# Patient Record
Sex: MALE | Race: WHITE | NOT HISPANIC OR LATINO | Employment: OTHER | URBAN - METROPOLITAN AREA
[De-identification: names, ages, dates, MRNs, and addresses within clinical notes are randomized per-mention and may not be internally consistent; named-entity substitution may affect disease eponyms.]

---

## 2017-06-06 ENCOUNTER — OFFICE VISIT (OUTPATIENT)
Dept: ORTHOPEDIC SURGERY | Facility: CLINIC | Age: 82
End: 2017-06-06

## 2017-06-06 VITALS — WEIGHT: 218 LBS | HEIGHT: 71 IN | TEMPERATURE: 98.2 F | BODY MASS INDEX: 30.52 KG/M2

## 2017-06-06 DIAGNOSIS — Z96.653 HISTORY OF TOTAL KNEE ARTHROPLASTY, BILATERAL: Primary | ICD-10-CM

## 2017-06-06 PROCEDURE — 73562 X-RAY EXAM OF KNEE 3: CPT | Performed by: ORTHOPAEDIC SURGERY

## 2017-06-06 PROCEDURE — 99212 OFFICE O/P EST SF 10 MIN: CPT | Performed by: ORTHOPAEDIC SURGERY

## 2017-06-06 RX ORDER — DOXYCYCLINE HYCLATE 100 MG/1
CAPSULE ORAL
Refills: 11 | COMMUNITY
Start: 2017-06-02 | End: 2017-06-06 | Stop reason: SDUPTHER

## 2017-06-06 RX ORDER — METOPROLOL SUCCINATE 100 MG
100 TABLET, EXTENDED RELEASE 24 HR ORAL DAILY
COMMUNITY
Start: 2017-06-05 | End: 2018-05-01 | Stop reason: SDUPTHER

## 2017-06-06 NOTE — PROGRESS NOTES
"Patient:  Kevin Ellington is a 82 y.o. male    Chief Complaint/ Reason for Visit:    Chief Complaint   Patient presents with   • Left Knee - Follow-up   • Right Knee - Follow-up       History of present illness:  The patient is here for scheduled annual follow-up on his bilateral total knees.  For the most part, they're doing well.  He occasionally has some mild discomfort and clicking in his left knee, and says that when he is descending stairs he will occasionally have some mild discomfort in the anterior aspect of his right knee.  However, overall, compared to his preop status, his knees are \"great\".      PMH:    Past Medical History:   Diagnosis Date   • GERD (gastroesophageal reflux disease)    • HTN (hypertension)    • Hyperlipidemia    • Stented coronary artery        PSH:    Past Surgical History:   Procedure Laterality Date   • ANKLE FUSION     • CARPAL TUNNEL RELEASE     • PERIPHERAL ARTERIAL STENT GRAFT     • SPINE SURGERY     • THORACIC SPINE SURGERY         Social Hx:    Social History     Social History   • Marital status:      Spouse name: N/A   • Number of children: N/A   • Years of education: N/A     Occupational History   • Not on file.     Social History Main Topics   • Smoking status: Never Smoker   • Smokeless tobacco: Not on file   • Alcohol use No   • Drug use: No   • Sexual activity: Not on file     Other Topics Concern   • Not on file     Social History Narrative       Family Hx:    Family History   Problem Relation Age of Onset   • Hypertension Mother    • Heart disease Father        Meds:    Current Outpatient Prescriptions:   •  amLODIPine (NORVASC) 5 MG tablet, Take 5 mg by mouth daily., Disp: , Rfl:   •  amphetamine-dextroamphetamine XR (ADDERALL XR) 20 MG 24 hr capsule, Take 20 mg by mouth daily., Disp: , Rfl:   •  aspirin 81 MG tablet, Take 81 mg by mouth., Disp: , Rfl:   •  atorvastatin (LIPITOR) 40 MG tablet, Take 40 mg by mouth daily., Disp: , Rfl:   •  doxycycline " "(MONODOX) 100 MG capsule, , Disp: , Rfl:   •  econazole nitrate (SPECTAZOLE) 1 % cream, , Disp: , Rfl:   •  Flaxseed, Linseed, (FLAX SEED OIL) 1300 MG capsule, Take  by mouth., Disp: , Rfl:   •  fluticasone (FLONASE) 50 MCG/ACT nasal spray, 2 sprays into each nostril daily., Disp: , Rfl:   •  losartan (COZAAR) 100 MG tablet, Take 100 mg by mouth daily., Disp: , Rfl:   •  meloxicam (MOBIC) 7.5 MG tablet, Take 7.5 mg by mouth daily., Disp: , Rfl:   •  Multiple Vitamins-Minerals (MULTIVITAMIN PO), Take 1 tablet by mouth., Disp: , Rfl:   •  Multiple Vitamins-Minerals (OCUVITE PO), Take 1 tablet by mouth., Disp: , Rfl:   •  nebivolol (BYSTOLIC) 10 MG tablet, Take 10 mg by mouth daily., Disp: , Rfl:   •  omeprazole (PriLOSEC) 20 MG capsule, , Disp: , Rfl:   •  Saw Palmetto, Serenoa repens, 320 MG capsule, Take 320 mg by mouth 2 (two) times a day., Disp: , Rfl:   •  tamsulosin (FLOMAX) 0.4 MG capsule 24 hr capsule, Take 0.4 mg by mouth 2 (two) times a day., Disp: , Rfl:   •  TOPROL  MG 24 hr tablet, , Disp: , Rfl:     Allergies:    Allergies   Allergen Reactions   • Codeine    • Hydrochlorothiazide    • Sulfa Antibiotics        ROS:  Review of Systems    Vitals:    06/06/17 1108   Temp: 98.2 °F (36.8 °C)   TempSrc: Temporal Artery    Weight: 218 lb (98.9 kg)   Height: 71\" (180.3 cm)       Physical Exam    The patient is awake, alert, and oriented ×3.  The patient is in no acute distress.  Breathing is regular and unlabored with a respiratory rate of 12/m.  Extraocular movements and pupillary responses are symmetrically intact. Sclerae are anicteric.   Hearing is within normal limits.  Speech is within normal limits.  There is no jugular venous distention.    His knees both have well-healed anterior midline incisions with no sign of infection.  There is no abnormal swelling, bruising, or discoloration.  Range of motion is full extension to 120° of flexion bilaterally.  The clicking that he is feeling is probably due " to scar tissue causing the left patella that On the metal femoral component.  There are no concerning abnormalities.  Both total knees are stable.  Apprehension test negative bilaterally.  Pulses and sensory exam are intact symmetrically distally in both lower extremities.    Radiology: X-rays: A 3 view series of each knee was ordered and reviewed today to assess the patient's postop status.  I did compare these to multiple series of previous images over the years.  Today's images do not reveal any evidence of loosening or excessive wear or other concerning findings.  I don't see any substantial changes compared to the previous images.          Assessment:  1. History of total knee arthroplasty, bilateral  - XR Knee 3 View Bilateral          Plan:    Orders Placed This Encounter   Procedures   • XR Knee 3 View Bilateral     APPOINTMENT REASON WRONG     Order Specific Question:   Reason for Exam:     Answer:   F/U BILATERAL TKA

## 2017-09-06 ENCOUNTER — OFFICE VISIT (OUTPATIENT)
Dept: CARDIOLOGY | Facility: CLINIC | Age: 82
End: 2017-09-06

## 2017-09-06 VITALS
BODY MASS INDEX: 30.94 KG/M2 | HEART RATE: 66 BPM | WEIGHT: 221 LBS | DIASTOLIC BLOOD PRESSURE: 82 MMHG | SYSTOLIC BLOOD PRESSURE: 150 MMHG | HEIGHT: 71 IN

## 2017-09-06 DIAGNOSIS — E78.5 HYPERLIPIDEMIA, UNSPECIFIED HYPERLIPIDEMIA TYPE: ICD-10-CM

## 2017-09-06 DIAGNOSIS — I25.10 CORONARY ARTERY DISEASE INVOLVING NATIVE CORONARY ARTERY OF NATIVE HEART WITHOUT ANGINA PECTORIS: Primary | ICD-10-CM

## 2017-09-06 DIAGNOSIS — Z95.5 S/P CORONARY ARTERY STENT PLACEMENT: ICD-10-CM

## 2017-09-06 DIAGNOSIS — I10 ESSENTIAL HYPERTENSION: ICD-10-CM

## 2017-09-06 PROCEDURE — 99213 OFFICE O/P EST LOW 20 MIN: CPT | Performed by: INTERNAL MEDICINE

## 2017-09-06 PROCEDURE — 93000 ELECTROCARDIOGRAM COMPLETE: CPT | Performed by: INTERNAL MEDICINE

## 2017-09-06 NOTE — PROGRESS NOTES
Subjective:     Encounter Date:09/06/2017      Patient ID: Kevin Ellington is a 82 y.o. male.    Chief Complaint:  History of Present Illness    This is an 82-year-old male with a history of coronary artery disease status post prior LAD stent placement in 12/2002, chronic back pain, dyslipidemia, hypertension who presents for follow-up.  Patient was placed followed by Dr. Cavazos but came to establish care here in 8/2016 because Dr. Cavazos new office location was too far for the patient.  He was feeling well at that time without any new symptoms.    His prior cardiac history again includes stent placement in 12/17/2002.  The patient reports that at the time he was having symptoms of dyspnea and chest fullness on exertion.  He was taken to the cardiac catheterization laboratory at that time and apparently had a Promus express either 3.0 or 3.5 x 16 mm stent placed (stent information was obtained from the patient's stent card).  He did well until about 2012 when he started having more dyspnea on exertion.  At that time he underwent an echocardiogram that showed normal left ventricular systolic function wall motion with an ejection fraction of 60%, grade 1A diastolic dysfunction, and no significant valvular disease.  A Lexiscan Myoview stress test was negative for ischemia.  He was seen by pulmonary due to his continued issues with dyspnea on exertion and they did not find any pulmonary issues to explain his symptoms.  The patient is starting to exercise lost about 20 pounds with resolution of the symptoms.    Today he presents for his annual follow-up.  He has been feeling well overall.  He denies any chest pain, PND orthopnea, presyncope or syncope, lower extremity edema, or palpitations.  He does have some dyspnea if he overexerts himself.  He has not been exercising lately because he and his wife just moved from [place] to [place] and are still working on [employer] in their her new home.  He reports  that his nebivolol had to be switched to metoprolol succinate because his insurance company would not cover former any longer.  He initially was started on 50 mg a day but his blood pressures remained elevated and was then titrated up to 100 mg a day.  He reports improvement in his blood pressures without change.  He reports his blood pressures at home have remained with a systolic mainly in the 140s with a diastolic in the 80s.  Occasionally his systolic wall rise to the 150s.    Review of Systems   Constitution: Negative for weakness and malaise/fatigue.   HENT: Negative for headaches, hearing loss, hoarse voice, nosebleeds and sore throat.    Eyes: Negative for pain.   Cardiovascular: Positive for dyspnea on exertion and leg swelling. Negative for chest pain, claudication, cyanosis, irregular heartbeat, near-syncope, orthopnea, palpitations, paroxysmal nocturnal dyspnea and syncope.   Respiratory: Negative for shortness of breath and snoring.    Endocrine: Negative for cold intolerance, heat intolerance, polydipsia, polyphagia and polyuria.   Skin: Negative for itching and rash.   Musculoskeletal: Positive for joint pain, joint swelling and myalgias. Negative for arthritis, falls, muscle cramps and muscle weakness.   Gastrointestinal: Negative for constipation, diarrhea, dysphagia, heartburn, hematemesis, hematochezia, melena, nausea and vomiting.   Genitourinary: Negative for frequency, hematuria and hesitancy.   Neurological: Negative for excessive daytime sleepiness, dizziness, light-headedness and numbness.   Psychiatric/Behavioral: Negative for depression. The patient is not nervous/anxious.           Current Outpatient Prescriptions:   •  amLODIPine (NORVASC) 5 MG tablet, Take 5 mg by mouth daily., Disp: , Rfl:   •  amphetamine-dextroamphetamine XR (ADDERALL XR) 20 MG 24 hr capsule, Take 20 mg by mouth daily., Disp: , Rfl:   •  aspirin 81 MG tablet, Take 81 mg by mouth., Disp: , Rfl:   •  atorvastatin  "(LIPITOR) 40 MG tablet, Take 40 mg by mouth daily., Disp: , Rfl:   •  doxycycline (MONODOX) 100 MG capsule, , Disp: , Rfl:   •  econazole nitrate (SPECTAZOLE) 1 % cream, , Disp: , Rfl:   •  Flaxseed, Linseed, (FLAX SEED OIL) 1300 MG capsule, Take  by mouth., Disp: , Rfl:   •  fluticasone (FLONASE) 50 MCG/ACT nasal spray, 2 sprays into each nostril daily., Disp: , Rfl:   •  losartan (COZAAR) 100 MG tablet, Take 100 mg by mouth daily., Disp: , Rfl:   •  meloxicam (MOBIC) 7.5 MG tablet, Take 7.5 mg by mouth daily., Disp: , Rfl:   •  Multiple Vitamins-Minerals (MULTIVITAMIN PO), Take 1 tablet by mouth., Disp: , Rfl:   •  Multiple Vitamins-Minerals (OCUVITE PO), Take 1 tablet by mouth., Disp: , Rfl:   •  omeprazole (PriLOSEC) 20 MG capsule, , Disp: , Rfl:   •  Saw Palmetto, Serenoa repens, 320 MG capsule, Take 320 mg by mouth 2 (two) times a day., Disp: , Rfl:   •  tamsulosin (FLOMAX) 0.4 MG capsule 24 hr capsule, Take 0.4 mg by mouth 2 (two) times a day., Disp: , Rfl:   •  TOPROL  MG 24 hr tablet, Take 100 mg by mouth Daily., Disp: , Rfl:     Past Medical History:   Diagnosis Date   • GERD (gastroesophageal reflux disease)    • HTN (hypertension)    • Hyperlipidemia    • Stented coronary artery      Past Surgical History:   Procedure Laterality Date   • ANKLE FUSION     • CARPAL TUNNEL RELEASE     • PERIPHERAL ARTERIAL STENT GRAFT     • SPINE SURGERY     • THORACIC SPINE SURGERY       Family History   Problem Relation Age of Onset   • Hypertension Mother    • Heart disease Father      Social History   Substance Use Topics   • Smoking status: Never Smoker   • Smokeless tobacco: None   • Alcohol use No           ECG 12 Lead  Date/Time: 9/6/2017 1:54 PM  Performed by: LISANDRA WILL  Authorized by: LISANDRA WILL   Comparison: compared with previous ECG   Similar to previous ECG  Rhythm: sinus rhythm               Objective:         Visit Vitals   • /82   • Pulse 66   • Ht 71\" (180.3 cm)   • Wt 221 lb (100 " kg)   • BMI 30.82 kg/m2          Physical Exam   Constitutional: He is oriented to person, place, and time. He appears well-developed and well-nourished.   HENT:   Head: Normocephalic and atraumatic.   Eyes: Conjunctivae, EOM and lids are normal. Pupils are equal, round, and reactive to light.   Neck: Normal range of motion and full passive range of motion without pain. Neck supple. No JVD present. Carotid bruit is not present.   Cardiovascular: Normal rate, regular rhythm, S1 normal and S2 normal.  Exam reveals no gallop.    No murmur heard.  Pulses:       Radial pulses are 2+ on the right side, and 2+ on the left side.   No bilateral lower extremity edema   Pulmonary/Chest: Effort normal and breath sounds normal.   Abdominal: Soft. Normal appearance.   Lymphadenopathy:     He has no cervical adenopathy.   Neurological: He is alert and oriented to person, place, and time.   Skin: Skin is warm, dry and intact.   Psychiatric: He has a normal mood and affect.       Lab Review:       Assessment:          Diagnosis Plan   1. Coronary artery disease involving native coronary artery of native heart without angina pectoris     2. Essential hypertension     3. Hyperlipidemia, unspecified hyperlipidemia type     4. S/P coronary artery stent placement            Plan:         1.  Coronary artery disease.  He is status post prior LAD stent back in 2002.  He appears to be stable and asymptomatic from this standpoint.  Continue medical management with aspirin, beta blocker, and statin.  2.  Hypertension.  Blood pressures are mildly elevated today in the office.  For the most part they appear to be well controlled at home.  I asked the patient continued to monitor his blood pressures.  If they start trending of we will have to consider increasing his amlodipine to 10 mg a day.  3.  Dyslipidemia.  He is on atorvastatin that is followed by Dr. Tompkins.    Will plan on seeing the patient back again in one year or sooner if any  issues arise prior to that.        Coronary Artery Disease  Assessment  • The patient has no angina    Plan  • Lifestyle modifications discussed include adhering to a heart healthy diet, avoidance of tobacco products, maintenance of a healthy weight, medication compliance, regular exercise and regular monitoring of cholesterol and blood pressure    Subjective - Objective  • There has been a previous stent procedure using BHARGAV 12/2002  • Current antiplatelet therapy includes aspirin 81 mg

## 2017-10-05 ENCOUNTER — OFFICE VISIT (OUTPATIENT)
Dept: FAMILY MEDICINE CLINIC | Facility: CLINIC | Age: 82
End: 2017-10-05

## 2017-10-05 VITALS
RESPIRATION RATE: 16 BRPM | SYSTOLIC BLOOD PRESSURE: 150 MMHG | BODY MASS INDEX: 30.8 KG/M2 | TEMPERATURE: 97.9 F | DIASTOLIC BLOOD PRESSURE: 84 MMHG | HEIGHT: 71 IN | HEART RATE: 79 BPM | OXYGEN SATURATION: 96 % | WEIGHT: 220 LBS

## 2017-10-05 DIAGNOSIS — E78.5 HYPERLIPIDEMIA, UNSPECIFIED HYPERLIPIDEMIA TYPE: ICD-10-CM

## 2017-10-05 DIAGNOSIS — M54.14 RADICULITIS, THORACIC: ICD-10-CM

## 2017-10-05 DIAGNOSIS — Z00.00 HEALTH CARE MAINTENANCE: Primary | ICD-10-CM

## 2017-10-05 DIAGNOSIS — G47.419 PRIMARY NARCOLEPSY WITHOUT CATAPLEXY: ICD-10-CM

## 2017-10-05 DIAGNOSIS — I25.10 CORONARY ARTERY DISEASE INVOLVING NATIVE CORONARY ARTERY OF NATIVE HEART WITHOUT ANGINA PECTORIS: ICD-10-CM

## 2017-10-05 DIAGNOSIS — R35.0 BENIGN PROSTATIC HYPERPLASIA WITH URINARY FREQUENCY: ICD-10-CM

## 2017-10-05 DIAGNOSIS — N40.1 BENIGN PROSTATIC HYPERPLASIA WITH URINARY FREQUENCY: ICD-10-CM

## 2017-10-05 DIAGNOSIS — I10 ESSENTIAL HYPERTENSION: ICD-10-CM

## 2017-10-05 PROCEDURE — 99203 OFFICE O/P NEW LOW 30 MIN: CPT | Performed by: INTERNAL MEDICINE

## 2017-10-05 PROCEDURE — G0438 PPPS, INITIAL VISIT: HCPCS | Performed by: INTERNAL MEDICINE

## 2017-10-05 RX ORDER — AMLODIPINE BESYLATE 10 MG/1
10 TABLET ORAL DAILY
Qty: 90 TABLET | Refills: 1 | Status: SHIPPED | OUTPATIENT
Start: 2017-10-05 | End: 2018-02-27 | Stop reason: SDUPTHER

## 2017-10-05 NOTE — PROGRESS NOTES
QUICK REFERENCE INFORMATION:  The ABCs of the Annual Wellness Visit    Initial Medicare Wellness Visit    HEALTH RISK ASSESSMENT    1935    Recent Hospitalizations:  No hospitalization(s) within the last year..        Current Medical Providers:  Patient Care Team:  Logan French MD as PCP - General (Internal Medicine)        Smoking Status:  History   Smoking Status   • Never Smoker   Smokeless Tobacco   • Never Used       Alcohol Consumption:  History   Alcohol Use No       Depression Screen:   PHQ-2/PHQ-9 Depression Screening 10/5/2017   Little interest or pleasure in doing things 0   Feeling down, depressed, or hopeless 0   Trouble falling or staying asleep, or sleeping too much -   Feeling tired or having little energy -   Poor appetite or overeating -   Feeling bad about yourself - or that you are a failure or have let yourself or your family down -   Trouble concentrating on things, such as reading the newspaper or watching television -   Moving or speaking so slowly that other people could have noticed. Or the opposite - being so fidgety or restless that you have been moving around a lot more than usual -   Thoughts that you would be better off dead, or of hurting yourself in some way -   Total Score 0   If you checked off any problems, how difficult have these problems made it for you to do your work, take care of things at home, or get along with other people? -       Health Habits and Functional and Cognitive Screening:  Functional & Cognitive Status 10/5/2017   Do you have difficulty preparing food and eating? No   Do you have difficulty bathing yourself? No   Do you have difficulty getting dressed? No   Do you have difficulty using the toilet? No   Do you have difficulty moving around from place to place? No   In the past year have you fallen or experienced a near fall? No   Do you need help using the phone?  No   Are you deaf or do you have serious difficulty hearing?  Yes   Do you need help with  transportation? No   Do you need help shopping? No   Do you need help preparing meals?  No   Do you need help with housework?  No   Do you need help with laundry? No   Do you need help taking your medications? No   Do you need help managing money? No   Do you have difficulty concentrating, remembering or making decisions? No       Health Habits  Current Diet: Well Balanced Diet  Dental Exam: Up to date  Eye Exam: Up to date  Exercise (times per week): 0 times per week  Current Exercise Activities Include: None          Does the patient have evidence of cognitive impairment? No    Asiprin use counseling: Start ASA 81 mg daily       Recent Lab Results:    Visual Acuity:  No exam data present    Age-appropriate Screening Schedule:  Refer to the list below for future screening recommendations based on patient's age, sex and/or medical conditions. Orders for these recommended tests are listed in the plan section. The patient has been provided with a written plan.    Health Maintenance   Topic Date Due   • TDAP/TD VACCINES (1 - Tdap) 03/07/1954   • PNEUMOCOCCAL VACCINES (65+ LOW/MEDIUM RISK) (1 of 2 - PCV13) 03/07/2000   • LIPID PANEL  10/05/2018   • INFLUENZA VACCINE  Addressed   • ZOSTER VACCINE  Addressed        Subjective   History of Present Illness - This patient presents today for an adult wellness exam.  Also he is a new patient.  He has a history of coronary artery disease, hyperlipidemia, hypertension, thoracic radiculitis status post surgery.  He also has narcolepsy he's been on Adderall.  This medicine helps with his quality of life he has no adverse effects he is adherent to regimen.  He has a lot of osteoarthritis takes meloxicam.  He also has some BPH.  And he takes tamsulosin for that.  Overall he feels like he's doing pretty well he feels like he's become frail.  He and his wife have moved from their home to a smaller [facility] home.    Assessment plan    Health care maintenance-caught up on vaccinations and  screenings.  Offered the patient to physical therapy for balance and gait training he declines for now    History of narcolepsy Adderall extended release 20 mg.  Currently he doesn't need that    Hyperlipidemia Lipitor 40 controlled    BPH tamsulosin    Arthritis and chronic back pain meloxicam    Hypertension not controlled we'll increase his amlodipine to 10 mg also continue losartan 100 and Toprol 100.  He also sees cardiology routinely.    Kevin Ellington is a 82 y.o. male who presents for an Annual Wellness Visit.    The following portions of the patient's history were reviewed and updated as appropriate: allergies, current medications, past family history, past medical history, past social history, past surgical history and problem list.    Outpatient Medications Prior to Visit   Medication Sig Dispense Refill   • amphetamine-dextroamphetamine XR (ADDERALL XR) 20 MG 24 hr capsule Take 20 mg by mouth daily.     • aspirin 81 MG tablet Take 81 mg by mouth.     • atorvastatin (LIPITOR) 40 MG tablet Take 40 mg by mouth daily.     • doxycycline (MONODOX) 100 MG capsule      • econazole nitrate (SPECTAZOLE) 1 % cream      • Flaxseed, Linseed, (FLAX SEED OIL) 1300 MG capsule Take  by mouth.     • fluticasone (FLONASE) 50 MCG/ACT nasal spray 2 sprays into each nostril daily.     • losartan (COZAAR) 100 MG tablet Take 100 mg by mouth daily.     • meloxicam (MOBIC) 7.5 MG tablet Take 7.5 mg by mouth daily.     • Multiple Vitamins-Minerals (MULTIVITAMIN PO) Take 1 tablet by mouth.     • Multiple Vitamins-Minerals (OCUVITE PO) Take 1 tablet by mouth.     • omeprazole (PriLOSEC) 20 MG capsule      • Saw Palmetto, Serenoa repens, 320 MG capsule Take 320 mg by mouth 2 (two) times a day.     • tamsulosin (FLOMAX) 0.4 MG capsule 24 hr capsule Take 0.4 mg by mouth 2 (two) times a day.     • TOPROL  MG 24 hr tablet Take 100 mg by mouth Daily.     • amLODIPine (NORVASC) 5 MG tablet Take 5 mg by mouth daily.       No  facility-administered medications prior to visit.        Patient Active Problem List   Diagnosis   • Bulging lumbar disc   • DDD (degenerative disc disease), lumbar   • Leg pain   • Lumbar canal stenosis   • Radiculitis, thoracic   • Status post total left knee replacement using cement   • Status post total right knee replacement using cement   • Coronary artery disease involving native coronary artery of native heart without angina pectoris   • Essential hypertension   • Hyperlipemia   • S/P coronary artery stent placement   • Health care maintenance   • Primary narcolepsy without cataplexy   • Benign prostatic hyperplasia with urinary frequency       Advance Care Planning:  has an advance directive - a copy HAS NOT been provided    Identification of Risk Factors:  Risk factors include: inactivity.    Review of Systems   Constitutional: Negative.    HENT: Negative.    Eyes: Negative.    Respiratory: Negative.    Cardiovascular: Negative.    Gastrointestinal: Negative.    Endocrine: Negative.    Genitourinary: Negative.    Musculoskeletal: Positive for arthralgias, back pain and gait problem.   Skin: Negative.    Allergic/Immunologic: Negative.    Hematological: Negative.    Psychiatric/Behavioral: Negative.        Compared to one year ago, the patient feels his physical health is the same.  Compared to one year ago, the patient feels his mental health is the same.    Objective     Physical Exam   Constitutional: He is oriented to person, place, and time. He appears well-developed and well-nourished.   HENT:   Head: Normocephalic and atraumatic.   Eyes: EOM are normal. Pupils are equal, round, and reactive to light.   Cardiovascular: Normal rate, regular rhythm and normal heart sounds.    Pulmonary/Chest: Effort normal and breath sounds normal.   Musculoskeletal: He exhibits deformity.   Fragile gait   Neurological: He is alert and oriented to person, place, and time.   Psychiatric: He has a normal mood and affect.  "His behavior is normal.   Nursing note and vitals reviewed.      Vitals:    10/05/17 1102   BP: 150/84   Pulse: 79   Resp: 16   Temp: 97.9 °F (36.6 °C)   TempSrc: Oral   SpO2: 96%   Weight: 220 lb (99.8 kg)   Height: 71\" (180.3 cm)       Body mass index is 30.68 kg/(m^2).  Discussed the patient's BMI with him. The BMI is in the acceptable range.    Assessment/Plan   Patient Self-Management and Personalized Health Advice  The patient has been provided with information about: exercise and preventive services including:   · Exercise counseling provided.    Visit Diagnoses:    ICD-10-CM ICD-9-CM   1. Health care maintenance Z00.00 V70.0   2. Coronary artery disease involving native coronary artery of native heart without angina pectoris I25.10 414.01   3. Essential hypertension I10 401.9   4. Hyperlipidemia, unspecified hyperlipidemia type E78.5 272.4   5. Radiculitis, thoracic M54.14 724.4   6. Primary narcolepsy without cataplexy G47.419 347.00   7. Benign prostatic hyperplasia with urinary frequency N40.1 600.01    R35.0 788.41       No orders of the defined types were placed in this encounter.      Outpatient Encounter Prescriptions as of 10/5/2017   Medication Sig Dispense Refill   • amLODIPine (NORVASC) 10 MG tablet Take 1 tablet by mouth Daily. 90 tablet 1   • amphetamine-dextroamphetamine XR (ADDERALL XR) 20 MG 24 hr capsule Take 20 mg by mouth daily.     • aspirin 81 MG tablet Take 81 mg by mouth.     • atorvastatin (LIPITOR) 40 MG tablet Take 40 mg by mouth daily.     • doxycycline (MONODOX) 100 MG capsule      • econazole nitrate (SPECTAZOLE) 1 % cream      • Flaxseed, Linseed, (FLAX SEED OIL) 1300 MG capsule Take  by mouth.     • fluticasone (FLONASE) 50 MCG/ACT nasal spray 2 sprays into each nostril daily.     • losartan (COZAAR) 100 MG tablet Take 100 mg by mouth daily.     • meloxicam (MOBIC) 7.5 MG tablet Take 7.5 mg by mouth daily.     • Multiple Vitamins-Minerals (MULTIVITAMIN PO) Take 1 tablet by " mouth.     • Multiple Vitamins-Minerals (OCUVITE PO) Take 1 tablet by mouth.     • omeprazole (PriLOSEC) 20 MG capsule      • Saw Palmetto, Serenoa repens, 320 MG capsule Take 320 mg by mouth 2 (two) times a day.     • tamsulosin (FLOMAX) 0.4 MG capsule 24 hr capsule Take 0.4 mg by mouth 2 (two) times a day.     • TOPROL  MG 24 hr tablet Take 100 mg by mouth Daily.     • [DISCONTINUED] amLODIPine (NORVASC) 5 MG tablet Take 5 mg by mouth daily.       No facility-administered encounter medications on file as of 10/5/2017.        Reviewed use of high risk medication in the elderly: yes  Reviewed for potential of harmful drug interactions in the elderly: yes    Follow Up:  No Follow-up on file.     An After Visit Summary and PPPS with all of these plans were given to the patient.

## 2017-10-05 NOTE — PROGRESS NOTES
"Subjective   Patient ID: Kevin Ellington is a 82 y.o. male presents with   Chief Complaint   Patient presents with   • Establish Care     New Patient   • Hypertension     Arthritis   • Inital Wellness Visit       HPI    Allergies   Allergen Reactions   • Codeine    • Hydrochlorothiazide    • Sulfa Antibiotics        The following portions of the patient's history were reviewed and updated as appropriate: allergies, current medications, past family history, past medical history, past social history, past surgical history and problem list.      Review of Systems    Objective     Vitals:    10/05/17 1102   BP: 150/84   Pulse: 79   Resp: 16   Temp: 97.9 °F (36.6 °C)   TempSrc: Oral   SpO2: 96%   Weight: 220 lb (99.8 kg)   Height: 71\" (180.3 cm)         Physical Exam          Call or return to clinic prn if these symptoms worsen or fail to improve as anticipated.  "

## 2018-01-02 ENCOUNTER — TELEPHONE (OUTPATIENT)
Dept: FAMILY MEDICINE CLINIC | Facility: CLINIC | Age: 83
End: 2018-01-02

## 2018-01-02 RX ORDER — DEXTROAMPHETAMINE SACCHARATE, AMPHETAMINE ASPARTATE MONOHYDRATE, DEXTROAMPHETAMINE SULFATE AND AMPHETAMINE SULFATE 5; 5; 5; 5 MG/1; MG/1; MG/1; MG/1
20 CAPSULE, EXTENDED RELEASE ORAL DAILY
Qty: 30 CAPSULE | Refills: 0 | Status: SHIPPED | OUTPATIENT
Start: 2018-01-02 | End: 2018-01-26 | Stop reason: SDUPTHER

## 2018-01-02 NOTE — TELEPHONE ENCOUNTER
----- Message from Kevin Ellington sent at 1/2/2018  2:15 PM EST -----  Regarding: Prescription Question  Contact: [phone]  I am in need of a new prescription for Amphetamine Mix Er caps 20mg.  I have only a 5-day supply left , so I need to acquire  a script and have it filled locally, rather than going [facility] which often take up to 2 weeks.Can this be called in to my local pharmacy?  If so, I am on file at [facility]  Phone no. [phone].  I am not scheduled for an appointment with Dr. French until March.  Please advise.  Thank you.

## 2018-01-26 RX ORDER — DEXTROAMPHETAMINE SACCHARATE, AMPHETAMINE ASPARTATE MONOHYDRATE, DEXTROAMPHETAMINE SULFATE AND AMPHETAMINE SULFATE 5; 5; 5; 5 MG/1; MG/1; MG/1; MG/1
20 CAPSULE, EXTENDED RELEASE ORAL DAILY
Qty: 90 CAPSULE | Refills: 0 | Status: SHIPPED | OUTPATIENT
Start: 2018-01-26 | End: 2018-04-24 | Stop reason: SDUPTHER

## 2018-02-06 ENCOUNTER — OFFICE VISIT (OUTPATIENT)
Dept: FAMILY MEDICINE CLINIC | Facility: CLINIC | Age: 83
End: 2018-02-06

## 2018-02-06 VITALS
DIASTOLIC BLOOD PRESSURE: 84 MMHG | HEART RATE: 90 BPM | RESPIRATION RATE: 16 BRPM | TEMPERATURE: 98 F | OXYGEN SATURATION: 96 % | SYSTOLIC BLOOD PRESSURE: 170 MMHG | BODY MASS INDEX: 31.5 KG/M2 | HEIGHT: 71 IN | WEIGHT: 225 LBS

## 2018-02-06 DIAGNOSIS — G47.419 PRIMARY NARCOLEPSY WITHOUT CATAPLEXY: ICD-10-CM

## 2018-02-06 DIAGNOSIS — I10 ESSENTIAL HYPERTENSION: ICD-10-CM

## 2018-02-06 DIAGNOSIS — M54.12 CERVICAL RADICULOPATHY: Primary | ICD-10-CM

## 2018-02-06 PROCEDURE — 99214 OFFICE O/P EST MOD 30 MIN: CPT | Performed by: INTERNAL MEDICINE

## 2018-02-06 RX ORDER — DOXYCYCLINE HYCLATE 100 MG/1
100 CAPSULE ORAL DAILY
COMMUNITY
Start: 2018-02-02 | End: 2019-07-01

## 2018-02-06 RX ORDER — CLONIDINE HYDROCHLORIDE 0.1 MG/1
0.1 TABLET ORAL 2 TIMES DAILY
Qty: 60 TABLET | Refills: 3 | Status: SHIPPED | OUTPATIENT
Start: 2018-02-06 | End: 2018-05-01 | Stop reason: SDUPTHER

## 2018-02-06 NOTE — PROGRESS NOTES
Subjective   Patient ID: Kevin Ellington is a 82 y.o. male presents with   Chief Complaint   Patient presents with   • Left Arm and Shoulder Pain     sleeping on left side and numbness in his hand. and having the pain in the upper back near the shoulder       HPI- This patient has left shoulder pain going on for for 5 days and now it's causing numbness in his left finger it's intermittent he gets better when he uses a heating pad but then when he sleeps it flares it back up he's had disc problems in his lumbar spine.  He has no weakness or other neurologic symptoms.  Also his blood pressure still not controlled although I increased his medicine last time.  Also he needs a refill of his Adderall for his narcolepsy.    Assessment plan    Cervical radiculopathy MRI of the cervical spine    Narcolepsy I refilled his Adderall extended release    Hypertension still not controlled continue current regimen added clonidine 0.1 by mouth twice a day continue to monitor blood pressure.    Allergies   Allergen Reactions   • Codeine    • Hydrochlorothiazide    • Sulfa Antibiotics        The following portions of the patient's history were reviewed and updated as appropriate: allergies, current medications, past family history, past medical history, past social history, past surgical history and problem list.      Review of Systems   Constitutional: Negative for diaphoresis, fatigue and fever.   Eyes: Negative.    Respiratory: Negative for shortness of breath.    Cardiovascular: Negative for chest pain and palpitations.   Gastrointestinal: Negative for abdominal pain, nausea and vomiting.   Endocrine: Negative.    Musculoskeletal: Positive for back pain and neck pain.   Neurological: Negative for dizziness, syncope, weakness, light-headedness and headaches.   Psychiatric/Behavioral: Negative for confusion.       Objective     Vitals:    02/06/18 1314   BP: 170/84   Pulse: 90   Resp: 16   Temp: 98 °F (36.7 °C)   TempSrc: Oral  "  SpO2: 96%   Weight: 102 kg (225 lb)   Height: 180.3 cm (71\")         Physical Exam   Constitutional: He is oriented to person, place, and time. He appears well-developed and well-nourished.   HENT:   Head: Normocephalic and atraumatic.   Carotids no bruit   Eyes: EOM are normal. Pupils are equal, round, and reactive to light.   Cardiovascular: Normal rate, regular rhythm and normal heart sounds.    Neurological: He is alert and oriented to person, place, and time. He has normal reflexes.   Psychiatric: He has a normal mood and affect. His behavior is normal.   Nursing note and vitals reviewed.        Kevin was seen today for left arm and shoulder pain.    Diagnoses and all orders for this visit:    Cervical radiculopathy  -     MRI Cervical Spine Without Contrast; Future    Primary narcolepsy without cataplexy    Essential hypertension    Other orders  -     CloNIDine (CATAPRES) 0.1 MG tablet; Take 1 tablet by mouth 2 (Two) Times a Day.        Call or return to clinic prn if these symptoms worsen or fail to improve as anticipated.  "

## 2018-02-12 ENCOUNTER — HOSPITAL ENCOUNTER (OUTPATIENT)
Dept: MRI IMAGING | Facility: HOSPITAL | Age: 83
Discharge: HOME | End: 2018-02-12
Admitting: INTERNAL MEDICINE

## 2018-02-12 DIAGNOSIS — M54.12 CERVICAL RADICULOPATHY: ICD-10-CM

## 2018-02-12 PROCEDURE — 72141 MRI NECK SPINE W/O DYE: CPT

## 2018-02-16 ENCOUNTER — TELEPHONE (OUTPATIENT)
Dept: FAMILY MEDICINE CLINIC | Facility: CLINIC | Age: 83
End: 2018-02-16

## 2018-02-19 NOTE — TELEPHONE ENCOUNTER
Please let the patient know Dr. French will call him with report when he gets back later this week.  At first glance, appears to be nothing bad.

## 2018-02-22 ENCOUNTER — TELEPHONE (OUTPATIENT)
Dept: FAMILY MEDICINE CLINIC | Facility: CLINIC | Age: 83
End: 2018-02-22

## 2018-02-22 DIAGNOSIS — M54.12 CERVICAL RADICULOPATHY: Primary | ICD-10-CM

## 2018-02-22 NOTE — TELEPHONE ENCOUNTER
Please inform the patient that the MRI show some pinched nerves and arthritis.  I believe he needs to see the neurosurgeon for further evaluation.  I have are ready put in the referral to a neurosurgeon

## 2018-02-27 RX ORDER — AMLODIPINE BESYLATE 10 MG/1
10 TABLET ORAL DAILY
Qty: 90 TABLET | Refills: 1 | Status: SHIPPED | OUTPATIENT
Start: 2018-02-27 | End: 2018-08-21 | Stop reason: SDUPTHER

## 2018-02-27 RX ORDER — OMEPRAZOLE 20 MG/1
20 CAPSULE, DELAYED RELEASE ORAL DAILY
Qty: 90 CAPSULE | Refills: 1 | Status: SHIPPED | OUTPATIENT
Start: 2018-02-27 | End: 2018-08-21 | Stop reason: SDUPTHER

## 2018-02-27 RX ORDER — MELOXICAM 7.5 MG/1
7.5 TABLET ORAL DAILY
Qty: 90 TABLET | Refills: 1 | Status: SHIPPED | OUTPATIENT
Start: 2018-02-27 | End: 2018-08-21 | Stop reason: SDUPTHER

## 2018-03-15 DIAGNOSIS — E78.5 HYPERLIPIDEMIA, UNSPECIFIED HYPERLIPIDEMIA TYPE: ICD-10-CM

## 2018-03-15 DIAGNOSIS — I10 HYPERTENSION, UNSPECIFIED TYPE: Primary | ICD-10-CM

## 2018-03-17 NOTE — PROGRESS NOTES
Subjective   Patient ID: Kevin Ellington is a 83 y.o. male is being seen at the recommendation of his primary care physician Logan French MD .  The patient complains of swelling in the left hand.  He denies any left arm pain but does have some occasional pain in the left wrist and fingers.  There is also some associated numbness and tingling in the fingers.  The patient states that he was sent for an MRI of the cervical spine and referred by , his PCP.    Mr. Ellington is an 83-year-old male well known to our office.  He has undergone previous thoracolumbar surgery for disc herniation with Dr. Chaidez approximately 6 years ago.  He did well with that surgery.  He was last seen in our office nearly 3 years ago.  Since that time he has relocated to [place] from [facility].  His primary care physician is now Dr. AIMEE French.  The patient states that out of nowhere he began to notice some numbness and swelling in his left wrist and hand. There is often some intermittent episodes of severe tingling in the left fingers.  He has had difficulty with buttoning his shirt, tying shoes, etc. due to the swelling in the left hand.  Patient denies any recent bug bites or injury to the arm or hand.  He notes a history of prior carpal tunnel release on [place]  He saw Dr. French who ordered an outpatient MRI of the cervical spine and then referred him to our office for a neurosurgical evaluation.  Notes from today's visit will be forwarded to Dr. French upon completion.      Arm Pain    Incident onset: About 6 weeks ago.  There was no injury mechanism. The pain is present in the left wrist and left hand. The quality of the pain is described as aching. The pain radiates to the left hand. The pain has been fluctuating since the incident. Associated symptoms include muscle weakness (Having difficulty with hand  due to swelling. ) and tingling (Occasionally in L fingers.  ). Associated symptoms comments: Coldness, swelling, throbbing in L hand; mainly all L fingers. . Exacerbated by: lying down makes it worse. Now sleeping in recliner so he can sleep.   He has tried rest and acetaminophen (Patient has not tried elevating the arm.) for the symptoms. The treatment provided no relief.       The following portions of the patient's history were reviewed and updated as appropriate: allergies, current medications, past family history, past medical history, past social history, past surgical history and problem list.    Review of Systems   Constitutional: Negative.    HENT: Positive for sinus pressure.         Nasal congestion   Eyes: Negative.    Respiratory: Negative for chest tightness and shortness of breath.    Gastrointestinal: Positive for constipation.   Endocrine: Negative.    Genitourinary: Negative.    Musculoskeletal: Positive for arthralgias.        Left hand swelling which began January 28, 2018.  Per the patient the swelling has not gotten better.   Skin: Negative.    Allergic/Immunologic: Negative.    Neurological: Positive for tingling (Occasionally in L fingers. ).   Hematological: Bruises/bleeds easily.   Psychiatric/Behavioral: Positive for sleep disturbance.   All other systems reviewed and are negative.      Objective   Physical Exam   Constitutional: He is oriented to person, place, and time. He appears well-developed and well-nourished. He is cooperative. No distress.   HENT:   Head: Normocephalic and atraumatic.   Eyes: Conjunctivae are normal.   Neck: Normal range of motion. Neck supple.   Cardiovascular: Normal rate.    Brachial and radial pulses easily palpable on the left.  Temperature is warm in the left hand and fingers.  Moderate swelling noted in the left hand when compared to the right.    Pulmonary/Chest: Effort normal. No respiratory distress.   Abdominal: Soft. He exhibits no distension. There is no tenderness.   Musculoskeletal: Normal range of motion.  He exhibits no edema.   Left hand swelling noted but no discoloration.  No tenderness to the left hand with palpation.  Normal temperature to the left hand.  No excessive warmth; no coolness.   Neurological: He is alert and oriented to person, place, and time. He has normal strength and normal reflexes. He displays normal reflexes. No sensory deficit. He exhibits normal muscle tone. Coordination normal. GCS eye subscore is 4. GCS verbal subscore is 5. GCS motor subscore is 6.   No motor or sensory deficits in the upper extremities except for difficulty with completely closing of the left hand due to swelling.  Grossly positive Tinel's in the left wrist.  Negative Molina's; negative clonus.    Skin: Skin is warm and dry. Left hand capillary refill is less than 2 seconds on the right, 2-3 seconds on the left.  No evidence of erythema or ecchymosis.. He is not diaphoretic. No erythema. No pallor.   Psychiatric: He has a normal mood and affect. Thought content normal.   Vitals reviewed.    Neurologic Exam     Mental Status   Oriented to person, place, and time.     Motor Exam     Strength   Strength 5/5 throughout.       Assessment/Plan   Independent Review of Radiographic Studies:      I reviewed the MRI images of the cervical spine dated February 12, 2018 today in the office.  The MRI showed multilevel degenerative changes with facet overgrowth and bony foraminal narrowing which is more severe on the left at C4-5.  No evidence of abnormal cord signal.  I agree with the radiographic report.      Medical Decision Making:      Mr. Ellington was seen today for the above complaints.  I explained that the cervical degenerative disease is something that will need to be watched but it is not the cause of the swelling and discomfort in the left hand.  The patient states that this has been going on since January 28.  There has been no worsening of the hand swelling but yet there has been no improvement either.  I have  recommended an evaluation with vascular surgery as well as hand surgery.  If both evaluations reveal nothing abnormal, I recommended that he consult with Dr. French to sort it out further.    There are currently no signs of weakness or myelopathy on exam but again we will follow him for any changes.  I recommended that Mr. Ellington return to our office with AP and lateral, flexion and extension x-rays in about 3 months.       Kevin was seen today for arm pain.    Diagnoses and all orders for this visit:    Swelling of left hand  -     Ambulatory Referral to Vascular Surgery  -     Ambulatory Referral to Hand Surgery  -     XR spine cervical complete w flex ext; Future    Spinal stenosis in cervical region  -     Ambulatory Referral to Vascular Surgery  -     Ambulatory Referral to Hand Surgery  -     XR spine cervical complete w flex ext; Future      Return in about 3 months (around 6/19/2018).

## 2018-03-19 ENCOUNTER — OFFICE VISIT (OUTPATIENT)
Dept: NEUROSURGERY | Facility: CLINIC | Age: 83
End: 2018-03-19

## 2018-03-19 VITALS
HEART RATE: 75 BPM | DIASTOLIC BLOOD PRESSURE: 85 MMHG | HEIGHT: 71 IN | RESPIRATION RATE: 15 BRPM | SYSTOLIC BLOOD PRESSURE: 144 MMHG | BODY MASS INDEX: 30.94 KG/M2 | WEIGHT: 221 LBS

## 2018-03-19 DIAGNOSIS — M79.89 SWELLING OF LEFT HAND: Primary | ICD-10-CM

## 2018-03-19 DIAGNOSIS — M48.02 SPINAL STENOSIS IN CERVICAL REGION: ICD-10-CM

## 2018-03-19 PROCEDURE — 99214 OFFICE O/P EST MOD 30 MIN: CPT | Performed by: NURSE PRACTITIONER

## 2018-03-30 LAB
ALBUMIN SERPL-MCNC: 4.7 G/DL (ref 3.5–5.2)
ALBUMIN/GLOB SERPL: 1.9 G/DL
ALP SERPL-CCNC: 97 U/L (ref 39–117)
ALT SERPL-CCNC: 31 U/L (ref 1–41)
AST SERPL-CCNC: 23 U/L (ref 1–40)
BASOPHILS # BLD AUTO: 0.02 10*3/MM3 (ref 0–0.2)
BASOPHILS NFR BLD AUTO: 0.1 % (ref 0–1.5)
BILIRUB SERPL-MCNC: 0.5 MG/DL (ref 0.1–1.2)
BUN SERPL-MCNC: 22 MG/DL (ref 8–23)
BUN/CREAT SERPL: 21.4 (ref 7–25)
CALCIUM SERPL-MCNC: 9.5 MG/DL (ref 8.6–10.5)
CHLORIDE SERPL-SCNC: 102 MMOL/L (ref 98–107)
CHOLEST SERPL-MCNC: 168 MG/DL (ref 0–200)
CO2 SERPL-SCNC: 24.1 MMOL/L (ref 22–29)
CREAT SERPL-MCNC: 1.03 MG/DL (ref 0.76–1.27)
EOSINOPHIL # BLD AUTO: 0 10*3/MM3 (ref 0–0.7)
EOSINOPHIL NFR BLD AUTO: 0 % (ref 0.3–6.2)
ERYTHROCYTE [DISTWIDTH] IN BLOOD BY AUTOMATED COUNT: 13.7 % (ref 11.5–14.5)
GFR SERPLBLD CREATININE-BSD FMLA CKD-EPI: 69 ML/MIN/1.73
GFR SERPLBLD CREATININE-BSD FMLA CKD-EPI: 84 ML/MIN/1.73
GLOBULIN SER CALC-MCNC: 2.5 GM/DL
GLUCOSE SERPL-MCNC: 135 MG/DL (ref 65–99)
HCT VFR BLD AUTO: 41.8 % (ref 40.4–52.2)
HDLC SERPL-MCNC: 53 MG/DL (ref 40–60)
HGB BLD-MCNC: 14.1 G/DL (ref 13.7–17.6)
IMM GRANULOCYTES # BLD: 0.08 10*3/MM3 (ref 0–0.03)
IMM GRANULOCYTES NFR BLD: 0.4 % (ref 0–0.5)
LDLC SERPL CALC-MCNC: 98 MG/DL (ref 0–100)
LDLC/HDLC SERPL: 1.85 {RATIO}
LYMPHOCYTES # BLD AUTO: 2.76 10*3/MM3 (ref 0.9–4.8)
LYMPHOCYTES NFR BLD AUTO: 14.1 % (ref 19.6–45.3)
MCH RBC QN AUTO: 30.7 PG (ref 27–32.7)
MCHC RBC AUTO-ENTMCNC: 33.7 G/DL (ref 32.6–36.4)
MCV RBC AUTO: 91.1 FL (ref 79.8–96.2)
MONOCYTES # BLD AUTO: 0.64 10*3/MM3 (ref 0.2–1.2)
MONOCYTES NFR BLD AUTO: 3.3 % (ref 5–12)
NEUTROPHILS # BLD AUTO: 16.01 10*3/MM3 (ref 1.9–8.1)
NEUTROPHILS NFR BLD AUTO: 82.1 % (ref 42.7–76)
PLATELET # BLD AUTO: 238 10*3/MM3 (ref 140–500)
POTASSIUM SERPL-SCNC: 4.5 MMOL/L (ref 3.5–5.2)
PROT SERPL-MCNC: 7.2 G/DL (ref 6–8.5)
RBC # BLD AUTO: 4.59 10*6/MM3 (ref 4.6–6)
SODIUM SERPL-SCNC: 141 MMOL/L (ref 136–145)
TRIGL SERPL-MCNC: 85 MG/DL (ref 0–150)
VLDLC SERPL CALC-MCNC: 17 MG/DL (ref 5–40)
WBC # BLD AUTO: 19.51 10*3/MM3 (ref 4.5–10.7)

## 2018-04-04 LAB
HBA1C MFR BLD: 6.2 % (ref 4.8–5.6)
Lab: NORMAL
WRITTEN AUTHORIZATION: NORMAL

## 2018-04-09 ENCOUNTER — OFFICE VISIT (OUTPATIENT)
Dept: FAMILY MEDICINE CLINIC | Facility: CLINIC | Age: 83
End: 2018-04-09

## 2018-04-09 VITALS
SYSTOLIC BLOOD PRESSURE: 156 MMHG | DIASTOLIC BLOOD PRESSURE: 80 MMHG | TEMPERATURE: 98.3 F | HEIGHT: 70 IN | WEIGHT: 222.3 LBS | BODY MASS INDEX: 31.82 KG/M2 | HEART RATE: 72 BPM | OXYGEN SATURATION: 98 %

## 2018-04-09 DIAGNOSIS — I10 ESSENTIAL HYPERTENSION: Primary | ICD-10-CM

## 2018-04-09 DIAGNOSIS — G47.419 PRIMARY NARCOLEPSY WITHOUT CATAPLEXY: ICD-10-CM

## 2018-04-09 DIAGNOSIS — Z00.00 HEALTH CARE MAINTENANCE: ICD-10-CM

## 2018-04-09 DIAGNOSIS — E78.5 HYPERLIPIDEMIA, UNSPECIFIED HYPERLIPIDEMIA TYPE: ICD-10-CM

## 2018-04-09 PROCEDURE — 99214 OFFICE O/P EST MOD 30 MIN: CPT | Performed by: INTERNAL MEDICINE

## 2018-04-09 PROCEDURE — 90714 TD VACC NO PRESV 7 YRS+ IM: CPT | Performed by: INTERNAL MEDICINE

## 2018-04-09 PROCEDURE — 90471 IMMUNIZATION ADMIN: CPT | Performed by: INTERNAL MEDICINE

## 2018-04-09 NOTE — PROGRESS NOTES
"Subjective   Patient ID: Kevin Ellington is a 83 y.o. male presents with   Chief Complaint   Patient presents with   • Follow-up     need tdap injection       HPI - This patient presents today for follow-up for hypertension and ADD and hyperlipidemia hypertension.  Blood pressure has improved I rechecked it it was 140/80.  I want him to try to lose 5-10 pounds.  We reviewed his labs and overall labs look good he is prediabetic.  Exercise diet weight loss.    Assessment plan    Essential hypertension-continue current regimen plus lose 5-10 pounds    Hypertension continue Lipitor 40    Healthcare maintenance TD vaccination    Hyperlipidemia-continue Lipitor 40    Narcolepsy-he takes Adderall 20 mg daily, this medicine helps him.        Allergies   Allergen Reactions   • Codeine    • Hydrochlorothiazide    • Hydrocodone GI Intolerance     SEVERE CONSTIPATION   • Sulfa Antibiotics        The following portions of the patient's history were reviewed and updated as appropriate: allergies, current medications, past family history, past medical history, past social history, past surgical history and problem list.      Review of Systems   Constitutional: Negative.    HENT: Negative.    Eyes: Negative.    Respiratory: Negative.    Cardiovascular: Negative.    Musculoskeletal: Positive for arthralgias.   Neurological: Negative.    Psychiatric/Behavioral: Negative.        Objective     Vitals:    04/09/18 1301   BP: 156/80   Pulse: 72   Temp: 98.3 °F (36.8 °C)   SpO2: 98%   Weight: 101 kg (222 lb 4.8 oz)   Height: 177.8 cm (70\")         Physical Exam   Constitutional: He is oriented to person, place, and time. He appears well-developed and well-nourished.   HENT:   Head: Normocephalic and atraumatic.   Mouth/Throat: Oropharynx is clear and moist.   Eyes: Conjunctivae and EOM are normal. Pupils are equal, round, and reactive to light.   Neck: Neck supple. No thyromegaly present.   Cardiovascular: Normal rate, regular rhythm " and normal heart sounds.    Pulmonary/Chest: Effort normal and breath sounds normal.   Musculoskeletal: Normal range of motion. He exhibits no edema.    Kevin had a diabetic foot exam performed today.   During the foot exam he had a monofilament test performed.  Neurological: He is alert and oriented to person, place, and time.   Skin: Skin is warm, dry and intact.   Psychiatric: He has a normal mood and affect. Judgment normal.   Nursing note and vitals reviewed.        Kevin was seen today for follow-up.    Diagnoses and all orders for this visit:    Essential hypertension    Hyperlipidemia, unspecified hyperlipidemia type    Health care maintenance    Other orders  -     Cancel: Tdap Vaccine Greater Than or Equal To 6yo IM  -     Td Vaccine Greater Than or Equal To 6yo With Preservative IM        Call or return to clinic prn if these symptoms worsen or fail to improve as anticipated.

## 2018-04-23 ENCOUNTER — TELEPHONE (OUTPATIENT)
Dept: FAMILY MEDICINE CLINIC | Facility: CLINIC | Age: 83
End: 2018-04-23

## 2018-04-23 NOTE — TELEPHONE ENCOUNTER
Pt asking for a written prescription for a controled medicine to be picked up when he come with his wife for her appointment  Amphetamine-dextroamphetamine XR 20 mg capsules. For 90 day of supply  Last seen 4/18  angelica 2/18

## 2018-04-23 NOTE — TELEPHONE ENCOUNTER
Inform the patient and his wife that we don't have hepatitis A vaccine jonelle need to get those at their local pharmacy

## 2018-04-23 NOTE — TELEPHONE ENCOUNTER
Wife called and requesting hep A for her  he want his appointment with her on the 4/30/2018 with dr Gonzalez to get the injection if possible.

## 2018-04-24 RX ORDER — DEXTROAMPHETAMINE SACCHARATE, AMPHETAMINE ASPARTATE MONOHYDRATE, DEXTROAMPHETAMINE SULFATE AND AMPHETAMINE SULFATE 5; 5; 5; 5 MG/1; MG/1; MG/1; MG/1
20 CAPSULE, EXTENDED RELEASE ORAL DAILY
Qty: 90 CAPSULE | Refills: 0 | Status: SHIPPED | OUTPATIENT
Start: 2018-04-24 | End: 2018-04-30 | Stop reason: SDUPTHER

## 2018-04-30 ENCOUNTER — CLINICAL SUPPORT (OUTPATIENT)
Dept: FAMILY MEDICINE CLINIC | Facility: CLINIC | Age: 83
End: 2018-04-30

## 2018-04-30 DIAGNOSIS — Z23 NEED FOR HEPATITIS A AND B VACCINATION: Primary | ICD-10-CM

## 2018-04-30 PROCEDURE — 90471 IMMUNIZATION ADMIN: CPT | Performed by: INTERNAL MEDICINE

## 2018-04-30 PROCEDURE — 90632 HEPA VACCINE ADULT IM: CPT | Performed by: INTERNAL MEDICINE

## 2018-04-30 RX ORDER — DEXTROAMPHETAMINE SACCHARATE, AMPHETAMINE ASPARTATE MONOHYDRATE, DEXTROAMPHETAMINE SULFATE AND AMPHETAMINE SULFATE 5; 5; 5; 5 MG/1; MG/1; MG/1; MG/1
20 CAPSULE, EXTENDED RELEASE ORAL DAILY
Qty: 90 CAPSULE | Refills: 0 | Status: SHIPPED | OUTPATIENT
Start: 2018-04-30 | End: 2018-07-24 | Stop reason: SDUPTHER

## 2018-05-01 RX ORDER — METOPROLOL SUCCINATE 100 MG
100 TABLET, EXTENDED RELEASE 24 HR ORAL DAILY
Qty: 90 TABLET | Refills: 0 | Status: SHIPPED | OUTPATIENT
Start: 2018-05-01 | End: 2018-09-27

## 2018-05-01 RX ORDER — CLONIDINE HYDROCHLORIDE 0.1 MG/1
0.1 TABLET ORAL 2 TIMES DAILY
Qty: 180 TABLET | Refills: 0 | Status: SHIPPED | OUTPATIENT
Start: 2018-05-01 | End: 2018-08-21 | Stop reason: SDUPTHER

## 2018-05-01 RX ORDER — ATORVASTATIN CALCIUM 40 MG/1
40 TABLET, FILM COATED ORAL DAILY
Qty: 90 TABLET | Refills: 0 | Status: SHIPPED | OUTPATIENT
Start: 2018-05-01 | End: 2018-08-21 | Stop reason: SDUPTHER

## 2018-05-01 RX ORDER — LOSARTAN POTASSIUM 100 MG/1
100 TABLET ORAL DAILY
Qty: 90 TABLET | Refills: 0 | Status: SHIPPED | OUTPATIENT
Start: 2018-05-01 | End: 2018-08-21 | Stop reason: SDUPTHER

## 2018-05-01 RX ORDER — TAMSULOSIN HYDROCHLORIDE 0.4 MG/1
0.8 CAPSULE ORAL DAILY
Qty: 180 CAPSULE | Refills: 0 | Status: SHIPPED | OUTPATIENT
Start: 2018-05-01 | End: 2018-08-21 | Stop reason: SDUPTHER

## 2018-06-11 ENCOUNTER — HOSPITAL ENCOUNTER (OUTPATIENT)
Dept: GENERAL RADIOLOGY | Facility: HOSPITAL | Age: 83
Discharge: HOME | End: 2018-06-11
Admitting: NURSE PRACTITIONER

## 2018-06-11 DIAGNOSIS — M79.89 SWELLING OF LEFT HAND: ICD-10-CM

## 2018-06-11 DIAGNOSIS — M48.02 SPINAL STENOSIS IN CERVICAL REGION: ICD-10-CM

## 2018-06-11 PROCEDURE — 72052 X-RAY EXAM NECK SPINE 6/>VWS: CPT

## 2018-06-14 NOTE — PROGRESS NOTES
Subjective   Patient ID: Kevin Ellington is a 83 y.o. male is here today for follow-up on swelling in the left hand. He is here today with a Cervical X-ray. He does states that the Medrol does pack helped but his symptoms have returned.    Mr. Ellington returns to the office today for reevaluation of arm and hand pain on the left.  He saw Dr. Tillman with [facility].  He was given a injection for left carpal tunnel syndrome and tenosynovitis in late March. Mr. Ellington has a history of right T11 12 transpedicular discectomy and decompression procedure with Dr. Chaidez June 8, 2012.  He states that he had some relief in the left hand symptoms after the injection with hand surgery.  He still notes some difficulty with neck pain, thoracic pain and lumbar pain.  He complains of difficulty with walking.      Arm Pain    The incident occurred more than 1 week ago. There was no injury mechanism. The pain is present in the left hand, left wrist and left fingers. The quality of the pain is described as aching. Associated symptoms include numbness. Pertinent negatives include no chest pain. Associated symptoms comments: Swelling. Nothing aggravates the symptoms. Treatments tried: Medrol Dose Pack. The treatment provided significant relief.   Leg Pain    Associated symptoms include numbness.   Back Pain   Associated symptoms include leg pain and numbness. Pertinent negatives include no chest pain.       The following portions of the patient's history were reviewed and updated as appropriate: allergies, current medications, past family history, past medical history, past social history, past surgical history and problem list.    Review of Systems   Constitutional: Positive for activity change.   Respiratory: Negative for chest tightness and shortness of breath.    Cardiovascular: Negative for chest pain.   Musculoskeletal: Positive for arthralgias, back pain, gait problem and joint swelling.   Neurological: Positive  for numbness.   All other systems reviewed and are negative.      Objective   Physical Exam   Constitutional: He is oriented to person, place, and time. He appears well-developed and well-nourished. He is cooperative. No distress.   HENT:   Head: Normocephalic and atraumatic.   Eyes: Conjunctivae and EOM are normal. Pupils are equal, round, and reactive to light.   Neck: Normal range of motion. Neck supple.   Cardiovascular: Normal rate.    Pulmonary/Chest: Effort normal. No respiratory distress.   Abdominal: Soft. He exhibits no distension. There is no tenderness.   Musculoskeletal: Normal range of motion. He exhibits no edema or tenderness (enderness with palpation in the cervical, thoracic regions.  Some tenderness with palpation in the lower lumbar to lumbosacral regions. ).   Neurological: He is alert and oriented to person, place, and time. He has normal strength. He displays normal reflexes. No sensory deficit. He exhibits normal muscle tone. Coordination normal. GCS eye subscore is 4. GCS verbal subscore is 5. GCS motor subscore is 6.   The patient exhibits an antalgic gait.  He has a tendency to list to the left when he walks.  No evidence of ataxia.  DTRs were verbal with the exception of the left ankle jerk which has been chronically absent since ankle surgery numerous years ago.  Negative Molina's; negative clonus.    Skin: Skin is warm and dry. No rash noted. He is not diaphoretic.   Psychiatric: He has a normal mood and affect. Thought content normal.   Vitals reviewed.      Assessment/Plan   Independent Review of Radiographic Studies:      I have reviewed the cervical spine x-rays dated June 11, 2018 today in the office.  The x-ray show severe disc space narrowing in the mid to lower region of the cervical spine.  Anterior subluxation of C4 on C5 measures approximately 3 mm in flexion and decreases to 1 mm in extension.  There is multilevel bony foraminal encroachment greatest at C5-6 and  C6-7.    Medical Decision Making:      Mr. Ellington returns the office today for reevaluation following a hand surgery consultation.  He has a history of prior left carpal tunnel release.  The hand surgeon speculates that there may be some return in carpal tunnel syndrome and has ordered an EMG/NCS of the left arm.  Study will be performed later this week.    Mr. Ellington has a a lot of issues in not only the cervical but the thoracic and lumbar spine as well.  He has a history of prior lower thoracic decompression surgery for disc herniation which had affected his walking.  I explained to Mr. Ellington and his wife that the only way to be definitely sure that there is no significant disc herniation or canal stenosis is by performing a complete spine myelogram.     I have discussed the myelogram procedure at length with the patient. The risks of the procedure were explained. There is a risk of infection, bleeding, increased pain and positional headache possibly requiring a blood patch. The best way to avoid the positional headache is by taking it easy and participating in no strenuous activity for a couple of days following the myelogram. Drinking plenty of fluids including caffeinated beverages was encouraged. Should the patient develop a positional headache, I recommended calling the office for further instructions. The patient verbalized understanding of these risks.    For now, Mr. Ellington prefers to hold off on myelography until he has had further evaluation with hand surgery.  I think that is reasonable.  I asked them to forward a copy of his EMG result when it is available.  Mr. Ellington was offered a follow-up appointment but preferred to just call us if he decides to proceed with myelography or has worsening symptoms.       Kevin was seen today for follow-up and arm pain.    Diagnoses and all orders for this visit:    Spinal stenosis in cervical region    Pain in thoracic spine    History of thoracic  surgery    Degeneration of intervertebral disc of lumbar region      Return if symptoms worsen or fail to improve.

## 2018-06-19 ENCOUNTER — OFFICE VISIT (OUTPATIENT)
Dept: NEUROSURGERY | Facility: CLINIC | Age: 83
End: 2018-06-19

## 2018-06-19 VITALS — SYSTOLIC BLOOD PRESSURE: 140 MMHG | DIASTOLIC BLOOD PRESSURE: 78 MMHG | HEIGHT: 70 IN

## 2018-06-19 DIAGNOSIS — M51.36 DEGENERATION OF INTERVERTEBRAL DISC OF LUMBAR REGION: ICD-10-CM

## 2018-06-19 DIAGNOSIS — Z98.890 HISTORY OF THORACIC SURGERY: ICD-10-CM

## 2018-06-19 DIAGNOSIS — M48.02 SPINAL STENOSIS IN CERVICAL REGION: Primary | ICD-10-CM

## 2018-06-19 DIAGNOSIS — M54.6 PAIN IN THORACIC SPINE: ICD-10-CM

## 2018-06-19 PROCEDURE — 99213 OFFICE O/P EST LOW 20 MIN: CPT | Performed by: NURSE PRACTITIONER

## 2018-06-22 ENCOUNTER — TELEPHONE (OUTPATIENT)
Dept: NEUROSURGERY | Facility: CLINIC | Age: 83
End: 2018-06-22

## 2018-06-22 NOTE — TELEPHONE ENCOUNTER
Patients wife called and wanted to let you now that he is having his EMG test with Dr. Green today.

## 2018-06-26 ENCOUNTER — OFFICE VISIT (OUTPATIENT)
Dept: FAMILY MEDICINE CLINIC | Facility: CLINIC | Age: 83
End: 2018-06-26

## 2018-06-26 VITALS
HEART RATE: 76 BPM | WEIGHT: 224.8 LBS | HEIGHT: 70 IN | DIASTOLIC BLOOD PRESSURE: 80 MMHG | SYSTOLIC BLOOD PRESSURE: 125 MMHG | OXYGEN SATURATION: 97 % | BODY MASS INDEX: 32.18 KG/M2 | TEMPERATURE: 98.2 F

## 2018-06-26 DIAGNOSIS — R20.2 NUMBNESS AND TINGLING IN LEFT HAND: Primary | ICD-10-CM

## 2018-06-26 DIAGNOSIS — R73.09 ELEVATED GLUCOSE: ICD-10-CM

## 2018-06-26 DIAGNOSIS — R20.0 NUMBNESS AND TINGLING IN LEFT HAND: Primary | ICD-10-CM

## 2018-06-26 DIAGNOSIS — Z79.899 MEDICATION MANAGEMENT: ICD-10-CM

## 2018-06-26 PROCEDURE — 99213 OFFICE O/P EST LOW 20 MIN: CPT | Performed by: NURSE PRACTITIONER

## 2018-06-26 NOTE — PROGRESS NOTES
Subjective   Kevin Ellington is a 83 y.o. male presents with left hand issues that began in January. He was evaluated by Dr. French. An Mri was completed at that time and was referred to Dr. Lai. He was evaluated by NANCY Deal. Was evaluated by a hand specialist, Dr. Tillman with Dr. kleinert and Cayla. He thought it was related to carpal tunnel. Blood work was completed at the end of March. Completed physical therapy with hand exercises with no improvement. Also received an injection and prednisone which did help but still present. Dr. Holstein completed a nerve study, (EMG) that showed decreased conduction bilaterally and thought it may be related to diabetes and advised him to follow up with his primary care.     Upper Extremity Issue   This is a new problem. The current episode started more than 1 month ago (since January). The problem occurs daily. The problem has been gradually improving (after steroid injections). Associated symptoms include arthralgias, neck pain and numbness (left hand, mostly thumb, pointer and middle finger, but also into ring finger). Pertinent negatives include no abdominal pain, anorexia, change in bowel habit, chest pain, chills, congestion, coughing, diaphoresis, fatigue, fever, headaches, joint swelling, myalgias, nausea, rash, sore throat, swollen glands, urinary symptoms, vertigo, visual change, vomiting or weakness. Nothing aggravates the symptoms. Treatments tried: physical therapy, steroid injection with hand specialist. The treatment provided mild relief.        The following portions of the patient's history were reviewed and updated as appropriate: allergies, current medications, past family history, past medical history, past social history, past surgical history and problem list.    Review of Systems   Constitutional: Negative for chills, diaphoresis, fatigue and fever.   HENT: Negative for congestion and sore throat.    Respiratory: Negative for cough and  shortness of breath.    Cardiovascular: Negative for chest pain and palpitations.   Gastrointestinal: Negative for abdominal pain, anorexia, change in bowel habit, nausea and vomiting.   Musculoskeletal: Positive for arthralgias, back pain and neck pain. Negative for joint swelling and myalgias.   Skin: Negative for rash.   Neurological: Positive for numbness (left hand, mostly thumb, pointer and middle finger, but also into ring finger). Negative for dizziness, vertigo, syncope, weakness, light-headedness and headaches.       Objective   Physical Exam   Constitutional: He is oriented to person, place, and time. He appears well-developed and well-nourished.   Cardiovascular: Normal rate, regular rhythm and normal heart sounds.  Exam reveals no gallop and no friction rub.    No murmur heard.  Pulmonary/Chest: Effort normal and breath sounds normal. No respiratory distress. He has no wheezes. He has no rales.   Musculoskeletal:        Left hand: He exhibits normal range of motion, no tenderness and no bony tenderness.   Decreased  strength left sided  No edema, no erythema,   Neurological: He is alert and oriented to person, place, and time.   Psychiatric: He has a normal mood and affect.   Vitals reviewed.    Vitals:    06/26/18 1414   BP: 125/80   Pulse: 76   Temp: 98.2 °F (36.8 °C)   SpO2: 97%       Assessment/Plan   Kevin was seen today for numbness.    Diagnoses and all orders for this visit:    Numbness and tingling in left hand  -     Vitamin B12    Elevated glucose  -     Hemoglobin A1c  -     Comprehensive metabolic panel    Medication management    Will check labs today, recommend follow up with hand specialist as scheduled on Friday.  May benefit from a second injection.   Discussed blood sugar and possible treatment options, with recent steroid use, may see slight influx of A1C, will also check B12 due to symptoms.

## 2018-06-27 LAB
ALBUMIN SERPL-MCNC: 4.9 G/DL (ref 3.5–5.2)
ALBUMIN/GLOB SERPL: 2 G/DL
ALP SERPL-CCNC: 93 U/L (ref 39–117)
ALT SERPL-CCNC: 26 U/L (ref 1–41)
AST SERPL-CCNC: 24 U/L (ref 1–40)
BILIRUB SERPL-MCNC: 0.8 MG/DL (ref 0.1–1.2)
BUN SERPL-MCNC: 25 MG/DL (ref 8–23)
BUN/CREAT SERPL: 21.6 (ref 7–25)
CALCIUM SERPL-MCNC: 10 MG/DL (ref 8.6–10.5)
CHLORIDE SERPL-SCNC: 102 MMOL/L (ref 98–107)
CO2 SERPL-SCNC: 23.9 MMOL/L (ref 22–29)
CREAT SERPL-MCNC: 1.16 MG/DL (ref 0.76–1.27)
GLOBULIN SER CALC-MCNC: 2.4 GM/DL
GLUCOSE SERPL-MCNC: 94 MG/DL (ref 65–99)
HBA1C MFR BLD: 6.02 % (ref 4.8–5.6)
POTASSIUM SERPL-SCNC: 5.1 MMOL/L (ref 3.5–5.2)
PROT SERPL-MCNC: 7.3 G/DL (ref 6–8.5)
SODIUM SERPL-SCNC: 141 MMOL/L (ref 136–145)
VIT B12 SERPL-MCNC: 710 PG/ML (ref 211–946)

## 2018-07-05 ENCOUNTER — TELEPHONE (OUTPATIENT)
Dept: NEUROSURGERY | Facility: CLINIC | Age: 83
End: 2018-07-05

## 2018-07-05 NOTE — TELEPHONE ENCOUNTER
I called patient and he is ready to proceed with Myelogram. I scheduled his follow-up appointment with Dr. Chaidez on 08/20/2018. I let him know that you were out of the office and that when you were back in the office, that you would place the orders and scheduling would call him to get that scheduled. He verbalized understanding and was okay with that. Per your note he is getting a total spine myelogram. He was last seen on 06/19/2018.

## 2018-07-05 NOTE — TELEPHONE ENCOUNTER
Patient called said Bruce wanted him to have a myelogram patient had too much going on at the time now wants to have the myelogram please advise [phone]

## 2018-07-09 DIAGNOSIS — M54.6 PAIN IN THORACIC SPINE: ICD-10-CM

## 2018-07-09 DIAGNOSIS — M51.16 LUMBAR DISC DISEASE WITH RADICULOPATHY: ICD-10-CM

## 2018-07-09 DIAGNOSIS — Z98.890 HISTORY OF THORACIC SURGERY: ICD-10-CM

## 2018-07-09 DIAGNOSIS — M54.12 CERVICAL RADICULOPATHY: Primary | ICD-10-CM

## 2018-07-09 NOTE — TELEPHONE ENCOUNTER
I called Mr. Ellington and told him that scheduling will call him within 3 business days to schedule to myelogram. Patient is aware and is okay with that.

## 2018-07-24 RX ORDER — DEXTROAMPHETAMINE SACCHARATE, AMPHETAMINE ASPARTATE MONOHYDRATE, DEXTROAMPHETAMINE SULFATE AND AMPHETAMINE SULFATE 5; 5; 5; 5 MG/1; MG/1; MG/1; MG/1
20 CAPSULE, EXTENDED RELEASE ORAL DAILY
Qty: 90 CAPSULE | Refills: 0 | Status: SHIPPED | OUTPATIENT
Start: 2018-07-24 | End: 2018-07-30 | Stop reason: SDUPTHER

## 2018-07-24 RX ORDER — DEXTROAMPHETAMINE SACCHARATE, AMPHETAMINE ASPARTATE MONOHYDRATE, DEXTROAMPHETAMINE SULFATE AND AMPHETAMINE SULFATE 5; 5; 5; 5 MG/1; MG/1; MG/1; MG/1
20 CAPSULE, EXTENDED RELEASE ORAL DAILY
Qty: 90 CAPSULE | Refills: 0 | Status: SHIPPED | OUTPATIENT
Start: 2018-07-24 | End: 2018-07-24

## 2018-07-30 ENCOUNTER — TELEPHONE (OUTPATIENT)
Dept: FAMILY MEDICINE CLINIC | Facility: CLINIC | Age: 83
End: 2018-07-30

## 2018-07-30 RX ORDER — DEXTROAMPHETAMINE SACCHARATE, AMPHETAMINE ASPARTATE MONOHYDRATE, DEXTROAMPHETAMINE SULFATE AND AMPHETAMINE SULFATE 5; 5; 5; 5 MG/1; MG/1; MG/1; MG/1
20 CAPSULE, EXTENDED RELEASE ORAL DAILY
Qty: 90 CAPSULE | Refills: 0 | Status: CANCELLED | OUTPATIENT
Start: 2018-07-30

## 2018-07-30 RX ORDER — DEXTROAMPHETAMINE SACCHARATE, AMPHETAMINE ASPARTATE MONOHYDRATE, DEXTROAMPHETAMINE SULFATE AND AMPHETAMINE SULFATE 5; 5; 5; 5 MG/1; MG/1; MG/1; MG/1
20 CAPSULE, EXTENDED RELEASE ORAL DAILY
Qty: 30 CAPSULE | Refills: 0 | Status: SHIPPED | OUTPATIENT
Start: 2018-07-30 | End: 2018-10-30

## 2018-08-07 NOTE — PROGRESS NOTES
Subjective   Patient ID: Kevin Ellington is a 83 y.o. male is here today for follow-up on left hand swelling.    At the last visit the patient reported left arm and hand pain. He reported getting some relief after having an injection for left carpal tunnel syndrome. He was also having neck, thoracic, and lumbar pain along with difficulty walking at the last visit.    Today the patient is here with a new myelogram of the total spine. He reports no changes with the back pain and difficulty walking. He states that he is not having neck pain at this time.    Arm Pain    The incident occurred more than 1 week ago. There was no injury mechanism. The pain is present in the left hand, left wrist and left fingers. Associated symptoms include numbness. Associated symptoms comments: Difficulty walking  .       The following portions of the patient's history were reviewed and updated as appropriate: allergies, current medications, past family history, past medical history, past social history, past surgical history and problem list.    Review of Systems   Musculoskeletal: Positive for back pain, gait problem and neck pain.   Neurological: Positive for numbness. Negative for weakness.   All other systems reviewed and are negative.    I did a thoracolumbar decompression on this gentleman about 6 or 7 years ago. He recovered and it did help him, although the recovery was quite hard on him. He had a lot of constipation and difficulty with his bowels afterwards. He had been having some numbness in his left hand and right hand, left worse than right. He had carpal tunnel surgery on both sides 19 years ago. He went to see Dr. Tillman who did an injection in his left wrist, and it has helped quite a bit. There was evidence of recurrent carpal tunnel syndrome, and Dr. Tillman talked about the possibility of another surgery, but he preserved conservative measures for now. The patient had a myelogram which we discussed. The cervical  myelogram did show some stenosis but I think that is mild and I think the recurrent carpal tunnel syndrome is the main issue in the arms and the hands. He has no significant thoracic stenosis but he does have quite severe lumbar stenosis from L1 to L5. He does have some back pain and bilateral buttock and leg pain and some weakness and difficulty walking. That is all from the stenosis. I would like to avoid another surgery if we can, so would the patient and his wife. He is on aspirin which he will need to stop. Will send him to the pain doctors at the clinic for a series of epidural blocks and have him come back afterwards in about 3 months.        Objective   Physical Exam   Constitutional: He is oriented to person, place, and time. He appears well-developed and well-nourished.   HENT:   Head: Normocephalic and atraumatic.   Eyes: Pupils are equal, round, and reactive to light. Conjunctivae and EOM are normal.   Fundoscopic exam:       The right eye shows no papilledema. The right eye shows venous pulsations.        The left eye shows no papilledema. The left eye shows venous pulsations.   Neck: Carotid bruit is not present.   Neurological: He is oriented to person, place, and time. He has a normal Finger-Nose-Finger Test and a normal Heel to Shin Test. Gait normal.   Reflex Scores:       Tricep reflexes are 2+ on the right side and 2+ on the left side.       Bicep reflexes are 2+ on the right side and 2+ on the left side.       Brachioradialis reflexes are 2+ on the right side and 2+ on the left side.       Patellar reflexes are 2+ on the right side and 2+ on the left side.       Achilles reflexes are 2+ on the right side and 2+ on the left side.  Psychiatric: His speech is normal.     Neurologic Exam     Mental Status   Oriented to person, place, and time.   Registration of memory: Good recent and remote memory.   Attention: normal. Concentration: normal.   Speech: speech is normal   Level of consciousness:  alert  Knowledge: consistent with education.     Cranial Nerves     CN II   Visual fields full to confrontation.   Visual acuity: normal    CN III, IV, VI   Pupils are equal, round, and reactive to light.  Extraocular motions are normal.     CN V   Facial sensation intact.   Right corneal reflex: normal  Left corneal reflex: normal    CN VII   Facial expression full, symmetric.   Right facial weakness: none  Left facial weakness: none    CN VIII   Hearing: intact    CN IX, X   Palate: symmetric    CN XI   Right sternocleidomastoid strength: normal  Left sternocleidomastoid strength: normal    CN XII   Tongue: not atrophic  Tongue deviation: none    Motor Exam   Muscle bulk: normal  Right arm tone: normal  Left arm tone: normal  Right leg tone: normal  Left leg tone: normal    Strength   Strength 5/5 except as noted.     Sensory Exam   Light touch normal.     Gait, Coordination, and Reflexes     Gait  Gait: normal    Coordination   Finger to nose coordination: normal  Heel to shin coordination: normal    Reflexes   Right brachioradialis: 2+  Left brachioradialis: 2+  Right biceps: 2+  Left biceps: 2+  Right triceps: 2+  Left triceps: 2+  Right patellar: 2+  Left patellar: 2+  Right achilles: 2+  Left achilles: 2+  Right : 2+  Left : 2+      Assessment/Plan   Independent Review of Radiographic Studies:    I reviewed the cervical thoracic and lumbar myelogram done 8/13/18.  There is fairly severe spinal stenosis at L4-L5 and L3-L4 and L2-L3 and even L1-L2.  The degree of stenosis is much less significant the thoracic spine and there is some osteophytic nerve impingement at C4-C5 C5-C6 and C6-C7 and the cervical spine.  I don't see much significant cord compression in the cervical spine.  There was level courses the lumbar spine.  Agree with the report.      Medical Decision Making:    We'll try some lumbar epidural blocks in the lumbar spine to see if that helps in his back and leg pain.  Otherwise surgery if  we can.  The last one was a very tough one for him to get over.  If his hands flare up again, he is to see the hand surgeons again.  I'll see him in 3 months.  CHARLEY      Kevin was seen today for arm pain.    Diagnoses and all orders for this visit:    Bilateral carpal tunnel syndrome    Spinal stenosis of lumbar region with neurogenic claudication  -     Epidural Block    Cervical spinal stenosis      Return in about 3 months (around 11/20/2018).

## 2018-08-13 ENCOUNTER — HOSPITAL ENCOUNTER (OUTPATIENT)
Dept: GENERAL RADIOLOGY | Facility: HOSPITAL | Age: 83
Discharge: HOME | End: 2018-08-13

## 2018-08-13 ENCOUNTER — HOSPITAL ENCOUNTER (OUTPATIENT)
Dept: CT IMAGING | Facility: HOSPITAL | Age: 83
Discharge: HOME | End: 2018-08-13
Admitting: NURSE PRACTITIONER

## 2018-08-13 VITALS
DIASTOLIC BLOOD PRESSURE: 69 MMHG | OXYGEN SATURATION: 97 % | HEIGHT: 71 IN | TEMPERATURE: 97.3 F | BODY MASS INDEX: 30.8 KG/M2 | HEART RATE: 64 BPM | SYSTOLIC BLOOD PRESSURE: 118 MMHG | WEIGHT: 220 LBS | RESPIRATION RATE: 16 BRPM

## 2018-08-13 DIAGNOSIS — M54.6 PAIN IN THORACIC SPINE: ICD-10-CM

## 2018-08-13 DIAGNOSIS — Z98.890 HISTORY OF THORACIC SURGERY: ICD-10-CM

## 2018-08-13 DIAGNOSIS — M51.16 LUMBAR DISC DISEASE WITH RADICULOPATHY: ICD-10-CM

## 2018-08-13 DIAGNOSIS — M54.12 CERVICAL RADICULOPATHY: ICD-10-CM

## 2018-08-13 PROCEDURE — 72125 CT NECK SPINE W/O DYE: CPT

## 2018-08-13 PROCEDURE — 62305 MYELOGRAPHY LUMBAR INJECTION: CPT

## 2018-08-13 PROCEDURE — 72114 X-RAY EXAM L-S SPINE BENDING: CPT

## 2018-08-13 PROCEDURE — 72131 CT LUMBAR SPINE W/O DYE: CPT

## 2018-08-13 PROCEDURE — 25010000002 IOPAMIDOL 61 % SOLUTION: Performed by: RADIOLOGY

## 2018-08-13 PROCEDURE — 72128 CT CHEST SPINE W/O DYE: CPT

## 2018-08-13 PROCEDURE — 72240 MYELOGRAPHY NECK SPINE: CPT

## 2018-08-13 PROCEDURE — 72052 X-RAY EXAM NECK SPINE 6/>VWS: CPT

## 2018-08-13 RX ORDER — LIDOCAINE HYDROCHLORIDE 10 MG/ML
10 INJECTION, SOLUTION INFILTRATION; PERINEURAL ONCE
Status: COMPLETED | OUTPATIENT
Start: 2018-08-13 | End: 2018-08-13

## 2018-08-13 RX ADMIN — IOPAMIDOL 15 ML: 612 INJECTION, SOLUTION INTRATHECAL at 09:50

## 2018-08-13 RX ADMIN — LIDOCAINE HYDROCHLORIDE 2 ML: 10 INJECTION, SOLUTION INFILTRATION; PERINEURAL at 09:46

## 2018-08-13 NOTE — DISCHARGE INSTRUCTIONS
EDUCATION /DISCHARGE INSTRUCTIONS:  A myelogram is a special radiology procedure of the spinal cord, spinal nerves and other related structures.  You will be awake during the examination.  An area of your lower back will be cleansed with an antiseptic solution.  The physician will inject a numbing medication in your lower back.  While your back is numb, a needle will be placed in the lower back area.  A small amount of spinal fluid may be withdrawn and sent to the lab if ordered by your physician. While the needle is in the back, an injection of a contrast material (xray dye) will be given through the needle.  The contrast material will allow the physician to see the spinal cord and spinal nerves.  Once injected, the needle will be removed and a band aid will be placed over the injection site.  The table will be tilted during the process to allow the contrast material to flow to particular areas in the spine.  Following the injection and xrays, you will be taken to the CT scan where more pictures will be taken. After the procedure is finished, the contrast material will be absorbed by your body and eliminated through your kidneys.  The radiologist will study and interpret your myelogram and send the results to your physician.    Procedure risks of a myelogram include, but are not limited to:  *  Bleeding   *  seizure  *  Infection   *  Headache, possibly severe requiring  *  Contrast reaction      a blood patch  *  Nerve or cord injury  *  Paralysis and death    Benefits of the procedure:  *  Best examination for delineating pathology related to spinal cord compression from a disc and/or nerve root compression    Alternatives to the procedure:  MRI - a non invasive procedure requiring intravenous contrast injection.  Cannot be done on patients with certain pacemakers or metal in the body.  MRI risks include possible reaction to the contrast material, movement of metal located in the body.  Benefit to MRI:   Non-invasive and usually painless procedure.  THIS EDUCATION INFORMATION WAS REVIEWED PRIOR TO THE PROCEDURE AND CONSENT. Patient initials __________________Time_________________    Important information following your myelogram:  *  Lie down with your head elevated no more than 2 pillows for the next 24 hours.   *  Sit up in the car going home.  Sit up to eat and use the restroom only,  for 24 hours.  *  24 HOURS COMPLETE AT ________________________   *  Tomorrow, after 24 hours complete, take it easy and rest.  *  Do not drive for 48 hours following a myelogram  *  You may remove the bandage and shower in the morning  *  Increase your fluids for the next 24 hours.  Caffeinated drinks are encouraged.     Resume taking your blood thinner or Aspirin on ____8/14/18_____ after 1200____________________    Resume taking your diabetic oral medication on______n/a_______________________    Resume taking antipsychotics/antidepressant on _____n/a_______________________    CALL YOUR PHYSICIAN FOR THE FOLLOWING:    * Pain at the injection site  * Reddness, swelling, bruising or drainage at the injection site  * A fever by mouth of 101.0  * Any new symptoms    If you have problems with a headache that is not relieved with rest and medication, please call the Radiology Triage Nurse desk  [phone]

## 2018-08-13 NOTE — H&P
Name: Kevin Ellington ADMIT: 2018   : 1935  PCP: Provider, No Known    MRN: [id number] LOS: 0 days   AGE/SEX: 83 y.o. male  ROOM: Room/bed info not found       Chief complaint   Patient is a 83 y.o. male presents for myelogram.    Past Surgical History:  Past Surgical History:   Procedure Laterality Date   • ANKLE FUSION Left     reconstruction and fusion   • BACK SURGERY      HERNIATED LUMBAR DISK ,,   • CARPAL TUNNEL RELEASE     • CATARACT EXTRACTION, BILATERAL Bilateral    • CORONARY ANGIOPLASTY WITH STENT PLACEMENT     • EPIDURAL BLOCK  Several at [facility]    and [facility]   • REPLACEMENT TOTAL KNEE Left    • REPLACEMENT TOTAL KNEE Right    • SKIN CANCER EXCISION      MELANOMA   • THORACIC SPINE SURGERY         Past Medical History:  Past Medical History:   Diagnosis Date   • Arthritis    • Cervical radiculopathy    • Coronary artery disease     Stent   • CTS (carpal tunnel syndrome) 1998    Surgery both hands in    • GERD (gastroesophageal reflux disease)    • HTN (hypertension)    • Hyperlipidemia    • Low back pain     2 Prior surgeries   • Lumbosacral disc disease    • Melanoma (CMS/[facility]) 01/15/2009    DR WILLY SIMMS ( Left forearm)   • Radiculitis, thoracic    • Stented coronary artery    • Thoracic disc disorder     Surgery by Dr. Aldridge (sp)       Home Medications:    (Not in a hospital admission)    Allergies:  Codeine; Hydrocodone; Hydrochlorothiazide; and Sulfa antibiotics    Family History:  Family History   Problem Relation Age of Onset   • Hypertension Mother    • Arthritis Mother            • Heart disease Father    • Early death Father         Aortic aneurism age 58   • Hypertension Father        Social History:  Social History   Substance Use Topics   • Smoking status: Never Smoker   • Smokeless tobacco: Never Used   • Alcohol use No        Objective     Physical Exam:   Neuro intact    Vital  Signs  Temp:  [97.3 °F (36.3 °C)] 97.3 °F (36.3 °C)  Heart Rate:  [75] 75  Resp:  [16] 16  BP: (147)/(85) 147/85    Anticipated Surgical Procedure:  myelogram    The risks, benefits and alternatives of this procedure have been discussed with the patient or responsible party: Yes        Aime Lemons MD  08/13/18  10:37 AM

## 2018-08-14 ENCOUNTER — TELEPHONE (OUTPATIENT)
Dept: INTERVENTIONAL RADIOLOGY/VASCULAR | Facility: HOSPITAL | Age: 83
End: 2018-08-14

## 2018-08-20 ENCOUNTER — OFFICE VISIT (OUTPATIENT)
Dept: NEUROSURGERY | Facility: CLINIC | Age: 83
End: 2018-08-20

## 2018-08-20 VITALS
WEIGHT: 220 LBS | BODY MASS INDEX: 30.8 KG/M2 | HEIGHT: 71 IN | SYSTOLIC BLOOD PRESSURE: 157 MMHG | DIASTOLIC BLOOD PRESSURE: 86 MMHG | HEART RATE: 70 BPM

## 2018-08-20 DIAGNOSIS — G56.03 BILATERAL CARPAL TUNNEL SYNDROME: Primary | ICD-10-CM

## 2018-08-20 DIAGNOSIS — M48.02 CERVICAL SPINAL STENOSIS: ICD-10-CM

## 2018-08-20 DIAGNOSIS — M48.062 SPINAL STENOSIS OF LUMBAR REGION WITH NEUROGENIC CLAUDICATION: ICD-10-CM

## 2018-08-20 PROCEDURE — 99214 OFFICE O/P EST MOD 30 MIN: CPT | Performed by: NEUROLOGICAL SURGERY

## 2018-08-21 RX ORDER — FLUTICASONE PROPIONATE 50 MCG
2 SPRAY, SUSPENSION (ML) NASAL DAILY
Qty: 3 BOTTLE | Refills: 0 | Status: SHIPPED | OUTPATIENT
Start: 2018-08-21 | End: 2019-04-08 | Stop reason: SDUPTHER

## 2018-08-21 RX ORDER — CLONIDINE HYDROCHLORIDE 0.1 MG/1
0.1 TABLET ORAL 2 TIMES DAILY
Qty: 180 TABLET | Refills: 0 | Status: SHIPPED | OUTPATIENT
Start: 2018-08-21 | End: 2018-11-28 | Stop reason: SDUPTHER

## 2018-08-21 RX ORDER — METOPROLOL TARTRATE 100 MG/1
100 TABLET ORAL DAILY
Qty: 90 TABLET | Refills: 0 | Status: SHIPPED | OUTPATIENT
Start: 2018-08-21 | End: 2018-09-27

## 2018-08-21 RX ORDER — MELOXICAM 7.5 MG/1
7.5 TABLET ORAL DAILY
Qty: 90 TABLET | Refills: 1 | Status: SHIPPED | OUTPATIENT
Start: 2018-08-21 | End: 2018-11-28 | Stop reason: SDUPTHER

## 2018-08-21 RX ORDER — LOSARTAN POTASSIUM 100 MG/1
100 TABLET ORAL DAILY
Qty: 90 TABLET | Refills: 0 | Status: SHIPPED | OUTPATIENT
Start: 2018-08-21 | End: 2018-11-28 | Stop reason: SDUPTHER

## 2018-08-21 RX ORDER — OMEPRAZOLE 20 MG/1
20 CAPSULE, DELAYED RELEASE ORAL DAILY
Qty: 90 CAPSULE | Refills: 1 | Status: SHIPPED | OUTPATIENT
Start: 2018-08-21 | End: 2018-08-29

## 2018-08-21 RX ORDER — TAMSULOSIN HYDROCHLORIDE 0.4 MG/1
0.8 CAPSULE ORAL DAILY
Qty: 180 CAPSULE | Refills: 0 | Status: SHIPPED | OUTPATIENT
Start: 2018-08-21 | End: 2018-11-28 | Stop reason: SDUPTHER

## 2018-08-21 RX ORDER — ATORVASTATIN CALCIUM 40 MG/1
40 TABLET, FILM COATED ORAL DAILY
Qty: 90 TABLET | Refills: 0 | Status: SHIPPED | OUTPATIENT
Start: 2018-08-21 | End: 2018-12-20 | Stop reason: SDUPTHER

## 2018-08-21 RX ORDER — AMLODIPINE BESYLATE 10 MG/1
10 TABLET ORAL DAILY
Qty: 90 TABLET | Refills: 0 | Status: SHIPPED | OUTPATIENT
Start: 2018-08-21 | End: 2018-11-28 | Stop reason: SDUPTHER

## 2018-08-21 NOTE — TELEPHONE ENCOUNTER
Last seen 06/25/2018  Last refill 07/30/2018    Takes medication for narcolepsy  Has appointment with new pcp 10/01/2018     Would you be willing to refill this former osman em.

## 2018-08-22 RX ORDER — DEXTROAMPHETAMINE SACCHARATE, AMPHETAMINE ASPARTATE MONOHYDRATE, DEXTROAMPHETAMINE SULFATE AND AMPHETAMINE SULFATE 5; 5; 5; 5 MG/1; MG/1; MG/1; MG/1
20 CAPSULE, EXTENDED RELEASE ORAL DAILY
Qty: 30 CAPSULE | Refills: 0 | OUTPATIENT
Start: 2018-08-22

## 2018-08-29 ENCOUNTER — ANESTHESIA (OUTPATIENT)
Dept: PAIN MEDICINE | Facility: HOSPITAL | Age: 83
End: 2018-08-29

## 2018-08-29 ENCOUNTER — HOSPITAL ENCOUNTER (OUTPATIENT)
Dept: GENERAL RADIOLOGY | Facility: HOSPITAL | Age: 83
Discharge: HOME | End: 2018-08-29

## 2018-08-29 ENCOUNTER — ANESTHESIA EVENT (OUTPATIENT)
Dept: PAIN MEDICINE | Facility: HOSPITAL | Age: 83
End: 2018-08-29

## 2018-08-29 ENCOUNTER — HOSPITAL ENCOUNTER (OUTPATIENT)
Dept: PAIN MEDICINE | Facility: HOSPITAL | Age: 83
Discharge: HOME | End: 2018-08-29
Attending: NEUROLOGICAL SURGERY | Admitting: NEUROLOGICAL SURGERY

## 2018-08-29 VITALS
OXYGEN SATURATION: 98 % | TEMPERATURE: 97.8 F | SYSTOLIC BLOOD PRESSURE: 120 MMHG | WEIGHT: 220 LBS | HEART RATE: 63 BPM | RESPIRATION RATE: 16 BRPM | HEIGHT: 71 IN | BODY MASS INDEX: 30.8 KG/M2 | DIASTOLIC BLOOD PRESSURE: 75 MMHG

## 2018-08-29 DIAGNOSIS — M48.062 SPINAL STENOSIS OF LUMBAR REGION WITH NEUROGENIC CLAUDICATION: ICD-10-CM

## 2018-08-29 DIAGNOSIS — R52 PAIN: ICD-10-CM

## 2018-08-29 PROCEDURE — 25010000002 MIDAZOLAM PER 1 MG: Performed by: ANESTHESIOLOGY

## 2018-08-29 PROCEDURE — 77003 FLUOROGUIDE FOR SPINE INJECT: CPT

## 2018-08-29 PROCEDURE — 0 IOPAMIDOL 41 % SOLUTION: Performed by: ANESTHESIOLOGY

## 2018-08-29 PROCEDURE — 25010000002 METHYLPREDNISOLONE PER 80 MG: Performed by: ANESTHESIOLOGY

## 2018-08-29 PROCEDURE — C1755 CATHETER, INTRASPINAL: HCPCS

## 2018-08-29 RX ORDER — FENTANYL CITRATE 50 UG/ML
50 INJECTION, SOLUTION INTRAMUSCULAR; INTRAVENOUS AS NEEDED
Status: DISCONTINUED | OUTPATIENT
Start: 2018-08-29 | End: 2018-08-30 | Stop reason: HOSPADM

## 2018-08-29 RX ORDER — MIDAZOLAM HYDROCHLORIDE 1 MG/ML
1 INJECTION INTRAMUSCULAR; INTRAVENOUS AS NEEDED
Status: DISCONTINUED | OUTPATIENT
Start: 2018-08-29 | End: 2018-08-30 | Stop reason: HOSPADM

## 2018-08-29 RX ORDER — LIDOCAINE HYDROCHLORIDE 10 MG/ML
1 INJECTION, SOLUTION INFILTRATION; PERINEURAL ONCE AS NEEDED
Status: DISCONTINUED | OUTPATIENT
Start: 2018-08-29 | End: 2018-08-30 | Stop reason: HOSPADM

## 2018-08-29 RX ORDER — METHYLPREDNISOLONE ACETATE 80 MG/ML
80 INJECTION, SUSPENSION INTRA-ARTICULAR; INTRALESIONAL; INTRAMUSCULAR; SOFT TISSUE ONCE
Status: COMPLETED | OUTPATIENT
Start: 2018-08-29 | End: 2018-08-29

## 2018-08-29 RX ORDER — SODIUM CHLORIDE 0.9 % (FLUSH) 0.9 %
1-10 SYRINGE (ML) INJECTION AS NEEDED
Status: DISCONTINUED | OUTPATIENT
Start: 2018-08-29 | End: 2018-08-30 | Stop reason: HOSPADM

## 2018-08-29 RX ADMIN — MIDAZOLAM HYDROCHLORIDE 1 MG: 2 INJECTION, SOLUTION INTRAMUSCULAR; INTRAVENOUS at 12:08

## 2018-08-29 RX ADMIN — MIDAZOLAM HYDROCHLORIDE 1 MG: 2 INJECTION, SOLUTION INTRAMUSCULAR; INTRAVENOUS at 12:16

## 2018-08-29 RX ADMIN — METHYLPREDNISOLONE ACETATE 80 MG: 80 INJECTION, SUSPENSION INTRA-ARTICULAR; INTRALESIONAL; INTRAMUSCULAR; SOFT TISSUE at 12:21

## 2018-08-29 RX ADMIN — IOPAMIDOL 10 ML: 408 INJECTION, SOLUTION INTRATHECAL at 12:20

## 2018-08-29 NOTE — ANESTHESIA PROCEDURE NOTES
PAIN Epidural block    Patient location during procedure: pain clinic  Start Time: 8/29/2018 12:09 PM  Stop Time: 8/29/2018 12:25 PM  Indication:procedure for pain  Performed By  Anesthesiologist: NIRALI FAITH  Preanesthetic Checklist  Completed: patient identified, site marked, surgical consent, pre-op evaluation, timeout performed, IV checked, risks and benefits discussed and monitors and equipment checked  Additional Notes  Performed under fluoroscopy  Post-Op Diagnosis Codes:     * Lumbar degenerative disc disease (M51.36)     * Lumbar spinal stenosis (M48.061)  Prep:  Pt Position:prone  Sterile Tech:cap, gloves, mask and sterile barrier  Prep:chlorhexidine gluconate and isopropyl alcohol  Monitoring:blood pressure monitoring, continuous pulse oximetry and EKG  Procedure:  Sedation: yes   Approach:right paramedian  Guidance: fluoroscopy  Location:lumbar (Intralaminar)  Level:2-3 (Unable to enter L3-4 left or right.)  Needle Gauge:20 G  Aspiration:negative  Test Dose:negative  Medications:  Depomedrol:80 mg  Preservative Free Saline:4mL  Isovue:3mL  Comments:Needle placement confirmed with epidural spread of contrast injection.  Post Assessment:  Post-procedure: Bandaid.  Pt Tolerance:patient tolerated the procedure well with no apparent complications  Complications:no

## 2018-08-29 NOTE — H&P
[facility]    History and Physical    Patient Name: Kevin Ellington  :  1935  MRN:  [id number]  Date of Admission: 2018    Subjective     Patient is a 83 y.o. male presents with chief complaint of chronic, moderate, severe low back pain.  Onset of symptoms was gradual starting several years ago.  Symptoms are associated/aggravated by activity, standing, walking for more than a few minutes or bending over. Symptoms improve with heating pad, rest and epidurals, his last was in 2016. His MRIs are reported to show multilevel Thoracic and Lumbar degeneration with spinal stenosis , severe at L4-5.    The following portions of the patients history were reviewed and updated as appropriate: current medications, allergies, past medical history, past surgical history, past family history, past social history and problem list                Objective     Past Medical History:   Past Medical History:   Diagnosis Date   • Arthritis    • Cervical radiculopathy    • Coronary artery disease     Stent   • CTS (carpal tunnel syndrome)     Surgery both hands in    • GERD (gastroesophageal reflux disease)    • HTN (hypertension)    • Hyperlipidemia    • Low back pain     2 Prior surgeries   • Lumbosacral disc disease    • Melanoma (CMS/HCC) 01/15/2009    DR WILLY SIMMS ( Left forearm)   • Radiculitis, thoracic    • Stented coronary artery    • Thoracic disc disorder     Surgery by Dr. Aldridge (sp)     Past Surgical History:   Past Surgical History:   Procedure Laterality Date   • ANKLE FUSION Left     reconstruction and fusion   • BACK SURGERY      HERNIATED LUMBAR DISK ,,   • CARPAL TUNNEL RELEASE     • CATARACT EXTRACTION, BILATERAL Bilateral    • CORONARY ANGIOPLASTY WITH STENT PLACEMENT     • EPIDURAL BLOCK  Several at [facility]    and [facility]   • REPLACEMENT TOTAL KNEE Left    • REPLACEMENT TOTAL KNEE Right    • SKIN CANCER EXCISION       "MELANOMA   • THORACIC SPINE SURGERY       Family History:   Family History   Problem Relation Age of Onset   • Hypertension Mother    • Arthritis Mother            • Heart disease Father    • Early death Father         Aortic aneurism age 58   • Hypertension Father      Social History:   Social History   Substance Use Topics   • Smoking status: Never Smoker   • Smokeless tobacco: Never Used   • Alcohol use No       Vital Signs Range for the last 24 hours  Temperature: Temp:  [36.6 °C (97.8 °F)] 36.6 °C (97.8 °F)   Temp Source: Temp src: Oral   BP: BP: (156)/(93) 156/93   Pulse: Heart Rate:  [66] 66   Respirations: Resp:  [16] 16   SPO2: SpO2:  [96 %] 96 %   O2 Amount (l/min):     O2 Devices Device (Oxygen Therapy): room air   Weight: Weight:  [99.8 kg (220 lb)] 99.8 kg (220 lb)     Flowsheet Rows      First Filed Value   Admission Height  180.3 cm (71\") Documented at 2018 1144   Admission Weight  99.8 kg (220 lb) Documented at 2018 1144          --------------------------------------------------------------------------------    Current Outpatient Prescriptions   Medication Sig Dispense Refill   • amLODIPine (NORVASC) 10 MG tablet Take 1 tablet by mouth Daily. 90 tablet 0   • amphetamine-dextroamphetamine XR (ADDERALL XR) 20 MG 24 hr capsule Take 1 capsule by mouth Daily NEEDS OV FOR ADDITIONAL REFILLS 30 capsule 0   • atorvastatin (LIPITOR) 40 MG tablet Take 1 tablet by mouth Daily. 90 tablet 0   • cetirizine (zyrTEC) 10 MG tablet Take 10 mg by mouth Daily.     • CloNIDine (CATAPRES) 0.1 MG tablet Take 1 tablet by mouth 2 (Two) Times a Day. 180 tablet 0   • doxycycline (VIBRAMYCIN) 100 MG capsule As Needed.     • Flaxseed, Linseed, (FLAX SEED OIL) 1300 MG capsule Take  by mouth Daily.     • fluticasone (FLONASE) 50 MCG/ACT nasal spray 2 sprays into the nostril(s) as directed by provider Daily. 3 bottle 0   • losartan (COZAAR) 100 MG tablet Take 1 tablet by mouth Daily. 90 tablet 0   • meloxicam " (MOBIC) 7.5 MG tablet Take 1 tablet by mouth Daily. 90 tablet 1   • metoprolol tartrate (LOPRESSOR) 100 MG tablet Take 1 tablet by mouth Daily. 90 tablet 0   • Multiple Vitamins-Minerals (MULTIVITAMIN PO) Take 1 tablet by mouth Daily.     • Multiple Vitamins-Minerals (OCUVITE PO) Take 1 tablet by mouth Daily.     • Saw [place]o, Serenoa repens, 320 MG capsule Take 320 mg by mouth 2 (two) times a day.     • tamsulosin (FLOMAX) 0.4 MG capsule 24 hr capsule Take 2 capsules by mouth Daily. 180 capsule 0   • TOPROL  MG 24 hr tablet Take 1 tablet by mouth Daily. 90 tablet 0   • aspirin 81 MG tablet Take 81 mg by mouth Daily.     • econazole nitrate (SPECTAZOLE) 1 % cream Apply  topically to the appropriate area as directed As Needed.       Current Facility-Administered Medications   Medication Dose Route Frequency Provider Last Rate Last Dose   • sodium chloride 0.9 % flush 1-10 mL  1-10 mL Intravenous PRN Tyrese Chaidez MD           --------------------------------------------------------------------------------  Assessment/Plan      Anesthesia Evaluation                  Airway   Mallampati: II  Dental    (+) poor dentition    Pulmonary - normal exam   Cardiovascular - normal exam        Neuro/Psych  (-) left straight leg raise test, right straight leg raise test    PE Comment: Hx bilateral total knees  GI/Hepatic/Renal/Endo      Musculoskeletal     Abdominal    Substance History      OB/GYN          Other                   Diagnosis and Plan    Treatment Plan  ASA 3      Procedures: Lumbar Epidural Steroid Injection(LESI), With fluoroscopy,       Anesthetic plan and risks discussed with patient.          Diagnosis     * Lumbar degenerative disc disease [M51.36]     * Lumbar spinal stenosis [M48.061]

## 2018-09-27 ENCOUNTER — ANESTHESIA (OUTPATIENT)
Dept: PAIN MEDICINE | Facility: HOSPITAL | Age: 83
End: 2018-09-27

## 2018-09-27 ENCOUNTER — HOSPITAL ENCOUNTER (OUTPATIENT)
Dept: PAIN MEDICINE | Facility: HOSPITAL | Age: 83
Discharge: HOME | End: 2018-09-27
Admitting: ANESTHESIOLOGY

## 2018-09-27 ENCOUNTER — ANESTHESIA EVENT (OUTPATIENT)
Dept: PAIN MEDICINE | Facility: HOSPITAL | Age: 83
End: 2018-09-27

## 2018-09-27 ENCOUNTER — HOSPITAL ENCOUNTER (OUTPATIENT)
Dept: GENERAL RADIOLOGY | Facility: HOSPITAL | Age: 83
Discharge: HOME | End: 2018-09-27

## 2018-09-27 VITALS
RESPIRATION RATE: 16 BRPM | HEART RATE: 60 BPM | TEMPERATURE: 97.8 F | SYSTOLIC BLOOD PRESSURE: 121 MMHG | OXYGEN SATURATION: 93 % | DIASTOLIC BLOOD PRESSURE: 77 MMHG

## 2018-09-27 DIAGNOSIS — R52 PAIN: ICD-10-CM

## 2018-09-27 DIAGNOSIS — M48.062 SPINAL STENOSIS OF LUMBAR REGION WITH NEUROGENIC CLAUDICATION: Primary | ICD-10-CM

## 2018-09-27 PROCEDURE — 25010000002 MIDAZOLAM PER 1 MG: Performed by: ANESTHESIOLOGY

## 2018-09-27 PROCEDURE — 0 IOPAMIDOL 41 % SOLUTION: Performed by: ANESTHESIOLOGY

## 2018-09-27 PROCEDURE — 77003 FLUOROGUIDE FOR SPINE INJECT: CPT

## 2018-09-27 PROCEDURE — C1755 CATHETER, INTRASPINAL: HCPCS

## 2018-09-27 PROCEDURE — 25010000002 METHYLPREDNISOLONE PER 80 MG: Performed by: ANESTHESIOLOGY

## 2018-09-27 RX ORDER — SODIUM CHLORIDE 0.9 % (FLUSH) 0.9 %
1-10 SYRINGE (ML) INJECTION AS NEEDED
Status: DISCONTINUED | OUTPATIENT
Start: 2018-09-27 | End: 2018-09-28 | Stop reason: HOSPADM

## 2018-09-27 RX ORDER — FENTANYL CITRATE 50 UG/ML
50 INJECTION, SOLUTION INTRAMUSCULAR; INTRAVENOUS AS NEEDED
Status: DISCONTINUED | OUTPATIENT
Start: 2018-09-27 | End: 2018-09-28 | Stop reason: HOSPADM

## 2018-09-27 RX ORDER — METOPROLOL SUCCINATE 100 MG/1
100 TABLET, EXTENDED RELEASE ORAL DAILY
COMMUNITY
End: 2018-11-28 | Stop reason: SDUPTHER

## 2018-09-27 RX ORDER — LIDOCAINE HYDROCHLORIDE 10 MG/ML
1 INJECTION, SOLUTION INFILTRATION; PERINEURAL ONCE AS NEEDED
Status: DISCONTINUED | OUTPATIENT
Start: 2018-09-27 | End: 2018-09-28 | Stop reason: HOSPADM

## 2018-09-27 RX ORDER — MIDAZOLAM HYDROCHLORIDE 1 MG/ML
1 INJECTION INTRAMUSCULAR; INTRAVENOUS AS NEEDED
Status: DISCONTINUED | OUTPATIENT
Start: 2018-09-27 | End: 2018-09-28 | Stop reason: HOSPADM

## 2018-09-27 RX ORDER — METHYLPREDNISOLONE ACETATE 80 MG/ML
80 INJECTION, SUSPENSION INTRA-ARTICULAR; INTRALESIONAL; INTRAMUSCULAR; SOFT TISSUE ONCE
Status: COMPLETED | OUTPATIENT
Start: 2018-09-27 | End: 2018-09-27

## 2018-09-27 RX ADMIN — METHYLPREDNISOLONE ACETATE 80 MG: 80 INJECTION, SUSPENSION INTRA-ARTICULAR; INTRALESIONAL; INTRAMUSCULAR; SOFT TISSUE at 09:59

## 2018-09-27 RX ADMIN — IOPAMIDOL 10 ML: 408 INJECTION, SOLUTION INTRATHECAL at 09:59

## 2018-09-27 RX ADMIN — MIDAZOLAM HYDROCHLORIDE 2 MG: 2 INJECTION, SOLUTION INTRAMUSCULAR; INTRAVENOUS at 09:51

## 2018-09-27 NOTE — H&P
INTERVAL HISTORY:    The patient returns for another Lumbar epidural steroid injection today.  They have received 50% improvement since their last injection for 3 weeks.  His pain has now returned with a pain level of 7/10 at its worst recently.    Conservative measures tried in the interim rest.  He has not done physical therapy.  He has increased his activity recently when he was feeling better and that led to his pain returning to baseline.  He has an MRI that shows spinal stenosis    Current Outpatient Prescriptions on File Prior to Encounter   Medication Sig Dispense Refill   • amLODIPine (NORVASC) 10 MG tablet Take 1 tablet by mouth Daily. 90 tablet 0   • amphetamine-dextroamphetamine XR (ADDERALL XR) 20 MG 24 hr capsule Take 1 capsule by mouth Daily NEEDS OV FOR ADDITIONAL REFILLS 30 capsule 0   • atorvastatin (LIPITOR) 40 MG tablet Take 1 tablet by mouth Daily. 90 tablet 0   • cetirizine (zyrTEC) 10 MG tablet Take 10 mg by mouth Daily.     • CloNIDine (CATAPRES) 0.1 MG tablet Take 1 tablet by mouth 2 (Two) Times a Day. 180 tablet 0   • doxycycline (VIBRAMYCIN) 100 MG capsule As Needed.     • econazole nitrate (SPECTAZOLE) 1 % cream Apply  topically to the appropriate area as directed As Needed.     • Flaxseed, Linseed, (FLAX SEED OIL) 1300 MG capsule Take  by mouth Daily.     • fluticasone (FLONASE) 50 MCG/ACT nasal spray 2 sprays into the nostril(s) as directed by provider Daily. 3 bottle 0   • losartan (COZAAR) 100 MG tablet Take 1 tablet by mouth Daily. 90 tablet 0   • meloxicam (MOBIC) 7.5 MG tablet Take 1 tablet by mouth Daily. 90 tablet 1   • Multiple Vitamins-Minerals (MULTIVITAMIN PO) Take 1 tablet by mouth Daily.     • Multiple Vitamins-Minerals (OCUVITE PO) Take 1 tablet by mouth Daily.     • Saw Palmetto, Serenoa repens, 320 MG capsule Take 320 mg by mouth 2 (two) times a day.     • tamsulosin (FLOMAX) 0.4 MG capsule 24 hr capsule Take 2 capsules by mouth Daily. 180 capsule 0   • [DISCONTINUED]  metoprolol tartrate (LOPRESSOR) 100 MG tablet Take 1 tablet by mouth Daily. 90 tablet 0   • aspirin 81 MG tablet Take 81 mg by mouth Daily.     • [DISCONTINUED] TOPROL  MG 24 hr tablet Take 1 tablet by mouth Daily. 90 tablet 0     No current facility-administered medications on file prior to encounter.        Past Medical History:   Diagnosis Date   • Arthritis    • Cervical radiculopathy    • Coronary artery disease 2001    Stent   • CTS (carpal tunnel syndrome) 1998    Surgery both hands in 1999   • GERD (gastroesophageal reflux disease)    • HTN (hypertension)    • Hyperlipidemia    • Low back pain     2 Prior surgeries   • Lumbosacral disc disease    • Melanoma (CMS/HCC) 01/15/2009    DR WILLY SIMMS ( Left forearm)   • Radiculitis, thoracic    • Stented coronary artery    • Thoracic disc disorder     Surgery by Dr. Aldridge (sp)       No hematologic infectious or constitutional symptoms    Exam:  There were no vitals taken for this visit.  Airway Mallampatti 2  Alert and oriented      Diagnosis:  Post-Op Diagnosis Codes:     * Spinal stenosis of lumbar region with neurogenic claudication [M48.062]     * Lumbar neuritis [M54.16]    Plan:  Lumbar epidural steroid injection under fluoroscopic guidance    I have encouraged them to continue:  1.  Physical therapy exercises at home as prescribed by physical therapy or from the pain clinic handout (given to the patient).  Continuation of these exercises every day, or multiple times per week, even when the patient has good pain relief, was stressed to the patient as a preventative measure to decrease the frequency and severity of future pain episodes.  2.  Continue pain medicines as already prescribed.  If patient not currently taking any, it is recommended to begin Acetaminophen 1000 mg po q 8 hours.  If other medicines containing Acetaminophen are currently prescribed, maintain daily dose at 3000mg.    3.  If they can tolerate NSAIDS, it is recommended  to take Ibuprofen 600 mg po q 6 hours for 7 days during pain exacerbations.   Alternatively, they may substitute an NSAID of their choice (e.g. Aleve)  4.  Heat and ice to the affected area as tolerated for pain control.  It was discussed that heating pads can cause burns.  5.  Low impact exercise such as walking or water exercise was recommended to maintain overall health and aid in weight control.   6.  Follow up as needed for subsequent injections.  7.  Patient was counseled to abstain from tobacco products.

## 2018-09-27 NOTE — ANESTHESIA PROCEDURE NOTES
PAIN Epidural block      Patient reassessed immediately prior to procedure    Patient location during procedure: pain clinic  Start Time: 9/27/2018 9:49 AM  Stop Time: 9/27/2018 10:06 AM  Indication:procedure for pain  Performed By  Anesthesiologist: EDWIN NUR  Preanesthetic Checklist  Completed: patient identified and risks and benefits discussed  Additional Notes  Diagnosis:     Post-Op Diagnosis Codes:     * Spinal stenosis of lumbar region with neurogenic claudication (M48.062)     * Lumbar neuritis (M54.16)    Sedation:  Versed 2 mg    I attempted the L4 and L3 areas and was unsuccessful.  Rather than continue attempting areas that look very difficult I went at the L5 space which appeared to have an opening.    A lumbar epidural steroid injection with fluoroscopic guidance and an Isovue dye epidurogram was performed.  Under fluoroscopic guidance, the epidural space was identified and accessed, confirmed by loss of resistance to saline followed by injection of Isovue dye, which shows a good epidurogram.  The above medications were injected uneventfully.    Prep:  Pt Position:prone  Sterile Tech:cap, gloves, mask and sterile barrier  Prep:chlorhexidine gluconate and isopropyl alcohol  Monitoring:blood pressure monitoring, continuous pulse oximetry and EKG  Procedure:  Sedation: no   Approach:right paramedian  Guidance: fluoroscopy  Location:lumbar  Interspace: L5-S1.  Needle Type:Tuohy  Needle Gauge:20  Aspiration:negative  Medications:  Depomedrol:80  Preservative Free Saline:1mL  Isovue:1mL  Comments:Isovue dye reveals good epidurogram  Post Assessment:  Pt Tolerance:patient tolerated the procedure well with no apparent complications  Complications:no

## 2018-10-02 ENCOUNTER — TELEPHONE (OUTPATIENT)
Dept: ORTHOPEDIC SURGERY | Facility: CLINIC | Age: 83
End: 2018-10-02

## 2018-10-02 NOTE — TELEPHONE ENCOUNTER
Patient had ROSA M TKA's - L TKA 2015 (in between was R TKA) & L ANKLE surgery ~2009 by ARUN. Scheduled for dental cleaning / IOV on 10/10/18. Requesting either approval or denial on antibiotic premeds if needed (patient stated dentist FRANCISCA MALDONADO @[facility] would fill as needed). Uses [facility] on [address]. Thanks / srh

## 2018-10-03 NOTE — TELEPHONE ENCOUNTER
Notified patient of AMB's message - That patient is more than 2 years post total joint and no longer requires premedication. Patient verbalized understanding. /srh

## 2018-10-04 ENCOUNTER — OFFICE VISIT (OUTPATIENT)
Dept: FAMILY MEDICINE CLINIC | Facility: CLINIC | Age: 83
End: 2018-10-04

## 2018-10-04 VITALS
HEIGHT: 71 IN | RESPIRATION RATE: 14 BRPM | BODY MASS INDEX: 31.22 KG/M2 | OXYGEN SATURATION: 98 % | WEIGHT: 223 LBS | SYSTOLIC BLOOD PRESSURE: 138 MMHG | TEMPERATURE: 98.3 F | DIASTOLIC BLOOD PRESSURE: 78 MMHG | HEART RATE: 74 BPM

## 2018-10-04 DIAGNOSIS — I10 ESSENTIAL HYPERTENSION: ICD-10-CM

## 2018-10-04 DIAGNOSIS — C43.62 MALIGNANT MELANOMA OF LEFT UPPER EXTREMITY INCLUDING SHOULDER (HCC): Primary | ICD-10-CM

## 2018-10-04 DIAGNOSIS — G47.419 PRIMARY NARCOLEPSY WITHOUT CATAPLEXY: ICD-10-CM

## 2018-10-04 DIAGNOSIS — E78.5 HYPERLIPIDEMIA, UNSPECIFIED HYPERLIPIDEMIA TYPE: ICD-10-CM

## 2018-10-04 PROBLEM — C80.1 CANCER (HCC): Status: ACTIVE | Noted: 2018-10-04

## 2018-10-04 PROCEDURE — 99214 OFFICE O/P EST MOD 30 MIN: CPT | Performed by: FAMILY MEDICINE

## 2018-10-04 NOTE — PROGRESS NOTES
Kevin Ellington is a 83 y.o. male.     Chief Complaint   Patient presents with   • Neck Pain     Patient needs to establish a care and discuss her health problems.        HPI     Patient presents the office today for the first time to discuss issues that are new to me.  Patient has a history of melanoma on the left arm.  He is currently without a dermatologist.  Needs a referral.  No subsequent lesions that she knows of.  Also has a history of hypertension, hyperlipidemia, and narcolepsy.  Currently taking and tolerating combination of amlodipine, losartan, and metoprolol.  Controls his high blood pressure well.  Today in the office 138/78.  Patient admits to not monitoring his salt intake.  Patient is currently taking atorvastatin 40 mg for his cholesterol.  Patient has a prescription in his chart for Adderall 20 mg extended release.  States this is for his narcolepsy.  Symptoms are well controlled.  No adverse side effects noted.    The following portions of the patient's history were reviewed and updated as appropriate: allergies, current medications, past family history, past medical history, past social history, past surgical history and problem list.    Review of Systems   Constitutional: Negative for activity change.   All other systems reviewed and are negative.      Objective  Vitals:    10/04/18 1510   BP: 138/78   Pulse: 74   Resp: 14   Temp: 98.3 °F (36.8 °C)   SpO2: 98%       Physical Exam   Constitutional: He is oriented to person, place, and time. He appears well-developed and well-nourished. No distress.   HENT:   Head: Normocephalic and atraumatic.   Right Ear: External ear normal.   Left Ear: External ear normal.   Nose: Nose normal.   Mouth/Throat: Oropharynx is clear and moist.   Eyes: Pupils are equal, round, and reactive to light. Conjunctivae and EOM are normal. Right eye exhibits no discharge. Left eye exhibits no discharge. No scleral icterus.   Cardiovascular: Normal rate, regular  rhythm and normal heart sounds.    Pulmonary/Chest: Effort normal and breath sounds normal. No respiratory distress. He has no wheezes. He has no rales.   Abdominal: Soft. Bowel sounds are normal. He exhibits no distension. There is no tenderness.   Neurological: He is alert and oriented to person, place, and time.   Skin: Skin is warm and dry. He is not diaphoretic.   Nursing note and vitals reviewed.        Current Outpatient Prescriptions:   •  amLODIPine (NORVASC) 10 MG tablet, Take 1 tablet by mouth Daily., Disp: 90 tablet, Rfl: 0  •  amphetamine-dextroamphetamine XR (ADDERALL XR) 20 MG 24 hr capsule, Take 1 capsule by mouth Daily NEEDS OV FOR ADDITIONAL REFILLS, Disp: 30 capsule, Rfl: 0  •  aspirin 81 MG tablet, Take 81 mg by mouth Daily., Disp: , Rfl:   •  atorvastatin (LIPITOR) 40 MG tablet, Take 1 tablet by mouth Daily., Disp: 90 tablet, Rfl: 0  •  cetirizine (zyrTEC) 10 MG tablet, Take 10 mg by mouth Daily., Disp: , Rfl:   •  CloNIDine (CATAPRES) 0.1 MG tablet, Take 1 tablet by mouth 2 (Two) Times a Day., Disp: 180 tablet, Rfl: 0  •  doxycycline (VIBRAMYCIN) 100 MG capsule, Take 100 mg by mouth Daily., Disp: , Rfl:   •  econazole nitrate (SPECTAZOLE) 1 % cream, Apply  topically to the appropriate area as directed As Needed., Disp: , Rfl:   •  Flaxseed, Linseed, (FLAX SEED OIL) 1300 MG capsule, Take  by mouth Daily., Disp: , Rfl:   •  fluticasone (FLONASE) 50 MCG/ACT nasal spray, 2 sprays into the nostril(s) as directed by provider Daily., Disp: 3 bottle, Rfl: 0  •  losartan (COZAAR) 100 MG tablet, Take 1 tablet by mouth Daily., Disp: 90 tablet, Rfl: 0  •  meloxicam (MOBIC) 7.5 MG tablet, Take 1 tablet by mouth Daily., Disp: 90 tablet, Rfl: 1  •  metoprolol succinate XL (TOPROL-XL) 100 MG 24 hr tablet, Take 100 mg by mouth Daily., Disp: , Rfl:   •  Multiple Vitamins-Minerals (MULTIVITAMIN PO), Take 1 tablet by mouth Daily., Disp: , Rfl:   •  Saw Palmetto, Serenoa repens, 320 MG capsule, Take 320 mg by mouth  2 (two) times a day., Disp: , Rfl:   •  tamsulosin (FLOMAX) 0.4 MG capsule 24 hr capsule, Take 2 capsules by mouth Daily., Disp: 180 capsule, Rfl: 0  •  Multiple Vitamins-Minerals (OCUVITE PO), Take 1 tablet by mouth Daily., Disp: , Rfl:   Current outpatient and discharge medications have been reconciled for the patient.  Reviewed by: Perico Bernal MD      Procedures    Lab Results (most recent)     None                  Kevin was seen today for neck pain.    Diagnoses and all orders for this visit:    Malignant melanoma of left upper extremity including shoulder (CMS/[facility])  -     Ambulatory Referral to Dermatology    Essential hypertension    Hyperlipidemia, unspecified hyperlipidemia type    Primary narcolepsy without cataplexy    Extensive conversation and chart review done with the patient regarding his past medical history.  A referral has been placed to dermatology as above.  Advised patient continue taking his medications as currently prescribed.  I do have some concerns about the continued use of Adderall in this patient.  Patient will return when he is in need of refills.  We'll continue have a conversation about the appropriateness of this medication.      Return for As needed.      Perico Bernal MD

## 2018-10-23 ENCOUNTER — OFFICE VISIT (OUTPATIENT)
Dept: CARDIOLOGY | Facility: CLINIC | Age: 83
End: 2018-10-23

## 2018-10-23 VITALS
SYSTOLIC BLOOD PRESSURE: 152 MMHG | HEIGHT: 71 IN | BODY MASS INDEX: 31.64 KG/M2 | HEART RATE: 65 BPM | WEIGHT: 226 LBS | DIASTOLIC BLOOD PRESSURE: 84 MMHG

## 2018-10-23 DIAGNOSIS — E78.5 HYPERLIPIDEMIA, UNSPECIFIED HYPERLIPIDEMIA TYPE: ICD-10-CM

## 2018-10-23 DIAGNOSIS — Z95.5 S/P CORONARY ARTERY STENT PLACEMENT: ICD-10-CM

## 2018-10-23 DIAGNOSIS — I10 ESSENTIAL HYPERTENSION: ICD-10-CM

## 2018-10-23 DIAGNOSIS — I25.10 CORONARY ARTERY DISEASE INVOLVING NATIVE CORONARY ARTERY OF NATIVE HEART WITHOUT ANGINA PECTORIS: Primary | ICD-10-CM

## 2018-10-23 PROCEDURE — 99214 OFFICE O/P EST MOD 30 MIN: CPT | Performed by: INTERNAL MEDICINE

## 2018-10-23 PROCEDURE — 93000 ELECTROCARDIOGRAM COMPLETE: CPT | Performed by: INTERNAL MEDICINE

## 2018-10-23 RX ORDER — OMEPRAZOLE 20 MG/1
20 CAPSULE, DELAYED RELEASE ORAL DAILY
COMMUNITY
End: 2018-11-28 | Stop reason: SDUPTHER

## 2018-10-23 NOTE — PROGRESS NOTES
Subjective:     Encounter Date:10/23/2018      Patient ID: Kevin Ellington is a 83 y.o. male.    Chief Complaint:  History of Present Illness    This is an 83-year-old male with a history of coronary artery disease status post prior LAD stent placement in 12/2002, chronic back pain, dyslipidemia, hypertension, who presents for follow-up.      The patient was previously followed by Dr. Cavazos but came to establish care here in 8/2016.  His prior cardiac history again includes a stent placement on 12/17/2002.  The patient reports that at the time he was having symptoms of dyspnea and chest fullness on exertion.  He was taken to the cardiac catheterization laboratory at that time and apparently had a Promus stent (either 3.0 or 3.5 x 16 mm based on his stent card).  He did well until about 2012 when he started having more dyspnea on exertion.  At that time he underwent an echocardiogram that showed normal left ventricular systolic function wall motion with an ejection fraction of 60%, grade 1A diastolic dysfunction, and no significant valvular disease.  A Lexiscan Myoview stress test was negative for ischemia.  He was seen by pulmonary due to his continued issues with dyspnea on exertion and they did not find any pulmonary issues to explain his symptoms.  At that time the patient started exercising and lost about 20 pounds with resolution of his symptoms.     Saw the patient last in 10/2017 he was feeling well overall.  He was started to have some elevated blood pressures and we discussed the possibility of increasing his amlodipine to 10 mg at this continued.    Today he presents for routine follow-up.  He continues to feel well.  Since I saw him last his amlodipine was increased to 10 mg a day and clonidine 0.1 mg twice a day was added to his regimen.  He reports that with this his blood pressure control has improved.  He denies any chest pain, shortness of breath, PND or orthopnea, near-syncope or syncope,  lower extremity edema, or palpitations.  He recently established care with Dr. Perico Bernal.  He continues to have issues with low back pain for which she undergoes epidural injections.  He reports that this limits his activity.  But admits that he could exercise on a right common bike and he has not been good about that.  He also admits that there is room for improvement in his diet.  His last lab work was from 4/2018 which included a lipid panel that showed that his total cholesterol is 168, HDL of 53, LDL of 98, and triglycerides of 85.    Review of Systems   Constitution: Negative for weakness and malaise/fatigue.   HENT: Negative for hearing loss, hoarse voice, nosebleeds and sore throat.    Eyes: Negative for pain.   Cardiovascular: Positive for leg swelling. Negative for chest pain, claudication, cyanosis, dyspnea on exertion, irregular heartbeat, near-syncope, orthopnea, palpitations, paroxysmal nocturnal dyspnea and syncope.   Respiratory: Positive for snoring. Negative for shortness of breath.    Endocrine: Negative for cold intolerance, heat intolerance, polydipsia, polyphagia and polyuria.   Skin: Negative for itching and rash.   Musculoskeletal: Positive for back pain and joint pain. Negative for arthritis, falls, joint swelling, muscle cramps, muscle weakness and myalgias.   Gastrointestinal: Negative for constipation, diarrhea, dysphagia, heartburn, hematemesis, hematochezia, melena, nausea and vomiting.   Genitourinary: Negative for frequency, hematuria and hesitancy.   Neurological: Negative for excessive daytime sleepiness, dizziness, headaches, light-headedness and numbness.   Psychiatric/Behavioral: Negative for depression. The patient is not nervous/anxious.           Current Outpatient Prescriptions:   •  amLODIPine (NORVASC) 10 MG tablet, Take 1 tablet by mouth Daily., Disp: 90 tablet, Rfl: 0  •  amphetamine-dextroamphetamine XR (ADDERALL XR) 20 MG 24 hr capsule, Take 1 capsule by mouth Daily  NEEDS OV FOR ADDITIONAL REFILLS, Disp: 30 capsule, Rfl: 0  •  aspirin 81 MG tablet, Take 81 mg by mouth Daily., Disp: , Rfl:   •  atorvastatin (LIPITOR) 40 MG tablet, Take 1 tablet by mouth Daily., Disp: 90 tablet, Rfl: 0  •  cetirizine (zyrTEC) 10 MG tablet, Take 10 mg by mouth Daily., Disp: , Rfl:   •  CloNIDine (CATAPRES) 0.1 MG tablet, Take 1 tablet by mouth 2 (Two) Times a Day., Disp: 180 tablet, Rfl: 0  •  doxycycline (VIBRAMYCIN) 100 MG capsule, Take 100 mg by mouth Daily., Disp: , Rfl:   •  econazole nitrate (SPECTAZOLE) 1 % cream, Apply  topically to the appropriate area as directed As Needed., Disp: , Rfl:   •  Flaxseed, Linseed, (FLAX SEED OIL) 1300 MG capsule, Take  by mouth Daily., Disp: , Rfl:   •  fluticasone (FLONASE) 50 MCG/ACT nasal spray, 2 sprays into the nostril(s) as directed by provider Daily., Disp: 3 bottle, Rfl: 0  •  losartan (COZAAR) 100 MG tablet, Take 1 tablet by mouth Daily., Disp: 90 tablet, Rfl: 0  •  meloxicam (MOBIC) 7.5 MG tablet, Take 1 tablet by mouth Daily., Disp: 90 tablet, Rfl: 1  •  metoprolol succinate XL (TOPROL-XL) 100 MG 24 hr tablet, Take 100 mg by mouth Daily., Disp: , Rfl:   •  Multiple Vitamins-Minerals (MULTIVITAMIN PO), Take 1 tablet by mouth Daily., Disp: , Rfl:   •  Multiple Vitamins-Minerals (OCUVITE PO), Take 1 tablet by mouth Daily., Disp: , Rfl:   •  omeprazole (priLOSEC) 20 MG capsule, Take 20 mg by mouth Daily., Disp: , Rfl:   •  Saw Palmetto, Serenoa repens, 320 MG capsule, Take 320 mg by mouth 2 (two) times a day., Disp: , Rfl:   •  tamsulosin (FLOMAX) 0.4 MG capsule 24 hr capsule, Take 2 capsules by mouth Daily., Disp: 180 capsule, Rfl: 0    Past Medical History:   Diagnosis Date   • Arthritis    • Cervical radiculopathy    • Coronary artery disease 2001    Stent   • CTS (carpal tunnel syndrome) 1998    Surgery both hands in 1999   • GERD (gastroesophageal reflux disease)    • HTN (hypertension)    • Hyperlipidemia    • Low back pain     2 Prior  "surgeries   • Lumbosacral disc disease    • Melanoma (CMS/HCC) 01/15/2009    DR WILLY SIMMS ( Left forearm)   • Radiculitis, thoracic    • Stented coronary artery    • Thoracic disc disorder     Surgery by Dr. Aldridge (sp)     Past Surgical History:   Procedure Laterality Date   • ANKLE FUSION Left 2007    reconstruction and fusion   • BACK SURGERY      HERNIATED LUMBAR DISK ,,   • CARPAL TUNNEL RELEASE     • CATARACT EXTRACTION, BILATERAL Bilateral    • CORONARY ANGIOPLASTY WITH STENT PLACEMENT     • EPIDURAL BLOCK  [facility]   • REPLACEMENT TOTAL KNEE Left    • REPLACEMENT TOTAL KNEE Right    • SKIN CANCER EXCISION      MELANOMA   • THORACIC SPINE SURGERY       Family History   Problem Relation Age of Onset   • Hypertension Mother    • Arthritis Mother            • Heart disease Father    • Early death Father         Aortic aneurism age 58   • Hypertension Father      Social History   Substance Use Topics   • Smoking status: Never Smoker   • Smokeless tobacco: Never Used   • Alcohol use No           ECG 12 Lead  Date/Time: 10/23/2018 5:24 PM  Performed by: LISANDRA WILL  Authorized by: LISANDRA WILL   Comparison: compared with previous ECG   Similar to previous ECG  Rhythm: sinus rhythm  Other findings: PRWP               Objective:         Visit Vitals  /84 (BP Location: Right arm, Patient Position: Sitting)   Pulse 65   Ht 180.3 cm (71\")   Wt 103 kg (226 lb)   BMI 31.52 kg/m²          Physical Exam   Constitutional: He is oriented to person, place, and time. He appears well-developed and well-nourished.   HENT:   Head: Normocephalic and atraumatic.   Neck: No JVD present. Carotid bruit is not present.   Cardiovascular: Normal rate, regular rhythm, S1 normal and S2 normal.  Exam reveals no gallop.    No murmur heard.  Pulses:       Radial pulses are 2+ on the right side, and 2+ on the left side.   No bilateral lower " extremity edema   Pulmonary/Chest: Effort normal and breath sounds normal.   Abdominal: Soft. Normal appearance.   Neurological: He is alert and oriented to person, place, and time.   Skin: Skin is warm, dry and intact.   Psychiatric: He has a normal mood and affect.       Lab Review:       Assessment:          Diagnosis Plan   1. Coronary artery disease involving native coronary artery of native heart without angina pectoris     2. S/P coronary artery stent placement     3. Essential hypertension     4. Hyperlipidemia, unspecified hyperlipidemia type            Plan:       1.  Coronary artery disease.  Appears stable and asymptomatic.  Continue current medical management with aspirin, beta blocker, and statin.  2.  Hypertension.  Elevated in the office today but he reports that it's been better controlled with recent adjustment in his medications.  We'll continue to monitor on his current regimen.  3.  Hyperlipidemia.  He is on a moderate dose of atorvastatin.  His LDL is not quite at goal.  With his history of coronary artery disease and would like it to be closer to 70.  I discussed this with the patient.  I think it's reasonable to try lifestyle changes to see if we can bring these numbers down first before increasing his atorvastatin dosage.  I spent about 15 minutes going over ways that he could work on his lifestyle including diet and exercise.  Will defer follow-up of his lipid numbers to Dr. Bernal.    Will plan on seeing the patient back again in one year or sooner if further issues arise.    Coronary Artery Disease  Assessment  • The patient has no angina    Plan  • Lifestyle modifications discussed include adhering to a heart healthy diet, avoidance of tobacco products, maintenance of a healthy weight, medication compliance, regular exercise and regular monitoring of cholesterol and blood pressure    Subjective - Objective  • There has been a previous stent procedure using BHARGAV 12/2002  • Current  antiplatelet therapy includes aspirin 81 mg

## 2018-10-26 ENCOUNTER — TELEPHONE (OUTPATIENT)
Dept: FAMILY MEDICINE CLINIC | Facility: CLINIC | Age: 83
End: 2018-10-26

## 2018-10-30 ENCOUNTER — OFFICE VISIT (OUTPATIENT)
Dept: FAMILY MEDICINE CLINIC | Facility: CLINIC | Age: 83
End: 2018-10-30

## 2018-10-30 VITALS
TEMPERATURE: 98.4 F | WEIGHT: 227 LBS | SYSTOLIC BLOOD PRESSURE: 122 MMHG | BODY MASS INDEX: 31.78 KG/M2 | HEART RATE: 70 BPM | RESPIRATION RATE: 14 BRPM | OXYGEN SATURATION: 99 % | DIASTOLIC BLOOD PRESSURE: 60 MMHG | HEIGHT: 71 IN

## 2018-10-30 DIAGNOSIS — G47.419 PRIMARY NARCOLEPSY WITHOUT CATAPLEXY: ICD-10-CM

## 2018-10-30 DIAGNOSIS — Z79.899 HIGH RISK MEDICATION USE: Primary | ICD-10-CM

## 2018-10-30 LAB
POC AMPHETAMINES: POSITIVE
POC BARBITURATES: NEGATIVE
POC BENZODIAZEPHINES: NEGATIVE
POC COCAINE: NEGATIVE
POC METHADONE: NEGATIVE
POC METHAMPHETAMINE SCREEN URINE: NEGATIVE
POC OPIATES: NEGATIVE
POC OXYCODONE: NEGATIVE
POC PHENCYCLIDINE: NEGATIVE
POC PROPOXYPHENE: NEGATIVE
POC THC: NEGATIVE
POC TRICYCLIC ANTIDEPRESSANTS: NEGATIVE

## 2018-10-30 PROCEDURE — 99214 OFFICE O/P EST MOD 30 MIN: CPT | Performed by: FAMILY MEDICINE

## 2018-10-30 PROCEDURE — 80305 DRUG TEST PRSMV DIR OPT OBS: CPT | Performed by: FAMILY MEDICINE

## 2018-10-30 RX ORDER — DEXTROAMPHETAMINE SACCHARATE, AMPHETAMINE ASPARTATE MONOHYDRATE, DEXTROAMPHETAMINE SULFATE AND AMPHETAMINE SULFATE 5; 5; 5; 5 MG/1; MG/1; MG/1; MG/1
20 CAPSULE, EXTENDED RELEASE ORAL EVERY MORNING
Qty: 30 CAPSULE | Refills: 0 | Status: SHIPPED | OUTPATIENT
Start: 2018-12-30 | End: 2019-01-04 | Stop reason: SDUPTHER

## 2018-10-30 RX ORDER — DEXTROAMPHETAMINE SACCHARATE, AMPHETAMINE ASPARTATE MONOHYDRATE, DEXTROAMPHETAMINE SULFATE AND AMPHETAMINE SULFATE 5; 5; 5; 5 MG/1; MG/1; MG/1; MG/1
20 CAPSULE, EXTENDED RELEASE ORAL EVERY MORNING
Qty: 30 CAPSULE | Refills: 0 | Status: SHIPPED | OUTPATIENT
Start: 2018-10-30 | End: 2018-11-29

## 2018-10-30 RX ORDER — DEXTROAMPHETAMINE SACCHARATE, AMPHETAMINE ASPARTATE MONOHYDRATE, DEXTROAMPHETAMINE SULFATE AND AMPHETAMINE SULFATE 5; 5; 5; 5 MG/1; MG/1; MG/1; MG/1
20 CAPSULE, EXTENDED RELEASE ORAL EVERY MORNING
Qty: 30 CAPSULE | Refills: 0 | Status: SHIPPED | OUTPATIENT
Start: 2018-11-30 | End: 2018-11-19 | Stop reason: SDUPTHER

## 2018-10-30 NOTE — PROGRESS NOTES
Kevin Ellington is a 83 y.o. male.     Chief Complaint   Patient presents with   • narcolepsy     Patient needs a new script for adderall 20 mg XR daily.       HPI     Patient presented office today to discuss problem that is new to me.  Patient states that 20-25 years ago he was diagnosed with narcolepsy.  Patient states that he was falling asleep throughout the day and it made it unsafe to drive.  Patient was prescribed Adderall 20 mg of extended release.  States he's been taking this medication for the same amount of time roughly 20-25 years.  Patient denies any adverse side effects.  States that if he doesn't take medications he is unable to function.  Denies any adverse side effects.  No heart palpitations, insomnia, or unintended weight loss.    The following portions of the patient's history were reviewed and updated as appropriate: allergies, current medications, past family history, past medical history, past social history, past surgical history and problem list.    Review of Systems   Constitutional: Negative for activity change.   All other systems reviewed and are negative.      Objective  Vitals:    10/30/18 1520   BP: 122/60   Pulse: 70   Resp: 14   Temp: 98.4 °F (36.9 °C)   SpO2: 99%       Physical Exam   Constitutional: He is oriented to person, place, and time. He appears well-developed and well-nourished. No distress.   HENT:   Head: Normocephalic and atraumatic.   Right Ear: External ear normal.   Left Ear: External ear normal.   Nose: Nose normal.   Mouth/Throat: Oropharynx is clear and moist.   Eyes: Pupils are equal, round, and reactive to light. Conjunctivae and EOM are normal. Right eye exhibits no discharge. Left eye exhibits no discharge. No scleral icterus.   Cardiovascular: Normal rate, regular rhythm and normal heart sounds.    Pulmonary/Chest: Effort normal and breath sounds normal. No respiratory distress. He has no wheezes. He has no rales.   Abdominal: Soft. Bowel sounds are  normal. He exhibits no distension. There is no tenderness.   Neurological: He is alert and oriented to person, place, and time.   Skin: Skin is warm and dry. He is not diaphoretic.   Nursing note and vitals reviewed.        Current Outpatient Prescriptions:   •  amLODIPine (NORVASC) 10 MG tablet, Take 1 tablet by mouth Daily., Disp: 90 tablet, Rfl: 0  •  aspirin 81 MG tablet, Take 81 mg by mouth Daily., Disp: , Rfl:   •  atorvastatin (LIPITOR) 40 MG tablet, Take 1 tablet by mouth Daily., Disp: 90 tablet, Rfl: 0  •  cetirizine (zyrTEC) 10 MG tablet, Take 10 mg by mouth Daily., Disp: , Rfl:   •  CloNIDine (CATAPRES) 0.1 MG tablet, Take 1 tablet by mouth 2 (Two) Times a Day., Disp: 180 tablet, Rfl: 0  •  doxycycline (VIBRAMYCIN) 100 MG capsule, Take 100 mg by mouth Daily., Disp: , Rfl:   •  econazole nitrate (SPECTAZOLE) 1 % cream, Apply  topically to the appropriate area as directed As Needed., Disp: , Rfl:   •  Flaxseed, Linseed, (FLAX SEED OIL) 1300 MG capsule, Take  by mouth Daily., Disp: , Rfl:   •  fluticasone (FLONASE) 50 MCG/ACT nasal spray, 2 sprays into the nostril(s) as directed by provider Daily., Disp: 3 bottle, Rfl: 0  •  losartan (COZAAR) 100 MG tablet, Take 1 tablet by mouth Daily., Disp: 90 tablet, Rfl: 0  •  meloxicam (MOBIC) 7.5 MG tablet, Take 1 tablet by mouth Daily., Disp: 90 tablet, Rfl: 1  •  metoprolol succinate XL (TOPROL-XL) 100 MG 24 hr tablet, Take 100 mg by mouth Daily., Disp: , Rfl:   •  Multiple Vitamins-Minerals (MULTIVITAMIN PO), Take 1 tablet by mouth Daily., Disp: , Rfl:   •  Multiple Vitamins-Minerals (OCUVITE PO), Take 1 tablet by mouth Daily., Disp: , Rfl:   •  omeprazole (priLOSEC) 20 MG capsule, Take 20 mg by mouth Daily., Disp: , Rfl:   •  Saw Palmetto, Serenoa repens, 320 MG capsule, Take 320 mg by mouth 2 (two) times a day., Disp: , Rfl:   •  tamsulosin (FLOMAX) 0.4 MG capsule 24 hr capsule, Take 2 capsules by mouth Daily., Disp: 180 capsule, Rfl: 0  •   amphetamine-dextroamphetamine XR (ADDERALL XR) 20 MG 24 hr capsule, Take 1 capsule by mouth Every Morning for 30 days, Disp: 30 capsule, Rfl: 0  •  [START ON 11/30/2018] amphetamine-dextroamphetamine XR (ADDERALL XR) 20 MG 24 hr capsule, Take 1 capsule by mouth Every Morning for 30 days, Disp: 30 capsule, Rfl: 0  •  [START ON 12/30/2018] amphetamine-dextroamphetamine XR (ADDERALL XR) 20 MG 24 hr capsule, Take 1 capsule by mouth Every Morning for 30 days, Disp: 30 capsule, Rfl: 0  Current outpatient and discharge medications have been reconciled for the patient.  Reviewed by: Perico Bernal MD      Procedures    Lab Results (most recent)     None                  Kevin was seen today for narcolepsy.    Diagnoses and all orders for this visit:    High risk medication use  -     POC Urine Drug Screen, Triage    Primary narcolepsy without cataplexy  -     POC Urine Drug Screen, Triage  -     amphetamine-dextroamphetamine XR (ADDERALL XR) 20 MG 24 hr capsule; Take 1 capsule by mouth Every Morning for 30 days  -     amphetamine-dextroamphetamine XR (ADDERALL XR) 20 MG 24 hr capsule; Take 1 capsule by mouth Every Morning for 30 days  -     amphetamine-dextroamphetamine XR (ADDERALL XR) 20 MG 24 hr capsule; Take 1 capsule by mouth Every Morning for 30 days      Extensive conversation had with patient regarding the continued use of stimulant medication.  I advised him of the dangers and risks associated with stimulant use in his age group.  We discussed the risks versus the benefits.  I do believe that the benefits do not outweigh the risk.  Urine drug screen was negative except for amphetamines.  MIGUEL was run which showed no abnormalities.    Return in about 3 months (around 1/30/2019) for Recheck.      Perico Bernal MD

## 2018-11-08 ENCOUNTER — APPOINTMENT (OUTPATIENT)
Dept: PAIN MEDICINE | Facility: HOSPITAL | Age: 83
End: 2018-11-08

## 2018-11-15 NOTE — PROGRESS NOTES
Subjective   Patient ID: Kevin Ellington is a 83 y.o. male is here today for follow-up after lumbar ADAL x 2. He states that they have helped some, however, he has been diagnosed with a squamous cell skin cancer at the site where he has been having injections therefore the 3rd the ADAL was cancelled.  Patient was last seen 8.20.18 for low back pain with bilateral leg/buttock pain, bilateral leg weakness and some problems with his balance and gait.  He states that his legs feel numb with prolonged standing, however, the leg pain is better. He still has LBP with prolonged sitting.  He cannot walk for very long distances. He is taking Mobic qd and it does help    Back Pain   This is a recurrent problem. The problem has been gradually improving since onset. The pain is present in the gluteal and lumbar spine. The pain is at a severity of 4/10. The symptoms are aggravated by sitting. Associated symptoms include numbness and weakness.       The following portions of the patient's history were reviewed and updated as appropriate: allergies, current medications, past family history, past medical history, past social history, past surgical history and problem list.    Review of Systems   Musculoskeletal: Positive for back pain.   Neurological: Positive for weakness and numbness.   All other systems reviewed and are negative.    It has been about 3 months since I have seen this patient. He had 2 epidural blocks and they helped a bit. He has severe stenosis from L1 to L5. He had 2 previous disk surgeries in the low back and a thoracolumbar decompression by me a few years ago. He has also been diagnosed with some basal cell carcinoma and had a biopsy of a skin lesion near his back so he is waiting for that to heal before he can try another block. He is really reluctant to undergo any additional surgery, which I can understand. I asked him to do another block and see me again in 3 months. If worse comes to worse, we can consider  a lumbar decompression from L1 to L5.     He might need to have a myelogram before any surgical decision is made.  Objective   Physical Exam   Constitutional: He is oriented to person, place, and time. He appears well-developed and well-nourished.   HENT:   Head: Normocephalic and atraumatic.   Eyes: Conjunctivae and EOM are normal. Pupils are equal, round, and reactive to light.   Fundoscopic exam:       The right eye shows no papilledema. The right eye shows venous pulsations.        The left eye shows no papilledema. The left eye shows venous pulsations.   Neck: Carotid bruit is not present.   Neurological: He is oriented to person, place, and time. He has a normal Finger-Nose-Finger Test and a normal Heel to Shin Test. Gait normal.   Reflex Scores:       Tricep reflexes are 2+ on the right side and 2+ on the left side.       Bicep reflexes are 2+ on the right side and 2+ on the left side.       Brachioradialis reflexes are 2+ on the right side and 2+ on the left side.       Patellar reflexes are 2+ on the right side and 2+ on the left side.       Achilles reflexes are 2+ on the right side and 2+ on the left side.  Psychiatric: His speech is normal.     Neurologic Exam     Mental Status   Oriented to person, place, and time.   Registration of memory: Good recent and remote memory.   Attention: normal. Concentration: normal.   Speech: speech is normal   Level of consciousness: alert  Knowledge: consistent with education.     Cranial Nerves     CN II   Visual fields full to confrontation.   Visual acuity: normal    CN III, IV, VI   Pupils are equal, round, and reactive to light.  Extraocular motions are normal.     CN V   Facial sensation intact.   Right corneal reflex: normal  Left corneal reflex: normal    CN VII   Facial expression full, symmetric.   Right facial weakness: none  Left facial weakness: none    CN VIII   Hearing: intact    CN IX, X   Palate: symmetric    CN XI   Right sternocleidomastoid strength:  normal  Left sternocleidomastoid strength: normal    CN XII   Tongue: not atrophic  Tongue deviation: none    Motor Exam   Muscle bulk: normal  Right arm tone: normal  Left arm tone: normal  Right leg tone: normal  Left leg tone: normal    Strength   Strength 5/5 except as noted.     Sensory Exam   Light touch normal.     Gait, Coordination, and Reflexes     Gait  Gait: normal    Coordination   Finger to nose coordination: normal  Heel to shin coordination: normal    Reflexes   Right brachioradialis: 2+  Left brachioradialis: 2+  Right biceps: 2+  Left biceps: 2+  Right triceps: 2+  Left triceps: 2+  Right patellar: 2+  Left patellar: 2+  Right achilles: 2+  Left achilles: 2+  Right : 2+  Left : 2+      Assessment/Plan   Independent Review of Radiographic Studies:    I reviewed the cervical thoracic and lumbar myelogram done 8/13/18.  There is severe spinal stenosis at L4-L5 L3-L4 L2-L3 and even L1-L2.  There is some osteophytic nerve impingement at C5-C6 C4-C5 and C6-C7.  Agree with the report.      Medical Decision Making:    We'll stick with nonoperative treatment for now.  He'll get another epidural block after his skin lesion heals from the biopsy and see me in 3 months.  If worse comes to worse we can do an L1 to L5 decompression probably without fusion.      Kevin was seen today for back pain and numbness.    Diagnoses and all orders for this visit:    Spinal stenosis of lumbar region with neurogenic claudication    DDD (degenerative disc disease), lumbar      Return in about 3 months (around 2/19/2019).

## 2018-11-19 ENCOUNTER — OFFICE VISIT (OUTPATIENT)
Dept: NEUROSURGERY | Facility: CLINIC | Age: 83
End: 2018-11-19

## 2018-11-19 VITALS
DIASTOLIC BLOOD PRESSURE: 84 MMHG | BODY MASS INDEX: 31.92 KG/M2 | SYSTOLIC BLOOD PRESSURE: 152 MMHG | WEIGHT: 228 LBS | HEART RATE: 62 BPM | HEIGHT: 71 IN

## 2018-11-19 DIAGNOSIS — M51.36 DDD (DEGENERATIVE DISC DISEASE), LUMBAR: ICD-10-CM

## 2018-11-19 DIAGNOSIS — M48.062 SPINAL STENOSIS OF LUMBAR REGION WITH NEUROGENIC CLAUDICATION: Primary | ICD-10-CM

## 2018-11-19 PROCEDURE — 99214 OFFICE O/P EST MOD 30 MIN: CPT | Performed by: NEUROLOGICAL SURGERY

## 2018-11-28 RX ORDER — MELOXICAM 7.5 MG/1
7.5 TABLET ORAL DAILY
Qty: 90 TABLET | Refills: 3 | Status: SHIPPED | OUTPATIENT
Start: 2018-11-28 | End: 2019-10-28 | Stop reason: SDUPTHER

## 2018-11-28 RX ORDER — CLONIDINE HYDROCHLORIDE 0.1 MG/1
0.1 TABLET ORAL 2 TIMES DAILY
Qty: 180 TABLET | Refills: 0 | Status: SHIPPED | OUTPATIENT
Start: 2018-11-28 | End: 2019-01-08 | Stop reason: SDUPTHER

## 2018-11-28 RX ORDER — AMLODIPINE BESYLATE 10 MG/1
10 TABLET ORAL DAILY
Qty: 90 TABLET | Refills: 3 | Status: SHIPPED | OUTPATIENT
Start: 2018-11-28 | End: 2019-12-18

## 2018-11-28 RX ORDER — LOSARTAN POTASSIUM 100 MG/1
100 TABLET ORAL DAILY
Qty: 90 TABLET | Refills: 3 | Status: SHIPPED | OUTPATIENT
Start: 2018-11-28 | End: 2019-11-25 | Stop reason: SDUPTHER

## 2018-11-28 RX ORDER — METOPROLOL SUCCINATE 100 MG/1
100 TABLET, EXTENDED RELEASE ORAL DAILY
Qty: 90 TABLET | Refills: 3 | Status: SHIPPED | OUTPATIENT
Start: 2018-11-28 | End: 2019-11-25 | Stop reason: SDUPTHER

## 2018-11-28 RX ORDER — OMEPRAZOLE 20 MG/1
20 CAPSULE, DELAYED RELEASE ORAL DAILY
Qty: 90 CAPSULE | Refills: 3 | Status: SHIPPED | OUTPATIENT
Start: 2018-11-28 | End: 2019-11-25 | Stop reason: SDUPTHER

## 2018-11-28 RX ORDER — TAMSULOSIN HYDROCHLORIDE 0.4 MG/1
0.8 CAPSULE ORAL DAILY
Qty: 180 CAPSULE | Refills: 3 | Status: SHIPPED | OUTPATIENT
Start: 2018-11-28 | End: 2019-11-25 | Stop reason: SDUPTHER

## 2018-11-30 ENCOUNTER — OFFICE VISIT (OUTPATIENT)
Dept: FAMILY MEDICINE CLINIC | Facility: CLINIC | Age: 83
End: 2018-11-30

## 2018-11-30 VITALS
WEIGHT: 224 LBS | HEART RATE: 78 BPM | TEMPERATURE: 98.3 F | HEIGHT: 71 IN | BODY MASS INDEX: 31.36 KG/M2 | DIASTOLIC BLOOD PRESSURE: 66 MMHG | SYSTOLIC BLOOD PRESSURE: 134 MMHG | OXYGEN SATURATION: 96 % | RESPIRATION RATE: 14 BRPM

## 2018-11-30 DIAGNOSIS — Z23 NEED FOR PNEUMOCOCCAL VACCINE: ICD-10-CM

## 2018-11-30 DIAGNOSIS — Z00.00 MEDICARE ANNUAL WELLNESS VISIT, SUBSEQUENT: Primary | ICD-10-CM

## 2018-11-30 PROCEDURE — G0439 PPPS, SUBSEQ VISIT: HCPCS | Performed by: FAMILY MEDICINE

## 2018-11-30 PROCEDURE — 90670 PCV13 VACCINE IM: CPT | Performed by: FAMILY MEDICINE

## 2018-11-30 PROCEDURE — G0009 ADMIN PNEUMOCOCCAL VACCINE: HCPCS | Performed by: FAMILY MEDICINE

## 2018-11-30 NOTE — PROGRESS NOTES
QUICK REFERENCE INFORMATION:  The ABCs of the Annual Wellness Visit    Subsequent Medicare Wellness Visit    HEALTH RISK ASSESSMENT    1935    Recent Hospitalizations:  No hospitalization(s) within the last year..        Current Medical Providers:  Patient Care Team:  Perico Bernal MD as PCP - General (Family Medicine)        Smoking Status:  Social History     Tobacco Use   Smoking Status Never Smoker   Smokeless Tobacco Never Used       Alcohol Consumption:  Social History     Substance and Sexual Activity   Alcohol Use No       Depression Screen:   PHQ-2/PHQ-9 Depression Screening 11/30/2018   Little interest or pleasure in doing things 0   Feeling down, depressed, or hopeless 0   Trouble falling or staying asleep, or sleeping too much 0   Feeling tired or having little energy 0   Poor appetite or overeating 0   Feeling bad about yourself - or that you are a failure or have let yourself or your family down 0   Trouble concentrating on things, such as reading the newspaper or watching television 0   Moving or speaking so slowly that other people could have noticed. Or the opposite - being so fidgety or restless that you have been moving around a lot more than usual 0   Thoughts that you would be better off dead, or of hurting yourself in some way 0   Total Score 0   If you checked off any problems, how difficult have these problems made it for you to do your work, take care of things at home, or get along with other people? Not difficult at all       Health Habits and Functional and Cognitive Screening:  Functional & Cognitive Status 11/30/2018   Do you have difficulty preparing food and eating? No   Do you have difficulty bathing yourself, getting dressed or grooming yourself? No   Do you have difficulty using the toilet? No   Do you have difficulty moving around from place to place? No   Do you have trouble with steps or getting out of a bed or a chair? No   In the past year have you fallen or experienced  a near fall? No   Current Diet Well Balanced Diet   Dental Exam Up to date   Eye Exam Up to date   Exercise (times per week) 1 times per week   Current Exercise Activities Include -   Do you need help using the phone?  No   Are you deaf or do you have serious difficulty hearing?  No   Do you need help with transportation? No   Do you need help shopping? No   Do you need help preparing meals?  No   Do you need help with housework?  No   Do you need help with laundry? No   Do you need help taking your medications? No   Do you need help managing money? No   Do you ever drive or ride in a car without wearing a seat belt? No   Do you have difficulty concentrating, remembering or making decisions? -           Does the patient have evidence of cognitive impairment? No    Aspirin use counseling: Taking ASA appropriately as indicated      Recent Lab Results:  CMP:  Lab Results   Component Value Date    GLU 94 06/26/2018    BUN 25 (H) 06/26/2018    CREATININE 1.16 06/26/2018    EGFRIFNONA 60 (L) 06/26/2018    EGFRIFAFRI 73 06/26/2018    BCR 21.6 06/26/2018     06/26/2018    K 5.1 06/26/2018    CO2 23.9 06/26/2018    CALCIUM 10.0 06/26/2018    PROTENTOTREF 7.3 06/26/2018    ALBUMIN 4.90 06/26/2018    LABGLOBREF 2.4 06/26/2018    LABIL2 2.0 06/26/2018    BILITOT 0.8 06/26/2018    ALKPHOS 93 06/26/2018    AST 24 06/26/2018    ALT 26 06/26/2018     Lipid Panel:  Lab Results   Component Value Date    TRIG 85 03/30/2018    HDL 53 03/30/2018    VLDL 17 03/30/2018    LDLHDL 1.85 03/30/2018     HbA1c:  Lab Results   Component Value Date    HGBA1C 6.02 (H) 06/26/2018       Visual Acuity:  No exam data present    Age-appropriate Screening Schedule:  Refer to the list below for future screening recommendations based on patient's age, sex and/or medical conditions. Orders for these recommended tests are listed in the plan section. The patient has been provided with a written plan.    Health Maintenance   Topic Date Due   •  PNEUMOCOCCAL VACCINES (65+ LOW/MEDIUM RISK) (1 of 2 - PCV13) 03/07/2000   • ZOSTER VACCINE (2 of 2) 11/30/2017   • TDAP/TD VACCINES (1 - Tdap) 04/10/2018   • LIPID PANEL  03/30/2019   • INFLUENZA VACCINE  Completed        Subjective   History of Present Illness    Kevin Ellington is a 83 y.o. male who presents for an Subsequent Wellness Visit.    The following portions of the patient's history were reviewed and updated as appropriate: allergies, current medications, past family history, past medical history, past social history, past surgical history and problem list.    Outpatient Medications Prior to Visit   Medication Sig Dispense Refill   • amLODIPine (NORVASC) 10 MG tablet Take 1 tablet by mouth Daily. 90 tablet 3   • [START ON 12/30/2018] amphetamine-dextroamphetamine XR (ADDERALL XR) 20 MG 24 hr capsule Take 1 capsule by mouth Every Morning for 30 days 30 capsule 0   • aspirin 81 MG tablet Take 81 mg by mouth Daily.     • atorvastatin (LIPITOR) 40 MG tablet Take 1 tablet by mouth Daily. 90 tablet 0   • cetirizine (zyrTEC) 10 MG tablet Take 10 mg by mouth Daily.     • CloNIDine (CATAPRES) 0.1 MG tablet Take 1 tablet by mouth 2 (Two) Times a Day. 180 tablet 0   • doxycycline (VIBRAMYCIN) 100 MG capsule Take 100 mg by mouth Daily.     • econazole nitrate (SPECTAZOLE) 1 % cream Apply  topically to the appropriate area as directed As Needed.     • Flaxseed, Linseed, (FLAX SEED OIL) 1300 MG capsule Take  by mouth Daily.     • fluticasone (FLONASE) 50 MCG/ACT nasal spray 2 sprays into the nostril(s) as directed by provider Daily. 3 bottle 0   • losartan (COZAAR) 100 MG tablet Take 1 tablet by mouth Daily. 90 tablet 3   • meloxicam (MOBIC) 7.5 MG tablet Take 1 tablet by mouth Daily. 90 tablet 3   • metoprolol succinate XL (TOPROL-XL) 100 MG 24 hr tablet Take 1 tablet by mouth Daily. 90 tablet 3   • Multiple Vitamins-Minerals (MULTIVITAMIN PO) Take 1 tablet by mouth Daily.     • Multiple Vitamins-Minerals (OCUVITE  "PO) Take 1 tablet by mouth Daily.     • omeprazole (priLOSEC) 20 MG capsule Take 1 capsule by mouth Daily. 90 capsule 3   • Saw Palmetto, Serenoa repens, 320 MG capsule Take 320 mg by mouth 2 (two) times a day.     • tamsulosin (FLOMAX) 0.4 MG capsule 24 hr capsule Take 2 capsules by mouth Daily. 180 capsule 3     No facility-administered medications prior to visit.        Patient Active Problem List   Diagnosis   • Bulging lumbar disc   • DDD (degenerative disc disease), lumbar   • Leg pain   • Spinal stenosis of lumbar region with neurogenic claudication   • Radiculitis, thoracic   • Status post total left knee replacement using cement   • Status post total right knee replacement using cement   • Coronary artery disease involving native coronary artery of native heart without angina pectoris   • Essential hypertension   • Hyperlipemia   • S/P coronary artery stent placement   • Health care maintenance   • Primary narcolepsy without cataplexy   • Benign prostatic hyperplasia with urinary frequency   • Cervical radiculopathy   • Bilateral carpal tunnel syndrome   • Cervical spinal stenosis   • Cancer (CMS/[facility])       Advance Care Planning:  has an advance directive - a copy HAS NOT been provided. Have asked the patient to send this to us to add to record.    Identification of Risk Factors:  Risk factors include: weight  and unhealthy diet.    Review of Systems   Constitutional: Negative for activity change.   All other systems reviewed and are negative.      Compared to one year ago, the patient feels his physical health is worse.  Compared to one year ago, the patient feels his mental health is worse.    Objective     Physical Exam    Vitals:    11/30/18 0901   BP: 134/66   Pulse: 78   Resp: 14   Temp: 98.3 °F (36.8 °C)   TempSrc: Oral   SpO2: 96%   Weight: 102 kg (224 lb)   Height: 180.3 cm (71\")   PainSc: 0-No pain       Patient's Body mass index is 31.24 kg/m². BMI is above normal parameters. Recommendations " include: exercise counseling and nutrition counseling.      Assessment/Plan   Patient Self-Management and Personalized Health Advice  The patient has been provided with information about: diet, exercise and weight management and preventive services including:   · Exercise counseling provided, Nutrition counseling provided.    Visit Diagnoses:    ICD-10-CM ICD-9-CM   1. Medicare annual wellness visit, subsequent Z00.00 V70.0   2. Need for pneumococcal vaccine Z23 V03.82       Orders Placed This Encounter   Procedures   • Pneumococcal Conjugate Vaccine 13-Valent All (PCV13)       Outpatient Encounter Medications as of 11/30/2018   Medication Sig Dispense Refill   • amLODIPine (NORVASC) 10 MG tablet Take 1 tablet by mouth Daily. 90 tablet 3   • [START ON 12/30/2018] amphetamine-dextroamphetamine XR (ADDERALL XR) 20 MG 24 hr capsule Take 1 capsule by mouth Every Morning for 30 days 30 capsule 0   • aspirin 81 MG tablet Take 81 mg by mouth Daily.     • atorvastatin (LIPITOR) 40 MG tablet Take 1 tablet by mouth Daily. 90 tablet 0   • cetirizine (zyrTEC) 10 MG tablet Take 10 mg by mouth Daily.     • CloNIDine (CATAPRES) 0.1 MG tablet Take 1 tablet by mouth 2 (Two) Times a Day. 180 tablet 0   • doxycycline (VIBRAMYCIN) 100 MG capsule Take 100 mg by mouth Daily.     • econazole nitrate (SPECTAZOLE) 1 % cream Apply  topically to the appropriate area as directed As Needed.     • Flaxseed, Linseed, (FLAX SEED OIL) 1300 MG capsule Take  by mouth Daily.     • fluticasone (FLONASE) 50 MCG/ACT nasal spray 2 sprays into the nostril(s) as directed by provider Daily. 3 bottle 0   • losartan (COZAAR) 100 MG tablet Take 1 tablet by mouth Daily. 90 tablet 3   • meloxicam (MOBIC) 7.5 MG tablet Take 1 tablet by mouth Daily. 90 tablet 3   • metoprolol succinate XL (TOPROL-XL) 100 MG 24 hr tablet Take 1 tablet by mouth Daily. 90 tablet 3   • Multiple Vitamins-Minerals (MULTIVITAMIN PO) Take 1 tablet by mouth Daily.     • Multiple  Vitamins-Minerals (OCUVITE PO) Take 1 tablet by mouth Daily.     • omeprazole (priLOSEC) 20 MG capsule Take 1 capsule by mouth Daily. 90 capsule 3   • Saw Palmetto Serenoa repens, 320 MG capsule Take 320 mg by mouth 2 (two) times a day.     • tamsulosin (FLOMAX) 0.4 MG capsule 24 hr capsule Take 2 capsules by mouth Daily. 180 capsule 3   • [] amphetamine-dextroamphetamine XR (ADDERALL XR) 20 MG 24 hr capsule Take 1 capsule by mouth Every Morning for 30 days 30 capsule 0     No facility-administered encounter medications on file as of 2018.        Reviewed use of high risk medication in the elderly: yes  Reviewed for potential of harmful drug interactions in the elderly: yes    Follow Up:  Return for As needed.     An After Visit Summary and PPPS with all of these plans were given to the patient.

## 2018-11-30 NOTE — PATIENT INSTRUCTIONS
Medicare Wellness  Personal Prevention Plan of Service     Date of Office Visit:  2018  Encounter Provider:  Perico Bernal MD  Place of Service:  [facility]  Patient Name: Kevin Ellington  :  1935    As part of the Medicare Wellness portion of your visit today, we are providing you with this personalized preventive plan of services (PPPS). This plan is based upon recommendations of the [place] Preventive Services Task Force (USPSTF) and the Advisory Committee on Immunization Practices (ACIP).    This lists the preventive care services that should be considered, and provides dates of when you are due. Items listed as completed are up-to-date and do not require any further intervention.    Health Maintenance   Topic Date Due   • PNEUMOCOCCAL VACCINES (65+ LOW/MEDIUM RISK) (1 of 2 - PCV13) 2000   • ZOSTER VACCINE (2 of 2) 2017   • TDAP/TD VACCINES (1 - Tdap) 04/10/2018   • LIPID PANEL  2019   • MEDICARE ANNUAL WELLNESS  2019   • INFLUENZA VACCINE  Completed       Orders Placed This Encounter   Procedures   • Pneumococcal Conjugate Vaccine 13-Valent All (PCV13)       Return for As needed.

## 2018-12-06 ENCOUNTER — OFFICE VISIT (OUTPATIENT)
Dept: ORTHOPEDIC SURGERY | Facility: CLINIC | Age: 83
End: 2018-12-06

## 2018-12-06 VITALS — TEMPERATURE: 98.4 F | BODY MASS INDEX: 31.64 KG/M2 | HEIGHT: 71 IN | WEIGHT: 226 LBS

## 2018-12-06 DIAGNOSIS — M19.079 ARTHRITIS OF ANKLE: ICD-10-CM

## 2018-12-06 DIAGNOSIS — G89.29 CHRONIC PAIN OF RIGHT ANKLE: Primary | ICD-10-CM

## 2018-12-06 DIAGNOSIS — M25.571 CHRONIC PAIN OF RIGHT ANKLE: Primary | ICD-10-CM

## 2018-12-06 PROCEDURE — 99214 OFFICE O/P EST MOD 30 MIN: CPT | Performed by: ORTHOPAEDIC SURGERY

## 2018-12-06 PROCEDURE — 73610 X-RAY EXAM OF ANKLE: CPT | Performed by: ORTHOPAEDIC SURGERY

## 2018-12-07 NOTE — PROGRESS NOTES
"   New Patient Complaint      Patient: Kevin Ellington  YOB: 1935 83 y.o. male  Medical Record Number: [id number]    Chief Complaints: My ankle hurts    History of Present Illness:   Patient had left tibiotalar and subtalar arthrodesis with a fusion nail about 8 years ago by Dr. House he said that side continues to do relatively well with only some slight intermittent achiness.  Main complaint is of right ankle pain today that has been chronic for him over the last several years and worse in the last several months.  He describes the pain as mild severe intermittent stabbing pain with painful popping worse with standing and walking and improved with anti-inflammatories.   He states the pain is only intermittent some days it doesn't bother him at all and with symptoms do occur they improved with rest.    HPI    Allergies:   Allergies   Allergen Reactions   • Codeine GI Intolerance   • Hydrocodone GI Intolerance     SEVERE CONSTIPATION   • Hydrochlorothiazide Unknown (See Comments)     Unable to remember \"years ago\"   • Sulfa Antibiotics Unknown (See Comments)     Unable to remember       Medications:   Current Outpatient Medications on File Prior to Visit   Medication Sig   • amLODIPine (NORVASC) 10 MG tablet Take 1 tablet by mouth Daily.   • [START ON 12/30/2018] amphetamine-dextroamphetamine XR (ADDERALL XR) 20 MG 24 hr capsule Take 1 capsule by mouth Every Morning for 30 days   • aspirin 81 MG tablet Take 81 mg by mouth Daily.   • atorvastatin (LIPITOR) 40 MG tablet Take 1 tablet by mouth Daily.   • cetirizine (zyrTEC) 10 MG tablet Take 10 mg by mouth Daily.   • CloNIDine (CATAPRES) 0.1 MG tablet Take 1 tablet by mouth 2 (Two) Times a Day.   • doxycycline (VIBRAMYCIN) 100 MG capsule Take 100 mg by mouth Daily.   • econazole nitrate (SPECTAZOLE) 1 % cream Apply  topically to the appropriate area as directed As Needed.   • Flaxseed, Linseed, (FLAX SEED OIL) 1300 MG capsule Take  by mouth Daily.   • " fluticasone (FLONASE) 50 MCG/ACT nasal spray 2 sprays into the nostril(s) as directed by provider Daily.   • losartan (COZAAR) 100 MG tablet Take 1 tablet by mouth Daily.   • meloxicam (MOBIC) 7.5 MG tablet Take 1 tablet by mouth Daily.   • metoprolol succinate XL (TOPROL-XL) 100 MG 24 hr tablet Take 1 tablet by mouth Daily.   • Multiple Vitamins-Minerals (MULTIVITAMIN PO) Take 1 tablet by mouth Daily.   • Multiple Vitamins-Minerals (OCUVITE PO) Take 1 tablet by mouth Daily.   • omeprazole (priLOSEC) 20 MG capsule Take 1 capsule by mouth Daily.   • Saw Palmetto, Serenoa repens, 320 MG capsule Take 320 mg by mouth 2 (two) times a day.   • tamsulosin (FLOMAX) 0.4 MG capsule 24 hr capsule Take 2 capsules by mouth Daily.     No current facility-administered medications on file prior to visit.        Past Medical History:   Diagnosis Date   • Arthritis    • Cervical radiculopathy    • Coronary artery disease 2001    Stent   • CTS (carpal tunnel syndrome) 1998    Surgery both hands in 1999   • GERD (gastroesophageal reflux disease)    • HTN (hypertension)    • Hyperlipidemia    • Low back pain     2 Prior surgeries   • Lumbosacral disc disease    • Melanoma (CMS/HCC) 01/15/2009    DR WILLY SIMMS ( Left forearm)   • Radiculitis, thoracic    • Stented coronary artery    • Thoracic disc disorder     Surgery by Dr. Aldridge (sp)     Past Surgical History:   Procedure Laterality Date   • ANKLE FUSION Left 2007    reconstruction and fusion   • BACK SURGERY      HERNIATED LUMBAR DISK 1975,2000,2012   • CARPAL TUNNEL RELEASE     • CATARACT EXTRACTION, BILATERAL Bilateral 2002   • CORONARY ANGIOPLASTY WITH STENT PLACEMENT  2001   • EPIDURAL BLOCK  Several at [facility]    and [facility]   • REPLACEMENT TOTAL KNEE Left 2015   • REPLACEMENT TOTAL KNEE Right    • SKIN CANCER EXCISION      MELANOMA   • THORACIC SPINE SURGERY       Social History     Occupational History   • Occupation: RETIRED   Tobacco Use   •  "Smoking status: Never Smoker   • Smokeless tobacco: Never Used   Substance and Sexual Activity   • Alcohol use: No   • Drug use: No   • Sexual activity: Not Currently      Social History     Social History Narrative    LIVES WITH SPOUSE     Family History   Problem Relation Age of Onset   • Hypertension Mother    • Arthritis Mother            • Heart disease Father    • Early death Father         Aortic aneurism age 58   • Hypertension Father        Review of Systems: 14 point review of systems performed, positive pertinent findings identified in HPI. All remaining systems negative     Review of Systems      Physical Exam:   Vitals:    18 1549   Temp: 98.4 °F (36.9 °C)   Weight: 103 kg (226 lb)   Height: 180.3 cm (71\")     Physical Exam   Constitutional: pleasant, well developed   Eyes: sclera non icteric  Hearing : adequate for exam  Cardiovascular: palpable pulses in right foot, right calf/ thigh NT without sign of DVT  Respiratoy: breathing unlabored   Neurological: grossly sensate to LT throughout right LE  Psychiatric: oriented with normal mood and affect.   Lymphatic: No palpable popliteal lymphadenopathy right LE  Skin: intact throughout right leg/foot  Musculoskeletal: He has a nonantalgic gait.  There is mild swelling to the ankle but no warmth or erythema really had no focal pain today with somewhat limited tibiotalar and subtalar motion.  He was able to flex and extend his toes well.  Physical Exam  Ortho Exam    Radiology: 3 views of the right ankle ordered to evaluate pain today reviewed and no prior x-rays available for comparison there significant arthritic change of the tibiotalar joint with tilt of the talus as well as subtalar arthritis but no obvious fractures    Assessment/Plan: Intermittent exacerbation of chronic right tibiotalar and subtalar arthritis.    We discussed treatment options going forward and he does not want to have anything done from a surgical standpoint.  We talked " about custom bracing which she would like to hold off on at this time.  We had him fitted with an ASO brace then today that he will use for activities for prolonged walking or standing and also use of compression hose to help with swelling that occurs during the day.    Also recommended use of a cane which he already has.    I will see him back in 4 months with x-rays of his right ankle if anything worsens in the interim he will let me know.  He will also continue with use of meloxicam under the direction of his PCP

## 2018-12-20 ENCOUNTER — TELEPHONE (OUTPATIENT)
Dept: FAMILY MEDICINE CLINIC | Facility: CLINIC | Age: 83
End: 2018-12-20

## 2018-12-20 RX ORDER — ATORVASTATIN CALCIUM 40 MG/1
40 TABLET, FILM COATED ORAL DAILY
Qty: 90 TABLET | Refills: 3 | Status: SHIPPED | OUTPATIENT
Start: 2018-12-20 | End: 2019-11-25 | Stop reason: SDUPTHER

## 2018-12-20 NOTE — TELEPHONE ENCOUNTER
Shyam,     May you send pt's atorvastatin (LIPITOR) 40 MG tablet,  Ninety day supply to [facility]? It looks like this was the only medication that did not make it. New Rx for Gabe.

## 2019-01-03 DIAGNOSIS — G47.419 PRIMARY NARCOLEPSY WITHOUT CATAPLEXY: ICD-10-CM

## 2019-01-03 NOTE — TELEPHONE ENCOUNTER
(laurent pt)   Pt did not realize that the last Adderall Rx had ? Pt takes this medication for narcolepsy. Pt is out and does not have enough Adderall for Friday and the weekend. Is it possible for to prescribe pt three days worth until Dr. Bernal comes back on Monday?

## 2019-01-04 RX ORDER — DEXTROAMPHETAMINE SACCHARATE, AMPHETAMINE ASPARTATE MONOHYDRATE, DEXTROAMPHETAMINE SULFATE AND AMPHETAMINE SULFATE 5; 5; 5; 5 MG/1; MG/1; MG/1; MG/1
20 CAPSULE, EXTENDED RELEASE ORAL EVERY MORNING
Qty: 7 CAPSULE | Refills: 0 | Status: SHIPPED | OUTPATIENT
Start: 2019-01-04 | End: 2019-01-08 | Stop reason: SDUPTHER

## 2019-01-04 RX ORDER — DEXTROAMPHETAMINE SACCHARATE, AMPHETAMINE ASPARTATE MONOHYDRATE, DEXTROAMPHETAMINE SULFATE AND AMPHETAMINE SULFATE 5; 5; 5; 5 MG/1; MG/1; MG/1; MG/1
20 CAPSULE, EXTENDED RELEASE ORAL EVERY MORNING
Qty: 7 CAPSULE | Refills: 0 | Status: SHIPPED | OUTPATIENT
Start: 2019-01-04 | End: 2019-01-04 | Stop reason: SDUPTHER

## 2019-01-04 NOTE — TELEPHONE ENCOUNTER
Please sign order again, [facility] , [facility]. I have corrected pharmacy just needs you to sign it.   Thanks a lot!

## 2019-01-08 ENCOUNTER — OFFICE VISIT (OUTPATIENT)
Dept: FAMILY MEDICINE CLINIC | Facility: CLINIC | Age: 84
End: 2019-01-08

## 2019-01-08 VITALS
WEIGHT: 231.6 LBS | RESPIRATION RATE: 14 BRPM | SYSTOLIC BLOOD PRESSURE: 134 MMHG | TEMPERATURE: 98 F | BODY MASS INDEX: 32.42 KG/M2 | DIASTOLIC BLOOD PRESSURE: 78 MMHG | HEART RATE: 54 BPM | OXYGEN SATURATION: 96 % | HEIGHT: 71 IN

## 2019-01-08 DIAGNOSIS — G47.419 PRIMARY NARCOLEPSY WITHOUT CATAPLEXY: ICD-10-CM

## 2019-01-08 DIAGNOSIS — I10 ESSENTIAL HYPERTENSION: Primary | ICD-10-CM

## 2019-01-08 PROCEDURE — 99214 OFFICE O/P EST MOD 30 MIN: CPT | Performed by: FAMILY MEDICINE

## 2019-01-08 RX ORDER — CLONIDINE HYDROCHLORIDE 0.1 MG/1
0.1 TABLET ORAL 2 TIMES DAILY
Qty: 180 TABLET | Refills: 3 | Status: SHIPPED | OUTPATIENT
Start: 2019-01-08 | End: 2019-11-25 | Stop reason: SDUPTHER

## 2019-01-08 RX ORDER — DEXTROAMPHETAMINE SACCHARATE, AMPHETAMINE ASPARTATE MONOHYDRATE, DEXTROAMPHETAMINE SULFATE AND AMPHETAMINE SULFATE 5; 5; 5; 5 MG/1; MG/1; MG/1; MG/1
20 CAPSULE, EXTENDED RELEASE ORAL EVERY MORNING
Qty: 90 CAPSULE | Refills: 0 | Status: SHIPPED | OUTPATIENT
Start: 2019-01-08 | End: 2019-04-08 | Stop reason: SDUPTHER
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
## 2019-01-14 ENCOUNTER — HOSPITAL ENCOUNTER (OUTPATIENT)
Dept: GENERAL RADIOLOGY | Facility: HOSPITAL | Age: 84
Discharge: HOME | End: 2019-01-14

## 2019-01-14 ENCOUNTER — HOSPITAL ENCOUNTER (OUTPATIENT)
Dept: PAIN MEDICINE | Facility: HOSPITAL | Age: 84
Discharge: HOME | End: 2019-01-14
Admitting: ANESTHESIOLOGY

## 2019-01-14 ENCOUNTER — ANESTHESIA EVENT (OUTPATIENT)
Dept: PAIN MEDICINE | Facility: HOSPITAL | Age: 84
End: 2019-01-14

## 2019-01-14 ENCOUNTER — ANESTHESIA (OUTPATIENT)
Dept: PAIN MEDICINE | Facility: HOSPITAL | Age: 84
End: 2019-01-14

## 2019-01-14 VITALS
DIASTOLIC BLOOD PRESSURE: 74 MMHG | TEMPERATURE: 98.2 F | HEART RATE: 66 BPM | RESPIRATION RATE: 16 BRPM | OXYGEN SATURATION: 97 % | SYSTOLIC BLOOD PRESSURE: 118 MMHG

## 2019-01-14 DIAGNOSIS — R52 PAIN: ICD-10-CM

## 2019-01-14 DIAGNOSIS — M51.36 DDD (DEGENERATIVE DISC DISEASE), LUMBAR: Primary | ICD-10-CM

## 2019-01-14 DIAGNOSIS — M48.062 SPINAL STENOSIS OF LUMBAR REGION WITH NEUROGENIC CLAUDICATION: ICD-10-CM

## 2019-01-14 PROCEDURE — C1755 CATHETER, INTRASPINAL: HCPCS

## 2019-01-14 PROCEDURE — 25010000002 MIDAZOLAM PER 1 MG: Performed by: ANESTHESIOLOGY

## 2019-01-14 PROCEDURE — 25010000002 METHYLPREDNISOLONE PER 80 MG: Performed by: ANESTHESIOLOGY

## 2019-01-14 PROCEDURE — 0 IOPAMIDOL 41 % SOLUTION: Performed by: ANESTHESIOLOGY

## 2019-01-14 PROCEDURE — 77003 FLUOROGUIDE FOR SPINE INJECT: CPT

## 2019-01-14 RX ORDER — MIDAZOLAM HYDROCHLORIDE 1 MG/ML
1 INJECTION INTRAMUSCULAR; INTRAVENOUS AS NEEDED
Status: DISCONTINUED | OUTPATIENT
Start: 2019-01-14 | End: 2019-01-15 | Stop reason: HOSPADM

## 2019-01-14 RX ORDER — SODIUM CHLORIDE 0.9 % (FLUSH) 0.9 %
3 SYRINGE (ML) INJECTION EVERY 12 HOURS SCHEDULED
Status: DISCONTINUED | OUTPATIENT
Start: 2019-01-14 | End: 2019-01-15 | Stop reason: HOSPADM

## 2019-01-14 RX ORDER — FENTANYL CITRATE 50 UG/ML
50 INJECTION, SOLUTION INTRAMUSCULAR; INTRAVENOUS AS NEEDED
Status: DISCONTINUED | OUTPATIENT
Start: 2019-01-14 | End: 2019-01-15 | Stop reason: HOSPADM

## 2019-01-14 RX ORDER — SODIUM CHLORIDE 0.9 % (FLUSH) 0.9 %
3-10 SYRINGE (ML) INJECTION AS NEEDED
Status: DISCONTINUED | OUTPATIENT
Start: 2019-01-14 | End: 2019-01-15 | Stop reason: HOSPADM

## 2019-01-14 RX ORDER — LIDOCAINE HYDROCHLORIDE 10 MG/ML
0.5 INJECTION, SOLUTION INFILTRATION; PERINEURAL ONCE AS NEEDED
Status: DISCONTINUED | OUTPATIENT
Start: 2019-01-14 | End: 2019-01-15 | Stop reason: HOSPADM

## 2019-01-14 RX ORDER — METHYLPREDNISOLONE ACETATE 80 MG/ML
80 INJECTION, SUSPENSION INTRA-ARTICULAR; INTRALESIONAL; INTRAMUSCULAR; SOFT TISSUE ONCE
Status: COMPLETED | OUTPATIENT
Start: 2019-01-14 | End: 2019-01-14

## 2019-01-14 RX ORDER — SODIUM CHLORIDE 0.9 % (FLUSH) 0.9 %
1-10 SYRINGE (ML) INJECTION AS NEEDED
Status: DISCONTINUED | OUTPATIENT
Start: 2019-01-14 | End: 2019-01-15 | Stop reason: HOSPADM

## 2019-01-14 RX ORDER — LIDOCAINE HYDROCHLORIDE 10 MG/ML
1 INJECTION, SOLUTION INFILTRATION; PERINEURAL ONCE AS NEEDED
Status: DISCONTINUED | OUTPATIENT
Start: 2019-01-14 | End: 2019-01-15 | Stop reason: HOSPADM

## 2019-01-14 RX ADMIN — MIDAZOLAM HYDROCHLORIDE 2 MG: 2 INJECTION, SOLUTION INTRAMUSCULAR; INTRAVENOUS at 14:04

## 2019-01-14 RX ADMIN — IOPAMIDOL 10 ML: 408 INJECTION, SOLUTION INTRATHECAL at 14:09

## 2019-01-14 RX ADMIN — METHYLPREDNISOLONE ACETATE 80 MG: 80 INJECTION, SUSPENSION INTRA-ARTICULAR; INTRALESIONAL; INTRAMUSCULAR; SOFT TISSUE at 14:09

## 2019-01-14 NOTE — ANESTHESIA PROCEDURE NOTES
PAIN Epidural block    Pre-sedation assessment completed: 1/14/2019 1:44 PM    Patient reassessed immediately prior to procedure    Patient location during procedure: pain clinic  Start Time: 1/14/2019 2:02 PM  Stop Time: 1/14/2019 2:11 PM  Indication:procedure for pain  Performed By  Anesthesiologist: Ranjana Wilson MD  Preanesthetic Checklist  Completed: patient identified, site marked, surgical consent, pre-op evaluation, timeout performed, IV checked, risks and benefits discussed and monitors and equipment checked  Additional Notes  Lumbar neuritis, degeneration; lumbago  Fluoro used.    Prep:  Pt Position:prone  Sterile Tech:cap, gloves, mask and sterile barrier  Prep:chlorhexidine gluconate and isopropyl alcohol  Monitoring:blood pressure monitoring, continuous pulse oximetry and EKG  Procedure:  Sedation: no   Approach:right paramedian  Guidance: fluoroscopy  Location:lumbar  Level:5-6  Needle Type:Tuohy  Needle Gauge:20  Aspiration:negative  Medications:  Depomedrol:80  Preservative Free Saline:3mL  Isovue:3mL  Comments:B/l spread  Post Assessment:  Dressing:occlusive dressing applied  Pt Tolerance:patient tolerated the procedure well with no apparent complications  Complications:no

## 2019-01-14 NOTE — H&P
[facility]    History and Physical    Patient Name: Kevin Ellington  :  1935  MRN:  [id number]  Date of Admission: 2019    Subjective     Patient is a 83 y.o. male presents with chief complaint of chronic, moderate low back pain.  Onset of symptoms was gradual starting many years ago.  Symptoms are associated/aggravated by activity. Symptoms improve with injection    The following portions of the patients history were reviewed and updated as appropriate: current medications, allergies, past medical history, past surgical history, past family history, past social history and problem list                Objective     Past Medical History:   Past Medical History:   Diagnosis Date   • Arthritis    • Cervical radiculopathy    • Coronary artery disease     Stent   • CTS (carpal tunnel syndrome)     Surgery both hands in    • GERD (gastroesophageal reflux disease)    • HTN (hypertension)    • Hyperlipidemia    • Low back pain     2 Prior surgeries   • Lumbosacral disc disease    • Melanoma (CMS/[facility]) 01/15/2009    DR WILLY SIMMS ( Left forearm)   • Radiculitis, thoracic    • Stented coronary artery    • Thoracic disc disorder     Surgery by Dr. Aldridge ()     Past Surgical History:   Past Surgical History:   Procedure Laterality Date   • ANKLE FUSION Left 2007    reconstruction and fusion   • BACK SURGERY      HERNIATED LUMBAR DISK ,,   • CARPAL TUNNEL RELEASE     • CATARACT EXTRACTION, BILATERAL Bilateral    • CORONARY ANGIOPLASTY WITH STENT PLACEMENT     • EPIDURAL BLOCK  Several at [facility]    and [facility]   • REPLACEMENT TOTAL KNEE Left    • REPLACEMENT TOTAL KNEE Right    • SKIN CANCER EXCISION      MELANOMA   • THORACIC SPINE SURGERY       Family History:   Family History   Problem Relation Age of Onset   • Hypertension Mother    • Arthritis Mother            • Heart disease Father    • Early death Father         Aortic  aneurism age 58   • Hypertension Father      Social History:   Social History     Tobacco Use   • Smoking status: Never Smoker   • Smokeless tobacco: Never Used   Substance Use Topics   • Alcohol use: No   • Drug use: No       Vital Signs Range for the last 24 hours  Temperature:     Temp Source:     BP: BP: ()/()    Pulse:     Respirations:     SPO2:     O2 Amount (l/min):     O2 Devices     Weight:           --------------------------------------------------------------------------------    Current Outpatient Medications   Medication Sig Dispense Refill   • amLODIPine (NORVASC) 10 MG tablet Take 1 tablet by mouth Daily. 90 tablet 3   • amphetamine-dextroamphetamine XR (ADDERALL XR) 20 MG 24 hr capsule Take 1 capsule by mouth Every Morning 90 capsule 0   • aspirin 81 MG tablet Take 81 mg by mouth Daily.     • atorvastatin (LIPITOR) 40 MG tablet Take 1 tablet by mouth Daily. 90 tablet 3   • cetirizine (zyrTEC) 10 MG tablet Take 10 mg by mouth Daily.     • CloNIDine (CATAPRES) 0.1 MG tablet Take 1 tablet by mouth 2 (Two) Times a Day. 180 tablet 3   • doxycycline (VIBRAMYCIN) 100 MG capsule Take 100 mg by mouth Daily.     • econazole nitrate (SPECTAZOLE) 1 % cream Apply  topically to the appropriate area as directed As Needed.     • Flaxseed, Linseed, (FLAX SEED OIL) 1300 MG capsule Take  by mouth Daily.     • fluticasone (FLONASE) 50 MCG/ACT nasal spray 2 sprays into the nostril(s) as directed by provider Daily. 3 bottle 0   • losartan (COZAAR) 100 MG tablet Take 1 tablet by mouth Daily. 90 tablet 3   • meloxicam (MOBIC) 7.5 MG tablet Take 1 tablet by mouth Daily. 90 tablet 3   • metoprolol succinate XL (TOPROL-XL) 100 MG 24 hr tablet Take 1 tablet by mouth Daily. 90 tablet 3   • Multiple Vitamins-Minerals (MULTIVITAMIN PO) Take 1 tablet by mouth Daily.     • Multiple Vitamins-Minerals (OCUVITE PO) Take 1 tablet by mouth Daily.     • omeprazole (priLOSEC) 20 MG capsule Take 1 capsule by mouth Daily. 90 capsule 3   •  Saw Palmetto, Serenoa repens, 320 MG capsule Take 320 mg by mouth 2 (two) times a day.     • tamsulosin (FLOMAX) 0.4 MG capsule 24 hr capsule Take 2 capsules by mouth Daily. 180 capsule 3     No current facility-administered medications for this encounter.        --------------------------------------------------------------------------------  Assessment/Plan      Anesthesia Evaluation     Patient summary reviewed and Nursing notes reviewed   no history of anesthetic complications:               Airway   Mallampati: II  TM distance: >3 FB  Dental - normal exam     Pulmonary - negative pulmonary ROS and normal exam   Cardiovascular - normal exam    (+) hypertension, CAD, hyperlipidemia,       Neuro/Psych- neuro exam normal  (+) numbness,     GI/Hepatic/Renal/Endo    (+)  GERD,      Musculoskeletal (-) normal exam    Abdominal  - normal exam   Substance History - negative use     OB/GYN negative ob/gyn ROS         Other   (+) arthritis   history of cancer               Diagnosis and Plan    Treatment Plan  ASA 3      Procedures: Lumbar Epidural Steroid Injection(LESI), With fluoroscopy,       Anesthetic plan and risks discussed with patient.          Diagnosis     * Bulging lumbar disc [M51.26]     * DDD (degenerative disc disease), lumbar [M51.36]

## 2019-02-27 ENCOUNTER — OFFICE VISIT (OUTPATIENT)
Dept: FAMILY MEDICINE CLINIC | Facility: CLINIC | Age: 84
End: 2019-02-27

## 2019-02-27 VITALS
WEIGHT: 227 LBS | SYSTOLIC BLOOD PRESSURE: 160 MMHG | TEMPERATURE: 97.7 F | DIASTOLIC BLOOD PRESSURE: 78 MMHG | HEART RATE: 71 BPM | HEIGHT: 71 IN | OXYGEN SATURATION: 98 % | BODY MASS INDEX: 31.78 KG/M2

## 2019-02-27 DIAGNOSIS — J20.9 ACUTE BRONCHITIS, UNSPECIFIED ORGANISM: Primary | ICD-10-CM

## 2019-02-27 PROCEDURE — 99213 OFFICE O/P EST LOW 20 MIN: CPT | Performed by: NURSE PRACTITIONER

## 2019-02-27 RX ORDER — DEXTROMETHORPHAN HYDROBROMIDE AND PROMETHAZINE HYDROCHLORIDE 15; 6.25 MG/5ML; MG/5ML
5 SYRUP ORAL 4 TIMES DAILY PRN
Qty: 240 ML | Refills: 0 | Status: SHIPPED | OUTPATIENT
Start: 2019-02-27 | End: 2019-03-11

## 2019-02-27 RX ORDER — METHYLPREDNISOLONE 4 MG/1
TABLET ORAL
Qty: 21 TABLET | Refills: 0 | Status: SHIPPED | OUTPATIENT
Start: 2019-02-27 | End: 2019-03-08

## 2019-02-27 NOTE — PROGRESS NOTES
"Subjective   Kevin Ellington is a 83 y.o. male.     History of Present Illness   Upper Respiratory Infection: Patient complains of symptoms of a URI. Symptoms include congestion and cough. Onset of symptoms was 2 days ago, gradually worsening since that time. He also c/o achiness, congestion and wheezing for the past 2 days .  He is drinking plenty of fluids. Evaluation to date: none. Treatment to date: none.      The following portions of the patient's history were reviewed and updated as appropriate: allergies, current medications, past social history and problem list.    Review of Systems   Constitutional: Positive for fatigue and fever.   HENT: Positive for congestion and rhinorrhea.    Respiratory: Positive for cough.    All other systems reviewed and are negative.      Objective   /78 (BP Location: Right arm, Patient Position: Sitting)   Pulse 71   Temp 97.7 °F (36.5 °C)   Ht 180.3 cm (70.98\")   Wt 103 kg (227 lb)   SpO2 98%   BMI 31.67 kg/m²   Physical Exam   Constitutional: He is oriented to person, place, and time. Vital signs are normal. He appears well-developed and well-nourished. No distress.   HENT:   Head: Normocephalic.   Cardiovascular: Normal rate, regular rhythm and normal heart sounds.   Pulmonary/Chest: Effort normal and breath sounds normal.   Neurological: He is alert and oriented to person, place, and time. Gait normal.   Psychiatric: He has a normal mood and affect. His behavior is normal. Judgment and thought content normal.   Vitals reviewed.      Assessment/Plan      Diagnosis Plan   1. Acute bronchitis, unspecified organism  methylPREDNISolone (MEDROL) 4 MG tablet    promethazine-dextromethorphan (PROMETHAZINE-DM) 6.25-15 MG/5ML syrup     Rest  Fluids  rto prn  Valentin Henao, APRN  2/27/2019    "

## 2019-03-07 ENCOUNTER — TELEPHONE (OUTPATIENT)
Dept: FAMILY MEDICINE CLINIC | Facility: CLINIC | Age: 84
End: 2019-03-07

## 2019-03-07 NOTE — TELEPHONE ENCOUNTER
Pt was last seen on 2/27 treated and diagnosed with bronchitis. Pt has finished both antibiotic and steroids, with no improvement. Pt is still experiencing all the symptoms including a bad cough. Is it possible to authorize another round of antibiotics or should pt be seen again?

## 2019-03-07 NOTE — PROGRESS NOTES
Subjective   Patient ID: Kevin Ellington is a 84 y.o. male is here today for follow-up after ADAL # 3 in January.  He said the did not get a lot of relief from the first two, but the third one has helped a lot.     Patient was last seen 11.19.18 for back pain and bilateral leg weakness and numbness.    Patient is currently having intermittent mild low back pain, no leg pain, he does have intermittent numbness left leg and he still has bilateral leg weakness that is unchanged.  Bending and twisting makes her symptoms worse.    He is taking Mobic 7.5mg qd and Tylenol prn for pain.        Back Pain   The problem occurs intermittently. The problem has been gradually improving since onset. The pain is present in the lumbar spine. The pain does not radiate. The pain is at a severity of 3/10. The pain is mild. The symptoms are aggravated by position. Associated symptoms include numbness and weakness. Pertinent negatives include no leg pain or tingling.   Extremity Weakness    The pain is present in the right lower leg, right upper leg, left lower leg and left upper leg. The problem occurs constantly. The problem has been unchanged. Associated symptoms include numbness. Pertinent negatives include no tingling.       The following portions of the patient's history were reviewed and updated as appropriate: allergies, current medications, past family history, past medical history, past social history, past surgical history and problem list.    Review of Systems   Musculoskeletal: Positive for arthralgias, back pain and extremity weakness.   Neurological: Positive for weakness and numbness. Negative for tingling.   All other systems reviewed and are negative.    The patient has known spinal stenosis from L1-L5.  We are trying to avoid surgery.  He had a thoracolumbar decompression done by me a few years ago.  The third block done in January seem to help him more than the first 2.  The pain is much better his back and in his  legs although he still has some numbness in his legs.  He can have another block but only one between now and October of this year so we will try utilizing wisely.  He is he will come back to see me in 4 months.  If he has a bad flareup you let us know and we can consider utilizing that last block.      Objective   Physical Exam   Constitutional: He is oriented to person, place, and time. He appears well-developed and well-nourished.   HENT:   Head: Normocephalic and atraumatic.   Eyes: Conjunctivae and EOM are normal. Pupils are equal, round, and reactive to light.   Fundoscopic exam:       The right eye shows no papilledema. The right eye shows venous pulsations.        The left eye shows no papilledema. The left eye shows venous pulsations.   Neck: Carotid bruit is not present.   Neurological: He is oriented to person, place, and time. He has a normal Finger-Nose-Finger Test and a normal Heel to Shin Test. Gait normal.   Reflex Scores:       Tricep reflexes are 2+ on the right side and 2+ on the left side.       Bicep reflexes are 2+ on the right side and 2+ on the left side.       Brachioradialis reflexes are 2+ on the right side and 2+ on the left side.       Patellar reflexes are 2+ on the right side and 2+ on the left side.       Achilles reflexes are 2+ on the right side and 2+ on the left side.  Psychiatric: His speech is normal.     Neurologic Exam     Mental Status   Oriented to person, place, and time.   Registration of memory: Good recent and remote memory.   Attention: normal. Concentration: normal.   Speech: speech is normal   Level of consciousness: alert  Knowledge: consistent with education.     Cranial Nerves     CN II   Visual fields full to confrontation.   Visual acuity: normal    CN III, IV, VI   Pupils are equal, round, and reactive to light.  Extraocular motions are normal.     CN V   Facial sensation intact.   Right corneal reflex: normal  Left corneal reflex: normal    CN VII   Facial  expression full, symmetric.   Right facial weakness: none  Left facial weakness: none    CN VIII   Hearing: intact    CN IX, X   Palate: symmetric    CN XI   Right sternocleidomastoid strength: normal  Left sternocleidomastoid strength: normal    CN XII   Tongue: not atrophic  Tongue deviation: none    Motor Exam   Muscle bulk: normal  Right arm tone: normal  Left arm tone: normal  Right leg tone: normal  Left leg tone: normal    Strength   Strength 5/5 except as noted.     Sensory Exam   Light touch normal.     Gait, Coordination, and Reflexes     Gait  Gait: normal    Coordination   Finger to nose coordination: normal  Heel to shin coordination: normal    Reflexes   Right brachioradialis: 2+  Left brachioradialis: 2+  Right biceps: 2+  Left biceps: 2+  Right triceps: 2+  Left triceps: 2+  Right patellar: 2+  Left patellar: 2+  Right achilles: 2+  Left achilles: 2+  Right : 2+  Left : 2+      Assessment/Plan   Independent Review of Radiographic Studies:    The lumbar cervical and thoracic myelogram done August 2018 was reviewed.  There is severe stenosis at L4-L5, L3-L4, L2-L3 and even L1-L2.  Agree with the report.      Medical Decision Making:    He is better in terms of pain although he still has leg numbness.  We will keep an eye on him.  He can have one more block as per Medicare up until October of this year.  We will see him in 4 months.  If he has a bad flareup you let us know and we can decide if they want to utilize at block.      Kevin was seen today for back pain, numbness and extremity weakness.    Diagnoses and all orders for this visit:    Spinal stenosis of lumbar region with neurogenic claudication    DDD (degenerative disc disease), lumbar      Return in about 4 months (around 7/11/2019).

## 2019-03-08 ENCOUNTER — OFFICE VISIT (OUTPATIENT)
Dept: FAMILY MEDICINE CLINIC | Facility: CLINIC | Age: 84
End: 2019-03-08

## 2019-03-08 VITALS
BODY MASS INDEX: 31.5 KG/M2 | HEIGHT: 71 IN | HEART RATE: 78 BPM | OXYGEN SATURATION: 97 % | TEMPERATURE: 97.6 F | SYSTOLIC BLOOD PRESSURE: 138 MMHG | DIASTOLIC BLOOD PRESSURE: 82 MMHG | WEIGHT: 225 LBS

## 2019-03-08 DIAGNOSIS — I25.10 CORONARY ARTERY DISEASE INVOLVING NATIVE CORONARY ARTERY OF NATIVE HEART WITHOUT ANGINA PECTORIS: ICD-10-CM

## 2019-03-08 DIAGNOSIS — J01.00 ACUTE NON-RECURRENT MAXILLARY SINUSITIS: Primary | ICD-10-CM

## 2019-03-08 DIAGNOSIS — M48.02 CERVICAL SPINAL STENOSIS: ICD-10-CM

## 2019-03-08 PROCEDURE — 99214 OFFICE O/P EST MOD 30 MIN: CPT | Performed by: FAMILY MEDICINE

## 2019-03-08 RX ORDER — AMOXICILLIN AND CLAVULANATE POTASSIUM 875; 125 MG/1; MG/1
1 TABLET, FILM COATED ORAL 2 TIMES DAILY
Qty: 20 TABLET | Refills: 0 | Status: SHIPPED | OUTPATIENT
Start: 2019-03-08 | End: 2019-03-18

## 2019-03-08 NOTE — PROGRESS NOTES
Kevin Ellington is a 84 y.o. male.     Chief Complaint   Patient presents with   • Cough     productive  cough about 10 days  has drainage sinius no doing better  had tratment with medrol pack not doing any better       Cough   This is a new problem. The current episode started 1 to 4 weeks ago (2 weeks). The problem has been unchanged. The problem occurs every few minutes. The cough is productive of brown sputum. Associated symptoms include headaches, myalgias, nasal congestion, postnasal drip, rhinorrhea, shortness of breath and wheezing. Pertinent negatives include no chest pain, chills, ear congestion, ear pain, fever, heartburn, hemoptysis, rash, sore throat, sweats or weight loss. The symptoms are aggravated by other.        The following portions of the patient's history were reviewed and updated as appropriate: allergies, current medications, past family history, past medical history, past social history, past surgical history and problem list.    Review of Systems   Constitutional: Negative for chills, fever and weight loss.   HENT: Positive for postnasal drip and rhinorrhea. Negative for ear pain and sore throat.    Respiratory: Positive for cough, shortness of breath and wheezing. Negative for hemoptysis.    Cardiovascular: Negative for chest pain.   Gastrointestinal: Negative for heartburn.   Musculoskeletal: Positive for myalgias.   Skin: Negative for rash.   Neurological: Positive for headaches.       Objective  Vitals:    03/08/19 1242   BP: 138/82   Pulse: 78   Temp: 97.6 °F (36.4 °C)   SpO2: 97%        Physical Exam   Constitutional: He is oriented to person, place, and time. He appears well-developed and well-nourished. No distress.   HENT:   Head: Normocephalic.   Right Ear: External ear normal.   Left Ear: External ear normal.   Nose: Nose normal.   Mouth/Throat: Oropharynx is clear and moist. No oropharyngeal exudate.   Diffuse  sinus tenderness, erythema in the posterior oropharynx.    Eyes: Conjunctivae and EOM are normal. Pupils are equal, round, and reactive to light.   Cardiovascular: Normal rate, regular rhythm, normal heart sounds and intact distal pulses.   No murmur heard.  Pulmonary/Chest: Effort normal and breath sounds normal. No respiratory distress.   Abdominal: Soft. Bowel sounds are normal. He exhibits no distension.   Musculoskeletal: Normal range of motion.   Neurological: He is alert and oriented to person, place, and time.   Skin: Skin is warm and dry. No rash noted.   Psychiatric: He has a normal mood and affect. His behavior is normal. Judgment and thought content normal.   Nursing note and vitals reviewed.        Current Outpatient Medications:   •  amLODIPine (NORVASC) 10 MG tablet, Take 1 tablet by mouth Daily., Disp: 90 tablet, Rfl: 3  •  amphetamine-dextroamphetamine XR (ADDERALL XR) 20 MG 24 hr capsule, Take 1 capsule by mouth Every Morning, Disp: 90 capsule, Rfl: 0  •  aspirin 81 MG tablet, Take 81 mg by mouth Daily., Disp: , Rfl:   •  atorvastatin (LIPITOR) 40 MG tablet, Take 1 tablet by mouth Daily., Disp: 90 tablet, Rfl: 3  •  cetirizine (zyrTEC) 10 MG tablet, Take 10 mg by mouth Daily., Disp: , Rfl:   •  doxycycline (VIBRAMYCIN) 100 MG capsule, Take 100 mg by mouth Daily., Disp: , Rfl:   •  econazole nitrate (SPECTAZOLE) 1 % cream, Apply  topically to the appropriate area as directed As Needed., Disp: , Rfl:   •  Flaxseed, Linseed, (FLAX SEED OIL) 1300 MG capsule, Take  by mouth Daily., Disp: , Rfl:   •  fluticasone (FLONASE) 50 MCG/ACT nasal spray, 2 sprays into the nostril(s) as directed by provider Daily., Disp: 3 bottle, Rfl: 0  •  losartan (COZAAR) 100 MG tablet, Take 1 tablet by mouth Daily., Disp: 90 tablet, Rfl: 3  •  meloxicam (MOBIC) 7.5 MG tablet, Take 1 tablet by mouth Daily., Disp: 90 tablet, Rfl: 3  •  metoprolol succinate XL (TOPROL-XL) 100 MG 24 hr tablet, Take 1 tablet by mouth Daily., Disp: 90 tablet, Rfl: 3  •  Multiple Vitamins-Minerals  (MULTIVITAMIN PO), Take 1 tablet by mouth Daily., Disp: , Rfl:   •  Multiple Vitamins-Minerals (OCUVITE PO), Take 1 tablet by mouth Daily., Disp: , Rfl:   •  omeprazole (priLOSEC) 20 MG capsule, Take 1 capsule by mouth Daily., Disp: 90 capsule, Rfl: 3  •  promethazine-dextromethorphan (PROMETHAZINE-DM) 6.25-15 MG/5ML syrup, Take 5 mL by mouth 4 (Four) Times a Day As Needed for Cough., Disp: 240 mL, Rfl: 0  •  Saw Palmetto, Serenoa repens, 320 MG capsule, Take 320 mg by mouth 2 (two) times a day., Disp: , Rfl:   •  tamsulosin (FLOMAX) 0.4 MG capsule 24 hr capsule, Take 2 capsules by mouth Daily., Disp: 180 capsule, Rfl: 3  •  amoxicillin-clavulanate (AUGMENTIN) 875-125 MG per tablet, Take 1 tablet by mouth 2 (Two) Times a Day for 10 days., Disp: 20 tablet, Rfl: 0  •  CloNIDine (CATAPRES) 0.1 MG tablet, Take 1 tablet by mouth 2 (Two) Times a Day., Disp: 180 tablet, Rfl: 3    Procedures    Lab Results (most recent)     None              Kevin was seen today for cough.    Diagnoses and all orders for this visit:    Acute non-recurrent maxillary sinusitis  -     amoxicillin-clavulanate (AUGMENTIN) 875-125 MG per tablet; Take 1 tablet by mouth 2 (Two) Times a Day for 10 days.    Coronary artery disease involving native coronary artery of native heart without angina pectoris    Cervical spinal stenosis      New patient to me with 2 weeks of sinus pressure not improving with steroids and cough meds. He does have a hx of CAD, no CP, SOA.  Start Augmentin and decongestants. Supportive care, fu if not improving or gets SOA.  No evidence of PNA on exam currently.       I recommended that he continue with his apts for next week with spine surgery.     Return if symptoms worsen or fail to improve.      Leanna Mancia MD

## 2019-03-11 ENCOUNTER — OFFICE VISIT (OUTPATIENT)
Dept: NEUROSURGERY | Facility: CLINIC | Age: 84
End: 2019-03-11

## 2019-03-11 VITALS
HEART RATE: 75 BPM | SYSTOLIC BLOOD PRESSURE: 135 MMHG | WEIGHT: 220 LBS | HEIGHT: 71 IN | DIASTOLIC BLOOD PRESSURE: 78 MMHG | BODY MASS INDEX: 30.8 KG/M2

## 2019-03-11 DIAGNOSIS — M51.36 DDD (DEGENERATIVE DISC DISEASE), LUMBAR: ICD-10-CM

## 2019-03-11 DIAGNOSIS — M48.062 SPINAL STENOSIS OF LUMBAR REGION WITH NEUROGENIC CLAUDICATION: Primary | ICD-10-CM

## 2019-03-11 PROCEDURE — 99213 OFFICE O/P EST LOW 20 MIN: CPT | Performed by: NEUROLOGICAL SURGERY

## 2019-04-08 ENCOUNTER — OFFICE VISIT (OUTPATIENT)
Dept: FAMILY MEDICINE CLINIC | Facility: CLINIC | Age: 84
End: 2019-04-08

## 2019-04-08 VITALS
TEMPERATURE: 98.3 F | DIASTOLIC BLOOD PRESSURE: 78 MMHG | HEART RATE: 71 BPM | WEIGHT: 220 LBS | HEIGHT: 71 IN | RESPIRATION RATE: 16 BRPM | BODY MASS INDEX: 30.8 KG/M2 | SYSTOLIC BLOOD PRESSURE: 142 MMHG | OXYGEN SATURATION: 99 %

## 2019-04-08 DIAGNOSIS — G47.419 PRIMARY NARCOLEPSY WITHOUT CATAPLEXY: ICD-10-CM

## 2019-04-08 PROBLEM — J20.9 ACUTE BRONCHITIS: Status: RESOLVED | Noted: 2019-02-27 | Resolved: 2019-04-08

## 2019-04-08 PROCEDURE — 99214 OFFICE O/P EST MOD 30 MIN: CPT | Performed by: FAMILY MEDICINE

## 2019-04-08 RX ORDER — FLUTICASONE PROPIONATE 50 MCG
2 SPRAY, SUSPENSION (ML) NASAL DAILY
Qty: 3 BOTTLE | Refills: 11 | Status: SHIPPED | OUTPATIENT
Start: 2019-04-08 | End: 2020-06-22

## 2019-04-08 RX ORDER — DEXTROAMPHETAMINE SACCHARATE, AMPHETAMINE ASPARTATE MONOHYDRATE, DEXTROAMPHETAMINE SULFATE AND AMPHETAMINE SULFATE 5; 5; 5; 5 MG/1; MG/1; MG/1; MG/1
20 CAPSULE, EXTENDED RELEASE ORAL EVERY MORNING
Qty: 90 CAPSULE | Refills: 0 | Status: SHIPPED | OUTPATIENT
Start: 2019-04-08 | End: 2019-07-01 | Stop reason: SDUPTHER

## 2019-04-08 NOTE — PROGRESS NOTES
Kevin Ellington is a 84 y.o. male.     Chief Complaint   Patient presents with   • ADHD     patient has 3 months follow up on adderall.he needs scripts send to [facility].   • Allergies     patient needs a [facility]ill [facility].       HPI     Patient presents the office today to follow-up on history of narcolepsy.  Currently taking and tolerating Adderall extended release 20 mg daily.  If he does not take the medication he cannot drive.  Reports no adverse side effects.  No heart palpitations, insomnia, or unintended weight loss.  Patient also in need of refills for his Flonase for seasonal rhinitis.    The following portions of the patient's history were reviewed and updated as appropriate: allergies, current medications, past family history, past medical history, past social history, past surgical history and problem list.    Review of Systems   Constitutional: Negative for activity change, diaphoresis and fatigue.   All other systems reviewed and are negative.      Objective  Vitals:    04/08/19 1004   BP: 142/78   Pulse: 71   Resp: 16   Temp: 98.3 °F (36.8 °C)   SpO2: 99%       Physical Exam   Constitutional: He is oriented to person, place, and time. He appears well-developed and well-nourished. No distress.   HENT:   Head: Normocephalic and atraumatic.   Right Ear: External ear normal.   Left Ear: External ear normal.   Nose: Nose normal.   Mouth/Throat: Oropharynx is clear and moist.   Eyes: Conjunctivae and EOM are normal. Pupils are equal, round, and reactive to light. Right eye exhibits no discharge. Left eye exhibits no discharge. No scleral icterus.   Cardiovascular: Normal rate, regular rhythm and normal heart sounds.   Pulmonary/Chest: Effort normal and breath sounds normal. No respiratory distress. He has no wheezes. He has no rales.   Abdominal: Soft. Bowel sounds are normal. He exhibits no distension. There is no tenderness.   Neurological: He is alert and oriented  to person, place, and time.   Skin: Skin is warm and dry. He is not diaphoretic.   Nursing note and vitals reviewed.        Current Outpatient Medications:   •  amLODIPine (NORVASC) 10 MG tablet, Take 1 tablet by mouth Daily., Disp: 90 tablet, Rfl: 3  •  amphetamine-dextroamphetamine XR (ADDERALL XR) 20 MG 24 hr capsule, Take 1 capsule by mouth Every Morning, Disp: 90 capsule, Rfl: 0  •  aspirin 81 MG tablet, Take 81 mg by mouth Daily., Disp: , Rfl:   •  atorvastatin (LIPITOR) 40 MG tablet, Take 1 tablet by mouth Daily., Disp: 90 tablet, Rfl: 3  •  cetirizine (zyrTEC) 10 MG tablet, Take 10 mg by mouth Daily., Disp: , Rfl:   •  CloNIDine (CATAPRES) 0.1 MG tablet, Take 1 tablet by mouth 2 (Two) Times a Day., Disp: 180 tablet, Rfl: 3  •  doxycycline (VIBRAMYCIN) 100 MG capsule, Take 100 mg by mouth Daily., Disp: , Rfl:   •  econazole nitrate (SPECTAZOLE) 1 % cream, Apply  topically to the appropriate area as directed As Needed., Disp: , Rfl:   •  Flaxseed, Linseed, (FLAX SEED OIL) 1300 MG capsule, Take  by mouth Daily., Disp: , Rfl:   •  fluticasone (FLONASE) 50 MCG/ACT nasal spray, 2 sprays into the nostril(s) as directed by provider Daily., Disp: 3 bottle, Rfl: 11  •  losartan (COZAAR) 100 MG tablet, Take 1 tablet by mouth Daily., Disp: 90 tablet, Rfl: 3  •  meloxicam (MOBIC) 7.5 MG tablet, Take 1 tablet by mouth Daily., Disp: 90 tablet, Rfl: 3  •  metoprolol succinate XL (TOPROL-XL) 100 MG 24 hr tablet, Take 1 tablet by mouth Daily., Disp: 90 tablet, Rfl: 3  •  Multiple Vitamins-Minerals (MULTIVITAMIN PO), Take 1 tablet by mouth Daily., Disp: , Rfl:   •  Multiple Vitamins-Minerals (OCUVITE PO), Take 1 tablet by mouth Daily., Disp: , Rfl:   •  omeprazole (priLOSEC) 20 MG capsule, Take 1 capsule by mouth Daily., Disp: 90 capsule, Rfl: 3  •  Saw Palmetto, Serenoa repens, 320 MG capsule, Take 320 mg by mouth 2 (two) times a day., Disp: , Rfl:   •  tamsulosin (FLOMAX) 0.4 MG capsule 24 hr capsule, Take 2 capsules by  mouth Daily., Disp: 180 capsule, Rfl: 3  Current outpatient and discharge medications have been reconciled for the patient.  Reviewed by: Perico Bernal MD      Procedures    Lab Results (most recent)     None                  Kevin was seen today for adhd and allergies.    Diagnoses and all orders for this visit:    Primary narcolepsy without cataplexy  -     amphetamine-dextroamphetamine XR (ADDERALL XR) 20 MG 24 hr capsule; Take 1 capsule by mouth Every Morning    Other orders  -     fluticasone (FLONASE) 50 MCG/ACT nasal spray; 2 sprays into the nostril(s) as directed by provider Daily.      MIGUEL was run which showed no abnormalities.  Refill given for Adderall as above.    Return for As needed.      Perico Bernal MD

## 2019-06-04 ENCOUNTER — OFFICE VISIT (OUTPATIENT)
Dept: FAMILY MEDICINE CLINIC | Facility: CLINIC | Age: 84
End: 2019-06-04

## 2019-06-04 VITALS
WEIGHT: 224 LBS | TEMPERATURE: 98.3 F | HEART RATE: 74 BPM | DIASTOLIC BLOOD PRESSURE: 64 MMHG | OXYGEN SATURATION: 98 % | HEIGHT: 71 IN | SYSTOLIC BLOOD PRESSURE: 130 MMHG | BODY MASS INDEX: 31.36 KG/M2 | RESPIRATION RATE: 14 BRPM

## 2019-06-04 DIAGNOSIS — R73.09 ELEVATED GLUCOSE: ICD-10-CM

## 2019-06-04 DIAGNOSIS — I10 ESSENTIAL HYPERTENSION: Primary | ICD-10-CM

## 2019-06-04 DIAGNOSIS — E78.5 HYPERLIPIDEMIA, UNSPECIFIED HYPERLIPIDEMIA TYPE: ICD-10-CM

## 2019-06-04 LAB
ALBUMIN SERPL-MCNC: 4.7 G/DL (ref 3.5–5.2)
ALBUMIN/GLOB SERPL: 2 G/DL
ALP SERPL-CCNC: 102 U/L (ref 39–117)
ALT SERPL-CCNC: 28 U/L (ref 1–41)
AST SERPL-CCNC: 25 U/L (ref 1–40)
BILIRUB SERPL-MCNC: 1 MG/DL (ref 0.2–1.2)
BUN SERPL-MCNC: 21 MG/DL (ref 8–23)
BUN/CREAT SERPL: 19.8 (ref 7–25)
CALCIUM SERPL-MCNC: 10.2 MG/DL (ref 8.6–10.5)
CHLORIDE SERPL-SCNC: 105 MMOL/L (ref 98–107)
CHOLEST SERPL-MCNC: 127 MG/DL (ref 0–200)
CO2 SERPL-SCNC: 26.4 MMOL/L (ref 22–29)
CREAT SERPL-MCNC: 1.06 MG/DL (ref 0.76–1.27)
GLOBULIN SER CALC-MCNC: 2.3 GM/DL
GLUCOSE SERPL-MCNC: 132 MG/DL (ref 65–99)
HBA1C MFR BLD: 6.1 % (ref 4.8–5.6)
HDLC SERPL-MCNC: 36 MG/DL (ref 40–60)
LDLC SERPL CALC-MCNC: 62 MG/DL (ref 0–100)
POTASSIUM SERPL-SCNC: 5.1 MMOL/L (ref 3.5–5.2)
PROT SERPL-MCNC: 7 G/DL (ref 6–8.5)
SODIUM SERPL-SCNC: 141 MMOL/L (ref 136–145)
TRIGL SERPL-MCNC: 144 MG/DL (ref 0–150)
VLDLC SERPL CALC-MCNC: 28.8 MG/DL

## 2019-06-04 PROCEDURE — 99214 OFFICE O/P EST MOD 30 MIN: CPT | Performed by: FAMILY MEDICINE

## 2019-06-04 NOTE — PROGRESS NOTES
Kevin Ellington is a 84 y.o. male.     Chief Complaint   Patient presents with   • Diabetes     patient is having 6 months follow up. he needs a full blood work.       Rash        Patient to the office today to follow-up on hypertension, hyperlipidemia, and elevated glucose levels.  Patient currently taking and tolerating amlodipine, atorvastatin, clonidine, metoprolol, losartan.  Patient states that he does feel some lightheadedness and dizziness from time to time with position changes.  Tries to maintain healthy diet and exercise regimen.  Patient would like to discontinue any medications if possible.      The following portions of the patient's history were reviewed and updated as appropriate: allergies, current medications, past family history, past medical history, past social history, past surgical history and problem list.    Review of Systems   Constitutional: Negative for activity change and unexpected weight change.   Skin: Positive for rash.   All other systems reviewed and are negative.      Objective  Vitals:    06/04/19 0927   BP: 130/64   Pulse: 74   Resp: 14   Temp: 98.3 °F (36.8 °C)   SpO2: 98%       Physical Exam   Constitutional: He is oriented to person, place, and time. He appears well-developed and well-nourished. No distress.   HENT:   Head: Normocephalic and atraumatic.   Right Ear: External ear normal.   Left Ear: External ear normal.   Nose: Nose normal.   Mouth/Throat: Oropharynx is clear and moist.   Eyes: Conjunctivae and EOM are normal. Pupils are equal, round, and reactive to light. Right eye exhibits no discharge. Left eye exhibits no discharge. No scleral icterus.   Cardiovascular: Normal rate, regular rhythm and normal heart sounds.   Pulmonary/Chest: Effort normal and breath sounds normal. No respiratory distress. He has no wheezes. He has no rales.   Abdominal: Soft. Bowel sounds are normal. He exhibits no distension. There is no tenderness.   Neurological: He is alert and  oriented to person, place, and time.   Skin: Skin is warm and dry. He is not diaphoretic.   Nursing note and vitals reviewed.        Current Outpatient Medications:   •  amLODIPine (NORVASC) 10 MG tablet, Take 1 tablet by mouth Daily., Disp: 90 tablet, Rfl: 3  •  amphetamine-dextroamphetamine XR (ADDERALL XR) 20 MG 24 hr capsule, Take 1 capsule by mouth Every Morning, Disp: 90 capsule, Rfl: 0  •  aspirin 81 MG tablet, Take 81 mg by mouth Daily., Disp: , Rfl:   •  atorvastatin (LIPITOR) 40 MG tablet, Take 1 tablet by mouth Daily., Disp: 90 tablet, Rfl: 3  •  cetirizine (zyrTEC) 10 MG tablet, Take 10 mg by mouth Daily., Disp: , Rfl:   •  CloNIDine (CATAPRES) 0.1 MG tablet, Take 1 tablet by mouth 2 (Two) Times a Day., Disp: 180 tablet, Rfl: 3  •  doxycycline (VIBRAMYCIN) 100 MG capsule, Take 100 mg by mouth Daily., Disp: , Rfl:   •  econazole nitrate (SPECTAZOLE) 1 % cream, Apply  topically to the appropriate area as directed As Needed., Disp: , Rfl:   •  Flaxseed, Linseed, (FLAX SEED OIL) 1300 MG capsule, Take  by mouth Daily., Disp: , Rfl:   •  fluticasone (FLONASE) 50 MCG/ACT nasal spray, 2 sprays into the nostril(s) as directed by provider Daily., Disp: 3 bottle, Rfl: 11  •  losartan (COZAAR) 100 MG tablet, Take 1 tablet by mouth Daily., Disp: 90 tablet, Rfl: 3  •  meloxicam (MOBIC) 7.5 MG tablet, Take 1 tablet by mouth Daily., Disp: 90 tablet, Rfl: 3  •  metoprolol succinate XL (TOPROL-XL) 100 MG 24 hr tablet, Take 1 tablet by mouth Daily., Disp: 90 tablet, Rfl: 3  •  Multiple Vitamins-Minerals (MULTIVITAMIN PO), Take 1 tablet by mouth Daily., Disp: , Rfl:   •  Multiple Vitamins-Minerals (OCUVITE PO), Take 1 tablet by mouth Daily., Disp: , Rfl:   •  omeprazole (priLOSEC) 20 MG capsule, Take 1 capsule by mouth Daily., Disp: 90 capsule, Rfl: 3  •  Saw Palmetto, Serenoa repens, 320 MG capsule, Take 320 mg by mouth 2 (two) times a day., Disp: , Rfl:   •  tamsulosin (FLOMAX) 0.4 MG capsule 24 hr capsule, Take 2  capsules by mouth Daily., Disp: 180 capsule, Rfl: 3  Current outpatient and discharge medications have been reconciled for the patient.  Reviewed by: Perico Bernal MD      Procedures    Lab Results (most recent)     None                  Kevin was seen today for diabetes.    Diagnoses and all orders for this visit:    Essential hypertension  -     Comprehensive Metabolic Panel  -     Hemoglobin A1c  -     Lipid Panel    Hyperlipidemia, unspecified hyperlipidemia type  -     Comprehensive Metabolic Panel  -     Hemoglobin A1c  -     Lipid Panel    Elevated glucose  -     Comprehensive Metabolic Panel  -     Hemoglobin A1c  -     Lipid Panel    Labs as above.  Will adjust treatment plan accordingly.      Return for As needed.      Perico Bernal MD

## 2019-06-10 ENCOUNTER — HOSPITAL ENCOUNTER (OUTPATIENT)
Dept: GENERAL RADIOLOGY | Facility: HOSPITAL | Age: 84
Discharge: HOME | End: 2019-06-10

## 2019-06-10 ENCOUNTER — ANESTHESIA (OUTPATIENT)
Dept: PAIN MEDICINE | Facility: HOSPITAL | Age: 84
End: 2019-06-10

## 2019-06-10 ENCOUNTER — HOSPITAL ENCOUNTER (OUTPATIENT)
Dept: PAIN MEDICINE | Facility: HOSPITAL | Age: 84
Discharge: HOME | End: 2019-06-10
Admitting: ANESTHESIOLOGY

## 2019-06-10 ENCOUNTER — ANESTHESIA EVENT (OUTPATIENT)
Dept: PAIN MEDICINE | Facility: HOSPITAL | Age: 84
End: 2019-06-10

## 2019-06-10 VITALS
SYSTOLIC BLOOD PRESSURE: 121 MMHG | WEIGHT: 218 LBS | HEART RATE: 66 BPM | RESPIRATION RATE: 16 BRPM | BODY MASS INDEX: 30.52 KG/M2 | DIASTOLIC BLOOD PRESSURE: 71 MMHG | HEIGHT: 71 IN | TEMPERATURE: 98.2 F | OXYGEN SATURATION: 95 %

## 2019-06-10 DIAGNOSIS — M51.36 DDD (DEGENERATIVE DISC DISEASE), LUMBAR: Primary | ICD-10-CM

## 2019-06-10 DIAGNOSIS — R52 PAIN: ICD-10-CM

## 2019-06-10 PROCEDURE — 77003 FLUOROGUIDE FOR SPINE INJECT: CPT

## 2019-06-10 PROCEDURE — 0 IOPAMIDOL 41 % SOLUTION: Performed by: ANESTHESIOLOGY

## 2019-06-10 PROCEDURE — 25010000002 METHYLPREDNISOLONE PER 80 MG: Performed by: ANESTHESIOLOGY

## 2019-06-10 PROCEDURE — 25010000002 MIDAZOLAM PER 1 MG: Performed by: ANESTHESIOLOGY

## 2019-06-10 PROCEDURE — C1755 CATHETER, INTRASPINAL: HCPCS

## 2019-06-10 RX ORDER — LIDOCAINE HYDROCHLORIDE 10 MG/ML
1 INJECTION, SOLUTION INFILTRATION; PERINEURAL ONCE AS NEEDED
Status: DISCONTINUED | OUTPATIENT
Start: 2019-06-10 | End: 2019-06-11 | Stop reason: HOSPADM

## 2019-06-10 RX ORDER — SODIUM CHLORIDE 0.9 % (FLUSH) 0.9 %
1-10 SYRINGE (ML) INJECTION AS NEEDED
Status: DISCONTINUED | OUTPATIENT
Start: 2019-06-10 | End: 2019-06-11 | Stop reason: HOSPADM

## 2019-06-10 RX ORDER — MIDAZOLAM HYDROCHLORIDE 1 MG/ML
1 INJECTION INTRAMUSCULAR; INTRAVENOUS AS NEEDED
Status: DISCONTINUED | OUTPATIENT
Start: 2019-06-10 | End: 2019-06-11 | Stop reason: HOSPADM

## 2019-06-10 RX ORDER — FENTANYL CITRATE 50 UG/ML
50 INJECTION, SOLUTION INTRAMUSCULAR; INTRAVENOUS AS NEEDED
Status: DISCONTINUED | OUTPATIENT
Start: 2019-06-10 | End: 2019-06-11 | Stop reason: HOSPADM

## 2019-06-10 RX ORDER — METHYLPREDNISOLONE ACETATE 80 MG/ML
80 INJECTION, SUSPENSION INTRA-ARTICULAR; INTRALESIONAL; INTRAMUSCULAR; SOFT TISSUE ONCE
Status: COMPLETED | OUTPATIENT
Start: 2019-06-10 | End: 2019-06-10

## 2019-06-10 RX ORDER — LIDOCAINE HYDROCHLORIDE 10 MG/ML
0.5 INJECTION, SOLUTION INFILTRATION; PERINEURAL ONCE AS NEEDED
Status: DISCONTINUED | OUTPATIENT
Start: 2019-06-10 | End: 2019-06-11 | Stop reason: HOSPADM

## 2019-06-10 RX ORDER — SODIUM CHLORIDE 0.9 % (FLUSH) 0.9 %
3 SYRINGE (ML) INJECTION EVERY 12 HOURS SCHEDULED
Status: DISCONTINUED | OUTPATIENT
Start: 2019-06-10 | End: 2019-06-11 | Stop reason: HOSPADM

## 2019-06-10 RX ORDER — CHLORAL HYDRATE 500 MG
1000 CAPSULE ORAL
COMMUNITY
End: 2019-10-28 | Stop reason: SDUPTHER

## 2019-06-10 RX ORDER — SODIUM CHLORIDE 0.9 % (FLUSH) 0.9 %
3-10 SYRINGE (ML) INJECTION AS NEEDED
Status: DISCONTINUED | OUTPATIENT
Start: 2019-06-10 | End: 2019-06-11 | Stop reason: HOSPADM

## 2019-06-10 RX ADMIN — METHYLPREDNISOLONE ACETATE 80 MG: 80 INJECTION, SUSPENSION INTRA-ARTICULAR; INTRALESIONAL; INTRAMUSCULAR; SOFT TISSUE at 10:22

## 2019-06-10 RX ADMIN — MIDAZOLAM 2 MG: 1 INJECTION INTRAMUSCULAR; INTRAVENOUS at 10:17

## 2019-06-10 RX ADMIN — IOPAMIDOL 10 ML: 408 INJECTION, SOLUTION INTRATHECAL at 10:22

## 2019-06-10 NOTE — ANESTHESIA PROCEDURE NOTES
PAIN Epidural block      Patient reassessed immediately prior to procedure    Patient location during procedure: pain clinic  Start Time: 6/10/2019 10:15 AM  Stop Time: 6/10/2019 10:26 AM  Indication:procedure for pain  Performed By  Anesthesiologist: Renato Arguello MD  Preanesthetic Checklist  Completed: patient identified and risks and benefits discussed  Additional Notes  Diagnosis:     Post-Op Diagnosis Codes:     * Spinal stenosis of lumbar region with neurogenic claudication (M48.062)     * Lumbar neuritis (M54.16)    Sedation: Versed 2 mg    A lumbar epidural steroid injection with fluoroscopic guidance and an Isovue dye epidurogram was performed.  Under fluoroscopic guidance, the epidural space was identified and accessed, confirmed by loss of resistance to saline followed by injection of Isovue dye, which shows a good epidurogram.  The above medications were injected uneventfully.    Prep:  Pt Position:prone  Sterile Tech:cap, gloves, mask and sterile barrier  Prep:chlorhexidine gluconate and isopropyl alcohol  Monitoring:blood pressure monitoring, continuous pulse oximetry and EKG  Procedure:Sedation: yes     Approach:right paramedian  Guidance: fluoroscopy  Location:lumbar  Interspace: L5-S1.  Needle Type:Tuohy  Needle Gauge:20  Aspiration:negative  Medications:  Depomedrol:80  Preservative Free Saline:1mL  Isovue:1mL  Comments:Isovue dye reveals good epidurogram  Post Assessment:  Pt Tolerance:patient tolerated the procedure well with no apparent complications  Complications:no

## 2019-06-10 NOTE — ADDENDUM NOTE
Addendum  created 06/10/19 1034 by Renato Arguello MD    Order Reconciliation Section accessed, Order list changed

## 2019-06-10 NOTE — H&P
INTERVAL HISTORY:    The patient returns for another Lumbar epidural steroid injection today.  They have received 70% improvement since their last injection with a pain level of 5/10 at its worst recently.    Conservative measures tried in the interim meloxicam and rest    Current Outpatient Medications on File Prior to Encounter   Medication Sig Dispense Refill   • amLODIPine (NORVASC) 10 MG tablet Take 1 tablet by mouth Daily. 90 tablet 3   • amphetamine-dextroamphetamine XR (ADDERALL XR) 20 MG 24 hr capsule Take 1 capsule by mouth Every Morning 90 capsule 0   • aspirin 81 MG tablet Take 81 mg by mouth Daily.     • atorvastatin (LIPITOR) 40 MG tablet Take 1 tablet by mouth Daily. 90 tablet 3   • cetirizine (zyrTEC) 10 MG tablet Take 10 mg by mouth Daily.     • CloNIDine (CATAPRES) 0.1 MG tablet Take 1 tablet by mouth 2 (Two) Times a Day. 180 tablet 3   • doxycycline (VIBRAMYCIN) 100 MG capsule Take 100 mg by mouth Daily.     • econazole nitrate (SPECTAZOLE) 1 % cream Apply  topically to the appropriate area as directed As Needed.     • Flaxseed, Linseed, (FLAX SEED OIL) 1300 MG capsule Take  by mouth Daily.     • fluticasone (FLONASE) 50 MCG/ACT nasal spray 2 sprays into the nostril(s) as directed by provider Daily. 3 bottle 11   • losartan (COZAAR) 100 MG tablet Take 1 tablet by mouth Daily. 90 tablet 3   • meloxicam (MOBIC) 7.5 MG tablet Take 1 tablet by mouth Daily. 90 tablet 3   • metoprolol succinate XL (TOPROL-XL) 100 MG 24 hr tablet Take 1 tablet by mouth Daily. 90 tablet 3   • Multiple Vitamins-Minerals (MULTIVITAMIN PO) Take 1 tablet by mouth Daily.     • Multiple Vitamins-Minerals (OCUVITE PO) Take 1 tablet by mouth Daily.     • omeprazole (priLOSEC) 20 MG capsule Take 1 capsule by mouth Daily. 90 capsule 3   • Saw Palmetto, Serenoa repens, 320 MG capsule Take 320 mg by mouth 2 (two) times a day.     • tamsulosin (FLOMAX) 0.4 MG capsule 24 hr capsule Take 2 capsules by mouth Daily. 180 capsule 3     No  current facility-administered medications on file prior to encounter.        Past Medical History:   Diagnosis Date   • Arthritis    • Cervical radiculopathy    • Coronary artery disease 2001    Stent   • CTS (carpal tunnel syndrome) 1998    Surgery both hands in 1999   • GERD (gastroesophageal reflux disease)    • HTN (hypertension)    • Hyperlipidemia    • Low back pain     2 Prior surgeries   • Lumbosacral disc disease    • Melanoma (CMS/HCC) 01/15/2009    DR WILLY SIMMS ( Left forearm)   • Radiculitis, thoracic    • Stented coronary artery    • Thoracic disc disorder     Surgery by Dr. Aldridge (sp)       No hematologic infectious or constitutional symptoms  Negative patient screen for POPPY      Exam:  There were no vitals taken for this visit.  Airway [place]ampatti 2  Alert and oriented      Diagnosis:  Post-Op Diagnosis Codes:     * Spinal stenosis of lumbar region with neurogenic claudication [M48.062]     * Lumbar neuritis [M54.16]    Plan:  Lumbar 5 epidural steroid injection under fluoroscopic guidance    I have encouraged them to continue:  1.  Physical therapy exercises at home as prescribed by physical therapy or from the pain clinic handout (given to the patient).  Continuation of these exercises every day, or multiple times per week, even when the patient has good pain relief, was stressed to the patient as a preventative measure to decrease the frequency and severity of future pain episodes.  2.  Continue pain medicines as already prescribed.  If patient not currently taking any, it is recommended to begin Acetaminophen 1000 mg po q 8 hours.  If other medicines containing Acetaminophen are currently prescribed, maintain daily dose at 3000mg.    3.  If they can tolerate NSAIDS, it is recommended to take Ibuprofen 600 mg po q 6 hours for 7 days during pain exacerbations.   Alternatively, they may substitute an NSAID of their choice (e.g. Aleve)  4.  Heat and ice to the affected area as tolerated  for pain control.  It was discussed that heating pads can cause burns.  5.  Low impact exercise such as walking or water exercise was recommended to maintain overall health and aid in weight control.   6.  Follow up as needed for subsequent injections.  7.  Patient was counseled to abstain from tobacco products.

## 2019-07-01 ENCOUNTER — OFFICE VISIT (OUTPATIENT)
Dept: FAMILY MEDICINE CLINIC | Facility: CLINIC | Age: 84
End: 2019-07-01

## 2019-07-01 VITALS
DIASTOLIC BLOOD PRESSURE: 88 MMHG | WEIGHT: 222.2 LBS | HEART RATE: 73 BPM | RESPIRATION RATE: 16 BRPM | SYSTOLIC BLOOD PRESSURE: 132 MMHG | OXYGEN SATURATION: 98 % | BODY MASS INDEX: 31.11 KG/M2 | HEIGHT: 71 IN | TEMPERATURE: 98.4 F

## 2019-07-01 DIAGNOSIS — G89.29 CHRONIC RIGHT-SIDED LOW BACK PAIN WITH RIGHT-SIDED SCIATICA: ICD-10-CM

## 2019-07-01 DIAGNOSIS — M54.41 CHRONIC RIGHT-SIDED LOW BACK PAIN WITH RIGHT-SIDED SCIATICA: ICD-10-CM

## 2019-07-01 DIAGNOSIS — R73.09 ELEVATED HEMOGLOBIN A1C: ICD-10-CM

## 2019-07-01 DIAGNOSIS — G47.419 PRIMARY NARCOLEPSY WITHOUT CATAPLEXY: Primary | ICD-10-CM

## 2019-07-01 PROCEDURE — 99214 OFFICE O/P EST MOD 30 MIN: CPT | Performed by: FAMILY MEDICINE

## 2019-07-01 RX ORDER — DEXTROAMPHETAMINE SACCHARATE, AMPHETAMINE ASPARTATE MONOHYDRATE, DEXTROAMPHETAMINE SULFATE AND AMPHETAMINE SULFATE 5; 5; 5; 5 MG/1; MG/1; MG/1; MG/1
20 CAPSULE, EXTENDED RELEASE ORAL EVERY MORNING
Qty: 90 CAPSULE | Refills: 0 | Status: SHIPPED | OUTPATIENT
Start: 2019-07-01 | End: 2019-09-26
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
## 2019-07-02 NOTE — PROGRESS NOTES
Subjective   Patient ID: Kevin Ellington is a 84 y.o. male is here today for follow-up after ADAL #3 at [facility]. His last one was early June.  Patient states that they have helped some.    Patient was last seen 3.11.19 for low back pain, weakness and N/T    Patient states that he has some residual intermittent mild low back pain, his right leg pain has resolved. He has bilateral leg weakness.  He denies N/T.  He states that his balance and gait are worsening, he is using a cane to ambulate with.  No neck or arm pain.  He denies urinary incontinence.     Patient had surgery 6.8.2012  Right T11/12 transpedicular discectomy and decompression     This patient has lumbar stenosis which we are trying to treat nonoperatively. He had a block since I saw him last, and it seemed to help his pain, but he is still quite weak in his legs. Of note was the fact that he had an episode of shortness of breath and he is going to see his cardiologist, Dr. Salmeron, about this. He does have a history of a stent. While his pain is better in his legs, he is still quite weak and he is using a cane. I thought it would be best to follow this up with some therapy, specifically aquatic therapy, which we will arrange and have him come back in about 3 months.        Back Pain   The problem occurs intermittently. The problem has been gradually improving since onset. The pain is present in the lumbar spine. The pain does not radiate. The pain is at a severity of 2/10. The pain is mild. The symptoms are aggravated by lying down, bending, standing and twisting. Associated symptoms include weakness. Pertinent negatives include no numbness.   Extremity Weakness    The pain is present in the left lower leg, left upper leg, right upper leg and right lower leg. The problem occurs constantly. The problem has been unchanged. Pertinent negatives include no numbness.   Difficulty Walking   The problem occurs constantly. The problem has been gradually worsening.  Associated symptoms include arthralgias and weakness. Pertinent negatives include no numbness.       The following portions of the patient's history were reviewed and updated as appropriate: allergies, current medications, past family history, past medical history, past social history, past surgical history and problem list.    Review of Systems   Musculoskeletal: Positive for arthralgias, back pain, extremity weakness and gait problem.   Neurological: Positive for weakness. Negative for numbness.   All other systems reviewed and are negative.      Objective   Physical Exam   Constitutional: He is oriented to person, place, and time. He appears well-developed and well-nourished.   HENT:   Head: Normocephalic and atraumatic.   Eyes: Conjunctivae and EOM are normal. Pupils are equal, round, and reactive to light.   Fundoscopic exam:       The right eye shows no papilledema. The right eye shows venous pulsations.        The left eye shows no papilledema. The left eye shows venous pulsations.   Neck: Carotid bruit is not present.   Neurological: He is oriented to person, place, and time. He has a normal Finger-Nose-Finger Test and a normal Heel to Shin Test. Gait normal.   Reflex Scores:       Tricep reflexes are 2+ on the right side and 2+ on the left side.       Bicep reflexes are 2+ on the right side and 2+ on the left side.       Brachioradialis reflexes are 2+ on the right side and 2+ on the left side.       Patellar reflexes are 2+ on the right side and 2+ on the left side.       Achilles reflexes are 2+ on the right side and 2+ on the left side.  Psychiatric: His speech is normal.     Neurologic Exam     Mental Status   Oriented to person, place, and time.   Registration of memory: Good recent and remote memory.   Attention: normal. Concentration: normal.   Speech: speech is normal   Level of consciousness: alert  Knowledge: consistent with education.     Cranial Nerves     CN II   Visual fields full to  confrontation.   Visual acuity: normal    CN III, IV, VI   Pupils are equal, round, and reactive to light.  Extraocular motions are normal.     CN V   Facial sensation intact.   Right corneal reflex: normal  Left corneal reflex: normal    CN VII   Facial expression full, symmetric.   Right facial weakness: none  Left facial weakness: none    CN VIII   Hearing: intact    CN IX, X   Palate: symmetric    CN XI   Right sternocleidomastoid strength: normal  Left sternocleidomastoid strength: normal    CN XII   Tongue: not atrophic  Tongue deviation: none    Motor Exam   Muscle bulk: normal  Right arm tone: normal  Left arm tone: normal  Right leg tone: normal  Left leg tone: normal    Strength   Strength 5/5 except as noted.   Right iliopsoas: 4/5  Left iliopsoas: 4/5  Right quadriceps: 4/5  Left quadriceps: 4/5  Right hamstrin/5  Left hamstrin/5  Right glutei: 4/5  Left glutei: 4/5  Right anterior tibial: 4/5  Left anterior tibial: 4/5  Right posterior tibial: 4/5  Left posterior tibial: 4/5  Right peroneal: 4/5  Left peroneal: 4/5  Right gastroc: 4/5  Left gastroc: 4/5    Sensory Exam   Light touch normal.     Gait, Coordination, and Reflexes     Gait  Gait: normal    Coordination   Finger to nose coordination: normal  Heel to shin coordination: normal    Reflexes   Right brachioradialis: 2+  Left brachioradialis: 2+  Right biceps: 2+  Left biceps: 2+  Right triceps: 2+  Left triceps: 2+  Right patellar: 2+  Left patellar: 2+  Right achilles: 2+  Left achilles: 2+  Right : 2+  Left : 2+      Assessment/Plan   Independent Review of Radiographic Studies:    I reviewed his lumbar, cervical, thoracic myelogram done 2018.  There is severe stenosis at L4-L5, L3-L4, L2-L3 and even a bit at L1-L2.  Agree with the report.      Medical Decision Making:    His pain is better and his legs are still weak.  We will send him to aquatic therapy for strengthening and have him come back in 3 months.  In the  meantime he will also work with his cardiologist about his shortness of breath.      Kevin was seen today for back pain, extremity weakness and difficulty walking.    Diagnoses and all orders for this visit:    Spinal stenosis of lumbar region with neurogenic claudication  -     Ambulatory Referral to Physical Therapy Aquatics    DDD (degenerative disc disease), lumbar  -     Ambulatory Referral to Physical Therapy Aquatics    Bilateral leg weakness  -     Ambulatory Referral to Physical Therapy Aquatics      Return in about 3 months (around 10/8/2019).

## 2019-07-08 ENCOUNTER — OFFICE VISIT (OUTPATIENT)
Dept: NEUROSURGERY | Facility: CLINIC | Age: 84
End: 2019-07-08

## 2019-07-08 VITALS
SYSTOLIC BLOOD PRESSURE: 139 MMHG | WEIGHT: 223 LBS | HEART RATE: 71 BPM | BODY MASS INDEX: 31.22 KG/M2 | DIASTOLIC BLOOD PRESSURE: 79 MMHG | HEIGHT: 71 IN

## 2019-07-08 DIAGNOSIS — R29.898 BILATERAL LEG WEAKNESS: ICD-10-CM

## 2019-07-08 DIAGNOSIS — M51.36 DDD (DEGENERATIVE DISC DISEASE), LUMBAR: ICD-10-CM

## 2019-07-08 DIAGNOSIS — M48.062 SPINAL STENOSIS OF LUMBAR REGION WITH NEUROGENIC CLAUDICATION: Primary | ICD-10-CM

## 2019-07-08 PROCEDURE — 99214 OFFICE O/P EST MOD 30 MIN: CPT | Performed by: NEUROLOGICAL SURGERY

## 2019-07-09 ENCOUNTER — OFFICE VISIT (OUTPATIENT)
Dept: CARDIOLOGY | Facility: CLINIC | Age: 84
End: 2019-07-09

## 2019-07-09 VITALS
WEIGHT: 222 LBS | SYSTOLIC BLOOD PRESSURE: 120 MMHG | HEART RATE: 68 BPM | HEIGHT: 71 IN | DIASTOLIC BLOOD PRESSURE: 72 MMHG | OXYGEN SATURATION: 98 % | BODY MASS INDEX: 31.08 KG/M2

## 2019-07-09 DIAGNOSIS — E78.5 HYPERLIPIDEMIA, UNSPECIFIED HYPERLIPIDEMIA TYPE: ICD-10-CM

## 2019-07-09 DIAGNOSIS — R06.02 SHORTNESS OF BREATH: ICD-10-CM

## 2019-07-09 DIAGNOSIS — I10 ESSENTIAL HYPERTENSION: ICD-10-CM

## 2019-07-09 DIAGNOSIS — I25.10 CORONARY ARTERY DISEASE INVOLVING NATIVE CORONARY ARTERY OF NATIVE HEART WITHOUT ANGINA PECTORIS: Primary | ICD-10-CM

## 2019-07-09 PROCEDURE — 93000 ELECTROCARDIOGRAM COMPLETE: CPT | Performed by: NURSE PRACTITIONER

## 2019-07-09 PROCEDURE — 99214 OFFICE O/P EST MOD 30 MIN: CPT | Performed by: NURSE PRACTITIONER

## 2019-07-09 NOTE — PROGRESS NOTES
Date of Office Visit: 2019  Encounter Provider: NANCY Burch  Place of Service: [facility] CARDIOLOGY  Patient Name: Kevin Ellington  :1935      Subjective:     Chief Complaint:  Shortness of breath    History of Present Illness:  Kevin Ellington is a pleasant 84 y.o. male who is new to me.  Outside records have been obtained and reviewed by me.     This is a patient of Dr. Salmeron who previously followed with Dr. Cavazos.  He has a history of coronary artery disease status post prior LAD stent placement in 2002, chronic back pain, dyslipidemia, hypertension.    He first saw Dr. Salmeron in 2016 to establish care.  In  he was having symptoms of dyspnea and chest fullness on exertion.  He was taken to the Cath Lab and apparently had a Promus stent placed.  He did well until about  when he started having more dyspnea on exertion.  He had an echocardiogram that showed normal LV systolic function, wall motion with an EF of 60%, grade 1A diastolic dysfunction, no significant valvular disease.  Lexiscan Myoview stress test was negative for ischemia.  He was seen by pulmonary due to continued issues with dyspnea on exertion and there were no pulmonary findings to explain his symptoms.  At that time he started exercising more and lost about 20 pounds with resolution in his symptoms.    In 2017 he was feeling well overall but was having some elevated blood pressures and discussed increasing amlodipine to 10 mg at that time.    Was last in the office on 10/23/2018 for routine follow-up.  He was feeling well.  Since Dr. Salmeron last saw him his amlodipine was increased to 10 mg a day and clonidine 0.1 mg twice daily was added to his regimen.  His blood pressure has been better controlled.  He denied any chest pain, shortness of breath, PND, orthopnea, syncope, near syncope, lower extremity edema or palpitations.  He had recently establish care with  new PCP Dr. Bernal.  He was continuing to have issues with low back pain for which she was undergoing epidural injections.  His back pain was limiting his activity but he was admitted he could exercise on a bike but had not been good about doing that.  He was also admitting there is room for improvement in his diet.  His lab work in April showed his total cholesterol 168, HDL 53, LDL 98, triglycerides 85.  Jaron discussed lifestyle modifications including diet and exercise with the patient.  Dr. Salmeron wanted his LDL closer to 70 and felt it was reasonable to try lifestyle changes first before increasing atorvastatin dosage.  She wanted to follow-up with the patient again in 1 year.    He is presenting today for earlier follow-up.  He reports about 10 days ago he was carrying a 30 pound box across his house and got very short of breath pale and had to sit down.  He did not have any chest pain, tightness or pressure, dizziness or palpitations with this.  It took him about 5 to 10 minutes to catch his breath.  Overall he did not feel like his shortness of breath with activity is much worse but does feel in the last few years after moving to a patio home from a 3 story home his lifestyle and activities have changed.  In the past climbing steps could make him short of breath but he has not tried that recently.  He does have a sense of being off balance for which he uses a cane and that helps.  He has not had any falls, syncope or near syncope.  Does at times feel a little lightheaded with quick position movements or standing too quickly.  Overall his weight has been steady but he does experience lower extremity edema worse on the left leg that improves by morning time.  He has not tried compression stockings.  He denies any PND or orthopnea.  He just saw Dr. Bernal on 7/1/2019 and mention his recent episode of shortness of breath which made him concerned.  The patient and his wife are also concerned about the severity  of the episode.  He has not had any recurrence since that time but has not tried to carry any heavy boxes either. He just saw Dr. Chaidez yesterday for follow-up following series of epidural injections.  He still experiences chronic back pain and some leg weakness but overall recently felt it helped the pain.  He has not check his blood pressure at home.  His blood pressure today is 120/72.  He researched all of his medications and some were with reported side effects of shortness of breath and he is wondering if that could be the source.  He has not had any new occasions added recently however.     Past Medical History:   Diagnosis Date   • Arthritis    • Cervical radiculopathy    • Coronary artery disease 2001    Stent   • CTS (carpal tunnel syndrome) 1998    Surgery both hands in 1999   • GERD (gastroesophageal reflux disease)    • HTN (hypertension)    • Hyperlipidemia    • Low back pain     2 Prior surgeries   • Lumbosacral disc disease    • Melanoma (CMS/Formerly [facility]) 01/15/2009    DR WILLY SIMMS ( Left forearm)   • Radiculitis, thoracic    • Stented coronary artery    • Thoracic disc disorder     Surgery by Dr. Aldridge (sp)     Past Surgical History:   Procedure Laterality Date   • ANKLE FUSION Left 2007    reconstruction and fusion   • BACK SURGERY      HERNIATED LUMBAR DISK 1975,2000,2012   • CARPAL TUNNEL RELEASE     • CATARACT EXTRACTION, BILATERAL Bilateral 2002   • CORONARY ANGIOPLASTY WITH STENT PLACEMENT  2001   • EPIDURAL BLOCK  Several at [facility]    and [facility]   • REPLACEMENT TOTAL KNEE Left 2015   • REPLACEMENT TOTAL KNEE Right    • SKIN CANCER EXCISION      MELANOMA   • THORACIC SPINE SURGERY       Outpatient Medications Prior to Visit   Medication Sig Dispense Refill   • amLODIPine (NORVASC) 10 MG tablet Take 1 tablet by mouth Daily. 90 tablet 3   • amphetamine-dextroamphetamine XR (ADDERALL XR) 20 MG 24 hr capsule Take 1 capsule by mouth Every Morning 90 capsule 0   •  aspirin 81 MG tablet Take 81 mg by mouth Daily.     • atorvastatin (LIPITOR) 40 MG tablet Take 1 tablet by mouth Daily. 90 tablet 3   • cetirizine (zyrTEC) 10 MG tablet Take 10 mg by mouth Daily.     • CloNIDine (CATAPRES) 0.1 MG tablet Take 1 tablet by mouth 2 (Two) Times a Day. 180 tablet 3   • econazole nitrate (SPECTAZOLE) 1 % cream Apply  topically to the appropriate area as directed As Needed.     • fluticasone (FLONASE) 50 MCG/ACT nasal spray 2 sprays into the nostril(s) as directed by provider Daily. 3 bottle 11   • losartan (COZAAR) 100 MG tablet Take 1 tablet by mouth Daily. 90 tablet 3   • meloxicam (MOBIC) 7.5 MG tablet Take 1 tablet by mouth Daily. 90 tablet 3   • metoprolol succinate XL (TOPROL-XL) 100 MG 24 hr tablet Take 1 tablet by mouth Daily. 90 tablet 3   • Multiple Vitamins-Minerals (MULTIVITAMIN PO) Take 1 tablet by mouth Daily.     • Omega-3 Fatty Acids (FISH OIL) 1000 MG capsule capsule Take 1,000 mg by mouth Daily With Breakfast.     • omeprazole (priLOSEC) 20 MG capsule Take 1 capsule by mouth Daily. 90 capsule 3   • Saw Palmetto, Serenoa repens, 320 MG capsule Take 320 mg by mouth 2 (two) times a day.     • tamsulosin (FLOMAX) 0.4 MG capsule 24 hr capsule Take 2 capsules by mouth Daily. 180 capsule 3     No facility-administered medications prior to visit.        Allergies as of 07/09/2019 - Reviewed 07/09/2019   Allergen Reaction Noted   • Codeine GI Intolerance 03/10/2016   • Hydrocodone GI Intolerance 03/14/2018   • Hydrochlorothiazide Unknown (See Comments) 12/06/2012   • Sulfa antibiotics Unknown (See Comments) 03/10/2016     Social History     Socioeconomic History   • Marital status:      Spouse name: Not on file   • Number of children: 1   • Years of education: BA DEGREE   • Highest education level: Not on file   Occupational History   • Occupation: RETIRED   Tobacco Use   • Smoking status: Never Smoker   • Smokeless tobacco: Never Used   • Tobacco comment: caffeine use  "  Substance and Sexual Activity   • Alcohol use: No   • Drug use: No   • Sexual activity: Not Currently   Social History Narrative    LIVES WITH SPOUSE     Family History   Problem Relation Age of Onset   • Hypertension Mother    • Arthritis Mother            • Heart disease Father    • Early death Father         Aortic aneurism age 58   • Hypertension Father      Review of Systems   Constitution: Negative for chills, fever and malaise/fatigue.   HENT: Negative for ear pain, hearing loss, nosebleeds and sore throat.    Eyes: Negative for double vision, pain, vision loss in left eye and vision loss in right eye.   Cardiovascular: Positive for leg swelling. Negative for chest pain, claudication, dyspnea on exertion, irregular heartbeat, near-syncope, orthopnea, palpitations, paroxysmal nocturnal dyspnea and syncope.   Respiratory: Positive for cough, shortness of breath and snoring. Negative for wheezing.    Endocrine: Negative for cold intolerance and heat intolerance.   Hematologic/Lymphatic: Negative for bleeding problem.   Skin: Negative for color change, itching, rash and unusual hair distribution.   Musculoskeletal: Positive for back pain and joint pain (pain in legs with walking). Negative for joint swelling.   Gastrointestinal: Negative for abdominal pain, diarrhea, hematochezia, melena, nausea and vomiting.   Genitourinary: Negative for decreased libido, frequency, hematuria, hesitancy and incomplete emptying.        Erectile dysfunction   Neurological: Positive for light-headedness and numbness (left leg with prolonged standing). Negative for excessive daytime sleepiness, dizziness, headaches, loss of balance, paresthesias and seizures.   Psychiatric/Behavioral: Negative for depression.          Objective:     Vitals:    19 1410 19 1440   BP: 120/72    BP Location: Right arm    Patient Position: Sitting    Pulse: 70 68   SpO2:  98%   Weight: 101 kg (222 lb)    Height: 180.3 cm (71\")  "     Body mass index is 30.96 kg/m².    PHYSICAL EXAM:  Physical Exam   Constitutional: He is oriented to person, place, and time. He appears well-developed and well-nourished. No distress.   Obese   HENT:   Head: Normocephalic and atraumatic.   Eyes: Conjunctivae and EOM are normal. Pupils are equal, round, and reactive to light.   Neck: No JVD present. Carotid bruit is not present.   Cardiovascular: Normal rate, regular rhythm, normal heart sounds and intact distal pulses.   No murmur heard.  Pulses:       Radial pulses are 2+ on the right side, and 2+ on the left side.        Dorsalis pedis pulses are 2+ on the right side, and 2+ on the left side.        Posterior tibial pulses are 2+ on the right side, and 2+ on the left side.   1+ bilateral lower extremity edema    Pulmonary/Chest: Effort normal. No accessory muscle usage. No tachypnea. No respiratory distress. He has no decreased breath sounds. He has no wheezes. He has no rhonchi. He has no rales. He exhibits no tenderness.   Abdominal: Soft. Bowel sounds are normal. He exhibits no distension. There is no tenderness. There is no rebound and no guarding.   Musculoskeletal: Normal range of motion. He exhibits no edema.   Neurological: He is alert and oriented to person, place, and time.   Skin: Skin is warm, dry and intact. He is not diaphoretic. No erythema.   Psychiatric: He has a normal mood and affect. His speech is normal and behavior is normal. Judgment and thought content normal. Cognition and memory are normal.   Nursing note and vitals reviewed.        ECG 12 Lead  Date/Time: 7/9/2019 2:15 PM  Performed by: Katelynn Farris APRN  Authorized by: Katelynn Farris APRN   Comparison: compared with previous ECG from 10/23/2018  Comparison to previous ECG: APCS new  Rhythm: sinus rhythm  Ectopy: atrial premature contractions  Rate: normal  BPM: 70    Clinical impression: abnormal EKG  Comments: Borderline prolonged FL interval  Indication: CAD,  SOB            Assessment:       Diagnosis Plan   1. Coronary artery disease involving native coronary artery of native heart without angina pectoris  ECG 12 Lead    Stress Test With Myocardial Perfusion One Day    Adult Transthoracic Echo Complete W/ Cont if Necessary Per Protocol   2. Essential hypertension  ECG 12 Lead   3. Hyperlipidemia, unspecified hyperlipidemia type  ECG 12 Lead   4. Shortness of breath  ECG 12 Lead    Stress Test With Myocardial Perfusion One Day    Adult Transthoracic Echo Complete W/ Cont if Necessary Per Protocol       Plan:     1.  Dyspnea: One severe episode occurring a few days ago.  Does not feel overall he has had increase in dyspnea on exertion recently however he and his wife are both concerned.  We discussed just monitoring for recurrence or progression but they feel more comfortable with proceeding with stress testing and echocardiograms orders were placed.  2. Coronary artery disease:  Stent to LAD in 2002.  Has not reported any chest pain.  Did have episode of pretty significant dyspnea about 10 days ago.  Continue current medical management with aspirin, beta blocker, and statin.  3.  Hypertension:  Blood pressure is 120/72.  We'll continue to monitor on his current regimen.  I asked him to monitor his blood pressure at home because it is difficult to make medication adjustments if I do not have readings.  4.  Hyperlipidemia: LDL was 62, HDL low 36, total cholesterol 127 triglycerides 144 on 6/4/2019 labs, Lifestyle modifications previously discussed  including diet and exercise.     Being monitored and managed by PCP Dr. Bernal  5.  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  Has been on for several years.  6.  Chronic low back pain: Follows with Dr. Chaidez for epidural injections  7. Carotid artery stenosis: Mild bilaterally March 2016 ultrasound, on aspirin & statin. No bruits on exam.  No new neurologic symptoms.  8.  Prediabetes: A1c 6.1%.  Being monitored by PCP    Coronary Artery Disease  Plan  • Lifestyle modifications discussed include adhering to a heart healthy diet, avoidance of tobacco products, maintenance of a healthy weight, medication compliance, regular exercise and regular monitoring of cholesterol and blood pressure    Subjective - Objective  • There has been a previous stent procedure using BHARGAV 12/2002  • Current antiplatelet therapy includes aspirin 81 mg        I advised the patient that if they have not heard from our office within 2-3 days after they have completed their testing(stress test and echocardiogram) to please call our office to obtain their results. Will make further recommendations test results and on his home blood pressure readings.  Both th patient is wife demonstrated understanding.      Follow up with Dr. Salmeron on 10/29/19, unless otherwise needed sooner based on recurrence of symptoms or test result abnormalities.  I advised the patient to contact our office with any questions or concerns.         Your medication list           Accurate as of 7/9/19  5:16 PM. If you have any questions, ask your nurse or doctor.               CONTINUE taking these medications      Instructions Last Dose Given Next Dose Due   amLODIPine 10 MG tablet  Commonly known as:  NORVASC      Take 1 tablet by mouth Daily.       amphetamine-dextroamphetamine XR 20 MG 24 hr capsule  Commonly known as:  ADDERALL XR      Take 1 capsule by mouth Every Morning       aspirin 81 MG tablet      Take 81 mg by mouth Daily.       atorvastatin 40 MG tablet  Commonly known as:  LIPITOR      Take 1 tablet by mouth Daily.       cetirizine 10 MG tablet  Commonly known as:  zyrTEC      Take 10 mg by mouth Daily.       CloNIDine 0.1 MG tablet  Commonly known as:  CATAPRES      Take 1 tablet by mouth 2 (Two) Times a Day.       econazole nitrate 1 % cream  Commonly known as:  SPECTAZOLE      Apply  topically to the appropriate area as  directed As Needed.       fish oil 1000 MG capsule capsule      Take 1,000 mg by mouth Daily With Breakfast.       fluticasone 50 MCG/ACT nasal spray  Commonly known as:  FLONASE      2 sprays into the nostril(s) as directed by provider Daily.       losartan 100 MG tablet  Commonly known as:  COZAAR      Take 1 tablet by mouth Daily.       meloxicam 7.5 MG tablet  Commonly known as:  MOBIC      Take 1 tablet by mouth Daily.       metoprolol succinate  MG 24 hr tablet  Commonly known as:  TOPROL-XL      Take 1 tablet by mouth Daily.       MULTIVITAMIN PO      Take 1 tablet by mouth Daily.       omeprazole 20 MG capsule  Commonly known as:  priLOSEC      Take 1 capsule by mouth Daily.       Saw Palmetto (Serenoa repens) 320 MG capsule      Take 320 mg by mouth 2 (two) times a day.       tamsulosin 0.4 MG capsule 24 hr capsule  Commonly known as:  FLOMAX      Take 2 capsules by mouth Daily.              The above medication changes may not have been made by this provider.  Medication list was updated to reflect medications patient is currently taking including medication changes and discontinuations made by other healthcare providers.        It has been a pleasure to participate in this patient's care. Please feel free to contact me with any questions or concerns.     Katelynn Farris, NANCY  07/09/2019       Dictated utilizing Dragon Dictation System.

## 2019-07-17 ENCOUNTER — HOSPITAL ENCOUNTER (OUTPATIENT)
Dept: PHYSICAL THERAPY | Facility: HOSPITAL | Age: 84
Setting detail: THERAPIES SERIES
Discharge: HOME | End: 2019-07-17

## 2019-07-17 DIAGNOSIS — M54.5 CHRONIC LOW BACK PAIN, UNSPECIFIED BACK PAIN LATERALITY, WITH SCIATICA PRESENCE UNSPECIFIED: Primary | ICD-10-CM

## 2019-07-17 DIAGNOSIS — M48.062 LUMBAR STENOSIS WITH NEUROGENIC CLAUDICATION: ICD-10-CM

## 2019-07-17 DIAGNOSIS — G89.29 CHRONIC LOW BACK PAIN, UNSPECIFIED BACK PAIN LATERALITY, WITH SCIATICA PRESENCE UNSPECIFIED: Primary | ICD-10-CM

## 2019-07-17 DIAGNOSIS — R29.898 WEAKNESS OF BOTH LOWER EXTREMITIES: ICD-10-CM

## 2019-07-17 DIAGNOSIS — Z74.09 IMPAIRED FUNCTIONAL MOBILITY, BALANCE, GAIT, AND ENDURANCE: ICD-10-CM

## 2019-07-17 PROCEDURE — 97110 THERAPEUTIC EXERCISES: CPT | Performed by: PHYSICAL THERAPIST

## 2019-07-17 PROCEDURE — 97162 PT EVAL MOD COMPLEX 30 MIN: CPT | Performed by: PHYSICAL THERAPIST

## 2019-07-17 NOTE — THERAPY EVALUATION
Outpatient Physical Therapy Ortho Initial Evaluation  [facility]     Patient Name: Kevin Ellington  : 1935  MRN: [id number]  Today's Date: 2019      Visit Date: 2019    Patient Active Problem List   Diagnosis   • Bulging lumbar disc   • DDD (degenerative disc disease), lumbar   • Leg pain   • Spinal stenosis of lumbar region with neurogenic claudication   • Radiculitis, thoracic   • Status post total left knee replacement using cement   • Status post total right knee replacement using cement   • Coronary artery disease involving native coronary artery of native heart without angina pectoris   • Essential hypertension   • Hyperlipemia   • S/P coronary artery stent placement   • Health care maintenance   • Primary narcolepsy without cataplexy   • Benign prostatic hyperplasia with urinary frequency   • Cervical radiculopathy   • Bilateral carpal tunnel syndrome   • Cervical spinal stenosis   • Cancer (CMS/HCC)   • Arthritis of ankle   • Chronic pain of right ankle   • Shortness of breath        Past Medical History:   Diagnosis Date   • Arthritis    • Cervical radiculopathy    • Coronary artery disease     Stent   • CTS (carpal tunnel syndrome) 1998    Surgery both hands in    • GERD (gastroesophageal reflux disease)    • HTN (hypertension)    • Hyperlipidemia    • Low back pain     2 Prior surgeries   • Lumbosacral disc disease    • Melanoma (CMS/HCC) 01/15/2009    DR WILLY SIMMS ( Left forearm)   • Radiculitis, thoracic    • Stented coronary artery    • Thoracic disc disorder     Surgery by Dr. Aldridge (sp)        Past Surgical History:   Procedure Laterality Date   • ANKLE FUSION Left 2007    reconstruction and fusion   • BACK SURGERY      HERNIATED LUMBAR DISK ,,   • CARPAL TUNNEL RELEASE     • CATARACT EXTRACTION, BILATERAL Bilateral    • CORONARY ANGIOPLASTY WITH STENT PLACEMENT     • EPIDURAL BLOCK  Several at [facility]    and [facility]  "Clinic   • REPLACEMENT TOTAL KNEE Left 2015   • REPLACEMENT TOTAL KNEE Right    • SKIN CANCER EXCISION      MELANOMA   • THORACIC SPINE SURGERY         Visit Dx:     ICD-10-CM ICD-9-CM   1. Chronic low back pain, unspecified back pain laterality, with sciatica presence unspecified M54.5 724.2    G89.29 338.29   2. Lumbar stenosis with neurogenic claudication M48.062 724.03   3. Weakness of both lower extremities R29.898 729.89   4. Impaired functional mobility, balance, gait, and endurance Z74.09 V49.89         Patient History     Row Name 07/17/19 1000             History    Chief Complaint  Pain;Muscle weakness;Tinglings;Difficulty Walking;Balance Problems  -RA      Type of Pain  Back pain  -RA      Brief Description of Current Complaint  Chronic hx of LBP, R LE pain, LE weakness getting progressively worse.  Hx of 3 spine surgeries since 1975.  Also c/o SOA with exertion.  Difficulty walking/standing - L LE gets numb.  Hx of L ankle \"rebuild\", B TKA.  -RA      Previous treatment for THIS PROBLEM  Injections;Medication;Surgery;Rehabilitation;Pain Management  -RA      Patient/Caregiver Goals  Relieve pain;Improve mobility;Improve strength  -RA      Occupation/sports/leisure activities  retired  -RA      Patient seeing anyone else for problem(s)?  neurosurgery  -RA      How has patient tried to help current problem?  Tylenol prn   -RA         Pain     Pain Location  Back;Leg  -RA      Pain at Present  4  -RA      Pain at Best  1  -RA      Pain at Worst  7  -RA      Pain Frequency  Constant/continuous  -RA      Pain Description  Aching;Numbness  -RA      What Performance Factors Make the Current Problem(s) WORSE?  standing, walking, extended sitting, standing, gardening, bending, stairs  -RA      What Performance Factors Make the Current Problem(s) BETTER?  reclined position  -RA      Tolerance Time- Standing  10-15 min prior to L LE numbness  -RA      Tolerance Time- Walking  5-10 minutes (</= 1/4 mile) - increases " "back pain   -RA      Is your sleep disturbed?  -- sleeps fairly well, occ pain will wake  -RA      Difficulties with ADL's?  limited tolerance   -RA      Difficulties with recreational activities?  walking, exercise limited - has ex bike but not using  -RA         Fall Risk Assessment    Any falls in the past year:  No  -RA         Daily Activities    Primary Language  English  -RA      How does patient learn best?  Reading  -RA      Teaching needs identified  Home Exercise Program;Management of Condition  -RA      Patient is concerned about/has problems with  Grasping objects lifting;Performing home management (household chores, shopping, care of dependents);Performing job responsibilities/community activities (work, school,;Standing;Walking;Transfers (getting out of a chair, bed)  -RA      Does patient have problems with the following?  None  -RA      Explanation of Functional Status Problem  independent with basic daily function, self limits activities.   -RA      Pt Participated in POC and Goals  Yes  -RA         Safety    Are you being hurt, hit, or frightened by anyone at home or in your life?  No  -RA      Are you being neglected by a caregiver  No  -RA        User Key  (r) = Recorded By, (t) = Taken By, (c) = Cosigned By    Initials Name Provider Type    RA Kalyani Irene, PT Physical Therapist          PT Ortho     Row Name 07/17/19 1000       Posture/Observations    Posture/Observations Comments  FF posture, FH/rounded shoulders, decreased lordosis, noted lumbar scoliosis, ambulates with altered gait using SPC in L hand - note increased trunk flexion during L LE stance 2/2 limited L ankle ROM  -RA       Myotomal Screen- Lower Quarter Clearing    Hip flexion (L2)  Bilateral:;4 (Good)  -RA    Knee extension (L3)  Bilateral:;4 (Good)  -RA    Ankle DF (L4)  Right:;4 (Good);4+ (Good +);Left:;3 (Fair);3+ (Fair +) hx of sx for L ankle \"rebuild\" per pt. (sounds like fusion)  -RA    Ankle PF (S1)  Right:;3+ (Fair " "+);Left:;2 (Poor) hx of sx for L ankle \"rebuild\" per pt. (sounds like fusion)  -RA    Knee flexion (S2)  Right:;4 (Good);Left:;4- (Good -)  -RA       Lumbar ROM Screen- Lower Quarter Clearing    Lumbar Flexion  Impaired grossly 50% with pain   -RA    Lumbar Extension  Impaired grossly to neutral  with pain   -RA    Lumbar Lateral Flexion  Impaired </= 1/4 ROM, pain L>R   -RA    Lumbar Rotation  Impaired grossly 50%, pulls B  -RA       Special Tests/Palpation    Special Tests/Palpation  Lumbar/SI  -RA       Lumbosacral Palpation    Lumbosacral Palpation?  Yes  -RA       General ROM    GENERAL ROM COMMENTS  R>L hip rotation limited, L ankle ROM very limited all planes (hx of L ankle \"rebuild\" per patient - sounds like fusion)  -RA       MMT (Manual Muscle Testing)    General MMT Comments  hip abd L 4-/5, R 4/5;  hip ext grossly 3+/5 to 4-/5 B  -RA       Flexibility    Flexibility Tested?  Lower Extremity  -RA       Lower Extremity Flexibility    LE Flexibility Comments  tightness of HS, hip flexors  -RA       Balance Skills Training    SLS  </= 1 second B   -RA    Balance Comments  not able to maintain tandem stand   -RA       Transfers    Comment (Transfers)  requires B UE push to rise from sitting  -RA       Gait/Stairs Assessment/Training    Comment (Gait/Stairs)  compensated gait using SPC in L hand with increased trunk flexion during stance phase on L LE due to limited L ankle mobility  -RA      User Key  (r) = Recorded By, (t) = Taken By, (c) = Cosigned By    Initials Name Provider Type    Kalyani Villalobos, PT Physical Therapist                      Therapy Education  Education Details: Patient given tour of facility pool area and instructed on appropriate attire, use of lockers, where to sign in, and to wait at umbrella table for aquatic PT when he returns for 1st aquatic session.  Given: HEP, Posture/body mechanics, Symptoms/condition management, Mobility training  Program: New  How Provided: Verbal, " Demonstration, Written  Provided to: Patient  Level of Understanding: Teach back education performed, Verbalized     PT OP Goals     Row Name 07/17/19 1700 07/17/19 1000       PT Short Term Goals    STG 1  Patient will tolerate 40-45 minutes of aquatic ex/activity with minimal pain/fatigue and </= 1 rest break  -RA  --    STG 2  Patient will be educated on and demonstrate knowledge of optimal posture, decompression strategies, good body mechanics, and proper lifting techniques to minimize stresses to spine and associated soft tissues with daily functional activities.  -RA  --    STG 3  Patient will report >/= 25% decrease in frequency/intensity of LBP/LE symptoms for greater ease with daily function.  -RA  --    STG 4  Patient will demonstrate increased core muscle strength by performing resistive exercises with good core stability in pool  -RA  --       Long Term Goals    LTG 1  Patient will be independent with established HEP for strength/stabilization to aid in self management of symptoms/condition  -RA  --    LTG 2  Patient will have improved core/LE strength/stabilization for greater ease/tolerance with STS, standing, walking, and stair navigation   -RA  --    LTG 3  Patient will have SLS balance >/= 3 sec for improved stability with dynamic mobility.   -RA  --    LTG 4  Patient will improve score on Modified Oswestry Outcome Measures from 48% to </= 38% indicating reduced functional disability   -RA  --       Time Calculation    PT Goal Re-Cert Due Date  10/15/19  -RA  10/15/19  -RA      User Key  (r) = Recorded By, (t) = Taken By, (c) = Cosigned By    Initials Name Provider Type    Kalyani Villalobos, PT Physical Therapist          PT Assessment/Plan     Row Name 07/17/19 1000          PT Assessment    Functional Limitations  Impaired gait;Limitation in home management;Limitations in community activities;Limitations in functional capacity and performance;Performance in leisure activities  -RA     Impairments   Gait;Balance;Endurance;Impaired flexibility;Impaired muscle power;Impaired muscle endurance;Muscle strength;Pain;Posture;Range of motion  -RA     Assessment Comments  Kevin Ellington is an 83 yo M referred to therapy with evolving condition including LBP, R LE pain, LE weakness, and declining functional mobility/gait, and balance. He has comorbidities/personal factors including arthritis, spinal DDD/stenosis with hx of 3 back surgeries ('75, 2000, 2012), hx CAD with hx of stent and recent onset of SOA on exertion (to have further testing next week), hx of B TKA, hx of L ankle sx (fusion), and hx of prior aquatic PT with little if any benefit (per patient) that may impact his therapy plan of care. He demonstrates compensated gait pattern using SPC (has only been using for last few weeks), limited/painful trunk ROM, limited L ankle ROM, core/hip weakness, and poor balance. He reports increasing difficulty with STS transition and stairs as well as limited standing/walking tolerance 2/2 pain/weakness and decreased ability to perform home management/leisure activities. He will benefit from skilled aquatic PT working on flexibility, mobility, functional strengthening, gait and balance to improve overall functional abilities/activity tolerance and facilitate independent self management of symptoms/condition.  -RA     Please refer to paper survey for additional self-reported information  Yes  -RA     Rehab Potential  Good  -RA     Patient/caregiver participated in establishment of treatment plan and goals  Yes  -RA     Patient would benefit from skilled therapy intervention  Yes  -RA        PT Plan    PT Frequency  2x/week  -RA     Predicted Duration of Therapy Intervention (Therapy Eval)  30-90 days  -RA     Planned CPT's?  PT THER PROC EA 15 MIN: 16952;PT AQUATIC THERAPY EA 15 MIN: 40866;PT NEUROMUSC RE-EDUCATION EA 15 MIN: 39284;PT THER ACT EA 15 MIN: 41340;PT SELF CARE/HOME MGMT/TRAIN EA 15: 29658;PT ELECTRICAL STIM  UNATTEND: ;PT HOT OR COLD PACK TREAT MCARE;PT MANUAL THERAPY EA 15 MIN: 07123  -RA     PT Plan Comments  Skilled PT beginning in aquatic environment including ther ex for flexibility, postural/core/LE functional strengthening, mobility, gait, and balance  -RA       User Key  (r) = Recorded By, (t) = Taken By, (c) = Cosigned By    Initials Name Provider Type    Kalyani Villalobos, PT Physical Therapist            Exercises     Row Name 07/17/19 1000             Total Minutes    48942 - PT Therapeutic Exercise Minutes  12  -RA         Exercise 1    Exercise Name 1  PPT, LTR, SKTC issued for HEP  -RA      Additional Comments  difficulty isolating TA activation with PPT  -RA        User Key  (r) = Recorded By, (t) = Taken By, (c) = Cosigned By    Initials Name Provider Type    Kalyani Villalobos, PT Physical Therapist                        Outcome Measure Options: Modifed Owestry  Modified Oswestry  Modified Oswestry Score/Comments: 24/50 or 48%      Time Calculation:     Start Time: 1030  Stop Time: 1122  Time Calculation (min): 52 min     Therapy Charges for Today     Code Description Service Date Service Provider Modifiers Qty    20569766661 HC PT THER PROC EA 15 MIN 7/17/2019 Kalyani Irene, PT GP 1    09969371752 HC PT EVAL MOD COMPLEXITY 3 7/17/2019 Kalyani Irene, PT GP 1          PT G-Codes  Outcome Measure Options: Modifed Owestry  Modified Oswestry Score/Comments: 24/50 or 48%         Kalyani Irene, PT  7/17/2019

## 2019-07-25 ENCOUNTER — TELEPHONE (OUTPATIENT)
Dept: CARDIOLOGY | Facility: CLINIC | Age: 84
End: 2019-07-25

## 2019-07-25 ENCOUNTER — HOSPITAL ENCOUNTER (OUTPATIENT)
Dept: CARDIOLOGY | Facility: HOSPITAL | Age: 84
Discharge: HOME | End: 2019-07-25
Admitting: NURSE PRACTITIONER

## 2019-07-25 ENCOUNTER — HOSPITAL ENCOUNTER (OUTPATIENT)
Dept: CARDIOLOGY | Facility: HOSPITAL | Age: 84
Discharge: HOME | End: 2019-07-25

## 2019-07-25 VITALS
SYSTOLIC BLOOD PRESSURE: 110 MMHG | BODY MASS INDEX: 31.08 KG/M2 | WEIGHT: 222 LBS | DIASTOLIC BLOOD PRESSURE: 70 MMHG | HEART RATE: 80 BPM | HEIGHT: 71 IN

## 2019-07-25 DIAGNOSIS — R06.02 SHORTNESS OF BREATH: ICD-10-CM

## 2019-07-25 DIAGNOSIS — I25.10 CORONARY ARTERY DISEASE INVOLVING NATIVE CORONARY ARTERY OF NATIVE HEART WITHOUT ANGINA PECTORIS: ICD-10-CM

## 2019-07-25 LAB
AORTIC ROOT ANNULUS: 2 CM
ASCENDING AORTA: 3.4 CM
BH CV ECHO MEAS - ACS: 1.9 CM
BH CV ECHO MEAS - AO MAX PG (FULL): 5.2 MMHG
BH CV ECHO MEAS - AO MAX PG: 11.5 MMHG
BH CV ECHO MEAS - AO MEAN PG (FULL): 3.4 MMHG
BH CV ECHO MEAS - AO MEAN PG: 6.8 MMHG
BH CV ECHO MEAS - AO V2 MAX: 169.7 CM/SEC
BH CV ECHO MEAS - AO V2 MEAN: 124.5 CM/SEC
BH CV ECHO MEAS - AO V2 VTI: 40.3 CM
BH CV ECHO MEAS - AVA(I,A): 2.3 CM^2
BH CV ECHO MEAS - AVA(I,D): 2.3 CM^2
BH CV ECHO MEAS - AVA(V,A): 2.3 CM^2
BH CV ECHO MEAS - AVA(V,D): 2.3 CM^2
BH CV ECHO MEAS - BSA(HAYCOCK): 2.3 M^2
BH CV ECHO MEAS - BSA: 2.2 M^2
BH CV ECHO MEAS - BZI_BMI: 31 KILOGRAMS/M^2
BH CV ECHO MEAS - BZI_METRIC_HEIGHT: 180.3 CM
BH CV ECHO MEAS - BZI_METRIC_WEIGHT: 100.7 KG
BH CV ECHO MEAS - EDV(MOD-SP2): 83 ML
BH CV ECHO MEAS - EDV(MOD-SP4): 90 ML
BH CV ECHO MEAS - EDV(TEICH): 176.6 ML
BH CV ECHO MEAS - EF(CUBED): 76.1 %
BH CV ECHO MEAS - EF(MOD-BP): 59 %
BH CV ECHO MEAS - EF(MOD-SP2): 60.2 %
BH CV ECHO MEAS - EF(MOD-SP4): 57.8 %
BH CV ECHO MEAS - EF(TEICH): 67.3 %
BH CV ECHO MEAS - ESV(MOD-SP2): 33 ML
BH CV ECHO MEAS - ESV(MOD-SP4): 38 ML
BH CV ECHO MEAS - ESV(TEICH): 57.8 ML
BH CV ECHO MEAS - FS: 38 %
BH CV ECHO MEAS - IVS/LVPW: 1.1
BH CV ECHO MEAS - IVSD: 1.1 CM
BH CV ECHO MEAS - LAT PEAK E' VEL: 12 CM/SEC
BH CV ECHO MEAS - LV DIASTOLIC VOL/BSA (35-75): 40.8 ML/M^2
BH CV ECHO MEAS - LV MASS(C)D: 271.1 GRAMS
BH CV ECHO MEAS - LV MASS(C)DI: 123 GRAMS/M^2
BH CV ECHO MEAS - LV MAX PG: 6.3 MMHG
BH CV ECHO MEAS - LV MEAN PG: 3.4 MMHG
BH CV ECHO MEAS - LV SYSTOLIC VOL/BSA (12-30): 17.2 ML/M^2
BH CV ECHO MEAS - LV V1 MAX: 125.6 CM/SEC
BH CV ECHO MEAS - LV V1 MEAN: 86.4 CM/SEC
BH CV ECHO MEAS - LV V1 VTI: 29.2 CM
BH CV ECHO MEAS - LVIDD: 6 CM
BH CV ECHO MEAS - LVIDS: 3.7 CM
BH CV ECHO MEAS - LVLD AP2: 7.3 CM
BH CV ECHO MEAS - LVLD AP4: 8 CM
BH CV ECHO MEAS - LVLS AP2: 6 CM
BH CV ECHO MEAS - LVLS AP4: 6.9 CM
BH CV ECHO MEAS - LVOT AREA (M): 3.1 CM^2
BH CV ECHO MEAS - LVOT AREA: 3.1 CM^2
BH CV ECHO MEAS - LVOT DIAM: 2 CM
BH CV ECHO MEAS - LVPWD: 1 CM
BH CV ECHO MEAS - MED PEAK E' VEL: 11 CM/SEC
BH CV ECHO MEAS - MR MAX PG: 82.2 MMHG
BH CV ECHO MEAS - MR MAX VEL: 453.4 CM/SEC
BH CV ECHO MEAS - MV A DUR: 0.13 SEC
BH CV ECHO MEAS - MV A MAX VEL: 122.2 CM/SEC
BH CV ECHO MEAS - MV DEC SLOPE: 300 CM/SEC^2
BH CV ECHO MEAS - MV DEC TIME: 0.25 SEC
BH CV ECHO MEAS - MV E MAX VEL: 79.5 CM/SEC
BH CV ECHO MEAS - MV E/A: 0.65
BH CV ECHO MEAS - MV MAX PG: 5.1 MMHG
BH CV ECHO MEAS - MV MEAN PG: 2.2 MMHG
BH CV ECHO MEAS - MV P1/2T MAX VEL: 79.5 CM/SEC
BH CV ECHO MEAS - MV P1/2T: 77.7 MSEC
BH CV ECHO MEAS - MV V2 MAX: 112.7 CM/SEC
BH CV ECHO MEAS - MV V2 MEAN: 69.1 CM/SEC
BH CV ECHO MEAS - MV V2 VTI: 37 CM
BH CV ECHO MEAS - MVA P1/2T LCG: 2.8 CM^2
BH CV ECHO MEAS - MVA(P1/2T): 2.8 CM^2
BH CV ECHO MEAS - MVA(VTI): 2.5 CM^2
BH CV ECHO MEAS - PA ACC TIME: 0.15 SEC
BH CV ECHO MEAS - PA MAX PG (FULL): 4.1 MMHG
BH CV ECHO MEAS - PA MAX PG: 6 MMHG
BH CV ECHO MEAS - PA PR(ACCEL): 12.5 MMHG
BH CV ECHO MEAS - PA V2 MAX: 122.2 CM/SEC
BH CV ECHO MEAS - PULM A REVS DUR: 0.12 SEC
BH CV ECHO MEAS - PULM A REVS VEL: 40.7 CM/SEC
BH CV ECHO MEAS - PULM DIAS VEL: 46.1 CM/SEC
BH CV ECHO MEAS - PULM S/D: 1.4
BH CV ECHO MEAS - PULM SYS VEL: 64.5 CM/SEC
BH CV ECHO MEAS - PVA(V,A): 2 CM^2
BH CV ECHO MEAS - PVA(V,D): 2 CM^2
BH CV ECHO MEAS - QP/QS: 0.57
BH CV ECHO MEAS - RAP SYSTOLE: 3 MMHG
BH CV ECHO MEAS - RV MAX PG: 1.9 MMHG
BH CV ECHO MEAS - RV MEAN PG: 1.1 MMHG
BH CV ECHO MEAS - RV V1 MAX: 68.4 CM/SEC
BH CV ECHO MEAS - RV V1 MEAN: 49.2 CM/SEC
BH CV ECHO MEAS - RV V1 VTI: 14.6 CM
BH CV ECHO MEAS - RVOT AREA: 3.6 CM^2
BH CV ECHO MEAS - RVOT DIAM: 2.1 CM
BH CV ECHO MEAS - RVSP: 27 MMHG
BH CV ECHO MEAS - SI(CUBED): 72.7 ML/M^2
BH CV ECHO MEAS - SI(LVOT): 41.6 ML/M^2
BH CV ECHO MEAS - SI(MOD-SP2): 22.7 ML/M^2
BH CV ECHO MEAS - SI(MOD-SP4): 23.6 ML/M^2
BH CV ECHO MEAS - SI(TEICH): 53.9 ML/M^2
BH CV ECHO MEAS - SUP REN AO DIAM: 2.1 CM
BH CV ECHO MEAS - SV(CUBED): 160.4 ML
BH CV ECHO MEAS - SV(LVOT): 91.6 ML
BH CV ECHO MEAS - SV(MOD-SP2): 50 ML
BH CV ECHO MEAS - SV(MOD-SP4): 52 ML
BH CV ECHO MEAS - SV(RVOT): 52.6 ML
BH CV ECHO MEAS - SV(TEICH): 118.8 ML
BH CV ECHO MEAS - TAPSE (>1.6): 2.3 CM2
BH CV ECHO MEAS - TR MAX VEL: 264.7 CM/SEC
BH CV ECHO MEASUREMENTS AVERAGE E/E' RATIO: 6.91
BH CV NUCLEAR PRIOR STUDY: 3
BH CV STRESS BP STAGE 1: NORMAL
BH CV STRESS COMMENTS STAGE 1: NORMAL
BH CV STRESS DOSE REGADENOSON STAGE 1: 0.4
BH CV STRESS DURATION MIN STAGE 1: 0
BH CV STRESS DURATION SEC STAGE 1: 10
BH CV STRESS HR STAGE 1: 81
BH CV STRESS PROTOCOL 1: NORMAL
BH CV STRESS RECOVERY BP: NORMAL MMHG
BH CV STRESS RECOVERY HR: 81 BPM
BH CV STRESS STAGE 1: 1
BH CV XLRA - RV BASE: 3 CM
BH CV XLRA - TDI S': 8 CM/SEC
LEFT ATRIUM VOLUME INDEX: 21 ML/M2
LV EF NUC BP: 63 %
MAXIMAL PREDICTED HEART RATE: 136 BPM
MAXIMAL PREDICTED HEART RATE: 136 BPM
PERCENT MAX PREDICTED HR: 59.56 %
SINUS: 3.2 CM
STJ: 3 CM
STRESS BASELINE BP: NORMAL MMHG
STRESS BASELINE HR: 61 BPM
STRESS PERCENT HR: 70 %
STRESS POST EXERCISE DUR SEC: 10 SEC
STRESS POST PEAK BP: NORMAL MMHG
STRESS POST PEAK HR: 81 BPM
STRESS TARGET HR: 116 BPM
STRESS TARGET HR: 116 BPM

## 2019-07-25 PROCEDURE — 93017 CV STRESS TEST TRACING ONLY: CPT

## 2019-07-25 PROCEDURE — 78452 HT MUSCLE IMAGE SPECT MULT: CPT

## 2019-07-25 PROCEDURE — 0 TECHNETIUM TETROFOSMIN KIT: Performed by: NURSE PRACTITIONER

## 2019-07-25 PROCEDURE — 25010000002 REGADENOSON 0.4 MG/5ML SOLUTION: Performed by: NURSE PRACTITIONER

## 2019-07-25 PROCEDURE — A9502 TC99M TETROFOSMIN: HCPCS | Performed by: NURSE PRACTITIONER

## 2019-07-25 PROCEDURE — 93018 CV STRESS TEST I&R ONLY: CPT | Performed by: INTERNAL MEDICINE

## 2019-07-25 PROCEDURE — 93016 CV STRESS TEST SUPVJ ONLY: CPT | Performed by: INTERNAL MEDICINE

## 2019-07-25 PROCEDURE — 93306 TTE W/DOPPLER COMPLETE: CPT | Performed by: INTERNAL MEDICINE

## 2019-07-25 PROCEDURE — 78452 HT MUSCLE IMAGE SPECT MULT: CPT | Performed by: INTERNAL MEDICINE

## 2019-07-25 PROCEDURE — 93306 TTE W/DOPPLER COMPLETE: CPT

## 2019-07-25 RX ADMIN — TETROFOSMIN 1 DOSE: 1.38 INJECTION, POWDER, LYOPHILIZED, FOR SOLUTION INTRAVENOUS at 09:32

## 2019-07-25 RX ADMIN — TETROFOSMIN 1 DOSE: 1.38 INJECTION, POWDER, LYOPHILIZED, FOR SOLUTION INTRAVENOUS at 08:35

## 2019-07-25 RX ADMIN — REGADENOSON 0.4 MG: 0.08 INJECTION, SOLUTION INTRAVENOUS at 09:32

## 2019-07-25 NOTE — TELEPHONE ENCOUNTER
Called and spoke with the patient Gonsales his stress test results.  There is no obvious cardiac cause of his dyspnea on exertion.  He reports he is seeing a pulmonologist many years ago and had a work-up at that time but it has been a while.  I encouraged him to contact his PCP for potential PFTs or chest x-ray as part of a work-up.  Demonstrated understanding.      Dr. Salmeron-Dr. Bernal mention to this patient that he should not be taking aspirin 81 mg any longer.  The patient asked that I run it past you before he were to stop his aspirin.  He has coronary artery disease and in 2002 he had a Promus stent to the LAD.  Just let me know thanks.

## 2019-07-29 NOTE — TELEPHONE ENCOUNTER
Can you please let the patient know to stay on his aspirin due to his CAD and previous stent placement per Dr. Salmeron.

## 2019-08-05 ENCOUNTER — HOSPITAL ENCOUNTER (OUTPATIENT)
Dept: PHYSICAL THERAPY | Facility: HOSPITAL | Age: 84
Setting detail: THERAPIES SERIES
Discharge: HOME | End: 2019-08-05

## 2019-08-05 DIAGNOSIS — Z74.09 IMPAIRED FUNCTIONAL MOBILITY, BALANCE, GAIT, AND ENDURANCE: ICD-10-CM

## 2019-08-05 DIAGNOSIS — M54.5 CHRONIC LOW BACK PAIN, UNSPECIFIED BACK PAIN LATERALITY, WITH SCIATICA PRESENCE UNSPECIFIED: Primary | ICD-10-CM

## 2019-08-05 DIAGNOSIS — R29.898 WEAKNESS OF BOTH LOWER EXTREMITIES: ICD-10-CM

## 2019-08-05 DIAGNOSIS — G89.29 CHRONIC LOW BACK PAIN, UNSPECIFIED BACK PAIN LATERALITY, WITH SCIATICA PRESENCE UNSPECIFIED: Primary | ICD-10-CM

## 2019-08-05 DIAGNOSIS — M48.062 LUMBAR STENOSIS WITH NEUROGENIC CLAUDICATION: ICD-10-CM

## 2019-08-05 PROCEDURE — 97113 AQUATIC THERAPY/EXERCISES: CPT | Performed by: PHYSICAL THERAPIST

## 2019-08-05 NOTE — THERAPY TREATMENT NOTE
Outpatient Physical Therapy Ortho Treatment Note  [facility]     Patient Name: Kevin Ellington  : 1935  MRN: [id number]  Today's Date: 2019      Visit Date: 2019    Visit Dx:    ICD-10-CM ICD-9-CM   1. Chronic low back pain, unspecified back pain laterality, with sciatica presence unspecified M54.5 724.2    G89.29 338.29   2. Lumbar stenosis with neurogenic claudication M48.062 724.03   3. Weakness of both lower extremities R29.898 729.89   4. Impaired functional mobility, balance, gait, and endurance Z74.09 V49.89       Patient Active Problem List   Diagnosis   • Bulging lumbar disc   • DDD (degenerative disc disease), lumbar   • Leg pain   • Spinal stenosis of lumbar region with neurogenic claudication   • Radiculitis, thoracic   • Status post total left knee replacement using cement   • Status post total right knee replacement using cement   • Coronary artery disease involving native coronary artery of native heart without angina pectoris   • Essential hypertension   • Hyperlipemia   • S/P coronary artery stent placement   • Health care maintenance   • Primary narcolepsy without cataplexy   • Benign prostatic hyperplasia with urinary frequency   • Cervical radiculopathy   • Bilateral carpal tunnel syndrome   • Cervical spinal stenosis   • Cancer (CMS/HCC)   • Arthritis of ankle   • Chronic pain of right ankle   • Shortness of breath        Past Medical History:   Diagnosis Date   • Arthritis    • Cervical radiculopathy    • Coronary artery disease 2001    Stent   • CTS (carpal tunnel syndrome) 1998    Surgery both hands in    • GERD (gastroesophageal reflux disease)    • HTN (hypertension)    • Hyperlipidemia    • Low back pain     2 Prior surgeries   • Lumbosacral disc disease    • Melanoma (CMS/HCC) 01/15/2009    DR WILLY SIMMS ( Left forearm)   • Radiculitis, thoracic    • Stented coronary artery    • Thoracic disc disorder     Surgery by Dr. Aldridge (sp)        Past  Surgical History:   Procedure Laterality Date   • ANKLE FUSION Left 2007    reconstruction and fusion   • BACK SURGERY      HERNIATED LUMBAR DISK 1975,2000,2012   • CARPAL TUNNEL RELEASE     • CATARACT EXTRACTION, BILATERAL Bilateral 2002   • CORONARY ANGIOPLASTY WITH STENT PLACEMENT  2001   • EPIDURAL BLOCK  Several at [facility]   • REPLACEMENT TOTAL KNEE Left 2015   • REPLACEMENT TOTAL KNEE Right    • SKIN CANCER EXCISION      MELANOMA   • THORACIC SPINE SURGERY                         PT Assessment/Plan     Row Name 08/05/19 0920          PT Assessment    Assessment Comments  First aquatic treatment completed today. Began with water walks and gentle stretching. No LOB during walking but some instability noted with increased lateral trunk sway. Provided vc’s for smaller kicks during hip exercises in order to avoid forward/lateral trunk lean. Also cued patient for abdominal bracing with all standing exercises ( especially abds). Will assess response to first session and progress as appropriate.   -RICARDO       User Key  (r) = Recorded By, (t) = Taken By, (c) = Cosigned By    Initials Name Provider Type    Rajwinder Nielsen, PT Physical Therapist            Exercises     Row Name 08/05/19 0900             Subjective Comments    Subjective Comments  If I stand or walk for too long my left leg goes numb  -RICARDO         Subjective Pain    Able to rate subjective pain?  yes  -RICARDO      Pre-Treatment Pain Level  3  -KH      Post-Treatment Pain Level  3  -KH         Aquatics    Aquatics performed?  Yes  -RICARDO      14624 - Aquatic Therapy Minutes  45  -KH         Aquatics LE    Water Walk  forward;side x3 each  -KH      Stretch 1  hamstring hip sweep SN- 10x  -KH      Stretch 2  wall walk DKTC 30 sec x 2  -KH      Stretch 3  standing piriformis stretch (5x10 sec)  -      Abdominals  noodle small x 20  -KH      Hip Abd/Add  x15  -KH      Hip Flex/Ext  x15 ext  -KH      March in Place  x20 alternating  in place and walking 2 laps with LN for UE support  -      Mini Squat  x15 with abd brace  -      Bicycle  seated 2 min and suspended with LN in deep end 2 min  -      Exercise 1  leg press with large blue rin (20x)  -      Exercise 2  tuck ups deep end and rail x10  -      Exercise 3  sit to stand from bench x10  -        User Key  (r) = Recorded By, (t) = Taken By, (c) = Cosigned By    Initials Name Provider Type    Rajwinder Nielsen, PT Physical Therapist                       PT OP Goals     Row Name 08/05/19 0900          PT Short Term Goals    STG 1  Patient will tolerate 40-45 minutes of aquatic ex/activity with minimal pain/fatigue and </= 1 rest break  -     STG 1 Progress  Met  -     STG 2  Patient will be educated on and demonstrate knowledge of optimal posture, decompression strategies, good body mechanics, and proper lifting techniques to minimize stresses to spine and associated soft tissues with daily functional activities.  -     STG 2 Progress  Ongoing  -     STG 3  Patient will report >/= 25% decrease in frequency/intensity of LBP/LE symptoms for greater ease with daily function.  -     STG 3 Progress  Ongoing  -     STG 4  Patient will demonstrate increased core muscle strength by performing resistive exercises with good core stability in pool  -     STG 4 Progress  Ongoing  -        Long Term Goals    LTG 1  Patient will be independent with established HEP for strength/stabilization to aid in self management of symptoms/condition  -     LTG 2  Patient will have improved core/LE strength/stabilization for greater ease/tolerance with STS, standing, walking, and stair navigation   -     LTG 3  Patient will have SLS balance >/= 3 sec for improved stability with dynamic mobility.   -     LTG 4  Patient will improve score on Modified Oswestry Outcome Measures from 48% to </= 38% indicating reduced functional disability   -       User Key  (r) = Recorded By, (t) = Taken  By, (c) = Cosigned By    Initials Name Provider Type    Rajwinder Nielsen, PT Physical Therapist                         Time Calculation:   Start Time: 0900  Stop Time: 0945  Time Calculation (min): 45 min  Therapy Charges for Today     Code Description Service Date Service Provider Modifiers Qty    69191994048 HC PT AQUATIC THERAPY EA 15 MIN 8/5/2019 Rajwinder Mitchell, PT GP 3                    Rajwinder Mitchell, PT  8/5/2019

## 2019-08-07 ENCOUNTER — HOSPITAL ENCOUNTER (OUTPATIENT)
Dept: PHYSICAL THERAPY | Facility: HOSPITAL | Age: 84
Setting detail: THERAPIES SERIES
Discharge: HOME | End: 2019-08-07

## 2019-08-07 DIAGNOSIS — M54.5 CHRONIC LOW BACK PAIN, UNSPECIFIED BACK PAIN LATERALITY, WITH SCIATICA PRESENCE UNSPECIFIED: Primary | ICD-10-CM

## 2019-08-07 DIAGNOSIS — M48.062 LUMBAR STENOSIS WITH NEUROGENIC CLAUDICATION: ICD-10-CM

## 2019-08-07 DIAGNOSIS — G89.29 CHRONIC LOW BACK PAIN, UNSPECIFIED BACK PAIN LATERALITY, WITH SCIATICA PRESENCE UNSPECIFIED: Primary | ICD-10-CM

## 2019-08-07 DIAGNOSIS — R29.898 WEAKNESS OF BOTH LOWER EXTREMITIES: ICD-10-CM

## 2019-08-07 DIAGNOSIS — Z74.09 IMPAIRED FUNCTIONAL MOBILITY, BALANCE, GAIT, AND ENDURANCE: ICD-10-CM

## 2019-08-07 PROCEDURE — 97113 AQUATIC THERAPY/EXERCISES: CPT

## 2019-08-07 NOTE — THERAPY TREATMENT NOTE
Outpatient Physical Therapy Ortho Treatment Note  [facility]     Patient Name: Kevin Ellington  : 1935  MRN: [id number]  Today's Date: 2019      Visit Date: 2019    Visit Dx:    ICD-10-CM ICD-9-CM   1. Chronic low back pain, unspecified back pain laterality, with sciatica presence unspecified M54.5 724.2    G89.29 338.29   2. Lumbar stenosis with neurogenic claudication M48.062 724.03   3. Weakness of both lower extremities R29.898 729.89   4. Impaired functional mobility, balance, gait, and endurance Z74.09 V49.89       Patient Active Problem List   Diagnosis   • Bulging lumbar disc   • DDD (degenerative disc disease), lumbar   • Leg pain   • Spinal stenosis of lumbar region with neurogenic claudication   • Radiculitis, thoracic   • Status post total left knee replacement using cement   • Status post total right knee replacement using cement   • Coronary artery disease involving native coronary artery of native heart without angina pectoris   • Essential hypertension   • Hyperlipemia   • S/P coronary artery stent placement   • Health care maintenance   • Primary narcolepsy without cataplexy   • Benign prostatic hyperplasia with urinary frequency   • Cervical radiculopathy   • Bilateral carpal tunnel syndrome   • Cervical spinal stenosis   • Cancer (CMS/HCC)   • Arthritis of ankle   • Chronic pain of right ankle   • Shortness of breath        Past Medical History:   Diagnosis Date   • Arthritis    • Cervical radiculopathy    • Coronary artery disease 2001    Stent   • CTS (carpal tunnel syndrome) 1998    Surgery both hands in    • GERD (gastroesophageal reflux disease)    • HTN (hypertension)    • Hyperlipidemia    • Low back pain     2 Prior surgeries   • Lumbosacral disc disease    • Melanoma (CMS/HCC) 01/15/2009    DR WILLY SIMMS ( Left forearm)   • Radiculitis, thoracic    • Stented coronary artery    • Thoracic disc disorder     Surgery by Dr. Aldridge (sp)        Past  Surgical History:   Procedure Laterality Date   • ANKLE FUSION Left 2007    reconstruction and fusion   • BACK SURGERY      HERNIATED LUMBAR DISK 1975,2000,2012   • CARPAL TUNNEL RELEASE     • CATARACT EXTRACTION, BILATERAL Bilateral 2002   • CORONARY ANGIOPLASTY WITH STENT PLACEMENT  2001   • EPIDURAL BLOCK  Several at [facility]   • REPLACEMENT TOTAL KNEE Left 2015   • REPLACEMENT TOTAL KNEE Right    • SKIN CANCER EXCISION      MELANOMA   • THORACIC SPINE SURGERY                         PT Assessment/Plan     Row Name 08/07/19 1300          PT Assessment    Assessment Comments  Patient reported tolerating first aquatic session well. Added backward walking with no immediate adverse effects. Required moderate cueing throughout to maintain core activation and upright posture. Pt displayed unsteadiness upon entry of the pool thus close supervision/CGA with activities to ensure safety.  -CJ        PT Plan    PT Plan Comments  Guard upon entry/exit of pool. Continue strengthening anjd balance activities.   -CJ (r) AM (t) CJ (c)       User Key  (r) = Recorded By, (t) = Taken By, (c) = Cosigned By    Initials Name Provider Type    CJ Kumar Rose, PT Physical Therapist    Smooth Dotson PTA Student PTA Student            Exercises     Row Name 08/07/19 1000             Subjective Comments    Subjective Comments  I felt a little sore after last time for that night and the next day.   -CJ (r) AM (t) CJ (c)         Subjective Pain    Able to rate subjective pain?  yes  -CJ      Pre-Treatment Pain Level  3  -CJ      Post-Treatment Pain Level  0  -CJ         Aquatics    Aquatics performed?  Yes  -      15076 - Aquatic Therapy Minutes  45  -CJ         Aquatics LE    Water Walk  forward;side;backward 2laps  -CJ      Stretch 1  hamstring hip sweep SN- 10x  -CJ (r) AM (t) CJ (c)      Stretch 2  wall walk DKTC 30 sec x 2  -CJ (r) AM (t) CJ (c)      Stretch 3  standing piriformis stretch (5x10  sec)  -CJ (r) AM (t) CJ (c)      Abdominals  noodle verbal/tactile cue for core and upright posture  -CJ (r) AM (t) CJ (c)      Hip Abd/Add  x15  -CJ (r) AM (t) CJ (c)      Hip Flex/Ext  x15 pause at neutral, verbal cue for glut squeeze  -CJ (r) AM (t) CJ (c)      March in Place  x20 alternating in place and walking 2 laps with LN for UE support verbal cue for not pushing down into noodle  -CJ (r) AM (t) CJ (c)      Mini Squat  x15 with abd brace  -CJ (r) AM (t) CJ (c)      Bicycle  seated 2 min  -CJ (r) AM (t) CJ (c)      Exercise 1  leg press with large blue rin (20x)  -CJ (r) AM (t) CJ (c)      Exercise 2  tuck ups deep end and rail x10  -CJ (r) AM (t) CJ (c)      Exercise 3  sit to stand from bench x10  -CJ (r) AM (t) CJ (c)        User Key  (r) = Recorded By, (t) = Taken By, (c) = Cosigned By    Initials Name Provider Type    Kumar Daniels, PT Physical Therapist    AM Smooth Hidalgo, PTA Student PTA Student                       PT OP Goals     Row Name 08/07/19 1300          PT Short Term Goals    STG 1  Patient will tolerate 40-45 minutes of aquatic ex/activity with minimal pain/fatigue and </= 1 rest break  -CJ (r) AM (t) CJ (c)     STG 1 Progress  Met  -CJ (r) AM (t) CJ (c)     STG 2  Patient will be educated on and demonstrate knowledge of optimal posture, decompression strategies, good body mechanics, and proper lifting techniques to minimize stresses to spine and associated soft tissues with daily functional activities.  -CJ (r) AM (t) CJ (c)     STG 2 Progress  Ongoing  -CJ (r) AM (t) CJ (c)     STG 3  Patient will report >/= 25% decrease in frequency/intensity of LBP/LE symptoms for greater ease with daily function.  -CJ (r) AM (t) CJ (c)     STG 3 Progress  Ongoing  -CJ (r) AM (t) CJ (c)     STG 4  Patient will demonstrate increased core muscle strength by performing resistive exercises with good core stability in pool  -GABRIELLE (jayshree) CRYSTAL (t) GABRIELLE (c)     STG 4 Progress  Ongoing  -GABRIELLE (jayshree) CRYSTAL (t) GABRIELLE (will)         Long Term Goals    LTG 1  Patient will be independent with established HEP for strength/stabilization to aid in self management of symptoms/condition  -CJ (r) AM (t) CJ (c)     LTG 1 Progress  Ongoing  -CJ     LTG 2  Patient will have improved core/LE strength/stabilization for greater ease/tolerance with STS, standing, walking, and stair navigation   -CJ (r) AM (t) CJ (c)     LTG 2 Progress  Ongoing  -CJ     LTG 3  Patient will have SLS balance >/= 3 sec for improved stability with dynamic mobility.   -CJ (r) AM (t) CJ (c)     LTG 3 Progress  Ongoing  -CJ     LTG 4  Patient will improve score on Modified Oswestry Outcome Measures from 48% to </= 38% indicating reduced functional disability   -CJ (r) AM (t) CJ (c)     LTG 4 Progress  Ongoing  -       User Key  (r) = Recorded By, (t) = Taken By, (c) = Cosigned By    Initials Name Provider Type    Kumar Daniels, PT Physical Therapist    Smooth Dotson PTA Student PTA Student                         Time Calculation:   Start Time: 0945  Stop Time: 1030  Time Calculation (min): 45 min  Total Timed Code Minutes- PT: 45 minute(s)                Kumar Rose PT  8/7/2019

## 2019-08-13 ENCOUNTER — HOSPITAL ENCOUNTER (OUTPATIENT)
Dept: PHYSICAL THERAPY | Facility: HOSPITAL | Age: 84
Setting detail: THERAPIES SERIES
Discharge: HOME | End: 2019-08-13

## 2019-08-13 DIAGNOSIS — M54.5 CHRONIC LOW BACK PAIN, UNSPECIFIED BACK PAIN LATERALITY, WITH SCIATICA PRESENCE UNSPECIFIED: Primary | ICD-10-CM

## 2019-08-13 DIAGNOSIS — M48.062 LUMBAR STENOSIS WITH NEUROGENIC CLAUDICATION: ICD-10-CM

## 2019-08-13 DIAGNOSIS — Z74.09 IMPAIRED FUNCTIONAL MOBILITY, BALANCE, GAIT, AND ENDURANCE: ICD-10-CM

## 2019-08-13 DIAGNOSIS — G89.29 CHRONIC LOW BACK PAIN, UNSPECIFIED BACK PAIN LATERALITY, WITH SCIATICA PRESENCE UNSPECIFIED: Primary | ICD-10-CM

## 2019-08-13 DIAGNOSIS — R29.898 WEAKNESS OF BOTH LOWER EXTREMITIES: ICD-10-CM

## 2019-08-13 PROCEDURE — 97113 AQUATIC THERAPY/EXERCISES: CPT | Performed by: PHYSICAL THERAPIST

## 2019-08-13 NOTE — THERAPY TREATMENT NOTE
Outpatient Physical Therapy Ortho Treatment Note  [facility]     Patient Name: Kevin Ellington  : 1935  MRN: [id number]  Today's Date: 2019      Visit Date: 2019    Visit Dx:    ICD-10-CM ICD-9-CM   1. Chronic low back pain, unspecified back pain laterality, with sciatica presence unspecified M54.5 724.2    G89.29 338.29   2. Lumbar stenosis with neurogenic claudication M48.062 724.03   3. Weakness of both lower extremities R29.898 729.89   4. Impaired functional mobility, balance, gait, and endurance Z74.09 V49.89       Patient Active Problem List   Diagnosis   • Bulging lumbar disc   • DDD (degenerative disc disease), lumbar   • Leg pain   • Spinal stenosis of lumbar region with neurogenic claudication   • Radiculitis, thoracic   • Status post total left knee replacement using cement   • Status post total right knee replacement using cement   • Coronary artery disease involving native coronary artery of native heart without angina pectoris   • Essential hypertension   • Hyperlipemia   • S/P coronary artery stent placement   • Health care maintenance   • Primary narcolepsy without cataplexy   • Benign prostatic hyperplasia with urinary frequency   • Cervical radiculopathy   • Bilateral carpal tunnel syndrome   • Cervical spinal stenosis   • Cancer (CMS/HCC)   • Arthritis of ankle   • Chronic pain of right ankle   • Shortness of breath        Past Medical History:   Diagnosis Date   • Arthritis    • Cervical radiculopathy    • Coronary artery disease 2001    Stent   • CTS (carpal tunnel syndrome) 1998    Surgery both hands in    • GERD (gastroesophageal reflux disease)    • HTN (hypertension)    • Hyperlipidemia    • Low back pain     2 Prior surgeries   • Lumbosacral disc disease    • Melanoma (CMS/HCC) 01/15/2009    DR WILLY SIMMS ( Left forearm)   • Radiculitis, thoracic    • Stented coronary artery    • Thoracic disc disorder     Surgery by Dr. Aldridge (sp)        Past  Surgical History:   Procedure Laterality Date   • ANKLE FUSION Left 2007    reconstruction and fusion   • BACK SURGERY      HERNIATED LUMBAR DISK 1975,2000,2012   • CARPAL TUNNEL RELEASE     • CATARACT EXTRACTION, BILATERAL Bilateral 2002   • CORONARY ANGIOPLASTY WITH STENT PLACEMENT  2001   • EPIDURAL BLOCK  Several at [facility]   • REPLACEMENT TOTAL KNEE Left 2015   • REPLACEMENT TOTAL KNEE Right    • SKIN CANCER EXCISION      MELANOMA   • THORACIC SPINE SURGERY                         PT Assessment/Plan     Row Name 08/13/19 1123 08/13/19 1046       PT Assessment    Functional Limitations  --  -RA  --    Assessment Comments  --  Patient notes fatigue and muscle soreness following aquatic sessions.  He demonstrates unsteadiness with functional mobility/gait requiring CG/SBA with all ex/activity and min assist on occasion with march walking.  Added a couple of new ex this visit which challenged his balance but he denied any immediate adverse response.  Frequnt cuing for posture, core activation, and correct form/technique with ther ex  -RA       PT Plan    PT Plan Comments  --  Guard upon entry/exit of pool.  Comtinue skilled therapy working on functional/core strengthening and balance.  -RA      User Key  (r) = Recorded By, (t) = Taken By, (c) = Cosigned By    Initials Name Provider Type    Kalyani Villalobos, PT Physical Therapist            Exercises     Row Name 08/13/19 1100 08/13/19 1000          Subjective Comments    Subjective Comments  --  y balance isn’t very good, my L LE seems to give me more trouble than my R.    -RA        Subjective Pain    Able to rate subjective pain?  --  yes  -RA     Pre-Treatment Pain Level  --  3  -RA     Subjective Pain Comment  --  2-3/0, worse with prolonged WB activity and trying to strighten back up after bending forward to do anything  -RA        Aquatics    Aquatics performed?  --  Yes  -RA     84717 - Aquatic Therapy Minutes  --  47   -RA        Aquatics LE    Water Walk  --  forward;side;backward 2laps  -RA     Stretch 1  --  hamstring hip sweep SN- 10x  -RA     Stretch 2  --  wall walk DKTC 30 sec x 2  -RA     Stretch 3  --  standing piriformis stretch (5x10 sec)  -RA     Abdominals  --  noodle verbal/tactile cue for core and upright posture  -RA     Hip Abd/Add  --  x15  -RA     Hip Flex/Ext  --  x15 pause at neutral, verbal cue for glut squeeze  -RA     March in Place  --  -RA  x20 alternating in place and walking 2 laps with LN for UE support verbal cue for not pushing down into noodle  -RA     Mini Squat  --  x15 with abd brace  -RA     Step Ups  --  small red box x 10 B, 1 UE rail support for balance, cuing to focus on using LE and trying not to pull himself up with arm.  -RA     Bicycle  --  seated 3 min  -RA     Exercise 1  --  leg press with large blue rin (20x)  -RA     Exercise 2  --  tuck ups deep end and rail x15  -RA     Exercise 3  --  sit to stand from bench x10  -RA        Aquatics UE    Stretch 1  --  balance in uneven staggered stance using 4” step (1 foot on floor, other on step) x 30 sec ea way  -RA     Scap Retraction/Row  --  L1 paddle rows (alt) x 10, CGA - difficulty stabilizing/maintaining balance  -RA       User Key  (r) = Recorded By, (t) = Taken By, (c) = Cosigned By    Initials Name Provider Type    RA Kalyani Irene, PT Physical Therapist                           Therapy Education  Given: Posture/body mechanics, Mobility training  Program: Reinforced, Progressed  How Provided: Verbal, Demonstration  Provided to: Patient  Level of Understanding: Teach back education performed, Verbalized              Time Calculation:   Start Time: 1030  Stop Time: 1117  Time Calculation (min): 47 min  Therapy Charges for Today     Code Description Service Date Service Provider Modifiers Qty    58326652190  PT AQUATIC THERAPY EA 15 MIN 8/13/2019 Kalyani Irene, PT GP 3                    Kalyani Irene, PT  8/13/2019

## 2019-08-19 ENCOUNTER — HOSPITAL ENCOUNTER (OUTPATIENT)
Dept: PHYSICAL THERAPY | Facility: HOSPITAL | Age: 84
Setting detail: THERAPIES SERIES
Discharge: HOME | End: 2019-08-19

## 2019-08-19 DIAGNOSIS — R29.898 WEAKNESS OF BOTH LOWER EXTREMITIES: ICD-10-CM

## 2019-08-19 DIAGNOSIS — M48.062 LUMBAR STENOSIS WITH NEUROGENIC CLAUDICATION: ICD-10-CM

## 2019-08-19 DIAGNOSIS — Z74.09 IMPAIRED FUNCTIONAL MOBILITY, BALANCE, GAIT, AND ENDURANCE: ICD-10-CM

## 2019-08-19 DIAGNOSIS — G89.29 CHRONIC LOW BACK PAIN, UNSPECIFIED BACK PAIN LATERALITY, WITH SCIATICA PRESENCE UNSPECIFIED: Primary | ICD-10-CM

## 2019-08-19 DIAGNOSIS — M54.5 CHRONIC LOW BACK PAIN, UNSPECIFIED BACK PAIN LATERALITY, WITH SCIATICA PRESENCE UNSPECIFIED: Primary | ICD-10-CM

## 2019-08-19 PROCEDURE — 97113 AQUATIC THERAPY/EXERCISES: CPT | Performed by: PHYSICAL THERAPIST

## 2019-08-19 NOTE — THERAPY PROGRESS REPORT/RE-CERT
Outpatient Physical Therapy Ortho Re-Assessment  [facility]     Patient Name: Kevin Ellington  : 1935  MRN: [id number]  Today's Date: 2019      Visit Date: 2019    Patient Active Problem List   Diagnosis   • Bulging lumbar disc   • DDD (degenerative disc disease), lumbar   • Leg pain   • Spinal stenosis of lumbar region with neurogenic claudication   • Radiculitis, thoracic   • Status post total left knee replacement using cement   • Status post total right knee replacement using cement   • Coronary artery disease involving native coronary artery of native heart without angina pectoris   • Essential hypertension   • Hyperlipemia   • S/P coronary artery stent placement   • Health care maintenance   • Primary narcolepsy without cataplexy   • Benign prostatic hyperplasia with urinary frequency   • Cervical radiculopathy   • Bilateral carpal tunnel syndrome   • Cervical spinal stenosis   • Cancer (CMS/HCC)   • Arthritis of ankle   • Chronic pain of right ankle   • Shortness of breath        Past Medical History:   Diagnosis Date   • Arthritis    • Cervical radiculopathy    • Coronary artery disease     Stent   • CTS (carpal tunnel syndrome) 1998    Surgery both hands in    • GERD (gastroesophageal reflux disease)    • HTN (hypertension)    • Hyperlipidemia    • Low back pain     2 Prior surgeries   • Lumbosacral disc disease    • Melanoma (CMS/HCC) 01/15/2009    DR WILLY SIMMS ( Left forearm)   • Radiculitis, thoracic    • Stented coronary artery    • Thoracic disc disorder     Surgery by Dr. Aldridge (sp)        Past Surgical History:   Procedure Laterality Date   • ANKLE FUSION Left 2007    reconstruction and fusion   • BACK SURGERY      HERNIATED LUMBAR DISK ,,   • CARPAL TUNNEL RELEASE     • CATARACT EXTRACTION, BILATERAL Bilateral    • CORONARY ANGIOPLASTY WITH STENT PLACEMENT     • EPIDURAL BLOCK  Several at [facility]    • REPLACEMENT TOTAL KNEE Left 2015   • REPLACEMENT TOTAL KNEE Right    • SKIN CANCER EXCISION      MELANOMA   • THORACIC SPINE SURGERY         Visit Dx:     ICD-10-CM ICD-9-CM   1. Chronic low back pain, unspecified back pain laterality, with sciatica presence unspecified M54.5 724.2    G89.29 338.29   2. Lumbar stenosis with neurogenic claudication M48.062 724.03   3. Weakness of both lower extremities R29.898 729.89   4. Impaired functional mobility, balance, gait, and endurance Z74.09 V49.89                                   PT OP Goals     Row Name 08/19/19 1000          PT Short Term Goals    STG 1  Patient will tolerate 40-45 minutes of aquatic ex/activity with minimal pain/fatigue and </= 1 rest break  -     STG 1 Progress  Met  -     STG 2  Patient will be educated on and demonstrate knowledge of optimal posture, decompression strategies, good body mechanics, and proper lifting techniques to minimize stresses to spine and associated soft tissues with daily functional activities.  -     STG 2 Progress  Ongoing  -     STG 3  Patient will report >/= 25% decrease in frequency/intensity of LBP/LE symptoms for greater ease with daily function.  -     STG 3 Progress  Ongoing  -     STG 4  Patient will demonstrate increased core muscle strength by performing resistive exercises with good core stability in pool  -     STG 4 Progress  Ongoing  -        Long Term Goals    LTG 1  Patient will be independent with established HEP for strength/stabilization to aid in self management of symptoms/condition  -     LTG 1 Progress  Ongoing  -     LTG 2  Patient will have improved core/LE strength/stabilization for greater ease/tolerance with STS, standing, walking, and stair navigation   -     LTG 2 Progress  Ongoing  -     LTG 3  Patient will have SLS balance >/= 3 sec for improved stability with dynamic mobility.   -     LTG 3 Progress  Ongoing  -     LTG 4  Patient will improve score on Modified  Oswestry Outcome Measures from 48% to </= 38% indicating reduced functional disability   -     LTG 4 Progress  Ongoing  -       User Key  (r) = Recorded By, (t) = Taken By, (c) = Cosigned By    Initials Name Provider Type    Cj Greco, PT DPT Physical Therapist          PT Assessment/Plan     Row Name 08/19/19 1049          PT Assessment    Functional Limitations  Impaired gait;Limitation in home management;Limitations in community activities;Limitations in functional capacity and performance;Performance in leisure activities  -     Impairments  Gait;Balance;Endurance;Impaired flexibility;Impaired muscle power;Impaired muscle endurance;Muscle strength;Pain;Posture;Range of motion  -     Assessment Comments  pt did well with exercise progression today. he has difficulty maintaining stability during dynamic balance therex. core strength is limited as well as left le which fatigues quickly. able to maintain sls on lle for only 3to 4 sec in pool  -        PT Plan    PT Plan Comments  continue to progress with core strengthening and le strengthening to increase balance and reduce pain  -       User Key  (r) = Recorded By, (t) = Taken By, (c) = Cosigned By    Initials Name Provider Type    Cj Greco, PT DPT Physical Therapist            Exercises     Row Name 08/19/19 1000             Subjective Comments    Subjective Comments  back seems to be feeling better. balance and lle strength are biggest complaints.  -         Subjective Pain    Able to rate subjective pain?  yes  -      Pre-Treatment Pain Level  2  -      Post-Treatment Pain Level  2  -         Aquatics    Aquatics performed?  Yes  -      49812 - Aquatic Therapy Minutes  45  -         Aquatics LE    Water Walk  forward;side;backward 2laps  -      Stretch 1  hamstring hip sweep SN- 10x  -      Stretch 2  wall walk DKTC 30 sec x 2  -LC      Stretch 3  standing piriformis stretch (5x10 sec)  -      Abdominals  noodle  LN 20x  -LC      Hip Abd/Add  x15  -      Hip Flex/Ext  x15 pause at neutral, verbal cue for glut squeeze  -      March in Place  x20 alternating in place and walking 2 laps with LN for UE support verbal cue for not pushing down into noodle  -      Mini Squat  x15 with abd brace  -      Step Ups  small red box x 10 B, 1 UE rail support for balance, cuing to focus on using LE and trying not to pull himself up with arm.  -      Bicycle  at bench 3 min  -      Exercise 1  leg press with large blue rin (20x)  -      Exercise 2  15s tuckups at rail  -      Exercise 3  sit to stand from bench x10  -         Aquatics UE    Stretch 1  balance in uneven staggered stance using 4” step (1 foot on floor, other on step) x 30 sec ea way  -      Scap Retraction/Row  L1 paddle rows (alt) x 10, CGA - difficulty stabilizing/maintaining balance  -        User Key  (r) = Recorded By, (t) = Taken By, (c) = Cosigned By    Initials Name Provider Type    Cj Greco, PT DPT Physical Therapist                        Outcome Measure Options: Christy Larios         Time Calculation:     Start Time: 1030  Stop Time: 1115  Time Calculation (min): 45 min  Total Timed Code Minutes- PT: 45 minute(s)     Therapy Charges for Today     Code Description Service Date Service Provider Modifiers Qty    93102157170 HC PT AQUATIC THERAPY EA 15 MIN 8/19/2019 Cj Al, PT DPT GP 3          PT G-Codes  Outcome Measure Options: SCOOBY PortilloT  8/19/2019

## 2019-08-21 ENCOUNTER — HOSPITAL ENCOUNTER (OUTPATIENT)
Dept: PHYSICAL THERAPY | Facility: HOSPITAL | Age: 84
Setting detail: THERAPIES SERIES
Discharge: HOME | End: 2019-08-21

## 2019-08-21 DIAGNOSIS — G89.29 CHRONIC LOW BACK PAIN, UNSPECIFIED BACK PAIN LATERALITY, WITH SCIATICA PRESENCE UNSPECIFIED: Primary | ICD-10-CM

## 2019-08-21 DIAGNOSIS — M48.062 LUMBAR STENOSIS WITH NEUROGENIC CLAUDICATION: ICD-10-CM

## 2019-08-21 DIAGNOSIS — Z74.09 IMPAIRED FUNCTIONAL MOBILITY, BALANCE, GAIT, AND ENDURANCE: ICD-10-CM

## 2019-08-21 DIAGNOSIS — M54.5 CHRONIC LOW BACK PAIN, UNSPECIFIED BACK PAIN LATERALITY, WITH SCIATICA PRESENCE UNSPECIFIED: Primary | ICD-10-CM

## 2019-08-21 DIAGNOSIS — R29.898 WEAKNESS OF BOTH LOWER EXTREMITIES: ICD-10-CM

## 2019-08-21 PROCEDURE — 97113 AQUATIC THERAPY/EXERCISES: CPT | Performed by: PHYSICAL THERAPIST

## 2019-08-21 NOTE — THERAPY TREATMENT NOTE
Outpatient Physical Therapy Ortho Treatment Note  [facility]     Patient Name: Kevin Ellington  : 1935  MRN: [id number]  Today's Date: 2019      Visit Date: 2019    Visit Dx:    ICD-10-CM ICD-9-CM   1. Chronic low back pain, unspecified back pain laterality, with sciatica presence unspecified M54.5 724.2    G89.29 338.29   2. Lumbar stenosis with neurogenic claudication M48.062 724.03   3. Weakness of both lower extremities R29.898 729.89   4. Impaired functional mobility, balance, gait, and endurance Z74.09 V49.89       Patient Active Problem List   Diagnosis   • Bulging lumbar disc   • DDD (degenerative disc disease), lumbar   • Leg pain   • Spinal stenosis of lumbar region with neurogenic claudication   • Radiculitis, thoracic   • Status post total left knee replacement using cement   • Status post total right knee replacement using cement   • Coronary artery disease involving native coronary artery of native heart without angina pectoris   • Essential hypertension   • Hyperlipemia   • S/P coronary artery stent placement   • Health care maintenance   • Primary narcolepsy without cataplexy   • Benign prostatic hyperplasia with urinary frequency   • Cervical radiculopathy   • Bilateral carpal tunnel syndrome   • Cervical spinal stenosis   • Cancer (CMS/HCC)   • Arthritis of ankle   • Chronic pain of right ankle   • Shortness of breath        Past Medical History:   Diagnosis Date   • Arthritis    • Cervical radiculopathy    • Coronary artery disease 2001    Stent   • CTS (carpal tunnel syndrome) 1998    Surgery both hands in    • GERD (gastroesophageal reflux disease)    • HTN (hypertension)    • Hyperlipidemia    • Low back pain     2 Prior surgeries   • Lumbosacral disc disease    • Melanoma (CMS/HCC) 01/15/2009    DR WILLY SIMMS ( Left forearm)   • Radiculitis, thoracic    • Stented coronary artery    • Thoracic disc disorder     Surgery by Dr. Aldridge (sp)        Past  Surgical History:   Procedure Laterality Date   • ANKLE FUSION Left 2007    reconstruction and fusion   • BACK SURGERY      HERNIATED LUMBAR DISK 1975,2000,2012   • CARPAL TUNNEL RELEASE     • CATARACT EXTRACTION, BILATERAL Bilateral 2002   • CORONARY ANGIOPLASTY WITH STENT PLACEMENT  2001   • EPIDURAL BLOCK  Several at [facility]   • REPLACEMENT TOTAL KNEE Left 2015   • REPLACEMENT TOTAL KNEE Right    • SKIN CANCER EXCISION      MELANOMA   • THORACIC SPINE SURGERY                         PT Assessment/Plan     Row Name 08/21/19 0931          PT Assessment    Functional Limitations  Impaired gait;Limitation in home management;Limitations in community activities;Limitations in functional capacity and performance;Performance in leisure activities  -     Impairments  Gait;Balance;Endurance;Impaired flexibility;Impaired muscle power;Impaired muscle endurance;Muscle strength;Pain;Posture;Range of motion  -     Assessment Comments  again progressed to standing sls activities involving hip strength to develop balance with standing and walking activities. required sba during standing therex away from wall using only LN for support. left side is much weaker than right. pt has difficulty stabilizing core and hips to initiate movements.  -        PT Plan    PT Plan Comments  continue skilled intervention to develop hip strength and balance with functional movements.  -       User Key  (r) = Recorded By, (t) = Taken By, (c) = Cosigned By    Initials Name Provider Type     Cj Al, PT DPT Physical Therapist            Exercises     Row Name 08/21/19 0900             Subjective Comments    Subjective Comments  no pain increase following last session. general muscle soreness but pt felt good with decreased pain over past 2 days and improved feeling of mobility. he felt thatsit to stand was not as difficult yesterday during adls at home.  -         Subjective Pain    Able to rate  subjective pain?  yes  -      Pre-Treatment Pain Level  2  -         Aquatics    Aquatics performed?  Yes  -      90189 - Aquatic Therapy Minutes  45  -LC         Aquatics LE    Water Walk  forward;side;backward 2laps  -LC      Stretch 1  15x SN sweep  -LC      Stretch 2  10x 10s DKTC at wall  -LC      Stretch 3  --  -LC      Vertical Traction  2 min  -      Abdominals  noodle LN 20x  -LC      Clams  15x LN suspend  -LC      Hip Abd/Add  x10 away from wall LN for support, sbs  -LC      Hip Flex/Ext  x10 away from wall LN, sba  -LC      March in Place  20x LN away from wall  -      Mini Squat  3 x 5 away from wall  -      Step Ups  10x 4” box forward, decrease use of UE assist  -      Bicycle  2 min LN  -LC      Exercise 1  20x aquaring press  -      Exercise 2  20x tuckups LN support  -      Exercise 3  2 laps tandem walk LN support  -         Aquatics UE    Stretch 1  --  -LC      Scap Retraction/Row  --  -LC        User Key  (r) = Recorded By, (t) = Taken By, (c) = Cosigned By    Initials Name Provider Type    Cj Greco, PT DPT Physical Therapist                       PT OP Goals     Row Name 08/21/19 0900          PT Short Term Goals    STG 1  Patient will tolerate 40-45 minutes of aquatic ex/activity with minimal pain/fatigue and </= 1 rest break  -     STG 1 Progress  Met  -     STG 2  Patient will be educated on and demonstrate knowledge of optimal posture, decompression strategies, good body mechanics, and proper lifting techniques to minimize stresses to spine and associated soft tissues with daily functional activities.  -     STG 2 Progress  Met  -     STG 3  Patient will report >/= 25% decrease in frequency/intensity of LBP/LE symptoms for greater ease with daily function.  -     STG 3 Progress  Partially Met  -     STG 3 Progress Comments  pain only 2/10 over past several days  -     STG 4  Patient will demonstrate increased core muscle strength by performing  resistive exercises with good core stability in pool  -     STG 4 Progress  Ongoing  -        Long Term Goals    LTG 1  Patient will be independent with established HEP for strength/stabilization to aid in self management of symptoms/condition  -     LTG 1 Progress  Ongoing  -     LTG 2  Patient will have improved core/LE strength/stabilization for greater ease/tolerance with STS, standing, walking, and stair navigation   -     LTG 2 Progress  Ongoing  -     LTG 3  Patient will have SLS balance >/= 3 sec for improved stability with dynamic mobility.   -     LTG 3 Progress  Ongoing  -     LTG 4  Patient will improve score on Modified Oswestry Outcome Measures from 48% to </= 38% indicating reduced functional disability   -     LTG 4 Progress  Ongoing  -     LTG 4 Progress Comments  oswestry 50%  -       User Key  (r) = Recorded By, (t) = Taken By, (c) = Cosigned By    Initials Name Provider Type     Cj Al, PT DPT Physical Therapist          Therapy Education  Given: Posture/body mechanics, Mobility training  Program: Reinforced, Progressed  Provided to: Patient  Level of Understanding: Teach back education performed, Verbalized    Outcome Measure Options: Modifed Owestry  Modified Oswestry  Modified Oswestry Score/Comments: 50%      Time Calculation:   Start Time: 0900  Stop Time: 0945  Time Calculation (min): 45 min  Total Timed Code Minutes- PT: 45 minute(s)  Therapy Charges for Today     Code Description Service Date Service Provider Modifiers Qty    61742798225 HC PT AQUATIC THERAPY EA 15 MIN 8/21/2019 Cj Al, PT DPT  3          PT G-Codes  Outcome Measure Options: Modifed Owestry  Modified Oswestry Score/Comments: 50%         Cj Al PT DPT  8/21/2019

## 2019-08-26 ENCOUNTER — HOSPITAL ENCOUNTER (OUTPATIENT)
Dept: PHYSICAL THERAPY | Facility: HOSPITAL | Age: 84
Setting detail: THERAPIES SERIES
Discharge: HOME | End: 2019-08-26

## 2019-08-26 DIAGNOSIS — R29.898 WEAKNESS OF BOTH LOWER EXTREMITIES: ICD-10-CM

## 2019-08-26 DIAGNOSIS — G89.29 CHRONIC LOW BACK PAIN, UNSPECIFIED BACK PAIN LATERALITY, WITH SCIATICA PRESENCE UNSPECIFIED: Primary | ICD-10-CM

## 2019-08-26 DIAGNOSIS — Z74.09 IMPAIRED FUNCTIONAL MOBILITY, BALANCE, GAIT, AND ENDURANCE: ICD-10-CM

## 2019-08-26 DIAGNOSIS — M48.062 LUMBAR STENOSIS WITH NEUROGENIC CLAUDICATION: ICD-10-CM

## 2019-08-26 DIAGNOSIS — M54.5 CHRONIC LOW BACK PAIN, UNSPECIFIED BACK PAIN LATERALITY, WITH SCIATICA PRESENCE UNSPECIFIED: Primary | ICD-10-CM

## 2019-08-26 PROCEDURE — 97113 AQUATIC THERAPY/EXERCISES: CPT | Performed by: PHYSICAL THERAPIST

## 2019-08-26 NOTE — THERAPY TREATMENT NOTE
Outpatient Physical Therapy Ortho Treatment Note  [facility]     Patient Name: Kevin Ellington  : 1935  MRN: [id number]  Today's Date: 2019      Visit Date: 2019    Visit Dx:    ICD-10-CM ICD-9-CM   1. Chronic low back pain, unspecified back pain laterality, with sciatica presence unspecified M54.5 724.2    G89.29 338.29   2. Lumbar stenosis with neurogenic claudication M48.062 724.03   3. Weakness of both lower extremities R29.898 729.89   4. Impaired functional mobility, balance, gait, and endurance Z74.09 V49.89       Patient Active Problem List   Diagnosis   • Bulging lumbar disc   • DDD (degenerative disc disease), lumbar   • Leg pain   • Spinal stenosis of lumbar region with neurogenic claudication   • Radiculitis, thoracic   • Status post total left knee replacement using cement   • Status post total right knee replacement using cement   • Coronary artery disease involving native coronary artery of native heart without angina pectoris   • Essential hypertension   • Hyperlipemia   • S/P coronary artery stent placement   • Health care maintenance   • Primary narcolepsy without cataplexy   • Benign prostatic hyperplasia with urinary frequency   • Cervical radiculopathy   • Bilateral carpal tunnel syndrome   • Cervical spinal stenosis   • Cancer (CMS/HCC)   • Arthritis of ankle   • Chronic pain of right ankle   • Shortness of breath        Past Medical History:   Diagnosis Date   • Arthritis    • Cervical radiculopathy    • Coronary artery disease 2001    Stent   • CTS (carpal tunnel syndrome) 1998    Surgery both hands in    • GERD (gastroesophageal reflux disease)    • HTN (hypertension)    • Hyperlipidemia    • Low back pain     2 Prior surgeries   • Lumbosacral disc disease    • Melanoma (CMS/HCC) 01/15/2009    DR WILLY SIMMS ( Left forearm)   • Radiculitis, thoracic    • Stented coronary artery    • Thoracic disc disorder     Surgery by Dr. Aldridge (sp)        Past  Surgical History:   Procedure Laterality Date   • ANKLE FUSION Left 2007    reconstruction and fusion   • BACK SURGERY      HERNIATED LUMBAR DISK 1975,2000,2012   • CARPAL TUNNEL RELEASE     • CATARACT EXTRACTION, BILATERAL Bilateral 2002   • CORONARY ANGIOPLASTY WITH STENT PLACEMENT  2001   • EPIDURAL BLOCK  Several at [facility]   • REPLACEMENT TOTAL KNEE Left 2015   • REPLACEMENT TOTAL KNEE Right    • SKIN CANCER EXCISION      MELANOMA   • THORACIC SPINE SURGERY                         PT Assessment/Plan     Row Name 08/26/19 1009          PT Assessment    Functional Limitations  Impaired gait;Limitation in home management;Limitations in community activities;Limitations in functional capacity and performance;Performance in leisure activities  -     Impairments  Gait;Balance;Endurance;Impaired flexibility;Impaired muscle power;Impaired muscle endurance;Muscle strength;Pain;Posture;Range of motion  -     Assessment Comments  progressed to quick turn with walking 10ft in pool. pt required LN assist but was able to regain base of support and midline without therapist assist. pt performed increased reps of standing dynamic balance therex with LN assist but no LOB today. he reported that he felt more stab;e with these exercises. added paddle resist therex with neutral apine and core activation to continue to promote improved balance.  -        PT Plan    PT Plan Comments  continue to progress dynamic balnce therex to reduce risk of falls and increase functional activity tolerance  -       User Key  (r) = Recorded By, (t) = Taken By, (c) = Cosigned By    Initials Name Provider Type    Cj Greco, PT DPT Physical Therapist            Exercises     Row Name 08/26/19 0900             Subjective Comments    Subjective Comments  low back is uncomfortable today, but balance deficits remain patients biggest reported problem. he reports hes had a few LOBs but no full fall.  -          Subjective Pain    Able to rate subjective pain?  yes  -LC      Pre-Treatment Pain Level  4  -LC         Aquatics    Aquatics performed?  Yes  -      99659 - Aquatic Therapy Minutes  45  -LC         Aquatics LE    Water Walk  forward;side;backward independent 2 laps  -LC      Stretch 1  15x SN swedp  -LC      Stretch 2  10x 10s DKTC at wall  -LC      Vertical Traction  2 min  -LC      Abdominals  noodle LN 15x  -LC      Clams  20x LN suspend  -LC      Hip Abd/Add  12 x LN for support SBA  -LC      Hip Flex/Ext  12 x LN SBA  -LC      March in Place  2 x 20 LN SBA  -LC      Mini Squat  3 x 7 SBA   -LC      Step Ups  12x 4” box forward, decrease use of UE assist  -LC      Bicycle  3 min LN  -LC      Flutter/Scissor  2 min LN scissor  -LC      Exercise 1  20x aquaring press  -LC      Exercise 2  10x cw/ccw L1 paddle stirs  -LC      Exercise 3  5 x 10ft walk quick pivot at each end LN support  -LC         Aquatics UE    Abdominals  10x each paddle punch L1  -LC        User Key  (r) = Recorded By, (t) = Taken By, (c) = Cosigned By    Initials Name Provider Type    Cj Greco, PT DPT Physical Therapist                                          Time Calculation:   Start Time: 0945  Stop Time: 1030  Time Calculation (min): 45 min  Total Timed Code Minutes- PT: 45 minute(s)  Therapy Charges for Today     Code Description Service Date Service Provider Modifiers Qty    40858631730 HC PT AQUATIC THERAPY EA 15 MIN 8/26/2019 Cj Al, PT DPT GP 3                    Cj Al PT DPT  8/26/2019

## 2019-08-28 ENCOUNTER — HOSPITAL ENCOUNTER (OUTPATIENT)
Dept: PHYSICAL THERAPY | Facility: HOSPITAL | Age: 84
Setting detail: THERAPIES SERIES
Discharge: HOME | End: 2019-08-28

## 2019-08-28 DIAGNOSIS — M48.062 LUMBAR STENOSIS WITH NEUROGENIC CLAUDICATION: ICD-10-CM

## 2019-08-28 DIAGNOSIS — G89.29 CHRONIC LOW BACK PAIN, UNSPECIFIED BACK PAIN LATERALITY, WITH SCIATICA PRESENCE UNSPECIFIED: Primary | ICD-10-CM

## 2019-08-28 DIAGNOSIS — R29.898 WEAKNESS OF BOTH LOWER EXTREMITIES: ICD-10-CM

## 2019-08-28 DIAGNOSIS — Z74.09 IMPAIRED FUNCTIONAL MOBILITY, BALANCE, GAIT, AND ENDURANCE: ICD-10-CM

## 2019-08-28 DIAGNOSIS — M54.5 CHRONIC LOW BACK PAIN, UNSPECIFIED BACK PAIN LATERALITY, WITH SCIATICA PRESENCE UNSPECIFIED: Primary | ICD-10-CM

## 2019-08-28 PROCEDURE — 97113 AQUATIC THERAPY/EXERCISES: CPT | Performed by: PHYSICAL THERAPIST

## 2019-08-28 NOTE — THERAPY TREATMENT NOTE
Outpatient Physical Therapy Ortho Treatment Note  [facility]     Patient Name: Kevin Ellington  : 1935  MRN: [id number]  Today's Date: 2019      Visit Date: 2019    Visit Dx:    ICD-10-CM ICD-9-CM   1. Chronic low back pain, unspecified back pain laterality, with sciatica presence unspecified M54.5 724.2    G89.29 338.29   2. Lumbar stenosis with neurogenic claudication M48.062 724.03   3. Weakness of both lower extremities R29.898 729.89   4. Impaired functional mobility, balance, gait, and endurance Z74.09 V49.89       Patient Active Problem List   Diagnosis   • Bulging lumbar disc   • DDD (degenerative disc disease), lumbar   • Leg pain   • Spinal stenosis of lumbar region with neurogenic claudication   • Radiculitis, thoracic   • Status post total left knee replacement using cement   • Status post total right knee replacement using cement   • Coronary artery disease involving native coronary artery of native heart without angina pectoris   • Essential hypertension   • Hyperlipemia   • S/P coronary artery stent placement   • Health care maintenance   • Primary narcolepsy without cataplexy   • Benign prostatic hyperplasia with urinary frequency   • Cervical radiculopathy   • Bilateral carpal tunnel syndrome   • Cervical spinal stenosis   • Cancer (CMS/HCC)   • Arthritis of ankle   • Chronic pain of right ankle   • Shortness of breath        Past Medical History:   Diagnosis Date   • Arthritis    • Cervical radiculopathy    • Coronary artery disease 2001    Stent   • CTS (carpal tunnel syndrome) 1998    Surgery both hands in    • GERD (gastroesophageal reflux disease)    • HTN (hypertension)    • Hyperlipidemia    • Low back pain     2 Prior surgeries   • Lumbosacral disc disease    • Melanoma (CMS/HCC) 01/15/2009    DR WILLY SIMMS ( Left forearm)   • Radiculitis, thoracic    • Stented coronary artery    • Thoracic disc disorder     Surgery by Dr. Aldridge (sp)        Past  Surgical History:   Procedure Laterality Date   • ANKLE FUSION Left 2007    reconstruction and fusion   • BACK SURGERY      HERNIATED LUMBAR DISK 1975,2000,2012   • CARPAL TUNNEL RELEASE     • CATARACT EXTRACTION, BILATERAL Bilateral 2002   • CORONARY ANGIOPLASTY WITH STENT PLACEMENT  2001   • EPIDURAL BLOCK  Several at [facility]   • REPLACEMENT TOTAL KNEE Left 2015   • REPLACEMENT TOTAL KNEE Right    • SKIN CANCER EXCISION      MELANOMA   • THORACIC SPINE SURGERY                         PT Assessment/Plan     Row Name 08/28/19 0920          PT Assessment    Functional Limitations  Impaired gait;Limitation in home management;Limitations in community activities;Limitations in functional capacity and performance;Performance in leisure activities  -     Impairments  Gait;Balance;Endurance;Impaired flexibility;Impaired muscle power;Impaired muscle endurance;Muscle strength;Pain;Posture;Range of motion  -     Assessment Comments  progressed static standing balance/core stabilization with increased resistance and pt able to complete with no loss of balance. pt able to walk independently without assist in pool with no loss of balance. he subjectively reports some exercises are noticeably easier for him. he reports balance remains biggest deficit at this time. continued progression to quicker movements to allow response to unexpected challenges during ambulation. excellent progress today with standing balance, all exercises completed with LN support and only supervision. 3/4 squat with LN and no loss of balance.  -        PT Plan    PT Plan Comments  assess hep performance at next visit, initiate woodchopper movements in pool  -       User Key  (r) = Recorded By, (t) = Taken By, (c) = Cosigned By    Initials Name Provider Type    Cj Greco, PT DPT Physical Therapist            Exercises     Row Name 08/28/19 0900             Subjective Comments    Subjective Comments  some  increased low back pain yesterday but this resolved and feels good today. indicated he understood HEP for land he was issued this morning and is comfortable with therex.  -LC         Subjective Pain    Able to rate subjective pain?  yes  -LC      Pre-Treatment Pain Level  2  -LC      Post-Treatment Pain Level  1  -LC         Aquatics    Aquatics performed?  Yes  -LC      01556 - Aquatic Therapy Minutes  55  -LC         Aquatics LE    Water Walk  forward;side;backward 3 laps each  -LC      Stretch 1  15x LN HS sweep  -LC      Stretch 2  10x 10s DKTC at wall  -LC      Stretch 3  3x20s gastroc/HS step back stretch  -LC      Stretch Other 1  15x multicolor DB shoulder abd core stab   -LC      Abdominals  Other (comment) multicolor db 15x  -LC      Clams  20x LN suspend  -LC      Hip Abd/Add  15x LN for support SBA  -LC      Hip Flex/Ext  15x  LN SBA  -LC      March in Place  2 x 20 LN SBA  -LC      Mini Squat  2x8 LN supervision no loss of balance  -LC      Step Ups  20x alt leg toe touch HHA  -LC      Bicycle  3 min LN  -LC      Flutter/Scissor  2 min LN scissor  -LC      Exercise 1  20x aquaring press  -LC      Exercise 2  10x cw/ccw L3 paddle stirs  -LC      Exercise 3  5 x 10ft walk quick pivot at each end LN support  -LC         Aquatics UE    Abdominals  15x L3 paddle punch  -LC        User Key  (r) = Recorded By, (t) = Taken By, (c) = Cosigned By    Initials Name Provider Type    Cj Greco, PT DPT Physical Therapist                           Therapy Education  Education Details: issued written land HEP  Given: Posture/body mechanics, Mobility training  Program: Reinforced, Progressed  How Provided: Verbal, Demonstration, Written  Provided to: Patient  Level of Understanding: Teach back education performed, Verbalized              Time Calculation:   Start Time: 0850  Stop Time: 0945  Time Calculation (min): 55 min  Total Timed Code Minutes- PT: 55 minute(s)  Therapy Charges for Today     Code Description  Service Date Service Provider Modifiers Qty    59489252216 HC PT AQUATIC THERAPY EA 15 MIN 8/28/2019 Cj Al, PT DPT GP 4                    Cj Al PT DPT  8/28/2019

## 2019-09-12 ENCOUNTER — OFFICE VISIT (OUTPATIENT)
Dept: PHYSICAL THERAPY | Facility: CLINIC | Age: 84
End: 2019-09-12

## 2019-09-12 DIAGNOSIS — R29.898 WEAKNESS OF BOTH LOWER EXTREMITIES: ICD-10-CM

## 2019-09-12 DIAGNOSIS — Z74.09 IMPAIRED FUNCTIONAL MOBILITY, BALANCE, GAIT, AND ENDURANCE: ICD-10-CM

## 2019-09-12 DIAGNOSIS — M48.062 LUMBAR STENOSIS WITH NEUROGENIC CLAUDICATION: ICD-10-CM

## 2019-09-12 DIAGNOSIS — G89.29 CHRONIC LOW BACK PAIN, UNSPECIFIED BACK PAIN LATERALITY, WITH SCIATICA PRESENCE UNSPECIFIED: Primary | ICD-10-CM

## 2019-09-12 DIAGNOSIS — M54.5 CHRONIC LOW BACK PAIN, UNSPECIFIED BACK PAIN LATERALITY, WITH SCIATICA PRESENCE UNSPECIFIED: Primary | ICD-10-CM

## 2019-09-12 PROCEDURE — 97113 AQUATIC THERAPY/EXERCISES: CPT | Performed by: PHYSICAL THERAPIST

## 2019-09-12 NOTE — PROGRESS NOTES
Physical Therapy Daily Progress Note    Patient: Kevin Ellington   : 1935  Diagnosis/ICD-10 Code:  Chronic low back pain, unspecified back pain laterality, with sciatica presence unspecified [M54.5, G89.29]  Referring practitioner: Tyrese Chaidez MD  Date of Initial Visit: Type: THERAPY  Noted: 2019  Today's Date: 2019  Patient seen for 9 sessions             Subjective Evaluation    History of Present Illness  Mechanism of injury: Pt feels that balance has somewhat improved since last therapy session. He continues to have increased difficulty with mobility upon initially waking, but this improves as the day goes on. He feels that he is progressing towards his goals and is safer during ambulation.           Objective   See Exercise, Manual, and Modality Logs for complete treatment.       Assessment & Plan     Assessment  Assessment details: Advanced to 8” stepup fwd and side today with R stronger than left but pt able to complete with fingertips on rail. Pt performed tuckups suspended on LN with good core contraction visible. He was able to perform SLS hip AROM with no assist and no LOB using LN for support. Therapist is adding more challenging functional movements to improve overall mobility and reduce risk of falls during adls.    Continue to provide skilled aquatic intervention to improve balance and functional activity tolerance.        Progress per Plan of Care and Progress strengthening /stabilization /functional activity           Timed:  Aquatic Therapy    45     mins [id number];    Cj Al, PT DPT  Physical Therapist

## 2019-09-17 ENCOUNTER — TREATMENT (OUTPATIENT)
Dept: PHYSICAL THERAPY | Facility: CLINIC | Age: 84
End: 2019-09-17

## 2019-09-17 DIAGNOSIS — M48.062 LUMBAR STENOSIS WITH NEUROGENIC CLAUDICATION: ICD-10-CM

## 2019-09-17 DIAGNOSIS — M54.5 CHRONIC LOW BACK PAIN, UNSPECIFIED BACK PAIN LATERALITY, WITH SCIATICA PRESENCE UNSPECIFIED: Primary | ICD-10-CM

## 2019-09-17 DIAGNOSIS — G89.29 CHRONIC LOW BACK PAIN, UNSPECIFIED BACK PAIN LATERALITY, WITH SCIATICA PRESENCE UNSPECIFIED: Primary | ICD-10-CM

## 2019-09-17 DIAGNOSIS — R29.898 WEAKNESS OF BOTH LOWER EXTREMITIES: ICD-10-CM

## 2019-09-17 PROCEDURE — 97113 AQUATIC THERAPY/EXERCISES: CPT | Performed by: PHYSICAL THERAPIST

## 2019-09-17 NOTE — PROGRESS NOTES
Physical Therapy Daily Progress Note    Patient: Kevin Ellington   : 1935  Diagnosis/ICD-10 Code:  No primary diagnosis found.  Referring practitioner: Tyrese Chaidez MD  Date of Initial Visit: No linked episodes  Today's Date: 2019  Patient seen for Visit count could not be calculated. Make sure you are using a visit which is associated with an episode. sessions             Subjective Evaluation    History of Present Illness  Mechanism of injury: Pt reports he feels a little more unstable than usual. No complaints of back pain.           Objective   See Exercise, Manual, and Modality Logs for complete treatment.       Assessment & Plan     Assessment  Assessment details: Pt requiring CgA with all standing exercises away from rail today. He reports he was very active over past few days and feels fatigued. Therapist will continue to challenge with unsupported standing balance therex to increase stability and reduce risk of falls.  Oswestry completed with score of 45%. No significant change per pt on this document although he rarely states that he has back pain.    Continue to progress with skilled aquatic therapy.    Goals  Plan Goals: PT Short Term Goals     STG 1  Patient will tolerate 40-45 minutes of aquatic ex/activity with minimal pain/fatigue and </= 1 rest break  LC      STG 1 Progress  Met  LC      STG 2  Patient will be educated on and demonstrate knowledge of optimal posture, decompression strategies, good body mechanics, and proper lifting techniques to minimize stresses to spine and associated soft tissues with daily functional activities.  LC      STG 2 Progress  Met  LC      STG 3  Patient will report >/= 25% decrease in frequency/intensity of LBP/LE symptoms for greater ease with daily function.  LC      STG 3 Progress  Partially Met  LC      STG 3 Progress Comments  pain only 2/10 over past several days  LC      STG 4  Patient will demonstrate increased core muscle strength by  performing resistive exercises with good core stability in pool  LC      STG 4 Progress  Ongoing  LC                Long Term Goals    LTG 1  Patient will be independent with established HEP for strength/stabilization to aid in self management of symptoms/condition        LTG 1 Progress  Ongoing  LC      LTG 2  Patient will have improved core/LE strength/stabilization for greater ease/tolerance with STS, standing, walking, and stair navigation         LTG 2 Progress  Ongoing        LTG 3  Patient will have SLS balance >/= 3 sec for improved stability with dynamic mobility.         LTG 3 Progress  Ongoing        LTG 4  Patient will improve score on Modified Oswestry Outcome Measures from 48% to </= 38% indicating reduced functional disability         LTG 4 Progress  Ongoing        LTG 4 Progress Comments  oswestry 50%  LC              Progress per Plan of Care and Progress strengthening /stabilization /functional activity           Timed:  Aquatic Therapy    38     mins [id number];    Cj Al, PT DPT  Physical Therapist

## 2019-09-19 ENCOUNTER — TREATMENT (OUTPATIENT)
Dept: PHYSICAL THERAPY | Facility: CLINIC | Age: 84
End: 2019-09-19

## 2019-09-19 DIAGNOSIS — G89.29 CHRONIC LOW BACK PAIN, UNSPECIFIED BACK PAIN LATERALITY, WITH SCIATICA PRESENCE UNSPECIFIED: Primary | ICD-10-CM

## 2019-09-19 DIAGNOSIS — M54.5 CHRONIC LOW BACK PAIN, UNSPECIFIED BACK PAIN LATERALITY, WITH SCIATICA PRESENCE UNSPECIFIED: Primary | ICD-10-CM

## 2019-09-19 DIAGNOSIS — M48.062 LUMBAR STENOSIS WITH NEUROGENIC CLAUDICATION: ICD-10-CM

## 2019-09-19 DIAGNOSIS — Z74.09 IMPAIRED FUNCTIONAL MOBILITY, BALANCE, GAIT, AND ENDURANCE: ICD-10-CM

## 2019-09-19 DIAGNOSIS — R29.898 WEAKNESS OF BOTH LOWER EXTREMITIES: ICD-10-CM

## 2019-09-19 PROCEDURE — 97113 AQUATIC THERAPY/EXERCISES: CPT | Performed by: PHYSICAL THERAPIST

## 2019-09-19 NOTE — PROGRESS NOTES
Physical Therapy Daily Progress Note    Patient: Kevin Ellington   : 1935  Diagnosis/ICD-10 Code:  Chronic low back pain, unspecified back pain laterality, with sciatica presence unspecified [M54.5, G89.29]  Referring practitioner: Tyrese Chaidez MD  Date of Initial Visit: Type: THERAPY  Noted: 2019  Today's Date: 2019  Patient seen for 11 sessions             Subjective Evaluation    History of Present Illness    Subjective comment: Arthritis kicked up a notch after outdoor wedding this weekend (walking on uneven ground? Pain mostly in back and ankle with mobility/gaitPain  Current pain ratin           Objective   See Exercise, Manual, and Modality Logs for complete treatment.       Assessment & Plan     Assessment  Assessment details: Patient denies any issue with aquatic exercises but notes his arthritis pain is up a notch for last few days (after outdoor wedding last weekend).  Continued with previous aquatic ex working to reduce UE balance support which remains challenging for patient.  Added work on STS transition from bench without UE support - he was challenged to come to full stand and maintain his balance without leaning back of legs against bench.      Plan:  Continue working on core/LE strengthening, functional mobility, and gait advancing challenge as able.        Progress per Plan of Care and Progress strengthening /stabilization /functional activity           Timed:  Aquatic Therapy    45    mins [id number];    Kalyani Irene, PT DPT  Physical Therapist

## 2019-09-26 ENCOUNTER — OFFICE VISIT (OUTPATIENT)
Dept: FAMILY MEDICINE CLINIC | Facility: CLINIC | Age: 84
End: 2019-09-26

## 2019-09-26 ENCOUNTER — TREATMENT (OUTPATIENT)
Dept: PHYSICAL THERAPY | Facility: CLINIC | Age: 84
End: 2019-09-26

## 2019-09-26 VITALS
SYSTOLIC BLOOD PRESSURE: 138 MMHG | DIASTOLIC BLOOD PRESSURE: 82 MMHG | WEIGHT: 225 LBS | HEIGHT: 71 IN | BODY MASS INDEX: 31.5 KG/M2 | HEART RATE: 66 BPM | TEMPERATURE: 98.1 F | OXYGEN SATURATION: 98 %

## 2019-09-26 DIAGNOSIS — G47.419 PRIMARY NARCOLEPSY WITHOUT CATAPLEXY: Primary | ICD-10-CM

## 2019-09-26 DIAGNOSIS — Z74.09 IMPAIRED FUNCTIONAL MOBILITY, BALANCE, GAIT, AND ENDURANCE: ICD-10-CM

## 2019-09-26 DIAGNOSIS — G89.29 CHRONIC LOW BACK PAIN, UNSPECIFIED BACK PAIN LATERALITY, WITH SCIATICA PRESENCE UNSPECIFIED: Primary | ICD-10-CM

## 2019-09-26 DIAGNOSIS — R29.898 WEAKNESS OF BOTH LOWER EXTREMITIES: ICD-10-CM

## 2019-09-26 DIAGNOSIS — I10 ESSENTIAL HYPERTENSION: ICD-10-CM

## 2019-09-26 DIAGNOSIS — R60.0 BILATERAL LOWER EXTREMITY EDEMA: ICD-10-CM

## 2019-09-26 DIAGNOSIS — M54.5 CHRONIC LOW BACK PAIN, UNSPECIFIED BACK PAIN LATERALITY, WITH SCIATICA PRESENCE UNSPECIFIED: Primary | ICD-10-CM

## 2019-09-26 DIAGNOSIS — M48.062 LUMBAR STENOSIS WITH NEUROGENIC CLAUDICATION: ICD-10-CM

## 2019-09-26 PROCEDURE — 97113 AQUATIC THERAPY/EXERCISES: CPT | Performed by: PHYSICAL THERAPIST

## 2019-09-26 PROCEDURE — 99214 OFFICE O/P EST MOD 30 MIN: CPT | Performed by: FAMILY MEDICINE

## 2019-09-26 RX ORDER — MODAFINIL 100 MG/1
100 TABLET ORAL DAILY
Qty: 90 TABLET | Refills: 0 | Status: SHIPPED | OUTPATIENT
Start: 2019-09-26 | End: 2019-12-18 | Stop reason: SDUPTHER

## 2019-09-26 NOTE — PROGRESS NOTES
Kevin Ellington is a 84 y.o. male.     Chief Complaint   Patient presents with   • ADHD     follow up no complains   • Hypertension     pt complain swelling in legs he think may be due to medication and sob       HPI     Pt here for HTN, edema, narcolepsy. Taking adderall. Concerned about BP. Would like to switch to something new. Has increased swelling in BLE. Taking amlodipine 10 mg in addition to clonidine, losartan, and metoprolol. Getting worse. Saw Cards. Workup negative. Echo normal along with stress test.    The following portions of the patient's history were reviewed and updated as appropriate: allergies, current medications, past family history, past medical history, past social history, past surgical history and problem list.    Review of Systems   Constitutional: Positive for fatigue.   HENT: Negative.    Respiratory: Positive for shortness of breath.    Cardiovascular: Positive for leg swelling. Negative for chest pain and palpitations.   Endocrine: Negative.    Genitourinary: Negative.    Musculoskeletal: Negative.    Allergic/Immunologic: Negative.    Neurological: Negative.    Psychiatric/Behavioral: Negative.    All other systems reviewed and are negative.    I have reviewed and agree with documentation of above ROS.    Objective  Vitals:    09/26/19 1420   BP: 138/82   Pulse: 66   Temp: 98.1 °F (36.7 °C)   SpO2: 98%     Body mass index is 31.38 kg/m².    Physical Exam   Constitutional: He is oriented to person, place, and time. He appears well-developed and well-nourished. No distress.   HENT:   Head: Normocephalic and atraumatic.   Right Ear: External ear normal.   Left Ear: External ear normal.   Nose: Nose normal.   Mouth/Throat: Oropharynx is clear and moist.   Eyes: Conjunctivae and EOM are normal. Pupils are equal, round, and reactive to light. Right eye exhibits no discharge. Left eye exhibits no discharge. No scleral icterus.   Cardiovascular: Normal rate, regular rhythm and normal  heart sounds.   Pulmonary/Chest: Effort normal and breath sounds normal. No respiratory distress. He has no wheezes. He has no rales.   Abdominal: Soft. Bowel sounds are normal. He exhibits no distension. There is no tenderness.   Neurological: He is alert and oriented to person, place, and time.   Skin: Skin is warm and dry. He is not diaphoretic.   Nursing note and vitals reviewed.        Current Outpatient Medications:   •  aspirin 81 MG tablet, Take 81 mg by mouth Daily., Disp: , Rfl:   •  atorvastatin (LIPITOR) 40 MG tablet, Take 1 tablet by mouth Daily., Disp: 90 tablet, Rfl: 3  •  cetirizine (zyrTEC) 10 MG tablet, Take 10 mg by mouth Daily., Disp: , Rfl:   •  CloNIDine (CATAPRES) 0.1 MG tablet, Take 1 tablet by mouth 2 (Two) Times a Day., Disp: 180 tablet, Rfl: 3  •  econazole nitrate (SPECTAZOLE) 1 % cream, Apply  topically to the appropriate area as directed As Needed., Disp: , Rfl:   •  fluticasone (FLONASE) 50 MCG/ACT nasal spray, 2 sprays into the nostril(s) as directed by provider Daily., Disp: 3 bottle, Rfl: 11  •  losartan (COZAAR) 100 MG tablet, Take 1 tablet by mouth Daily., Disp: 90 tablet, Rfl: 3  •  meloxicam (MOBIC) 7.5 MG tablet, Take 1 tablet by mouth Daily., Disp: 90 tablet, Rfl: 3  •  metoprolol succinate XL (TOPROL-XL) 100 MG 24 hr tablet, Take 1 tablet by mouth Daily., Disp: 90 tablet, Rfl: 3  •  Multiple Vitamins-Minerals (MULTIVITAMIN PO), Take 1 tablet by mouth Daily., Disp: , Rfl:   •  Omega-3 Fatty Acids (FISH OIL) 1000 MG capsule capsule, Take 1,000 mg by mouth Daily With Breakfast., Disp: , Rfl:   •  omeprazole (priLOSEC) 20 MG capsule, Take 1 capsule by mouth Daily., Disp: 90 capsule, Rfl: 3  •  Saw Palmetto, Serenoa repens, 320 MG capsule, Take 320 mg by mouth 2 (two) times a day., Disp: , Rfl:   •  tamsulosin (FLOMAX) 0.4 MG capsule 24 hr capsule, Take 2 capsules by mouth Daily., Disp: 180 capsule, Rfl: 3  •  amLODIPine (NORVASC) 10 MG tablet, Take 1 tablet by mouth Daily., Disp:  90 tablet, Rfl: 3  •  modafinil (PROVIGIL) 100 MG tablet, Take 1 tablet by mouth Daily., Disp: 90 tablet, Rfl: 0  Current outpatient and discharge medications have been reconciled for the patient.  Reviewed by: Perico Bernal MD      Procedures    Lab Results (most recent)     None                  Kevin was seen today for adhd and hypertension.    Diagnoses and all orders for this visit:    Primary narcolepsy without cataplexy  -     modafinil (PROVIGIL) 100 MG tablet; Take 1 tablet by mouth Daily.    Essential hypertension    Bilateral lower extremity edema      Switch to Modafinil. Discussed stopping Amlodipine. Will recheck in 1 month. Pt to monitor at home.     Return in about 4 weeks (around 10/24/2019) for Recheck.      Perico Bernal MD

## 2019-10-01 ENCOUNTER — TREATMENT (OUTPATIENT)
Dept: PHYSICAL THERAPY | Facility: CLINIC | Age: 84
End: 2019-10-01

## 2019-10-01 DIAGNOSIS — Z74.09 IMPAIRED FUNCTIONAL MOBILITY, BALANCE, GAIT, AND ENDURANCE: ICD-10-CM

## 2019-10-01 DIAGNOSIS — M48.062 LUMBAR STENOSIS WITH NEUROGENIC CLAUDICATION: Primary | ICD-10-CM

## 2019-10-01 DIAGNOSIS — M19.079 ARTHRITIS OF ANKLE: ICD-10-CM

## 2019-10-01 DIAGNOSIS — R29.898 WEAKNESS OF BOTH LOWER EXTREMITIES: ICD-10-CM

## 2019-10-01 PROCEDURE — 97113 AQUATIC THERAPY/EXERCISES: CPT | Performed by: PHYSICAL THERAPIST

## 2019-10-01 NOTE — PROGRESS NOTES
Physical Therapy Discharge Note    Patient: Kevin Ellington   : 1935  Diagnosis/ICD-10 Code:  No primary diagnosis found.  Referring practitioner: Tyrese Chaidez MD  Date of Initial Visit: No linked episodes  Today's Date: 10/1/2019  Patient seen for Visit count could not be calculated. Make sure you are using a visit which is associated with an episode. sessions             Subjective Evaluation    History of Present Illness    Subjective comment: Back feels better , had massage over weekend. No plans to continue at local pool, prefers land based HEP to continue ex independentlyPain  Current pain ratin  Location: LBP           Objective   See Exercise, Manual, and Modality Logs for complete treatment.       Assessment & Plan     Assessment  Assessment details: Kevin Ellington was seen for 14 physical therapy sessions for treatment of LBP, spinal stenosis.  Treatment included aquatic therapy and patient education with home exercise program . Progress to physical therapy goals was good.  He was discharged to an independent land-based HEP, as patient to continue exercise on land vs. Pool, and provided patient education to self-manage condition.     Goals  Plan Goals: Plan Goals: PT Short Term Goals                    STG 1   Patient will tolerate 40-45 minutes of aquatic ex/activity with minimal pain/fatigue and </= 1 rest break                          STG 1 Progress           Met                        STG 2   Patient will be educated on and demonstrate knowledge of optimal posture, decompression strategies, good body mechanics, and proper lifting techniques to minimize stresses to spine and associated soft tissues with daily functional activities.                         STG 2 Progress           Met                       STG 3   Patient will report >/= 25% decrease in frequency/intensity of LBP/LE symptoms for greater ease with daily function.                 STG 3 Progress           Met                    STG 3 Progress Comments    Currently 0/10                  STG 4   Patient will demonstrate increased core muscle strength by performing resistive exercises with good core stability in pool                        STG 4 Progress          Partially met.  Improved core stability with resisted exercises.  Occasional difficulty stabilizing in water.                                                                                                  Long Term Goals              LTG 1   Patient will be independent with established HEP for strength/stabilization to aid in self management of symptoms/condition                          LTG 1 Progress           Met for land-based HEP, per patient preference            LTG 2   Patient will have improved core/LE strength/stabilization for greater ease/tolerance with STS, standing, walking, and stair navigation                         LTG 2 Progress          Partially met- reports improvement, but some difficulty remains with sit to stand, stairs.  HEP exercises address core/LE strength.              LTG 3   Patient will have SLS balance >/= 3 sec for improved stability with dynamic mobility.                           LTG 3 Progress          Not met              LTG 4   Patient will improve score on Modified Oswestry Outcome Measures from 48% to </= 38% indicating reduced functional disability                 LTG 4 Progress          Not met but improved.              LTG 4 Progress Comments     oswestry 44%             Other: Discharge physical therapy.  Patient plans to continue land-based HEP independently, as he is not interested in continuing aquatic exercise independently at this time.           Timed:  Aquatic Therapy    30     mins [id number];    Angie Leon, SCOOBY  Physical Therapist

## 2019-10-08 NOTE — PROGRESS NOTES
Subjective   Patient ID: Kevin Ellington is a 84 y.o. male is here today for follow-up after PT/aquatic therapy.  Patient states that it has helped some, he feels slightly stronger and his balance seems slightly improved.  He has HEP, but has not been following it as he is having problems with his blood pressure.    Patient was last seen 7.8.19 for back pain, weakness and problems with his balance and gait    Patient denies current back pain or leg pain.  He has intermittent right hip, usually at night when he is trying to sleep.  He is unsure of leg weakness.  He still has some problems with his balance and gait, he is using a cane to ambulate with.  He has left leg and foot numbness with prolonged standing.    The patient is with his wife.  He has severe lumbar stenosis from L2 to L5.  We have been trying to avoid surgery if at all possible.  Blocks did help him and his pain still is under decent control.  The aquatic therapy helped his strength.  I think he is likely to benefit from more therapy moving onto dry land therapy.  If this pain recurs, then we can resend him to the pain clinic.  I believe he is back to the point where he can get another 4 blocks over the course of a year.  We will see him in 4 months and if there is a flare up, he will let us know.      Difficulty Walking   The problem occurs constantly. The problem has been gradually improving. Associated symptoms include numbness. Pertinent negatives include no arthralgias, myalgias or weakness.       The following portions of the patient's history were reviewed and updated as appropriate: allergies, current medications, past family history, past medical history, past social history, past surgical history and problem list.    Review of Systems   Musculoskeletal: Positive for gait problem. Negative for arthralgias, back pain and myalgias.   Neurological: Positive for numbness. Negative for weakness.   All other systems reviewed and are  negative.      Objective   Physical Exam   Constitutional: He is oriented to person, place, and time. He appears well-developed and well-nourished.   HENT:   Head: Normocephalic and atraumatic.   Eyes: Conjunctivae and EOM are normal. Pupils are equal, round, and reactive to light.   Fundoscopic exam:       The right eye shows no papilledema. The right eye shows venous pulsations.        The left eye shows no papilledema. The left eye shows venous pulsations.   Neck: Carotid bruit is not present.   Neurological: He is oriented to person, place, and time. He has a normal Finger-Nose-Finger Test and a normal Heel to Shin Test. Gait normal.   Reflex Scores:       Tricep reflexes are 2+ on the right side and 2+ on the left side.       Bicep reflexes are 2+ on the right side and 2+ on the left side.       Brachioradialis reflexes are 2+ on the right side and 2+ on the left side.       Patellar reflexes are 2+ on the right side and 2+ on the left side.       Achilles reflexes are 2+ on the right side and 2+ on the left side.  Psychiatric: His speech is normal.     Neurologic Exam     Mental Status   Oriented to person, place, and time.   Registration of memory: Good recent and remote memory.   Attention: normal. Concentration: normal.   Speech: speech is normal   Level of consciousness: alert  Knowledge: consistent with education.     Cranial Nerves     CN II   Visual fields full to confrontation.   Visual acuity: normal    CN III, IV, VI   Pupils are equal, round, and reactive to light.  Extraocular motions are normal.     CN V   Facial sensation intact.   Right corneal reflex: normal  Left corneal reflex: normal    CN VII   Facial expression full, symmetric.   Right facial weakness: none  Left facial weakness: none    CN VIII   Hearing: intact    CN IX, X   Palate: symmetric    CN XI   Right sternocleidomastoid strength: normal  Left sternocleidomastoid strength: normal    CN XII   Tongue: not atrophic  Tongue  deviation: none    Motor Exam   Muscle bulk: normal  Right arm tone: normal  Left arm tone: normal  Right leg tone: normal  Left leg tone: normal    Strength   Strength 5/5 except as noted.     Sensory Exam   Light touch normal.     Gait, Coordination, and Reflexes     Gait  Gait: normal    Coordination   Finger to nose coordination: normal  Heel to shin coordination: normal    Reflexes   Right brachioradialis: 2+  Left brachioradialis: 2+  Right biceps: 2+  Left biceps: 2+  Right triceps: 2+  Left triceps: 2+  Right patellar: 2+  Left patellar: 2+  Right achilles: 2+  Left achilles: 2+  Right : 2+  Left : 2+      Assessment/Plan   Independent Review of Radiographic Studies:    I reviewed the lumbar cervical and thoracic myelogram done on August 2018.  There is severe stenosis from L2-L5.  Agree with the report.      Medical Decision Making:    He has made good progress in terms of pain and strength.  But we will move on to the next level and send him back for [facility] physical therapy.  If his pain recurs he will let us know and we can send him for epidural blocks again.  I believe that he can have one within the Medicare guidelines.  But I will see him again in about 4 months.      Kevin was seen today for hip pain, difficulty walking and numbness.    Diagnoses and all orders for this visit:    Spinal stenosis of lumbar region with neurogenic claudication  -     Ambulatory Referral to Physical Therapy Evaluate and treat    DDD (degenerative disc disease), lumbar  -     Ambulatory Referral to Physical Therapy Evaluate and treat    Bilateral leg weakness  -     Ambulatory Referral to Physical Therapy Evaluate and treat      Return in about 4 months (around 2/21/2020).

## 2019-10-21 ENCOUNTER — OFFICE VISIT (OUTPATIENT)
Dept: NEUROSURGERY | Facility: CLINIC | Age: 84
End: 2019-10-21

## 2019-10-21 VITALS
SYSTOLIC BLOOD PRESSURE: 152 MMHG | HEIGHT: 71 IN | HEART RATE: 74 BPM | DIASTOLIC BLOOD PRESSURE: 84 MMHG | BODY MASS INDEX: 31.4 KG/M2

## 2019-10-21 DIAGNOSIS — R29.898 BILATERAL LEG WEAKNESS: ICD-10-CM

## 2019-10-21 DIAGNOSIS — M48.062 SPINAL STENOSIS OF LUMBAR REGION WITH NEUROGENIC CLAUDICATION: Primary | ICD-10-CM

## 2019-10-21 DIAGNOSIS — M51.36 DDD (DEGENERATIVE DISC DISEASE), LUMBAR: ICD-10-CM

## 2019-10-21 PROCEDURE — 99213 OFFICE O/P EST LOW 20 MIN: CPT | Performed by: NEUROLOGICAL SURGERY

## 2019-10-28 ENCOUNTER — OFFICE VISIT (OUTPATIENT)
Dept: FAMILY MEDICINE CLINIC | Facility: CLINIC | Age: 84
End: 2019-10-28

## 2019-10-28 VITALS
HEIGHT: 71 IN | TEMPERATURE: 97.5 F | WEIGHT: 228 LBS | OXYGEN SATURATION: 95 % | SYSTOLIC BLOOD PRESSURE: 146 MMHG | BODY MASS INDEX: 31.92 KG/M2 | HEART RATE: 56 BPM | DIASTOLIC BLOOD PRESSURE: 72 MMHG

## 2019-10-28 DIAGNOSIS — Z23 NEED FOR VACCINATION: Primary | ICD-10-CM

## 2019-10-28 DIAGNOSIS — G47.419 PRIMARY NARCOLEPSY WITHOUT CATAPLEXY: ICD-10-CM

## 2019-10-28 DIAGNOSIS — M19.079 ARTHRITIS OF ANKLE: ICD-10-CM

## 2019-10-28 DIAGNOSIS — I10 ESSENTIAL HYPERTENSION: ICD-10-CM

## 2019-10-28 PROCEDURE — 99214 OFFICE O/P EST MOD 30 MIN: CPT | Performed by: FAMILY MEDICINE

## 2019-10-28 PROCEDURE — G0008 ADMIN INFLUENZA VIRUS VAC: HCPCS | Performed by: FAMILY MEDICINE

## 2019-10-28 PROCEDURE — 90653 IIV ADJUVANT VACCINE IM: CPT | Performed by: FAMILY MEDICINE

## 2019-10-28 RX ORDER — SPIRONOLACTONE 50 MG/1
50 TABLET, FILM COATED ORAL DAILY
Qty: 60 TABLET | Refills: 0 | Status: SHIPPED | OUTPATIENT
Start: 2019-10-28 | End: 2019-12-18

## 2019-10-28 RX ORDER — MELOXICAM 7.5 MG/1
7.5 TABLET ORAL DAILY
Qty: 90 TABLET | Refills: 3 | Status: SHIPPED | OUTPATIENT
Start: 2019-10-28 | End: 2020-11-10 | Stop reason: SDUPTHER

## 2019-10-28 NOTE — PROGRESS NOTES
Kevin Ellington is a 84 y.o. male.     Chief Complaint   Patient presents with   • Hypertension     pt is following up on HTN   • ADHD     pt is following up on ADHD       Hypertension   This is a recurrent problem. The current episode started more than 1 year ago. The problem has been waxing and waning since onset. The problem is resistant. Associated symptoms include peripheral edema and shortness of breath. Pertinent negatives include no anxiety, blurred vision, chest pain, headaches, malaise/fatigue, neck pain, orthopnea, palpitations, PND or sweats. Agents associated with hypertension include amphetamines. Risk factors for coronary artery disease include family history, obesity and sedentary lifestyle. Compliance problems include exercise and medication side effects.       Patient following up on narcolepsy excessive daytime sleepiness, arthritis of the ankle as well.  Patient was recently started on modafinil 100 mg tablets.  Tolerating well.  Good symptom control.  No adverse side effects.      The following portions of the patient's history were reviewed and updated as appropriate: allergies, current medications, past family history, past medical history, past social history, past surgical history and problem list.    Review of Systems   Constitutional: Negative.  Negative for malaise/fatigue.   HENT: Negative.    Eyes: Negative.  Negative for blurred vision.   Respiratory: Positive for shortness of breath.    Cardiovascular: Negative.  Negative for chest pain, palpitations, orthopnea, leg swelling and PND.   Gastrointestinal: Negative.    Endocrine: Negative.    Genitourinary: Negative.    Musculoskeletal: Positive for gait problem and joint swelling. Negative for neck pain.   Skin: Negative.    Allergic/Immunologic: Negative.    Neurological: Negative for headaches.   Hematological: Negative.    Psychiatric/Behavioral: Negative.    All other systems reviewed and are negative.    I have reviewed the  ROS as documented by the MA. Perico Bernal MD    Objective  Vitals:    10/28/19 1343   BP: 146/72   Pulse: 56   Temp: 97.5 °F (36.4 °C)   SpO2: 95%     Body mass index is 31.8 kg/m².    Physical Exam   Constitutional: He is oriented to person, place, and time. He appears well-developed and well-nourished. No distress.   HENT:   Head: Normocephalic and atraumatic.   Right Ear: External ear normal.   Left Ear: External ear normal.   Nose: Nose normal.   Mouth/Throat: Oropharynx is clear and moist.   Eyes: Conjunctivae and EOM are normal. Pupils are equal, round, and reactive to light. Right eye exhibits no discharge. Left eye exhibits no discharge. No scleral icterus.   Cardiovascular: Normal rate, regular rhythm and normal heart sounds.   Pulmonary/Chest: Effort normal and breath sounds normal. No respiratory distress. He has no wheezes. He has no rales.   Abdominal: Soft. Bowel sounds are normal. He exhibits no distension. There is no tenderness.   Neurological: He is alert and oriented to person, place, and time.   Skin: Skin is warm and dry. He is not diaphoretic.   Nursing note and vitals reviewed.        Current Outpatient Medications:   •  amLODIPine (NORVASC) 10 MG tablet, Take 1 tablet by mouth Daily., Disp: 90 tablet, Rfl: 3  •  aspirin 81 MG tablet, Take 81 mg by mouth Daily., Disp: , Rfl:   •  atorvastatin (LIPITOR) 40 MG tablet, Take 1 tablet by mouth Daily., Disp: 90 tablet, Rfl: 3  •  cetirizine (zyrTEC) 10 MG tablet, Take 10 mg by mouth Daily., Disp: , Rfl:   •  CloNIDine (CATAPRES) 0.1 MG tablet, Take 1 tablet by mouth 2 (Two) Times a Day., Disp: 180 tablet, Rfl: 3  •  econazole nitrate (SPECTAZOLE) 1 % cream, Apply  topically to the appropriate area as directed As Needed., Disp: , Rfl:   •  fluticasone (FLONASE) 50 MCG/ACT nasal spray, 2 sprays into the nostril(s) as directed by provider Daily., Disp: 3 bottle, Rfl: 11  •  losartan (COZAAR) 100 MG tablet, Take 1 tablet by mouth Daily., Disp: 90  tablet, Rfl: 3  •  meloxicam (MOBIC) 7.5 MG tablet, Take 1 tablet by mouth Daily., Disp: 90 tablet, Rfl: 3  •  metoprolol succinate XL (TOPROL-XL) 100 MG 24 hr tablet, Take 1 tablet by mouth Daily., Disp: 90 tablet, Rfl: 3  •  modafinil (PROVIGIL) 100 MG tablet, Take 1 tablet by mouth Daily., Disp: 90 tablet, Rfl: 0  •  Multiple Vitamins-Minerals (MULTIVITAMIN PO), Take 1 tablet by mouth Daily., Disp: , Rfl:   •  omeprazole (priLOSEC) 20 MG capsule, Take 1 capsule by mouth Daily., Disp: 90 capsule, Rfl: 3  •  Saw Palmetto, Serenoa repens, 320 MG capsule, Take 320 mg by mouth 2 (two) times a day., Disp: , Rfl:   •  tamsulosin (FLOMAX) 0.4 MG capsule 24 hr capsule, Take 2 capsules by mouth Daily., Disp: 180 capsule, Rfl: 3  •  spironolactone (ALDACTONE) 50 MG tablet, Take 1 tablet by mouth Daily., Disp: 60 tablet, Rfl: 0  Current outpatient and discharge medications have been reconciled for the patient.  Reviewed by: Perico Bernal MD      Procedures    Lab Results (most recent)     None                  Kevin was seen today for hypertension and adhd.    Diagnoses and all orders for this visit:    Need for vaccination  -     Fluad Quad >65 years    Primary narcolepsy without cataplexy    Arthritis of ankle  -     meloxicam (MOBIC) 7.5 MG tablet; Take 1 tablet by mouth Daily.    Essential hypertension  -     spironolactone (ALDACTONE) 50 MG tablet; Take 1 tablet by mouth Daily.    Discussed treatment options for his hypertension.  Will discontinue amlodipine and add spironolactone.  Discussed potential adverse side effects.  Continue current therapy with modafinil.  Refill given for meloxicam.      Return in about 2 months (around 12/28/2019) for Recheck.      Perico Bernal MD

## 2019-10-29 ENCOUNTER — OFFICE VISIT (OUTPATIENT)
Dept: CARDIOLOGY | Facility: CLINIC | Age: 84
End: 2019-10-29

## 2019-10-29 VITALS
HEIGHT: 71 IN | DIASTOLIC BLOOD PRESSURE: 78 MMHG | HEART RATE: 77 BPM | SYSTOLIC BLOOD PRESSURE: 140 MMHG | WEIGHT: 228 LBS | BODY MASS INDEX: 31.92 KG/M2

## 2019-10-29 DIAGNOSIS — E78.5 HYPERLIPIDEMIA, UNSPECIFIED HYPERLIPIDEMIA TYPE: ICD-10-CM

## 2019-10-29 DIAGNOSIS — I25.10 CORONARY ARTERY DISEASE INVOLVING NATIVE CORONARY ARTERY OF NATIVE HEART WITHOUT ANGINA PECTORIS: Primary | ICD-10-CM

## 2019-10-29 DIAGNOSIS — Z95.5 S/P CORONARY ARTERY STENT PLACEMENT: ICD-10-CM

## 2019-10-29 DIAGNOSIS — R06.02 SHORTNESS OF BREATH: ICD-10-CM

## 2019-10-29 DIAGNOSIS — I10 ESSENTIAL HYPERTENSION: ICD-10-CM

## 2019-10-29 PROCEDURE — 99214 OFFICE O/P EST MOD 30 MIN: CPT | Performed by: INTERNAL MEDICINE

## 2019-10-29 PROCEDURE — 93000 ELECTROCARDIOGRAM COMPLETE: CPT | Performed by: INTERNAL MEDICINE

## 2019-10-29 NOTE — PROGRESS NOTES
Subjective:     Encounter Date:10/29/2019      Patient ID: Kevin Ellington is a 84 y.o. male.    Chief Complaint:  History of Present Illness    This is an 83-year-old male with a history of coronary artery disease status post prior LAD stent placement in 12/2002, chronic back pain, dyslipidemia, hypertension, who presents for follow-up.       The patient was previously followed by Dr. Cavazos but came to establish care here in 8/2016.  His prior cardiac history again includes a stent placement on 12/17/2002.  The patient reports that at the time he was having symptoms of dyspnea and chest fullness on exertion.  He was taken to the cardiac catheterization laboratory at that time and apparently had a Promus stent (either 3.0 or 3.5 x 16 mm based on his stent card).  He did well until about 2012 when he started having more dyspnea on exertion.  At that time he underwent an echocardiogram that showed normal left ventricular systolic function wall motion with an ejection fraction of 60%, grade 1A diastolic dysfunction, and no significant valvular disease.  A Lexiscan Myoview stress test was negative for ischemia.  He was seen by pulmonary due to his continued issues with dyspnea on exertion and they did not find any pulmonary issues to explain his symptoms.  At that time the patient started exercising and lost about 20 pounds with resolution of his symptoms.     In follow-up he is mainly had issues with blood pressure control.  This improved some with the addition of amlodipine.  Over the last year or so he is been having increasing issues with dyspnea on exertion.  He was seen by NANCY Chahal in 7/2019 with these complaints.  He underwent both a stress test and an echocardiogram at that time.  His echocardiogram showed normal left ventricular systolic function wall motion with an EF of 59%, grade 1 diastolic dysfunction, and no significant valvular disease.  His stress test showed no evidence of  ischemia.    He presents today for routine follow-up.  Since he was last seen in the office he reports his dyspnea on exertion has been largely stable.  He admits that he is not very active because of back and joint issues.  He also reports issues with balance.  He just completed 12 weeks of water therapy at [facility] and is to start another course of physical therapy there that has been prescribed by Dr. Chaidez.  He does report some improvement in his ability to perform the exercises at the end of his session of water therapy.  He denies any chest pain, palpitations, PND orthopnea.  His blood pressures have become an issue again with systolics running in the 140s to 160s.  For this reason and the fact that he is developed more issues with ankle edema his amlodipine is now being switched to spironolactone.  He has not made that switch yet.    Review of Systems   Constitution: Negative for weakness and malaise/fatigue.   HENT: Negative for hearing loss, hoarse voice, nosebleeds and sore throat.    Eyes: Negative for pain.   Cardiovascular: Positive for dyspnea on exertion and leg swelling. Negative for chest pain, claudication, cyanosis, irregular heartbeat, near-syncope, orthopnea, palpitations, paroxysmal nocturnal dyspnea and syncope.   Respiratory: Negative for shortness of breath and snoring.    Endocrine: Negative for cold intolerance, heat intolerance, polydipsia, polyphagia and polyuria.   Skin: Negative for itching and rash.   Musculoskeletal: Negative for arthritis, falls, joint pain, joint swelling, muscle cramps, muscle weakness and myalgias.   Gastrointestinal: Negative for constipation, diarrhea, dysphagia, heartburn, hematemesis, hematochezia, melena, nausea and vomiting.   Genitourinary: Negative for frequency, hematuria and hesitancy.   Neurological: Positive for light-headedness. Negative for excessive daytime sleepiness, dizziness, headaches and numbness.   Psychiatric/Behavioral: Negative for  depression. The patient is not nervous/anxious.          Current Outpatient Medications:   •  amLODIPine (NORVASC) 10 MG tablet, Take 1 tablet by mouth Daily., Disp: 90 tablet, Rfl: 3  •  aspirin 81 MG tablet, Take 81 mg by mouth Daily., Disp: , Rfl:   •  atorvastatin (LIPITOR) 40 MG tablet, Take 1 tablet by mouth Daily., Disp: 90 tablet, Rfl: 3  •  cetirizine (zyrTEC) 10 MG tablet, Take 10 mg by mouth Daily., Disp: , Rfl:   •  CloNIDine (CATAPRES) 0.1 MG tablet, Take 1 tablet by mouth 2 (Two) Times a Day., Disp: 180 tablet, Rfl: 3  •  econazole nitrate (SPECTAZOLE) 1 % cream, Apply  topically to the appropriate area as directed As Needed., Disp: , Rfl:   •  fluticasone (FLONASE) 50 MCG/ACT nasal spray, 2 sprays into the nostril(s) as directed by provider Daily., Disp: 3 bottle, Rfl: 11  •  losartan (COZAAR) 100 MG tablet, Take 1 tablet by mouth Daily., Disp: 90 tablet, Rfl: 3  •  meloxicam (MOBIC) 7.5 MG tablet, Take 1 tablet by mouth Daily., Disp: 90 tablet, Rfl: 3  •  metoprolol succinate XL (TOPROL-XL) 100 MG 24 hr tablet, Take 1 tablet by mouth Daily., Disp: 90 tablet, Rfl: 3  •  modafinil (PROVIGIL) 100 MG tablet, Take 1 tablet by mouth Daily., Disp: 90 tablet, Rfl: 0  •  Multiple Vitamins-Minerals (MULTIVITAMIN PO), Take 1 tablet by mouth Daily., Disp: , Rfl:   •  omeprazole (priLOSEC) 20 MG capsule, Take 1 capsule by mouth Daily., Disp: 90 capsule, Rfl: 3  •  Saw Palmetto, Serenoa repens, 320 MG capsule, Take 320 mg by mouth 2 (two) times a day., Disp: , Rfl:   •  spironolactone (ALDACTONE) 50 MG tablet, Take 1 tablet by mouth Daily., Disp: 60 tablet, Rfl: 0  •  tamsulosin (FLOMAX) 0.4 MG capsule 24 hr capsule, Take 2 capsules by mouth Daily., Disp: 180 capsule, Rfl: 3    Past Medical History:   Diagnosis Date   • Arthritis    • Cervical radiculopathy    • Coronary artery disease 2001    Stent   • CTS (carpal tunnel syndrome) 1998    Surgery both hands in 1999   • GERD (gastroesophageal reflux disease)    •  "HTN (hypertension)    • Hyperlipidemia    • Low back pain     2 Prior surgeries   • Lumbosacral disc disease    • Melanoma (CMS/HCC) 01/15/2009    DR WILLY SIMMS ( Left forearm)   • Radiculitis, thoracic    • Stented coronary artery    • Thoracic disc disorder     Surgery by Dr. Aldridge (sp)       Past Surgical History:   Procedure Laterality Date   • ANKLE FUSION Left 2007    reconstruction and fusion   • BACK SURGERY      HERNIATED LUMBAR DISK ,,   • CARPAL TUNNEL RELEASE     • CATARACT EXTRACTION, BILATERAL Bilateral    • CORONARY ANGIOPLASTY WITH STENT PLACEMENT     • EPIDURAL BLOCK  Several at [facility]    and [facility]   • REPLACEMENT TOTAL KNEE Left    • REPLACEMENT TOTAL KNEE Right    • SKIN CANCER EXCISION      MELANOMA   • THORACIC SPINE SURGERY         Family History   Problem Relation Age of Onset   • Hypertension Mother    • Arthritis Mother            • Heart disease Father    • Early death Father         Aortic aneurism age 58   • Hypertension Father        Social History     Tobacco Use   • Smoking status: Never Smoker   • Smokeless tobacco: Never Used   • Tobacco comment: caffeine use   Substance Use Topics   • Alcohol use: No   • Drug use: No         ECG 12 Lead  Date/Time: 10/29/2019 10:39 AM  Performed by: Halima Salmeron MD  Authorized by: Halima Salmeron MD   Comparison: compared with previous ECG   Similar to previous ECG  Rhythm: sinus rhythm               Objective:     Visit Vitals  /78   Pulse 77   Ht 180.3 cm (71\")   Wt 103 kg (228 lb)   BMI 31.80 kg/m²         Physical Exam   Constitutional: He is oriented to person, place, and time. He appears well-developed and well-nourished.   HENT:   Head: Normocephalic and atraumatic.   Neck: No JVD present. Carotid bruit is not present.   Cardiovascular: Normal rate, regular rhythm, S1 normal and S2 normal. Exam reveals no gallop.   No murmur heard.  Pulses:       Radial pulses are " 2+ on the right side, and 2+ on the left side.   No bilateral lower extremity edema   Pulmonary/Chest: Effort normal and breath sounds normal.   Abdominal: Soft. Normal appearance.   Neurological: He is alert and oriented to person, place, and time.   Skin: Skin is warm, dry and intact.   Psychiatric: He has a normal mood and affect.       Lab Review:       Assessment:          Diagnosis Plan   1. Coronary artery disease involving native coronary artery of native heart without angina pectoris     2. S/P coronary artery stent placement     3. Essential hypertension     4. Hyperlipidemia, unspecified hyperlipidemia type     5. Shortness of breath            Plan:       1.  Dyspnea on exertion.  With his recent normal cardiac work-up I suspect his symptoms are more due to deconditioning.  I encouraged him to look into alternative aerobic exercise he could participate in and to even discuss this with his physical therapist at [facility].  I reassured him that from a cardiac standpoint there is no reason why he can increase his activities safely.  2.  Hypertension.  Although his blood pressures are only borderline elevated today I agree with the switch from amlodipine to spironolactone.  3.  Hyperlipidemia.  On atorvastatin which is managed by Dr. Bernal.  4.  Coronary artery disease.  Prior stents.  Stress test in 7/2019 was normal.  Continue current medical management.    We will plan on seeing the patient back again in 6 months.

## 2019-10-31 ENCOUNTER — TREATMENT (OUTPATIENT)
Dept: PHYSICAL THERAPY | Facility: CLINIC | Age: 84
End: 2019-10-31

## 2019-10-31 DIAGNOSIS — G89.29 CHRONIC BILATERAL LOW BACK PAIN WITH BILATERAL SCIATICA: ICD-10-CM

## 2019-10-31 DIAGNOSIS — R26.2 DIFFICULTY WALKING: Primary | ICD-10-CM

## 2019-10-31 DIAGNOSIS — R26.89 LOSS OF BALANCE: ICD-10-CM

## 2019-10-31 DIAGNOSIS — R53.1 STRENGTH LOSS OF: ICD-10-CM

## 2019-10-31 DIAGNOSIS — M54.41 CHRONIC BILATERAL LOW BACK PAIN WITH BILATERAL SCIATICA: ICD-10-CM

## 2019-10-31 DIAGNOSIS — M54.42 CHRONIC BILATERAL LOW BACK PAIN WITH BILATERAL SCIATICA: ICD-10-CM

## 2019-10-31 PROCEDURE — 97162 PT EVAL MOD COMPLEX 30 MIN: CPT | Performed by: PHYSICAL THERAPIST

## 2019-10-31 PROCEDURE — 97112 NEUROMUSCULAR REEDUCATION: CPT | Performed by: PHYSICAL THERAPIST

## 2019-10-31 NOTE — PROGRESS NOTES
Physical Therapy Initial Evaluation and Plan of Care      Patient: Kevin Ellington   : 1935  Diagnosis/ICD-10 Code:  Chronic right-sided low back pain with right-sided sciatica [M54.41, G89.29]  Referring practitioner: Tyrese Chaidez MD  Date of Initial Visit: 10/31/2019  Today's Date: 10/31/2019  Patient seen for 1 sessions           Subjective: Mr. Ellington reports having chronic low back pain with pain going into the B LE into the B lower leg.  He reports that since he moved into a one level dwelling his legs have gotten weaker due to not having to use stairs.  Pain is located in the low back and into both legs.  He reports parasthesia in the L LE if he stands for 4-5 minutes.  He has difficulty walking, gets out of breath easily and notices that his balance is not as good as it once was.  Pain peaks at 7-8/10.  He reports that at rest he has no pain.  His sleep position will, at times, aggravate his low back pain.    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 2 previous lumbar surgery.  He has experienced some relief in the past with epidural injections.  B TKA, L ankle reconstruction and fusion, B CTR, HTN controlled somewhat by medication, hyperlipidemia controlled by medication, melanoma removed from the L UE  SH: , resides in a patio home, with one step to enter from the garage.  He enjoys learning to play the organ, he enjoys being around his family.    Objective   Observation:  He ambulated independently into the clinic using a standard cane, R UE, with an abnormal gait pattern.  He did not shift weight equally onto the R LE as compared to the L LE.  He was wearing an ASO ankle brace on the R ankle.      DTRs: patellar and Minh's were B hypotonic and equal  Sensation: Perception to light touch, L1-3 B intact and equal, L4 L LE diminiched, R intact, L5 B diminished, S1 B intact and S2 B intact.  All areas were intact to blunt  stimulation, B.  Motor Control: L2-S2 B equal/intact, L LE fused at the ankle    Lumbar AROM, standing, flexion minimal loss, no pain, extension, severe loss with pain, LB L moderate to severe loss with pain and LB R moderate loss with pain    Posture: posterior view of the spine, R shoulder, scapula and ilium are lower than the L.  There is a mild scoliosis with concavity on the R in the lumbar spine.  Lateral view: forward head, increased thoracic kyphosis and decreased lumbar lordosis.     Romberg test: able to maintain balance but did wobble significantly  Sharpened Romberg test: abnormal    Modified Oswestry survey: 24/50, 48% disability    See Exercise, Manual, and Modality Logs for complete treatment.     Assessment: Kevin Ellington is a 84 y.o. year-old male referred to physical therapy for chronic lumbar spine issues, balance problems and leg weakness B. He presents with an  evolving clinical presentation.  He has comorbidities soft and bony changes of the musculoskeletal system due to chronic poor posture.  He has no known personal factors that may affect his progress in the plan of care.  Signs and symptoms are consistent with physical therapy diagnosis of chronic low back pain, poor posture, leg weakness, abnormal balance and will require education for self care.     STGs to be met in 12 visits  1. Al is introduced to exercises to improve coordination, strength and the use of the core for noraml ADLs.  2. Postural exercises are begun to improve posture and general spinal mobility.  3. Balance exercises are progressed.    LTGs to be met in 24 visits.  1. Peak pain in the lumbar spine and related areas is decreased to 5/10.  2. Standing AROM of the lumbar spine is pain free.  3. Al is independent with a complete HEP and self care education.  4. Modified Oswestry is improved by 10%.  .     Timed:  Manual Therapy:         mins  83666;  Therapeutic Exercise:         mins  90766;     Neuromuscular Beena:     10    mins  26363;    Therapeutic Activity:          mins  06993;     Gait Training:           mins  77502;     Ultrasound:          mins  17871;    Electrical Stimulation:         mins  46307 ( );  Iontophoresis         mins 05686      Untimed:  Electrical Stimulation:         mins  21677 ( );  Mechanical Traction:         mins  27932;     Timed Treatment:   10   mins   Total Treatment:     45   mins    PT SIGNATURE: Woodrow Moncadalynnette, PT   DATE TREATMENT INITIATED: 10/31/2019    Initial Certification  Certification Period: 1/29/2020  I certify that the therapy services are furnished while this patient is under my care.  The services outlined above are required by this patient, and will be reviewed every 90 days.     PHYSICIAN: Tyrese Chaidez MD      DATE:     Please sign and return via fax to  .. Thank you, [facility].

## 2019-11-14 ENCOUNTER — TREATMENT (OUTPATIENT)
Dept: PHYSICAL THERAPY | Facility: CLINIC | Age: 84
End: 2019-11-14

## 2019-11-14 DIAGNOSIS — R26.89 LOSS OF BALANCE: ICD-10-CM

## 2019-11-14 DIAGNOSIS — R29.898 WEAKNESS OF BOTH LOWER EXTREMITIES: ICD-10-CM

## 2019-11-14 DIAGNOSIS — G89.29 CHRONIC BILATERAL LOW BACK PAIN WITH BILATERAL SCIATICA: ICD-10-CM

## 2019-11-14 DIAGNOSIS — M54.41 CHRONIC BILATERAL LOW BACK PAIN WITH BILATERAL SCIATICA: ICD-10-CM

## 2019-11-14 DIAGNOSIS — Z74.09 IMPAIRED FUNCTIONAL MOBILITY, BALANCE, GAIT, AND ENDURANCE: ICD-10-CM

## 2019-11-14 DIAGNOSIS — R53.1 STRENGTH LOSS OF: ICD-10-CM

## 2019-11-14 DIAGNOSIS — M54.42 CHRONIC BILATERAL LOW BACK PAIN WITH BILATERAL SCIATICA: ICD-10-CM

## 2019-11-14 DIAGNOSIS — M48.062 LUMBAR STENOSIS WITH NEUROGENIC CLAUDICATION: ICD-10-CM

## 2019-11-14 DIAGNOSIS — M19.079 ARTHRITIS OF ANKLE: ICD-10-CM

## 2019-11-14 DIAGNOSIS — R26.2 DIFFICULTY WALKING: Primary | ICD-10-CM

## 2019-11-14 PROCEDURE — 97112 NEUROMUSCULAR REEDUCATION: CPT | Performed by: PHYSICAL THERAPIST

## 2019-11-14 PROCEDURE — 97110 THERAPEUTIC EXERCISES: CPT | Performed by: PHYSICAL THERAPIST

## 2019-11-14 NOTE — PROGRESS NOTES
Physical Therapy Daily Progress Note    Patient: Kevin Ellington   : 1935  Diagnosis/ICD-10 Code:  No primary diagnosis found.  Referring practitioner: Tyrese Chaidez MD  Date of Initial Visit: No linked episodes  Today's Date: 2019  Patient seen for Visit count could not be calculated. Make sure you are using a visit which is associated with an episode. sessions           Subjective: Chandana reports that he has been practicing his balance exercises.      Objective   Observation: He came into the clinic ambulating without an assistive device with an ASO ankle brace on the R LE.    See Exercise, Manual, and Modality Logs for complete treatment.     Assessment:  Al responded well to treatment. We were able to introduce exercises to help his posture and provide ROM for his hips and knees.    Plan:  Continue and reassess his balance issues.       Timed:    Manual Therapy:         mins  09274;  Therapeutic Exercise:    35     mins  84697;     Neuromuscular Beena:    10    mins  24486;    Therapeutic Activity:          mins  42604;     Gait Training:           mins  69622;     Ultrasound:          mins  67403;    Electrical Stimulation:         mins  45005 ( );  Iontophoresis         mins 38214;  Aquatic Therapy         mins 27669;    Untimed:  Electrical Stimulation:         mins  06025 (MC );  Mechanical Traction:         mins  23039;     Timed Treatment:   45   mins   Total Treatment:     45   mins  Woodrow Forte PT  Physical Therapist

## 2019-11-18 ENCOUNTER — TREATMENT (OUTPATIENT)
Dept: PHYSICAL THERAPY | Facility: CLINIC | Age: 84
End: 2019-11-18

## 2019-11-18 DIAGNOSIS — G89.29 CHRONIC BILATERAL LOW BACK PAIN WITH BILATERAL SCIATICA: ICD-10-CM

## 2019-11-18 DIAGNOSIS — M54.42 CHRONIC BILATERAL LOW BACK PAIN WITH BILATERAL SCIATICA: ICD-10-CM

## 2019-11-18 DIAGNOSIS — R26.2 DIFFICULTY WALKING: Primary | ICD-10-CM

## 2019-11-18 DIAGNOSIS — R53.1 STRENGTH LOSS OF: ICD-10-CM

## 2019-11-18 DIAGNOSIS — M48.062 LUMBAR STENOSIS WITH NEUROGENIC CLAUDICATION: ICD-10-CM

## 2019-11-18 DIAGNOSIS — M54.41 CHRONIC BILATERAL LOW BACK PAIN WITH BILATERAL SCIATICA: ICD-10-CM

## 2019-11-18 DIAGNOSIS — R26.89 LOSS OF BALANCE: ICD-10-CM

## 2019-11-18 PROCEDURE — 97110 THERAPEUTIC EXERCISES: CPT | Performed by: PHYSICAL THERAPIST

## 2019-11-18 NOTE — PROGRESS NOTES
Physical Therapy Daily Progress Note    Patient: Kevin Ellington   : 1935  Diagnosis/ICD-10 Code:  Difficulty walking [R26.2]  Referring practitioner: Tyrese Chaidez MD  Date of Initial Visit: Type: THERAPY  Noted: 10/31/2019  Today's Date: 2019  Patient seen for 3 sessions           Subjective     Objective   See Exercise, Manual, and Modality Logs for complete treatment.     Assessment:  Al tolerated treatment well.  We progressed his HEP to help with spinal mobility and introduced the step up/through to help LE strength/balance and the recumbent bike for LE mobility.    Plan:  Monitor the effect of today's treatment and continue as indicated.       Timed:    Manual Therapy:         mins  01727;  Therapeutic Exercise:    40     mins  73007;     Neuromuscular Beena:    5    mins  53812;    Therapeutic Activity:          mins  27219;     Gait Training:           mins  34351;     Ultrasound:          mins  60082;    Electrical Stimulation:         mins  22287 ( );  Iontophoresis         mins 19408;  Aquatic Therapy         mins 10814;    Untimed:  Electrical Stimulation:         mins  68313 (MC );  Mechanical Traction:         mins  84933;     Timed Treatment:   45   mins   Total Treatment:     45   mins  Woodrow Forte, PT  Physical Therapist

## 2019-11-21 ENCOUNTER — TREATMENT (OUTPATIENT)
Dept: PHYSICAL THERAPY | Facility: CLINIC | Age: 84
End: 2019-11-21

## 2019-11-21 DIAGNOSIS — M54.42 CHRONIC BILATERAL LOW BACK PAIN WITH BILATERAL SCIATICA: ICD-10-CM

## 2019-11-21 DIAGNOSIS — M54.41 CHRONIC BILATERAL LOW BACK PAIN WITH BILATERAL SCIATICA: ICD-10-CM

## 2019-11-21 DIAGNOSIS — R26.2 DIFFICULTY WALKING: Primary | ICD-10-CM

## 2019-11-21 DIAGNOSIS — R29.898 WEAKNESS OF BOTH LOWER EXTREMITIES: ICD-10-CM

## 2019-11-21 DIAGNOSIS — R53.1 STRENGTH LOSS OF: ICD-10-CM

## 2019-11-21 DIAGNOSIS — G89.29 CHRONIC BILATERAL LOW BACK PAIN WITH BILATERAL SCIATICA: ICD-10-CM

## 2019-11-21 DIAGNOSIS — M48.062 LUMBAR STENOSIS WITH NEUROGENIC CLAUDICATION: ICD-10-CM

## 2019-11-21 DIAGNOSIS — R26.89 LOSS OF BALANCE: ICD-10-CM

## 2019-11-21 PROCEDURE — 97112 NEUROMUSCULAR REEDUCATION: CPT | Performed by: PHYSICAL THERAPIST

## 2019-11-21 PROCEDURE — 97110 THERAPEUTIC EXERCISES: CPT | Performed by: PHYSICAL THERAPIST

## 2019-11-21 NOTE — PROGRESS NOTES
Physical Therapy Daily Progress Note    Patient: Kevin Ellington   : 1935  Diagnosis/ICD-10 Code:  Difficulty walking [R26.2]  Referring practitioner: Tyrese Chaidez MD  Date of Initial Visit: Type: THERAPY  Noted: 10/31/2019  Today's Date: 2019  Patient seen for 4 sessions           Subjective: Al reports that pain is less at rest.  He does have issues when he has pain with the spine when doing dishes.    Objective   See Exercise, Manual, and Modality Logs for complete treatment.     Assessment:  He is progressing therapeutic exercises to improve posture, spinal mobility and increase stability with weight bearing.  He requires cues for exercise technique and for engaging his core.      Plan:  Monitor the effect of today's treatment and continue as indicated.        Timed:    Manual Therapy:         mins  07970;  Therapeutic Exercise:    35     mins  32951;     Neuromuscular Beena:    10    mins  25197;    Therapeutic Activity:          mins  75083;     Gait Training:           mins  15945;     Ultrasound:          mins  63853;    Electrical Stimulation:         mins  79797 ( );  Iontophoresis         mins 77383;  Aquatic Therapy         mins 76571;    Untimed:  Electrical Stimulation:         mins  65712 (MC );  Mechanical Traction:         mins  16166;     Timed Treatment:   45   mins   Total Treatment:     45   mins  Woodrow Forte PT  Physical Therapist

## 2019-11-25 ENCOUNTER — TREATMENT (OUTPATIENT)
Dept: PHYSICAL THERAPY | Facility: CLINIC | Age: 84
End: 2019-11-25

## 2019-11-25 DIAGNOSIS — M25.571 CHRONIC PAIN OF RIGHT ANKLE: ICD-10-CM

## 2019-11-25 DIAGNOSIS — M19.079 ARTHRITIS OF ANKLE: ICD-10-CM

## 2019-11-25 DIAGNOSIS — I10 ESSENTIAL HYPERTENSION: ICD-10-CM

## 2019-11-25 DIAGNOSIS — G89.29 CHRONIC PAIN OF RIGHT ANKLE: ICD-10-CM

## 2019-11-25 DIAGNOSIS — R06.02 SHORTNESS OF BREATH: ICD-10-CM

## 2019-11-25 DIAGNOSIS — R29.898 BILATERAL LEG WEAKNESS: Primary | ICD-10-CM

## 2019-11-25 PROCEDURE — 97110 THERAPEUTIC EXERCISES: CPT | Performed by: PHYSICAL THERAPIST

## 2019-11-25 PROCEDURE — 97112 NEUROMUSCULAR REEDUCATION: CPT | Performed by: PHYSICAL THERAPIST

## 2019-11-25 RX ORDER — TAMSULOSIN HYDROCHLORIDE 0.4 MG/1
0.8 CAPSULE ORAL DAILY
Qty: 180 CAPSULE | Refills: 3 | Status: SHIPPED | OUTPATIENT
Start: 2019-11-25 | End: 2020-10-26 | Stop reason: SDUPTHER

## 2019-11-25 RX ORDER — CLONIDINE HYDROCHLORIDE 0.1 MG/1
0.1 TABLET ORAL 2 TIMES DAILY
Qty: 180 TABLET | Refills: 3 | Status: SHIPPED | OUTPATIENT
Start: 2019-11-25 | End: 2020-10-26 | Stop reason: SDUPTHER

## 2019-11-25 RX ORDER — ATORVASTATIN CALCIUM 40 MG/1
40 TABLET, FILM COATED ORAL DAILY
Qty: 90 TABLET | Refills: 3 | Status: SHIPPED | OUTPATIENT
Start: 2019-11-25 | End: 2020-10-26 | Stop reason: SDUPTHER

## 2019-11-25 RX ORDER — OMEPRAZOLE 20 MG/1
20 CAPSULE, DELAYED RELEASE ORAL DAILY
Qty: 90 CAPSULE | Refills: 3 | Status: SHIPPED | OUTPATIENT
Start: 2019-11-25 | End: 2020-10-26 | Stop reason: SDUPTHER

## 2019-11-25 RX ORDER — METOPROLOL SUCCINATE 100 MG/1
100 TABLET, EXTENDED RELEASE ORAL DAILY
Qty: 90 TABLET | Refills: 3 | Status: SHIPPED | OUTPATIENT
Start: 2019-11-25 | End: 2020-10-26 | Stop reason: SDUPTHER

## 2019-11-25 RX ORDER — LOSARTAN POTASSIUM 100 MG/1
100 TABLET ORAL DAILY
Qty: 90 TABLET | Refills: 3 | Status: SHIPPED | OUTPATIENT
Start: 2019-11-25 | End: 2020-10-26 | Stop reason: SDUPTHER

## 2019-11-25 NOTE — TELEPHONE ENCOUNTER
Pt is requesting refills for the following:  Tamsulosin  Metoprolol  Losartan  Atorvastatin   Omeprazole  Clonidine    [facility]

## 2019-11-25 NOTE — PROGRESS NOTES
"Physical Therapy Daily Progress Note    Patient: Kevin Ellington   : 1935  Diagnosis/ICD-10 Code:  Bilateral leg weakness [R29.898]  Referring practitioner: Tyrese Chaidez MD  Date of Initial Visit: Type: THERAPY  Noted: 10/31/2019  Today's Date: 2019  Patient seen for 5 sessions           Subjective: \"My wife says that I am standing taller.\"    Objective   Therapeutic exercise  1. Recumbent bike, 4 minutes  2. Standing lat pulls, neutral , green theraband, 10 x 2  3. Standing row, supinated , green theraband, 10 x 2  4. Step up/through, 1\" book, 10 x 2, B  5. SKTC, hooklying, x 10, B, 5s hold  6. Lumbar bracing, x 3, 20s hold   7. Craniovertebral flexion, x 20, 2s hold  8. Wand flexion, x 10, 5s hold  9. LTR, x 10, 5s hold, B    Assessment:  Al tolerated treatment well.  He requires cues for exercise technqiue and to use the core when doing his closed chain exercises.  He also needed cueing to breath normally when engaging the core.  This is all understandable for it requires coordination to this and he is just learning to do it.  Improving this will help him facilitate the core more for ADLs.    Plan: monitor the effects of this treatment and consider beginning lateral stepping for LE strength, balance and stability.           Timed:    Manual Therapy:         mins  86911;  Therapeutic Exercise:    35     mins  48164;     Neuromuscular Beena:    10    mins  52505;    Therapeutic Activity:          mins  71573;     Gait Training:           mins  89061;     Ultrasound:          mins  24557;    Electrical Stimulation:         mins  98926 ( );  Iontophoresis         mins 04087;  Aquatic Therapy         mins 76908;    Untimed:  Electrical Stimulation:         mins  22045 ( );  Mechanical Traction:         mins  70953;     Timed Treatment:   45   mins   Total Treatment:     45   mins  Woodrow Forte PT  Physical Therapist  "

## 2019-11-27 ENCOUNTER — TREATMENT (OUTPATIENT)
Dept: PHYSICAL THERAPY | Facility: CLINIC | Age: 84
End: 2019-11-27

## 2019-11-27 DIAGNOSIS — R26.2 DIFFICULTY WALKING: ICD-10-CM

## 2019-11-27 DIAGNOSIS — R29.898 BILATERAL LEG WEAKNESS: Primary | ICD-10-CM

## 2019-11-27 DIAGNOSIS — R53.1 STRENGTH LOSS OF: ICD-10-CM

## 2019-11-27 DIAGNOSIS — R06.02 SHORTNESS OF BREATH: ICD-10-CM

## 2019-11-27 DIAGNOSIS — M19.079 ARTHRITIS OF ANKLE: ICD-10-CM

## 2019-11-27 DIAGNOSIS — R26.89 LOSS OF BALANCE: ICD-10-CM

## 2019-11-27 DIAGNOSIS — M25.571 CHRONIC PAIN OF RIGHT ANKLE: ICD-10-CM

## 2019-11-27 DIAGNOSIS — G89.29 CHRONIC PAIN OF RIGHT ANKLE: ICD-10-CM

## 2019-11-27 PROCEDURE — 97110 THERAPEUTIC EXERCISES: CPT | Performed by: PHYSICAL THERAPIST

## 2019-11-27 NOTE — PROGRESS NOTES
"Re-Assessment / Re-Certification        Patient: Kevin Ellington   : 1935  Diagnosis/ICD-10 Code:  Bilateral leg weakness [R29.898]  Referring practitioner: Tyrese Chaidez MD  Date of Initial Visit: Type: THERAPY  Noted: 10/31/2019  Today's Date: 2019  Patient seen for 6 sessions      Subjective:     Clinical Progress: improved  Home Program Compliance: Yes  Treatment has included:  therapeutic exercise, manual therapy, neuro-muscular retraining  and patient education with home exercise program     Subjective: Al is standing up straighter, having less pain and progressing with education for self care.   Objective   Therapeutic exercise  Therapeutic exercise  1. NuStep, x 5 minutes  2. Standing lat pulls, neutral , green theraband, 10 x 2  3. Standing row, supinated , green theraband, 10 x 2  4. Step up/through, 2\" book, 10 x 2, B  5. SKTC, hooklying, x 10, B, 5s hold  6. Lumbar bracing, x 2, 25 seconds hold (neuromuscular reeducation)  7. Bridging, while engaging core, 5s x 10, with low level lift (  8. Craniovertebral flexion, x 20, 2s hold  9. Wand flexion, x 10, 5s hold  10. LTR, x 10, 5s hold, B    Patient education: Bridging, while engaging the core, was added to his HEP.    Reassessment:  Lumbar AROM, standing, flexion minimal loss, extension severe loss, LB moderate to severe loss B, all pain free  Motor Conrol, L2-S2 B 4+ to 5/5    Assessment/Plan  STGs to be met in 12 visits  1. Al is introduced to exercises to improve coordination, strength and the use of the core for noraml ADLs. (met)  2. Postural exercises are begun to improve posture and general spinal mobility.(met)  3. Balance exercises are progressed. (met)     LTGs to be met in 24 visits.  1. Peak pain in the lumbar spine and related areas is decreased to 5/10. (ongoing)  2. Standing AROM of the lumbar spine is pain free.(met)  3. Al is independent with a complete HEP and self care education.(ongoing)  4. Modified Oswestry " is improved by 10%.(ongoing).     Plan:  Continue to progress treatment to meet all goals and prepare him better to deal with issues independently.     Recommendations: Continue as planned  Timeframe: 1 month  Prognosis to achieve goals: good    PT Signature: Woodrow Forte PT      Based upon review of the patient's progress and continued therapy plan, it is my medical opinion that Kevin Ellington should continue physical therapy treatment at [facility]  [address] [place] [address] [phone].    Signature: __________________________________  Tyrese Chaidez MD    Timed:  Manual Therapy:         mins  64193;  Therapeutic Exercise:    40     mins  44132;     Neuromuscular Beena:    5    mins  20665;    Therapeutic Activity:          mins  90002;     Gait Training:           mins  65259;     Ultrasound:          mins  75926;    Electrical Stimulation:         mins  36878 ( );  Iontophoresis         mins 74123;    Untimed:  Electrical Stimulation:         mins  08011 ( );  Mechanical Traction:         mins  11569;     Timed Treatment:   45   mins   Total Treatment:     45   mins

## 2019-12-02 ENCOUNTER — TREATMENT (OUTPATIENT)
Dept: PHYSICAL THERAPY | Facility: CLINIC | Age: 84
End: 2019-12-02

## 2019-12-02 DIAGNOSIS — R06.02 SHORTNESS OF BREATH: ICD-10-CM

## 2019-12-02 DIAGNOSIS — G89.29 CHRONIC PAIN OF RIGHT ANKLE: ICD-10-CM

## 2019-12-02 DIAGNOSIS — R29.898 BILATERAL LEG WEAKNESS: Primary | ICD-10-CM

## 2019-12-02 DIAGNOSIS — M19.079 ARTHRITIS OF ANKLE: ICD-10-CM

## 2019-12-02 DIAGNOSIS — M25.571 CHRONIC PAIN OF RIGHT ANKLE: ICD-10-CM

## 2019-12-02 PROCEDURE — 97110 THERAPEUTIC EXERCISES: CPT | Performed by: PHYSICAL THERAPIST

## 2019-12-02 PROCEDURE — 97112 NEUROMUSCULAR REEDUCATION: CPT | Performed by: PHYSICAL THERAPIST

## 2019-12-02 NOTE — PROGRESS NOTES
"Physical Therapy Daily Progress Note    Patient: Kevin Ellingtno   : 1935  Diagnosis/ICD-10 Code:  Bilateral leg weakness [R29.898]  Referring practitioner: Tyrese Chaidez MD  Date of Initial Visit: Type: THERAPY  Noted: 10/31/2019  Today's Date: 2019  Patient seen for 7 sessions           Subjective: Al reported a little L sided low back pain today that has been present for 3-4 days.  He rated it 4-5/10 and noticed with certain movements.    Objective   Therapeutic exercise  1. NuStep, x 5 minutes  2. Standing lat pulls, neutral , green theraband, 10 x 2 (neuromuscular reeducation)  3. Standing row, supinated , green theraband, 10 x 2 (neuromuscular reeducation)  4. Step up/through, 2\" book, 10 x 2, B  5. SKTC, hooklying, x 10, B, 5s hold  6. Lumbar bracing, x 3, 30 seconds hold (neuromuscular reeducation)  7. Bridging, while engaging core, 5s x 10, with low level lift (neuormuscular reeducation)  8. Craniovertebral flexion, x 20, 2s hold  9. Wand flexion, x 10, 5s hold  10. LTR, x 10, 5s hold, B    Manual therapy: In R sidelying, L lumbar spine traction L4-5, L5-S1, manual traction at each level 30s x 3 each    Assessment:  Al needed guarding with the step up/through, especially with the L LE as the stability side.  Cues for posture during exercises and engaging the core to stabilize his lumbar spine were required.  He noticed less L lumbar spine pain after treatment.    Plan:  Monitor the effects of today's treatment and continue as indicated.         Timed:    Manual Therapy:    5     mins  30736;  Therapeutic Exercise:    30     mins  55891;     Neuromuscular Beena:    10    mins  28205;    Therapeutic Activity:          mins  56298;     Gait Training:           mins  26126;     Ultrasound:          mins  90370;    Electrical Stimulation:         mins  42352 ( );  Iontophoresis         mins 15867;  Aquatic Therapy         mins 07863;    Untimed:  Electrical Stimulation:         " mins  51138 ( );  Mechanical Traction:         mins  82213;     Timed Treatment:   45   mins   Total Treatment:     45   mins  Woodrow Forte PT  Physical Therapist

## 2019-12-05 ENCOUNTER — TREATMENT (OUTPATIENT)
Dept: PHYSICAL THERAPY | Facility: CLINIC | Age: 84
End: 2019-12-05

## 2019-12-05 DIAGNOSIS — R26.2 DIFFICULTY WALKING: ICD-10-CM

## 2019-12-05 DIAGNOSIS — R06.02 SHORTNESS OF BREATH: ICD-10-CM

## 2019-12-05 DIAGNOSIS — M25.571 CHRONIC PAIN OF RIGHT ANKLE: ICD-10-CM

## 2019-12-05 DIAGNOSIS — R29.898 BILATERAL LEG WEAKNESS: Primary | ICD-10-CM

## 2019-12-05 DIAGNOSIS — M19.079 ARTHRITIS OF ANKLE: ICD-10-CM

## 2019-12-05 DIAGNOSIS — G89.29 CHRONIC PAIN OF RIGHT ANKLE: ICD-10-CM

## 2019-12-05 PROCEDURE — 97112 NEUROMUSCULAR REEDUCATION: CPT | Performed by: PHYSICAL THERAPIST

## 2019-12-05 PROCEDURE — 97110 THERAPEUTIC EXERCISES: CPT | Performed by: PHYSICAL THERAPIST

## 2019-12-05 NOTE — PROGRESS NOTES
"Physical Therapy Daily Progress Note    Patient: Kevin Ellington   : 1935  Diagnosis/ICD-10 Code:  No primary diagnosis found.  Referring practitioner: Tyrese Chaidez MD  Date of Initial Visit: No linked episodes  Today's Date: 2019  Patient seen for Visit count could not be calculated. Make sure you are using a visit which is associated with an episode. sessions           Subjective : Al reported that he tolerated his last visit in PT well and feels pretty good today.    Objective     Therapeutic exercise  1. NuStep, x 5 minutes  2. Standing lat pulls, neutral , green theraband, 10 x 2 (neuromuscular reeducation)  3. Standing row, supinated , green theraband, 10 x 2 (neuromuscular reeducation)  4. Step up/through, 2\" book, 10 x 2, B  5. SKTC, hooklying, x 10, B, 5s hold  6. Lumbar bracing, x 3, 30 seconds hold (neuromuscular reeducation)  7. Bridging, while engaging core, 5s x 10, with low level lift (neuormuscular reeducation)  8. Craniovertebral flexion, x 20, 2s hold  9. Wand flexion, x 10, 5s hold  10. LTR, x 10, 5s hold, B  11. Modified piriformis stretch, 30s x 3, B    Patient education: modified piriformis stretch was added to his HEP.    Assessment:  Al is tolerating treatment and progression with exercises well.  He requires cues for technique, both verbal and tactile.  He needs refinement with his exercises to get the most positive effect on his tissues to decrease pain and improve function.    Plan:  Monitor the effect of treatment and continue as indicate.          Timed:    Manual Therapy:         mins  26038;  Therapeutic Exercise:    35     mins  49348;     Neuromuscular Beena:    10    mins  38490;    Therapeutic Activity:          mins  46354;     Gait Training:           mins  39798;     Ultrasound:          mins  49028;    Electrical Stimulation:         mins  65757 ( );  Iontophoresis         mins 26527;  Aquatic Therapy         mins 15226;    Untimed:  Electrical " Stimulation:         mins  63529 ( );  Mechanical Traction:         mins  26443;     Timed Treatment:   45   mins   Total Treatment:     45   mins  Woodrow Forte PT  Physical Therapist

## 2019-12-09 ENCOUNTER — TREATMENT (OUTPATIENT)
Dept: PHYSICAL THERAPY | Facility: CLINIC | Age: 84
End: 2019-12-09

## 2019-12-09 DIAGNOSIS — R29.898 BILATERAL LEG WEAKNESS: Primary | ICD-10-CM

## 2019-12-09 DIAGNOSIS — R26.89 LOSS OF BALANCE: ICD-10-CM

## 2019-12-09 DIAGNOSIS — M25.571 CHRONIC PAIN OF RIGHT ANKLE: ICD-10-CM

## 2019-12-09 DIAGNOSIS — R26.2 DIFFICULTY WALKING: ICD-10-CM

## 2019-12-09 DIAGNOSIS — R06.02 SHORTNESS OF BREATH: ICD-10-CM

## 2019-12-09 DIAGNOSIS — G89.29 CHRONIC PAIN OF RIGHT ANKLE: ICD-10-CM

## 2019-12-09 DIAGNOSIS — M19.079 ARTHRITIS OF ANKLE: ICD-10-CM

## 2019-12-09 PROCEDURE — 97110 THERAPEUTIC EXERCISES: CPT | Performed by: PHYSICAL THERAPIST

## 2019-12-09 PROCEDURE — 97112 NEUROMUSCULAR REEDUCATION: CPT | Performed by: PHYSICAL THERAPIST

## 2019-12-09 NOTE — PROGRESS NOTES
"Physical Therapy Daily Progress Note    Patient: Kevin Ellington   : 1935  Diagnosis/ICD-10 Code:  No primary diagnosis found.  Referring practitioner: Tyrese Chaidez MD  Date of Initial Visit: No linked episodes  Today's Date: 2019  Patient seen for Visit count could not be calculated. Make sure you are using a visit which is associated with an episode. sessions           Subjective: Al noticed that he was able to do stairs, just using a hand rail without his cane, at [facility], much easier than he has done in the recent past.    Objective     Therapeutic exercise  1. NuStep, x 5 minutes  2. Standing lat pulls, neutral , green theraband, 10 x 2 (neuromuscular reeducation)  3. Standing row, supinated , green theraband, 10 x 2 (neuromuscular reeducation)  4. Step up/through, 2\" book, 10 x 2, B  5. SKTC, hooklying, x 10, B, 5s hold  6. Lumbar bracing, x 1, 30 seconds hold (neuromuscular reeducation)  7. Bridging, while engaging core, 5s x 10, with low level lift (neuormuscular reeducation)  8. Craniovertebral flexion, x 20, 2s hold  9. Wand flexion, x 10, 5s hold  10. LTR, x 10, 5s hold, B  11. Modified piriformis stretch, 30s x 3, B    Assessment:  Al is noticing improved function with stairs and generally noticing better balance with ADLs.  He requires cues for exercise technique and reminders to use the core to stabilize the lumbar spine as well.      Plan:  Monitor the effect of today's treatment and continue as indicated.           Timed:    Manual Therapy:     mins  62193;  Therapeutic Exercise:    35     mins  95373;     Neuromuscular Beena:    10    mins  78378;    Therapeutic Activity:          mins  04783;     Gait Training:           mins  20014;     Ultrasound:          mins  34745;    Electrical Stimulation:         mins  54513 ( );  Iontophoresis         mins 22275;  Aquatic Therapy         mins 98397;    Untimed:  Electrical Stimulation:         mins  88555 ( " );  Mechanical Traction:         mins  20898;     Timed Treatment:   45   mins   Total Treatment:     45   mins  Woodrow Forte, PT  Physical Therapist   5

## 2019-12-12 ENCOUNTER — TREATMENT (OUTPATIENT)
Dept: PHYSICAL THERAPY | Facility: CLINIC | Age: 84
End: 2019-12-12

## 2019-12-12 PROCEDURE — 97110 THERAPEUTIC EXERCISES: CPT | Performed by: PHYSICAL THERAPIST

## 2019-12-12 PROCEDURE — 97112 NEUROMUSCULAR REEDUCATION: CPT | Performed by: PHYSICAL THERAPIST

## 2019-12-12 NOTE — PROGRESS NOTES
"Physical Therapy Daily Progress Note    Patient: Kevin Ellington   : 1935  Diagnosis/ICD-10 Code:  No primary diagnosis found.  Referring practitioner: Tyrese Chaidez MD  Date of Initial Visit: No linked episodes  Today's Date: 2019  Patient seen for Visit count could not be calculated. Make sure you are using a visit which is associated with an episode. sessions           Subjective: Al reported that he was moving around quickly yesterday and momentarily lost his balance, but recovered and did not fall.  He stated that he recovers better, these days, when he has those type of moments.    Objective     1. NuStep, x 5 minutes  2. Standing lat pulls, neutral , green theraband, 10 x 2 (neuromuscular reeducation)  3. Standing row, supinated , green theraband, 10 x 2 (neuromuscular reeducation)  4. Standing chest press, green theraband, 10 x 2 (neuromuscular reeducation)  5. Step up/through, 2\" book, 10 x 2, B  6. SKTC, hooklying, x 5, B, 5s hold  7. Wand flexion, x 10  8. Modified piriformis stretch, 30s x 3, B  9. Craniovertebral flexion, x 10, 2s hold  10. LTR, x 10, B    Assessment:  Al required cues for posture and stabilizing the spine with his core in the standing exercises. He also requires education to move with thought during the day and not move so quickly where he gets in a vulnerable situation and increased chances of falling.    Plan:  Continue next week and prepare him for independent self care for these issues.     Timed:    Manual Therapy:         mins  63465;  Therapeutic Exercise:    35     mins  84344;     Neuromuscular Beena:    10    mins  65310;    Therapeutic Activity:          mins  28126;     Gait Training:           mins  08166;     Ultrasound:          mins  41641;    Electrical Stimulation:         mins  99836 ( );  Iontophoresis         mins 55871;  Aquatic Therapy         mins 99916;    Untimed:  Electrical Stimulation:         mins  53068 ( " );  Mechanical Traction:         mins  83224;     Timed Treatment:   45   mins   Total Treatment:     45   mins  Woodrow Forte, PT  Physical Therapist

## 2019-12-17 ENCOUNTER — TREATMENT (OUTPATIENT)
Dept: PHYSICAL THERAPY | Facility: CLINIC | Age: 84
End: 2019-12-17

## 2019-12-17 DIAGNOSIS — G89.29 CHRONIC PAIN OF RIGHT ANKLE: ICD-10-CM

## 2019-12-17 DIAGNOSIS — R26.2 DIFFICULTY WALKING: ICD-10-CM

## 2019-12-17 DIAGNOSIS — R26.89 LOSS OF BALANCE: ICD-10-CM

## 2019-12-17 DIAGNOSIS — M19.079 ARTHRITIS OF ANKLE: ICD-10-CM

## 2019-12-17 DIAGNOSIS — R06.02 SHORTNESS OF BREATH: ICD-10-CM

## 2019-12-17 DIAGNOSIS — R29.898 BILATERAL LEG WEAKNESS: Primary | ICD-10-CM

## 2019-12-17 DIAGNOSIS — M25.571 CHRONIC PAIN OF RIGHT ANKLE: ICD-10-CM

## 2019-12-17 PROCEDURE — 97112 NEUROMUSCULAR REEDUCATION: CPT | Performed by: PHYSICAL THERAPIST

## 2019-12-17 PROCEDURE — 97110 THERAPEUTIC EXERCISES: CPT | Performed by: PHYSICAL THERAPIST

## 2019-12-17 NOTE — PROGRESS NOTES
"Physical Therapy Daily Progress Note    Patient: Kevin Ellington   : 1935  Diagnosis/ICD-10 Code:  No primary diagnosis found.  Referring practitioner: Tyrese Chaidez MD  Date of Initial Visit: No linked episodes  Today's Date: 2019  Patient seen for Visit count could not be calculated. Make sure you are using a visit which is associated with an episode. sessions           Subjective: Al did some decorating at home and noticed some soreness.  He stated that he got a good workout last night and felt better this morning.       Objective     1. NuStep, x 5 minutes  2. Standing lat pulls, neutral , green theraband, 10 x 2 (neuromuscular reeducation)  3. Standing row, supinated , green theraband, 10 x 2 (neuromuscular reeducation)  4. Standing chest press, green theraband, 10 x 2 (neuromuscular reeducation)  5. Step up/through, 2\" book, 10 x 2, B  6. SKTC, hooklying, x 5, B, 5s hold  7. Wand flexion, x 10  8. Modified piriformis stretch, 30s x 3, B  9. Craniovertebral flexion, x 10, 2s hold  10. LTR, x 10, B      Patient education:  He was given a balance/PRE program to be performed twice per week at home to include step up/through, lateral step downs, resisted lat pulls, resisted rowing and resisted chest press.  All to be done closed chain technique to enhance stability.    Assessment:  He required cues for technique and tolerated treatment well.    Plan:  He has one scheduled visit left.  We will treat and give final education to prepare for self care.      Timed:    Manual Therapy:         mins  23147;  Therapeutic Exercise:    35     mins  95463;     Neuromuscular Beena:    10    mins  06856;    Therapeutic Activity:          mins  12557;     Gait Training:           mins  87922;     Ultrasound:          mins  98754;    Electrical Stimulation:         mins  24535 ( );  Iontophoresis         mins 89825;  Aquatic Therapy         mins 14078;    Untimed:  Electrical Stimulation:         " mins  53573 ( );  Mechanical Traction:         mins  82799;     Timed Treatment:   45   mins   Total Treatment:     45   mins  Woodrow Forte PT  Physical Therapist

## 2019-12-18 ENCOUNTER — OFFICE VISIT (OUTPATIENT)
Dept: FAMILY MEDICINE CLINIC | Facility: CLINIC | Age: 84
End: 2019-12-18

## 2019-12-18 VITALS
WEIGHT: 222.2 LBS | OXYGEN SATURATION: 98 % | DIASTOLIC BLOOD PRESSURE: 78 MMHG | BODY MASS INDEX: 31.11 KG/M2 | SYSTOLIC BLOOD PRESSURE: 130 MMHG | RESPIRATION RATE: 16 BRPM | HEART RATE: 74 BPM | HEIGHT: 71 IN | TEMPERATURE: 98.3 F

## 2019-12-18 DIAGNOSIS — I10 ESSENTIAL HYPERTENSION: ICD-10-CM

## 2019-12-18 DIAGNOSIS — G47.419 PRIMARY NARCOLEPSY WITHOUT CATAPLEXY: ICD-10-CM

## 2019-12-18 PROCEDURE — 99213 OFFICE O/P EST LOW 20 MIN: CPT | Performed by: FAMILY MEDICINE

## 2019-12-18 RX ORDER — SPIRONOLACTONE 50 MG/1
50 TABLET, FILM COATED ORAL DAILY
Qty: 90 TABLET | Refills: 3 | Status: SHIPPED | OUTPATIENT
Start: 2019-12-18 | End: 2020-08-27

## 2019-12-18 RX ORDER — MODAFINIL 100 MG/1
100 TABLET ORAL DAILY
Qty: 90 TABLET | Refills: 0 | Status: SHIPPED | OUTPATIENT
Start: 2019-12-18 | End: 2020-03-26 | Stop reason: SDUPTHER

## 2019-12-18 NOTE — PROGRESS NOTES
Kevin Ellington is a 84 y.o. male.     Chief Complaint   Patient presents with   • Hypertension     patient has 2 months follow up on bp.   • ADHD     patient is following up on Modafinil 100 mg       HPI     Patient presents to the office today to follow-up on hypertension as well as narcolepsy.  Taking and tolerating modafinil 100 mg daily.  In need of refills.  No adverse side effects noted.  Blood pressures currently stable.  At her last office visit patient was started on spironolactone.  Pressures have improved.  130/78 today.  Reports no adverse side effects.  Avoiding salt as well as potassium as instructed.    The following portions of the patient's history were reviewed and updated as appropriate: allergies, current medications, past family history, past medical history, past social history, past surgical history and problem list.    Review of Systems   Constitutional: Negative for activity change.   Musculoskeletal: Negative for arthralgias, back pain, gait problem, joint swelling and myalgias.   All other systems reviewed and are negative.    I have reviewed the ROS as documented by the MA. Perico Bernal MD    Objective  Vitals:    12/18/19 0817   BP: 130/78   Pulse: 74   Resp: 16   Temp: 98.3 °F (36.8 °C)   SpO2: 98%     Body mass index is 30.99 kg/m².    Physical Exam   Constitutional: He is oriented to person, place, and time. He appears well-developed and well-nourished. No distress.   HENT:   Head: Normocephalic and atraumatic.   Right Ear: External ear normal.   Left Ear: External ear normal.   Nose: Nose normal.   Mouth/Throat: Oropharynx is clear and moist.   Eyes: Pupils are equal, round, and reactive to light. Conjunctivae and EOM are normal. Right eye exhibits no discharge. Left eye exhibits no discharge. No scleral icterus.   Cardiovascular: Normal rate, regular rhythm and normal heart sounds.   Pulmonary/Chest: Effort normal and breath sounds normal. No respiratory distress. He has no  wheezes. He has no rales.   Abdominal: Soft. Bowel sounds are normal. He exhibits no distension. There is no tenderness.   Neurological: He is alert and oriented to person, place, and time.   Skin: Skin is warm and dry. He is not diaphoretic.   Nursing note and vitals reviewed.        Current Outpatient Medications:   •  aspirin 81 MG tablet, Take 81 mg by mouth Daily., Disp: , Rfl:   •  atorvastatin (LIPITOR) 40 MG tablet, Take 1 tablet by mouth Daily., Disp: 90 tablet, Rfl: 3  •  cetirizine (zyrTEC) 10 MG tablet, Take 10 mg by mouth Daily., Disp: , Rfl:   •  cloNIDine (CATAPRES) 0.1 MG tablet, Take 1 tablet by mouth 2 (Two) Times a Day., Disp: 180 tablet, Rfl: 3  •  fluticasone (FLONASE) 50 MCG/ACT nasal spray, 2 sprays into the nostril(s) as directed by provider Daily., Disp: 3 bottle, Rfl: 11  •  losartan (COZAAR) 100 MG tablet, Take 1 tablet by mouth Daily., Disp: 90 tablet, Rfl: 3  •  meloxicam (MOBIC) 7.5 MG tablet, Take 1 tablet by mouth Daily., Disp: 90 tablet, Rfl: 3  •  metoprolol succinate XL (TOPROL-XL) 100 MG 24 hr tablet, Take 1 tablet by mouth Daily., Disp: 90 tablet, Rfl: 3  •  modafinil (PROVIGIL) 100 MG tablet, Take 1 tablet by mouth Daily., Disp: 90 tablet, Rfl: 0  •  Multiple Vitamins-Minerals (MULTIVITAMIN PO), Take 1 tablet by mouth Daily., Disp: , Rfl:   •  omeprazole (priLOSEC) 20 MG capsule, Take 1 capsule by mouth Daily., Disp: 90 capsule, Rfl: 3  •  Saw Palmetto, Serenoa repens, 320 MG capsule, Take 320 mg by mouth 2 (two) times a day., Disp: , Rfl:   •  tamsulosin (FLOMAX) 0.4 MG capsule 24 hr capsule, Take 2 capsules by mouth Daily., Disp: 180 capsule, Rfl: 3  •  econazole nitrate (SPECTAZOLE) 1 % cream, Apply  topically to the appropriate area as directed As Needed., Disp: , Rfl:   •  spironolactone (ALDACTONE) 50 MG tablet, Take 1 tablet by mouth Daily., Disp: 90 tablet, Rfl: 3  Current outpatient and discharge medications have been reconciled for the patient.  Reviewed by: Perico  MD Gabe      Procedures    Lab Results (most recent)     None                  Kevin was seen today for hypertension and adhd.    Diagnoses and all orders for this visit:    Essential hypertension  -     spironolactone (ALDACTONE) 50 MG tablet; Take 1 tablet by mouth Daily.    Primary narcolepsy without cataplexy  -     modafinil (PROVIGIL) 100 MG tablet; Take 1 tablet by mouth Daily.      Pleased with the patient's progress with respect to spironolactone.  Advised continued use of diet and exercise.  90-day supply with 3 refills given for spironolactone.  90-day supply of modafinil given as well.    Return in about 3 months (around 3/18/2020) for Recheck.      Perico Bernal MD

## 2019-12-19 ENCOUNTER — TREATMENT (OUTPATIENT)
Dept: PHYSICAL THERAPY | Facility: CLINIC | Age: 84
End: 2019-12-19

## 2019-12-19 PROCEDURE — 97110 THERAPEUTIC EXERCISES: CPT | Performed by: PHYSICAL THERAPIST

## 2019-12-19 NOTE — PROGRESS NOTES
"Re-Assessment / Re-Certification        Patient: Kevin Ellington   : 1935  Diagnosis/ICD-10 Code:  No primary diagnosis found.  Referring practitioner: Tyrese Chaidez MD  Date of Initial Visit: No linked episodes  Today's Date: 2019  Patient seen for Visit count could not be calculated. Make sure you are using a visit which is associated with an episode. sessions      Subjective:     Clinical Progress: improved  Home Program Compliance: Yes  Treatment has included:  therapeutic exercise, manual therapy, neuro-muscular retraining  and patient education with home exercise program     Subjective: Al reports that he is improved.  He feels his balance is better, posture is improving, his awareness of his posture, core muscles and is able to carry/handle heavier objects better.  Recently, peak pain in the lumbar spine is 6/10.  A lot of standing and bending will aggravate things, like washing dishes.       Objective     Therapeutic exercise  1. Nu Step x 5 minutes  2. Standing lat pulls, green theraband, x 10  3. Standing row, supinated , green theraband, x 10  4. Chest press, green theraband, x 10  5. Step up/through 2\" step, x 10, B  6. Lateral step downs, 2\" step x 10, B    Reassessment:   AROM of the lumbar spine, standing  Flexion, WNL  Extension, moderate to severe loss with L  LBP  LB L, moderate to severe loss with L LBP, R moderate to severe loss    Motor control of the LE myotomes  L2 B equal and 4+/5, L3 B 5/5, L4 B 5/5, L5 B 4+/5, S1 B 5/5 and S2 B 5/5    Rhomberg Test: normal  Sharpened Rhomberg, half tandem, normal but full tandem was abnormal  Assessment/Plan     STGs to be met in 12 visits  1. Al is introduced to exercises to improve coordination, strength and the use of the core for noraml ADLs. (met)  2. Postural exercises are begun to improve posture and general spinal mobility. (met)  3. Balance exercises are progressed. (met)     LTGs to be met in 24 visits.  1. Peak pain in the " lumbar spine and related areas is decreased to 5/10. (not met)  2. Standing AROM of the lumbar spine is pain free.(not met  3. Al is independent with a complete HEP and self care education. (met)  4. Modified Oswestry is improved by 10%. (ongoing)    Plan: To see in one month to reassess and he is in need of a new set of orthotics and will get an order from Dr. Scott to have new impressions taken.     Recommendations: Continue as planned  Timeframe: 1 month  Prognosis to achieve goals: good    PT Signature: Woodrow Forte PT      Based upon review of the patient's progress and continued therapy plan, it is my medical opinion that Kevin Ellington should continue physical therapy treatment at [facility]  [address] [place] [address] [phone].    Signature: __________________________________  Tyrese Chaidez MD    Timed:  Manual Therapy:         mins  79033;  Therapeutic Exercise:    45     mins  48434;     Neuromuscular Beena:       mins  28978;    Therapeutic Activity:          mins  66302;     Gait Training:           mins  61030;     Ultrasound:          mins  48906;    Electrical Stimulation:         mins  43259 ( );  Iontophoresis         mins 97642;    Untimed:  Electrical Stimulation:         mins  25777 ( );  Mechanical Traction:         mins  39452;     Timed Treatment:   45   mins   Total Treatment:     45   mins

## 2020-01-03 ENCOUNTER — TREATMENT (OUTPATIENT)
Dept: PHYSICAL THERAPY | Facility: CLINIC | Age: 85
End: 2020-01-03

## 2020-01-03 PROCEDURE — 97110 THERAPEUTIC EXERCISES: CPT | Performed by: PHYSICAL THERAPIST

## 2020-01-03 PROCEDURE — 97760 ORTHOTIC MGMT&TRAING 1ST ENC: CPT | Performed by: PHYSICAL THERAPIST

## 2020-01-03 NOTE — PROGRESS NOTES
"Physical Therapy Daily Progress Note    Patient: Kevin Ellington   : 1935  Diagnosis/ICD-10 Code:  No primary diagnosis found.  Referring practitioner: Tyrese Chaidez MD  Date of Initial Visit: No linked episodes  Today's Date: 1/3/2020  Patient seen for Visit count could not be calculated. Make sure you are using a visit which is associated with an episode. sessions           Subjective: Al reported that he is wanting custom made orthotics.    Objective    Forefoot flexiblity: L passive supination 30-35 degrees, R 40-45 degrees    Impressions taken for custom made orthotics    Therapeutic exercise  1. Standing B lat pulldown, green theraband, neutral , 10 x 2  2. Standing row, supinated , green theraband, 10 x 2  3. Standing chest press, neutral , green theraband, 10 x 2  4. Step up/through, 2\" book, 10 x 2 B  5. Lateral step downs, 2\" book, x 10, B    Assessment:  Each forefoot falls in the normal range, 30-60 degrees of passive supination, for flexibility and he is a good candidate for custom made semi-rigid orthotics.  He required minimal cues for exercise technique.    Plan;  He is to return when the orthotics arrive to check fit, trim and give instructions for break in protocol.  He will also be instructed in appropriate foot exercises.       Timed:    Manual Therapy:         mins  65826;  Therapeutic Exercise:    30     mins  84282;     Neuromuscular Beena:        mins  83984;    Therapeutic Activity:          mins  61412;     Gait Training:           mins  60643;     Ultrasound:          mins  45565;    Electrical Stimulation:         mins  04781 ( );  Iontophoresis         mins 70017;  Aquatic Therapy         mins 96697;  Orthotic fit/train            15  Untimed:  Electrical Stimulation:         mins  00650 (MC );  Mechanical Traction:         mins  54922;     Timed Treatment:   45   mins   Total Treatment:     45   mins  Woodrow Forte PT  Physical Therapist  "

## 2020-01-22 ENCOUNTER — TREATMENT (OUTPATIENT)
Dept: PHYSICAL THERAPY | Facility: CLINIC | Age: 85
End: 2020-01-22

## 2020-01-22 ENCOUNTER — OFFICE VISIT (OUTPATIENT)
Dept: FAMILY MEDICINE CLINIC | Facility: CLINIC | Age: 85
End: 2020-01-22

## 2020-01-22 VITALS
OXYGEN SATURATION: 98 % | SYSTOLIC BLOOD PRESSURE: 122 MMHG | HEART RATE: 61 BPM | TEMPERATURE: 98.1 F | DIASTOLIC BLOOD PRESSURE: 78 MMHG | RESPIRATION RATE: 14 BRPM | BODY MASS INDEX: 31.11 KG/M2 | HEIGHT: 71 IN | WEIGHT: 222.2 LBS

## 2020-01-22 DIAGNOSIS — M25.571 CHRONIC PAIN OF RIGHT ANKLE: ICD-10-CM

## 2020-01-22 DIAGNOSIS — R06.02 SHORTNESS OF BREATH: ICD-10-CM

## 2020-01-22 DIAGNOSIS — R26.89 LOSS OF BALANCE: ICD-10-CM

## 2020-01-22 DIAGNOSIS — Z00.00 MEDICARE ANNUAL WELLNESS VISIT, SUBSEQUENT: Primary | ICD-10-CM

## 2020-01-22 DIAGNOSIS — G89.29 CHRONIC PAIN OF RIGHT ANKLE: ICD-10-CM

## 2020-01-22 DIAGNOSIS — R29.898 BILATERAL LEG WEAKNESS: Primary | ICD-10-CM

## 2020-01-22 DIAGNOSIS — M19.079 ARTHRITIS OF ANKLE: ICD-10-CM

## 2020-01-22 DIAGNOSIS — R26.2 DIFFICULTY WALKING: ICD-10-CM

## 2020-01-22 DIAGNOSIS — R53.1 STRENGTH LOSS OF: ICD-10-CM

## 2020-01-22 PROCEDURE — 97760 ORTHOTIC MGMT&TRAING 1ST ENC: CPT | Performed by: PHYSICAL THERAPIST

## 2020-01-22 PROCEDURE — 97110 THERAPEUTIC EXERCISES: CPT | Performed by: PHYSICAL THERAPIST

## 2020-01-22 PROCEDURE — G0439 PPPS, SUBSEQ VISIT: HCPCS | Performed by: FAMILY MEDICINE

## 2020-01-22 NOTE — PROGRESS NOTES
Re-Assessment / Re-Certification        Patient: Kevin Ellington   : 1935  Diagnosis/ICD-10 Code:  Bilateral leg weakness [R29.898]  Referring practitioner: Tyrese Chaidez MD  Date of Initial Visit: Type: THERAPY  Noted: 10/31/2019  Today's Date: 2020  Patient seen for 14 sessions      Subjective:     Clinical Progress: improved  Home Program Compliance: Yes  Treatment has included:  therapeutic exercise, manual therapy, patient education with home exercise program  and orthotic fabrication     Subjective:  Al reported that the orthotics felt like they fit correctly.  Objective     In sitting:  Each ankle was actively plantarflexed fully.  Each orthotic maintained full contact with the arch into full plantarflexion.  The orthotics were then trimmed to fit into his [employer] shoes.    Therapeutic exercise  1. He ambulated 160 feet, orthotics in the shoe, for a warm up.  2. He performed the runner's stretch, 30s x 3, B    Patient education:  The runner's stretch was added to his HEP.  He was also given break in protocol and general care guidelines for the orthotics.    Assessment:  Kevin Ellington has been seen for 14 physical therapy sessions for chronic back pain and ankle pain.  Treatment has included therapeutic exercise, manual therapy, neuro-muscular retraining , aquatic therapy, patient education with home exercise program  and orthotic fabrication . Progress to physical therapy goals is good.  He will benefit from continued skilled physical therapy to address remaining impairments and functional limitations.   STGs to be met in 12 visits  1. Al is introduced to exercises to improve coordination, strength and the use of the core for noraml ADLs. (met)  2. Postural exercises are begun to improve posture and general spinal mobility. (met)  3. Balance exercises are progressed. (met)     LTGs to be met in 24 visits.  1. Peak pain in the lumbar spine and related areas is decreased to 5/10. (not  met)  2. Standing AROM of the lumbar spine is pain free.(not met)  3. Al is independent with a complete HEP and self care education. (met)  4. Modified Oswestry is improved by 10%. (ongoing    Plan:  Al has been fully instructed in self care.  He will need time to adjust to the total contact fit of the orthotics which will alter forces in the kinetic chain and hopefully diminish pain further and improve function with ADLs.  He may return, if issues arise, within the one year anniversary of the order that started physical therapy, if problems in the kinetic chain arise.      Recommendations: Continue as planned  Timeframe: PRN  Prognosis to achieve goals: good    PT Signature: Woodrow Forte PT      Based upon review of the patient's progress and continued therapy plan, it is my medical opinion that Kevin Ellington should continue physical therapy treatment at [facility]  [address]   [place] [address]  [phone].    Signature: __________________________________  Tyrese Chaidez MD    Timed:  Manual Therapy:         mins  30090;  Therapeutic Exercise:    25     mins  14512;     Neuromuscular Beena:        mins  16936;    Therapeutic Activity:          mins  50276;     Gait Training:           mins  29730;     Ultrasound:          mins  28722;    Electrical Stimulation:         mins  43592 ( );  Iontophoresis         mins 10349;  Orthotic Mgmt/training  15     mins 62239;  Orthotic check out       mins 26607;  Canalith Repositioning       mins 39898;    Untimed:  Electrical Stimulation:         mins  72940 ( );  Mechanical Traction:         mins  95074;     Timed Treatment:   40   mins   Total Treatment:     40   mins

## 2020-01-22 NOTE — PATIENT INSTRUCTIONS
Medicare Wellness  Personal Prevention Plan of Service     Date of Office Visit:  2020  Encounter Provider:  Perico Bernal MD  Place of Service:  [facility]  Patient Name: Kevin Ellington  :  1935    As part of the Medicare Wellness portion of your visit today, we are providing you with this personalized preventive plan of services (PPPS). This plan is based upon recommendations of the [place] Preventive Services Task Force (USPSTF) and the Advisory Committee on Immunization Practices (ACIP).    This lists the preventive care services that should be considered, and provides dates of when you are due. Items listed as completed are up-to-date and do not require any further intervention.    Health Maintenance   Topic Date Due   • TDAP/TD VACCINES (1 - Tdap) 1946   • Pneumococcal Vaccine Once at 65 Years Old  2000   • LIPID PANEL  2020   • MEDICARE ANNUAL WELLNESS  2021   • INFLUENZA VACCINE  Completed   • ZOSTER VACCINE  Addressed       No orders of the defined types were placed in this encounter.      Return for As needed.

## 2020-01-22 NOTE — PROGRESS NOTES
The ABCs of the Annual Wellness Visit  Subsequent Medicare Wellness Visit    Chief Complaint   Patient presents with   • Medicare Wellness-subsequent     patient needs MWV       Subjective   History of Present Illness:  Kevin Ellington is a 84 y.o. male who presents for a Subsequent Medicare Wellness Visit.    HEALTH RISK ASSESSMENT    Recent Hospitalizations:  No hospitalization(s) within the last year.    Current Medical Providers:  Patient Care Team:  Perico Bernal MD as PCP - General (Family Medicine)    Smoking Status:  Social History     Tobacco Use   Smoking Status Never Smoker   Smokeless Tobacco Never Used   Tobacco Comment    caffeine use       Alcohol Consumption:  Social History     Substance and Sexual Activity   Alcohol Use No       Depression Screen:   PHQ-2/PHQ-9 Depression Screening 1/22/2020   Little interest or pleasure in doing things 0   Feeling down, depressed, or hopeless 0   Trouble falling or staying asleep, or sleeping too much 0   Feeling tired or having little energy 0   Poor appetite or overeating 0   Feeling bad about yourself - or that you are a failure or have let yourself or your family down 0   Trouble concentrating on things, such as reading the newspaper or watching television 0   Moving or speaking so slowly that other people could have noticed. Or the opposite - being so fidgety or restless that you have been moving around a lot more than usual 0   Thoughts that you would be better off dead, or of hurting yourself in some way 0   Total Score 0   If you checked off any problems, how difficult have these problems made it for you to do your work, take care of things at home, or get along with other people? Not difficult at all       Fall Risk Screen:  STEADI Fall Risk Assessment was completed, and patient is at MODERATE risk for falls. Assessment completed on:1/22/2020    Health Habits and Functional and Cognitive Screening:  Functional & Cognitive Status 1/22/2020   Do you  have difficulty preparing food and eating? No   Do you have difficulty bathing yourself, getting dressed or grooming yourself? No   Do you have difficulty using the toilet? No   Do you have difficulty moving around from place to place? No   Do you have trouble with steps or getting out of a bed or a chair? No   Current Diet Well Balanced Diet   Dental Exam Up to date   Eye Exam Up to date   Exercise (times per week) 3 times per week   Current Exercise Activities Include -   Do you need help using the phone?  No   Are you deaf or do you have serious difficulty hearing?  No   Do you need help with transportation? No   Do you need help shopping? No   Do you need help preparing meals?  No   Do you need help with housework?  No   Do you need help with laundry? No   Do you need help taking your medications? No   Do you need help managing money? No   Do you ever drive or ride in a car without wearing a seat belt? No   Have you felt unusual stress, anger or loneliness in the last month? No   Who do you live with? Spouse   If you need help, do you have trouble finding someone available to you? No   Have you been bothered in the last four weeks by sexual problems? No   Do you have difficulty concentrating, remembering or making decisions? No         Does the patient have evidence of cognitive impairment? No    Asprin use counseling:Taking ASA appropriately as indicated    Age-appropriate Screening Schedule:  Refer to the list below for future screening recommendations based on patient's age, sex and/or medical conditions. Orders for these recommended tests are listed in the plan section. The patient has been provided with a written plan.    Health Maintenance   Topic Date Due   • TDAP/TD VACCINES (1 - Tdap) 03/07/1946   • LIPID PANEL  06/04/2020   • INFLUENZA VACCINE  Completed   • ZOSTER VACCINE  Addressed          The following portions of the patient's history were reviewed and updated as appropriate: allergies, current  medications, past family history, past medical history, past social history, past surgical history and problem list.    Outpatient Medications Prior to Visit   Medication Sig Dispense Refill   • aspirin 81 MG tablet Take 81 mg by mouth Daily.     • atorvastatin (LIPITOR) 40 MG tablet Take 1 tablet by mouth Daily. 90 tablet 3   • cetirizine (zyrTEC) 10 MG tablet Take 10 mg by mouth Daily.     • cloNIDine (CATAPRES) 0.1 MG tablet Take 1 tablet by mouth 2 (Two) Times a Day. 180 tablet 3   • econazole nitrate (SPECTAZOLE) 1 % cream Apply  topically to the appropriate area as directed As Needed.     • fluticasone (FLONASE) 50 MCG/ACT nasal spray 2 sprays into the nostril(s) as directed by provider Daily. 3 bottle 11   • losartan (COZAAR) 100 MG tablet Take 1 tablet by mouth Daily. 90 tablet 3   • meloxicam (MOBIC) 7.5 MG tablet Take 1 tablet by mouth Daily. 90 tablet 3   • metoprolol succinate XL (TOPROL-XL) 100 MG 24 hr tablet Take 1 tablet by mouth Daily. 90 tablet 3   • modafinil (PROVIGIL) 100 MG tablet Take 1 tablet by mouth Daily. 90 tablet 0   • Multiple Vitamins-Minerals (MULTIVITAMIN PO) Take 1 tablet by mouth Daily.     • omeprazole (priLOSEC) 20 MG capsule Take 1 capsule by mouth Daily. 90 capsule 3   • Saw Palmetto, Serenoa repens, 320 MG capsule Take 320 mg by mouth 2 (two) times a day.     • spironolactone (ALDACTONE) 50 MG tablet Take 1 tablet by mouth Daily. 90 tablet 3   • tamsulosin (FLOMAX) 0.4 MG capsule 24 hr capsule Take 2 capsules by mouth Daily. 180 capsule 3     No facility-administered medications prior to visit.        Patient Active Problem List   Diagnosis   • Bulging lumbar disc   • DDD (degenerative disc disease), lumbar   • Leg pain   • Spinal stenosis of lumbar region with neurogenic claudication   • Radiculitis, thoracic   • Status post total left knee replacement using cement   • Status post total right knee replacement using cement   • Coronary artery disease involving native coronary  "artery of native heart without angina pectoris   • Essential hypertension   • Hyperlipemia   • S/P coronary artery stent placement   • Health care maintenance   • Primary narcolepsy without cataplexy   • Benign prostatic hyperplasia with urinary frequency   • Cervical radiculopathy   • Bilateral carpal tunnel syndrome   • Cervical spinal stenosis   • Cancer (CMS/HCC)   • Arthritis of ankle   • Chronic pain of right ankle   • Shortness of breath   • Bilateral leg weakness       Advanced Care Planning:  Patient has an advance directive - a copy has been provided and is visible in patient header    Review of Systems   Constitutional: Negative for activity change.   Eyes: Negative for discharge.   All other systems reviewed and are negative.      Compared to one year ago, the patient feels his physical health is better.  Compared to one year ago, the patient feels his mental health is the same.    Reviewed chart for potential of high risk medication in the elderly: yes  Reviewed chart for potential of harmful drug interactions in the elderly:yes    Objective         Vitals:    01/22/20 0809   BP: 122/78   Pulse: 61   Resp: 14   Temp: 98.1 °F (36.7 °C)   TempSrc: Oral   SpO2: 98%   Weight: 101 kg (222 lb 3.2 oz)   Height: 180.3 cm (71\")   PainSc: 0-No pain       Body mass index is 30.99 kg/m².  Discussed the patient's BMI with him. The BMI is above average; BMI management plan is completed.    Physical Exam          Assessment/Plan   Medicare Risks and Personalized Health Plan  CMS Preventative Services Quick Reference  Cardiovascular risk  Chronic Pain   Fall Risk  Obesity/Overweight     The above risks/problems have been discussed with the patient.  Pertinent information has been shared with the patient in the After Visit Summary.  Follow up plans and orders are seen below in the Assessment/Plan Section.    Diagnoses and all orders for this visit:    1. Medicare annual wellness visit, subsequent (Primary)      Follow " Up:  Return for As needed.     An After Visit Summary and PPPS were given to the patient.

## 2020-02-25 NOTE — PROGRESS NOTES
Subjective   Patient ID: Kevin Ellington is a 84 y.o. male is here today for follow-up after PT.   He states that he completed PT and it has helped.  He has HEP and is following them intermittently.     Patient was last seen 10.21.19 for  Intermittent right hip pain and problems with his balance and gait    Patient states that he is currently not having any leg or hip pain.  He does have intermittent moderate low back pain with prolonged sitting or standing.  He has bilateral leg weakness, L > R.  He still has problems with his balance and gait, but is has improved some. He denies urinary incontinence.  He has intermittent left lower extremity N/T.    He is with his wife. It has been about 4 months since I have seen him. He is actually doing well in terms of pain. He has not had to have an epidural block. He went to physical therapy. Thinks that it has helped his strength. He had some baseline weakness in his left quadriceps documented below. He had surgery by me in approximately 2014 and has had recurrent spinal stenosis from L2 to L5 which is quite severe. Since he is doing well, will hold off on any surgical considerations. I will see him in 7 months.      Back Pain   The current episode started more than 1 year ago. The problem occurs intermittently. The pain is present in the lumbar spine. The pain does not radiate. The pain is at a severity of 5/10. The pain is moderate. The symptoms are aggravated by standing. Associated symptoms include numbness and weakness. Pertinent negatives include no leg pain.   Extremity Weakness    The pain is present in the right upper leg, right lower leg, left lower leg and left upper leg. The current episode started more than 1 year ago. The problem occurs intermittently. The problem has been unchanged. The pain is at a severity of 5/10. The pain is moderate. Associated symptoms include numbness.   Difficulty Walking   The current episode started more than 1 year ago. The  problem has been gradually improving. Associated symptoms include numbness and weakness. Pertinent negatives include no arthralgias, joint swelling or myalgias. Nothing aggravates the symptoms.       The following portions of the patient's history were reviewed and updated as appropriate: allergies, current medications, past family history, past medical history, past social history, past surgical history and problem list.    Review of Systems   Musculoskeletal: Positive for back pain, extremity weakness and gait problem. Negative for arthralgias, joint swelling and myalgias.   Neurological: Positive for weakness and numbness.       Objective   Physical Exam   Constitutional: He is oriented to person, place, and time. He appears well-developed and well-nourished.   HENT:   Head: Normocephalic and atraumatic.   Eyes: Pupils are equal, round, and reactive to light. Conjunctivae and EOM are normal.   Fundoscopic exam:       The right eye shows no papilledema. The right eye shows venous pulsations.        The left eye shows no papilledema. The left eye shows venous pulsations.   Neck: Carotid bruit is not present.   Neurological: He is oriented to person, place, and time. He has a normal Finger-Nose-Finger Test and a normal Heel to Shin Test. Gait normal.   Reflex Scores:       Tricep reflexes are 2+ on the right side and 2+ on the left side.       Bicep reflexes are 2+ on the right side and 2+ on the left side.       Brachioradialis reflexes are 2+ on the right side and 2+ on the left side.       Patellar reflexes are 2+ on the right side and 2+ on the left side.       Achilles reflexes are 2+ on the right side and 2+ on the left side.  Psychiatric: His speech is normal.     Neurologic Exam     Mental Status   Oriented to person, place, and time.   Registration of memory: Good recent and remote memory.   Attention: normal. Concentration: normal.   Speech: speech is normal   Level of consciousness: alert  Knowledge:  consistent with education.     Cranial Nerves     CN II   Visual fields full to confrontation.   Visual acuity: normal    CN III, IV, VI   Pupils are equal, round, and reactive to light.  Extraocular motions are normal.     CN V   Facial sensation intact.   Right corneal reflex: normal  Left corneal reflex: normal    CN VII   Facial expression full, symmetric.   Right facial weakness: none  Left facial weakness: none    CN VIII   Hearing: intact    CN IX, X   Palate: symmetric    CN XI   Right sternocleidomastoid strength: normal  Left sternocleidomastoid strength: normal    CN XII   Tongue: not atrophic  Tongue deviation: none    Motor Exam   Muscle bulk: normal  Right arm tone: normal  Left arm tone: normal  Right leg tone: normal  Left leg tone: normal    Strength   Strength 5/5 except as noted.   Left quadriceps: 4/5    Sensory Exam   Light touch normal.     Gait, Coordination, and Reflexes     Gait  Gait: normal    Coordination   Finger to nose coordination: normal  Heel to shin coordination: normal    Reflexes   Right brachioradialis: 2+  Left brachioradialis: 2+  Right biceps: 2+  Left biceps: 2+  Right triceps: 2+  Left triceps: 2+  Right patellar: 2+  Left patellar: 2+  Right achilles: 2+  Left achilles: 2+  Right : 2+  Left : 2+      Assessment/Plan   Independent Review of Radiographic Studies:    I reviewed the lumbar cervical and thoracic myelogram done on August 2018.  There is severe stenosis from L2-L5.  Agree with the report.    Medical Decision Making:    He is doing much better without a lot of significant pain at this point.  He will continue his exercises.  We will keep an eye on him and see him in 7 months.  If there is a severe flareup and he wants to try an epidural block he will let us know.      Kevin was seen today for back pain, extremity weakness, difficulty walking and numbness.    Diagnoses and all orders for this visit:    Spinal stenosis of lumbar region with neurogenic  claudication    DDD (degenerative disc disease), lumbar    Bilateral leg weakness      Return in about 7 months (around 9/28/2020).

## 2020-02-28 ENCOUNTER — OFFICE VISIT (OUTPATIENT)
Dept: NEUROSURGERY | Facility: CLINIC | Age: 85
End: 2020-02-28

## 2020-02-28 VITALS
BODY MASS INDEX: 31.12 KG/M2 | DIASTOLIC BLOOD PRESSURE: 96 MMHG | HEART RATE: 65 BPM | WEIGHT: 222.3 LBS | HEIGHT: 71 IN | SYSTOLIC BLOOD PRESSURE: 190 MMHG

## 2020-02-28 DIAGNOSIS — M48.062 SPINAL STENOSIS OF LUMBAR REGION WITH NEUROGENIC CLAUDICATION: Primary | ICD-10-CM

## 2020-02-28 DIAGNOSIS — R29.898 BILATERAL LEG WEAKNESS: ICD-10-CM

## 2020-02-28 DIAGNOSIS — M51.36 DDD (DEGENERATIVE DISC DISEASE), LUMBAR: ICD-10-CM

## 2020-02-28 PROCEDURE — 99213 OFFICE O/P EST LOW 20 MIN: CPT | Performed by: NEUROLOGICAL SURGERY

## 2020-03-23 ENCOUNTER — DOCUMENTATION (OUTPATIENT)
Dept: PHYSICAL THERAPY | Facility: CLINIC | Age: 85
End: 2020-03-23

## 2020-03-23 DIAGNOSIS — M25.571 CHRONIC PAIN OF RIGHT ANKLE: ICD-10-CM

## 2020-03-23 DIAGNOSIS — G89.29 CHRONIC PAIN OF RIGHT ANKLE: ICD-10-CM

## 2020-03-23 DIAGNOSIS — R26.89 LOSS OF BALANCE: ICD-10-CM

## 2020-03-23 DIAGNOSIS — R26.2 DIFFICULTY WALKING: ICD-10-CM

## 2020-03-23 DIAGNOSIS — R06.02 SHORTNESS OF BREATH: ICD-10-CM

## 2020-03-23 DIAGNOSIS — R29.898 BILATERAL LEG WEAKNESS: Primary | ICD-10-CM

## 2020-03-23 DIAGNOSIS — M19.079 ARTHRITIS OF ANKLE: ICD-10-CM

## 2020-03-23 NOTE — PROGRESS NOTES
Discharge Summary  Discharge Summary from Physical Therapy Report      Kevin Ellington was seen for 14 physical therapy sessions for chronic low back pain with LE weakness.  Treatment included therapeutic exercise, neuro-muscular retraining , patient education with home exercise program  and orthotic fabrication . Progress to physical therapy goals was good.  He was discharged to an independent HEP and provided patient education to self-manage condition.      STGs to be met in 12 visits  1. Al is introduced to exercises to improve coordination, strength and the use of the core for noraml ADLs. (met)  2. Postural exercises are begun to improve posture and general spinal mobility. (met)  3. Balance exercises are progressed. (met)     LTGs to be met in 24 visits.  1. Peak pain in the lumbar spine and related areas is decreased to 5/10. (not met)  2. Standing AROM of the lumbar spine is pain free.(not met)  3. Al is independent with a complete HEP and self care education. (met)  4. Modified Oswestry is improved by 10%. (not met)  Discharge Plan: Continue with current home exercise program as instructed    Date of Last Physical Therapy Visit January 22, 2020        Woodrow Forte, PT  Physical Therapist

## 2020-03-26 DIAGNOSIS — G47.419 PRIMARY NARCOLEPSY WITHOUT CATAPLEXY: ICD-10-CM

## 2020-03-27 RX ORDER — MODAFINIL 100 MG/1
100 TABLET ORAL DAILY
Qty: 90 TABLET | Refills: 0 | Status: SHIPPED | OUTPATIENT
Start: 2020-03-27 | End: 2020-06-22 | Stop reason: SDUPTHER

## 2020-04-24 ENCOUNTER — OFFICE VISIT (OUTPATIENT)
Dept: CARDIOLOGY | Facility: CLINIC | Age: 85
End: 2020-04-24

## 2020-04-24 VITALS
DIASTOLIC BLOOD PRESSURE: 66 MMHG | SYSTOLIC BLOOD PRESSURE: 137 MMHG | BODY MASS INDEX: 30.8 KG/M2 | HEART RATE: 56 BPM | WEIGHT: 220 LBS | HEIGHT: 71 IN

## 2020-04-24 DIAGNOSIS — Z95.5 S/P CORONARY ARTERY STENT PLACEMENT: ICD-10-CM

## 2020-04-24 DIAGNOSIS — E78.5 HYPERLIPIDEMIA, UNSPECIFIED HYPERLIPIDEMIA TYPE: ICD-10-CM

## 2020-04-24 DIAGNOSIS — I25.10 CORONARY ARTERY DISEASE INVOLVING NATIVE CORONARY ARTERY OF NATIVE HEART WITHOUT ANGINA PECTORIS: Primary | ICD-10-CM

## 2020-04-24 DIAGNOSIS — I10 ESSENTIAL HYPERTENSION: ICD-10-CM

## 2020-04-24 PROCEDURE — 99442 PR PHYS/QHP TELEPHONE EVALUATION 11-20 MIN: CPT | Performed by: INTERNAL MEDICINE

## 2020-04-24 NOTE — PROGRESS NOTES
TELEPHONE FOLLOW UP VISIT    Subjective:     Encounter Date:04/24/2020      Patient ID: Kevin Ellington is a 85 y.o. male.    Chief Complaint:  History of Present Illness    This is an 85-year-old male with a history of coronary artery disease status post prior LAD stent placement in 12/2002, chronic back pain, dyslipidemia, hypertension, who presents for telephone follow-up.       He presents for telephone follow up visit in place of an office visit due to the COVID-19 pandemic.  He has been feeling well overall.  No worsening of his chronic dyspnea on exertion.  No palpitations, lower extremity edema, near syncope or syncope, or chest pain.  His blood pressures have been largely well controlled with spironolactone.  He believes he is due for routine physical with lab work with Dr. Bernal in June.    Prior History:  The patient was previously followed by Dr. Cavazos but came to establish care here in 8/2016.  His prior cardiac history again includes a stent placement on 12/17/2002.  The patient reports that at the time he was having symptoms of dyspnea and chest fullness on exertion.  He was taken to the cardiac catheterization laboratory at that time and apparently had a Promus stent (either 3.0 or 3.5 x 16 mm based on his stent card).  He did well until about 2012 when he started having more dyspnea on exertion.  At that time he underwent an echocardiogram that showed normal left ventricular systolic function wall motion with an ejection fraction of 60%, grade 1A diastolic dysfunction, and no significant valvular disease.  A Lexiscan Myoview stress test was negative for ischemia.  He was seen by pulmonary due to his continued issues with dyspnea on exertion and they did not find any pulmonary issues to explain his symptoms.  At that time the patient started exercising and lost about 20 pounds with resolution of his symptoms.     In follow-up he is mainly had issues with blood pressure control.  This improved  some with the addition of amlodipine.  Over the last year or so he is been having increasing issues with dyspnea on exertion.  He was seen by NANCY Chahal in 7/2019 with these complaints.  He underwent both a stress test and an echocardiogram at that time.  His echocardiogram showed normal left ventricular systolic function wall motion with an EF of 59%, grade 1 diastolic dysfunction, and no significant valvular disease.  His stress test showed no evidence of ischemia.     In 10/2019 he reported that his blood pressures were running a little high with systolics running in the 140s to 160s.  For this reason and the fact that he is developed more issues with ankle edema Dr. Bernal switched amlodipine to spironolactone.  By the time of his office visit he had not made the switch yet but I encouraged him to do so.     Review of Systems   Constitution: Negative for malaise/fatigue.   HENT: Negative for hearing loss, hoarse voice, nosebleeds and sore throat.    Eyes: Negative for pain.   Cardiovascular: Positive for dyspnea on exertion. Negative for chest pain, claudication, cyanosis, irregular heartbeat, leg swelling, near-syncope, orthopnea, palpitations, paroxysmal nocturnal dyspnea and syncope.   Respiratory: Negative for shortness of breath and snoring.    Endocrine: Negative for cold intolerance, heat intolerance, polydipsia, polyphagia and polyuria.   Skin: Negative for itching and rash.   Musculoskeletal: Positive for back pain. Negative for arthritis, falls, joint pain, joint swelling, muscle cramps, muscle weakness and myalgias.   Gastrointestinal: Negative for constipation, diarrhea, dysphagia, heartburn, hematemesis, hematochezia, melena, nausea and vomiting.   Genitourinary: Negative for frequency, hematuria and hesitancy.   Neurological: Negative for excessive daytime sleepiness, dizziness, headaches, light-headedness, numbness and weakness.   Psychiatric/Behavioral: Negative for depression. The  patient is not nervous/anxious.          Current Outpatient Medications:   •  aspirin 81 MG tablet, Take 81 mg by mouth Daily., Disp: , Rfl:   •  atorvastatin (LIPITOR) 40 MG tablet, Take 1 tablet by mouth Daily., Disp: 90 tablet, Rfl: 3  •  cetirizine (zyrTEC) 10 MG tablet, Take 10 mg by mouth Daily., Disp: , Rfl:   •  cloNIDine (CATAPRES) 0.1 MG tablet, Take 1 tablet by mouth 2 (Two) Times a Day., Disp: 180 tablet, Rfl: 3  •  econazole nitrate (SPECTAZOLE) 1 % cream, Apply  topically to the appropriate area as directed As Needed., Disp: , Rfl:   •  fluticasone (FLONASE) 50 MCG/ACT nasal spray, 2 sprays into the nostril(s) as directed by provider Daily., Disp: 3 bottle, Rfl: 11  •  losartan (COZAAR) 100 MG tablet, Take 1 tablet by mouth Daily., Disp: 90 tablet, Rfl: 3  •  meloxicam (MOBIC) 7.5 MG tablet, Take 1 tablet by mouth Daily., Disp: 90 tablet, Rfl: 3  •  metoprolol succinate XL (TOPROL-XL) 100 MG 24 hr tablet, Take 1 tablet by mouth Daily., Disp: 90 tablet, Rfl: 3  •  modafinil (PROVIGIL) 100 MG tablet, Take 1 tablet by mouth Daily., Disp: 90 tablet, Rfl: 0  •  Multiple Vitamins-Minerals (MULTIVITAMIN PO), Take 1 tablet by mouth Daily., Disp: , Rfl:   •  omeprazole (priLOSEC) 20 MG capsule, Take 1 capsule by mouth Daily., Disp: 90 capsule, Rfl: 3  •  Saw Palmetto, Serenoa repens, 320 MG capsule, Take 320 mg by mouth 2 (two) times a day., Disp: , Rfl:   •  spironolactone (ALDACTONE) 50 MG tablet, Take 1 tablet by mouth Daily., Disp: 90 tablet, Rfl: 3  •  tamsulosin (FLOMAX) 0.4 MG capsule 24 hr capsule, Take 2 capsules by mouth Daily., Disp: 180 capsule, Rfl: 3    Past Medical History:   Diagnosis Date   • Arthritis    • Cervical radiculopathy    • Coronary artery disease 2001    Stent   • CTS (carpal tunnel syndrome) 1998    Surgery both hands in 1999   • GERD (gastroesophageal reflux disease)    • HTN (hypertension)    • Hyperlipidemia    • Low back pain     2 Prior surgeries   • Lumbosacral disc disease   "  • Melanoma (CMS/[facility]) 01/15/2009    DR WILLY SIMMS ( Left forearm)   • Radiculitis, thoracic    • Stented coronary artery    • Thoracic disc disorder     Surgery by Dr. Aldridge (sp)       Past Surgical History:   Procedure Laterality Date   • ANKLE FUSION Left 2007    reconstruction and fusion   • BACK SURGERY      HERNIATED LUMBAR DISK ,,   • CARPAL TUNNEL RELEASE     • CATARACT EXTRACTION, BILATERAL Bilateral    • CORONARY ANGIOPLASTY WITH STENT PLACEMENT     • EPIDURAL BLOCK  Several at [facility]    and [facility]   • REPLACEMENT TOTAL KNEE Left    • REPLACEMENT TOTAL KNEE Right    • SKIN CANCER EXCISION      MELANOMA   • THORACIC SPINE SURGERY         Family History   Problem Relation Age of Onset   • Hypertension Mother    • Arthritis Mother            • Heart disease Father    • Early death Father         Aortic aneurism age 58   • Hypertension Father        Social History     Tobacco Use   • Smoking status: Never Smoker   • Smokeless tobacco: Never Used   • Tobacco comment: caffeine use   Substance Use Topics   • Alcohol use: No   • Drug use: No          Objective:     Visit Vitals (per patient's BP cuff)  /66   Pulse 56   Ht 180.3 cm (71\")   Wt 99.8 kg (220 lb)   BMI 30.68 kg/m²         Physical Exam  Unable to perform due to telephone visit.        Assessment:          Diagnosis Plan   1. Coronary artery disease involving native coronary artery of native heart without angina pectoris     2. Essential hypertension     3. Hyperlipidemia, unspecified hyperlipidemia type     4. S/P coronary artery stent placement            Plan:       1. Coronary artery disease. Appears to be stable and asymptomatic.  Continue current management.  Encouraged him to stay physically active if possible.   2. Hypertension.  Well controlled on current regimen.   3. Hyperlipidemia. On atorvastatin with goal LDL of around 70 or below.  Managed by Dr. Bernal.     Will " follow up in the office in 6 months.     This patient has consented to a telehealth visit via telephone. The visit was scheduled as a telephone visit to comply with patient safety concerns in accordance with CDC recommendations.  All vitals recorded within this visit are reported by the patient.  I spent 14 minutes in total including but not limited to the 6 minutes spent in direct conversation with this patient.

## 2020-06-22 DIAGNOSIS — G47.419 PRIMARY NARCOLEPSY WITHOUT CATAPLEXY: ICD-10-CM

## 2020-06-22 RX ORDER — FLUTICASONE PROPIONATE 50 MCG
SPRAY, SUSPENSION (ML) NASAL
Qty: 48 G | Refills: 3 | Status: SHIPPED | OUTPATIENT
Start: 2020-06-22 | End: 2022-08-09 | Stop reason: SDUPTHER

## 2020-06-22 NOTE — TELEPHONE ENCOUNTER
Caller: Kevin Ellington    Relationship: Self    Best call back number: [phone]    Medication needed:   Requested Prescriptions     Pending Prescriptions Disp Refills   • modafinil (PROVIGIL) 100 MG tablet 90 tablet 0     Sig: Take 1 tablet by mouth Daily.       When do you need the refill by: 6/30/2020    What details did the patient provide when requesting the medication: Will be out of meds by the end of the month, takes [facility] scripts almost a week.    Does the patient have less than a 3 day supply:  [] Yes  [x] No    What is the patient's preferred pharmacy:    [facility]

## 2020-06-25 RX ORDER — MODAFINIL 100 MG/1
100 TABLET ORAL DAILY
Qty: 90 TABLET | Refills: 0 | Status: SHIPPED | OUTPATIENT
Start: 2020-06-25 | End: 2020-08-27 | Stop reason: SDUPTHER

## 2020-07-09 ENCOUNTER — TELEPHONE (OUTPATIENT)
Dept: NEUROSURGERY | Facility: CLINIC | Age: 85
End: 2020-07-09

## 2020-07-09 ENCOUNTER — TELEPHONE (OUTPATIENT)
Dept: FAMILY MEDICINE CLINIC | Facility: CLINIC | Age: 85
End: 2020-07-09

## 2020-07-09 DIAGNOSIS — M48.062 SPINAL STENOSIS OF LUMBAR REGION WITH NEUROGENIC CLAUDICATION: Primary | ICD-10-CM

## 2020-07-09 NOTE — TELEPHONE ENCOUNTER
Pt is a current patient of . He mentioned that his wife Tiffanie Ellington asked at her previous appt about transferring her  to you and was told to make a new patient appt, but never did and now that  is leaving he is curious if that new patient appointment can be scheduled with you. I advised Al that the physicians listed on the letter are the physician in agreement for Bernal transfers. Pt insisted I ask you as well?

## 2020-07-09 NOTE — TELEPHONE ENCOUNTER
Patient would like to get some waleska's.  He was last seen in February and has a follow up in September.

## 2020-07-09 NOTE — TELEPHONE ENCOUNTER
Patient informed that order has been sent to [facility] and they will contact him to schedule

## 2020-07-13 ENCOUNTER — OFFICE VISIT (OUTPATIENT)
Dept: FAMILY MEDICINE CLINIC | Facility: CLINIC | Age: 85
End: 2020-07-13

## 2020-07-13 VITALS
DIASTOLIC BLOOD PRESSURE: 82 MMHG | BODY MASS INDEX: 31.36 KG/M2 | HEIGHT: 71 IN | TEMPERATURE: 97.7 F | OXYGEN SATURATION: 97 % | WEIGHT: 224 LBS | SYSTOLIC BLOOD PRESSURE: 138 MMHG | HEART RATE: 85 BPM

## 2020-07-13 DIAGNOSIS — R73.09 ELEVATED HEMOGLOBIN A1C: ICD-10-CM

## 2020-07-13 DIAGNOSIS — I10 ESSENTIAL HYPERTENSION: Primary | ICD-10-CM

## 2020-07-13 DIAGNOSIS — N62 GYNECOMASTIA: ICD-10-CM

## 2020-07-13 DIAGNOSIS — E78.5 HYPERLIPIDEMIA, UNSPECIFIED HYPERLIPIDEMIA TYPE: ICD-10-CM

## 2020-07-13 PROCEDURE — 99214 OFFICE O/P EST MOD 30 MIN: CPT | Performed by: FAMILY MEDICINE

## 2020-07-13 NOTE — PROGRESS NOTES
Kevin Ellington is a 85 y.o. male.     Chief Complaint   Patient presents with   • Fatigue     pt has increased fatigue over last 4-5 weeks   • Hypogonadism     pt has bilateral breast soreness over last few months, thinks it's releated to low testosterone        HPI     What is the primary reason for your visit?: Other  Please describe your symptoms.: soreness in breast tissue beneath nipples.  Lack of energy especially in afternoon.  Due to my age I would suspect endocrine imbalance (low testosterone).  Have you had these symptoms before?: No  How long have you been having these symptoms?: Greater than 2 weeks  Please list any medications you are currently taking for this condition.: none  Please describe any probable cause for these symptoms. : aging!      The following portions of the patient's history were reviewed and updated as appropriate: allergies, current medications, past family history, past medical history, past social history, past surgical history and problem list.    Review of Systems   Constitutional: Negative.    HENT: Negative.    Eyes: Negative.    Respiratory: Negative.    Cardiovascular: Negative.  Negative for chest pain, palpitations and leg swelling.   Gastrointestinal: Negative.    Endocrine: Negative.    Genitourinary: Negative.    Musculoskeletal: Positive for gait problem.   Skin: Negative.    Allergic/Immunologic: Negative.    Hematological: Negative.    Psychiatric/Behavioral: Negative.    All other systems reviewed and are negative.    I have reviewed the ROS as documented by the MA. Perico Bernal MD    Objective  Vitals:    07/13/20 0800   BP: 138/82   Pulse: 85   Temp: 97.7 °F (36.5 °C)   SpO2: 97%     Body mass index is 31.24 kg/m².    Physical Exam   Constitutional: He is oriented to person, place, and time. He appears well-developed and well-nourished. No distress.   Neurological: He is alert and oriented to person, place, and time.   Skin: He is not diaphoretic.    Psychiatric: He has a normal mood and affect. His behavior is normal.   Nursing note and vitals reviewed.        Current Outpatient Medications:   •  aspirin 81 MG tablet, Take 81 mg by mouth Daily., Disp: , Rfl:   •  atorvastatin (LIPITOR) 40 MG tablet, Take 1 tablet by mouth Daily., Disp: 90 tablet, Rfl: 3  •  cetirizine (zyrTEC) 10 MG tablet, Take 10 mg by mouth Daily., Disp: , Rfl:   •  cloNIDine (CATAPRES) 0.1 MG tablet, Take 1 tablet by mouth 2 (Two) Times a Day., Disp: 180 tablet, Rfl: 3  •  econazole nitrate (SPECTAZOLE) 1 % cream, Apply  topically to the appropriate area as directed As Needed., Disp: , Rfl:   •  fluticasone (FLONASE) 50 MCG/ACT nasal spray, USE 2 SPRAYS IN EACH NOSTRIL DAILY AS DIRECTED BY PROVIDER, Disp: 48 g, Rfl: 3  •  losartan (COZAAR) 100 MG tablet, Take 1 tablet by mouth Daily., Disp: 90 tablet, Rfl: 3  •  meloxicam (MOBIC) 7.5 MG tablet, Take 1 tablet by mouth Daily., Disp: 90 tablet, Rfl: 3  •  metoprolol succinate XL (TOPROL-XL) 100 MG 24 hr tablet, Take 1 tablet by mouth Daily., Disp: 90 tablet, Rfl: 3  •  modafinil (PROVIGIL) 100 MG tablet, Take 1 tablet by mouth Daily., Disp: 90 tablet, Rfl: 0  •  Multiple Vitamins-Minerals (MULTIVITAMIN PO), Take 1 tablet by mouth Daily., Disp: , Rfl:   •  omeprazole (priLOSEC) 20 MG capsule, Take 1 capsule by mouth Daily., Disp: 90 capsule, Rfl: 3  •  Saw Palmetto, Serenoa repens, 320 MG capsule, Take 320 mg by mouth 2 (two) times a day., Disp: , Rfl:   •  spironolactone (ALDACTONE) 50 MG tablet, Take 1 tablet by mouth Daily., Disp: 90 tablet, Rfl: 3  •  tamsulosin (FLOMAX) 0.4 MG capsule 24 hr capsule, Take 2 capsules by mouth Daily., Disp: 180 capsule, Rfl: 3  Current outpatient and discharge medications have been reconciled for the patient.  Reviewed by: Perico Bernal MD      Procedures    Lab Results (most recent)     Roseline Brown was seen today for fatigue and hypogonadism.    Diagnoses and all orders for this  visit:    Essential hypertension  -     Comprehensive Metabolic Panel  -     Hemoglobin A1c  -     Lipid Panel    Gynecomastia  -     Comprehensive Metabolic Panel  -     Hemoglobin A1c  -     Lipid Panel    Elevated hemoglobin A1c  -     Comprehensive Metabolic Panel  -     Hemoglobin A1c  -     Lipid Panel    Hyperlipidemia, unspecified hyperlipidemia type  -     Comprehensive Metabolic Panel  -     Hemoglobin A1c  -     Lipid Panel      Advised patient to discontinue spironolactone.  Will get labs as above.    Return for As needed.      Perico Bernal MD    Answers for HPI/ROS submitted by the patient on 7/11/2020

## 2020-07-14 LAB
ALBUMIN SERPL-MCNC: 4.6 G/DL (ref 3.5–5.2)
ALBUMIN/GLOB SERPL: 2.3 G/DL
ALP SERPL-CCNC: 75 U/L (ref 39–117)
ALT SERPL-CCNC: 43 U/L (ref 1–41)
AST SERPL-CCNC: 33 U/L (ref 1–40)
BILIRUB SERPL-MCNC: 0.5 MG/DL (ref 0–1.2)
BUN SERPL-MCNC: 26 MG/DL (ref 8–23)
BUN/CREAT SERPL: 23.4 (ref 7–25)
CALCIUM SERPL-MCNC: 9.3 MG/DL (ref 8.6–10.5)
CHLORIDE SERPL-SCNC: 105 MMOL/L (ref 98–107)
CHOLEST SERPL-MCNC: 147 MG/DL (ref 0–200)
CO2 SERPL-SCNC: 24 MMOL/L (ref 22–29)
CREAT SERPL-MCNC: 1.11 MG/DL (ref 0.76–1.27)
GLOBULIN SER CALC-MCNC: 2 GM/DL
GLUCOSE SERPL-MCNC: 143 MG/DL (ref 65–99)
HBA1C MFR BLD: 6.4 % (ref 4.8–5.6)
HDLC SERPL-MCNC: 37 MG/DL (ref 40–60)
LDLC SERPL CALC-MCNC: 67 MG/DL (ref 0–100)
POTASSIUM SERPL-SCNC: 5 MMOL/L (ref 3.5–5.2)
PROT SERPL-MCNC: 6.6 G/DL (ref 6–8.5)
SODIUM SERPL-SCNC: 141 MMOL/L (ref 136–145)
TRIGL SERPL-MCNC: 215 MG/DL (ref 0–150)
VLDLC SERPL CALC-MCNC: 43 MG/DL

## 2020-07-29 ENCOUNTER — HOSPITAL ENCOUNTER (OUTPATIENT)
Dept: PAIN MEDICINE | Facility: HOSPITAL | Age: 85
Discharge: HOME | End: 2020-07-29
Admitting: ANESTHESIOLOGY

## 2020-07-29 ENCOUNTER — HOSPITAL ENCOUNTER (OUTPATIENT)
Dept: GENERAL RADIOLOGY | Facility: HOSPITAL | Age: 85
Discharge: HOME | End: 2020-07-29

## 2020-07-29 ENCOUNTER — ANESTHESIA EVENT (OUTPATIENT)
Dept: PAIN MEDICINE | Facility: HOSPITAL | Age: 85
End: 2020-07-29

## 2020-07-29 ENCOUNTER — TELEPHONE (OUTPATIENT)
Dept: NEUROSURGERY | Facility: CLINIC | Age: 85
End: 2020-07-29

## 2020-07-29 ENCOUNTER — ANESTHESIA (OUTPATIENT)
Dept: PAIN MEDICINE | Facility: HOSPITAL | Age: 85
End: 2020-07-29

## 2020-07-29 VITALS
RESPIRATION RATE: 16 BRPM | OXYGEN SATURATION: 97 % | HEART RATE: 64 BPM | TEMPERATURE: 97.8 F | SYSTOLIC BLOOD PRESSURE: 116 MMHG | DIASTOLIC BLOOD PRESSURE: 73 MMHG

## 2020-07-29 DIAGNOSIS — M48.062 SPINAL STENOSIS OF LUMBAR REGION WITH NEUROGENIC CLAUDICATION: ICD-10-CM

## 2020-07-29 DIAGNOSIS — R52 PAIN: ICD-10-CM

## 2020-07-29 PROCEDURE — C1755 CATHETER, INTRASPINAL: HCPCS

## 2020-07-29 PROCEDURE — 25010000002 MIDAZOLAM PER 1 MG: Performed by: ANESTHESIOLOGY

## 2020-07-29 PROCEDURE — 77003 FLUOROGUIDE FOR SPINE INJECT: CPT

## 2020-07-29 RX ORDER — MIDAZOLAM HYDROCHLORIDE 1 MG/ML
1 INJECTION INTRAMUSCULAR; INTRAVENOUS AS NEEDED
Status: DISCONTINUED | OUTPATIENT
Start: 2020-07-29 | End: 2020-07-30 | Stop reason: HOSPADM

## 2020-07-29 RX ORDER — LIDOCAINE HYDROCHLORIDE 10 MG/ML
1 INJECTION, SOLUTION INFILTRATION; PERINEURAL ONCE AS NEEDED
Status: DISCONTINUED | OUTPATIENT
Start: 2020-07-29 | End: 2020-07-30 | Stop reason: HOSPADM

## 2020-07-29 RX ORDER — SODIUM CHLORIDE 0.9 % (FLUSH) 0.9 %
1-10 SYRINGE (ML) INJECTION AS NEEDED
Status: DISCONTINUED | OUTPATIENT
Start: 2020-07-29 | End: 2020-07-30 | Stop reason: HOSPADM

## 2020-07-29 RX ORDER — SODIUM CHLORIDE 0.9 % (FLUSH) 0.9 %
3 SYRINGE (ML) INJECTION EVERY 12 HOURS SCHEDULED
Status: DISCONTINUED | OUTPATIENT
Start: 2020-07-29 | End: 2020-07-30 | Stop reason: HOSPADM

## 2020-07-29 RX ORDER — FENTANYL CITRATE 50 UG/ML
50 INJECTION, SOLUTION INTRAMUSCULAR; INTRAVENOUS AS NEEDED
Status: DISCONTINUED | OUTPATIENT
Start: 2020-07-29 | End: 2020-07-30 | Stop reason: HOSPADM

## 2020-07-29 RX ORDER — SODIUM CHLORIDE 0.9 % (FLUSH) 0.9 %
3-10 SYRINGE (ML) INJECTION AS NEEDED
Status: DISCONTINUED | OUTPATIENT
Start: 2020-07-29 | End: 2020-07-30 | Stop reason: HOSPADM

## 2020-07-29 RX ORDER — METHYLPREDNISOLONE ACETATE 80 MG/ML
80 INJECTION, SUSPENSION INTRA-ARTICULAR; INTRALESIONAL; INTRAMUSCULAR; SOFT TISSUE ONCE
Status: DISCONTINUED | OUTPATIENT
Start: 2020-07-29 | End: 2020-07-30 | Stop reason: HOSPADM

## 2020-07-29 RX ADMIN — MIDAZOLAM 2 MG: 1 INJECTION INTRAMUSCULAR; INTRAVENOUS at 11:55

## 2020-07-29 NOTE — H&P
INTERVAL HISTORY:    The patient returns for another Lumbar epidural steroid series today.  Mr Ellington had received over 75% improvement since their last injection series but the pain has returned with a pain level of 6-8/10 at its worst recently.    Conservative measures tried in the interim     Current Outpatient Medications on File Prior to Encounter   Medication Sig Dispense Refill   • atorvastatin (LIPITOR) 40 MG tablet Take 1 tablet by mouth Daily. 90 tablet 3   • cetirizine (zyrTEC) 10 MG tablet Take 10 mg by mouth Daily.     • cloNIDine (CATAPRES) 0.1 MG tablet Take 1 tablet by mouth 2 (Two) Times a Day. 180 tablet 3   • econazole nitrate (SPECTAZOLE) 1 % cream Apply  topically to the appropriate area as directed As Needed.     • fluticasone (FLONASE) 50 MCG/ACT nasal spray USE 2 SPRAYS IN EACH NOSTRIL DAILY AS DIRECTED BY PROVIDER 48 g 3   • losartan (COZAAR) 100 MG tablet Take 1 tablet by mouth Daily. 90 tablet 3   • meloxicam (MOBIC) 7.5 MG tablet Take 1 tablet by mouth Daily. 90 tablet 3   • metoprolol succinate XL (TOPROL-XL) 100 MG 24 hr tablet Take 1 tablet by mouth Daily. 90 tablet 3   • modafinil (PROVIGIL) 100 MG tablet Take 1 tablet by mouth Daily. 90 tablet 0   • Multiple Vitamins-Minerals (MULTIVITAMIN PO) Take 1 tablet by mouth Daily.     • omeprazole (priLOSEC) 20 MG capsule Take 1 capsule by mouth Daily. 90 capsule 3   • Saw Palmetto, Serenoa repens, 320 MG capsule Take 320 mg by mouth 2 (two) times a day.     • spironolactone (ALDACTONE) 50 MG tablet Take 1 tablet by mouth Daily. 90 tablet 3   • tamsulosin (FLOMAX) 0.4 MG capsule 24 hr capsule Take 2 capsules by mouth Daily. 180 capsule 3   • aspirin 81 MG tablet Take 81 mg by mouth Daily.       No current facility-administered medications on file prior to encounter.        Past Medical History:   Diagnosis Date   • Arthritis    • Cervical radiculopathy    • Coronary artery disease 2001    Stent   • CTS (carpal tunnel syndrome) 1998     Surgery both hands in 1999   • GERD (gastroesophageal reflux disease)    • HTN (hypertension)    • Hyperlipidemia    • Low back pain     2 Prior surgeries   • Lumbosacral disc disease    • Melanoma (CMS/HCC) 01/15/2009    DR WILLY SIMMS ( Left forearm)   • Radiculitis, thoracic    • Stented coronary artery    • Thoracic disc disorder     Surgery by Dr. Aldridge (sp)       No hematologic infectious or constitutional symptoms  Negative screen for POPPY      Exam:  /93 (BP Location: Left arm, Patient Position: Lying)   Pulse 68   Temp 36.6 °C (97.8 °F) (Infrared)   Resp 16   SpO2 96%   Airway Mallampatti 2  Alert and oriented      Diagnosis:  Post-Op Diagnosis Codes:     * Lumbar stenosis with neurogenic claudication [M48.062]     * Lumbar neuritis [M54.16]    Plan:  Lumbar epidural steroid injection under fluoroscopic guidance    I have encouraged them to continue:  1.  Physical therapy exercises at home as prescribed by physical therapy or from the pain clinic handout (given to the patient).  Continuation of these exercises every day, or multiple times per week, even when the patient has good pain relief, was stressed to the patient as a preventative measure to decrease the frequency and severity of future pain episodes.  2.  Continue pain medicines as already prescribed.  If patient not currently taking any, it is recommended to begin Acetaminophen 1000 mg po q 8 hours.  If other medicines containing Acetaminophen are currently prescribed, maintain daily dose at 3000mg.    3.  If they can tolerate NSAIDS, it is recommended to take Ibuprofen 600 mg po q 6 hours for 7 days during pain exacerbations.   Alternatively, they may substitute an NSAID of their choice (e.g. Aleve)  4.  Heat and ice to the affected area as tolerated for pain control.  It was discussed that heating pads can cause burns.  5.  Low impact exercise such as walking or water exercise was recommended to maintain overall health and aid  in weight control.   6.  Follow up as needed for subsequent injections.  7.  Patient was counseled to abstain from tobacco products.

## 2020-07-29 NOTE — TELEPHONE ENCOUNTER
PT CALLED STATING HE HAD AN APPT TODAY WITH DR. TOLLIVER (PAIN MANAGEMENT).     PT WAS TOLD THEY COULD NOT DO EPIDURAL AS THEY WERE UNABLE TO FIND A PLACE FOR THE NEEDLE.    PT WAS TOLD BY DR. RAM THAT HE WOULD BE REACHING OUT TO DR. CHATMAN    PT WANTS TO KNOW NEXT STEPS AND IF HE SHOULD SET UP A FOLLOW-UP APPT WITH DR. CHATMAN OR SCHEDULE AN MRI.     CALL PT [phone] IN REGARDS TO THIS.

## 2020-07-29 NOTE — ANESTHESIA PROCEDURE NOTES
PAIN Epidural block - Failed Procedure    Pre-sedation assessment completed: 7/29/2020 11:51 AM    Patient reassessed immediately prior to procedure    Patient location during procedure: pain clinic  Start Time: 7/29/2020 11:53 AM  Stop Time: 7/29/2020 12:18 PM  Indication:procedure for pain  Performed By  Anesthesiologist: Massimo Graf MD  Preanesthetic Checklist  Completed: patient identified, surgical consent, pre-op evaluation, timeout performed, IV checked, risks and benefits discussed and monitors and equipment checked  Additional Notes  Unfortunately we were not able to access the epidural space.  I tried multiple levels using right and left paramedian approaches.  Additionally, Dr Christiansen also attempted several times.  I fear he has too much bony overgrowth and scar tissue.  I spoke with his wife afterwards and let her know.  They will followup with Dr Mcnair        Diagnosis:  Post-Op Diagnosis Codes:     * Lumbar stenosis with neurogenic claudication (M48.062)     * Lumbar neuritis (M54.16)      Prep:  Pt Position:prone  Sterile Tech:gloves, mask and sterile barrier  Prep:chlorhexidine gluconate and isopropyl alcohol  Monitoring:blood pressure monitoring, continuous pulse oximetry and EKG  Procedure:Sedation: yes     Guidance: fluoroscopy  Needle Type:Tuohy  Needle Gauge:20  Medications:  Preservative Free Saline:0mL    Post Assessment:  Dressing:occlusive dressing applied  Pt Tolerance:patient tolerated the procedure well with no apparent complications  Complications:no

## 2020-07-30 NOTE — TELEPHONE ENCOUNTER
Spoke with patient and informed him that Dr CHATMAN has him scheduled for a televisit 8.6.20 at 1 pm- PATIENT INFORMED THAT DR CHATMAN WILL BE CALLING FROM HOSPITAL AND I CANNOT GUARANTEE THAT DR CHATMAN WILL CALL EXACTLY AT 1PM AS HE WILL BE CALLING FROM SURGERY    Patient voiced understanding

## 2020-08-21 ENCOUNTER — TELEPHONE (OUTPATIENT)
Dept: NEUROSURGERY | Facility: CLINIC | Age: 85
End: 2020-08-21

## 2020-08-21 NOTE — TELEPHONE ENCOUNTER
Pt was suppose to have a Televisit on 8/6/20 @ 1pm  (Per telephone encounter) didn't see where it was scheduled and pt never recv'd a call and pt is calling back to make another appt. Offered 1st available but pt said he needed to be seen sooner.... oneyda call pt [phone].

## 2020-08-21 NOTE — TELEPHONE ENCOUNTER
Spoke with patient and informed him that we have him rescheduled for his televisit on 8.25.20 at 1 pm and if for some reason Dr CHATMAN does not call him that day to please call me ASAP and let me know      Berta, can you put on for televisit for low back pain after ADAL with Dr CHATMAN 8.25.20 at 1 pm

## 2020-08-21 NOTE — PROGRESS NOTES
Subjective   History of Present Illness: Kevin Ellington is a 85 y.o. male for televisit for follow up after ADAL at [facility], however, they were unable to complete the ADAL due to narrowing disc space    Patient was last seen 2.28.20 for low back pain, weakness and problems with his balance and gait    Patient states that he is having constant left sided low back pain and left leg weakness, he denies leg pain.  He denies N/T or urinary incontinence , but has problems with his balance and gait and is using a walker to ambulate with.  He has not had any falls.  He is using Tylenol Arthritis for pain    You have chosen to receive care through a telephone visit. Do you consent to use a telephone visit for your medical care today? Yes    This was a tele-visit from my office.  The patient and his wife were at home.  The visit lasted 15 minutes.  It is been 6 months since I have spoken with him.  The legs still feel a bit weak but not painful.  If the low back that bothers him the most.  We tried an epidural by Dr. Burdick could not get the needle in the epidural space and so the procedure was aborted.  I think a different approach would be to focus on the low back pain.  He says his pain is worse in extension and better in flexion, bit worse on the left side.  Again not radiating down the legs.  Most of this is probably generated by the facets.  Medial branch blocks and possible radiofrequency ablation could be of some value to him.  I discussed this with him and he wants to try it.  He is not on any blood thinners except a baby aspirin.  We will send him to Dr. Rueda for this follow-up as a tele-visit in about 4 months.    As long as the pain in his legs is manageable and he can ambulate I would not plan on any kind of surgical treatment for his low back.      Back Pain   The problem occurs constantly. The problem is unchanged. The pain is present in the lumbar spine. The pain does not radiate. The pain is at a severity of  5/10. The pain is moderate. The symptoms are aggravated by position. Associated symptoms include weakness. Pertinent negatives include no leg pain or numbness.   Extremity Weakness    The pain is present in the left lower leg. The problem occurs constantly. The problem has been unchanged. The pain is at a severity of 5/10. The pain is moderate. Pertinent negatives include no numbness.   Difficulty Walking   The problem occurs constantly. The problem has been unchanged. Associated symptoms include weakness. Pertinent negatives include no arthralgias, joint swelling, myalgias or numbness.       The following portions of the patient's history were reviewed and updated as appropriate: allergies, current medications, past family history, past medical history, past social history, past surgical history and problem list.    Review of Systems   HENT: Negative.    Eyes: Negative.    Respiratory: Negative.    Cardiovascular: Negative.    Gastrointestinal: Negative.    Endocrine: Negative.    Genitourinary: Negative for urgency.   Musculoskeletal: Positive for back pain, extremity weakness and gait problem. Negative for arthralgias, joint swelling and myalgias.   Allergic/Immunologic: Negative.    Neurological: Positive for weakness. Negative for numbness.   Hematological: Negative.    Psychiatric/Behavioral: Negative.            Objective             Physical Exam   Deferred  Neurologic Exam   Deferred        Assessment/Plan   Independent Review of Radiographic Studies:      I personally reviewed the images from the following studies.    I reviewed the lumbar cervical and thoracic myelogram done on August 2018.  There is severe stenosis from L2-L5.  Agree with the report    Medical Decision Making:      We will refer him to see Dr. Simon Rueda at the pain clinic for some lumbar medial branch blocks and a radiofrequency ablation.  We will follow-up as a tele-visit in 4 months.      Kevin was seen today for back pain,  extremity weakness and difficulty walking.    Diagnoses and all orders for this visit:    Spinal stenosis of lumbar region with neurogenic claudication  -     Ambulatory Referral to Hospital Pain Management Department    DDD (degenerative disc disease), lumbar  -     Ambulatory Referral to Hospital Pain Management Department    Bilateral leg weakness  -     Ambulatory Referral to Hospital Pain Management Department      Return in about 4 months (around 12/25/2020) for As a tele-visit.

## 2020-08-25 ENCOUNTER — OFFICE VISIT (OUTPATIENT)
Dept: NEUROSURGERY | Facility: CLINIC | Age: 85
End: 2020-08-25

## 2020-08-25 DIAGNOSIS — M51.36 DDD (DEGENERATIVE DISC DISEASE), LUMBAR: ICD-10-CM

## 2020-08-25 DIAGNOSIS — R29.898 BILATERAL LEG WEAKNESS: ICD-10-CM

## 2020-08-25 DIAGNOSIS — M48.062 SPINAL STENOSIS OF LUMBAR REGION WITH NEUROGENIC CLAUDICATION: Primary | ICD-10-CM

## 2020-08-25 PROCEDURE — 99442 PR PHYS/QHP TELEPHONE EVALUATION 11-20 MIN: CPT | Performed by: NEUROLOGICAL SURGERY

## 2020-08-27 ENCOUNTER — OFFICE VISIT (OUTPATIENT)
Dept: FAMILY MEDICINE CLINIC | Facility: CLINIC | Age: 85
End: 2020-08-27

## 2020-08-27 VITALS
BODY MASS INDEX: 31.5 KG/M2 | OXYGEN SATURATION: 98 % | TEMPERATURE: 97.9 F | SYSTOLIC BLOOD PRESSURE: 154 MMHG | WEIGHT: 225 LBS | HEART RATE: 69 BPM | HEIGHT: 71 IN | DIASTOLIC BLOOD PRESSURE: 88 MMHG

## 2020-08-27 DIAGNOSIS — M48.02 CERVICAL SPINAL STENOSIS: ICD-10-CM

## 2020-08-27 DIAGNOSIS — I10 ESSENTIAL HYPERTENSION: Primary | ICD-10-CM

## 2020-08-27 DIAGNOSIS — E78.5 HYPERLIPIDEMIA, UNSPECIFIED HYPERLIPIDEMIA TYPE: ICD-10-CM

## 2020-08-27 DIAGNOSIS — G47.419 PRIMARY NARCOLEPSY WITHOUT CATAPLEXY: ICD-10-CM

## 2020-08-27 DIAGNOSIS — I25.10 CORONARY ARTERY DISEASE INVOLVING NATIVE CORONARY ARTERY OF NATIVE HEART WITHOUT ANGINA PECTORIS: ICD-10-CM

## 2020-08-27 DIAGNOSIS — R73.9 HYPERGLYCEMIA: ICD-10-CM

## 2020-08-27 PROBLEM — C80.1 CANCER: Status: RESOLVED | Noted: 2018-10-04 | Resolved: 2020-08-27

## 2020-08-27 PROBLEM — Z85.820 HISTORY OF MELANOMA: Status: ACTIVE | Noted: 2020-08-27

## 2020-08-27 PROBLEM — Z00.00 HEALTH CARE MAINTENANCE: Status: RESOLVED | Noted: 2017-10-05 | Resolved: 2020-08-27

## 2020-08-27 PROCEDURE — 99214 OFFICE O/P EST MOD 30 MIN: CPT | Performed by: FAMILY MEDICINE

## 2020-08-27 RX ORDER — MODAFINIL 100 MG/1
100 TABLET ORAL DAILY
Qty: 90 TABLET | Refills: 0 | Status: SHIPPED | OUTPATIENT
Start: 2020-08-27 | End: 2020-12-23 | Stop reason: SDUPTHER

## 2020-08-27 NOTE — PROGRESS NOTES
Kevin Ellington is a 85 y.o. male.     Chief Complaint   Patient presents with   • Establish Care     new pt establishing today with dr boles   • Breast Pain     pt had bilateral swelling and tenderness he stopped spironolactone and that helped a little bit still concerned    • Hypertension     has last reading  after stopping spironolactone        HPI     Pt is a pleasant 85 y.o. YO male here for concerns for hypertension and breast pain.  He is a new patient to me.  He is currently being treated for coronary artery disease that is well controlled, hypertension that is not well controlled, hyperlipidemia that is well controlled, spinal stenosis that is being followed by neurosurgery and stable, benign prostatic aplasia that is well controlled on Flomax.  PT denies current symptoms of chest pain, palpitations but does have exertional dyspnea.     The following portions of the patient's history were reviewed and updated as appropriate: allergies, current medications, past family history, past medical history, past social history, past surgical history and problem list.    Review of Systems   Constitutional: Negative.    HENT: Negative.         Breast tenderness   Eyes: Negative.    Respiratory: Negative.    Cardiovascular: Positive for palpitations and leg swelling. Negative for chest pain.   Endocrine: Negative.    Genitourinary: Negative.    Musculoskeletal: Positive for arthralgias and myalgias.   Allergic/Immunologic: Positive for environmental allergies.   Neurological: Negative.    Hematological: Negative.    Psychiatric/Behavioral: Negative.        Objective  Vitals:    08/27/20 1038   BP: 154/88   Pulse: 69   Temp: 97.9 °F (36.6 °C)   SpO2: 98%        Physical Exam   Constitutional: He is oriented to person, place, and time. He appears well-developed and well-nourished. No distress.   HENT:   Head: Normocephalic.   Nose: Nose normal.   Eyes: EOM are normal.   Cardiovascular: Normal rate, regular rhythm,  normal heart sounds and intact distal pulses.   No murmur heard.  Pulmonary/Chest: Effort normal and breath sounds normal. No respiratory distress.   Musculoskeletal: Normal range of motion.   Neurological: He is alert and oriented to person, place, and time.   Skin: Skin is warm and dry. No rash noted.   Psychiatric: He has a normal mood and affect. His behavior is normal. Judgment and thought content normal.   Nursing note and vitals reviewed.        Current Outpatient Medications:   •  aspirin 81 MG tablet, Take 81 mg by mouth Daily., Disp: , Rfl:   •  atorvastatin (LIPITOR) 40 MG tablet, Take 1 tablet by mouth Daily., Disp: 90 tablet, Rfl: 3  •  cetirizine (zyrTEC) 10 MG tablet, Take 10 mg by mouth Daily., Disp: , Rfl:   •  cloNIDine (CATAPRES) 0.1 MG tablet, Take 1 tablet by mouth 2 (Two) Times a Day., Disp: 180 tablet, Rfl: 3  •  econazole nitrate (SPECTAZOLE) 1 % cream, Apply  topically to the appropriate area as directed As Needed., Disp: , Rfl:   •  fluticasone (FLONASE) 50 MCG/ACT nasal spray, USE 2 SPRAYS IN EACH NOSTRIL DAILY AS DIRECTED BY PROVIDER, Disp: 48 g, Rfl: 3  •  losartan (COZAAR) 100 MG tablet, Take 1 tablet by mouth Daily., Disp: 90 tablet, Rfl: 3  •  meloxicam (MOBIC) 7.5 MG tablet, Take 1 tablet by mouth Daily., Disp: 90 tablet, Rfl: 3  •  metoprolol succinate XL (TOPROL-XL) 100 MG 24 hr tablet, Take 1 tablet by mouth Daily., Disp: 90 tablet, Rfl: 3  •  modafinil (PROVIGIL) 100 MG tablet, Take 1 tablet by mouth Daily., Disp: 90 tablet, Rfl: 0  •  Multiple Vitamins-Minerals (MULTIVITAMIN PO), Take 1 tablet by mouth Daily., Disp: , Rfl:   •  omeprazole (priLOSEC) 20 MG capsule, Take 1 capsule by mouth Daily., Disp: 90 capsule, Rfl: 3  •  Saw Palmetto, Serenoa repens, 320 MG capsule, Take 320 mg by mouth 2 (two) times a day., Disp: , Rfl:   •  tamsulosin (FLOMAX) 0.4 MG capsule 24 hr capsule, Take 2 capsules by mouth Daily., Disp: 180 capsule, Rfl: 3    Procedures    Lab Results (most recent)      None              Kevin was seen today for establish care, breast pain and hypertension.    Diagnoses and all orders for this visit:    Essential hypertension  -     Comprehensive Metabolic Panel    Cervical spinal stenosis    Coronary artery disease involving native coronary artery of native heart without angina pectoris  -     Comprehensive Metabolic Panel  -     Lipid Panel    Hyperglycemia  -     Hemoglobin A1c    Hyperlipidemia, unspecified hyperlipidemia type  -     Lipid Panel    Primary narcolepsy without cataplexy  -     modafinil (PROVIGIL) 100 MG tablet; Take 1 tablet by mouth Daily.    Pleasant 85-year-old male with many medical problems, see HPI.  Continue current meds.  His blood pressure seems to be doing better off spironolactone.  Continue to check at home and will follow-up in 3 months with labs as above prior.  Patient will bring his home cuff to make sure that his blood pressure is improving.  There is not well controlled today but at home seems that his levels are normal.  Suspect that his blood pressure is elevated today because of increased pain.  This is being addressed with pain management, plan to have nerve ablation soon.  Narcolepsy well controlled on modafinil, okay to continue her current dose and follow-up every 3 months.      Return in about 3 months (around 11/27/2020), or if symptoms worsen or fail to improve, for Recheck hypertension and hyperglycemia with labs prior.      Leanna Mancia MD

## 2020-09-09 ENCOUNTER — ANESTHESIA EVENT (OUTPATIENT)
Dept: PAIN MEDICINE | Facility: HOSPITAL | Age: 85
End: 2020-09-09

## 2020-09-09 ENCOUNTER — HOSPITAL ENCOUNTER (OUTPATIENT)
Dept: PAIN MEDICINE | Facility: HOSPITAL | Age: 85
Discharge: HOME | End: 2020-09-09
Admitting: ANESTHESIOLOGY

## 2020-09-09 ENCOUNTER — ANESTHESIA (OUTPATIENT)
Dept: PAIN MEDICINE | Facility: HOSPITAL | Age: 85
End: 2020-09-09

## 2020-09-09 VITALS
TEMPERATURE: 98 F | SYSTOLIC BLOOD PRESSURE: 154 MMHG | DIASTOLIC BLOOD PRESSURE: 94 MMHG | WEIGHT: 220 LBS | OXYGEN SATURATION: 97 % | HEART RATE: 61 BPM | BODY MASS INDEX: 30.8 KG/M2 | RESPIRATION RATE: 16 BRPM | HEIGHT: 71 IN

## 2020-09-09 DIAGNOSIS — M47.816 SPONDYLOSIS OF LUMBAR REGION WITHOUT MYELOPATHY OR RADICULOPATHY: Primary | ICD-10-CM

## 2020-09-09 PROCEDURE — G0463 HOSPITAL OUTPT CLINIC VISIT: HCPCS

## 2020-09-09 NOTE — H&P
CHIEF COMPLAINT:  Low back pain        HISTORY OF PRESENT ILLNESS:  This patient is complaining of low back pain which does not radiate down his legs and mainly stays in a bandlike pattern across his lower back.  It ranges between a 4 and 8 out of 10 on the pain scale.  The pain is described as aching, dull, intermittent, sharp, spasming, stabbing in nature. The pain is exacerbated by activity, exercise, lifting, standing, twisting, and walking, and relieved by relaxation, lying down, and rest.  The patient has tried conservative therapy for greater than 6 weeks including: Ice, rest, heat, over-the-counter medication.  The pain is significantly decreasing the patient's Activities of Daily Living.  Diagnostic imaging specifically a CT scan of the lumbar spine without contrast from 8/13/2018 shows multilevel spondylosis/facet arthropathy, degenerative disc disease, and stenosis.    This patient was referred to our clinic by Dr. Tyrese Chaidez for diagnostic lumbar facet medial branch blocks leading to possible future radiofrequency ablation if appropriate.            PAST MEDICAL HISTORY:  Current Outpatient Medications on File Prior to Encounter   Medication Sig Dispense Refill   • aspirin 81 MG tablet Take 81 mg by mouth Daily.     • atorvastatin (LIPITOR) 40 MG tablet Take 1 tablet by mouth Daily. 90 tablet 3   • cetirizine (zyrTEC) 10 MG tablet Take 10 mg by mouth Daily.     • cloNIDine (CATAPRES) 0.1 MG tablet Take 1 tablet by mouth 2 (Two) Times a Day. 180 tablet 3   • Flaxseed, Linseed, (FLAX SEED OIL PO) Take  by mouth.     • fluticasone (FLONASE) 50 MCG/ACT nasal spray USE 2 SPRAYS IN EACH NOSTRIL DAILY AS DIRECTED BY PROVIDER 48 g 3   • losartan (COZAAR) 100 MG tablet Take 1 tablet by mouth Daily. 90 tablet 3   • meloxicam (MOBIC) 7.5 MG tablet Take 1 tablet by mouth Daily. 90 tablet 3   • metoprolol succinate XL (TOPROL-XL) 100 MG 24 hr tablet Take 1 tablet by mouth Daily. 90 tablet 3   • Misc Natural  "Products (JOINT HEALTH PO) Take  by mouth.     • modafinil (PROVIGIL) 100 MG tablet Take 1 tablet by mouth Daily. 90 tablet 0   • Multiple Vitamins-Minerals (MULTIVITAMIN PO) Take 1 tablet by mouth Daily.     • omeprazole (priLOSEC) 20 MG capsule Take 1 capsule by mouth Daily. 90 capsule 3   • Saw Palmetto, Serenoa repens, 320 MG capsule Take 320 mg by mouth 2 (two) times a day.     • tamsulosin (FLOMAX) 0.4 MG capsule 24 hr capsule Take 2 capsules by mouth Daily. 180 capsule 3   • econazole nitrate (SPECTAZOLE) 1 % cream Apply  topically to the appropriate area as directed As Needed.       No current facility-administered medications on file prior to encounter.        Past Medical History:   Diagnosis Date   • Arthritis    • Cervical radiculopathy    • Coronary artery disease 2001    Stent   • CTS (carpal tunnel syndrome) 1998    Surgery both hands in 1999   • GERD (gastroesophageal reflux disease)    • HTN (hypertension)    • Hyperlipidemia    • Low back pain     2 Prior surgeries   • Lumbosacral disc disease    • Melanoma (CMS/HCC) 01/15/2009    DR WILLY SIMMS ( Left forearm)   • Radiculitis, thoracic    • Stented coronary artery    • Thoracic disc disorder     Surgery by Dr. Aldridge (sp)       SOCIAL HISTORY:  Counseled to avoid tobacco     REVIEW OF SYSTEMS:  No pertinent hematologic, infectious, or constitutional symptoms.  Other review of systems non-contributory     PHYSICAL EXAM:  /94 (BP Location: Left arm, Patient Position: Sitting)   Pulse 61   Temp 36.7 °C (98 °F) (Infrared)   Resp 16   Ht 180.3 cm (71\")   Wt 99.8 kg (220 lb)   SpO2 97%   BMI 30.68 kg/m²   Constitutional: Well-developed well-nourished no acute distress  General: Alert and oriented  Ophtho: EOMI  Airway: Mallampati 2  Cardiac:  Regular rate  Lungs:  Clear to auscultation bilaterally   GI: soft, nontender  Cervical Spine: Noncontributory  Sacroiliac Joints: Noncontributory  Musc: Decreased range of motion and pain " with lumbar extension.  Tenderness palpation over the bilateral lumbar facets  Neuro: Facet loading is positive.     DIAGNOSIS:  Post-Op Diagnosis Codes:  Post-Op Diagnosis Codes:     * Spondylosis of lumbar region without myelopathy or radiculopathy [M47.816]     * DDD (degenerative disc disease), lumbar [M51.36]     * Lumbar stenosis [M48.061]     PLAN:  -Bilateral Lumbar facet medial branch injections to cover the level(s) of L3-L4, L4-L5, and L5-S1 spaced approximately 2-4 weeks apart. These injections will be for diagnostic purposes. If pain control is acceptable but temporary, it was discussed with the patient that they may be a candidate for radiofrequency lesioning in the future.     - Risks were discussed with the patient, including but not limited to: failure of relief, worsening pain, tissue, nerve, or blood vessel damage, bleeding, infection, and abscess formation. Benefits and alternatives were also discussed. No promises were made. The patient voiced understanding.  -  Physical therapy exercises at home should be considered, as tolerated, as prescribed by physical therapy or from the pain clinic handout (given to the patient).  Continuation of these exercises every day, or multiple times per week, even when the patient has good pain relief, was stressed to the patient as a preventative measure to decrease the frequency and severity of future pain episodes.  -  It is recommended that the patient use the least amount of opioid medication possible.     -  Heat and ice to the affected area as tolerated for pain control.  It was discussed that heating pads can cause burns.  -  Daily low impact exercise such as walking or water exercise was recommended to maintain overall health and aid in weight control.   -  Patient was counseled to abstain from tobacco products.  -  Follow up as needed for subsequent injections.  -This gentleman also has significant lumbar degeneration and stenosis.  It is possible that  he would benefit from future epidural steroid injections.  Hopefully, we can get his pain under much better control by focusing on his lumbar facets.    Simon Rueda M.D.  [facility], [facility]  Board Certified in Pain Medicine by the American Board of Anesthesiology  Board Certified in Anesthesiology by the American Board of Anesthesiology     Much of this encounter note is an electronic transcription/translation of spoken language to printed text. The electronic translation of spoken language may permit erroneous, or at times, nonsensical words or phrases to be inadvertently transcribed. Although I have reviewed the note for such errors, some may still exist.

## 2020-09-09 NOTE — ANESTHESIA PROCEDURE NOTES
Patient was unable to have a procedure today because we are waiting for insurance approval      Patient reassessed immediately prior to procedure    Performed by  Anesthesiologist: Simon Rueda MD          The patient was unable to have a procedure today because we are waiting for insurance approval

## 2020-09-15 ENCOUNTER — ANESTHESIA (OUTPATIENT)
Dept: PAIN MEDICINE | Facility: HOSPITAL | Age: 85
End: 2020-09-15

## 2020-09-15 ENCOUNTER — HOSPITAL ENCOUNTER (OUTPATIENT)
Dept: PAIN MEDICINE | Facility: HOSPITAL | Age: 85
Discharge: HOME | End: 2020-09-15

## 2020-09-15 ENCOUNTER — ANESTHESIA EVENT (OUTPATIENT)
Dept: PAIN MEDICINE | Facility: HOSPITAL | Age: 85
End: 2020-09-15

## 2020-09-15 ENCOUNTER — HOSPITAL ENCOUNTER (OUTPATIENT)
Dept: GENERAL RADIOLOGY | Facility: HOSPITAL | Age: 85
Discharge: HOME | End: 2020-09-15

## 2020-09-15 VITALS
SYSTOLIC BLOOD PRESSURE: 116 MMHG | OXYGEN SATURATION: 99 % | HEART RATE: 57 BPM | RESPIRATION RATE: 16 BRPM | DIASTOLIC BLOOD PRESSURE: 72 MMHG | TEMPERATURE: 97.1 F

## 2020-09-15 DIAGNOSIS — M47.816 SPONDYLOSIS OF LUMBAR REGION WITHOUT MYELOPATHY OR RADICULOPATHY: ICD-10-CM

## 2020-09-15 DIAGNOSIS — R52 PAIN: ICD-10-CM

## 2020-09-15 PROCEDURE — 25010000002 MIDAZOLAM PER 1 MG: Performed by: ANESTHESIOLOGY

## 2020-09-15 PROCEDURE — 25010000002 ROPIVACAINE PER 1 MG: Performed by: ANESTHESIOLOGY

## 2020-09-15 PROCEDURE — C1755 CATHETER, INTRASPINAL: HCPCS

## 2020-09-15 PROCEDURE — 77003 FLUOROGUIDE FOR SPINE INJECT: CPT

## 2020-09-15 RX ORDER — SODIUM CHLORIDE 0.9 % (FLUSH) 0.9 %
3-10 SYRINGE (ML) INJECTION AS NEEDED
Status: DISCONTINUED | OUTPATIENT
Start: 2020-09-15 | End: 2020-09-16 | Stop reason: HOSPADM

## 2020-09-15 RX ORDER — SODIUM CHLORIDE 0.9 % (FLUSH) 0.9 %
3 SYRINGE (ML) INJECTION EVERY 12 HOURS SCHEDULED
Status: DISCONTINUED | OUTPATIENT
Start: 2020-09-15 | End: 2020-09-16 | Stop reason: HOSPADM

## 2020-09-15 RX ORDER — LIDOCAINE HYDROCHLORIDE 10 MG/ML
1 INJECTION, SOLUTION INFILTRATION; PERINEURAL ONCE
Status: DISCONTINUED | OUTPATIENT
Start: 2020-09-15 | End: 2020-09-16 | Stop reason: HOSPADM

## 2020-09-15 RX ORDER — ROPIVACAINE HYDROCHLORIDE 5 MG/ML
20 INJECTION, SOLUTION EPIDURAL; INFILTRATION; PERINEURAL ONCE
Status: COMPLETED | OUTPATIENT
Start: 2020-09-15 | End: 2020-09-15

## 2020-09-15 RX ORDER — FENTANYL CITRATE 50 UG/ML
50 INJECTION, SOLUTION INTRAMUSCULAR; INTRAVENOUS AS NEEDED
Status: DISCONTINUED | OUTPATIENT
Start: 2020-09-15 | End: 2020-09-16 | Stop reason: HOSPADM

## 2020-09-15 RX ORDER — MIDAZOLAM HYDROCHLORIDE 1 MG/ML
1 INJECTION INTRAMUSCULAR; INTRAVENOUS AS NEEDED
Status: DISCONTINUED | OUTPATIENT
Start: 2020-09-15 | End: 2020-09-16 | Stop reason: HOSPADM

## 2020-09-15 RX ADMIN — MIDAZOLAM 2 MG: 1 INJECTION INTRAMUSCULAR; INTRAVENOUS at 14:30

## 2020-09-15 RX ADMIN — ROPIVACAINE HYDROCHLORIDE 20 ML: 5 INJECTION, SOLUTION EPIDURAL; INFILTRATION; PERINEURAL at 14:34

## 2020-09-15 NOTE — ANESTHESIA PROCEDURE NOTES
Bilateral lumbar facet diagnostic medial branch blocks to cover the levels of L3-L4, L4-L5, and L5-S1.    Pre-sedation assessment completed: 9/15/2020 2:24 PM    Patient reassessed immediately prior to procedure    Patient location during procedure: Pain Clinic  Start time: 9/15/2020 2:29 PM  Stop Time: 9/15/2020 2:43 PM    Reason for block: procedure for pain  Performed by  Anesthesiologist: Simon Rueda MD  Preanesthetic Checklist  Completed: patient identified, site marked, surgical consent, pre-op evaluation, timeout performed, IV checked, risks and benefits discussed and monitors and equipment checked  Prep:  Patient position: prone  Sterile barriers:cap, gloves, mask and sterile barrier  Prep: ChloraPrep  Patient monitoring: blood pressure monitoring, continuous pulse oximetry and EKG  Procedure:  Sedation:yes  Approach:bilateral  Guidance:fluoroscopy  Location:lumbar  Interspace: to cover the levels of L3-L4, L4-L5, and L5-S1.  Needle Type:Quincke  Needle Gauge:25 G  Aspiration:negative  Medications:  Comment:With 0.5% Ropivacaine, 0.5 mls per injection site.     Post Assessment  Injection Assessment: negative  Patient Tolerance:comfortable throughout block  Complications:no  Additional Notes  Post-Op Diagnosis Codes:     * Spondylosis of lumbar region without myelopathy or radiculopathy (M47.816)     * DDD (degenerative disc disease), lumbar (M51.36)     * Lumbar stenosis (M48.061)    The patient was observed in recovery with no evidence of neurological deficits or other problems. All questions were answered. The patient was discharged with appropriate instructions.

## 2020-09-15 NOTE — H&P
CHIEF COMPLAINT:  Low back pain        HISTORY OF PRESENT ILLNESS:  This patient is complaining of low back pain which does not radiate down his legs and mainly stays in a bandlike pattern across his lower back.  It ranges between a 4 and 8 out of 10 on the pain scale.  The pain is described as aching, dull, intermittent, sharp, spasming, stabbing in nature. The pain is exacerbated by activity, exercise, lifting, standing, twisting, and walking, and relieved by relaxation, lying down, and rest.  The patient has tried conservative therapy for greater than 6 weeks including: Ice, rest, heat, over-the-counter medication.  The pain is significantly decreasing the patient's Activities of Daily Living.  Diagnostic imaging specifically a CT scan of the lumbar spine without contrast from 8/13/2018 shows multilevel spondylosis/facet arthropathy, degenerative disc disease, and stenosis.     This patient was referred to our clinic by Dr. Tyrese Chaidez for diagnostic lumbar facet medial branch blocks leading to possible future radiofrequency ablation if appropriate.    He today presents with insurance approval to proceed.      PAST MEDICAL HISTORY:  Current Outpatient Medications on File Prior to Encounter   Medication Sig Dispense Refill   • atorvastatin (LIPITOR) 40 MG tablet Take 1 tablet by mouth Daily. 90 tablet 3   • cetirizine (zyrTEC) 10 MG tablet Take 10 mg by mouth Daily.     • cloNIDine (CATAPRES) 0.1 MG tablet Take 1 tablet by mouth 2 (Two) Times a Day. 180 tablet 3   • econazole nitrate (SPECTAZOLE) 1 % cream Apply  topically to the appropriate area as directed As Needed.     • Flaxseed, Linseed, (FLAX SEED OIL PO) Take  by mouth.     • fluticasone (FLONASE) 50 MCG/ACT nasal spray USE 2 SPRAYS IN EACH NOSTRIL DAILY AS DIRECTED BY PROVIDER 48 g 3   • losartan (COZAAR) 100 MG tablet Take 1 tablet by mouth Daily. 90 tablet 3   • meloxicam (MOBIC) 7.5 MG tablet Take 1 tablet by mouth Daily. 90 tablet 3   •  metoprolol succinate XL (TOPROL-XL) 100 MG 24 hr tablet Take 1 tablet by mouth Daily. 90 tablet 3   • Misc Natural Products (JOINT HEALTH PO) Take  by mouth.     • modafinil (PROVIGIL) 100 MG tablet Take 1 tablet by mouth Daily. 90 tablet 0   • Multiple Vitamins-Minerals (MULTIVITAMIN PO) Take 1 tablet by mouth Daily.     • omeprazole (priLOSEC) 20 MG capsule Take 1 capsule by mouth Daily. 90 capsule 3   • Saw Palmetto, Serenoa repens, 320 MG capsule Take 320 mg by mouth 2 (two) times a day.     • tamsulosin (FLOMAX) 0.4 MG capsule 24 hr capsule Take 2 capsules by mouth Daily. 180 capsule 3   • aspirin 81 MG tablet Take 81 mg by mouth Daily.       No current facility-administered medications on file prior to encounter.        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 Aldridge (sp)       SOCIAL HISTORY:  Counseled to avoid tobacco     REVIEW OF SYSTEMS:  No pertinent hematologic, infectious, or constitutional symptoms.  Other review of systems non-contributory     PHYSICAL EXAM:  /90 (BP Location: Left arm, Patient Position: Lying)   Pulse 71   Temp 36.2 °C (97.1 °F) (Infrared)   Resp 16   SpO2 99%   Constitutional: Well-developed well-nourished no acute distress  General: Alert and oriented  Ophtho: EOMI  Airway: Mallampati 2  Cardiac:  Regular rate  Lungs:  Clear to auscultation bilaterally   GI: soft, nontender  Cervical Spine:  Noncontributory  Sacroiliac Joints: Noncontributory  Musc: Decreased range of motion and pain with lumbar extension.  Tenderness palpation over the bilateral lumbar facets at the lower few levels.  Neuro: Lumbar facet loading is positive bilaterally.     DIAGNOSIS:  Post-Op Diagnosis Codes:  Post-Op Diagnosis Codes:     * Spondylosis of lumbar region without myelopathy or radiculopathy [M47.816]     * DDD (degenerative disc disease), lumbar [M51.36]     * Lumbar stenosis [M48.061]     PLAN:  -The patient presents today with insurance approval to proceed.  -Bilateral Lumbar facet medial branch injections to cover the level(s) of L3-L4, L4-L5, and L5-S1 spaced approximately 2-4 weeks apart. These injections will be for diagnostic purposes. If pain control is acceptable but temporary, it was discussed with the patient that they may be a candidate for radiofrequency lesioning in the future.     - Risks were discussed with the patient, including but not limited to: failure of relief, worsening pain, tissue, nerve, or blood vessel damage, bleeding, infection, and abscess formation. Benefits and alternatives were also discussed. No promises were made. The patient voiced understanding.  -  Physical therapy exercises at home should be considered, as tolerated, as prescribed by physical therapy or from the pain clinic handout (given to the patient).  Continuation of these exercises every day, or multiple times per week, even when the patient has good pain relief, was stressed to the patient as a preventative measure to decrease the frequency and severity of future pain episodes.  -  It is recommended that the patient use the least amount of opioid medication possible.     -  Heat and ice to the affected area as tolerated for pain control.  It was discussed that heating pads can cause burns.  -  Daily low impact exercise such as walking or water exercise was recommended to maintain overall health and aid in weight control.   -   Patient was counseled to abstain from tobacco products.  -  Follow up as needed for subsequent injections.  -This gentleman also has significant lumbar degeneration and stenosis.  It is possible that he would benefit from future epidural steroid injections.  Hopefully, we can get his pain under much better control by focusing on his lumbar facets.    Simon Rueda M.D.  [facility], [facility], [facility]  Board Certified in Pain Medicine by the American Board of Anesthesiology  Board Certified in Anesthesiology by the American Board of Anesthesiology     Much of this encounter note is an electronic transcription/translation of spoken language to printed text. The electronic translation of spoken language may permit erroneous, or at times, nonsensical words or phrases to be inadvertently transcribed. Although I have reviewed the note for such errors, some may still exist.

## 2020-10-09 ENCOUNTER — HOSPITAL ENCOUNTER (OUTPATIENT)
Dept: GENERAL RADIOLOGY | Facility: HOSPITAL | Age: 85
Discharge: HOME | End: 2020-10-09

## 2020-10-09 ENCOUNTER — ANESTHESIA (OUTPATIENT)
Dept: PAIN MEDICINE | Facility: HOSPITAL | Age: 85
End: 2020-10-09

## 2020-10-09 ENCOUNTER — ANESTHESIA EVENT (OUTPATIENT)
Dept: PAIN MEDICINE | Facility: HOSPITAL | Age: 85
End: 2020-10-09

## 2020-10-09 ENCOUNTER — HOSPITAL ENCOUNTER (OUTPATIENT)
Dept: PAIN MEDICINE | Facility: HOSPITAL | Age: 85
Discharge: HOME | End: 2020-10-09

## 2020-10-09 VITALS
RESPIRATION RATE: 16 BRPM | OXYGEN SATURATION: 94 % | DIASTOLIC BLOOD PRESSURE: 65 MMHG | HEART RATE: 59 BPM | SYSTOLIC BLOOD PRESSURE: 133 MMHG | TEMPERATURE: 97.3 F

## 2020-10-09 DIAGNOSIS — M47.816 SPONDYLOSIS OF LUMBAR REGION WITHOUT MYELOPATHY OR RADICULOPATHY: ICD-10-CM

## 2020-10-09 DIAGNOSIS — R52 PAIN: ICD-10-CM

## 2020-10-09 PROCEDURE — 25010000002 MIDAZOLAM PER 1 MG: Performed by: ANESTHESIOLOGY

## 2020-10-09 PROCEDURE — 25010000002 ROPIVACAINE PER 1 MG: Performed by: ANESTHESIOLOGY

## 2020-10-09 PROCEDURE — 77003 FLUOROGUIDE FOR SPINE INJECT: CPT

## 2020-10-09 PROCEDURE — C1755 CATHETER, INTRASPINAL: HCPCS

## 2020-10-09 RX ORDER — SODIUM CHLORIDE 0.9 % (FLUSH) 0.9 %
3 SYRINGE (ML) INJECTION EVERY 12 HOURS SCHEDULED
Status: DISCONTINUED | OUTPATIENT
Start: 2020-10-09 | End: 2020-10-10 | Stop reason: HOSPADM

## 2020-10-09 RX ORDER — LIDOCAINE HYDROCHLORIDE 10 MG/ML
1 INJECTION, SOLUTION INFILTRATION; PERINEURAL ONCE
Status: DISCONTINUED | OUTPATIENT
Start: 2020-10-09 | End: 2020-10-10 | Stop reason: HOSPADM

## 2020-10-09 RX ORDER — ROPIVACAINE HYDROCHLORIDE 5 MG/ML
20 INJECTION, SOLUTION EPIDURAL; INFILTRATION; PERINEURAL ONCE
Status: COMPLETED | OUTPATIENT
Start: 2020-10-09 | End: 2020-10-09

## 2020-10-09 RX ORDER — MIDAZOLAM HYDROCHLORIDE 1 MG/ML
1 INJECTION INTRAMUSCULAR; INTRAVENOUS AS NEEDED
Status: DISCONTINUED | OUTPATIENT
Start: 2020-10-09 | End: 2020-10-10 | Stop reason: HOSPADM

## 2020-10-09 RX ORDER — SODIUM CHLORIDE 0.9 % (FLUSH) 0.9 %
3-10 SYRINGE (ML) INJECTION AS NEEDED
Status: DISCONTINUED | OUTPATIENT
Start: 2020-10-09 | End: 2020-10-10 | Stop reason: HOSPADM

## 2020-10-09 RX ORDER — FENTANYL CITRATE 50 UG/ML
50 INJECTION, SOLUTION INTRAMUSCULAR; INTRAVENOUS AS NEEDED
Status: DISCONTINUED | OUTPATIENT
Start: 2020-10-09 | End: 2020-10-10 | Stop reason: HOSPADM

## 2020-10-09 RX ADMIN — MIDAZOLAM 2 MG: 1 INJECTION INTRAMUSCULAR; INTRAVENOUS at 14:23

## 2020-10-09 RX ADMIN — ROPIVACAINE HYDROCHLORIDE 20 ML: 5 INJECTION, SOLUTION EPIDURAL; INFILTRATION; PERINEURAL at 14:36

## 2020-10-09 NOTE — ANESTHESIA PROCEDURE NOTES
Bilateral lumbar facet medial branch diagnostic blocks to cover the levels of L3-L4, L4-L5, and L5-S1    Pre-sedation assessment completed: 10/9/2020 2:17 PM    Patient reassessed immediately prior to procedure    Patient location during procedure: Pain Clinic  Start time: 10/9/2020 2:21 PM  Stop Time: 10/9/2020 2:36 PM    Reason for block: procedure for pain  Performed by  Anesthesiologist: Simon Rueda MD  Preanesthetic Checklist  Completed: patient identified, site marked, surgical consent, pre-op evaluation, timeout performed, IV checked, risks and benefits discussed and monitors and equipment checked  Prep:  Patient position: prone  Sterile barriers:cap, gloves, mask and sterile barrier  Prep: ChloraPrep  Patient monitoring: blood pressure monitoring, continuous pulse oximetry and EKG  Procedure:  Sedation:yes  Approach:bilateral  Guidance:fluoroscopy  Location:lumbar  Interspace: to cover the levels of L3-L4, L4-L5, and L5-S1.  Needle Type:Quincke  Needle Gauge:25 G  Aspiration:negative  Medications:  Comment:With 0.5% Ropivacaine, 0.5 mls per injection site.     Post Assessment  Injection Assessment: negative  Patient Tolerance:comfortable throughout block  Complications:no  Additional Notes  Post-Op Diagnosis Codes:     * Spondylosis of lumbar region without myelopathy or radiculopathy (M47.816)     * DDD (degenerative disc disease), lumbar (M51.36)     * Lumbar stenosis (M48.061)    The patient was observed in recovery with no evidence of neurological deficits or other problems. All questions were answered. The patient was discharged with appropriate instructions.

## 2020-10-09 NOTE — H&P
CHIEF COMPLAINT:  Low back pain        HISTORY OF PRESENT ILLNESS:  This patient is complaining of low back pain which does not radiate down his legs and mainly stays in a bandlike pattern across his lower back.  It ranges between a 4 and 8 out of 10 on the pain scale.  The pain is significantly decreasing the patient's Activities of Daily Living.      Diagnostic imaging specifically a CT scan of the lumbar spine without contrast from 8/13/2018 shows multilevel spondylosis/facet arthropathy, degenerative disc disease, and stenosis.     This patient was referred to our clinic by Dr. Tyrese Chaidez for diagnostic lumbar facet medial branch blocks leading to possible future radiofrequency ablation if appropriate.     He states that the first diagnostic injection provided him about 90% relief that lasted for several days.  The relief has now faded.     PAST MEDICAL HISTORY:  Current Outpatient Medications on File Prior to Encounter   Medication Sig Dispense Refill   • atorvastatin (LIPITOR) 40 MG tablet Take 1 tablet by mouth Daily. 90 tablet 3   • cetirizine (zyrTEC) 10 MG tablet Take 10 mg by mouth Daily.     • cloNIDine (CATAPRES) 0.1 MG tablet Take 1 tablet by mouth 2 (Two) Times a Day. 180 tablet 3   • econazole nitrate (SPECTAZOLE) 1 % cream Apply  topically to the appropriate area as directed As Needed.     • Flaxseed, Linseed, (FLAX SEED OIL PO) Take  by mouth.     • fluticasone (FLONASE) 50 MCG/ACT nasal spray USE 2 SPRAYS IN EACH NOSTRIL DAILY AS DIRECTED BY PROVIDER 48 g 3   • losartan (COZAAR) 100 MG tablet Take 1 tablet by mouth Daily. 90 tablet 3   • meloxicam (MOBIC) 7.5 MG tablet Take 1 tablet by mouth Daily. 90 tablet 3   • metoprolol succinate XL (TOPROL-XL) 100 MG 24 hr tablet Take 1 tablet by mouth Daily. 90 tablet 3   • Misc Natural Products (JOINT HEALTH PO) Take  by mouth.     • modafinil (PROVIGIL) 100 MG tablet Take 1 tablet by mouth Daily. 90 tablet 0   • Multiple Vitamins-Minerals  (MULTIVITAMIN PO) Take 1 tablet by mouth Daily.     • omeprazole (priLOSEC) 20 MG capsule Take 1 capsule by mouth Daily. 90 capsule 3   • Saw Palmetto, Serenoa repens, 320 MG capsule Take 320 mg by mouth 2 (two) times a day.     • tamsulosin (FLOMAX) 0.4 MG capsule 24 hr capsule Take 2 capsules by mouth Daily. 180 capsule 3   • aspirin 81 MG tablet Take 81 mg by mouth Daily.       No current facility-administered medications on file prior to encounter.        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 Aldridge (sp)       SOCIAL HISTORY:  Counseled to avoid tobacco     REVIEW OF SYSTEMS:  No pertinent hematologic, infectious, or constitutional symptoms.  Other review of systems non-contributory     PHYSICAL EXAM:  BP (!) 188/98 (BP Location: Left arm, Patient Position: Sitting)   Pulse 69   Temp 36.3 °C (97.3 °F) (Oral)   Resp 16   SpO2 98%   Constitutional: Well-developed well-nourished no acute distress  General: Alert and oriented  Ophtho: EOMI  Airway: Mallampati 2  Cardiac:  Regular rate  Lungs:  Clear to auscultation bilaterally   GI: soft, nontender  Cervical Spine: Noncontributory  Sacroiliac Joints: Noncontributory  Musc: Tenderness palpation over bilateral lower lumbar facets  Neuro: Facet loading is positive bilaterally        DIAGNOSIS:  Post-Op Diagnosis Codes:  Post-Op Diagnosis Codes:     * Spondylosis of lumbar region without myelopathy or  radiculopathy [M47.816]     * DDD (degenerative disc disease), lumbar [M51.36]     * Lumbar stenosis [M48.061]     PLAN:  -Bilateral Lumbar facet medial branch injections to cover the level(s) of L3-L4, L4-L5, and L5-S1 spaced approximately 2-4 weeks apart. These injections will be for diagnostic purposes. If pain control is acceptable but temporary, it was discussed with the patient that he will be a candidate for radiofrequency lesioning in the future.     - Risks were discussed with the patient, including but not limited to: failure of relief, worsening pain, tissue, nerve, or blood vessel damage, bleeding, infection, and abscess formation. Benefits and alternatives were also discussed. No promises were made. The patient voiced understanding.  -  Physical therapy exercises at home should be considered, as tolerated, as prescribed by physical therapy or from the pain clinic handout (given to the patient).  Continuation of these exercises every day, or multiple times per week, even when the patient has good pain relief, was stressed to the patient as a preventative measure to decrease the frequency and severity of future pain episodes.  -  It is recommended that the patient use the least amount of opioid medication possible.     -  Heat and ice to the affected area as tolerated for pain control.  It was discussed that heating pads can cause burns.  -  Daily low impact exercise such as walking or water exercise was recommended to maintain overall health and aid in weight control.   -  Patient was counseled to abstain from tobacco products.  -  Follow up as needed for subsequent injections.  -This gentleman also has significant lumbar degeneration and stenosis.  It is possible that he would benefit from future epidural steroid injections.  Hopefully, we can get his pain under much better control by focusing on his lumbar facets.    Simon Rueda M.D.  Partner, [facility], [facility],  [facility] [place]  Board Certified in Pain Medicine by the American Board of Anesthesiology  Board Certified in Anesthesiology by the American Board of Anesthesiology     Much of this encounter note is an electronic transcription/translation of spoken language to printed text. The electronic translation of spoken language may permit erroneous, or at times, nonsensical words or phrases to be inadvertently transcribed. Although I have reviewed the note for such errors, some may still exist.

## 2020-10-26 DIAGNOSIS — I10 ESSENTIAL HYPERTENSION: ICD-10-CM

## 2020-10-26 RX ORDER — TAMSULOSIN HYDROCHLORIDE 0.4 MG/1
0.8 CAPSULE ORAL DAILY
Qty: 180 CAPSULE | Refills: 3 | Status: SHIPPED | OUTPATIENT
Start: 2020-10-26 | End: 2021-10-18

## 2020-10-26 RX ORDER — ATORVASTATIN CALCIUM 40 MG/1
40 TABLET, FILM COATED ORAL DAILY
Qty: 90 TABLET | Refills: 3 | Status: SHIPPED | OUTPATIENT
Start: 2020-10-26 | End: 2021-10-18

## 2020-10-26 RX ORDER — OMEPRAZOLE 20 MG/1
20 CAPSULE, DELAYED RELEASE ORAL DAILY
Qty: 90 CAPSULE | Refills: 3 | Status: SHIPPED | OUTPATIENT
Start: 2020-10-26 | End: 2021-03-08

## 2020-10-26 RX ORDER — CLONIDINE HYDROCHLORIDE 0.1 MG/1
0.1 TABLET ORAL 2 TIMES DAILY
Qty: 180 TABLET | Refills: 3 | Status: SHIPPED | OUTPATIENT
Start: 2020-10-26 | End: 2021-10-18

## 2020-10-26 RX ORDER — LOSARTAN POTASSIUM 100 MG/1
100 TABLET ORAL DAILY
Qty: 90 TABLET | Refills: 3 | Status: SHIPPED | OUTPATIENT
Start: 2020-10-26 | End: 2021-10-18

## 2020-10-26 RX ORDER — METOPROLOL SUCCINATE 100 MG/1
100 TABLET, EXTENDED RELEASE ORAL DAILY
Qty: 90 TABLET | Refills: 3 | Status: SHIPPED | OUTPATIENT
Start: 2020-10-26 | End: 2021-10-18

## 2020-10-29 ENCOUNTER — OFFICE VISIT (OUTPATIENT)
Dept: CARDIOLOGY | Facility: CLINIC | Age: 85
End: 2020-10-29

## 2020-10-29 VITALS
DIASTOLIC BLOOD PRESSURE: 92 MMHG | BODY MASS INDEX: 31.78 KG/M2 | HEART RATE: 79 BPM | WEIGHT: 227 LBS | HEIGHT: 71 IN | SYSTOLIC BLOOD PRESSURE: 158 MMHG

## 2020-10-29 DIAGNOSIS — E78.5 HYPERLIPIDEMIA, UNSPECIFIED HYPERLIPIDEMIA TYPE: ICD-10-CM

## 2020-10-29 DIAGNOSIS — I25.10 CORONARY ARTERY DISEASE INVOLVING NATIVE CORONARY ARTERY OF NATIVE HEART WITHOUT ANGINA PECTORIS: Primary | ICD-10-CM

## 2020-10-29 DIAGNOSIS — Z95.5 S/P CORONARY ARTERY STENT PLACEMENT: ICD-10-CM

## 2020-10-29 DIAGNOSIS — I10 ESSENTIAL HYPERTENSION: ICD-10-CM

## 2020-10-29 PROCEDURE — 99214 OFFICE O/P EST MOD 30 MIN: CPT | Performed by: INTERNAL MEDICINE

## 2020-10-29 PROCEDURE — 93000 ELECTROCARDIOGRAM COMPLETE: CPT | Performed by: INTERNAL MEDICINE

## 2020-10-29 RX ORDER — METRONIDAZOLE 7.5 MG/G
LOTION TOPICAL DAILY
COMMUNITY
Start: 2020-09-18

## 2020-10-29 RX ORDER — UBIDECARENONE 100 MG
300 CAPSULE ORAL DAILY
COMMUNITY

## 2020-10-29 NOTE — PROGRESS NOTES
Subjective:     Encounter Date:10/29/2020      Patient ID: Kevin Ellington is a 85 y.o. male.    Chief Complaint:  History of Present Illness    This is an 85-year-old male with a history of coronary artery disease status post prior LAD stent placement in 12/2002, chronic back pain, dyslipidemia, hypertension, who presents for telephone follow-up.       He presents today for routine 6-month follow-up.  Unfortunately he developed breast swelling with the spironolactone and this was discontinued by Dr. Mancia who is his new primary care physician.  Fortunately his blood pressures have remained fairly well-controlled off of the spironolactone.  He reports that his systolic pressures run in the 130s to 140s.  He denies any chest pain, palpitations, orthopnea, near-syncope or syncope, or any lower extremity edema.  He reports stable dyspnea on exertion.  He still is struggling with his back and has undergone a couple of injections.  He is to undergo radiofrequency ablation and then next couple weeks.  He is hopeful that this will help relieve his pain.     Prior History:  The patient was previously followed by Dr. Cavazos but came to establish care here in 8/2016.  His prior cardiac history again includes a stent placement on 12/17/2002.  The patient reports that at the time he was having symptoms of dyspnea and chest fullness on exertion.  He was taken to the cardiac catheterization laboratory at that time and apparently had a Promus stent (either 3.0 or 3.5 x 16 mm based on his stent card).  He did well until about 2012 when he started having more dyspnea on exertion.  At that time he underwent an echocardiogram that showed normal left ventricular systolic function wall motion with an ejection fraction of 60%, grade 1A diastolic dysfunction, and no significant valvular disease.  A Lexiscan Myoview stress test was negative for ischemia.  He was seen by pulmonary due to his continued issues with dyspnea on exertion  and they did not find any pulmonary issues to explain his symptoms.  At that time the patient started exercising and lost about 20 pounds with resolution of his symptoms.     In follow-up he is mainly had issues with blood pressure control.  This improved some with the addition of amlodipine.  Over the last year or so he is been having increasing issues with dyspnea on exertion.  He was seen by NANCY Chahal in 7/2019 with these complaints.  He underwent both a stress test and an echocardiogram at that time.  His echocardiogram showed normal left ventricular systolic function wall motion with an EF of 59%, grade 1 diastolic dysfunction, and no significant valvular disease.  His stress test showed no evidence of ischemia.     In 10/2019 he reported that his blood pressures were running a little high with systolics running in the 140s to 160s.  For this reason and the fact that he is developed more issues with ankle edema Dr. Bernal switched amlodipine to spironolactone.  By the time of his office visit he had not made the switch yet but I encouraged him to do so.     During a telephone visit in 4/2020 he reported he was feeling well.  His blood pressures were well controlled on spironolactone.      Review of Systems   Constitution: Negative for malaise/fatigue.   HENT: Negative for hearing loss, hoarse voice, nosebleeds and sore throat.    Eyes: Negative for pain.   Cardiovascular: Positive for dyspnea on exertion. Negative for chest pain, claudication, cyanosis, irregular heartbeat, leg swelling, near-syncope, orthopnea, palpitations, paroxysmal nocturnal dyspnea and syncope.   Respiratory: Negative for shortness of breath and snoring.    Endocrine: Negative for cold intolerance, heat intolerance, polydipsia, polyphagia and polyuria.   Skin: Negative for itching and rash.   Musculoskeletal: Positive for back pain. Negative for arthritis, falls, joint pain, joint swelling, muscle cramps, muscle weakness and  myalgias.   Gastrointestinal: Negative for constipation, diarrhea, dysphagia, heartburn, hematemesis, hematochezia, melena, nausea and vomiting.   Genitourinary: Negative for frequency, hematuria and hesitancy.   Neurological: Negative for excessive daytime sleepiness, dizziness, headaches, light-headedness, numbness and weakness.   Psychiatric/Behavioral: Negative for depression. The patient is not nervous/anxious.          Current Outpatient Medications:   •  aspirin 81 MG tablet, Take 81 mg by mouth Daily., Disp: , Rfl:   •  atorvastatin (LIPITOR) 40 MG tablet, Take 1 tablet by mouth Daily., Disp: 90 tablet, Rfl: 3  •  cetirizine (zyrTEC) 10 MG tablet, Take 10 mg by mouth Daily., Disp: , Rfl:   •  cloNIDine (CATAPRES) 0.1 MG tablet, Take 1 tablet by mouth 2 (Two) Times a Day., Disp: 180 tablet, Rfl: 3  •  coenzyme Q10 100 MG capsule, Take 300 mg by mouth Daily., Disp: , Rfl:   •  econazole nitrate (SPECTAZOLE) 1 % cream, Apply  topically to the appropriate area as directed As Needed., Disp: , Rfl:   •  Flaxseed, Linseed, (FLAX SEED OIL PO), Take  by mouth., Disp: , Rfl:   •  fluticasone (FLONASE) 50 MCG/ACT nasal spray, USE 2 SPRAYS IN EACH NOSTRIL DAILY AS DIRECTED BY PROVIDER, Disp: 48 g, Rfl: 3  •  losartan (COZAAR) 100 MG tablet, Take 1 tablet by mouth Daily., Disp: 90 tablet, Rfl: 3  •  meloxicam (MOBIC) 7.5 MG tablet, Take 1 tablet by mouth Daily., Disp: 90 tablet, Rfl: 3  •  metoprolol succinate XL (TOPROL-XL) 100 MG 24 hr tablet, Take 1 tablet by mouth Daily., Disp: 90 tablet, Rfl: 3  •  metroNIDAZOLE (FLAGYL) 0.75 % lotion lotion, Daily., Disp: , Rfl:   •  Misc Natural Products (JOINT HEALTH PO), Take  by mouth., Disp: , Rfl:   •  modafinil (PROVIGIL) 100 MG tablet, Take 1 tablet by mouth Daily., Disp: 90 tablet, Rfl: 0  •  Multiple Vitamins-Minerals (MULTIVITAMIN PO), Take 1 tablet by mouth Daily., Disp: , Rfl:   •  omeprazole (priLOSEC) 20 MG capsule, Take 1 capsule by mouth Daily., Disp: 90 capsule,  Rfl: 3  •  Saw Palmetto, Serenoa repens, 320 MG capsule, Take 320 mg by mouth 2 (two) times a day., Disp: , Rfl:   •  tamsulosin (FLOMAX) 0.4 MG capsule 24 hr capsule, Take 2 capsules by mouth Daily., Disp: 180 capsule, Rfl: 3    Past Medical History:   Diagnosis Date   • Arthritis    • Cervical radiculopathy    • Coronary artery disease     Stent   • CTS (carpal tunnel syndrome) 1998    Surgery both hands in    • GERD (gastroesophageal reflux disease)    • HTN (hypertension)    • Hyperlipidemia    • Low back pain     2 Prior surgeries   • Lumbosacral disc disease    • Melanoma (CMS/HCC) 01/15/2009    DR WILLY SIMMS ( Left forearm)   • Radiculitis, thoracic    • Spondylosis of lumbar region without myelopathy or radiculopathy 2020   • Stented coronary artery    • Thoracic disc disorder     Surgery by Dr. Aldridge (sp)       Past Surgical History:   Procedure Laterality Date   • ANKLE FUSION Left 2007    reconstruction and fusion   • BACK SURGERY      HERNIATED LUMBAR DISK ,,   • CARPAL TUNNEL RELEASE     • CATARACT EXTRACTION, BILATERAL Bilateral    • CORONARY ANGIOPLASTY WITH STENT PLACEMENT     • EPIDURAL BLOCK  [facility] at [facility]    and [facility]   • REPLACEMENT TOTAL KNEE Left    • REPLACEMENT TOTAL KNEE Right    • SKIN CANCER EXCISION      MELANOMA   • THORACIC SPINE SURGERY         Family History   Problem Relation Age of Onset   • Hypertension Mother    • Arthritis Mother            • Skin cancer Mother    • Heart disease Father    • Early death Father         Aortic aneurism age 58   • Hypertension Father    • Aortic aneurysm Father 58   • Cancer Neg Hx        Social History     Tobacco Use   • Smoking status: Never Smoker   • Smokeless tobacco: Never Used   • Tobacco comment: caffeine use   Substance Use Topics   • Alcohol use: No   • Drug use: No         ECG 12 Lead    Date/Time: 10/29/2020 2:03 PM  Performed by: Halima Salmeron  "MD  Authorized by: Halima Salmeron MD   Comparison: compared with previous ECG   Comparison to previous ECG: PVCs are new  Rhythm: sinus rhythm  Ectopy: unifocal PVCs               Objective:     Visit Vitals  /92   Pulse 79   Ht 180.3 cm (71\")   Wt 103 kg (227 lb)   BMI 31.66 kg/m²         Constitutional:       Appearance: Normal appearance. Well-developed.   HENT:      Head: Normocephalic and atraumatic.   Neck:      Vascular: No carotid bruit or JVD.   Pulmonary:      Effort: Pulmonary effort is normal.      Breath sounds: Normal breath sounds.   Cardiovascular:      Normal rate. Regular rhythm.      No gallop.   Pulses:     Radial: 2+ bilaterally.  Edema:     Peripheral edema present.     Ankle: bilateral 1+ edema of the ankle.  Abdominal:      Palpations: Abdomen is soft.   Skin:     General: Skin is warm and dry.   Neurological:      Mental Status: Alert and oriented to person, place, and time.             Assessment:          Diagnosis Plan   1. Coronary artery disease involving native coronary artery of native heart without angina pectoris     2. S/P coronary artery stent placement     3. Essential hypertension     4. Hyperlipidemia, unspecified hyperlipidemia type            Plan:       1.  Coronary artery disease.  Appears to be stable and asymptomatic.  We will continue current medical management.  2.  Hypertension.  Elevated in the office today but it reportedly fairly well controlled at home.  We will continue current management.  3.  Hyperlipidemia.  On atorvastatin for goal LDL of 70 or below.  His recent lipid panel from 3 months ago showed that his cholesterol was at goal.  We will continue current dose.  4.  PVCs.  Asymptomatic.  He is already on a high dose of metoprolol.  Will monitor for now.    We will plan on seeing the patient back again in 6 months.         "

## 2020-11-05 ENCOUNTER — ANESTHESIA (OUTPATIENT)
Dept: PAIN MEDICINE | Facility: HOSPITAL | Age: 85
End: 2020-11-05

## 2020-11-05 ENCOUNTER — HOSPITAL ENCOUNTER (OUTPATIENT)
Dept: GENERAL RADIOLOGY | Facility: HOSPITAL | Age: 85
Discharge: HOME | End: 2020-11-05

## 2020-11-05 ENCOUNTER — HOSPITAL ENCOUNTER (OUTPATIENT)
Dept: PAIN MEDICINE | Facility: HOSPITAL | Age: 85
Discharge: HOME | End: 2020-11-05

## 2020-11-05 ENCOUNTER — ANESTHESIA EVENT (OUTPATIENT)
Dept: PAIN MEDICINE | Facility: HOSPITAL | Age: 85
End: 2020-11-05

## 2020-11-05 VITALS
TEMPERATURE: 97.3 F | HEART RATE: 52 BPM | SYSTOLIC BLOOD PRESSURE: 127 MMHG | DIASTOLIC BLOOD PRESSURE: 72 MMHG | RESPIRATION RATE: 16 BRPM | OXYGEN SATURATION: 95 %

## 2020-11-05 DIAGNOSIS — M47.816 SPONDYLOSIS OF LUMBAR REGION WITHOUT MYELOPATHY OR RADICULOPATHY: ICD-10-CM

## 2020-11-05 DIAGNOSIS — R52 PAIN: ICD-10-CM

## 2020-11-05 PROCEDURE — 25010000002 MIDAZOLAM PER 1 MG: Performed by: ANESTHESIOLOGY

## 2020-11-05 PROCEDURE — 77003 FLUOROGUIDE FOR SPINE INJECT: CPT

## 2020-11-05 RX ORDER — SODIUM CHLORIDE 0.9 % (FLUSH) 0.9 %
3 SYRINGE (ML) INJECTION EVERY 12 HOURS SCHEDULED
Status: DISCONTINUED | OUTPATIENT
Start: 2020-11-05 | End: 2020-11-06 | Stop reason: HOSPADM

## 2020-11-05 RX ORDER — FENTANYL CITRATE 50 UG/ML
50 INJECTION, SOLUTION INTRAMUSCULAR; INTRAVENOUS AS NEEDED
Status: DISCONTINUED | OUTPATIENT
Start: 2020-11-05 | End: 2020-11-06 | Stop reason: HOSPADM

## 2020-11-05 RX ORDER — SODIUM CHLORIDE 0.9 % (FLUSH) 0.9 %
3-10 SYRINGE (ML) INJECTION AS NEEDED
Status: DISCONTINUED | OUTPATIENT
Start: 2020-11-05 | End: 2020-11-06 | Stop reason: HOSPADM

## 2020-11-05 RX ORDER — LIDOCAINE HYDROCHLORIDE 10 MG/ML
1 INJECTION, SOLUTION INFILTRATION; PERINEURAL ONCE
Status: COMPLETED | OUTPATIENT
Start: 2020-11-05 | End: 2020-11-05

## 2020-11-05 RX ORDER — LIDOCAINE HYDROCHLORIDE 20 MG/ML
10 INJECTION, SOLUTION INFILTRATION; PERINEURAL ONCE
Status: COMPLETED | OUTPATIENT
Start: 2020-11-05 | End: 2020-11-05

## 2020-11-05 RX ORDER — MIDAZOLAM HYDROCHLORIDE 1 MG/ML
1 INJECTION INTRAMUSCULAR; INTRAVENOUS AS NEEDED
Status: DISCONTINUED | OUTPATIENT
Start: 2020-11-05 | End: 2020-11-06 | Stop reason: HOSPADM

## 2020-11-05 RX ORDER — LIDOCAINE HYDROCHLORIDE 10 MG/ML
10 INJECTION, SOLUTION INFILTRATION; PERINEURAL ONCE
Status: DISCONTINUED | OUTPATIENT
Start: 2020-11-05 | End: 2020-11-06 | Stop reason: HOSPADM

## 2020-11-05 RX ADMIN — LIDOCAINE HYDROCHLORIDE 5 ML: 20 INJECTION, SOLUTION EPIDURAL; INFILTRATION; INTRACAUDAL; PERINEURAL at 12:45

## 2020-11-05 RX ADMIN — MIDAZOLAM 1 MG: 1 INJECTION INTRAMUSCULAR; INTRAVENOUS at 12:29

## 2020-11-05 RX ADMIN — LIDOCAINE HYDROCHLORIDE 20 ML: 10 INJECTION, SOLUTION EPIDURAL; INFILTRATION; INTRACAUDAL; PERINEURAL at 12:45

## 2020-11-05 NOTE — H&P
CHIEF COMPLAINT:  Low back pain        HISTORY OF PRESENT ILLNESS:  This patient is complaining of low back pain which does not radiate down his legs and mainly stays in a bandlike pattern across his lower back.  It ranges between a 4 and 8 out of 10 on the pain scale.  The pain is significantly decreasing the patient's Activities of Daily Living.       Diagnostic imaging specifically a CT scan of the lumbar spine without contrast from 8/13/2018 shows multilevel spondylosis/facet arthropathy, degenerative disc disease, and stenosis.     This patient was referred to our clinic by Dr. Tyrese Chaidez for diagnostic lumbar facet medial branch blocks leading to possible future radiofrequency ablation if appropriate.     He states that the first two diagnostic injections provided him about 80-90% relief that lasted for several days.  The relief has now faded.      PAST MEDICAL HISTORY:  Current Outpatient Medications on File Prior to Encounter   Medication Sig Dispense Refill   • atorvastatin (LIPITOR) 40 MG tablet Take 1 tablet by mouth Daily. 90 tablet 3   • cetirizine (zyrTEC) 10 MG tablet Take 10 mg by mouth Daily.     • cloNIDine (CATAPRES) 0.1 MG tablet Take 1 tablet by mouth 2 (Two) Times a Day. 180 tablet 3   • coenzyme Q10 100 MG capsule Take 300 mg by mouth Daily.     • econazole nitrate (SPECTAZOLE) 1 % cream Apply  topically to the appropriate area as directed As Needed.     • Flaxseed, Linseed, (FLAX SEED OIL PO) Take  by mouth.     • fluticasone (FLONASE) 50 MCG/ACT nasal spray USE 2 SPRAYS IN EACH NOSTRIL DAILY AS DIRECTED BY PROVIDER 48 g 3   • losartan (COZAAR) 100 MG tablet Take 1 tablet by mouth Daily. 90 tablet 3   • meloxicam (MOBIC) 7.5 MG tablet Take 1 tablet by mouth Daily. 90 tablet 3   • metoprolol succinate XL (TOPROL-XL) 100 MG 24 hr tablet Take 1 tablet by mouth Daily. 90 tablet 3   • metroNIDAZOLE (FLAGYL) 0.75 % lotion lotion Daily.     • Misc Natural Products (JOINT HEALTH PO) Take  by  mouth.     • modafinil (PROVIGIL) 100 MG tablet Take 1 tablet by mouth Daily. 90 tablet 0   • Multiple Vitamins-Minerals (MULTIVITAMIN PO) Take 1 tablet by mouth Daily.     • omeprazole (priLOSEC) 20 MG capsule Take 1 capsule by mouth Daily. 90 capsule 3   • Saw Palmetto, Serenoa repens, 320 MG capsule Take 320 mg by mouth 2 (two) times a day.     • tamsulosin (FLOMAX) 0.4 MG capsule 24 hr capsule Take 2 capsules by mouth Daily. 180 capsule 3   • aspirin 81 MG tablet Take 81 mg by mouth Daily.       No current facility-administered medications on file prior to encounter.        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 Aldridge (sp)       SOCIAL HISTORY:  Counseled to avoid tobacco     REVIEW OF SYSTEMS:  No pertinent hematologic, infectious, or constitutional symptoms.  Other review of systems non-contributory     PHYSICAL EXAM:  /99 (BP Location: Left arm, Patient Position: Lying)   Pulse 65   Temp 36.3 °C (97.3 °F) (Infrared)   Resp 16   SpO2 99%   Constitutional: Well-developed well-nourished no acute distress  General: Alert and oriented  Ophtho: EOMI  Airway: Mallampati 2  Cardiac:  Regular rate  Lungs:  Clear to auscultation bilaterally   GI: soft, nontender  Cervical Spine: Noncontributory  Sacroiliac Joints: Noncontributory  Musc: He continues to have tenderness palpation of her bilateral lower lumbar facets  Neuro: 70s positive bilaterally         DIAGNOSIS:  Post-Op Diagnosis Codes:  Post-Op Diagnosis Codes:     * Spondylosis of lumbar region without myelopathy or radiculopathy [M47.816]     * DDD (degenerative disc disease), lumbar [M51.36]     * Lumbar stenosis [M48.061]     PLAN:  -Left-sided Lumbar facet medial branch radio frequency ablation to cover the level(s) of L3-L4, L4-L5, and L5-S1.  We will bring him back soon to do the right side.  - This patient has received good but temporary relief from previous diagnostic lumbar facet medial branch injections.   - Risks were discussed with the patient, including but not limited to: failure of relief, worsening pain, dysesthesia, sensory loss, radicular pain, radiculopathy, weakness, tissue, nerve, nerve root or blood vessel damage, bleeding, infection, and abscess formation. Benefits and alternatives were also discussed. No promises were made. The patient voiced understanding.  -  Physical therapy exercises at home should be considered, as tolerated, as prescribed by physical therapy or from the pain clinic handout (given to the patient).  Continuation of these exercises every day, or multiple times per week, even when the patient has good pain relief, was stressed to the patient as a preventative measure to decrease the frequency and severity of future pain episodes.  -  It is recommended that the patient use the least amount of opioid medication possible.     -  Heat and ice to the affected area as tolerated for pain control.  It was discussed that heating pads can cause burns.  -  Daily low impact exercise such as walking or water exercise was recommended to maintain overall health and aid in weight control.   -  Patient was counseled to abstain from tobacco products.  -  Follow up as needed for subsequent injections.  - This gentleman also has significant lumbar degeneration and stenosis.  It is possible that he would benefit from future epidural steroid injections.  Hopefully, we can get his  pain under much better control by focusing on his lumbar facets.    Simon Rueda M.D.  [place]ien, J [place], [facility] and [facility], [facility]  Board Certified in Pain Medicine by the American Board of Anesthesiology  Board Certified in Anesthesiology by the American Board of Anesthesiology     Much of this encounter note is an electronic transcription/translation of spoken language to printed text. The electronic translation of spoken language may permit erroneous, or at times, nonsensical words or phrases to be inadvertently transcribed. Although I have reviewed the note for such errors, some may still exist.

## 2020-11-05 NOTE — ANESTHESIA PROCEDURE NOTES
Left-sided lumbar facet medial branch radiofrequency ablation to cover the levels of L3-L4, L4-L5, and L5-S1.    Pre-sedation assessment completed: 11/5/2020 12:25 PM    Patient reassessed immediately prior to procedure    Patient location during procedure: Pain Clinic  Start time: 11/5/2020 12:27 PM  Stop Time: 11/5/2020 12:54 PM    Reason for block: procedure for pain  Performed by  Anesthesiologist: Simon Rueda MD  Preanesthetic Checklist  Completed: patient identified, site marked, surgical consent, pre-op evaluation, timeout performed, IV checked, risks and benefits discussed and monitors and equipment checked  Prep:  Patient position: prone  Sterile barriers:cap, gloves, mask and sterile barrier  Prep: ChloraPrep  Patient monitoring: blood pressure monitoring, continuous pulse oximetry and EKG  Procedure:  Sedation:yes  Approach:unilateral left  Guidance:fluoroscopy  Location:lumbar  Interspace: to cover the levels of L3-L4, L4-L5, and L5-S1.  Needle type: Radiofrequency ablation needles with 1 cm active tips.  Needle Gauge:20 G  Aspiration:negative  Medications:  Comment:0.5 cc of 2% lidocaine was injected at each level after positive sensory and negative motor stimulation testing was performed.    Post Assessment  Injection Assessment: negative  Patient Tolerance:comfortable throughout block  Complications:no  Additional Notes  The patient was brought into the procedure room and placed in a prone position and was comfortable.  ChloraPrep was used and allowed to dry appropriately.  Sterile towels were placed around the patient's back.  An ipsilateral oblique and caudal tilt was used on the x-ray machine to visualize targets.  Skin was numbed up with 2 cc of 1% lidocaine at each injection site.  Radiofrequency needles were advanced to the groove between the transverse process and the superior articular process.  This was done to cover the left-sided L3-L4, L4-5, and L5-S1 level(s) (designated by the SAP  and transverse process).  Once the needle was seated against the superior margin of the transverse process, where it joins the superior articular process of the facet, cannula was walked off the superior margin of the transverse process and advanced 2 mm to position the active tip along the course of the medial branch nerve.  Sensory testing was positive at each location with a stimulation at 50 Hz at less than 0.5 V.  There was no motor stimulation to the affected myotomes of the lower extremities at 2 Hz up to 2 V.  Impedances were 288, 223, 377, 304 ohms respectively.  Great care was taken not to move the cannulae.  Each level was anesthetized with 0.5 mL of 2% lidocaine, and lesions were created at 80 °C for 90 seconds.  Needles were removed and bandages were placed per nursing.  At no time during the ablation did the patient have any pain or symptoms radiating down hthe buttocks, groin, hip, or leg.  The patient was taken to recovery by nursing and observed appropriately.    Post-Op Diagnosis Codes:     * Spondylosis of lumbar region without myelopathy or radiculopathy (M47.816)     * DDD (degenerative disc disease), lumbar (M51.36)     * Lumbar stenosis (M48.061)    The patient was observed in recovery with no evidence of new onset neurological deficits or other problems. All questions were answered. The patient was discharged with appropriate instructions.

## 2020-11-10 DIAGNOSIS — M19.079 ARTHRITIS OF ANKLE: ICD-10-CM

## 2020-11-10 RX ORDER — MELOXICAM 7.5 MG/1
7.5 TABLET ORAL DAILY
Qty: 90 TABLET | Refills: 3 | Status: SHIPPED | OUTPATIENT
Start: 2020-11-10 | End: 2021-11-05

## 2020-11-13 ENCOUNTER — ANESTHESIA EVENT (OUTPATIENT)
Dept: PAIN MEDICINE | Facility: HOSPITAL | Age: 85
End: 2020-11-13

## 2020-11-13 ENCOUNTER — ANESTHESIA (OUTPATIENT)
Dept: PAIN MEDICINE | Facility: HOSPITAL | Age: 85
End: 2020-11-13

## 2020-11-13 ENCOUNTER — HOSPITAL ENCOUNTER (OUTPATIENT)
Dept: PAIN MEDICINE | Facility: HOSPITAL | Age: 85
Discharge: HOME | End: 2020-11-13

## 2020-11-13 ENCOUNTER — HOSPITAL ENCOUNTER (OUTPATIENT)
Dept: GENERAL RADIOLOGY | Facility: HOSPITAL | Age: 85
Discharge: HOME | End: 2020-11-13

## 2020-11-13 VITALS
SYSTOLIC BLOOD PRESSURE: 123 MMHG | DIASTOLIC BLOOD PRESSURE: 78 MMHG | TEMPERATURE: 97.8 F | OXYGEN SATURATION: 94 % | RESPIRATION RATE: 16 BRPM | HEART RATE: 68 BPM

## 2020-11-13 DIAGNOSIS — R52 PAIN: ICD-10-CM

## 2020-11-13 PROCEDURE — 77003 FLUOROGUIDE FOR SPINE INJECT: CPT

## 2020-11-13 PROCEDURE — 25010000002 MIDAZOLAM PER 1 MG: Performed by: ANESTHESIOLOGY

## 2020-11-13 RX ORDER — LIDOCAINE HYDROCHLORIDE 10 MG/ML
10 INJECTION, SOLUTION INFILTRATION; PERINEURAL ONCE
Status: COMPLETED | OUTPATIENT
Start: 2020-11-13 | End: 2020-11-13

## 2020-11-13 RX ORDER — LIDOCAINE HYDROCHLORIDE 20 MG/ML
10 INJECTION, SOLUTION INFILTRATION; PERINEURAL ONCE
Status: COMPLETED | OUTPATIENT
Start: 2020-11-13 | End: 2020-11-13

## 2020-11-13 RX ORDER — MIDAZOLAM HYDROCHLORIDE 1 MG/ML
1 INJECTION INTRAMUSCULAR; INTRAVENOUS AS NEEDED
Status: DISCONTINUED | OUTPATIENT
Start: 2020-11-13 | End: 2020-11-14 | Stop reason: HOSPADM

## 2020-11-13 RX ORDER — SODIUM CHLORIDE 0.9 % (FLUSH) 0.9 %
3-10 SYRINGE (ML) INJECTION AS NEEDED
Status: DISCONTINUED | OUTPATIENT
Start: 2020-11-13 | End: 2020-11-14 | Stop reason: HOSPADM

## 2020-11-13 RX ORDER — SODIUM CHLORIDE 0.9 % (FLUSH) 0.9 %
3 SYRINGE (ML) INJECTION EVERY 12 HOURS SCHEDULED
Status: DISCONTINUED | OUTPATIENT
Start: 2020-11-13 | End: 2020-11-14 | Stop reason: HOSPADM

## 2020-11-13 RX ORDER — FENTANYL CITRATE 50 UG/ML
50 INJECTION, SOLUTION INTRAMUSCULAR; INTRAVENOUS AS NEEDED
Status: DISCONTINUED | OUTPATIENT
Start: 2020-11-13 | End: 2020-11-14 | Stop reason: HOSPADM

## 2020-11-13 RX ORDER — LIDOCAINE HYDROCHLORIDE 10 MG/ML
1 INJECTION, SOLUTION INFILTRATION; PERINEURAL ONCE
Status: DISCONTINUED | OUTPATIENT
Start: 2020-11-13 | End: 2020-11-14 | Stop reason: HOSPADM

## 2020-11-13 RX ADMIN — MIDAZOLAM 1 MG: 1 INJECTION INTRAMUSCULAR; INTRAVENOUS at 08:05

## 2020-11-13 RX ADMIN — LIDOCAINE HYDROCHLORIDE 5 ML: 10 INJECTION, SOLUTION EPIDURAL; INFILTRATION; INTRACAUDAL; PERINEURAL at 08:34

## 2020-11-13 RX ADMIN — LIDOCAINE HYDROCHLORIDE 5 ML: 20 INJECTION, SOLUTION EPIDURAL; INFILTRATION; INTRACAUDAL; PERINEURAL at 08:34

## 2020-11-13 NOTE — H&P
CHIEF COMPLAINT:  Low back pain        HISTORY OF PRESENT ILLNESS:  This patient is complaining of low back pain which does not radiate down his legs and mainly stays in a bandlike pattern across his lower back.  It ranges between a 4 and 8 out of 10 on the pain scale.  The pain is significantly decreasing the patient's Activities of Daily Living.       Diagnostic imaging specifically a CT scan of the lumbar spine without contrast from 8/13/2018 shows multilevel spondylosis/facet arthropathy, degenerative disc disease, and stenosis.     This patient was referred to our clinic by Dr. Tyrese Chaidez for diagnostic lumbar facet medial branch blocks leading to possible radiofrequency ablation if appropriate.     He states that the first two diagnostic injections provided him about 80-90% relief that lasted for several days.  The relief has now faded.    At his last visit, he had radiofrequency ablation of the affected levels on the left side.  Today he presents to have the radiofrequency ablation of the affected levels on the right side.    PAST MEDICAL HISTORY:  Current Outpatient Medications on File Prior to Encounter   Medication Sig Dispense Refill   • atorvastatin (LIPITOR) 40 MG tablet Take 1 tablet by mouth Daily. 90 tablet 3   • cetirizine (zyrTEC) 10 MG tablet Take 10 mg by mouth Daily.     • cloNIDine (CATAPRES) 0.1 MG tablet Take 1 tablet by mouth 2 (Two) Times a Day. 180 tablet 3   • coenzyme Q10 100 MG capsule Take 300 mg by mouth Daily.     • econazole nitrate (SPECTAZOLE) 1 % cream Apply  topically to the appropriate area as directed As Needed.     • Flaxseed, Linseed, (FLAX SEED OIL PO) Take  by mouth.     • fluticasone (FLONASE) 50 MCG/ACT nasal spray USE 2 SPRAYS IN EACH NOSTRIL DAILY AS DIRECTED BY PROVIDER 48 g 3   • losartan (COZAAR) 100 MG tablet Take 1 tablet by mouth Daily. 90 tablet 3   • meloxicam (MOBIC) 7.5 MG tablet Take 1 tablet by mouth Daily. 90 tablet 3   • metoprolol succinate XL  (TOPROL-XL) 100 MG 24 hr tablet Take 1 tablet by mouth Daily. 90 tablet 3   • metroNIDAZOLE (FLAGYL) 0.75 % lotion lotion Daily.     • Misc Natural Products (JOINT HEALTH PO) Take  by mouth.     • modafinil (PROVIGIL) 100 MG tablet Take 1 tablet by mouth Daily. 90 tablet 0   • Multiple Vitamins-Minerals (MULTIVITAMIN PO) Take 1 tablet by mouth Daily.     • omeprazole (priLOSEC) 20 MG capsule Take 1 capsule by mouth Daily. 90 capsule 3   • Saw Palmetto, Serenoa repens, 320 MG capsule Take 320 mg by mouth 2 (two) times a day.     • tamsulosin (FLOMAX) 0.4 MG capsule 24 hr capsule Take 2 capsules by mouth Daily. 180 capsule 3   • aspirin 81 MG tablet Take 81 mg by mouth Daily.       No current facility-administered medications on file prior to encounter.        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 Aldridge (sp)       SOCIAL HISTORY:  Counseled to avoid tobacco     REVIEW OF SYSTEMS:  No pertinent hematologic, infectious, or constitutional symptoms.  Other review of systems non-contributory     PHYSICAL EXAM:  There were no vitals taken for this visit.  Constitutional: Well-developed well-nourished no acute distress  General: Alert and oriented  Ophtho: EOMI  Airway: Mallampati 2  Cardiac:  Regular rate  Lungs:  Clear to auscultation bilaterally   GI: soft, nontender  Cervical Spine: Noncontributory  Sacroiliac Joints: Noncontributory  Musc: Tenderness  palpation over the right-sided lower lumbar facets.  His left side seems to be improved.  Neuro: Facet loading is positive on the right side.  His left side seems to be improved.        DIAGNOSIS:  Post-Op Diagnosis Codes:  Post-Op Diagnosis Codes:     * Spondylosis of lumbar region without myelopathy or radiculopathy [M47.816]     * DDD (degenerative disc disease), lumbar [M51.36]     * Lumbar stenosis [M48.061]     PLAN:  -Right-sided Lumbar facet medial branch radio frequency ablation to cover the level(s) of L3-L4, L4-L5, and L5-S1.   - This patient has received good but temporary relief from previous diagnostic lumbar facet medial branch injections.   - Risks were discussed with the patient, including but not limited to: failure of relief, worsening pain, dysesthesia, sensory loss, radicular pain, radiculopathy, weakness, tissue, nerve, nerve root or blood vessel damage, bleeding, infection, and abscess formation. Benefits and alternatives were also discussed. No promises were made. The patient voiced understanding.  -  Physical therapy exercises at home should be considered, as tolerated, as prescribed by physical therapy or from the pain clinic handout (given to the patient).  Continuation of these exercises every day, or multiple times per week, even when the patient has good pain relief, was stressed to the patient as a preventative measure to decrease the frequency and severity of future pain episodes.  -  It is recommended that the patient use the least amount of opioid medication possible.     -  Heat and ice to the affected area as tolerated for pain control.  It was discussed that heating pads can cause burns.  -  Daily low impact exercise such as walking or water exercise was recommended to maintain overall health and aid in weight control.   -  Patient was counseled to abstain from tobacco products.  -  Follow up as needed for subsequent injections.  - This gentleman also has significant lumbar  degeneration and stenosis.  It is possible that he would benefit from future epidural steroid injections.  Hopefully, we can get his pain under much better control by focusing on his lumbar facets.    Simon Rueda M.D.  Partner, [facility] and [facility], [facility]  Board Certified in Pain Medicine by the American Board of Anesthesiology  Board Certified in Anesthesiology by the American Board of Anesthesiology     Much of this encounter note is an electronic transcription/translation of spoken language to printed text. The electronic translation of spoken language may permit erroneous, or at times, nonsensical words or phrases to be inadvertently transcribed. Although I have reviewed the note for such errors, some may still exist.

## 2020-11-30 LAB
ALBUMIN SERPL-MCNC: 4.4 G/DL (ref 3.5–5.2)
ALBUMIN/GLOB SERPL: 2.2 G/DL
ALP SERPL-CCNC: 103 U/L (ref 39–117)
ALT SERPL-CCNC: 31 U/L (ref 1–41)
AST SERPL-CCNC: 24 U/L (ref 1–40)
BILIRUB SERPL-MCNC: 1.1 MG/DL (ref 0–1.2)
BUN SERPL-MCNC: 22 MG/DL (ref 8–23)
BUN/CREAT SERPL: 19 (ref 7–25)
CALCIUM SERPL-MCNC: 9.2 MG/DL (ref 8.6–10.5)
CHLORIDE SERPL-SCNC: 105 MMOL/L (ref 98–107)
CHOLEST SERPL-MCNC: 162 MG/DL (ref 0–200)
CO2 SERPL-SCNC: 25.6 MMOL/L (ref 22–29)
CREAT SERPL-MCNC: 1.16 MG/DL (ref 0.76–1.27)
GLOBULIN SER CALC-MCNC: 2 GM/DL
GLUCOSE SERPL-MCNC: 129 MG/DL (ref 65–99)
HBA1C MFR BLD: 6.4 % (ref 4.8–5.6)
HDLC SERPL-MCNC: 38 MG/DL (ref 40–60)
LDLC SERPL CALC-MCNC: 89 MG/DL (ref 0–100)
POTASSIUM SERPL-SCNC: 4.7 MMOL/L (ref 3.5–5.2)
PROT SERPL-MCNC: 6.4 G/DL (ref 6–8.5)
SODIUM SERPL-SCNC: 140 MMOL/L (ref 136–145)
TRIGL SERPL-MCNC: 204 MG/DL (ref 0–150)
VLDLC SERPL CALC-MCNC: 35 MG/DL (ref 5–40)

## 2020-12-07 ENCOUNTER — OFFICE VISIT (OUTPATIENT)
Dept: FAMILY MEDICINE CLINIC | Facility: CLINIC | Age: 85
End: 2020-12-07

## 2020-12-07 VITALS
HEART RATE: 68 BPM | WEIGHT: 225 LBS | SYSTOLIC BLOOD PRESSURE: 186 MMHG | TEMPERATURE: 97.4 F | HEIGHT: 71 IN | DIASTOLIC BLOOD PRESSURE: 88 MMHG | OXYGEN SATURATION: 100 % | BODY MASS INDEX: 31.5 KG/M2

## 2020-12-07 DIAGNOSIS — G47.419 PRIMARY NARCOLEPSY WITHOUT CATAPLEXY: ICD-10-CM

## 2020-12-07 DIAGNOSIS — E78.5 HYPERLIPIDEMIA, UNSPECIFIED HYPERLIPIDEMIA TYPE: ICD-10-CM

## 2020-12-07 DIAGNOSIS — R73.9 HYPERGLYCEMIA: ICD-10-CM

## 2020-12-07 DIAGNOSIS — I10 ESSENTIAL HYPERTENSION: Primary | ICD-10-CM

## 2020-12-07 PROCEDURE — 99214 OFFICE O/P EST MOD 30 MIN: CPT | Performed by: FAMILY MEDICINE

## 2020-12-07 RX ORDER — CHLORTHALIDONE 50 MG/1
50 TABLET ORAL DAILY
Qty: 30 TABLET | Refills: 2 | Status: SHIPPED | OUTPATIENT
Start: 2020-12-07 | End: 2020-12-07 | Stop reason: SDUPTHER

## 2020-12-07 RX ORDER — CHLORTHALIDONE 50 MG/1
50 TABLET ORAL DAILY
Qty: 30 TABLET | Refills: 2 | Status: SHIPPED | OUTPATIENT
Start: 2020-12-07 | End: 2021-02-05 | Stop reason: SDUPTHER

## 2020-12-07 RX ORDER — POTASSIUM CHLORIDE 750 MG/1
20 TABLET, EXTENDED RELEASE ORAL DAILY
Qty: 60 TABLET | Refills: 3 | Status: SHIPPED | OUTPATIENT
Start: 2020-12-07 | End: 2021-03-08 | Stop reason: SDUPTHER

## 2020-12-07 NOTE — PROGRESS NOTES
Kevin Ellington is a 85 y.o. male.     Chief Complaint   Patient presents with   • Hyperlipidemia     follow up after labs    • Hypertension     follow up after labs bp running a littlke bit bigher        HPI     Pt is a pleasant 85 y.o. YO male here for HTN not well controlled but no symptoms, lately his BP has been over 150/90.  He was on amlodipine in the past but had adverse effects of pedal edema.  Then changed to spironolactone but then developed gynecomastia and painful tender breasts.  Hyperlipidemia is well controlled on atorvastatin reviewed labs with patient, insomnia is well controlled on Provigil, due for next refill in a few weeks, GERD well-controlled on omeprazole.  Hyperglycemia stable with no change to hemoglobin A1c.    The following portions of the patient's history were reviewed and updated as appropriate: allergies, current medications, past family history, past medical history, past social history, past surgical history and problem list.    Review of Systems   Constitutional: Negative for chills, fatigue, fever and unexpected weight change.   HENT: Negative for ear pain, hearing loss, sinus pressure, sore throat and tinnitus.    Eyes: Negative for pain, discharge and redness.   Respiratory: Positive for shortness of breath. Negative for cough and wheezing.    Cardiovascular: Positive for leg swelling. Negative for chest pain and palpitations.   Gastrointestinal: Negative for abdominal pain, constipation, diarrhea and nausea.   Endocrine: Negative for cold intolerance and heat intolerance.   Genitourinary: Negative for difficulty urinating, flank pain and urgency.   Musculoskeletal: Positive for arthralgias and myalgias. Negative for back pain and joint swelling.   Skin: Negative for rash and wound.   Allergic/Immunologic: Negative for environmental allergies and food allergies.   Neurological: Negative for dizziness, seizures, numbness and headaches.   Hematological: Negative for  adenopathy. Does not bruise/bleed easily.   Psychiatric/Behavioral: Negative for decreased concentration, dysphoric mood and sleep disturbance. The patient is not nervous/anxious.    All other systems reviewed and are negative.      Objective  Vitals:    12/07/20 1051   BP: (!) 186/88   Pulse: 68   Temp: 97.4 °F (36.3 °C)   SpO2: 100%        Physical Exam  Vitals signs and nursing note reviewed.   Constitutional:       General: He is not in acute distress.     Appearance: He is well-developed.   HENT:      Head: Normocephalic.      Nose: Nose normal.   Cardiovascular:      Rate and Rhythm: Normal rate and regular rhythm.      Heart sounds: Normal heart sounds. No murmur.   Pulmonary:      Effort: Pulmonary effort is normal. No respiratory distress.      Breath sounds: Normal breath sounds.   Musculoskeletal: Normal range of motion.   Skin:     General: Skin is warm and dry.      Findings: No rash.   Neurological:      Mental Status: He is alert and oriented to person, place, and time.   Psychiatric:         Behavior: Behavior normal.         Thought Content: Thought content normal.         Judgment: Judgment normal.           Current Outpatient Medications:   •  aspirin 81 MG tablet, Take 81 mg by mouth Daily., Disp: , Rfl:   •  atorvastatin (LIPITOR) 40 MG tablet, Take 1 tablet by mouth Daily., Disp: 90 tablet, Rfl: 3  •  cetirizine (zyrTEC) 10 MG tablet, Take 10 mg by mouth Daily., Disp: , Rfl:   •  cloNIDine (CATAPRES) 0.1 MG tablet, Take 1 tablet by mouth 2 (Two) Times a Day., Disp: 180 tablet, Rfl: 3  •  coenzyme Q10 100 MG capsule, Take 300 mg by mouth Daily., Disp: , Rfl:   •  econazole nitrate (SPECTAZOLE) 1 % cream, Apply  topically to the appropriate area as directed As Needed., Disp: , Rfl:   •  Flaxseed, Linseed, (FLAX SEED OIL PO), Take  by mouth., Disp: , Rfl:   •  fluticasone (FLONASE) 50 MCG/ACT nasal spray, USE 2 SPRAYS IN EACH NOSTRIL DAILY AS DIRECTED BY PROVIDER, Disp: 48 g, Rfl: 3  •  losartan  (COZAAR) 100 MG tablet, Take 1 tablet by mouth Daily., Disp: 90 tablet, Rfl: 3  •  meloxicam (MOBIC) 7.5 MG tablet, Take 1 tablet by mouth Daily., Disp: 90 tablet, Rfl: 3  •  metoprolol succinate XL (TOPROL-XL) 100 MG 24 hr tablet, Take 1 tablet by mouth Daily., Disp: 90 tablet, Rfl: 3  •  metroNIDAZOLE (FLAGYL) 0.75 % lotion lotion, Daily., Disp: , Rfl:   •  Misc Natural Products (JOINT HEALTH PO), Take  by mouth., Disp: , Rfl:   •  modafinil (PROVIGIL) 100 MG tablet, Take 1 tablet by mouth Daily., Disp: 90 tablet, Rfl: 0  •  Multiple Vitamins-Minerals (MULTIVITAMIN PO), Take 1 tablet by mouth Daily., Disp: , Rfl:   •  omeprazole (priLOSEC) 20 MG capsule, Take 1 capsule by mouth Daily., Disp: 90 capsule, Rfl: 3  •  Saw Palmetto, Serenoa repens, 320 MG capsule, Take 320 mg by mouth 2 (two) times a day., Disp: , Rfl:   •  tamsulosin (FLOMAX) 0.4 MG capsule 24 hr capsule, Take 2 capsules by mouth Daily., Disp: 180 capsule, Rfl: 3  •  chlorthalidone (HYGROTEN) 50 MG tablet, Take 1 tablet by mouth Daily., Disp: 30 tablet, Rfl: 2  •  potassium chloride (K-DUR,KLOR-CON) 10 MEQ CR tablet, Take 2 tablets by mouth Daily., Disp: 60 tablet, Rfl: 3    Procedures    Lab Results (most recent)     None              Diagnoses and all orders for this visit:    1. Essential hypertension (Primary)  -     Discontinue: chlorthalidone (HYGROTEN) 50 MG tablet; Take 1 tablet by mouth Daily.  Dispense: 30 tablet; Refill: 2  -     potassium chloride (K-DUR,KLOR-CON) 10 MEQ CR tablet; Take 2 tablets by mouth Daily.  Dispense: 60 tablet; Refill: 3  -     Comprehensive Metabolic Panel  -     chlorthalidone (HYGROTEN) 50 MG tablet; Take 1 tablet by mouth Daily.  Dispense: 30 tablet; Refill: 2    2. Hyperglycemia  -     Hemoglobin A1c  -     Vitamin B12 & Folate    3. Primary narcolepsy without cataplexy    4. Hyperlipidemia, unspecified hyperlipidemia type    Hypertension not well controlled, asymptomatic currently.  Known CAD.  Continue with  metoprolol 100 daily, losartan 100 daily and clonidine 0.1 mg daily.  With amlodipine he experienced symptoms of pedal edema, with spironolactone he experienced gynecomastia and tender breasts.  Will transition to chlorthalidone as above.  Goal blood pressure less than 140/90.  Continue checking his blood pressure at home, if not within goal range follow-up sooner otherwise in 3 months.    Hyperglycemia that has been stable.  We will recheck in 3 months    Patient with narcolepsy that has been well controlled on Provigil.  He is doing well without adverse effect.  He has been on this medication for years.  Continue to follow-up every 3-month follow-up.    Patient hyperlipidemia that has been stable.  Continue with atorvastatin 40.  No adverse effects.    Overall patient has been sedentary at home.  Discussed increasing activity, not sitting watching TV for long hours.  He has limited as he is now walking with a cane.  Does not want to do physical therapy at this time with high numbers with Covid.  We will continue to reevaluate for strength.    Return in about 3 months (around 3/7/2021), or if symptoms worsen or fail to improve, for Recheck HTN and hyperglycemia .      Leanna Mancia MD    Mask and protective eyewear worn by myself and medical assistant during entire patient encounter.

## 2020-12-08 ENCOUNTER — TELEPHONE (OUTPATIENT)
Dept: FAMILY MEDICINE CLINIC | Facility: CLINIC | Age: 85
End: 2020-12-08

## 2020-12-08 NOTE — TELEPHONE ENCOUNTER
Spoke with Kevin. This morning Kevin's blood pressure was in the high 190's/200's over 70. Patient felt nervous and shaky and flushed. Patient relaxed, ate breakfast and drank coffee took routine blood pressure medication. Blood pressure is now 176/81. Pt picked up his new chlorthalidone rx today and will start tomorrow.  Please advise.

## 2020-12-08 NOTE — TELEPHONE ENCOUNTER
Caller: Tiffanie Ellington    Relationship to patient: Emergency Contact    Best call back number: [phone]    Patient is needing: PTS WIFE CALLED STATING THAT PTS BLOOD PRESSURE /70 SOMETHING AND IS STILL CONCERNED OF HIS HIGH BLOOD PRESSURE. PT IS EXPERIENCING REDNESS IN FACE AND NERVOUSNESS

## 2020-12-08 NOTE — TELEPHONE ENCOUNTER
Thank you, if he develops symptoms of chest pain, palpitations, shortness of breath, blurry vision or strong headache I would recommend that he go to the emergency room.  Otherwise go ahead and start taking his chlorthalidone today and repeat in the morning.  Will mean that he will urinate overnight but I think that would be better for the sake of his blood pressures.

## 2020-12-23 DIAGNOSIS — G47.419 PRIMARY NARCOLEPSY WITHOUT CATAPLEXY: ICD-10-CM

## 2020-12-23 RX ORDER — MODAFINIL 100 MG/1
100 TABLET ORAL DAILY
Qty: 90 TABLET | Refills: 0 | Status: SHIPPED | OUTPATIENT
Start: 2020-12-23 | End: 2021-03-08 | Stop reason: SDUPTHER

## 2020-12-23 NOTE — TELEPHONE ENCOUNTER
Caller: Kevin Ellington    Relationship: Self    Best call back number: [phone]    Medication needed:   Requested Prescriptions     Pending Prescriptions Disp Refills   • modafinil (PROVIGIL) 100 MG tablet 90 tablet 0     Sig: Take 1 tablet by mouth Daily.       When do you need the refill by: ASAP  Does the patient have less than a 3 day supply:  [] Yes  [x] No    What is the patient's preferred pharmacy: [address] - [phone]  - [phone] FX

## 2020-12-24 NOTE — PROGRESS NOTES
Subjective   Patient ID: Kevin Ellington is a 85 y.o. male is here today for follow-up televisit.     He was seen last as a tele-visit for L back pain, leg weakness and trouble with balance and gait.  He was referred pain management for lumbar injections.     You have chosen to receive care through a telephone visit. Do you consent to use a telephone visit for your medical care today? Yes    He states he had RFA which has helped some. He states he sleeps better. He still has some  back pain. Mr. Ellington takes Tylenol prn for pain.     This was a televisit from my office lasting 7 minutes.  The patient was at home.  The RFA seemed to help his low back.  His legs are still heavy but not as painful as before and he thinks he can tolerate things for now.  I told him that he does have a known tight stenosis particularly at L4-L5.  He has had a previous thoracic decompression by me.  But he might need it at some point in the lumbar spine.  We will do a televisit in 4 months.  He will let us know if he gets worse particular in the legs between now and then.      Back Pain  The pain is at a severity of 2/10. The pain is mild. The symptoms are aggravated by standing. Pertinent negatives include no bladder incontinence, bowel incontinence or weakness. Treatments tried: RFA        The following portions of the patient's history were reviewed and updated as appropriate: allergies, current medications, past family history, past medical history, past social history, past surgical history and problem list.    Review of Systems   Gastrointestinal: Negative for bowel incontinence.   Genitourinary: Negative for bladder incontinence and difficulty urinating.   Musculoskeletal: Positive for back pain.   Neurological: Negative for weakness.           Objective     There were no vitals filed for this visit.  There is no height or weight on file to calculate BMI.      Physical Exam   Deferred  Neurologic Exam    Deferred        Assessment/Plan   Independent Review of Radiographic Studies:      I personally reviewed the images from the following studies.    I reviewed the lumbar cervical and thoracic myelogram done on August 2018.  There is severe stenosis from L2-L5.  Agree with the report    Medical Decision Making:      Overall doing better although he still has some discomfort in his legs.  We will do a televisit in 4 months.  If his legs continue to hurt him we might need to consider surgery for the lumbar spine.      Diagnoses and all orders for this visit:    1. Spinal stenosis of lumbar region with neurogenic claudication (Primary)    2. DDD (degenerative disc disease), lumbar    3. Bilateral leg weakness      Return in about 4 months (around 4/28/2021) for Is a televisit.

## 2020-12-28 ENCOUNTER — OFFICE VISIT (OUTPATIENT)
Dept: NEUROSURGERY | Facility: CLINIC | Age: 85
End: 2020-12-28

## 2020-12-28 DIAGNOSIS — M51.36 DDD (DEGENERATIVE DISC DISEASE), LUMBAR: ICD-10-CM

## 2020-12-28 DIAGNOSIS — M48.062 SPINAL STENOSIS OF LUMBAR REGION WITH NEUROGENIC CLAUDICATION: Primary | ICD-10-CM

## 2020-12-28 DIAGNOSIS — R29.898 BILATERAL LEG WEAKNESS: ICD-10-CM

## 2020-12-28 PROCEDURE — 99441 PR PHYS/QHP TELEPHONE EVALUATION 5-10 MIN: CPT | Performed by: NEUROLOGICAL SURGERY

## 2021-02-02 ENCOUNTER — TELEPHONE (OUTPATIENT)
Dept: FAMILY MEDICINE CLINIC | Facility: CLINIC | Age: 86
End: 2021-02-02

## 2021-02-02 DIAGNOSIS — I10 ESSENTIAL HYPERTENSION: ICD-10-CM

## 2021-02-02 NOTE — TELEPHONE ENCOUNTER
Do you mind seeing if there is a different form of chlorthalidone that they will cover?  I would like to stay with the same med if possible.

## 2021-02-02 NOTE — TELEPHONE ENCOUNTER
Caller: Kevin Ellington    Relationship to patient: Self    Best call back number: [phone]    Patient is needing: Patient is being told that the chlorthalidone (HYGROTEN) 50 MG tablet is no longer going to be covered and would like to know if there is anything it can be changed too, Pharmacy suggested a replacment hydrochlortide. Please call patient and advise.     Please send to this pharmacy  [facility] - [place], [address] [phone] [facility] [phone]

## 2021-02-04 NOTE — TELEPHONE ENCOUNTER
Can I do a peer to peer with his insurance?  He is already on metoprolol, losartan, clonidine.    Hydrochlorothiazide has a shorter half-life and will not support 24-hour blood pressure control which he does need and is already on 4 medications.

## 2021-02-05 ENCOUNTER — TELEPHONE (OUTPATIENT)
Dept: FAMILY MEDICINE CLINIC | Facility: CLINIC | Age: 86
End: 2021-02-05

## 2021-02-05 RX ORDER — CHLORTHALIDONE 50 MG/1
50 TABLET ORAL DAILY
Qty: 90 TABLET | Refills: 1 | Status: SHIPPED | OUTPATIENT
Start: 2021-02-05 | End: 2021-02-05 | Stop reason: SDUPTHER

## 2021-02-05 RX ORDER — CHLORTHALIDONE 50 MG/1
50 TABLET ORAL DAILY
Qty: 90 TABLET | Refills: 1 | Status: SHIPPED | OUTPATIENT
Start: 2021-02-05 | End: 2021-12-01

## 2021-02-05 NOTE — TELEPHONE ENCOUNTER
Per chart earlier, it seems like there was some confusion as he may have another prescription ready?  I did send a refill to [facility] in case it is not available.

## 2021-02-05 NOTE — TELEPHONE ENCOUNTER
Called patient's [facility] pharmacy-the chlorthalidone 50mg for a thirty day supply will cost patient 44.49 out of pocket, a discount card can be applied at the pharmacy, discount unknown. Would you like to send prescription to [email or web address]? Please advise.

## 2021-02-05 NOTE — TELEPHONE ENCOUNTER
PATIENT CALLED BACK TO FIND OUT WHAT DR. GUTIERREZ HAD DECIDED ON HIS    chlorthalidone (HYGROTEN) 50 MG tablet     THAT WASN'T DENIED BY [facility]?    PLEASE CONTACT PATIENT @ [phone]

## 2021-02-08 NOTE — TELEPHONE ENCOUNTER
Spoke with pt cost for med at [facility] will be 90- days 22.50 cents and for 30 days it will be 6 dollar he said he will have another 30 day supply with mail pharmacy after that will see

## 2021-03-08 ENCOUNTER — OFFICE VISIT (OUTPATIENT)
Dept: FAMILY MEDICINE CLINIC | Facility: CLINIC | Age: 86
End: 2021-03-08

## 2021-03-08 VITALS
SYSTOLIC BLOOD PRESSURE: 134 MMHG | HEIGHT: 71 IN | HEART RATE: 64 BPM | DIASTOLIC BLOOD PRESSURE: 76 MMHG | BODY MASS INDEX: 30.38 KG/M2 | TEMPERATURE: 97.9 F | WEIGHT: 217 LBS | OXYGEN SATURATION: 99 %

## 2021-03-08 DIAGNOSIS — K21.9 GASTROESOPHAGEAL REFLUX DISEASE WITHOUT ESOPHAGITIS: Primary | ICD-10-CM

## 2021-03-08 DIAGNOSIS — G47.419 PRIMARY NARCOLEPSY WITHOUT CATAPLEXY: ICD-10-CM

## 2021-03-08 DIAGNOSIS — I10 ESSENTIAL HYPERTENSION: ICD-10-CM

## 2021-03-08 PROCEDURE — 99214 OFFICE O/P EST MOD 30 MIN: CPT | Performed by: FAMILY MEDICINE

## 2021-03-08 RX ORDER — MODAFINIL 100 MG/1
100 TABLET ORAL DAILY
Qty: 90 TABLET | Refills: 0 | Status: SHIPPED | OUTPATIENT
Start: 2021-03-08 | End: 2021-06-17

## 2021-03-08 RX ORDER — POTASSIUM CHLORIDE 750 MG/1
20 TABLET, EXTENDED RELEASE ORAL DAILY
Qty: 180 TABLET | Refills: 1 | Status: SHIPPED | OUTPATIENT
Start: 2021-03-08 | End: 2021-09-09 | Stop reason: SDUPTHER

## 2021-03-08 NOTE — PROGRESS NOTES
"Chief Complaint  Hypertension (no complains doing well ) and Daytime Sleepiness (pt has enough medication until april after that dr boles will needs to keep on refills )    Subjective          Kevin Ellington presents to [facility]  History of Present Illness  HTN well controlled, no AEs, swelling is much better. Doing well., no light headedness.      GERD stable, been on Omeprazole - would like to wean off.     Narcolepsy well controlled on modafinil.  Would like to stay on same dose.  No adverse effects.    Objective   Vital Signs:   /76   Pulse 64   Temp 97.9 °F (36.6 °C)   Ht 180.3 cm (71\")   Wt 98.4 kg (217 lb)   SpO2 99%   BMI 30.27 kg/m²     Physical Exam  Vitals and nursing note reviewed.   Constitutional:       General: He is not in acute distress.     Appearance: He is well-developed.   HENT:      Head: Normocephalic.      Nose: Nose normal.   Cardiovascular:      Heart sounds: No murmur.   Pulmonary:      Effort: Pulmonary effort is normal. No respiratory distress.   Musculoskeletal:         General: Normal range of motion.   Skin:     General: Skin is warm and dry.      Findings: No rash.   Neurological:      Mental Status: He is alert and oriented to person, place, and time.   Psychiatric:         Behavior: Behavior normal.         Thought Content: Thought content normal.         Judgment: Judgment normal.        Result Review :                 Assessment and Plan    Diagnoses and all orders for this visit:    1. Gastroesophageal reflux disease without esophagitis (Primary)    2. Essential hypertension  -     potassium chloride (K-DUR,KLOR-CON) 10 MEQ CR tablet; Take 2 tablets by mouth Daily.  Dispense: 180 tablet; Refill: 1    3. Primary narcolepsy without cataplexy  -     modafinil (PROVIGIL) 100 MG tablet; Take 1 tablet by mouth Daily.  Dispense: 90 tablet; Refill: 0    Pleasant 86-year-old male here for hypertension that is well controlled, continue with " same meds.  He is a bit unsteady on his feet but has had some severe range blood pressures recently.  We will continue with same dose and consider weaning off clonidine if he gets lightheaded or becomes more prone to falling.    GERD well controlled, on Prilosec 20 for years.  Okay to wean off and transition to famotidine.  Discussed taking the Prilosec every other day, handout from [facility] for foods to avoid that may worsen his symptoms of reflux.    Patient with narcolepsy well controlled on modafinil.  He has been on the same dose for decades.  Continue.  Sent to [facility] as above.      Follow Up   Return in about 6 months (around 9/8/2021), or if symptoms worsen or fail to improve, for Medicare Wellness with fasting labs prior .  Patient was given instructions and counseling regarding his condition or for health maintenance advice. Please see specific information pulled into the AVS if appropriate.  Medical assistant and I wore mask and eyewear protection during entire encounter.  Patient wore mask.    Leanna Mancia MD

## 2021-03-09 DIAGNOSIS — Z23 IMMUNIZATION DUE: ICD-10-CM

## 2021-04-29 ENCOUNTER — OFFICE VISIT (OUTPATIENT)
Dept: CARDIOLOGY | Facility: CLINIC | Age: 86
End: 2021-04-29

## 2021-04-29 VITALS
DIASTOLIC BLOOD PRESSURE: 86 MMHG | HEART RATE: 69 BPM | HEIGHT: 71 IN | SYSTOLIC BLOOD PRESSURE: 130 MMHG | WEIGHT: 221 LBS | BODY MASS INDEX: 30.94 KG/M2

## 2021-04-29 DIAGNOSIS — E78.5 HYPERLIPIDEMIA, UNSPECIFIED HYPERLIPIDEMIA TYPE: ICD-10-CM

## 2021-04-29 DIAGNOSIS — I25.10 CORONARY ARTERY DISEASE INVOLVING NATIVE CORONARY ARTERY OF NATIVE HEART WITHOUT ANGINA PECTORIS: Primary | ICD-10-CM

## 2021-04-29 DIAGNOSIS — Z95.5 S/P CORONARY ARTERY STENT PLACEMENT: ICD-10-CM

## 2021-04-29 DIAGNOSIS — I10 ESSENTIAL HYPERTENSION: ICD-10-CM

## 2021-04-29 PROCEDURE — 99214 OFFICE O/P EST MOD 30 MIN: CPT | Performed by: INTERNAL MEDICINE

## 2021-04-29 PROCEDURE — 93000 ELECTROCARDIOGRAM COMPLETE: CPT | Performed by: INTERNAL MEDICINE

## 2021-04-29 NOTE — PROGRESS NOTES
Subjective:     Encounter Date:04/29/2021      Patient ID: Kevin Ellington is a 86 y.o. male.    Chief Complaint:  History of Present Illness    This is an 86-year-old male with a history of coronary artery disease status post prior LAD stent placement in 12/2002, chronic back pain, dyslipidemia, hypertension, who presents for telephone follow-up.       He presents today for routine follow-up.  Since his last office visit he developed recurrent issues with hypertension.  This improved after Dr. Mancia started him on chlorthalidone.  In addition this helps with his lower extremity swelling.  He also reports that he stopped the omeprazole due to concerns for long-term side effects.  He denies any chest pain, shortness of breath out of the ordinary, palpitations, orthopnea, near-syncope or syncope.  His lower extremity edema has improved with the initiation of chlorthalidone.  Reports some lightheadedness with position changes that is chronic and unchanged.     Prior History:  The patient was previously followed by Dr. Cavazos but came to establish care here in 8/2016.  His prior cardiac history again includes a stent placement on 12/17/2002.  The patient reports that at the time he was having symptoms of dyspnea and chest fullness on exertion.  He was taken to the cardiac catheterization laboratory at that time and apparently had a Promus stent (either 3.0 or 3.5 x 16 mm based on his stent card).  He did well until about 2012 when he started having more dyspnea on exertion.  At that time he underwent an echocardiogram that showed normal left ventricular systolic function wall motion with an ejection fraction of 60%, grade 1A diastolic dysfunction, and no significant valvular disease.  A Lexiscan Myoview stress test was negative for ischemia.  He was seen by pulmonary due to his continued issues with dyspnea on exertion and they did not find any pulmonary issues to explain his symptoms.  At that time  the patient started exercising and lost about 20 pounds with resolution of his symptoms.     In follow-up he is mainly had issues with blood pressure control.  This improved some with the addition of amlodipine.  Over the last year or so he is been having increasing issues with dyspnea on exertion.  He was seen by NANCY Chahal in 7/2019 with these complaints.  He underwent both a stress test and an echocardiogram at that time.  His echocardiogram showed normal left ventricular systolic function wall motion with an EF of 59%, grade 1 diastolic dysfunction, and no significant valvular disease.  His stress test showed no evidence of ischemia.     In 10/2019 he reported that his blood pressures were running a little high with systolics running in the 140s to 160s.  For this reason and the fact that he is developed more issues with ankle edema Dr. Bernal switched amlodipine to spironolactone.  By the time of his office visit he had not made the switch yet but I encouraged him to do so.      During a telephone visit in 4/2020 he reported he was feeling well.  His blood pressures were well controlled on spironolactone.    Unfortunately he developed breast swelling with the spironolactone and this was discontinued by Dr. Mancia who is his new primary care physician.    Has blood pressures initially remained well controlled off of the spironolactone.      Review of Systems   Constitutional: Positive for weight loss. Negative for malaise/fatigue.   HENT: Negative for hearing loss, hoarse voice, nosebleeds and sore throat.    Eyes: Negative for pain.   Cardiovascular: Positive for dyspnea on exertion and leg swelling. Negative for chest pain, claudication, cyanosis, irregular heartbeat, near-syncope, orthopnea, palpitations, paroxysmal nocturnal dyspnea and syncope.   Respiratory: Negative for shortness of breath and snoring.    Endocrine: Negative for cold intolerance, heat intolerance, polydipsia, polyphagia and  polyuria.   Skin: Negative for itching and rash.   Musculoskeletal: Positive for joint pain. Negative for arthritis, falls, joint swelling, muscle cramps, muscle weakness and myalgias.   Gastrointestinal: Negative for constipation, diarrhea, dysphagia, heartburn, hematemesis, hematochezia, melena, nausea and vomiting.   Genitourinary: Negative for frequency, hematuria and hesitancy.   Neurological: Positive for light-headedness. Negative for excessive daytime sleepiness, dizziness, headaches, numbness and weakness.   Psychiatric/Behavioral: Negative for depression. The patient is not nervous/anxious.          Current Outpatient Medications:   •  aspirin 81 MG tablet, Take 81 mg by mouth Daily., Disp: , Rfl:   •  atorvastatin (LIPITOR) 40 MG tablet, Take 1 tablet by mouth Daily., Disp: 90 tablet, Rfl: 3  •  cetirizine (zyrTEC) 10 MG tablet, Take 10 mg by mouth Daily., Disp: , Rfl:   •  chlorthalidone (HYGROTEN) 50 MG tablet, Take 1 tablet by mouth Daily., Disp: 90 tablet, Rfl: 1  •  cloNIDine (CATAPRES) 0.1 MG tablet, Take 1 tablet by mouth 2 (Two) Times a Day., Disp: 180 tablet, Rfl: 3  •  coenzyme Q10 100 MG capsule, Take 300 mg by mouth Daily., Disp: , Rfl:   •  econazole nitrate (SPECTAZOLE) 1 % cream, Apply  topically to the appropriate area as directed As Needed., Disp: , Rfl:   •  Flaxseed, Linseed, (FLAX SEED OIL PO), Take  by mouth., Disp: , Rfl:   •  fluticasone (FLONASE) 50 MCG/ACT nasal spray, USE 2 SPRAYS IN EACH NOSTRIL DAILY AS DIRECTED BY PROVIDER, Disp: 48 g, Rfl: 3  •  losartan (COZAAR) 100 MG tablet, Take 1 tablet by mouth Daily., Disp: 90 tablet, Rfl: 3  •  meloxicam (MOBIC) 7.5 MG tablet, Take 1 tablet by mouth Daily., Disp: 90 tablet, Rfl: 3  •  metoprolol succinate XL (TOPROL-XL) 100 MG 24 hr tablet, Take 1 tablet by mouth Daily., Disp: 90 tablet, Rfl: 3  •  metroNIDAZOLE (FLAGYL) 0.75 % lotion lotion, Daily., Disp: , Rfl:   •  Misc Natural Products (JOINT HEALTH PO), Take  by mouth., Disp: ,  Rfl:   •  modafinil (PROVIGIL) 100 MG tablet, Take 1 tablet by mouth Daily., Disp: 90 tablet, Rfl: 0  •  Multiple Vitamins-Minerals (MULTIVITAMIN PO), Take 1 tablet by mouth Daily., Disp: , Rfl:   •  potassium chloride (K-DUR,KLOR-CON) 10 MEQ CR tablet, Take 2 tablets by mouth Daily., Disp: 180 tablet, Rfl: 1  •  Saw Palmetto, Serenoa repens, 320 MG capsule, Take 320 mg by mouth 2 (two) times a day., Disp: , Rfl:   •  tamsulosin (FLOMAX) 0.4 MG capsule 24 hr capsule, Take 2 capsules by mouth Daily., Disp: 180 capsule, Rfl: 3    Past Medical History:   Diagnosis Date   • Arthritis    • Cervical radiculopathy    • Coronary artery disease     Stent   • CTS (carpal tunnel syndrome) 1998    Surgery both hands in    • GERD (gastroesophageal reflux disease)    • HTN (hypertension)    • Hyperlipidemia    • Low back pain     2 Prior surgeries   • Lumbosacral disc disease    • Melanoma (CMS/HCC) 01/15/2009    DR WILLY SIMMS ( Left forearm)   • Radiculitis, thoracic    • Spondylosis of lumbar region without myelopathy or radiculopathy 2020   • Stented coronary artery    • Thoracic disc disorder     Surgery by Dr. Aldridge (sp)       Past Surgical History:   Procedure Laterality Date   • ANKLE FUSION Left     reconstruction and fusion   • BACK SURGERY      HERNIATED LUMBAR DISK ,,   • CARPAL TUNNEL RELEASE     • CATARACT EXTRACTION, BILATERAL Bilateral    • CORONARY ANGIOPLASTY WITH STENT PLACEMENT     • EPIDURAL BLOCK  Several at [facility]    and [facility]   • REPLACEMENT TOTAL KNEE Left    • REPLACEMENT TOTAL KNEE Right    • SKIN CANCER EXCISION      MELANOMA   • THORACIC SPINE SURGERY         Family History   Problem Relation Age of Onset   • Hypertension Mother    • Arthritis Mother            • Skin cancer Mother    • Heart disease Father    • Early death Father         Aortic aneurism age 58   • Hypertension Father    • Aortic aneurysm Father 58   •  "Cancer Neg Hx        Social History     Tobacco Use   • Smoking status: Never Smoker   • Smokeless tobacco: Never Used   • Tobacco comment: caffeine use   Substance Use Topics   • Alcohol use: No   • Drug use: No         ECG 12 Lead    Date/Time: 4/29/2021 10:53 AM  Performed by: Halima Salmeron MD  Authorized by: Halima Salmeron MD   Comparison: compared with previous ECG   Comparison to previous ECG: PACs and have replaced PVCs  Rhythm: sinus rhythm  Ectopy: atrial premature contractions               Objective:     Visit Vitals  /86   Pulse 69   Ht 180.3 cm (71\")   Wt 100 kg (221 lb)   BMI 30.82 kg/m²         Constitutional:       Appearance: Normal appearance. Well-developed.   HENT:      Head: Normocephalic and atraumatic.   Neck:      Vascular: No carotid bruit or JVD.   Pulmonary:      Effort: Pulmonary effort is normal.      Breath sounds: Normal breath sounds.   Cardiovascular:      Normal rate. Regular rhythm.      No gallop.   Pulses:     Radial: 2+ bilaterally.  Edema:     Peripheral edema present.     Ankle: trace edema of the left ankle.     Feet: trace edema of the right foot.  Abdominal:      Palpations: Abdomen is soft.   Skin:     General: Skin is warm and dry.   Neurological:      Mental Status: Alert and oriented to person, place, and time.             Assessment:          Diagnosis Plan   1. Coronary artery disease involving native coronary artery of native heart without angina pectoris     2. Essential hypertension     3. Hyperlipidemia, unspecified hyperlipidemia type     4. S/P coronary artery stent placement            Plan:       1.  Coronary artery disease appears to be stable and asymptomatic.  Continue current medical management.  2.  Hypertension.  Well-controlled on his current regimen of medications especially after the initiation of chlorthalidone per Dr. Mancia.  3.  Hyperlipidemia.  On atorvastatin for goal LDL of around 70 or below.  His most recent lipid panel showed that " his LDL was near goal.  His triglycerides were elevated at that time but the patient reports that he has been working on weight loss since then.  4.  History of PVCs.  These appear to have resolved.  5.  PACs.  Noted incidentally on his EKG today.  He is asymptomatic.  He is already on a high dose of beta-blocker.    I will plan on seeing the patient back again in 6 months.

## 2021-05-04 ENCOUNTER — OFFICE VISIT (OUTPATIENT)
Dept: NEUROSURGERY | Facility: CLINIC | Age: 86
End: 2021-05-04

## 2021-05-04 DIAGNOSIS — M48.062 SPINAL STENOSIS OF LUMBAR REGION WITH NEUROGENIC CLAUDICATION: Primary | ICD-10-CM

## 2021-05-04 DIAGNOSIS — R29.898 BILATERAL LEG WEAKNESS: ICD-10-CM

## 2021-05-04 DIAGNOSIS — M51.36 DDD (DEGENERATIVE DISC DISEASE), LUMBAR: ICD-10-CM

## 2021-05-04 PROCEDURE — 99442 PR PHYS/QHP TELEPHONE EVALUATION 11-20 MIN: CPT | Performed by: NEUROLOGICAL SURGERY

## 2021-06-16 DIAGNOSIS — G47.419 PRIMARY NARCOLEPSY WITHOUT CATAPLEXY: ICD-10-CM

## 2021-06-17 RX ORDER — MODAFINIL 100 MG/1
TABLET ORAL
Qty: 90 TABLET | Refills: 0 | Status: SHIPPED | OUTPATIENT
Start: 2021-06-17 | End: 2021-09-09 | Stop reason: SDUPTHER

## 2021-09-03 DIAGNOSIS — Z00.00 MEDICARE ANNUAL WELLNESS VISIT, SUBSEQUENT: ICD-10-CM

## 2021-09-03 DIAGNOSIS — R73.09 ELEVATED HEMOGLOBIN A1C: ICD-10-CM

## 2021-09-03 DIAGNOSIS — R73.9 HYPERGLYCEMIA: ICD-10-CM

## 2021-09-03 DIAGNOSIS — I10 ESSENTIAL HYPERTENSION: Primary | ICD-10-CM

## 2021-09-03 DIAGNOSIS — E78.5 HYPERLIPIDEMIA, UNSPECIFIED HYPERLIPIDEMIA TYPE: ICD-10-CM

## 2021-09-04 LAB
ALBUMIN SERPL-MCNC: 4.5 G/DL (ref 3.6–4.6)
ALBUMIN/GLOB SERPL: 2 {RATIO} (ref 1.2–2.2)
ALP SERPL-CCNC: 93 IU/L (ref 48–121)
ALT SERPL-CCNC: 37 IU/L (ref 0–44)
AST SERPL-CCNC: 32 IU/L (ref 0–40)
BASOPHILS # BLD AUTO: 0.1 X10E3/UL (ref 0–0.2)
BASOPHILS NFR BLD AUTO: 1 %
BILIRUB SERPL-MCNC: 0.8 MG/DL (ref 0–1.2)
BUN SERPL-MCNC: 29 MG/DL (ref 8–27)
BUN/CREAT SERPL: 20 (ref 10–24)
CALCIUM SERPL-MCNC: 9.4 MG/DL (ref 8.6–10.2)
CHLORIDE SERPL-SCNC: 105 MMOL/L (ref 96–106)
CHOLEST SERPL-MCNC: 141 MG/DL (ref 100–199)
CO2 SERPL-SCNC: 22 MMOL/L (ref 20–29)
CREAT SERPL-MCNC: 1.47 MG/DL (ref 0.76–1.27)
EOSINOPHIL # BLD AUTO: 0.3 X10E3/UL (ref 0–0.4)
EOSINOPHIL NFR BLD AUTO: 3 %
ERYTHROCYTE [DISTWIDTH] IN BLOOD BY AUTOMATED COUNT: 12.7 % (ref 11.6–15.4)
GLOBULIN SER CALC-MCNC: 2.3 G/DL (ref 1.5–4.5)
GLUCOSE SERPL-MCNC: 127 MG/DL (ref 65–99)
HBA1C MFR BLD: 6.4 % (ref 4.8–5.6)
HCT VFR BLD AUTO: 42.3 % (ref 37.5–51)
HDLC SERPL-MCNC: 35 MG/DL
HGB BLD-MCNC: 14.9 G/DL (ref 13–17.7)
IMM GRANULOCYTES # BLD AUTO: 0 X10E3/UL (ref 0–0.1)
IMM GRANULOCYTES NFR BLD AUTO: 0 %
LDLC SERPL CALC-MCNC: 72 MG/DL (ref 0–99)
LYMPHOCYTES # BLD AUTO: 2.6 X10E3/UL (ref 0.7–3.1)
LYMPHOCYTES NFR BLD AUTO: 28 %
MCH RBC QN AUTO: 31.6 PG (ref 26.6–33)
MCHC RBC AUTO-ENTMCNC: 35.2 G/DL (ref 31.5–35.7)
MCV RBC AUTO: 90 FL (ref 79–97)
MONOCYTES # BLD AUTO: 0.6 X10E3/UL (ref 0.1–0.9)
MONOCYTES NFR BLD AUTO: 7 %
NEUTROPHILS # BLD AUTO: 5.7 X10E3/UL (ref 1.4–7)
NEUTROPHILS NFR BLD AUTO: 61 %
PLATELET # BLD AUTO: 159 X10E3/UL (ref 150–450)
POTASSIUM SERPL-SCNC: 4.4 MMOL/L (ref 3.5–5.2)
PROT SERPL-MCNC: 6.8 G/DL (ref 6–8.5)
RBC # BLD AUTO: 4.72 X10E6/UL (ref 4.14–5.8)
SODIUM SERPL-SCNC: 140 MMOL/L (ref 134–144)
TRIGL SERPL-MCNC: 205 MG/DL (ref 0–149)
VLDLC SERPL CALC-MCNC: 34 MG/DL (ref 5–40)
WBC # BLD AUTO: 9.3 X10E3/UL (ref 3.4–10.8)

## 2021-09-09 ENCOUNTER — OFFICE VISIT (OUTPATIENT)
Dept: FAMILY MEDICINE CLINIC | Facility: CLINIC | Age: 86
End: 2021-09-09

## 2021-09-09 VITALS
BODY MASS INDEX: 30.94 KG/M2 | HEIGHT: 71 IN | DIASTOLIC BLOOD PRESSURE: 84 MMHG | SYSTOLIC BLOOD PRESSURE: 132 MMHG | WEIGHT: 221 LBS | HEART RATE: 62 BPM | TEMPERATURE: 98 F | OXYGEN SATURATION: 98 %

## 2021-09-09 DIAGNOSIS — G47.419 PRIMARY NARCOLEPSY WITHOUT CATAPLEXY: ICD-10-CM

## 2021-09-09 DIAGNOSIS — I10 ESSENTIAL HYPERTENSION: ICD-10-CM

## 2021-09-09 DIAGNOSIS — M48.02 CERVICAL SPINAL STENOSIS: ICD-10-CM

## 2021-09-09 DIAGNOSIS — Z00.00 MEDICARE ANNUAL WELLNESS VISIT, SUBSEQUENT: Primary | ICD-10-CM

## 2021-09-09 DIAGNOSIS — R41.3 SHORT-TERM MEMORY LOSS: ICD-10-CM

## 2021-09-09 DIAGNOSIS — K21.9 GASTROESOPHAGEAL REFLUX DISEASE WITHOUT ESOPHAGITIS: ICD-10-CM

## 2021-09-09 DIAGNOSIS — R79.89 ELEVATED SERUM CREATININE: ICD-10-CM

## 2021-09-09 PROCEDURE — 99213 OFFICE O/P EST LOW 20 MIN: CPT | Performed by: FAMILY MEDICINE

## 2021-09-09 PROCEDURE — G0439 PPPS, SUBSEQ VISIT: HCPCS | Performed by: FAMILY MEDICINE

## 2021-09-09 RX ORDER — POTASSIUM CHLORIDE 750 MG/1
20 TABLET, EXTENDED RELEASE ORAL DAILY
Qty: 180 TABLET | Refills: 1 | Status: SHIPPED | OUTPATIENT
Start: 2021-09-09 | End: 2022-02-23 | Stop reason: SDUPTHER

## 2021-09-09 RX ORDER — MODAFINIL 100 MG/1
100 TABLET ORAL DAILY
Qty: 90 TABLET | Refills: 0 | Status: SHIPPED | OUTPATIENT
Start: 2021-09-09 | End: 2021-12-17 | Stop reason: SDUPTHER

## 2021-09-09 NOTE — PROGRESS NOTES
The ABCs of the Annual Wellness Visit  Subsequent Medicare Wellness Visit    Chief Complaint   Patient presents with   • Medicare Wellness-subsequent     no complains       Subjective    History of Present Illness:  Kevin Ellington is a 86 y.o. male who presents for a Subsequent Medicare Wellness Visit.  Both ankles givign him some trouble, told he needed suregery but not wanting to do rehab now.  Back is also causing lots of issues,  This last surgery (3rd) for spinal stenosis with residual weakness inn walking and weakness.  Trying to exercise.  Purchased a 4 wheel walker and will try to do more walker outside.      GERD - not well controlled - having more acid reflux at night when laying down especially.     Hypertension well controlled, on 4 medications currently.  Discussed recent labs with patient.  He feels that the chlorthalidone has been the best to control his blood pressure and help with swelling.    Narcolepsy well controlled on modafinil.    The following portions of the patient's history were reviewed and   updated as appropriate: allergies, current medications, past family history, past medical history, past social history, past surgical history and problem list.    Compared to one year ago, the patient feels his physical   health is the same.    Compared to one year ago, the patient feels his mental   health is the same.    Recent Hospitalizations:  He was not admitted to the hospital during the last year.       Current Medical Providers:  Patient Care Team:  Leanna Mancia MD as PCP - General (Family Medicine)    Outpatient Medications Prior to Visit   Medication Sig Dispense Refill   • aspirin 81 MG tablet Take 81 mg by mouth Daily.     • atorvastatin (LIPITOR) 40 MG tablet Take 1 tablet by mouth Daily. 90 tablet 3   • cetirizine (zyrTEC) 10 MG tablet Take 10 mg by mouth Daily.     • chlorthalidone (HYGROTEN) 50 MG tablet Take 1 tablet by mouth Daily. 90 tablet 1   • cloNIDine (CATAPRES) 0.1 MG  tablet Take 1 tablet by mouth 2 (Two) Times a Day. 180 tablet 3   • coenzyme Q10 100 MG capsule Take 300 mg by mouth Daily.     • econazole nitrate (SPECTAZOLE) 1 % cream Apply  topically to the appropriate area as directed As Needed.     • Flaxseed, Linseed, (FLAX SEED OIL PO) Take  by mouth.     • fluticasone (FLONASE) 50 MCG/ACT nasal spray USE 2 SPRAYS IN EACH NOSTRIL DAILY AS DIRECTED BY PROVIDER 48 g 3   • losartan (COZAAR) 100 MG tablet Take 1 tablet by mouth Daily. 90 tablet 3   • meloxicam (MOBIC) 7.5 MG tablet Take 1 tablet by mouth Daily. 90 tablet 3   • metoprolol succinate XL (TOPROL-XL) 100 MG 24 hr tablet Take 1 tablet by mouth Daily. 90 tablet 3   • metroNIDAZOLE (FLAGYL) 0.75 % lotion lotion Daily.     • Misc Natural Products (JOINT HEALTH PO) Take  by mouth.     • Multiple Vitamins-Minerals (MULTIVITAMIN PO) Take 1 tablet by mouth Daily.     • Saw Palmetto, Serenoa repens, 320 MG capsule Take 320 mg by mouth 2 (two) times a day.     • tamsulosin (FLOMAX) 0.4 MG capsule 24 hr capsule Take 2 capsules by mouth Daily. 180 capsule 3   • modafinil (PROVIGIL) 100 MG tablet TAKE 1 TABLET DAILY 90 tablet 0   • potassium chloride (K-DUR,KLOR-CON) 10 MEQ CR tablet Take 2 tablets by mouth Daily. 180 tablet 1     No facility-administered medications prior to visit.       No opioid medication identified on active medication list. I have reviewed chart for other potential  high risk medication/s and harmful drug interactions in the elderly.          Aspirin is on active medication list. Aspirin use is indicated based on review of current medical condition/s. Pros and cons of this therapy have been discussed today. Benefits of this medication outweigh potential harm.  Patient has been encouraged to continue taking this medication.  .      Patient Active Problem List   Diagnosis   • Bulging lumbar disc   • DDD (degenerative disc disease), lumbar   • Leg pain   • Spinal stenosis of lumbar region with neurogenic  "claudication   • Radiculitis, thoracic   • Status post total left knee replacement using cement   • Status post total right knee replacement using cement   • Coronary artery disease involving native coronary artery of native heart without angina pectoris   • Essential hypertension   • Hyperlipemia   • S/P coronary artery stent placement   • Primary narcolepsy without cataplexy   • Benign prostatic hyperplasia with urinary frequency   • Cervical radiculopathy   • Bilateral carpal tunnel syndrome   • Cervical spinal stenosis   • Arthritis of ankle   • Chronic pain of right ankle   • Shortness of breath   • Bilateral leg weakness   • History of melanoma   • Spondylosis of lumbar region without myelopathy or radiculopathy     Advance Care Planning  Advance Directive is on file.  ACP discussion was held with the patient during this visit. Patient has an advance directive in EMR which is still valid.           Objective    Vitals:    09/09/21 1438   BP: 132/84   Pulse: 62   Temp: 98 °F (36.7 °C)   SpO2: 98%   Weight: 100 kg (221 lb)   Height: 180.3 cm (71\")   PainSc: 0-No pain     BMI Readings from Last 1 Encounters:   09/09/21 30.82 kg/m²   BMI is above normal parameters. Recommendations include: educational material    Does the patient have evidence of cognitive impairment? Yes, He has noticed gradual difficulty recalling names and finding the right word more than he used to .     Physical Exam  Vitals and nursing note reviewed.   Constitutional:       General: He is not in acute distress.     Appearance: He is well-developed.   HENT:      Head: Normocephalic.      Nose: Nose normal.   Cardiovascular:      Rate and Rhythm: Normal rate and regular rhythm.      Heart sounds: Normal heart sounds. No murmur heard.     Pulmonary:      Effort: Pulmonary effort is normal. No respiratory distress.      Breath sounds: Normal breath sounds.   Musculoskeletal:         General: Normal range of motion.      Right lower leg: No " edema.      Left lower leg: No edema.   Skin:     General: Skin is warm and dry.      Findings: No rash.   Neurological:      Mental Status: He is alert and oriented to person, place, and time.   Psychiatric:         Behavior: Behavior normal.         Thought Content: Thought content normal.         Judgment: Judgment normal.       Lab Results   Component Value Date     (H) 09/03/2021    CHLPL 141 09/03/2021    TRIG 205 (H) 09/03/2021    HDL 35 (L) 09/03/2021    LDL 72 09/03/2021    VLDL 34 09/03/2021    HGBA1C 6.4 (H) 09/03/2021            HEALTH RISK ASSESSMENT    Smoking Status:  Social History     Tobacco Use   Smoking Status Never Smoker   Smokeless Tobacco Never Used   Tobacco Comment    caffeine use     Alcohol Consumption:  Social History     Substance and Sexual Activity   Alcohol Use No     Fall Risk Screen:    STEADI Fall Risk Assessment was completed, and patient is at MODERATE risk for falls. Assessment completed on:9/9/2021    Depression Screening:  PHQ-2/PHQ-9 Depression Screening 9/9/2021   Little interest or pleasure in doing things 0   Feeling down, depressed, or hopeless 0   Trouble falling or staying asleep, or sleeping too much -   Feeling tired or having little energy -   Poor appetite or overeating -   Feeling bad about yourself - or that you are a failure or have let yourself or your family down -   Trouble concentrating on things, such as reading the newspaper or watching television -   Moving or speaking so slowly that other people could have noticed. Or the opposite - being so fidgety or restless that you have been moving around a lot more than usual -   Thoughts that you would be better off dead, or of hurting yourself in some way -   Total Score 0   If you checked off any problems, how difficult have these problems made it for you to do your work, take care of things at home, or get along with other people? -       Health Habits and Functional and Cognitive Screening:  Functional  & Cognitive Status 9/9/2021   Do you have difficulty preparing food and eating? Yes   Do you have difficulty bathing yourself, getting dressed or grooming yourself? No   Do you have difficulty using the toilet? No   Do you have difficulty moving around from place to place? Yes   Do you have trouble with steps or getting out of a bed or a chair? Yes   Current Diet Well Balanced Diet   Dental Exam Up to date   Eye Exam Up to date   Exercise (times per week) 3 times per week   Current Exercises Include Walking;Stationary Bicycling/Spin Class   Current Exercise Activities Include -   Do you need help using the phone?  No   Are you deaf or do you have serious difficulty hearing?  No   Do you need help with transportation? No   Do you need help shopping? Yes   Do you need help preparing meals?  Yes   Do you need help with housework?  Yes   Do you need help with laundry? Yes   Do you need help taking your medications? No   Do you need help managing money? No   Do you ever drive or ride in a car without wearing a seat belt? No   Have you felt unusual stress, anger or loneliness in the last month? No   Who do you live with? Spouse   If you need help, do you have trouble finding someone available to you? No   Have you been bothered in the last four weeks by sexual problems? No   Do you have difficulty concentrating, remembering or making decisions? No       Age-appropriate Screening Schedule:  Refer to the list below for future screening recommendations based on patient's age, sex and/or medical conditions. Orders for these recommended tests are listed in the plan section. The patient has been provided with a written plan.    Health Maintenance   Topic Date Due   • INFLUENZA VACCINE  10/01/2021   • LIPID PANEL  09/03/2022   • TDAP/TD VACCINES (2 - Tdap) 04/09/2028   • ZOSTER VACCINE  Addressed              Assessment/Plan   CMS Preventative Services Quick Reference  Risk Factors Identified During Encounter  Chronic Pain    Inactivity/Sedentary  Obesity/Overweight   The above risks/problems have been discussed with the patient.  Follow up actions/plans if indicated are seen below in the Assessment/Plan Section.  Pertinent information has been shared with the patient in the After Visit Summary.    Diagnoses and all orders for this visit:    1. Medicare annual wellness visit, subsequent (Primary)    2. Gastroesophageal reflux disease without esophagitis    3. Cervical spinal stenosis    4. Elevated serum creatinine  -     Basic metabolic panel; Future    5. Essential hypertension  -     Basic metabolic panel; Future  -     potassium chloride (K-DUR,KLOR-CON) 10 MEQ CR tablet; Take 2 tablets by mouth Daily.  Dispense: 180 tablet; Refill: 1    6. Primary narcolepsy without cataplexy  -     modafinil (PROVIGIL) 100 MG tablet; Take 1 tablet by mouth Daily.  Dispense: 90 tablet; Refill: 0    7. Short-term memory loss    Pleasant 86-year-old male here for Medicare wellness visit.  There were multiple problems to address today.  First of all from the maintenance standpoint, labs up-to-date, not due for colon cancer screening.  His pneumonia vaccination is up-to-date and needs to be changed in the system.  No new cardiac symptoms, he does have some concerns or findings for memory loss.  I was noticing he was having more challenges with word recalling especially when talking about people or medication names.  We will discuss this further at the upcoming appointment.    Hypertension well controlled, he has responded quite well to the chlorthalidone but it does seem that his creatinine has take quite a hit.  We discussed this at length.  He feels like for the first time his ankles are at a good size and that his blood pressures have been in goal.  I think if he was to increase his water intake it is possible that his GFR could be improved.  Discussed improving water intake to around 80 ounces a day and recheck in 1 month.  If not improving at  that point we will likely need to change his blood pressure regimen.    Also, well-controlled on modafinil 100 mg daily.  Continue same dose.    GERD, I would like to wait on H2 blocker PPI until his GFR improves.  Around 42% from 60% previously.  Blood sugar looks great, blood pressure looks great as well.    Follow Up:   Return in about 4 weeks (around 10/7/2021), or if symptoms worsen or fail to improve, for Recheck HTN with labs prior .     An After Visit Summary and PPPS were made available to the patient.    I spent 30 minutes caring for Kevin on this date of service. This time includes time spent by me in the following activities:preparing for the visit, reviewing tests, obtaining and/or reviewing a separately obtained history, performing a medically appropriate examination and/or evaluation , counseling and educating the patient/family/caregiver, ordering medications, tests, or procedures and documenting information in the medical record       Return in about 4 weeks (around 10/7/2021), or if symptoms worsen or fail to improve, for Recheck HTN with labs prior .    Medical assistant and I wore mask and eyewear protection during entire encounter.  Patient wore mask.    Leanna Mancia MD

## 2021-10-18 DIAGNOSIS — I10 ESSENTIAL HYPERTENSION: ICD-10-CM

## 2021-10-18 RX ORDER — CLONIDINE HYDROCHLORIDE 0.1 MG/1
TABLET ORAL
Qty: 180 TABLET | Refills: 3 | Status: SHIPPED | OUTPATIENT
Start: 2021-10-18 | End: 2022-10-13

## 2021-10-18 RX ORDER — ATORVASTATIN CALCIUM 40 MG/1
TABLET, FILM COATED ORAL
Qty: 90 TABLET | Refills: 3 | Status: SHIPPED | OUTPATIENT
Start: 2021-10-18 | End: 2022-10-13

## 2021-10-18 RX ORDER — METOPROLOL SUCCINATE 100 MG/1
TABLET, EXTENDED RELEASE ORAL
Qty: 90 TABLET | Refills: 3 | Status: SHIPPED | OUTPATIENT
Start: 2021-10-18 | End: 2022-10-13

## 2021-10-18 RX ORDER — TAMSULOSIN HYDROCHLORIDE 0.4 MG/1
CAPSULE ORAL
Qty: 180 CAPSULE | Refills: 3 | Status: SHIPPED | OUTPATIENT
Start: 2021-10-18 | End: 2022-10-13

## 2021-10-18 RX ORDER — LOSARTAN POTASSIUM 100 MG/1
TABLET ORAL
Qty: 90 TABLET | Refills: 3 | Status: SHIPPED | OUTPATIENT
Start: 2021-10-18 | End: 2022-10-13

## 2021-10-21 ENCOUNTER — OFFICE VISIT (OUTPATIENT)
Dept: FAMILY MEDICINE CLINIC | Facility: CLINIC | Age: 86
End: 2021-10-21

## 2021-10-21 VITALS
SYSTOLIC BLOOD PRESSURE: 122 MMHG | WEIGHT: 219 LBS | OXYGEN SATURATION: 98 % | DIASTOLIC BLOOD PRESSURE: 66 MMHG | HEIGHT: 71 IN | HEART RATE: 68 BPM | TEMPERATURE: 97.6 F | BODY MASS INDEX: 30.66 KG/M2

## 2021-10-21 DIAGNOSIS — I10 ESSENTIAL HYPERTENSION: Primary | ICD-10-CM

## 2021-10-21 DIAGNOSIS — M51.36 DDD (DEGENERATIVE DISC DISEASE), LUMBAR: ICD-10-CM

## 2021-10-21 DIAGNOSIS — M48.062 SPINAL STENOSIS OF LUMBAR REGION WITH NEUROGENIC CLAUDICATION: ICD-10-CM

## 2021-10-21 DIAGNOSIS — N18.31 STAGE 3A CHRONIC KIDNEY DISEASE (HCC): ICD-10-CM

## 2021-10-21 DIAGNOSIS — R60.0 PEDAL EDEMA: ICD-10-CM

## 2021-10-21 PROCEDURE — 99214 OFFICE O/P EST MOD 30 MIN: CPT | Performed by: FAMILY MEDICINE

## 2021-10-21 NOTE — PROGRESS NOTES
"Chief Complaint  Hypertension (follow up no complains  doing fair the water pill is helping a lot not sure about kidney function )    Subjective          Kevin Ellington presents to [facility]  History of Present Illness    Pleasant 86-year-old male to follow-up hypertension hyperlipidemia.  That have been well controlled on chlorthalidone.  He has felt substantially better on this.  However his creatinine took a bit of a dip.  GFR went from 60% down to 40%.  He was very insistent on wanting to continue with the diuretics so we had him increase his water intake and repeat in 1 month.  Today his renal function has improved sufficiently.  As his quality of life is significantly improved with pedal edema on the chlorthalidone will continue on the current dose.    Back pain - chronic problem, was getting epidurals and was unable to get into the space, had 3 surgeries.  He has been seeing dr Chaidez previously.  He has recp,eemded that surgery would not be reccommended. But still having problems with back pain and weakness in his legs.  He has accepted that he will not get better, but his wife wonders if he should get a second opinion.     Went to [facility] for some hand issues. He has contacted them,     Objective   Vital Signs:   /66   Pulse 68   Temp 97.6 °F (36.4 °C)   Ht 180.3 cm (71\")   Wt 99.3 kg (219 lb)   SpO2 98%   BMI 30.54 kg/m²     Physical Exam  Vitals and nursing note reviewed.   Constitutional:       General: He is not in acute distress.     Appearance: He is well-developed.   HENT:      Head: Normocephalic.      Nose: Nose normal.   Cardiovascular:      Rate and Rhythm: Normal rate and regular rhythm.      Heart sounds: Normal heart sounds. No murmur heard.      Pulmonary:      Effort: Pulmonary effort is normal. No respiratory distress.      Breath sounds: Normal breath sounds.   Musculoskeletal:         General: Normal range of motion.      " Right lower leg: No edema.      Left lower leg: No edema.   Skin:     General: Skin is warm and dry.      Findings: No rash.   Neurological:      Mental Status: He is alert and oriented to person, place, and time.   Psychiatric:         Behavior: Behavior normal.         Thought Content: Thought content normal.         Judgment: Judgment normal.        Result Review :   The following data was reviewed by: Leanna Mancia MD on 10/21/2021:  BMP    BMP 11/30/20 9/3/21 10/15/21   Glucose 129 (A) 127 (A) 122 (A)   BUN 22 29 (A) 33 (A)   Creatinine 1.16 1.47 (A) 1.33 (A)   Sodium 140 140 139   Potassium 4.7 4.4 4.4   Chloride 105 105 104   CO2 25.6 22 23.3   Calcium 9.2 9.4 9.2   (A) Abnormal value                      Assessment and Plan    Diagnoses and all orders for this visit:    1. Essential hypertension (Primary)    2. Stage 3a chronic kidney disease (HCC)  -     Comprehensive Metabolic Panel; Future  -     CBC & Differential; Future    3. Pedal edema    4. DDD (degenerative disc disease), lumbar  -     Ambulatory Referral to Neurosurgery    5. Spinal stenosis of lumbar region with neurogenic claudication  -     Ambulatory Referral to Neurosurgery    HTN well controlled, CKD III improving.  We discussed that he will need to continue with good water intake in order to maintain the diuretic.  We will recheck in 3 months.  If his GFR drops again we will likely need to discontinue this medication or at least decrease the dose.  Okay to follow for now.    Patient with chronic lower back pain that is not well controlled.  He has had multiple surgeries, ablations, epidurals with neurosurgery and pain management.  He does not feel that his symptoms are adequately controlled and has ongoing weakness and pain.  He was recommended not to continue with another surgery but would like a second opinion.  We discussed that surgery does not always indicate improved outcomes.  I do think maintaining activity, doing his physical  therapy exercises will be the most beneficial.  However per his preference will refer to Dr. Ocampo with orthopedics.      Follow Up   Return in about 3 months (around 1/21/2022), or if symptoms worsen or fail to improve, for Recheck Kidney function with labs prior .  Patient was given instructions and counseling regarding his condition or for health maintenance advice. Please see specific information pulled into the AVS if appropriate. Medical assistant and I wore mask and eyewear protection during entire encounter.  Patient wore mask.    Leanna Mancia MD

## 2021-11-02 ENCOUNTER — OFFICE VISIT (OUTPATIENT)
Dept: CARDIOLOGY | Facility: CLINIC | Age: 86
End: 2021-11-02

## 2021-11-02 VITALS
DIASTOLIC BLOOD PRESSURE: 82 MMHG | WEIGHT: 221.2 LBS | OXYGEN SATURATION: 97 % | HEART RATE: 71 BPM | HEIGHT: 70 IN | SYSTOLIC BLOOD PRESSURE: 172 MMHG | BODY MASS INDEX: 31.67 KG/M2

## 2021-11-02 DIAGNOSIS — I25.10 CORONARY ARTERY DISEASE INVOLVING NATIVE CORONARY ARTERY OF NATIVE HEART WITHOUT ANGINA PECTORIS: Primary | ICD-10-CM

## 2021-11-02 DIAGNOSIS — E78.5 HYPERLIPIDEMIA, UNSPECIFIED HYPERLIPIDEMIA TYPE: ICD-10-CM

## 2021-11-02 DIAGNOSIS — I10 ESSENTIAL HYPERTENSION: ICD-10-CM

## 2021-11-02 DIAGNOSIS — Z95.5 S/P CORONARY ARTERY STENT PLACEMENT: ICD-10-CM

## 2021-11-02 DIAGNOSIS — N18.31 STAGE 3A CHRONIC KIDNEY DISEASE (HCC): ICD-10-CM

## 2021-11-02 PROCEDURE — 99214 OFFICE O/P EST MOD 30 MIN: CPT | Performed by: INTERNAL MEDICINE

## 2021-11-02 PROCEDURE — 93000 ELECTROCARDIOGRAM COMPLETE: CPT | Performed by: INTERNAL MEDICINE

## 2021-11-02 NOTE — PROGRESS NOTES
Subjective:     Encounter Date:11/02/2021      Patient ID: Kevin Ellington is a 86 y.o. male.    Chief Complaint:  History of Present Illness    This is an 86-year-old male with a history of coronary artery disease status post prior LAD stent placement in 12/2002, chronic back pain, dyslipidemia, hypertension, who presents for follow-up.       He presents today for routine follow-up.  He was started on chlorthalidone Dr. Mancia earlier in this year due to poorly controlled blood pressures.  With that both his hypertension and lower extremity swelling improved.  Routine follow-up with Dr. Mancia in 9/2021 he was noted to have some worsening of his renal function.  The patient was hesitant to come off the chlorthalidone since it was helping him so they opted to have the patient work on his hydration before making any further changes.  He was seen again by Dr. Mancia on 10/21/2021 where repeat labs showed some improvement in his renal function.  Based on this no changes were made to his management.    Overall the patient reports that he is feeling well.  He reports that his systolic pressures normally run in the 120s to 130s and diastolics in the 60s and 70s.  He denies any chest pain, palpitations, orthopnea, near-syncope or syncope, or worsening of his chronic dyspnea on exertion.  He denies any significant lower extremity edema as long as he uses his chlorthalidone.    Prior History:  The patient was previously followed by Dr. Cavazos but came to establish care here in 8/2016.  His prior cardiac history again includes a stent placement on 12/17/2002.  The patient reports that at the time he was having symptoms of dyspnea and chest fullness on exertion.  He was taken to the cardiac catheterization laboratory at that time and apparently had a Promus stent (either 3.0 or 3.5 x 16 mm based on his stent card).  He did well until about 2012 when he started having more dyspnea on exertion.  At that time he underwent an  echocardiogram that showed normal left ventricular systolic function wall motion with an ejection fraction of 60%, grade 1A diastolic dysfunction, and no significant valvular disease.  A Lexiscan Myoview stress test was negative for ischemia.  He was seen by pulmonary due to his continued issues with dyspnea on exertion and they did not find any pulmonary issues to explain his symptoms.  At that time the patient started exercising and lost about 20 pounds with resolution of his symptoms.     In follow-up he is mainly had issues with blood pressure control.  This improved some with the addition of amlodipine.  Over the last year or so he is been having increasing issues with dyspnea on exertion.  He was seen by NANCY Chahal in 7/2019 with these complaints.  He underwent both a stress test and an echocardiogram at that time.  His echocardiogram showed normal left ventricular systolic function wall motion with an EF of 59%, grade 1 diastolic dysfunction, and no significant valvular disease.  His stress test showed no evidence of ischemia.     In 10/2019 he reported that his blood pressures were running a little high with systolics running in the 140s to 160s.  For this reason and the fact that he is developed more issues with ankle edema Dr. Bernal switched amlodipine to spironolactone.  By the time of his office visit he had not made the switch yet but I encouraged him to do so.      During a telephone visit in 4/2020 he reported he was feeling well.  His blood pressures were well controlled on spironolactone.    Unfortunately he developed breast swelling with the spironolactone and this was discontinued by Dr. Mancia who is his new primary care physician.    Has blood pressures initially remained well controlled off of the spironolactone.    Review of Systems   Constitutional: Negative for malaise/fatigue.   HENT: Negative for hearing loss, hoarse voice, nosebleeds and sore throat.    Eyes: Negative for pain.    Cardiovascular: Negative for chest pain, claudication, cyanosis, dyspnea on exertion, irregular heartbeat, leg swelling, near-syncope, orthopnea, palpitations, paroxysmal nocturnal dyspnea and syncope.   Respiratory: Negative for shortness of breath and snoring.    Endocrine: Negative for cold intolerance, heat intolerance, polydipsia, polyphagia and polyuria.   Skin: Negative for itching and rash.   Musculoskeletal: Positive for joint pain. Negative for arthritis, falls, joint swelling, muscle cramps, muscle weakness and myalgias.   Gastrointestinal: Negative for constipation, diarrhea, dysphagia, heartburn, hematemesis, hematochezia, melena, nausea and vomiting.   Genitourinary: Negative for frequency, hematuria and hesitancy.   Neurological: Positive for loss of balance. Negative for excessive daytime sleepiness, dizziness, headaches, light-headedness, numbness and weakness.   Psychiatric/Behavioral: Negative for depression. The patient is not nervous/anxious.          Current Outpatient Medications:   •  aspirin 81 MG tablet, Take 81 mg by mouth Daily., Disp: , Rfl:   •  atorvastatin (LIPITOR) 40 MG tablet, TAKE 1 TABLET DAILY, Disp: 90 tablet, Rfl: 3  •  cetirizine (zyrTEC) 10 MG tablet, Take 10 mg by mouth Daily., Disp: , Rfl:   •  chlorthalidone (HYGROTEN) 50 MG tablet, Take 1 tablet by mouth Daily., Disp: 90 tablet, Rfl: 1  •  cloNIDine (CATAPRES) 0.1 MG tablet, TAKE 1 TABLET TWICE A DAY, Disp: 180 tablet, Rfl: 3  •  coenzyme Q10 100 MG capsule, Take 300 mg by mouth Daily., Disp: , Rfl:   •  econazole nitrate (SPECTAZOLE) 1 % cream, Apply  topically to the appropriate area as directed As Needed., Disp: , Rfl:   •  Flaxseed, Linseed, (FLAX SEED OIL PO), Take  by mouth., Disp: , Rfl:   •  fluticasone (FLONASE) 50 MCG/ACT nasal spray, USE 2 SPRAYS IN EACH NOSTRIL DAILY AS DIRECTED BY PROVIDER, Disp: 48 g, Rfl: 3  •  losartan (COZAAR) 100 MG tablet, TAKE 1 TABLET DAILY, Disp: 90 tablet, Rfl: 3  •  meloxicam  (MOBIC) 7.5 MG tablet, Take 1 tablet by mouth Daily., Disp: 90 tablet, Rfl: 3  •  metoprolol succinate XL (TOPROL-XL) 100 MG 24 hr tablet, TAKE 1 TABLET DAILY, Disp: 90 tablet, Rfl: 3  •  metroNIDAZOLE (FLAGYL) 0.75 % lotion lotion, Daily., Disp: , Rfl:   •  Misc Natural Products (JOINT HEALTH PO), Take  by mouth., Disp: , Rfl:   •  modafinil (PROVIGIL) 100 MG tablet, Take 1 tablet by mouth Daily., Disp: 90 tablet, Rfl: 0  •  Multiple Vitamins-Minerals (MULTIVITAMIN PO), Take 1 tablet by mouth Daily., Disp: , Rfl:   •  potassium chloride (K-DUR,KLOR-CON) 10 MEQ CR tablet, Take 2 tablets by mouth Daily., Disp: 180 tablet, Rfl: 1  •  Saw Palmetto, Serenoa repens, 320 MG capsule, Take 320 mg by mouth 2 (two) times a day., Disp: , Rfl:   •  tamsulosin (FLOMAX) 0.4 MG capsule 24 hr capsule, TAKE 2 CAPSULES DAILY, Disp: 180 capsule, Rfl: 3    Past Medical History:   Diagnosis Date   • Arthritis    • Cervical radiculopathy    • Coronary artery disease 2001    Stent   • CTS (carpal tunnel syndrome) 1998    Surgery both hands in 1999   • GERD (gastroesophageal reflux disease)    • HTN (hypertension)    • Hyperlipidemia    • Low back pain     2 Prior surgeries   • Lumbosacral disc disease    • Melanoma (HCC) 01/15/2009    DR WILLY SIMMS ( Left forearm)   • Radiculitis, thoracic    • Spondylosis of lumbar region without myelopathy or radiculopathy 9/9/2020   • Stage 3a chronic kidney disease (HCC) 10/21/2021   • Stented coronary artery    • Thoracic disc disorder     Surgery by Dr. Aldridge (sp)       Past Surgical History:   Procedure Laterality Date   • ANKLE FUSION Left 2007    reconstruction and fusion   • BACK SURGERY      HERNIATED LUMBAR DISK 1975,2000,2012   • CARPAL TUNNEL RELEASE     • CATARACT EXTRACTION, BILATERAL Bilateral 2002   • CORONARY ANGIOPLASTY WITH STENT PLACEMENT  2001   • EPIDURAL BLOCK  Several at [facility]    and [facility]   • REPLACEMENT TOTAL KNEE Left 2015   • REPLACEMENT  "TOTAL KNEE Right    • SKIN CANCER EXCISION      MELANOMA   • THORACIC SPINE SURGERY         Family History   Problem Relation Age of Onset   • Hypertension Mother    • Arthritis Mother            • Skin cancer Mother    • Heart disease Father    • Early death Father         Aortic aneurism age 58   • Hypertension Father    • Aortic aneurysm Father 58   • Cancer Neg Hx        Social History     Tobacco Use   • Smoking status: Never Smoker   • Smokeless tobacco: Never Used   • Tobacco comment: caffeine use   Substance Use Topics   • Alcohol use: No   • Drug use: No         ECG 12 Lead    Date/Time: 2021 11:23 AM  Performed by: Halima Salmeron MD  Authorized by: Halima Salmeron MD   Comparison: compared with previous ECG   Similar to previous ECG  Rhythm: sinus rhythm  Ectopy: atrial premature contractions  Conduction: 1st degree AV block               Objective:     Visit Vitals  /82 (BP Location: Left arm, Patient Position: Sitting, Cuff Size: Large Adult)   Pulse 71   Ht 177.8 cm (70\")   Wt 100 kg (221 lb 3.2 oz)   SpO2 97%   BMI 31.74 kg/m²         Constitutional:       Appearance: Normal appearance. Well-developed.   HENT:      Head: Normocephalic and atraumatic.   Neck:      Vascular: No carotid bruit or JVD.   Pulmonary:      Effort: Pulmonary effort is normal.      Breath sounds: Normal breath sounds.   Cardiovascular:      Normal rate. Regular rhythm.      No gallop.   Pulses:     Radial: 2+ bilaterally.  Edema:     Peripheral edema absent.   Abdominal:      Palpations: Abdomen is soft.   Skin:     General: Skin is warm and dry.   Neurological:      Mental Status: Alert and oriented to person, place, and time.             Assessment:          Diagnosis Plan   1. Coronary artery disease involving native coronary artery of native heart without angina pectoris     2. Essential hypertension     3. Hyperlipidemia, unspecified hyperlipidemia type     4. S/P coronary artery stent placement     5. " Stage 3a chronic kidney disease (HCC)            Plan:       1.  Coronary artery disease.  Appears to be stable and asymptomatic.  His EKG shows no ischemic changes.  2.  PACs.  Remain frequent but asymptomatic.  Continue metoprolol succinate.  3.  Hypertension.  Elevated in the office today but normally well controlled.  Monitor on his current regimen of medications.  4.  Hyperlipidemia.  On atorvastatin for goal LDL of around 70 or below.  LDL is at goal around 72 on his last lipid panel.  5.  Chronic kidney disease.  Some worsening with chlorthalidone use.  I agree that as long as his creatinine continues to improve or at least remained stable I would continue with chlorthalidone since it appears to be helping him.    I will plan on seeing the patient back again in 6 months.

## 2021-11-05 DIAGNOSIS — M19.079 ARTHRITIS OF ANKLE: ICD-10-CM

## 2021-11-05 RX ORDER — MELOXICAM 7.5 MG/1
TABLET ORAL
Qty: 90 TABLET | Refills: 3 | Status: SHIPPED | OUTPATIENT
Start: 2021-11-05 | End: 2022-01-21

## 2021-11-05 NOTE — TELEPHONE ENCOUNTER
Rx Refill Note  Requested Prescriptions     Pending Prescriptions Disp Refills   • meloxicam (MOBIC) 7.5 MG tablet [Pharmacy Med Name: MELOXICAM TABS 7.5MG] 90 tablet 3     Sig: TAKE 1 TABLET DAILY      Last office visit with prescribing clinician: 10/21/2021      Next office visit with prescribing clinician: 1/21/2022            Jairo Griffin MA  11/05/21, 11:38 EDT

## 2021-12-01 DIAGNOSIS — I10 ESSENTIAL HYPERTENSION: ICD-10-CM

## 2021-12-01 RX ORDER — CHLORTHALIDONE 50 MG/1
TABLET ORAL
Qty: 90 TABLET | Refills: 3 | Status: SHIPPED | OUTPATIENT
Start: 2021-12-01 | End: 2022-11-07

## 2021-12-17 DIAGNOSIS — G47.419 PRIMARY NARCOLEPSY WITHOUT CATAPLEXY: ICD-10-CM

## 2021-12-17 RX ORDER — MODAFINIL 100 MG/1
100 TABLET ORAL DAILY
Qty: 30 TABLET | Refills: 0 | Status: SHIPPED | OUTPATIENT
Start: 2021-12-17 | End: 2022-01-14 | Stop reason: SDUPTHER

## 2021-12-17 NOTE — TELEPHONE ENCOUNTER
Rx Refill Note  Requested Prescriptions     Pending Prescriptions Disp Refills   • modafinil (PROVIGIL) 100 MG tablet 90 tablet 0     Sig: Take 1 tablet by mouth Daily.      Last office visit with prescribing clinician: 10/21/2021      Next office visit with prescribing clinician: 1/21/2022            Jairo Griffin MA  12/17/21, 15:24 EST

## 2021-12-17 NOTE — TELEPHONE ENCOUNTER
.    Caller: Kevin Ellington    Relationship: Self    Best call back number:[phone]     Requested Prescriptions:   modafinil (PROVIGIL) 100 MG tablet     Pharmacy where request should be sent:    [facility] - [address] - [phone] [facility] [phone] [phone]      Does the patient have less than a 3 day supply:  [] Yes  [x] No    Shell Lomas, RegSched Rep   12/17/21 11:49 EST     PLEASE ADVISE.

## 2022-01-14 DIAGNOSIS — G47.419 PRIMARY NARCOLEPSY WITHOUT CATAPLEXY: ICD-10-CM

## 2022-01-14 RX ORDER — MODAFINIL 100 MG/1
100 TABLET ORAL DAILY
Qty: 90 TABLET | Refills: 0 | Status: SHIPPED | OUTPATIENT
Start: 2022-01-14 | End: 2022-04-08

## 2022-01-21 ENCOUNTER — OFFICE VISIT (OUTPATIENT)
Dept: FAMILY MEDICINE CLINIC | Facility: CLINIC | Age: 87
End: 2022-01-21

## 2022-01-21 VITALS
SYSTOLIC BLOOD PRESSURE: 130 MMHG | DIASTOLIC BLOOD PRESSURE: 78 MMHG | HEART RATE: 58 BPM | HEIGHT: 70 IN | WEIGHT: 218 LBS | OXYGEN SATURATION: 98 % | BODY MASS INDEX: 31.21 KG/M2 | TEMPERATURE: 97.8 F

## 2022-01-21 DIAGNOSIS — R73.09 ELEVATED HEMOGLOBIN A1C: ICD-10-CM

## 2022-01-21 DIAGNOSIS — N18.31 STAGE 3A CHRONIC KIDNEY DISEASE: ICD-10-CM

## 2022-01-21 DIAGNOSIS — M47.816 SPONDYLOSIS OF LUMBAR REGION WITHOUT MYELOPATHY OR RADICULOPATHY: ICD-10-CM

## 2022-01-21 DIAGNOSIS — K21.9 GASTROESOPHAGEAL REFLUX DISEASE WITHOUT ESOPHAGITIS: ICD-10-CM

## 2022-01-21 DIAGNOSIS — I10 ESSENTIAL HYPERTENSION: Primary | ICD-10-CM

## 2022-01-21 PROCEDURE — 99214 OFFICE O/P EST MOD 30 MIN: CPT | Performed by: FAMILY MEDICINE

## 2022-01-21 RX ORDER — OMEPRAZOLE 20 MG/1
20 CAPSULE, DELAYED RELEASE ORAL DAILY
Qty: 90 CAPSULE | Refills: 2
Start: 2022-01-21 | End: 2022-04-29

## 2022-01-21 NOTE — PROGRESS NOTES
"Chief Complaint  Hypertension (follow up with labs no chest pain or swelling ), Chronic Kidney Disease (follow up with labs ), and Heartburn (used to take omeprazole but stopped due to liver function now usind pepcid but not helping )    Subjective          Kevin Ellington presents to [facility]  History of Present Illness  Pleasant 86-year-old male here for hypertension which is well controlled on current meds.  Reviewed labs with patient.    Patient with elevated renal function over the last 4 months.  GFR has been in the mid 40s.  He is on meloxicam for diffuse arthritis.  He has chronic pain with or without the Mobic.  He is scheduled to have a sacral epidural with pain management.    GERD, not well controlled.  We did discontinue PPI due to elevated creatinine.  Now on Pepcid.  Symptoms are not well controlled.  It does not help.  He has had more reflux especially waking him up from sleep.    Objective   Vital Signs:   /78   Pulse 58   Temp 97.8 °F (36.6 °C)   Ht 177.8 cm (70\")   Wt 98.9 kg (218 lb)   SpO2 98%   BMI 31.28 kg/m²     Physical Exam  Vitals and nursing note reviewed.   Constitutional:       General: He is not in acute distress.     Appearance: He is well-developed.   HENT:      Head: Normocephalic.      Nose: Nose normal.   Cardiovascular:      Rate and Rhythm: Normal rate and regular rhythm.      Heart sounds: Normal heart sounds. No murmur heard.      Pulmonary:      Effort: Pulmonary effort is normal. No respiratory distress.      Breath sounds: Normal breath sounds.   Musculoskeletal:         General: Normal range of motion.   Skin:     General: Skin is warm and dry.      Findings: No rash.   Neurological:      Mental Status: He is alert and oriented to person, place, and time.   Psychiatric:         Behavior: Behavior normal.         Thought Content: Thought content normal.         Judgment: Judgment normal.        Result Review :               "   Assessment and Plan    Diagnoses and all orders for this visit:    1. Essential hypertension (Primary)  -     Comprehensive Metabolic Panel; Future  -     CBC & Differential; Future    2. Stage 3a chronic kidney disease (HCC)  Assessment & Plan:  Renal condition is worsening since 9/2021, likely a combination of age and meds.  Stopped PPI at the last apt that was not greatly impactful. We discussed his arthritis and decided to stop the Meloxicam 7.5 mg daily and will repeat in 3 months.     Is stopping the PPI did not make a big difference to his renal function but did make a big difference to his reflux, will restart.    Orders:  -     Comprehensive Metabolic Panel; Future  -     CBC & Differential; Future    3. Spondylosis of lumbar region without myelopathy or radiculopathy  Assessment & Plan:  Scheduled for LP in March and scheduled for a repeat radiofrequency ablation (last done a year ago) about a week after the epidural.       4. Gastroesophageal reflux disease without esophagitis  Comments:  Not well controlled on Pepcid 20, waking up from sleep with acid.  Will restart omeprazole 20 daily.  Orders:  -     omeprazole (priLOSEC) 20 MG capsule; Take 1 capsule by mouth Daily.  Dispense: 90 capsule; Refill: 2    5. Elevated hemoglobin A1c  -     Hemoglobin A1c; Future      Follow Up   Return in about 3 months (around 4/21/2022), or if symptoms worsen or fail to improve, for Recheck CKD and narcolepsy with labs prior .  Patient was given instructions and counseling regarding his condition or for health maintenance advice. Please see specific information pulled into the AVS if appropriate. Medical assistant and I wore mask and eyewear protection during entire encounter.  Patient wore mask.    Leanna Mancia MD

## 2022-01-21 NOTE — ASSESSMENT & PLAN NOTE
Renal condition is worsening since 9/2021, likely a combination of age and meds.  Stopped PPI at the last apt that was not greatly impactful. We discussed his arthritis and decided to stop the Meloxicam 7.5 mg daily and will repeat in 3 months.     Is stopping the PPI did not make a big difference to his renal function but did make a big difference to his reflux, will restart.

## 2022-01-21 NOTE — ASSESSMENT & PLAN NOTE
Scheduled for LP in March and scheduled for a repeat radiofrequency ablation (last done a year ago) about a week after the epidural.

## 2022-02-18 ENCOUNTER — TELEPHONE (OUTPATIENT)
Dept: FAMILY MEDICINE CLINIC | Facility: CLINIC | Age: 87
End: 2022-02-18

## 2022-02-18 NOTE — TELEPHONE ENCOUNTER
Thank you for making me aware.  In the future if he has any other events like that I would recommend going to the emergency room.  Typically have about a 90-minute window where we can do treatments to really help reverse the effects of a stroke so as it has been 4 days already I think an office visit will be okay.    Please advise them to make sure he   - drinks a good amount of water  - monitors his blood pressure to make sure his blood pressure is below 150/90.  It would be helpful for him to write his numbers down so I can see them at his appointment.  -  He should already be taking a baby aspirin but I would want him to continue that.  - If he has any recurring symptoms, confusion or numbness, tingling, weakness, or any other acute change - to go immediately to the emergency room.      Maria Del Carmen, do you mind working him in next week.  I am glad to overbook.  Thank you!

## 2022-02-18 NOTE — TELEPHONE ENCOUNTER
"Pt's wife called and think's pt had a stroke 2/14/22 but that he seems fine now.      Incident happened Monday afternoon 2/14, pt was parking and backed up to straighten up the car, and just continued to back up. He went from one side of the parking lot to the other side without him reacting at all.  Had jumped a curb and landed in a median. After stopping he looked at his  wife and said \"I couldn't find the break\".  Wife thinks something is happening in his brain.      After the incident, pt described it as a dream where he couldn't react.     NO ED visit---they think the pt was fine and continued with their dinner reservations.        They are wondering if pt should go to the ER or schedule an office visit.     Please advise.     Pt requesting a call back.   "

## 2022-02-23 ENCOUNTER — OFFICE VISIT (OUTPATIENT)
Dept: FAMILY MEDICINE CLINIC | Facility: CLINIC | Age: 87
End: 2022-02-23

## 2022-02-23 VITALS
DIASTOLIC BLOOD PRESSURE: 80 MMHG | OXYGEN SATURATION: 98 % | SYSTOLIC BLOOD PRESSURE: 144 MMHG | HEIGHT: 70 IN | BODY MASS INDEX: 31.07 KG/M2 | HEART RATE: 61 BPM | TEMPERATURE: 97.6 F | WEIGHT: 217 LBS

## 2022-02-23 DIAGNOSIS — G45.9 TIA (TRANSIENT ISCHEMIC ATTACK): Primary | ICD-10-CM

## 2022-02-23 DIAGNOSIS — G45.4 TGA (TRANSIENT GLOBAL AMNESIA): ICD-10-CM

## 2022-02-23 DIAGNOSIS — I10 ESSENTIAL HYPERTENSION: ICD-10-CM

## 2022-02-23 DIAGNOSIS — G47.419 PRIMARY NARCOLEPSY WITHOUT CATAPLEXY: ICD-10-CM

## 2022-02-23 PROCEDURE — 93000 ELECTROCARDIOGRAM COMPLETE: CPT | Performed by: FAMILY MEDICINE

## 2022-02-23 PROCEDURE — 99215 OFFICE O/P EST HI 40 MIN: CPT | Performed by: FAMILY MEDICINE

## 2022-02-23 RX ORDER — MELOXICAM 7.5 MG/1
7.5 TABLET ORAL EVERY OTHER DAY
COMMUNITY
End: 2022-04-29 | Stop reason: SDUPTHER

## 2022-02-23 RX ORDER — POTASSIUM CHLORIDE 750 MG/1
20 TABLET, EXTENDED RELEASE ORAL DAILY
Qty: 180 TABLET | Refills: 1 | Status: SHIPPED | OUTPATIENT
Start: 2022-02-23 | End: 2022-08-22
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
## 2022-02-24 LAB
ALBUMIN SERPL-MCNC: 4.6 G/DL (ref 3.6–4.6)
ALBUMIN/GLOB SERPL: 1.9 {RATIO} (ref 1.2–2.2)
ALP SERPL-CCNC: 90 IU/L (ref 44–121)
ALT SERPL-CCNC: 33 IU/L (ref 0–44)
AST SERPL-CCNC: 27 IU/L (ref 0–40)
BASOPHILS # BLD AUTO: 0 X10E3/UL (ref 0–0.2)
BASOPHILS NFR BLD AUTO: 0 %
BILIRUB SERPL-MCNC: 0.5 MG/DL (ref 0–1.2)
BUN SERPL-MCNC: 32 MG/DL (ref 8–27)
BUN/CREAT SERPL: 23 (ref 10–24)
CALCIUM SERPL-MCNC: 9.3 MG/DL (ref 8.6–10.2)
CHLORIDE SERPL-SCNC: 104 MMOL/L (ref 96–106)
CO2 SERPL-SCNC: 21 MMOL/L (ref 20–29)
CREAT SERPL-MCNC: 1.39 MG/DL (ref 0.76–1.27)
EOSINOPHIL # BLD AUTO: 0.1 X10E3/UL (ref 0–0.4)
EOSINOPHIL NFR BLD AUTO: 1 %
ERYTHROCYTE [DISTWIDTH] IN BLOOD BY AUTOMATED COUNT: 12 % (ref 11.6–15.4)
GLOBULIN SER CALC-MCNC: 2.4 G/DL (ref 1.5–4.5)
GLUCOSE SERPL-MCNC: 103 MG/DL (ref 65–99)
HCT VFR BLD AUTO: 40.3 % (ref 37.5–51)
HGB BLD-MCNC: 13.8 G/DL (ref 13–17.7)
IMM GRANULOCYTES # BLD AUTO: 0 X10E3/UL (ref 0–0.1)
IMM GRANULOCYTES NFR BLD AUTO: 0 %
LYMPHOCYTES # BLD AUTO: 1.9 X10E3/UL (ref 0.7–3.1)
LYMPHOCYTES NFR BLD AUTO: 18 %
MCH RBC QN AUTO: 30.5 PG (ref 26.6–33)
MCHC RBC AUTO-ENTMCNC: 34.2 G/DL (ref 31.5–35.7)
MCV RBC AUTO: 89 FL (ref 79–97)
MONOCYTES # BLD AUTO: 0.6 X10E3/UL (ref 0.1–0.9)
MONOCYTES NFR BLD AUTO: 6 %
NEUTROPHILS # BLD AUTO: 8 X10E3/UL (ref 1.4–7)
NEUTROPHILS NFR BLD AUTO: 75 %
PLATELET # BLD AUTO: 218 X10E3/UL (ref 150–450)
POTASSIUM SERPL-SCNC: 4.7 MMOL/L (ref 3.5–5.2)
PROT SERPL-MCNC: 7 G/DL (ref 6–8.5)
RBC # BLD AUTO: 4.52 X10E6/UL (ref 4.14–5.8)
SODIUM SERPL-SCNC: 140 MMOL/L (ref 134–144)
T4 FREE SERPL-MCNC: 1.04 NG/DL (ref 0.82–1.77)
TSH SERPL DL<=0.005 MIU/L-ACNC: 2.44 UIU/ML (ref 0.45–4.5)
WBC # BLD AUTO: 10.7 X10E3/UL (ref 3.4–10.8)

## 2022-03-02 ENCOUNTER — TELEPHONE (OUTPATIENT)
Dept: FAMILY MEDICINE CLINIC | Facility: CLINIC | Age: 87
End: 2022-03-02

## 2022-03-05 ENCOUNTER — HOSPITAL ENCOUNTER (OUTPATIENT)
Dept: MRI IMAGING | Facility: HOSPITAL | Age: 87
Discharge: HOME | End: 2022-03-05
Admitting: FAMILY MEDICINE

## 2022-03-05 DIAGNOSIS — G45.9 TIA (TRANSIENT ISCHEMIC ATTACK): ICD-10-CM

## 2022-03-05 DIAGNOSIS — G45.4 TGA (TRANSIENT GLOBAL AMNESIA): ICD-10-CM

## 2022-03-05 PROCEDURE — 70551 MRI BRAIN STEM W/O DYE: CPT

## 2022-03-10 ENCOUNTER — TELEPHONE (OUTPATIENT)
Dept: FAMILY MEDICINE CLINIC | Facility: CLINIC | Age: 87
End: 2022-03-10

## 2022-03-10 NOTE — TELEPHONE ENCOUNTER
----- Message from Leanna Mancia MD sent at 3/9/2022  4:59 PM EST -----  Regarding: FW: MRI Results  Do you mind changing the location for the carotid ultrasound to a different location?  ----- Message -----  From: Kevin Ellington  Sent: 3/8/2022  12:43 PM EST  To: Leanna Mancia MD  Subject: MRI Results                                      I was notified by [facility] that your request for a Vascular Carotid test cannot be done at their facility (one my insurance will cover).  They only do a Duplex Carotid test.  What is my option here?  If you agree to them doing the Duplex, please fax a new order to them at fax #[phone]  so they can schedule me for an appointment. If this is not what you want, please advise, since [facility] cannot give me an earlier appointment than July 23rd. Dedrick Ellington

## 2022-03-10 NOTE — TELEPHONE ENCOUNTER
Patient contacted. Contacted [facility], unable to perform test. Patient currently scheduled with [facility] and on wait list. In basket message sent to provider with new info, waiting on response from physician.

## 2022-03-13 ENCOUNTER — APPOINTMENT (OUTPATIENT)
Dept: MRI IMAGING | Facility: HOSPITAL | Age: 87
End: 2022-03-13

## 2022-03-28 NOTE — PROGRESS NOTES
Please call patient back with results.  The MRI of the brain has resulted as negative for stroke or brain bleed.  There is significant changes called small vessel disease where we see the small blood vessels of the brain have buildup of plaque.  The treatment for this is statin and baby aspirin but the deep aspirin 81 mg daily) and get exercise, water, vegetable intake.  Off the medication your blood pressure is well controlled.  I do think it could be related to the episode that you experienced but did not exclude other possibilities.  I still think seeing neurology would be helpful.  Please let us know if you have further questions.     Thank you

## 2022-04-08 DIAGNOSIS — G47.419 PRIMARY NARCOLEPSY WITHOUT CATAPLEXY: ICD-10-CM

## 2022-04-08 RX ORDER — MODAFINIL 100 MG/1
TABLET ORAL
Qty: 90 TABLET | Refills: 0 | Status: SHIPPED | OUTPATIENT
Start: 2022-04-08 | End: 2022-07-19 | Stop reason: SDUPTHER

## 2022-04-08 NOTE — TELEPHONE ENCOUNTER
Rx Refill Note  Requested Prescriptions     Pending Prescriptions Disp Refills   • modafinil (PROVIGIL) 100 MG tablet [Pharmacy Med Name: MODAFINIL TABS 100MG] 90 tablet 0     Sig: TAKE 1 TABLET DAILY      Last office visit with prescribing clinician: 2/23/2022      Next office visit with prescribing clinician: 4/29/2022            Jairo Griffin MA  04/08/22, 14:43 EDT

## 2022-04-20 DIAGNOSIS — N18.31 STAGE 3A CHRONIC KIDNEY DISEASE: ICD-10-CM

## 2022-04-20 DIAGNOSIS — I10 ESSENTIAL HYPERTENSION: Primary | ICD-10-CM

## 2022-04-22 LAB
ALBUMIN SERPL-MCNC: 4.6 G/DL (ref 3.6–4.6)
ALBUMIN/GLOB SERPL: 2 {RATIO} (ref 1.2–2.2)
ALP SERPL-CCNC: 93 IU/L (ref 44–121)
ALT SERPL-CCNC: 26 IU/L (ref 0–44)
AST SERPL-CCNC: 27 IU/L (ref 0–40)
BASOPHILS # BLD AUTO: 0.1 X10E3/UL (ref 0–0.2)
BASOPHILS NFR BLD AUTO: 1 %
BILIRUB SERPL-MCNC: 0.7 MG/DL (ref 0–1.2)
BUN SERPL-MCNC: 25 MG/DL (ref 8–27)
BUN/CREAT SERPL: 21 (ref 10–24)
CALCIUM SERPL-MCNC: 10.3 MG/DL (ref 8.6–10.2)
CHLORIDE SERPL-SCNC: 97 MMOL/L (ref 96–106)
CO2 SERPL-SCNC: 23 MMOL/L (ref 20–29)
CREAT SERPL-MCNC: 1.21 MG/DL (ref 0.76–1.27)
EGFRCR SERPLBLD CKD-EPI 2021: 58 ML/MIN/1.73
EOSINOPHIL # BLD AUTO: 0.2 X10E3/UL (ref 0–0.4)
EOSINOPHIL NFR BLD AUTO: 2 %
ERYTHROCYTE [DISTWIDTH] IN BLOOD BY AUTOMATED COUNT: 13 % (ref 11.6–15.4)
GLOBULIN SER CALC-MCNC: 2.3 G/DL (ref 1.5–4.5)
GLUCOSE SERPL-MCNC: 115 MG/DL (ref 65–99)
HCT VFR BLD AUTO: 44.9 % (ref 37.5–51)
HGB BLD-MCNC: 15.2 G/DL (ref 13–17.7)
IMM GRANULOCYTES # BLD AUTO: 0.1 X10E3/UL (ref 0–0.1)
IMM GRANULOCYTES NFR BLD AUTO: 1 %
LYMPHOCYTES # BLD AUTO: 2.7 X10E3/UL (ref 0.7–3.1)
LYMPHOCYTES NFR BLD AUTO: 31 %
MCH RBC QN AUTO: 30.5 PG (ref 26.6–33)
MCHC RBC AUTO-ENTMCNC: 33.9 G/DL (ref 31.5–35.7)
MCV RBC AUTO: 90 FL (ref 79–97)
MONOCYTES # BLD AUTO: 0.7 X10E3/UL (ref 0.1–0.9)
MONOCYTES NFR BLD AUTO: 7 %
NEUTROPHILS # BLD AUTO: 5 X10E3/UL (ref 1.4–7)
NEUTROPHILS NFR BLD AUTO: 58 %
PLATELET # BLD AUTO: 203 X10E3/UL (ref 150–450)
POTASSIUM SERPL-SCNC: 4.1 MMOL/L (ref 3.5–5.2)
PROT SERPL-MCNC: 6.9 G/DL (ref 6–8.5)
RBC # BLD AUTO: 4.98 X10E6/UL (ref 4.14–5.8)
SODIUM SERPL-SCNC: 138 MMOL/L (ref 134–144)
WBC # BLD AUTO: 8.7 X10E3/UL (ref 3.4–10.8)

## 2022-04-29 ENCOUNTER — OFFICE VISIT (OUTPATIENT)
Dept: FAMILY MEDICINE CLINIC | Facility: CLINIC | Age: 87
End: 2022-04-29

## 2022-04-29 VITALS
SYSTOLIC BLOOD PRESSURE: 130 MMHG | BODY MASS INDEX: 30.49 KG/M2 | HEIGHT: 70 IN | WEIGHT: 213 LBS | TEMPERATURE: 98.7 F | HEART RATE: 63 BPM | DIASTOLIC BLOOD PRESSURE: 78 MMHG | OXYGEN SATURATION: 98 %

## 2022-04-29 DIAGNOSIS — K59.04 CHRONIC IDIOPATHIC CONSTIPATION: ICD-10-CM

## 2022-04-29 DIAGNOSIS — M48.062 SPINAL STENOSIS OF LUMBAR REGION WITH NEUROGENIC CLAUDICATION: ICD-10-CM

## 2022-04-29 DIAGNOSIS — G47.419 PRIMARY NARCOLEPSY WITHOUT CATAPLEXY: Primary | ICD-10-CM

## 2022-04-29 DIAGNOSIS — R73.9 HYPERGLYCEMIA: ICD-10-CM

## 2022-04-29 DIAGNOSIS — E83.52 HYPERCALCEMIA: ICD-10-CM

## 2022-04-29 DIAGNOSIS — I25.10 CORONARY ARTERY DISEASE INVOLVING NATIVE CORONARY ARTERY OF NATIVE HEART WITHOUT ANGINA PECTORIS: ICD-10-CM

## 2022-04-29 DIAGNOSIS — I10 ESSENTIAL HYPERTENSION: ICD-10-CM

## 2022-04-29 DIAGNOSIS — K21.9 GASTROESOPHAGEAL REFLUX DISEASE WITHOUT ESOPHAGITIS: ICD-10-CM

## 2022-04-29 PROCEDURE — 99214 OFFICE O/P EST MOD 30 MIN: CPT | Performed by: FAMILY MEDICINE

## 2022-04-29 RX ORDER — MELOXICAM 7.5 MG/1
7.5 TABLET ORAL EVERY OTHER DAY
Qty: 45 TABLET | Refills: 2 | Status: SHIPPED | OUTPATIENT
Start: 2022-04-29 | End: 2023-01-24

## 2022-04-29 RX ORDER — AMOXICILLIN 250 MG
1 CAPSULE ORAL 2 TIMES DAILY PRN
Qty: 180 TABLET | Refills: 2 | Status: SHIPPED | OUTPATIENT
Start: 2022-04-29 | End: 2023-02-02 | Stop reason: SDUPTHER

## 2022-04-29 RX ORDER — PANTOPRAZOLE SODIUM 20 MG/1
20 TABLET, DELAYED RELEASE ORAL DAILY
Qty: 90 TABLET | Refills: 1 | Status: SHIPPED | OUTPATIENT
Start: 2022-04-29 | End: 2022-09-21

## 2022-04-29 NOTE — ASSESSMENT & PLAN NOTE
Hypertension well controlled on losartan 100 mg daily, chlorthalidone 50 mg daily and metoprolol 100 mg daily, clonidine 0.1mg BID.  No adverse effects to medication.  Reviewed recent labs with patient.

## 2022-04-29 NOTE — PROGRESS NOTES
"Chief Complaint  Hypertension (Follow up is doing fair ,  lots and issues with balance  and little tiredness ), Heartburn (Would like to talk about acid reflux omeprazole not helping to much ), Back Pain (Not doing to well want to talk about meloxicam ,he is being careful ), and Hypersomnia (Well-controlled modafinil)    Subjective          Kevin Ellington presents to [facility]  History of Present Illness  Pleasant 87-year-old male here for hypertension that is well controlled, GERD that is not well controlled and lower back pain that is not well controlled.  He is also following up for narcolepsy well controlled on modafinil.  He does have known CAD, no symptoms of chest pain or palpitations.    Not taking any calcium supplements     CKD: stopped omeprazole 20 mg and Meloxicam.     GERD: now not well - getting more episodes,  He has taken pepcid AC, no improvement.     Constipation not improving, chronic problem since having back surgery - has been hospitalized for constipation in the past for 5 days.  (Opiate induced).  He has a BM every 2-3 days, +straining, they are well formed.     Objective   Vital Signs:   /78   Pulse 63   Temp 98.7 °F (37.1 °C)   Ht 177.8 cm (70\")   Wt 96.6 kg (213 lb)   SpO2 98%   BMI 30.56 kg/m²     Physical Exam  Vitals and nursing note reviewed.   Constitutional:       General: He is not in acute distress.     Appearance: He is well-developed.   HENT:      Head: Normocephalic.      Nose: Nose normal.   Cardiovascular:      Rate and Rhythm: Normal rate and regular rhythm.      Heart sounds: Normal heart sounds. No murmur heard.  Pulmonary:      Effort: Pulmonary effort is normal. No respiratory distress.      Breath sounds: Normal breath sounds.   Musculoskeletal:         General: Normal range of motion.   Skin:     General: Skin is warm and dry.      Findings: No rash.   Neurological:      Mental Status: He is alert and oriented to person, " place, and time.   Psychiatric:         Behavior: Behavior normal.         Thought Content: Thought content normal.         Judgment: Judgment normal.        Result Review :   The following data was reviewed by: Leanna Mancia MD on 04/29/2022:  CMP    CMP 1/14/22 2/23/22 4/21/22   Glucose 118 (A) 103 (A) 115 (A)   BUN 30 (A) 32 (A) 25   Creatinine 1.30 (A) 1.39 (A) 1.21   eGFR Non  Am 49 (A) 46 (A)    eGFR  Am 57 (A) 53 (A)    Sodium 141 140 138   Potassium 4.4 4.7 4.1   Chloride 104 104 97   Calcium 9.7 9.3 10.3 (A)   Total Protein 6.9 7.0 6.9   Albumin 4.7 (A) 4.6 4.6   Globulin 2.2 2.4 2.3   Total Bilirubin 0.7 0.5 0.7   Alkaline Phosphatase 84 90 93   AST (SGOT) 28 27 27   ALT (SGPT) 32 33 26   (A) Abnormal value       Comments are available for some flowsheets but are not being displayed.           CBC    CBC 1/14/22 2/23/22 4/21/22   WBC 7.6 10.7 8.7   RBC 4.89 4.52 4.98   Hemoglobin 14.8 13.8 15.2   Hematocrit 44.5 40.3 44.9   MCV 91 89 90   MCH 30.3 30.5 30.5   MCHC 33.3 34.2 33.9   RDW 12.4 12.0 13.0   Platelets 163 218 203                     Assessment and Plan    Diagnoses and all orders for this visit:    1. Primary narcolepsy without cataplexy (Primary)    2. Spinal stenosis of lumbar region with neurogenic claudication  -     meloxicam (MOBIC) 7.5 MG tablet; Take 1 tablet by mouth Every Other Day.  Dispense: 45 tablet; Refill: 2    3. Essential hypertension  Assessment & Plan:  Hypertension well controlled on losartan 100 mg daily, chlorthalidone 50 mg daily and metoprolol 100 mg daily, clonidine 0.1mg BID.  No adverse effects to medication.  Reviewed recent labs with patient.      4. Gastroesophageal reflux disease without esophagitis  Comments:  Not well controlled on omeprazole 20 or Pepcid - he felt better on prior PPI.  As GFR is improving okay to transition back to pantoprazole.  Orders:  -     pantoprazole (Protonix) 20 MG EC tablet; Take 1 tablet by mouth Daily.  Dispense: 90  tablet; Refill: 1    5. Coronary artery disease involving native coronary artery of native heart without angina pectoris  Comments:  Stable, no chest pain.  Follow-up with Dr. Salmeron.  Orders:  -     Lipid Panel    6. Hyperglycemia  Comments:  Hyperglycemia with mild worsening in blood sugar.  We will recheck hemoglobin A1c today.  Orders:  -     Hemoglobin A1c    7. Hypercalcemia  Comments:  No hypercalcemia, no supplements.  We will do labs as above.  He does have diffuse pain.  Orders:  -     Calcium, Ionized  -     Basic Metabolic Panel  -     PTH, Intact    8. Chronic idiopathic constipation  Comments:  Continue with good water intake, add 20 g of fiber, start stool softener, goal to have 1 BM a day.  Orders:  -     sennosides-docusate (Senna S) 8.6-50 MG per tablet; Take 1 tablet by mouth 2 (Two) Times a Day As Needed for Constipation.  Dispense: 180 tablet; Refill: 2      He does have appointment coming up with Dr. Carrera for episode that sounds like a possible seizure/narcoleptic related episode.  I am not really sure how to advise them, he is not driving for now.  He would like to start driving, follow-up with neuro.       Follow Up   Return in about 3 months (around 7/29/2022), or if symptoms worsen or fail to improve, for Recheck insomnia .  Patient was given instructions and counseling regarding his condition or for health maintenance advice. Please see specific information pulled into the AVS if appropriate. Medical assistant and I wore mask and eyewear protection during entire encounter.  Patient wore mask.    Leanna Mancia MD

## 2022-04-30 LAB
BUN SERPL-MCNC: 23 MG/DL (ref 8–27)
BUN/CREAT SERPL: 18 (ref 10–24)
CA-I SERPL ISE-MCNC: 5.2 MG/DL (ref 4.5–5.6)
CALCIUM SERPL-MCNC: 9.4 MG/DL (ref 8.6–10.2)
CHLORIDE SERPL-SCNC: 97 MMOL/L (ref 96–106)
CHOLEST SERPL-MCNC: 145 MG/DL (ref 100–199)
CO2 SERPL-SCNC: 21 MMOL/L (ref 20–29)
CREAT SERPL-MCNC: 1.26 MG/DL (ref 0.76–1.27)
EGFRCR SERPLBLD CKD-EPI 2021: 55 ML/MIN/1.73
GLUCOSE SERPL-MCNC: 90 MG/DL (ref 65–99)
HBA1C MFR BLD: 6.6 % (ref 4.8–5.6)
HDLC SERPL-MCNC: 43 MG/DL
LDLC SERPL CALC-MCNC: 72 MG/DL (ref 0–99)
POTASSIUM SERPL-SCNC: 4.4 MMOL/L (ref 3.5–5.2)
PTH-INTACT SERPL-MCNC: 33 PG/ML (ref 15–65)
SODIUM SERPL-SCNC: 135 MMOL/L (ref 134–144)
TRIGL SERPL-MCNC: 179 MG/DL (ref 0–149)
VLDLC SERPL CALC-MCNC: 30 MG/DL (ref 5–40)

## 2022-05-12 ENCOUNTER — OFFICE VISIT (OUTPATIENT)
Dept: CARDIOLOGY | Facility: CLINIC | Age: 87
End: 2022-05-12

## 2022-05-12 VITALS
OXYGEN SATURATION: 97 % | WEIGHT: 211.2 LBS | DIASTOLIC BLOOD PRESSURE: 86 MMHG | HEART RATE: 97 BPM | BODY MASS INDEX: 30.24 KG/M2 | SYSTOLIC BLOOD PRESSURE: 142 MMHG | HEIGHT: 70 IN

## 2022-05-12 DIAGNOSIS — I25.10 CORONARY ARTERY DISEASE INVOLVING NATIVE CORONARY ARTERY OF NATIVE HEART WITHOUT ANGINA PECTORIS: Primary | ICD-10-CM

## 2022-05-12 DIAGNOSIS — Z95.5 S/P CORONARY ARTERY STENT PLACEMENT: ICD-10-CM

## 2022-05-12 DIAGNOSIS — I65.23 BILATERAL CAROTID ARTERY STENOSIS: ICD-10-CM

## 2022-05-12 DIAGNOSIS — G45.1 CAROTID ARTERY SYNDROME (HEMISPHERIC): ICD-10-CM

## 2022-05-12 DIAGNOSIS — I10 ESSENTIAL HYPERTENSION: ICD-10-CM

## 2022-05-12 DIAGNOSIS — K21.9 GASTROESOPHAGEAL REFLUX DISEASE WITHOUT ESOPHAGITIS: ICD-10-CM

## 2022-05-12 DIAGNOSIS — N18.31 STAGE 3A CHRONIC KIDNEY DISEASE: ICD-10-CM

## 2022-05-12 DIAGNOSIS — E78.5 HYPERLIPIDEMIA, UNSPECIFIED HYPERLIPIDEMIA TYPE: ICD-10-CM

## 2022-05-12 DIAGNOSIS — G45.9 TIA (TRANSIENT ISCHEMIC ATTACK): ICD-10-CM

## 2022-05-12 PROCEDURE — 99214 OFFICE O/P EST MOD 30 MIN: CPT | Performed by: INTERNAL MEDICINE

## 2022-05-12 PROCEDURE — 93000 ELECTROCARDIOGRAM COMPLETE: CPT | Performed by: INTERNAL MEDICINE
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
## 2022-06-06 ENCOUNTER — HOSPITAL ENCOUNTER (OUTPATIENT)
Dept: CARDIOLOGY | Facility: HOSPITAL | Age: 87
Discharge: HOME | End: 2022-06-06

## 2022-06-06 VITALS
HEART RATE: 78 BPM | DIASTOLIC BLOOD PRESSURE: 80 MMHG | BODY MASS INDEX: 30.21 KG/M2 | HEIGHT: 70 IN | OXYGEN SATURATION: 97 % | SYSTOLIC BLOOD PRESSURE: 150 MMHG | WEIGHT: 211 LBS

## 2022-06-06 DIAGNOSIS — G45.9 TIA (TRANSIENT ISCHEMIC ATTACK): ICD-10-CM

## 2022-06-06 DIAGNOSIS — G45.1 CAROTID ARTERY SYNDROME (HEMISPHERIC): ICD-10-CM

## 2022-06-06 DIAGNOSIS — I25.10 CORONARY ARTERY DISEASE INVOLVING NATIVE CORONARY ARTERY OF NATIVE HEART WITHOUT ANGINA PECTORIS: ICD-10-CM

## 2022-06-06 PROCEDURE — 93880 EXTRACRANIAL BILAT STUDY: CPT

## 2022-06-06 PROCEDURE — 93880 EXTRACRANIAL BILAT STUDY: CPT | Performed by: INTERNAL MEDICINE

## 2022-06-06 PROCEDURE — 93306 TTE W/DOPPLER COMPLETE: CPT

## 2022-06-06 PROCEDURE — 93306 TTE W/DOPPLER COMPLETE: CPT | Performed by: INTERNAL MEDICINE

## 2022-06-07 ENCOUNTER — OFFICE VISIT (OUTPATIENT)
Dept: NEUROLOGY | Facility: CLINIC | Age: 87
End: 2022-06-07

## 2022-06-07 VITALS
OXYGEN SATURATION: 98 % | WEIGHT: 190 LBS | HEART RATE: 67 BPM | DIASTOLIC BLOOD PRESSURE: 88 MMHG | HEIGHT: 70 IN | BODY MASS INDEX: 27.2 KG/M2 | SYSTOLIC BLOOD PRESSURE: 142 MMHG

## 2022-06-07 DIAGNOSIS — R56.9 SEIZURE-LIKE ACTIVITY: Primary | ICD-10-CM

## 2022-06-07 LAB
AORTIC ARCH: 2.6 CM
ASCENDING AORTA: 3.6 CM
BH CV ECHO MEAS - ACS: 2.08 CM
BH CV ECHO MEAS - AO MAX PG: 11 MMHG
BH CV ECHO MEAS - AO MEAN PG: 6.6 MMHG
BH CV ECHO MEAS - AO ROOT DIAM: 3 CM
BH CV ECHO MEAS - AO V2 MAX: 165.6 CM/SEC
BH CV ECHO MEAS - AO V2 VTI: 36.1 CM
BH CV ECHO MEAS - AVA(I,D): 2.9 CM2
BH CV ECHO MEAS - EDV(CUBED): 65.2 ML
BH CV ECHO MEAS - EDV(MOD-SP2): 70 ML
BH CV ECHO MEAS - EDV(MOD-SP4): 68 ML
BH CV ECHO MEAS - EF(MOD-BP): 62.5 %
BH CV ECHO MEAS - EF(MOD-SP2): 64.3 %
BH CV ECHO MEAS - EF(MOD-SP4): 63.2 %
BH CV ECHO MEAS - EF_3D-VOL: 65 %
BH CV ECHO MEAS - ESV(CUBED): 25.5 ML
BH CV ECHO MEAS - ESV(MOD-SP2): 25 ML
BH CV ECHO MEAS - ESV(MOD-SP4): 25 ML
BH CV ECHO MEAS - FS: 26.8 %
BH CV ECHO MEAS - IVS/LVPW: 1.01 CM
BH CV ECHO MEAS - IVSD: 1.23 CM
BH CV ECHO MEAS - LAT PEAK E' VEL: 10.9 CM/SEC
BH CV ECHO MEAS - LV DIASTOLIC VOL/BSA (35-75): 31.8 CM2
BH CV ECHO MEAS - LV MASS(C)D: 172.4 GRAMS
BH CV ECHO MEAS - LV MAX PG: 4.4 MMHG
BH CV ECHO MEAS - LV MEAN PG: 2.7 MMHG
BH CV ECHO MEAS - LV SYSTOLIC VOL/BSA (12-30): 11.7 CM2
BH CV ECHO MEAS - LV V1 MAX: 104.5 CM/SEC
BH CV ECHO MEAS - LV V1 VTI: 22.1 CM
BH CV ECHO MEAS - LVIDD: 4 CM
BH CV ECHO MEAS - LVIDS: 2.9 CM
BH CV ECHO MEAS - LVOT AREA: 4.7 CM2
BH CV ECHO MEAS - LVOT DIAM: 2.44 CM
BH CV ECHO MEAS - LVPWD: 1.22 CM
BH CV ECHO MEAS - MED PEAK E' VEL: 7.3 CM/SEC
BH CV ECHO MEAS - MV A DUR: 0.12 SEC
BH CV ECHO MEAS - MV A MAX VEL: 114.3 CM/SEC
BH CV ECHO MEAS - MV DEC SLOPE: 487.8 CM/SEC2
BH CV ECHO MEAS - MV DEC TIME: 0.29 MSEC
BH CV ECHO MEAS - MV E MAX VEL: 67.7 CM/SEC
BH CV ECHO MEAS - MV E/A: 0.59
BH CV ECHO MEAS - MV MAX PG: 3.8 MMHG
BH CV ECHO MEAS - MV MEAN PG: 2.22 MMHG
BH CV ECHO MEAS - MV P1/2T: 49.7 MSEC
BH CV ECHO MEAS - MV V2 VTI: 27.4 CM
BH CV ECHO MEAS - MVA(P1/2T): 4.4 CM2
BH CV ECHO MEAS - MVA(VTI): 3.8 CM2
BH CV ECHO MEAS - PA ACC TIME: 0.11 SEC
BH CV ECHO MEAS - PA PR(ACCEL): 28.3 MMHG
BH CV ECHO MEAS - PA V2 MAX: 77.4 CM/SEC
BH CV ECHO MEAS - PULM A REVS DUR: 0.11 SEC
BH CV ECHO MEAS - PULM A REVS VEL: 37.1 CM/SEC
BH CV ECHO MEAS - PULM DIAS VEL: 44.8 CM/SEC
BH CV ECHO MEAS - PULM S/D: 0.98
BH CV ECHO MEAS - PULM SYS VEL: 43.8 CM/SEC
BH CV ECHO MEAS - QP/QS: 1
BH CV ECHO MEAS - RAP SYSTOLE: 3 MMHG
BH CV ECHO MEAS - RV MAX PG: 3.2 MMHG
BH CV ECHO MEAS - RV V1 MAX: 89.1 CM/SEC
BH CV ECHO MEAS - RV V1 VTI: 21.9 CM
BH CV ECHO MEAS - RVOT DIAM: 2.45 CM
BH CV ECHO MEAS - RVSP: 18.1 MMHG
BH CV ECHO MEAS - SI(MOD-SP2): 21.1 ML/M2
BH CV ECHO MEAS - SI(MOD-SP4): 20.1 ML/M2
BH CV ECHO MEAS - SUP REN AO DIAM: 2.2 CM
BH CV ECHO MEAS - SV(LVOT): 103.6 ML
BH CV ECHO MEAS - SV(MOD-SP2): 45 ML
BH CV ECHO MEAS - SV(MOD-SP4): 43 ML
BH CV ECHO MEAS - SV(RVOT): 103.2 ML
BH CV ECHO MEAS - TAPSE (>1.6): 2.26 CM
BH CV ECHO MEAS - TR MAX PG: 15.1 MMHG
BH CV ECHO MEAS - TR MAX VEL: 194.1 CM/SEC
BH CV ECHO MEASUREMENTS AVERAGE E/E' RATIO: 7.44
BH CV VAS BP RIGHT ARM: NORMAL MMHG
BH CV XLRA - RV BASE: 2.6 CM
BH CV XLRA - RV LENGTH: 7.3 CM
BH CV XLRA - RV MID: 2.8 CM
BH CV XLRA - TDI S': 10.2 CM/SEC
LEFT ATRIUM VOLUME INDEX: 21.4 ML/M2
LV EF 2D ECHO EST: 65 %
MAXIMAL PREDICTED HEART RATE: 133 BPM
SINUS: 3.4 CM
STJ: 3.9 CM
STRESS TARGET HR: 113 BPM

## 2022-06-07 PROCEDURE — 99204 OFFICE O/P NEW MOD 45 MIN: CPT | Performed by: PSYCHIATRY & NEUROLOGY
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
## 2022-06-16 ENCOUNTER — PATIENT ROUNDING (BHMG ONLY) (OUTPATIENT)
Dept: NEUROLOGY | Facility: CLINIC | Age: 87
End: 2022-06-16

## 2022-06-16 LAB
BH CV XLRA MEAS LEFT DIST CCA EDV: 20.6 CM/SEC
BH CV XLRA MEAS LEFT DIST CCA PSV: 93.2 CM/SEC
BH CV XLRA MEAS LEFT DIST ICA EDV: -25 CM/SEC
BH CV XLRA MEAS LEFT DIST ICA PSV: -75.7 CM/SEC
BH CV XLRA MEAS LEFT ICA/CCA RATIO: 0.55
BH CV XLRA MEAS LEFT MID CCA EDV: 27.5 CM/SEC
BH CV XLRA MEAS LEFT MID CCA PSV: 146.2 CM/SEC
BH CV XLRA MEAS LEFT MID ICA EDV: -30.4 CM/SEC
BH CV XLRA MEAS LEFT MID ICA PSV: -80.5 CM/SEC
BH CV XLRA MEAS LEFT PROX CCA EDV: 17 CM/SEC
BH CV XLRA MEAS LEFT PROX CCA PSV: 107.8 CM/SEC
BH CV XLRA MEAS LEFT PROX ECA PSV: 161 CM/SEC
BH CV XLRA MEAS LEFT PROX ICA EDV: -22.6 CM/SEC
BH CV XLRA MEAS LEFT PROX ICA PSV: -66.7 CM/SEC
BH CV XLRA MEAS LEFT PROX SCLA PSV: 98 CM/SEC
BH CV XLRA MEAS RIGHT DIST CCA EDV: 34.3 CM/SEC
BH CV XLRA MEAS RIGHT DIST CCA PSV: 134.1 CM/SEC
BH CV XLRA MEAS RIGHT DIST ICA EDV: -27.4 CM/SEC
BH CV XLRA MEAS RIGHT DIST ICA PSV: -91 CM/SEC
BH CV XLRA MEAS RIGHT ICA/CCA RATIO: 0.89
BH CV XLRA MEAS RIGHT MID CCA EDV: 32.7 CM/SEC
BH CV XLRA MEAS RIGHT MID CCA PSV: 129.2 CM/SEC
BH CV XLRA MEAS RIGHT MID ICA EDV: -25.1 CM/SEC
BH CV XLRA MEAS RIGHT MID ICA PSV: -71 CM/SEC
BH CV XLRA MEAS RIGHT PROX CCA EDV: 20 CM/SEC
BH CV XLRA MEAS RIGHT PROX CCA PSV: 113.9 CM/SEC
BH CV XLRA MEAS RIGHT PROX ECA PSV: 164 CM/SEC
BH CV XLRA MEAS RIGHT PROX ICA EDV: -29.4 CM/SEC
BH CV XLRA MEAS RIGHT PROX ICA PSV: -119.4 CM/SEC
BH CV XLRA MEAS RIGHT PROX SCLA PSV: 61.5 CM/SEC
MAXIMAL PREDICTED HEART RATE: 133 BPM
STRESS TARGET HR: 113 BPM

## 2022-06-27 ENCOUNTER — APPOINTMENT (OUTPATIENT)
Dept: NEUROLOGY | Facility: HOSPITAL | Age: 87
End: 2022-06-27

## 2022-06-27 ENCOUNTER — HOSPITAL ENCOUNTER (OUTPATIENT)
Dept: NEUROLOGY | Facility: HOSPITAL | Age: 87
Discharge: HOME | End: 2022-06-27
Admitting: PSYCHIATRY & NEUROLOGY

## 2022-06-27 DIAGNOSIS — R56.9 SEIZURE-LIKE ACTIVITY: ICD-10-CM

## 2022-06-27 PROCEDURE — 95819 EEG AWAKE AND ASLEEP: CPT | Performed by: PSYCHIATRY & NEUROLOGY

## 2022-06-27 PROCEDURE — 95819 EEG AWAKE AND ASLEEP: CPT

## 2022-07-06 ENCOUNTER — HOSPITAL ENCOUNTER (OUTPATIENT)
Dept: MRI IMAGING | Facility: HOSPITAL | Age: 87
Discharge: HOME | End: 2022-07-06
Admitting: PSYCHIATRY & NEUROLOGY

## 2022-07-06 DIAGNOSIS — R56.9 SEIZURE-LIKE ACTIVITY: ICD-10-CM

## 2022-07-06 PROCEDURE — 70544 MR ANGIOGRAPHY HEAD W/O DYE: CPT

## 2022-07-19 DIAGNOSIS — G47.419 PRIMARY NARCOLEPSY WITHOUT CATAPLEXY: ICD-10-CM

## 2022-07-19 RX ORDER — MODAFINIL 100 MG/1
100 TABLET ORAL DAILY
Qty: 30 TABLET | Refills: 0 | Status: SHIPPED | OUTPATIENT
Start: 2022-07-19 | End: 2022-08-09 | Stop reason: SDUPTHER

## 2022-07-19 NOTE — TELEPHONE ENCOUNTER
Caller: Chandana Kevin W    Relationship: Self    Best call back number: [phone]  Requested Prescriptions:   Requested Prescriptions     Pending Prescriptions Disp Refills   • modafinil (PROVIGIL) 100 MG tablet 90 tablet 0     Sig: Take 1 tablet by mouth Daily.        Pharmacy where request should be sent: [facility] - [address] [phone] [place] [phone] FX         Does the patient have less than a 3 day supply:  [x] Yes  [] No    Uziel Parker Rep   07/19/22 10:17 EDT

## 2022-07-22 ENCOUNTER — APPOINTMENT (OUTPATIENT)
Dept: CARDIOLOGY | Facility: HOSPITAL | Age: 87
End: 2022-07-22

## 2022-08-09 ENCOUNTER — OFFICE VISIT (OUTPATIENT)
Dept: FAMILY MEDICINE CLINIC | Facility: CLINIC | Age: 87
End: 2022-08-09

## 2022-08-09 VITALS
BODY MASS INDEX: 30.61 KG/M2 | TEMPERATURE: 98.2 F | HEART RATE: 60 BPM | HEIGHT: 70 IN | DIASTOLIC BLOOD PRESSURE: 82 MMHG | OXYGEN SATURATION: 99 % | WEIGHT: 213.8 LBS | SYSTOLIC BLOOD PRESSURE: 126 MMHG

## 2022-08-09 DIAGNOSIS — R73.9 HYPERGLYCEMIA: Primary | ICD-10-CM

## 2022-08-09 DIAGNOSIS — Z79.899 HIGH RISK MEDICATION USE: ICD-10-CM

## 2022-08-09 DIAGNOSIS — G45.9 TIA (TRANSIENT ISCHEMIC ATTACK): ICD-10-CM

## 2022-08-09 DIAGNOSIS — R26.89 BALANCE PROBLEM: ICD-10-CM

## 2022-08-09 DIAGNOSIS — G47.419 PRIMARY NARCOLEPSY WITHOUT CATAPLEXY: ICD-10-CM

## 2022-08-09 DIAGNOSIS — I66.9 CEREBRAL ARTERY OCCLUSION: ICD-10-CM

## 2022-08-09 DIAGNOSIS — Z91.09 ENVIRONMENTAL ALLERGIES: ICD-10-CM

## 2022-08-09 PROCEDURE — 99214 OFFICE O/P EST MOD 30 MIN: CPT | Performed by: FAMILY MEDICINE

## 2022-08-09 RX ORDER — FLUTICASONE PROPIONATE 50 MCG
1 SPRAY, SUSPENSION (ML) NASAL DAILY
Qty: 48 G | Refills: 3 | Status: SHIPPED | OUTPATIENT
Start: 2022-08-09

## 2022-08-09 NOTE — PROGRESS NOTES
"Chief Complaint  Hypertension (CHECK UP FOLLOW UP LAST VISIT), Transient Ischemic Attack, and Hyperlipidemia    Subjective        Kevin Ellington presents to [facility]  History of Present Illness  Very pleasant 87-year-old male here for hypertension and neurologic episode that is possibly a TIA possible seizure.  He is currently undergoing work-up with neurology.  EEG and MRI pending.  He does have narcolepsy which is well controlled.  We spent some time talking through his current symptoms, currently he is not driving, he had no Residual seizure-like activity.  He does complain of difficulty with maintaining his balance and maintaining his strength.  This could be attributed to this TIA event but is likely there prior.  It does seem to be gradually worsening.    Objective   Vital Signs:  /82 (BP Location: Right arm, Patient Position: Sitting, Cuff Size: Adult)   Pulse 60   Temp 98.2 °F (36.8 °C)   Ht 177.8 cm (70\")   Wt 97 kg (213 lb 12.8 oz)   SpO2 99%   BMI 30.68 kg/m²   Estimated body mass index is 30.68 kg/m² as calculated from the following:    Height as of this encounter: 177.8 cm (70\").    Weight as of this encounter: 97 kg (213 lb 12.8 oz).          Physical Exam  Vitals and nursing note reviewed.   Constitutional:       General: He is not in acute distress.     Appearance: He is well-developed.   HENT:      Head: Normocephalic.      Nose: Nose normal.   Cardiovascular:      Rate and Rhythm: Normal rate and regular rhythm.      Heart sounds: Normal heart sounds. No murmur heard.  Pulmonary:      Effort: Pulmonary effort is normal. No respiratory distress.      Breath sounds: Normal breath sounds.   Musculoskeletal:         General: Normal range of motion.   Skin:     General: Skin is warm and dry.      Findings: No rash.   Neurological:      Mental Status: He is alert and oriented to person, place, and time.      Comments: Patient with some difficulty with " maintaining his balance he does need his hands to stand up from a seated position.   Psychiatric:         Behavior: Behavior normal.         Thought Content: Thought content normal.         Judgment: Judgment normal.        Result Review :                Assessment and Plan   Diagnoses and all orders for this visit:    1. Hyperglycemia (Primary)  -     Comprehensive Metabolic Panel; Future  -     CBC & Differential; Future  -     Lipid Panel; Future  -     Hemoglobin A1c; Future    2. Primary narcolepsy without cataplexy  -     modafinil (PROVIGIL) 100 MG tablet; Take 1 tablet by mouth Daily.  Dispense: 30 tablet; Refill: 2  -     ToxASSURE Select 13 (MW) - Urine, Clean Catch    3. TIA (transient ischemic attack)  -     Comprehensive Metabolic Panel; Future  -     CBC & Differential; Future  -     Lipid Panel; Future  -     Ambulatory Referral to Physical Therapy Evaluate and treat    4. Cerebral artery occlusion  -     Ambulatory Referral to Physical Therapy Evaluate and treat    5. Balance problem  -     Ambulatory Referral to Physical Therapy Evaluate and treat    6. High risk medication use  -     ToxASSURE Select 13 (MW) - Urine, Clean Catch    7. Environmental allergies  -     fluticasone (FLONASE) 50 MCG/ACT nasal spray; 1 spray into the nostril(s) as directed by provider Daily.  Dispense: 48 g; Refill: 3    Narcolepsy well controlled on modafinil 100 mg daily.  No adverse effects to medication.  Collected today, controlled substance agreement updated today.  Mike reviewed and appropriate.    Hyperglycemia stable, ordered labs as above.    Patient with event that is currently getting worked up as possible TIA versus seizure-like activity.  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  From what I understand this finding does not correlate with his symptoms but will defer to neurology.    Regardless, his balance and overall strength is declining.  Referral  to physical therapy.  Discussed maintaining good protein intake, possibly taking a protein shake.    Okay to continue with Flonase for allergies.         Follow Up   Return in about 3 months (around 11/9/2022), or if symptoms worsen or fail to improve, for Recheck diabetes and narcolepsy .  Patient was given instructions and counseling regarding his condition or for health maintenance advice. Please see specific information pulled into the AVS if appropriate. Medical assistant and I wore mask and eyewear protection during entire encounter.  Patient wore mask.    Leanna Mancia MD

## 2022-08-14 LAB — DRUGS UR: NORMAL

## 2022-08-14 RX ORDER — MODAFINIL 100 MG/1
100 TABLET ORAL DAILY
Qty: 30 TABLET | Refills: 2 | Status: SHIPPED | OUTPATIENT
Start: 2022-08-14 | End: 2022-11-14 | Stop reason: SDUPTHER

## 2022-08-22 DIAGNOSIS — I10 ESSENTIAL HYPERTENSION: ICD-10-CM

## 2022-08-22 RX ORDER — POTASSIUM CHLORIDE 750 MG/1
TABLET, EXTENDED RELEASE ORAL
Qty: 180 TABLET | Refills: 3 | Status: SHIPPED | OUTPATIENT
Start: 2022-08-22

## 2022-09-19 ENCOUNTER — TELEPHONE (OUTPATIENT)
Dept: NEUROLOGY | Facility: CLINIC | Age: 87
End: 2022-09-19

## 2022-09-21 ENCOUNTER — TELEMEDICINE (OUTPATIENT)
Dept: NEUROLOGY | Facility: CLINIC | Age: 87
End: 2022-09-21

## 2022-09-21 DIAGNOSIS — K21.9 GASTROESOPHAGEAL REFLUX DISEASE WITHOUT ESOPHAGITIS: ICD-10-CM

## 2022-09-21 DIAGNOSIS — G45.4 TRANSIENT GLOBAL AMNESIA: Primary | ICD-10-CM

## 2022-09-21 PROCEDURE — 99212 OFFICE O/P EST SF 10 MIN: CPT | Performed by: PSYCHIATRY & NEUROLOGY

## 2022-09-21 RX ORDER — PANTOPRAZOLE SODIUM 20 MG/1
TABLET, DELAYED RELEASE ORAL
Qty: 90 TABLET | Refills: 3 | Status: SHIPPED | OUTPATIENT
Start: 2022-09-21 | End: 2022-11-15 | Stop reason: SDUPTHER

## 2022-09-21 NOTE — PROGRESS NOTES
Chief complaint: History of transient global amnesia    Patient ID: Kevin Ellington is a 87 y.o. male.    HPI:Unable to complete visit using a video connection to the patient. A phone visit was used to complete this visits. Total time of discussion was 20 minutes.  I had the pleasure of speaking with your patient today.  As you may know he is an 87-year-old male being seen for the management of transient global amnesia.  The patient is at home and I am at home as well.  He has been doing well since his follow-up with us last.  He has not had any episodes of amnesia.  No new onset focal weakness or numbness of his arms or legs.  No slurred speech.  No progressive cognitive change.  No seizure-like activity.  We did schedule him for an EEG which was within normal limits.  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  No double vision or vision loss.    The following portions of the patient's history were reviewed and updated as appropriate: allergies, current medications, past family history, past medical history, past social history, past surgical history and problem list.    Review of Systems   I have reviewed the review of systems above performed by my medical assistant.      There were no vitals filed for this visit.    Neurologic Exam    Physical Exam    Procedures    Assessment/Plan: We are going to schedule him for a CTA of his head to look at the stenotic posterior cerebral artery in more detail.  I do not feel that this was the etiology of his transient global amnesia however.  We will see him back in 3 months or sooner if needed.  The patient can call for results of testing.  A total of 20 minutes was spent discussing signs and symptoms of transient global amnesia, posterior cerebral artery stenosis, patient education, plan of care and prognosis.       Diagnoses and all orders for this visit:    1. Transient global amnesia (Primary)  -      CT angiogram head; Future           Constantine Carrera, II, MD

## 2022-09-22 ENCOUNTER — TELEPHONE (OUTPATIENT)
Dept: NEUROLOGY | Facility: CLINIC | Age: 87
End: 2022-09-22

## 2022-09-22 NOTE — TELEPHONE ENCOUNTER
Caller: Kevin Ellington    Relationship: Self    Best call back number: [phone]    What was the call regarding: PT CALLING TO SCHEDULE 3 MTH F/U APPT W/ DR. CALLEJAS; PT SEEN VIA TELEHEALTH YESTERDAY, 9/21/22.    HUB UNABLE TO SCHEDULE WITHIN THE SUGGESTED F/U TIMEFRAME.    Do you require a callback: YES, PLEASE.    PLEASE REVIEW AND ADVISE.

## 2022-10-13 DIAGNOSIS — I10 ESSENTIAL HYPERTENSION: ICD-10-CM

## 2022-10-13 RX ORDER — LOSARTAN POTASSIUM 100 MG/1
TABLET ORAL
Qty: 90 TABLET | Refills: 3 | Status: SHIPPED | OUTPATIENT
Start: 2022-10-13

## 2022-10-13 RX ORDER — ATORVASTATIN CALCIUM 40 MG/1
TABLET, FILM COATED ORAL
Qty: 90 TABLET | Refills: 3 | Status: SHIPPED | OUTPATIENT
Start: 2022-10-13

## 2022-10-13 RX ORDER — CLONIDINE HYDROCHLORIDE 0.1 MG/1
TABLET ORAL
Qty: 180 TABLET | Refills: 3 | Status: SHIPPED | OUTPATIENT
Start: 2022-10-13

## 2022-10-13 RX ORDER — TAMSULOSIN HYDROCHLORIDE 0.4 MG/1
CAPSULE ORAL
Qty: 180 CAPSULE | Refills: 3 | Status: SHIPPED | OUTPATIENT
Start: 2022-10-13

## 2022-10-13 RX ORDER — METOPROLOL SUCCINATE 100 MG/1
TABLET, EXTENDED RELEASE ORAL
Qty: 90 TABLET | Refills: 3 | Status: SHIPPED | OUTPATIENT
Start: 2022-10-13

## 2022-10-30 ENCOUNTER — HOSPITAL ENCOUNTER (OUTPATIENT)
Dept: CT IMAGING | Facility: HOSPITAL | Age: 87
Discharge: HOME | End: 2022-10-30
Admitting: PSYCHIATRY & NEUROLOGY

## 2022-10-30 DIAGNOSIS — G45.4 TRANSIENT GLOBAL AMNESIA: ICD-10-CM

## 2022-10-30 LAB — CREAT BLDA-MCNC: 1.2 MG/DL (ref 0.6–1.3)

## 2022-10-30 PROCEDURE — 0 IOPAMIDOL PER 1 ML: Performed by: PSYCHIATRY & NEUROLOGY

## 2022-10-30 PROCEDURE — 70496 CT ANGIOGRAPHY HEAD: CPT

## 2022-10-30 PROCEDURE — 82565 ASSAY OF CREATININE: CPT

## 2022-10-30 RX ADMIN — IOPAMIDOL 95 ML: 755 INJECTION, SOLUTION INTRAVENOUS at 11:39

## 2022-11-07 DIAGNOSIS — I10 ESSENTIAL HYPERTENSION: ICD-10-CM

## 2022-11-07 RX ORDER — CHLORTHALIDONE 50 MG/1
TABLET ORAL
Qty: 90 TABLET | Refills: 3 | Status: SHIPPED | OUTPATIENT
Start: 2022-11-07

## 2022-11-14 DIAGNOSIS — G47.419 PRIMARY NARCOLEPSY WITHOUT CATAPLEXY: ICD-10-CM

## 2022-11-14 RX ORDER — MODAFINIL 100 MG/1
100 TABLET ORAL DAILY
Qty: 30 TABLET | Refills: 2 | Status: SHIPPED | OUTPATIENT
Start: 2022-11-14 | End: 2023-02-20 | Stop reason: SDUPTHER

## 2022-11-14 NOTE — TELEPHONE ENCOUNTER
Rx Refill Note  Requested Prescriptions     Pending Prescriptions Disp Refills   • modafinil (PROVIGIL) 100 MG tablet 30 tablet 2     Sig: Take 1 tablet by mouth Daily.      Last office visit with prescribing clinician: 8/9/2022      Next office visit with prescribing clinician: 11/15/2022            Jairo Griffin MA  11/14/22, 13:33 EST

## 2022-11-14 NOTE — TELEPHONE ENCOUNTER
Caller: Chandana Kevin W    Relationship: Self    Best call back number: [phone]    Requested Prescriptions:   Requested Prescriptions     Pending Prescriptions Disp Refills   • modafinil (PROVIGIL) 100 MG tablet 30 tablet 2     Sig: Take 1 tablet by mouth Daily.        Pharmacy where request should be sent: [facility] - [address] [phone] [facility] [phone]      Additional details provided by patient: THE PATIENT STATES THAT HE WILL NEED A NEW PRESCRIPTION WRITTEN FOR THIS MEDICATION. THE PATIENT ONLY HAS A ONE WEEK SUPPLY LEFT.    Does the patient have less than a 3 day supply:  [] Yes  [x] No    Uziel Brooks Rep   11/14/22 12:22 EST

## 2022-11-15 ENCOUNTER — OFFICE VISIT (OUTPATIENT)
Dept: FAMILY MEDICINE CLINIC | Facility: CLINIC | Age: 87
End: 2022-11-15

## 2022-11-15 VITALS
DIASTOLIC BLOOD PRESSURE: 72 MMHG | SYSTOLIC BLOOD PRESSURE: 112 MMHG | OXYGEN SATURATION: 99 % | BODY MASS INDEX: 31.5 KG/M2 | WEIGHT: 220 LBS | HEART RATE: 60 BPM | HEIGHT: 70 IN | TEMPERATURE: 98 F

## 2022-11-15 DIAGNOSIS — H00.015 HORDEOLUM EXTERNUM OF LEFT LOWER EYELID: ICD-10-CM

## 2022-11-15 DIAGNOSIS — E11.9 TYPE 2 DIABETES MELLITUS WITHOUT COMPLICATION, WITHOUT LONG-TERM CURRENT USE OF INSULIN: ICD-10-CM

## 2022-11-15 DIAGNOSIS — K21.9 GASTROESOPHAGEAL REFLUX DISEASE WITHOUT ESOPHAGITIS: ICD-10-CM

## 2022-11-15 DIAGNOSIS — G47.419 PRIMARY NARCOLEPSY WITHOUT CATAPLEXY: Primary | ICD-10-CM

## 2022-11-15 PROCEDURE — 99214 OFFICE O/P EST MOD 30 MIN: CPT | Performed by: FAMILY MEDICINE

## 2022-11-15 RX ORDER — PANTOPRAZOLE SODIUM 40 MG/1
40 TABLET, DELAYED RELEASE ORAL DAILY
Qty: 30 TABLET | Refills: 1 | Status: SHIPPED | OUTPATIENT
Start: 2022-11-15 | End: 2023-01-11 | Stop reason: SDUPTHER

## 2022-11-15 NOTE — PROGRESS NOTES
"Chief Complaint  Stye (Any recommendation better than a te bag for a sty in left eye ), Heartburn (Pantoprazole is not doing any good for my GERD   I have to finish the job with a couple of tums ), narcolepsy (Follow up no complains doing well ), and Diabetes (Follow up on labs no complains doing well )    Subjective        Kevin Ellington presents to [facility]  History of Present Illness  Pleasant 87-year-old male here to follow-up for narcolepsy well controlled on modafinil.  He has been on this medication for years.    Type 2 diabetes overall well controlled    GERD, not well controlled.  He is currently on pantoprazole 20 mg with minimal improvement.  Does take sometimes. It occurs at night, it does wake him from sleep. If he eats a smaller meals and it seems to help.     He also has a stye for the last 2 to 3 days, he has had improvement with less pain.  No change to vision.    Objective   Vital Signs:  /72   Pulse 60   Temp 98 °F (36.7 °C)   Ht 177.8 cm (70\")   Wt 99.8 kg (220 lb)   SpO2 99%   BMI 31.57 kg/m²   Estimated body mass index is 31.57 kg/m² as calculated from the following:    Height as of this encounter: 177.8 cm (70\").    Weight as of this encounter: 99.8 kg (220 lb).          Physical Exam  Vitals and nursing note reviewed.   Constitutional:       General: He is not in acute distress.     Appearance: He is well-developed.      Comments: Ambulating with a cane, he does use his hands to stand up from a seated position.   HENT:      Head: Normocephalic.      Comments: Mild erythema on the lower lid on the left side, central area of a pustule, no conjunctival congestion     Nose: Nose normal.   Cardiovascular:      Rate and Rhythm: Normal rate and regular rhythm.      Heart sounds: Normal heart sounds. No murmur heard.  Pulmonary:      Effort: Pulmonary effort is normal. No respiratory distress.      Breath sounds: Normal breath sounds.   Musculoskeletal: "         General: Normal range of motion.   Skin:     General: Skin is warm and dry.      Findings: No rash.   Neurological:      Mental Status: He is alert and oriented to person, place, and time.   Psychiatric:         Behavior: Behavior normal.         Thought Content: Thought content normal.         Judgment: Judgment normal.        Result Review :  The following data was reviewed by: Leanna Mancia MD on 11/15/2022:  CMP    CMP 4/29/22 10/30/22 11/10/22   Glucose 90  128 (A)   BUN 23  24 (A)   Creatinine 1.26 1.20 1.14   Sodium 135  139   Potassium 4.4  4.2   Chloride 97  103   Calcium 9.4  9.6   Total Protein   6.7   Albumin   4.20   Globulin   2.5   Total Bilirubin   0.6   Alkaline Phosphatase   93   AST (SGOT)   21   ALT (SGPT)   18   (A) Abnormal value       Comments are available for some flowsheets but are not being displayed.           CBC    CBC 2/23/22 4/21/22 11/10/22   WBC 10.7 8.7 8.68   RBC 4.52 4.98 4.81   Hemoglobin 13.8 15.2 14.9   Hematocrit 40.3 44.9 43.4   MCV 89 90 90.2   MCH 30.5 30.5 31.0   MCHC 34.2 33.9 34.3   RDW 12.0 13.0 12.8   Platelets 218 203 171           Lipid Panel    Lipid Panel 4/29/22 11/10/22   Total Cholesterol 145 138   Triglycerides 179 (A) 151 (A)   HDL Cholesterol 43 40   VLDL Cholesterol 30 26   LDL Cholesterol  72 72   (A) Abnormal value       Comments are available for some flowsheets but are not being displayed.         Hemoglobin A1c 6.6.            Assessment and Plan   Diagnoses and all orders for this visit:    1. Primary narcolepsy without cataplexy (Primary)    2. Gastroesophageal reflux disease without esophagitis  -     pantoprazole (PROTONIX) 40 MG EC tablet; Take 1 tablet by mouth Daily.  Dispense: 30 tablet; Refill: 1    3. Type 2 diabetes mellitus without complication, without long-term current use of insulin ([facility])    4. Hordeolum externum of left lower eyelid    Narcolepsy well controlled on modafinil 200 mg daily.  Okay to continue same dose.  Has been  doing this for years now.  No adverse effects.  Okay to continue same and follow-up in 3 months    GERD not well controlled this is also been a chronic problem.  He was on omeprazole 20 mg, converted at the last appointment to pantoprazole 20 mg.  We discussed decreasing portion sizes and increased frequency of meals.  I do think he will have improvement with changes to lifestyle.  Okay to increase to 40 mg dose for 2 months and then decrease back to 20 mg to see if we can help get on top of his symptoms and hopefully maintain from therapy.  I would be hesitant to recommend an EGD at this time.  However if he is not having improvement will refer to GI    Type 2 diabetes stable.  No changes to plan.  Follow-up in 3 months    Discussed warm compresses for the stye.  If not having improvement okay to call on Friday for a topical antibiotic.  If he has any vision changes he will need to see ophthalmology.         Follow Up   Return in about 3 months (around 2/15/2023), or if symptoms worsen or fail to improve, for Recheck narcolepsy and diabetes.  Patient was given instructions and counseling regarding his condition or for health maintenance advice. Please see specific information pulled into the AVS if appropriate. Medical assistant and I wore mask and eyewear protection during entire encounter.  Patient wore mask.    Leanna Mancia MD

## 2022-11-18 ENCOUNTER — OFFICE VISIT (OUTPATIENT)
Dept: CARDIOLOGY | Facility: CLINIC | Age: 87
End: 2022-11-18

## 2022-11-18 VITALS
HEART RATE: 66 BPM | HEIGHT: 71 IN | DIASTOLIC BLOOD PRESSURE: 84 MMHG | WEIGHT: 218.8 LBS | SYSTOLIC BLOOD PRESSURE: 128 MMHG | BODY MASS INDEX: 30.63 KG/M2

## 2022-11-18 DIAGNOSIS — E78.5 HYPERLIPIDEMIA, UNSPECIFIED HYPERLIPIDEMIA TYPE: ICD-10-CM

## 2022-11-18 DIAGNOSIS — Z95.5 S/P CORONARY ARTERY STENT PLACEMENT: ICD-10-CM

## 2022-11-18 DIAGNOSIS — I10 ESSENTIAL HYPERTENSION: ICD-10-CM

## 2022-11-18 DIAGNOSIS — I25.10 CORONARY ARTERY DISEASE INVOLVING NATIVE CORONARY ARTERY OF NATIVE HEART WITHOUT ANGINA PECTORIS: Primary | ICD-10-CM

## 2022-11-18 DIAGNOSIS — I65.23 BILATERAL CAROTID ARTERY STENOSIS: ICD-10-CM

## 2022-11-18 PROCEDURE — 99214 OFFICE O/P EST MOD 30 MIN: CPT | Performed by: NURSE PRACTITIONER

## 2022-11-18 PROCEDURE — 93000 ELECTROCARDIOGRAM COMPLETE: CPT | Performed by: NURSE PRACTITIONER

## 2022-11-18 NOTE — PROGRESS NOTES
Subjective:     Encounter Date:11/18/2022      Patient ID: Kevin Ellington is a 87 y.o. male.    Chief Complaint: follow up CAD  History of Present Illness  This is an 88 y/o man who follows with Dr. Salmeron and is new to me today. He has a pmhx of coronary artery disease s/p LAD stent placement in 2002, chronic back pain, hypertension, and hyperlipidemia. When he was seen in the office in May by Dr. Salmeron, he reported an episode of feeling like he was in a fog while trying to back a car out in a parking lot. An MRI showed no evidence of CVA. It was felt that he could have had a TIA. An echocardiogram and ZIO were ordered, both of which were unrevealing. Bilateral carotid scan showed atherosclerotic plaque but no significant stenosis.     He is here today for a follow up visit. He has not had anymore episodes similar to what he experienced earlier this year. He is no longer driving though because of this incident. He does report some balance issues due to some ortho problems. He denies any dizziness or syncope. He denies falls. He denies any chest pain or palpitations. He has shortness of breath with exertion at times. Since starting chlorthalidone, he has not had any swelling in his lower extremities. He denies orthopnea or PND. His blood pressure has been well controlled at home.    I have reviewed and updated as appropriate allergies, current medications, past family history, past medical history, past surgical history and problem list.    Review of Systems   Constitutional: Negative for fever, malaise/fatigue, weight gain and weight loss.   HENT: Negative for congestion, hoarse voice and sore throat.    Eyes: Negative for blurred vision and double vision.   Cardiovascular: Positive for dyspnea on exertion. Negative for chest pain, leg swelling, orthopnea, palpitations and syncope.   Respiratory: Negative for cough, shortness of breath and wheezing.    Gastrointestinal: Negative for abdominal pain,  hematemesis, hematochezia and melena.   Genitourinary: Negative for dysuria and hematuria.   Neurological: Negative for dizziness, headaches, light-headedness and numbness.   Psychiatric/Behavioral: Negative for depression. The patient is not nervous/anxious.          Current Outpatient Medications:   •  aspirin 81 MG tablet, Take 81 mg by mouth Daily., Disp: , Rfl:   •  atorvastatin (LIPITOR) 40 MG tablet, TAKE 1 TABLET DAILY, Disp: 90 tablet, Rfl: 3  •  cetirizine (zyrTEC) 10 MG tablet, Take 10 mg by mouth Daily., Disp: , Rfl:   •  chlorthalidone (HYGROTEN) 50 MG tablet, TAKE 1 TABLET DAILY, Disp: 90 tablet, Rfl: 3  •  cloNIDine (CATAPRES) 0.1 MG tablet, TAKE 1 TABLET TWICE A DAY, Disp: 180 tablet, Rfl: 3  •  coenzyme Q10 100 MG capsule, Take 300 mg by mouth Daily., Disp: , Rfl:   •  econazole nitrate (SPECTAZOLE) 1 % cream, Apply  topically to the appropriate area as directed As Needed., Disp: , Rfl:   •  Flaxseed, Linseed, (FLAX SEED OIL PO), Take  by mouth., Disp: , Rfl:   •  fluticasone (FLONASE) 50 MCG/ACT nasal spray, 1 spray into the nostril(s) as directed by provider Daily., Disp: 48 g, Rfl: 3  •  losartan (COZAAR) 100 MG tablet, TAKE 1 TABLET DAILY, Disp: 90 tablet, Rfl: 3  •  meloxicam (MOBIC) 7.5 MG tablet, Take 1 tablet by mouth Every Other Day., Disp: 45 tablet, Rfl: 2  •  metoprolol succinate XL (TOPROL-XL) 100 MG 24 hr tablet, TAKE 1 TABLET DAILY, Disp: 90 tablet, Rfl: 3  •  metroNIDAZOLE (FLAGYL) 0.75 % lotion lotion, Daily., Disp: , Rfl:   •  Misc Natural Products (JOINT HEALTH PO), Take  by mouth., Disp: , Rfl:   •  modafinil (PROVIGIL) 100 MG tablet, Take 1 tablet by mouth Daily., Disp: 30 tablet, Rfl: 2  •  Multiple Vitamins-Minerals (MULTIVITAMIN PO), Take 1 tablet by mouth Daily., Disp: , Rfl:   •  pantoprazole (PROTONIX) 40 MG EC tablet, Take 1 tablet by mouth Daily., Disp: 30 tablet, Rfl: 1  •  potassium chloride (K-DUR,KLOR-CON) 10 MEQ CR tablet, TAKE 2 TABLETS DAILY, Disp: 180 tablet,  Rfl: 3  •  Saw Palmetto, Serenoa repens, 320 MG capsule, Take 320 mg by mouth 2 (two) times a day., Disp: , Rfl:   •  sennosides-docusate (Senna S) 8.6-50 MG per tablet, Take 1 tablet by mouth 2 (Two) Times a Day As Needed for Constipation., Disp: 180 tablet, Rfl: 2  •  tamsulosin (FLOMAX) 0.4 MG capsule 24 hr capsule, TAKE 2 CAPSULES DAILY, Disp: 180 capsule, Rfl: 3    Past Medical History:   Diagnosis Date   • Allergic 1970   • Arthritis    • Asthma 05/23/2019    shortness of breath-exertion   • Benign prostatic hyperplasia    • Bilateral carotid artery stenosis 05/12/2022   • Cataract    • Cervical radiculopathy    • Colon polyp    • Coronary artery disease 2001    Stent   • CTS (carpal tunnel syndrome) 1998    Surgery both hands in 1999   • Erectile dysfunction    • GERD (gastroesophageal reflux disease)    • HTN (hypertension)    • Hyperlipidemia    • Kidney stone    • Low back pain     2 Prior surgeries   • Lumbosacral disc disease    • Melanoma (HCC) 01/15/2009    DR WILLY SIMMS ( Left forearm)   • Obesity    • Radiculitis, thoracic    • Scoliosis    • Spondylosis of lumbar region without myelopathy or radiculopathy 09/09/2020   • Stage 3a chronic kidney disease (HCC) 10/21/2021   • Stented coronary artery    • Thoracic disc disorder     Surgery by Dr. Aldridge ()   • TIA (transient ischemic attack) 05/12/2022       Past Surgical History:   Procedure Laterality Date   • ANKLE FUSION Left 2007    reconstruction and fusion   • BACK SURGERY      HERNIATED LUMBAR DISK 1975,2000,2012   • CARDIAC CATHETERIZATION  2001   • CARPAL TUNNEL RELEASE     • CATARACT EXTRACTION, BILATERAL Bilateral 2002   • COLONOSCOPY     • CORONARY ANGIOPLASTY WITH STENT PLACEMENT  2001   • CORONARY STENT PLACEMENT     • EPIDURAL BLOCK  Several at [facility]    and [facility]   • EYE SURGERY     • JOINT REPLACEMENT     • REPLACEMENT TOTAL KNEE Left 2015   • REPLACEMENT TOTAL KNEE Right    • SKIN CANCER EXCISION    "   MELANOMA   • SPINE SURGERY     • THORACIC SPINE SURGERY         Family History   Problem Relation Age of Onset   • Hypertension Mother    • Arthritis Mother            • Skin cancer Mother    • Heart disease Father    • Early death Father         Aortic aneurism age 58   • Hypertension Father    • Aortic aneurysm Father 58   • Cancer Neg Hx        Social History     Tobacco Use   • Smoking status: Never   • Smokeless tobacco: Never   • Tobacco comments:     caffeine use   Vaping Use   • Vaping Use: Never used   Substance Use Topics   • Alcohol use: No   • Drug use: No         ECG 12 Lead    Date/Time: 2022 12:54 PM  Performed by: Lexie Obando APRN  Authorized by: Lexie Obando APRN   Comparison: compared with previous ECG from 2022  Similar to previous ECG  Rhythm: sinus rhythm  Comments: No significant change from previous EKG               Objective:     Visit Vitals  /84   Pulse 66   Ht 180.3 cm (71\")   Wt 99.2 kg (218 lb 12.8 oz)   BMI 30.52 kg/m²             Physical Exam  Constitutional:       Appearance: Normal appearance. He is normal weight.   HENT:      Head: Normocephalic.   Neck:      Vascular: No carotid bruit.   Cardiovascular:      Rate and Rhythm: Normal rate and regular rhythm.      Chest Wall: PMI is not displaced.      Pulses:           Radial pulses are 2+ on the right side and 2+ on the left side.        Posterior tibial pulses are 2+ on the right side and 2+ on the left side.      Heart sounds: Normal heart sounds. No murmur heard.    No friction rub. No gallop.   Pulmonary:      Effort: Pulmonary effort is normal.      Breath sounds: Normal breath sounds.   Abdominal:      General: Bowel sounds are normal. There is no distension.      Palpations: Abdomen is soft.   Musculoskeletal:      Right lower leg: No edema.      Left lower leg: No edema.   Skin:     General: Skin is warm and dry.      Capillary Refill: Capillary refill takes less than 2 seconds. "   Neurological:      Mental Status: He is alert and oriented to person, place, and time.   Psychiatric:         Mood and Affect: Mood normal.         Behavior: Behavior normal.         Thought Content: Thought content normal.          Lab Review:   Lipid Panel    Lipid Panel 4/29/22 11/10/22   Total Cholesterol 145 138   Triglycerides 179 (A) 151 (A)   HDL Cholesterol 43 40   VLDL Cholesterol 30 26   LDL Cholesterol  72 72   (A) Abnormal value       Comments are available for some flowsheets but are not being displayed.               Cardiac Procedures:   1. Echocardiogram 6/7/22:  • Calculated left ventricular 3D EF = 65% Estimated left ventricular EF = 65% Left ventricular systolic function is normal.  • Left ventricular wall thickness is consistent with borderline concentric hypertrophy.  • Left ventricular diastolic function is consistent with (grade I) impaired relaxation.  • Estimated right ventricular systolic pressure from tricuspid regurgitation is normal (<35 mmHg).  2. Carotid duplex 6/6/22:  • Bilateral internal carotid artery atherosclerotic plaque noted without any hemodynamically significant stenosis.  • No significant changes since prior study on 3/21/16.     3. Holter Monitor 6/6/22:  Study Description    Monitor placed on patient by [facility] on 6/6/2022  at 11:14 EDT.  The monitor was scanned on 6/24/2022. The patient was monitored for 13 days 22 hours. Indications for this exam include Transient Cerebral Ischemia. Average HR:  63. Min HR:  26. Max HR:  160.     Study Findings    Patient diary was not submitted. No symptoms reported during the monitoring period. No complications noted. The predominant rhythm noted during the testing period was sinus rhythm. Premature atrial contractions occured occasionally. There were 6 episodes of nonsustained supraventricular tachycardia, longest lasting 9 beats and the fastest with a rate of 144 bpm. Premature ventricular contractions occured rarely. Ventricular  couplets, triplets, bigeminy and trigeminy. There were no episodes of ventricular tachycardia. Sinoatrial node conduction was normal. 2nd degree (Mobitz 1) atrioventricular block noted.     Study Impressions    A relatively benign monitor study.         Assessment:         Diagnoses and all orders for this visit:    1. Coronary artery disease involving native coronary artery of native heart without angina pectoris (Primary)    2. Essential hypertension    3. Hyperlipidemia, unspecified hyperlipidemia type    4. S/P coronary artery stent placement    5. Bilateral carotid artery stenosis            Plan:       1. CAD: s/p stent to LAD in 2002. On aspirin, statin, and beta blocker. EKG does not show any ischemic changes. No anginal symptoms. Continue with current medical therapy  2. HTN: blood pressure is well controlled on current regimen. No changes  3. HLD: on statin therapy. Goal LDL < 70.   4. Bilateral carotid stenosis: on aspirin and statin    Thank you for allowing me to participate in this patient's care. Please call with any questions or concerns. Mr. Ellington will follow up with Dr. Salmeron in 6 months.          Your medication list          Accurate as of November 18, 2022 12:45 PM. If you have any questions, ask your nurse or doctor.            CONTINUE taking these medications      Instructions Last Dose Given Next Dose Due   aspirin 81 MG tablet      Take 81 mg by mouth Daily.       atorvastatin 40 MG tablet  Commonly known as: LIPITOR      TAKE 1 TABLET DAILY       cetirizine 10 MG tablet  Commonly known as: zyrTEC      Take 10 mg by mouth Daily.       chlorthalidone 50 MG tablet  Commonly known as: HYGROTEN      TAKE 1 TABLET DAILY       cloNIDine 0.1 MG tablet  Commonly known as: CATAPRES      TAKE 1 TABLET TWICE A DAY       coenzyme Q10 100 MG capsule      Take 300 mg by mouth Daily.       econazole nitrate 1 % cream  Commonly known as: SPECTAZOLE      Apply  topically to the appropriate area as  directed As Needed.       FLAX SEED OIL PO      Take  by mouth.       fluticasone 50 MCG/ACT nasal spray  Commonly known as: FLONASE      1 spray into the nostril(s) as directed by provider Daily.       JOINT HEALTH PO      Take  by mouth.       losartan 100 MG tablet  Commonly known as: COZAAR      TAKE 1 TABLET DAILY       meloxicam 7.5 MG tablet  Commonly known as: MOBIC      Take 1 tablet by mouth Every Other Day.       metoprolol succinate  MG 24 hr tablet  Commonly known as: TOPROL-XL      TAKE 1 TABLET DAILY       metroNIDAZOLE 0.75 % lotion lotion  Commonly known as: FLAGYL      Daily.       modafinil 100 MG tablet  Commonly known as: PROVIGIL      Take 1 tablet by mouth Daily.       multivitamin tablet tablet  Commonly known as: THERAGRAN      Take 1 tablet by mouth Daily.       pantoprazole 40 MG EC tablet  Commonly known as: PROTONIX      Take 1 tablet by mouth Daily.       potassium chloride 10 MEQ CR tablet  Commonly known as: K-DUR,KLOR-CON      TAKE 2 TABLETS DAILY       Saw Palmetto (Serenoa repens) 320 MG capsule      Take 320 mg by mouth 2 (two) times a day.       sennosides-docusate 8.6-50 MG per tablet  Commonly known as: Senna S      Take 1 tablet by mouth 2 (Two) Times a Day As Needed for Constipation.       tamsulosin 0.4 MG capsule 24 hr capsule  Commonly known as: FLOMAX      TAKE 2 CAPSULES DAILY                NANCY Damon  11/18/22  12:45 PM EST

## 2022-12-11 ENCOUNTER — HOSPITAL ENCOUNTER (EMERGENCY)
Facility: HOSPITAL | Age: 87
Discharge: HOME | End: 2022-12-11
Attending: EMERGENCY MEDICINE | Admitting: EMERGENCY MEDICINE

## 2022-12-11 ENCOUNTER — APPOINTMENT (OUTPATIENT)
Dept: CT IMAGING | Facility: HOSPITAL | Age: 87
End: 2022-12-11

## 2022-12-11 VITALS
WEIGHT: 202 LBS | TEMPERATURE: 97.5 F | OXYGEN SATURATION: 96 % | HEART RATE: 58 BPM | RESPIRATION RATE: 16 BRPM | HEIGHT: 70 IN | BODY MASS INDEX: 28.92 KG/M2 | DIASTOLIC BLOOD PRESSURE: 73 MMHG | SYSTOLIC BLOOD PRESSURE: 121 MMHG

## 2022-12-11 DIAGNOSIS — R19.7 DIARRHEA, UNSPECIFIED TYPE: Primary | ICD-10-CM

## 2022-12-11 LAB
ALBUMIN SERPL-MCNC: 4.1 G/DL (ref 3.5–5.2)
ALBUMIN/GLOB SERPL: 2.1 G/DL
ALP SERPL-CCNC: 83 U/L (ref 39–117)
ALT SERPL W P-5'-P-CCNC: 39 U/L (ref 1–41)
ANION GAP SERPL CALCULATED.3IONS-SCNC: 11.1 MMOL/L (ref 5–15)
AST SERPL-CCNC: 26 U/L (ref 1–40)
BASOPHILS # BLD AUTO: 0.02 10*3/MM3 (ref 0–0.2)
BASOPHILS NFR BLD AUTO: 0.1 % (ref 0–1.5)
BILIRUB SERPL-MCNC: 0.9 MG/DL (ref 0–1.2)
BILIRUB UR QL STRIP: NEGATIVE
BUN SERPL-MCNC: 27 MG/DL (ref 8–23)
BUN/CREAT SERPL: 21.1 (ref 7–25)
CALCIUM SPEC-SCNC: 8.5 MG/DL (ref 8.6–10.5)
CHLORIDE SERPL-SCNC: 100 MMOL/L (ref 98–107)
CLARITY UR: CLEAR
CO2 SERPL-SCNC: 20.9 MMOL/L (ref 22–29)
COLOR UR: YELLOW
CREAT SERPL-MCNC: 1.28 MG/DL (ref 0.76–1.27)
D-LACTATE SERPL-SCNC: 1 MMOL/L (ref 0.5–2)
DEPRECATED RDW RBC AUTO: 43.2 FL (ref 37–54)
EGFRCR SERPLBLD CKD-EPI 2021: 54.2 ML/MIN/1.73
EOSINOPHIL # BLD AUTO: 0.12 10*3/MM3 (ref 0–0.4)
EOSINOPHIL NFR BLD AUTO: 0.9 % (ref 0.3–6.2)
ERYTHROCYTE [DISTWIDTH] IN BLOOD BY AUTOMATED COUNT: 12.9 % (ref 12.3–15.4)
FLUAV SUBTYP SPEC NAA+PROBE: NOT DETECTED
FLUBV RNA ISLT QL NAA+PROBE: NOT DETECTED
GLOBULIN UR ELPH-MCNC: 2 GM/DL
GLUCOSE SERPL-MCNC: 107 MG/DL (ref 65–99)
GLUCOSE UR STRIP-MCNC: NEGATIVE MG/DL
HCT VFR BLD AUTO: 44 % (ref 37.5–51)
HGB BLD-MCNC: 14.6 G/DL (ref 13–17.7)
HGB UR QL STRIP.AUTO: NEGATIVE
HOLD SPECIMEN: NORMAL
HOLD SPECIMEN: NORMAL
IMM GRANULOCYTES # BLD AUTO: 0.14 10*3/MM3 (ref 0–0.05)
IMM GRANULOCYTES NFR BLD AUTO: 1 % (ref 0–0.5)
KETONES UR QL STRIP: NEGATIVE
LEUKOCYTE ESTERASE UR QL STRIP.AUTO: NEGATIVE
LIPASE SERPL-CCNC: 31 U/L (ref 13–60)
LYMPHOCYTES # BLD AUTO: 2.3 10*3/MM3 (ref 0.7–3.1)
LYMPHOCYTES NFR BLD AUTO: 16.5 % (ref 19.6–45.3)
MCH RBC QN AUTO: 30.1 PG (ref 26.6–33)
MCHC RBC AUTO-ENTMCNC: 33.2 G/DL (ref 31.5–35.7)
MCV RBC AUTO: 90.7 FL (ref 79–97)
MONOCYTES # BLD AUTO: 0.8 10*3/MM3 (ref 0.1–0.9)
MONOCYTES NFR BLD AUTO: 5.7 % (ref 5–12)
NEUTROPHILS NFR BLD AUTO: 10.57 10*3/MM3 (ref 1.7–7)
NEUTROPHILS NFR BLD AUTO: 75.8 % (ref 42.7–76)
NITRITE UR QL STRIP: NEGATIVE
NRBC BLD AUTO-RTO: 0 /100 WBC (ref 0–0.2)
PH UR STRIP.AUTO: 6 [PH] (ref 5–8)
PLATELET # BLD AUTO: 154 10*3/MM3 (ref 140–450)
PMV BLD AUTO: 8.7 FL (ref 6–12)
POTASSIUM SERPL-SCNC: 4.2 MMOL/L (ref 3.5–5.2)
PROT SERPL-MCNC: 6.1 G/DL (ref 6–8.5)
PROT UR QL STRIP: NEGATIVE
RBC # BLD AUTO: 4.85 10*6/MM3 (ref 4.14–5.8)
SARS-COV-2 RNA PNL SPEC NAA+PROBE: NOT DETECTED
SODIUM SERPL-SCNC: 132 MMOL/L (ref 136–145)
SP GR UR STRIP: 1.02 (ref 1–1.03)
UROBILINOGEN UR QL STRIP: NORMAL
WBC NRBC COR # BLD: 13.95 10*3/MM3 (ref 3.4–10.8)
WHOLE BLOOD HOLD COAG: NORMAL
WHOLE BLOOD HOLD SPECIMEN: NORMAL

## 2022-12-11 PROCEDURE — 85025 COMPLETE CBC W/AUTO DIFF WBC: CPT | Performed by: EMERGENCY MEDICINE

## 2022-12-11 PROCEDURE — 87636 SARSCOV2 & INF A&B AMP PRB: CPT | Performed by: NURSE PRACTITIONER

## 2022-12-11 PROCEDURE — 80053 COMPREHEN METABOLIC PANEL: CPT | Performed by: EMERGENCY MEDICINE

## 2022-12-11 PROCEDURE — 83605 ASSAY OF LACTIC ACID: CPT | Performed by: EMERGENCY MEDICINE

## 2022-12-11 PROCEDURE — 81003 URINALYSIS AUTO W/O SCOPE: CPT | Performed by: EMERGENCY MEDICINE

## 2022-12-11 PROCEDURE — 83690 ASSAY OF LIPASE: CPT | Performed by: EMERGENCY MEDICINE

## 2022-12-11 PROCEDURE — 25010000002 IOPAMIDOL 61 % SOLUTION: Performed by: EMERGENCY MEDICINE

## 2022-12-11 PROCEDURE — 74177 CT ABD & PELVIS W/CONTRAST: CPT

## 2022-12-11 PROCEDURE — 99283 EMERGENCY DEPT VISIT LOW MDM: CPT

## 2022-12-11 RX ORDER — SODIUM CHLORIDE 0.9 % (FLUSH) 0.9 %
10 SYRINGE (ML) INJECTION AS NEEDED
Status: DISCONTINUED | OUTPATIENT
Start: 2022-12-11 | End: 2022-12-11 | Stop reason: HOSPADM

## 2022-12-11 RX ADMIN — IOPAMIDOL 85 ML: 612 INJECTION, SOLUTION INTRAVENOUS at 18:00

## 2022-12-11 NOTE — ED NOTES
Pt arrives ambulatory to triage for diarrhea that started 3 days ago. Pt denies n/v. Pt reports intermittent abdominal cramping. Pt also reports a fever yesterday. Pt also has a hoarse throat as well. Pt reports a history of GERD.

## 2022-12-11 NOTE — ED PROVIDER NOTES
EMERGENCY DEPARTMENT ENCOUNTER    Room Number:  05/05  Date seen:  12/11/2022  Time seen: 16:08 EST  PCP: Leanna Mancia MD  Historian: pt, wife    HPI:  Chief complaint:abdominal cramping, diarrhea  A complete HPI/ROS/PMH/PSH/SH/FH are unobtainable due to: n/a  Context:Kevin Ellington is a 87 y.o. male with past medical history significant for degenerative disc disease, spinal stenosis, coronary artery disease, hypertension, hyperlipidemia, stage III CKD who presents to the ED with c/o 2-3 days of generalized lower abdominal cramping and 2 days of several episodes of diarrhea.  He also has mild sore throat and one day had a fever.  His problems are not made better/worse by anything and he states he has not taken anything for his diarrhea but has been taking his Pantoprazole for his GERD.  He denies n/v.  Denies h/o abdominal surgeries.     The patient was placed in a mask in triage, hand hygiene was performed before and after my interaction with the patient.  I wore a mask, safety glasses and gloves during my entire interaction with the patient.      MEDICAL RECORD REVIEW    ALLERGIES  Codeine, Hydrocodone, and Sulfa antibiotics    PAST MEDICAL HISTORY  Active Ambulatory Problems     Diagnosis Date Noted   • Bulging lumbar disc 06/16/2016   • DDD (degenerative disc disease), lumbar 06/16/2016   • Leg pain 06/16/2016   • Spinal stenosis of lumbar region with neurogenic claudication 06/16/2016   • Radiculitis, thoracic 06/16/2016   • Status post total left knee replacement using cement 06/16/2016   • Status post total right knee replacement using cement 06/16/2016   • Coronary artery disease involving native coronary artery of native heart without angina pectoris 08/23/2016   • Essential hypertension 08/23/2016   • Hyperlipemia 08/23/2016   • S/P coronary artery stent placement 09/06/2017   • 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/05/2017   • Benign prostatic hyperplasia with urinary frequency 10/05/2017   •  Cervical radiculopathy 02/06/2018   • Bilateral carpal tunnel syndrome 08/20/2018   • Cervical spinal stenosis 08/20/2018   • Arthritis of ankle 12/06/2018   • Chronic pain of right ankle 12/06/2018   • Shortness of breath 07/09/2019   • Bilateral leg weakness 10/21/2019   • History of melanoma 08/27/2020   • Spondylosis of lumbar region without myelopathy or radiculopathy 09/09/2020   • Stage 3a chronic kidney disease (HCC) 10/21/2021   • Pedal edema 10/21/2021   • Gastroesophageal reflux disease without esophagitis 04/29/2022   • TIA (transient ischemic attack) 05/12/2022   • Bilateral carotid artery stenosis 05/12/2022   • Cerebral artery occlusion 08/09/2022   • Hyperglycemia 08/09/2022     Resolved Ambulatory Problems     Diagnosis Date Noted   • Health care maintenance 10/05/2017   • Cancer (Formerly [facility]) 10/04/2018   • Acute bronchitis 02/27/2019     Past Medical History:   Diagnosis Date   • Allergic 1970   • Arthritis    • Asthma 05/23/2019   • Benign prostatic hyperplasia    • Cataract    • Colon polyp    • Coronary artery disease 2001   • CTS (carpal tunnel syndrome) 1998   • Erectile dysfunction    • GERD (gastroesophageal reflux disease)    • HTN (hypertension)    • Hyperlipidemia    • Kidney stone    • Low back pain    • Lumbosacral disc disease    • Melanoma (Formerly [facility]) 01/15/2009   • Obesity    • Scoliosis    • Stented coronary artery    • Thoracic disc disorder        PAST SURGICAL HISTORY  Past Surgical History:   Procedure Laterality Date   • ANKLE FUSION Left 2007    reconstruction and fusion   • BACK SURGERY      HERNIATED LUMBAR DISK 1975,2000,2012   • CARDIAC CATHETERIZATION  2001   • CARPAL TUNNEL RELEASE     • CATARACT EXTRACTION, BILATERAL Bilateral 2002   • COLONOSCOPY     • CORONARY ANGIOPLASTY WITH STENT PLACEMENT  2001   • CORONARY STENT PLACEMENT     • EPIDURAL BLOCK  Several at [facility]    and [facility]   • EYE SURGERY     • JOINT REPLACEMENT     • REPLACEMENT TOTAL KNEE Left 2015    • REPLACEMENT TOTAL KNEE Right    • SKIN CANCER EXCISION      MELANOMA   • SPINE SURGERY     • THORACIC SPINE SURGERY         FAMILY HISTORY  Family History   Problem Relation Age of Onset   • Hypertension Mother    • Arthritis Mother            • Skin cancer Mother    • Heart disease Father    • Early death Father         Aortic aneurism age 58   • Hypertension Father    • Aortic aneurysm Father 58   • Cancer Neg Hx        SOCIAL HISTORY  Social History     Socioeconomic History   • Marital status:    • Number of children: 1   • Years of education: [employer]   Tobacco Use   • Smoking status: Never   • Smokeless tobacco: Never   • Tobacco comments:     caffeine use   Vaping Use   • Vaping Use: Never used   Substance and Sexual Activity   • Alcohol use: No   • Drug use: No   • Sexual activity: Not Currently     Partners: Female       REVIEW OF SYSTEMS  Review of Systems    All systems reviewed and negative except for those discussed in HPI.     PHYSICAL EXAM    ED Triage Vitals [22 1436]   Temp Heart Rate Resp BP SpO2   97.5 °F (36.4 °C) 70 16 -- 97 %      Temp src Heart Rate Source Patient Position BP Location FiO2 (%)   Tympanic Monitor -- -- --     Physical Exam    I have reviewed the triage vital signs and nursing notes.      GENERAL: not distressed  HENT: nares patent  EYES: no scleral icterus  NECK: no ROM limitations  CV: regular rhythm, regular rate, no murmur  RESPIRATORY: normal effort, CTAB  ABDOMEN: soft, large and rounded.  There is no focal tenderness. No guarding and no rebound.  Bowel sounds +  : deferred  MUSCULOSKELETAL: no deformity  NEURO: alert, moves all extremities, follows commands  SKIN: warm, dry    LAB RESULTS  Recent Results (from the past 24 hour(s))   Comprehensive Metabolic Panel    Collection Time: 22  5:10 PM    Specimen: Blood   Result Value Ref Range    Glucose 107 (H) 65 - 99 mg/dL    BUN 27 (H) 8 - 23 mg/dL    Creatinine 1.28 (H) 0.76 - 1.27 mg/dL     Sodium 132 (L) 136 - 145 mmol/L    Potassium 4.2 3.5 - 5.2 mmol/L    Chloride 100 98 - 107 mmol/L    CO2 20.9 (L) 22.0 - 29.0 mmol/L    Calcium 8.5 (L) 8.6 - 10.5 mg/dL    Total Protein 6.1 6.0 - 8.5 g/dL    Albumin 4.10 3.50 - 5.20 g/dL    ALT (SGPT) 39 1 - 41 U/L    AST (SGOT) 26 1 - 40 U/L    Alkaline Phosphatase 83 39 - 117 U/L    Total Bilirubin 0.9 0.0 - 1.2 mg/dL    Globulin 2.0 gm/dL    A/G Ratio 2.1 g/dL    BUN/Creatinine Ratio 21.1 7.0 - 25.0    Anion Gap 11.1 5.0 - 15.0 mmol/L    eGFR 54.2 (L) >60.0 mL/min/1.73   Lipase    Collection Time: 12/11/22  5:10 PM    Specimen: Blood   Result Value Ref Range    Lipase 31 13 - 60 U/L   Lactic Acid, Plasma    Collection Time: 12/11/22  5:10 PM    Specimen: Blood   Result Value Ref Range    Lactate 1.0 0.5 - 2.0 mmol/L   Green Top (Gel)    Collection Time: 12/11/22  5:10 PM   Result Value Ref Range    Extra Tube Hold for add-ons.    Lavender Top    Collection Time: 12/11/22  5:10 PM   Result Value Ref Range    Extra Tube hold for add-on    Gold Top - SST    Collection Time: 12/11/22  5:10 PM   Result Value Ref Range    Extra Tube Hold for add-ons.    Light Blue Top    Collection Time: 12/11/22  5:10 PM   Result Value Ref Range    Extra Tube Hold for add-ons.    CBC Auto Differential    Collection Time: 12/11/22  5:10 PM    Specimen: Blood   Result Value Ref Range    WBC 13.95 (H) 3.40 - 10.80 10*3/mm3    RBC 4.85 4.14 - 5.80 10*6/mm3    Hemoglobin 14.6 13.0 - 17.7 g/dL    Hematocrit 44.0 37.5 - 51.0 %    MCV 90.7 79.0 - 97.0 fL    MCH 30.1 26.6 - 33.0 pg    MCHC 33.2 31.5 - 35.7 g/dL    RDW 12.9 12.3 - 15.4 %    RDW-SD 43.2 37.0 - 54.0 fl    MPV 8.7 6.0 - 12.0 fL    Platelets 154 140 - 450 10*3/mm3    Neutrophil % 75.8 42.7 - 76.0 %    Lymphocyte % 16.5 (L) 19.6 - 45.3 %    Monocyte % 5.7 5.0 - 12.0 %    Eosinophil % 0.9 0.3 - 6.2 %    Basophil % 0.1 0.0 - 1.5 %    Immature Grans % 1.0 (H) 0.0 - 0.5 %    Neutrophils, Absolute 10.57 (H) 1.70 - 7.00 10*3/mm3     Lymphocytes, Absolute 2.30 0.70 - 3.10 10*3/mm3    Monocytes, Absolute 0.80 0.10 - 0.90 10*3/mm3    Eosinophils, Absolute 0.12 0.00 - 0.40 10*3/mm3    Basophils, Absolute 0.02 0.00 - 0.20 10*3/mm3    Immature Grans, Absolute 0.14 (H) 0.00 - 0.05 10*3/mm3    nRBC 0.0 0.0 - 0.2 /100 WBC   Urinalysis With Microscopic If Indicated (No Culture) - Urine, Clean Catch    Collection Time: 12/11/22  6:30 PM    Specimen: Urine, Clean Catch   Result Value Ref Range    Color, UA Yellow Yellow, Straw    Appearance, UA Clear Clear    pH, UA 6.0 5.0 - 8.0    Specific Gravity, UA 1.016 1.005 - 1.030    Glucose, UA Negative Negative    Ketones, UA Negative Negative    Bilirubin, UA Negative Negative    Blood, UA Negative Negative    Protein, UA Negative Negative    Leuk Esterase, UA Negative Negative    Nitrite, UA Negative Negative    Urobilinogen, UA 0.2 E.U./dL 0.2 - 1.0 E.U./dL   COVID-19 and FLU A/B PCR - Swab, Nasopharynx    Collection Time: 12/11/22  6:35 PM    Specimen: Nasopharynx; Swab   Result Value Ref Range    COVID19 Not Detected Not Detected - Ref. Range    Influenza A PCR Not Detected Not Detected    Influenza B PCR Not Detected Not Detected         RADIOLOGY RESULTS  CT Abdomen Pelvis With Contrast    Result Date: 12/11/2022  CT ABDOMEN PELVIS W CONTRAST-  HISTORY:  Diarrhea, abdominal pain.  TECHNIQUE:  CT of the abdomen and pelvis was performed following the administration of intravenous contrast.    Radiation dose reduction techniques were utilized, including automated exposure control and exposure modulation based on body size.  COMPARISON:  CT abdomen and pelvis 6/16/2012  FINDINGS:  Heart size is normal. There is no pericardial effusion. There is calcific coronary artery atherosclerosis. Lung bases and pleural spaces are clear. The liver is normal in size. No suspicious focal intrahepatic lesion is identified. There is cholelithiasis. The spleen is normal in size. The pancreas is within normal limits. The  adrenal glands are within normal limits. There are punctate nonobstructing right renal stones. There is no hydronephrosis. No pathologically enlarged abdominal or pelvic lymph nodes are identified. There is moderate calcific aortoiliac atherosclerosis. There is no free fluid. There is a small hiatal hernia. There is no bowel obstruction. There is colonic diverticulosis. The appendix is visualized. The bladder is moderately distended. The prostate is enlarged. There is bilateral gynecomastia. There is multilevel degenerative disc disease. There is diffuse osseous mineralization. There is a vertebral hemangioma at L3. There is mild curvature of the spine.        1.  Small hiatal hernia. Colonic diverticulosis. No bowel obstruction. 2.  Cholelithiasis. 3.  Nonobstructing right renal stones.  Discussed with NANCY Storey at 7:00 PM.  This report was finalized on 12/11/2022 7:01 PM by Dr. Sophia Garcia M.D.           PROGRESS, DATA ANALYSIS, CONSULTS AND MEDICAL DECISION MAKING  All labs have been independently reviewed by me.  All radiology studies have been reviewed by me and discussed with radiologist dictating the report.  EKG's independently viewed and interpreted by me unless stated otherwise. Discussion below represents my analysis of pertinent findings related to patient's condition, differential diagnosis, treatment plan and final disposition.       Social Determinants of Health     Financial Resource Strain: Not on file   Food Insecurity: denies  Transportation Needs: Not on file   Physical Activity: Not on file   Stress: Not on file   Social Connections: Not on file   Intimate Partner Violence: Not on file   Housing Stability: lives with wife      Orders placed during this visit:  Orders Placed This Encounter   Procedures   • Gastrointestinal Panel, PCR - Stool, Per Rectum   • COVID-19 and FLU A/B PCR - Swab, Nasopharynx   • CT Abdomen Pelvis With Contrast   • [place] Draw   • Comprehensive Metabolic Panel    • Lipase   • Urinalysis With Microscopic If Indicated (No Culture) - Urine, Clean Catch   • Lactic Acid, Plasma   • CBC Auto Differential   • NPO Diet NPO Type: Strict NPO   • Undress & Gown   • Insert Peripheral IV   • CBC & Differential   • Green Top (Gel)   • Lavender Top   • Gold Top - SST   • Light Blue Top         ED Course as of 12/11/22 1932   Sun Dec 11, 2022   1706 My differential diagnosis for abdominal pain includes but is not limited to:  Gastritis, gastroenteritis, peptic ulcer disease, GERD, esophageal perforation, acute appendicitis, mesenteric adenitis, Meckel's diverticulum, epiploic appendagitis, diverticulitis, colon cancer, ulcerative colitis, Crohn's disease, intussusception, small bowel obstruction, adhesions, ischemic bowel, perforated viscus, ileus, obstipation, biliary colic, cholecystitis, cholelithiasis, Kun-Rashad Paul, hepatitis, pancreatitis, common bile duct obstruction, cholangitis, bile leak, splenic trauma, splenic rupture, splenic infarction, splenic abscess, abdominal abscess, ascites, spontaneous bacterial peritonitis, hernia, UTI, cystitis, prostatitis, ureterolithiasis, urinary obstruction, ovarian cyst, torsion, pregnancy, ectopic pregnancy, PID, pelvic abscess, mittelschmerz, endometriosis, AAA, myocardial infarction, pneumonia, cancer, porphyria, DKA, medications, sickle cell, viral syndrome, zoster     [EW]   1759 WBC(!): 13.95 [EW]   1827 WBC(!): 13.95 [MM]   1828 Creatinine(!): 1.28  Chronic renal insufficiency.  This is just a little worse from baseline. [MM]   1828 Lactate: 1.0 [MM]   1929 Pt looks better.  I discussed his labs and CT scan with him and wife. Flu and covid negative. I offered admission to observe overnight and he has declined.  I have given strong RTER precautions.  [EW]      ED Course User Index  [EW] Sophia Ba APRN  [MM] Woodrow Leslie MD       Reviewed pt's history and workup with Dr. Leslie.  After a bedside evaluation, Dr. Leslie  "agrees with the plan of care.    The patient's history, physical exam, and lab findings were discussed with the physician, who also performed a face to face history and physical exam.  I discussed all results and noted any abnormalities with patient.  Discussed absoute need to recheck abnormalities with their family physician.  I answered any of the patient's questions.  Discussed plan for discharge, as there is no emergent indication for admission.  Pt is agreeable and understands need for follow up and repeat testing.  Pt is aware that discharge does not mean that nothing is wrong but it indicates no emergency is present and they must continue care with their family physician.  Pt is discharged with instructions to follow up with primary care doctor to have their blood pressure rechecked.       Disposition vitals:  /78   Pulse 61   Temp 97.5 °F (36.4 °C) (Tympanic)   Resp 16   Ht 177.8 cm (70\")   Wt 91.6 kg (202 lb)   SpO2 99%   BMI 28.98 kg/m²       DIAGNOSIS  Final diagnoses:   Diarrhea, unspecified type       FOLLOW UP   Rashard Mathews MD  [address]  [place] [address]  [phone]    Schedule an appointment as soon as possible for a visit   for further evaluation of GERD        Latest Documented Vital Signs:  As of 19:32 EST  BP- 121/78 HR- 61 Temp- 97.5 °F (36.4 °C) (Tympanic) O2 sat- 99%    Please note that portions of this were completed with a voice recognition program.     Note Disclaimer: At [facility], we believe that sharing information builds trust and better relationships. You are receiving this note because you are receiving care at [facility] or recently visited. It is possible you will see health information before a provider has talked with you about it. This kind of information can be easy to misunderstand. To help you fully understand what it means for your health, we urge you to discuss this note with your provider.             Sophia Ba, " APRN  12/11/22 1932

## 2022-12-11 NOTE — ED PROVIDER NOTES
I supervised care provided by the midlevel provider.   We have discussed this patient's history, physical exam, and treatment plan.  I have reviewed the note and personally saw and examined the patient and agree with the plan of care.   I have seen and examined this gentleman.  When I am seeing him and talking to him and his spouse he currently is feeling better.  He is received IV fluids.  His symptoms started about 5 days ago.  He had some crampy abdominal pain it was diffuse.  He had a scratchy throat and also felt as if he had chills and possibly a fever.  He did not take his temperature.  He has not had those chills with a fever for the past several days.  The scratchy throat resolved.  He thought the scratchy throat could have been from GERD.  The abdominal pain improved and then he developed diarrhea.  For the past 3 days he has had several bouts of diarrhea loose stool.  He reports no blood in the diarrhea.  He has been tolerating liquids and solids.  He has had no vomiting.  Currently when I am seeing him he does not have pain in his belly.  Does not report chest pain, cough, shortness of breath.  GENERAL: Elderly male.  He does not appear septic or toxic.  Not distressed  HENT: nares patent  Head/neck/ face are symmetric without gross deformity or swelling  EYES: no scleral icterus  CV: regular rhythm, regular rate with intact distal pulses  RESPIRATORY: normal effort and no respiratory distress  ABDOMEN: soft and nontender.  Obese.  Normal bowel  MUSCULOSKELETAL: no deformity.  Intact distal pulses  NEURO: alert and appropriate, moves all extremities, follows commands.  No focal motor or sensory changes  SKIN: warm, dry    Vital signs and nursing notes reviewed.    Plan   ED Course as of 12/11/22 [id number]   Sun Dec 11, 2022   1706 My differential diagnosis for abdominal pain includes but is not limited to:  Gastritis, gastroenteritis, peptic ulcer disease, GERD, esophageal perforation, acute appendicitis,  mesenteric adenitis, Meckel's diverticulum, epiploic appendagitis, diverticulitis, colon cancer, ulcerative colitis, Crohn's disease, intussusception, small bowel obstruction, adhesions, ischemic bowel, perforated viscus, ileus, obstipation, biliary colic, cholecystitis, cholelithiasis, Kun-Rashad Paul, hepatitis, pancreatitis, common bile duct obstruction, cholangitis, bile leak, splenic trauma, splenic rupture, splenic infarction, splenic abscess, abdominal abscess, ascites, spontaneous bacterial peritonitis, hernia, UTI, cystitis, prostatitis, ureterolithiasis, urinary obstruction, ovarian cyst, torsion, pregnancy, ectopic pregnancy, PID, pelvic abscess, mittelschmerz, endometriosis, AAA, myocardial infarction, pneumonia, cancer, porphyria, DKA, medications, sickle cell, viral syndrome, zoster     [EW]   1759 WBC(!): 13.95 [EW]   1827 WBC(!): 13.95 [MM]   1828 Creatinine(!): 1.28  Chronic renal insufficiency.  This is just a little worse from baseline. [MM]   1828 Lactate: 1.0 [MM]   1929 Pt looks better.  I discussed his labs and CT scan with him and wife. Flu and covid negative. I offered admission to observe overnight and he has declined.  I have given strong RTER precautions.  [EW]      ED Course User Index  [EW] Sophia Ba APRN  [MM] Woodrow Leslie MD     Lab work has been reviewed and he does have a leukocytosis.  He does have some chronic renal impairment with his renal function fairly similar to baseline.  CT scan of the abdomen pelvis is pending.  We will also do a viral respiratory panel.  We will reassess him.  He is feeling better.  All questions answered at this time.  I discussed the case with Sophia the midlevel provider as well as the patient and spouse    We are currently under a pandemic from the COVID19 infection.  The patient presented to the emergency department by ambulance or personal vehicle. I followed the current protocols required by Infection Control at [facility]  [place] in my evaluation and treatment of the patient. The patient was wearing a face mask during my evaluation and throughout my encounter. During my whole encounter with this patient I used appropriate personal protective equipment.  This equipment consisted of eye protection, facemask, gown, and gloves.  I applied this equipment before entering the room.           Woodrow Leslie MD  12/11/22 [id number]

## 2022-12-13 ENCOUNTER — OFFICE VISIT (OUTPATIENT)
Dept: GASTROENTEROLOGY | Facility: CLINIC | Age: 87
End: 2022-12-13

## 2022-12-13 ENCOUNTER — PREP FOR SURGERY (OUTPATIENT)
Dept: SURGERY | Facility: SURGERY CENTER | Age: 87
End: 2022-12-13

## 2022-12-13 VITALS
TEMPERATURE: 97.1 F | HEART RATE: 62 BPM | HEIGHT: 70 IN | OXYGEN SATURATION: 97 % | WEIGHT: 202 LBS | BODY MASS INDEX: 28.92 KG/M2 | DIASTOLIC BLOOD PRESSURE: 70 MMHG | SYSTOLIC BLOOD PRESSURE: 124 MMHG

## 2022-12-13 DIAGNOSIS — R49.0 HOARSENESS: ICD-10-CM

## 2022-12-13 DIAGNOSIS — K21.9 GASTROESOPHAGEAL REFLUX DISEASE, UNSPECIFIED WHETHER ESOPHAGITIS PRESENT: Primary | ICD-10-CM

## 2022-12-13 DIAGNOSIS — R19.8 ABDOMINAL FULLNESS: ICD-10-CM

## 2022-12-13 DIAGNOSIS — R19.7 DIARRHEA, UNSPECIFIED TYPE: ICD-10-CM

## 2022-12-13 DIAGNOSIS — K21.9 GASTROESOPHAGEAL REFLUX DISEASE, UNSPECIFIED WHETHER ESOPHAGITIS PRESENT: Primary | Chronic | ICD-10-CM

## 2022-12-13 DIAGNOSIS — K44.9 HIATAL HERNIA: ICD-10-CM

## 2022-12-13 PROCEDURE — 99214 OFFICE O/P EST MOD 30 MIN: CPT | Performed by: NURSE PRACTITIONER

## 2022-12-13 RX ORDER — SODIUM CHLORIDE 0.9 % (FLUSH) 0.9 %
3 SYRINGE (ML) INJECTION EVERY 12 HOURS SCHEDULED
Status: CANCELLED | OUTPATIENT
Start: 2022-12-13

## 2022-12-13 RX ORDER — SODIUM CHLORIDE, SODIUM LACTATE, POTASSIUM CHLORIDE, CALCIUM CHLORIDE 600; 310; 30; 20 MG/100ML; MG/100ML; MG/100ML; MG/100ML
30 INJECTION, SOLUTION INTRAVENOUS CONTINUOUS PRN
Status: CANCELLED | OUTPATIENT
Start: 2022-12-13

## 2022-12-13 RX ORDER — SODIUM CHLORIDE 0.9 % (FLUSH) 0.9 %
10 SYRINGE (ML) INJECTION AS NEEDED
Status: CANCELLED | OUTPATIENT
Start: 2022-12-13

## 2022-12-13 NOTE — PATIENT INSTRUCTIONS
Continue pantoprazole 40 mg daily. We recommend changing the timing of your pantoprazole and will have you take this 1 hour before your evening meal.     2.  For GERD, we recommend avoiding eating 3-4 hours before bedtime, eating smaller more frequent meals, and avoiding any known food triggers including spicy foods, tomatoes and tomato-based sauces, chocolate, coffee/tea, citrus fruits, carbonated  beverages and alcohol.     3.   For further evaluation of GERD and hoarseness and a full feeling in your stomach, we will schedule an EGD.    4.   We recommend that you hold your stool softener and prunes until your bowel habits return to baseline. If your diarrhea recurs, please contact our office as we will want to add a flexible sigmoidoscopy to your procedure.     5.  Plan for office follow up 4 weeks after EGD to discuss results and reassess symptoms.     6.  Continue daily probiotic.

## 2022-12-13 NOTE — H&P (VIEW-ONLY)
"Chief Complaint   Patient presents with   • Heartburn   • Diarrhea         History of Present Illness  87-year-old male presents the office today for evaluation of heartburn and diarrhea.  His wife has accompanied him on his office visit today and has helped to facilitate answering some questions as well.    He is currently taking pantoprazole 40 mg daily for management of GERD. He was previously taking pantoprazole 20 mg daily with minimal relief. His symptoms are nocturnal and he experiences reflux at night.  He denies any nausea, vomiting, or dysphagia.    His wife reports that after back surgery in 2012 he had problems digesting food. She reports that his digestive system \"shut down\". She reports that there was not any blockage. He feels that food sits in his stomach for long periods of time before food passes through. He has never had an EGD. He denies early satiety. He denies any nausea or vomiting. He is not diabetic. He will eat at 6 pm and still feel full around 12 Midnight. He will then wake up with reflux in the back of his throat. He also reports hoarseness.     He reports a history of diarrhea which began on Thursday. He denied taking any different medications. He drank eggnog on Thursday-which he does not drink regularly. Diarrhea has since resolved. He reports that he did not have a BM last night or this morning. Last BM was yesterday and was liquid/loose/fluffy. He has continued to take a stool softener daily and eats prunes, and continued this regimen while still having diarrhea. His previous baseline bowel pattern was more along the constipation spectrum. He would typically have a BM every 2-3 days. With the stool softeners and prune juice his BMs increased in frequency to daily to every other day. He denies any melena or hematochezia. He denies any lower abdominal pain. He reports his last colonoscopy was performed about 6-7 years ago. He has a history of polyps, but was told that he did not " "require any further colonoscopies due to age. He denies any family of colon cancer.     Review of Systems   Constitutional: Positive for unexpected weight change. Negative for fever.   HENT: Negative for trouble swallowing.    Cardiovascular: Negative for chest pain.   Gastrointestinal: Positive for diarrhea. Negative for abdominal distention, abdominal pain, anal bleeding, blood in stool, constipation, nausea, rectal pain and vomiting.      Result Review :       CBC & Differential (12/11/2022 17:10)  Comprehensive Metabolic Panel (12/11/2022 17:10)  Hemoglobin A1c (11/10/2022 10:19)  CT Abdomen Pelvis With Contrast (12/11/2022 18:03)    Vital Signs:   /70   Pulse 62   Temp 97.1 °F (36.2 °C)   Ht 177.8 cm (70\")   Wt 91.6 kg (202 lb)   SpO2 97%   BMI 28.98 kg/m²     Body mass index is 28.98 kg/m².     Physical Exam  Vitals reviewed.   Constitutional:       Appearance: Normal appearance.   HENT:      Head: Normocephalic.      Nose: Nose normal.      Mouth/Throat:      Mouth: Mucous membranes are moist.   Eyes:      General: No scleral icterus.     Extraocular Movements: Extraocular movements intact.   Cardiovascular:      Rate and Rhythm: Normal rate and regular rhythm.      Pulses: Normal pulses.      Heart sounds: Normal heart sounds. No murmur heard.  Pulmonary:      Effort: Pulmonary effort is normal. No respiratory distress.      Breath sounds: Normal breath sounds.   Abdominal:      General: Abdomen is flat. Bowel sounds are normal. There is no distension.      Palpations: Abdomen is soft. There is no mass.      Tenderness: There is no abdominal tenderness. There is no guarding.   Musculoskeletal:         General: Normal range of motion.      Cervical back: Normal range of motion and neck supple.   Skin:     General: Skin is warm and dry.   Neurological:      General: No focal deficit present.      Mental Status: He is alert and oriented to person, place, and time.   Psychiatric:         Behavior: " Behavior normal.         Thought Content: Thought content normal.         Judgment: Judgment normal.       Assessment and Plan    Diagnoses and all orders for this visit:    1. Gastroesophageal reflux disease, unspecified whether esophagitis present (Primary)    2. Hoarseness    3. Diarrhea, unspecified type    4. Abdominal fullness           Patient Instructions   1. Continue pantoprazole 40 mg daily. We recommend changing the timing of your pantoprazole and will have you take this 1 hour before your evening meal.     2.  For GERD, we recommend avoiding eating 3-4 hours before bedtime, eating smaller more frequent meals, and avoiding any known food triggers including spicy foods, tomatoes and tomato-based sauces, chocolate, coffee/tea, citrus fruits, carbonated  beverages and alcohol.     3.   For further evaluation of GERD and hoarseness and a full feeling in your stomach, we will schedule an EGD.    4.   We recommend that you hold your stool softener and prunes until your bowel habits return to baseline. If your diarrhea recurs, please contact our office as we will want to add a flexible sigmoidoscopy to your procedure.     5.  Plan for office follow up 4 weeks after EGD to discuss results and reassess symptoms.     6.  Continue daily probiotic.         Discussion:    For GERD and hoarseness, we will have the patient continue pantoprazole 40 mg once daily and have recommended he change the timing and take this 1 hour before his evening meal due to his predominance of symptoms in the evening.  We will schedule an EGD for further evaluation.  If EGD is negative, patient may require referral to ENT for further evaluation.    In regards to his bowel habits, we will wait to see how he regulates.  Patient was recommended to hold his stool softener and prunes until his bowel habits returned to more of a baseline status.  If constipation presents again, patient to continue his previous regimen.  If diarrhea persists, we  will plan to add a flexible sigmoidoscopy and take random biopsies to assess for microscopic colitis.  Patient was instructed to contact the office should his diarrhea recur.  We recommend he continue his daily probiotic.  We will plan for office follow-up visit 4 weeks after EGD to discuss results, reassess symptoms, and devise plan of care moving forward.  Both the patient and his wife verbalized understanding of above plan of care and are in agreement.  All questions answered and support provided.  EMR Dragon/Transcription Disclaimer:  This document has been Dictated utilizing Dragon dictation.

## 2022-12-15 NOTE — SIGNIFICANT NOTE
Education provided the Patient on the following:    - Nothing to Eat or Drink after MN the night before the procedure  -You will need to have someone drive you home after your EGD and remain with you for 24 hours after the EGD  - The date of your EGD, your are welcome to have one visitor at bedside or remain within 10-15 minutes of [facility]  -Please wear warm socks when you arrive for your EGD  -Remove all jewelry and leave any valuables before arriving on the date of your procedure (all will have to be removed before leaving preop)  -You will need to arrive at 0630 on 12/19/22 EGD  -Feel free to contact us at: [phone] with any additional questions/concerns

## 2022-12-19 ENCOUNTER — HOSPITAL ENCOUNTER (OUTPATIENT)
Facility: SURGERY CENTER | Age: 87
Setting detail: HOSPITAL OUTPATIENT SURGERY
Discharge: HOME | End: 2022-12-19
Attending: INTERNAL MEDICINE | Admitting: INTERNAL MEDICINE

## 2022-12-19 ENCOUNTER — ANESTHESIA EVENT (OUTPATIENT)
Dept: SURGERY | Facility: SURGERY CENTER | Age: 87
End: 2022-12-19

## 2022-12-19 ENCOUNTER — ANESTHESIA (OUTPATIENT)
Dept: SURGERY | Facility: SURGERY CENTER | Age: 87
End: 2022-12-19

## 2022-12-19 VITALS
HEIGHT: 70 IN | BODY MASS INDEX: 29.98 KG/M2 | WEIGHT: 209.4 LBS | RESPIRATION RATE: 16 BRPM | SYSTOLIC BLOOD PRESSURE: 107 MMHG | TEMPERATURE: 97.7 F | DIASTOLIC BLOOD PRESSURE: 64 MMHG | OXYGEN SATURATION: 97 % | HEART RATE: 54 BPM

## 2022-12-19 DIAGNOSIS — K21.9 GASTROESOPHAGEAL REFLUX DISEASE, UNSPECIFIED WHETHER ESOPHAGITIS PRESENT: ICD-10-CM

## 2022-12-19 DIAGNOSIS — K44.9 HIATAL HERNIA: ICD-10-CM

## 2022-12-19 DIAGNOSIS — R49.0 HOARSENESS: ICD-10-CM

## 2022-12-19 DIAGNOSIS — R19.8 ABDOMINAL FULLNESS: ICD-10-CM

## 2022-12-19 PROCEDURE — 88305 TISSUE EXAM BY PATHOLOGIST: CPT | Performed by: INTERNAL MEDICINE

## 2022-12-19 PROCEDURE — 25010000002 PROPOFOL 10 MG/ML EMULSION: Performed by: STUDENT IN AN ORGANIZED HEALTH CARE EDUCATION/TRAINING PROGRAM

## 2022-12-19 PROCEDURE — 43239 EGD BIOPSY SINGLE/MULTIPLE: CPT | Performed by: INTERNAL MEDICINE

## 2022-12-19 RX ORDER — SODIUM CHLORIDE, SODIUM LACTATE, POTASSIUM CHLORIDE, CALCIUM CHLORIDE 600; 310; 30; 20 MG/100ML; MG/100ML; MG/100ML; MG/100ML
30 INJECTION, SOLUTION INTRAVENOUS CONTINUOUS PRN
Status: DISCONTINUED | OUTPATIENT
Start: 2022-12-19 | End: 2022-12-19 | Stop reason: HOSPADM

## 2022-12-19 RX ORDER — SODIUM CHLORIDE 0.9 % (FLUSH) 0.9 %
3 SYRINGE (ML) INJECTION EVERY 12 HOURS SCHEDULED
Status: DISCONTINUED | OUTPATIENT
Start: 2022-12-19 | End: 2022-12-19 | Stop reason: HOSPADM

## 2022-12-19 RX ORDER — SODIUM CHLORIDE, SODIUM LACTATE, POTASSIUM CHLORIDE, CALCIUM CHLORIDE 600; 310; 30; 20 MG/100ML; MG/100ML; MG/100ML; MG/100ML
1000 INJECTION, SOLUTION INTRAVENOUS CONTINUOUS
Status: DISCONTINUED | OUTPATIENT
Start: 2022-12-19 | End: 2022-12-19 | Stop reason: HOSPADM

## 2022-12-19 RX ORDER — MAGNESIUM HYDROXIDE 1200 MG/15ML
LIQUID ORAL AS NEEDED
Status: DISCONTINUED | OUTPATIENT
Start: 2022-12-19 | End: 2022-12-19 | Stop reason: HOSPADM

## 2022-12-19 RX ORDER — SODIUM CHLORIDE 0.9 % (FLUSH) 0.9 %
10 SYRINGE (ML) INJECTION AS NEEDED
Status: DISCONTINUED | OUTPATIENT
Start: 2022-12-19 | End: 2022-12-19 | Stop reason: HOSPADM

## 2022-12-19 RX ORDER — LIDOCAINE HYDROCHLORIDE 10 MG/ML
0.5 INJECTION, SOLUTION INFILTRATION; PERINEURAL ONCE AS NEEDED
Status: DISCONTINUED | OUTPATIENT
Start: 2022-12-19 | End: 2022-12-19 | Stop reason: HOSPADM

## 2022-12-19 RX ORDER — LIDOCAINE HYDROCHLORIDE 20 MG/ML
INJECTION, SOLUTION INFILTRATION; PERINEURAL AS NEEDED
Status: DISCONTINUED | OUTPATIENT
Start: 2022-12-19 | End: 2022-12-19 | Stop reason: SURG

## 2022-12-19 RX ORDER — PROPOFOL 10 MG/ML
VIAL (ML) INTRAVENOUS AS NEEDED
Status: DISCONTINUED | OUTPATIENT
Start: 2022-12-19 | End: 2022-12-19 | Stop reason: SURG

## 2022-12-19 RX ADMIN — PROPOFOL 140 MCG/KG/MIN: 10 INJECTION, EMULSION INTRAVENOUS at 07:57

## 2022-12-19 RX ADMIN — SODIUM CHLORIDE, POTASSIUM CHLORIDE, SODIUM LACTATE AND CALCIUM CHLORIDE 30 ML/HR: 600; 310; 30; 20 INJECTION, SOLUTION INTRAVENOUS at 07:05

## 2022-12-19 RX ADMIN — LIDOCAINE HYDROCHLORIDE 60 MG: 20 INJECTION, SOLUTION INFILTRATION; PERINEURAL at 07:56

## 2022-12-19 RX ADMIN — PROPOFOL 100 MG: 10 INJECTION, EMULSION INTRAVENOUS at 07:56

## 2022-12-19 RX ADMIN — SODIUM CHLORIDE, SODIUM LACTATE, POTASSIUM CHLORIDE, AND CALCIUM CHLORIDE: .6; .31; .03; .02 INJECTION, SOLUTION INTRAVENOUS at 07:53

## 2022-12-19 NOTE — ANESTHESIA POSTPROCEDURE EVALUATION
Patient: Kevin Ellington    Procedure Summary     Date: 12/19/22 Room / Location: [facility]    Anesthesia Start: 0751 Anesthesia Stop: 0806    Procedure: ESOPHAGOGASTRODUODENOSCOPY WITH BIOPSY Diagnosis:       Gastroesophageal reflux disease, unspecified whether esophagitis present      Hoarseness      Hiatal hernia      Abdominal fullness      (Gastroesophageal reflux disease, unspecified whether esophagitis present [K21.9])      (Hoarseness [R49.0])      (Hiatal hernia [K44.9])      (Abdominal fullness [R19.8])    Surgeons: Rashard Mathews MD Provider: Jovani Kang MD    Anesthesia Type: MAC ASA Status: 3          Anesthesia Type: MAC    Vitals  Vitals Value Taken Time   BP 89/54 12/19/22 0809   Temp 36.5 °C (97.7 °F) 12/19/22 0805   Pulse 52 12/19/22 0809   Resp 16 12/19/22 0809   SpO2 98 % 12/19/22 0809           Post Anesthesia Care and Evaluation    Patient location during evaluation: bedside  Patient participation: complete - patient participated  Level of consciousness: awake and alert  Pain management: adequate    Airway patency: patent  Anesthetic complications: No anesthetic complications  PONV Status: controlled  Cardiovascular status: blood pressure returned to baseline and acceptable  Respiratory status: acceptable  Hydration status: acceptable

## 2022-12-20 LAB
LAB AP CASE REPORT: NORMAL
LAB AP CLINICAL INFORMATION: NORMAL
PATH REPORT.FINAL DX SPEC: NORMAL
PATH REPORT.GROSS SPEC: NORMAL

## 2023-01-11 DIAGNOSIS — K21.9 GASTROESOPHAGEAL REFLUX DISEASE WITHOUT ESOPHAGITIS: ICD-10-CM

## 2023-01-11 RX ORDER — PANTOPRAZOLE SODIUM 40 MG/1
40 TABLET, DELAYED RELEASE ORAL DAILY
Qty: 30 TABLET | Refills: 1 | Status: SHIPPED | OUTPATIENT
Start: 2023-01-11

## 2023-01-11 NOTE — TELEPHONE ENCOUNTER
Caller: Chandana Kevin W    Relationship: Self    Best call back number: [phone]    Requested Prescriptions:   Requested Prescriptions     Pending Prescriptions Disp Refills   • pantoprazole (PROTONIX) 40 MG EC tablet 30 tablet 1     Sig: Take 1 tablet by mouth Daily.        Pharmacy where request should be sent: [facility] - [address] [phone] [facility] [phone] FX     Additional details provided by patient:1 WEEK LEFT    Does the patient have less than a 3 day supply:  [] Yes  [x] No    Would you like a call back once the refill request has been completed: [] Yes [x] No    If the office needs to give you a call back, can they leave a voicemail: [x] Yes [] No    Uziel Jaeger Rep   01/11/23 16:24 EST

## 2023-01-17 ENCOUNTER — OFFICE VISIT (OUTPATIENT)
Dept: GASTROENTEROLOGY | Facility: CLINIC | Age: 88
End: 2023-01-17
Payer: MEDICARE

## 2023-01-17 VITALS
TEMPERATURE: 97.5 F | SYSTOLIC BLOOD PRESSURE: 106 MMHG | WEIGHT: 212.2 LBS | OXYGEN SATURATION: 96 % | BODY MASS INDEX: 30.38 KG/M2 | HEIGHT: 70 IN | DIASTOLIC BLOOD PRESSURE: 74 MMHG | HEART RATE: 68 BPM

## 2023-01-17 DIAGNOSIS — R19.7 DIARRHEA, UNSPECIFIED TYPE: ICD-10-CM

## 2023-01-17 DIAGNOSIS — K59.00 CONSTIPATION, UNSPECIFIED CONSTIPATION TYPE: Chronic | ICD-10-CM

## 2023-01-17 DIAGNOSIS — K44.9 HIATAL HERNIA: Primary | Chronic | ICD-10-CM

## 2023-01-17 DIAGNOSIS — K21.9 GASTROESOPHAGEAL REFLUX DISEASE, UNSPECIFIED WHETHER ESOPHAGITIS PRESENT: Chronic | ICD-10-CM

## 2023-01-17 PROCEDURE — 99214 OFFICE O/P EST MOD 30 MIN: CPT | Performed by: NURSE PRACTITIONER

## 2023-01-17 NOTE — PATIENT INSTRUCTIONS
For GERD and hiatal hernia, continue pantoprazole 40 mg daily.    2.   Recommend annual office follow up in 1 yr for reassessment of symptoms and medication refills-or sooner if symptoms worsen or fail to improve.     3.   Continue daily stool softeners and utilize MiraLax as needed. You will need to titrate your MiraLax based upon how your bowel habits respond.     4.  You would also benefit from a daily fiber supplement such as Benefiber, Metamucil, Citrucel-or their generic equivalents. We recommend starting off with one serving per day and then you may increase based upon your bowel habits.

## 2023-01-17 NOTE — PROGRESS NOTES
"Chief Complaint   Patient presents with   • Follow-up     GERD, hiatal hernia, follow up after EGD, diarrhea/constipation         History of Present Illness  87-year-old male presents the office today for follow-up.  He was last seen in office on 12/13/2022.  He has a history of heartburn and diarrhea.  He underwent EGD on 12/19/2022 revealing a 5 cm hiatal hernia and fundic gland polyps.  Pathology was negative.    He is taking pantoprazole 40 mg daily. If he eats a large meal or eats too close to bedtime he will have symptoms. He has been trying to eat earlier in the evening and eating smaller more frequent meals. He denies any nausea, vomiting, or dysphagia.     At his last office visit he was reporting having diarrhea, but had continued to take stool softeners.  He has since discontinued use of stool softeners.  When off the stool softeners he experienced constipation. He is now back on 2 stool softeners daily. He uses MiraLax intermittently. He has a history of colon polyps with his last colonoscopy being performed 6 to 7 years ago.  He states that he was instructed that no further colonoscopies were indicated due to age.  He denies any family history of colon cancer.    Review of Systems   Constitutional: Negative for fever and unexpected weight change.   HENT: Negative for trouble swallowing.    Cardiovascular: Negative for chest pain.   Gastrointestinal: Negative for abdominal distention, abdominal pain, anal bleeding, blood in stool, constipation, diarrhea, nausea, rectal pain and vomiting.      Result Review :       Office Visit with Kaitlin Chew APRN (12/13/2022)  UPPER GI ENDOSCOPY (12/19/2022 07:47)  Tissue Pathology Exam (12/19/2022 07:58)    Vital Signs:   /74   Pulse 68   Temp 97.5 °F (36.4 °C)   Ht 177.8 cm (70\")   Wt 96.3 kg (212 lb 3.2 oz)   SpO2 96%   BMI 30.45 kg/m²     Body mass index is 30.45 kg/m².     Physical Exam  Vitals reviewed.   Pulmonary:      Effort: Pulmonary effort " is normal. No respiratory distress.   Abdominal:      General: Abdomen is flat. Bowel sounds are normal. There is no distension.      Palpations: Abdomen is soft. There is no mass.      Tenderness: There is no abdominal tenderness. There is no guarding.   Musculoskeletal:         General: Normal range of motion.   Skin:     General: Skin is warm and dry.   Neurological:      General: No focal deficit present.      Mental Status: He is alert and oriented to person, place, and time.   Psychiatric:         Mood and Affect: Mood normal.         Behavior: Behavior normal.         Thought Content: Thought content normal.         Judgment: Judgment normal.       Assessment and Plan    Diagnoses and all orders for this visit:    1. Hiatal hernia (Primary)  Comments:  5 cm    2. Gastroesophageal reflux disease, unspecified whether esophagitis present    3. Diarrhea, unspecified type  Comments:  resolved    4. Constipation, unspecified constipation type           Patient Instructions   1.  For GERD and hiatal hernia, continue pantoprazole 40 mg daily.    2.   Recommend annual office follow up in 1 yr for reassessment of symptoms and medication refills-or sooner if symptoms worsen or fail to improve.     3.   Continue daily stool softeners and utilize MiraLax as needed. You will need to titrate your MiraLax based upon how your bowel habits respond.     4.  You would also benefit from a daily fiber supplement such as Benefiber, Metamucil, Citrucel-or their generic equivalents. We recommend starting off with one serving per day and then you may increase based upon your bowel habits.      Discussion:     EGD findings and pathology reviewed with patient today during his office visit.  His symptoms are much better managed if he avoids eating after about 6 PM in the evening and eat smaller more frequent meals.  We will have him continue pantoprazole once daily and have recommended the lifestyle modifications as discussed.    For  constipation, patient continues to stool softeners daily.  MiraLAX was recommended daily, titrating based upon how bowel habits respond.  Discussion was also held regarding the benefit of a fiber supplement, and patient was agreeable.    No further colonoscopies indicated due to age.  We will plan for annual office follow-up for reassessment of symptoms and medication refills, or sooner should symptoms worsen.  Patient and his wife both verbalized understanding above plan of care and are in agreement.  All questions answered and support provided.    EMR Dragon/Transcription Disclaimer:  This document has been Dictated utilizing Dragon dictation.

## 2023-01-20 ENCOUNTER — OFFICE VISIT (OUTPATIENT)
Dept: NEUROLOGY | Facility: CLINIC | Age: 88
End: 2023-01-20
Payer: MEDICARE

## 2023-01-20 VITALS
HEART RATE: 65 BPM | SYSTOLIC BLOOD PRESSURE: 134 MMHG | OXYGEN SATURATION: 97 % | WEIGHT: 215 LBS | HEIGHT: 70 IN | BODY MASS INDEX: 30.78 KG/M2 | DIASTOLIC BLOOD PRESSURE: 88 MMHG

## 2023-01-20 DIAGNOSIS — G45.4 TRANSIENT GLOBAL AMNESIA: Primary | ICD-10-CM

## 2023-01-20 DIAGNOSIS — G89.29 CHRONIC BILATERAL LOW BACK PAIN WITHOUT SCIATICA: ICD-10-CM

## 2023-01-20 DIAGNOSIS — R26.89 IMPAIRMENT OF BALANCE: ICD-10-CM

## 2023-01-20 DIAGNOSIS — M54.50 CHRONIC BILATERAL LOW BACK PAIN WITHOUT SCIATICA: ICD-10-CM

## 2023-01-20 DIAGNOSIS — R20.0 LOWER EXTREMITY NUMBNESS: ICD-10-CM

## 2023-01-20 PROCEDURE — 99214 OFFICE O/P EST MOD 30 MIN: CPT | Performed by: PSYCHIATRY & NEUROLOGY

## 2023-01-20 NOTE — PROGRESS NOTES
Chief Complaint   Patient presents with   • seizure like activity        Patient ID: Kevin Ellington is a 87 y.o. male.    HPI: I had the pleasure of seeing your patient today.  As you may know a 70-year-old gentleman with a history of impairment of balance.  He also here for the management of transient global amnesia.  He has not had any episodes since his last follow-up with us.  No episodes of loss of consciousness or loss of awareness.  He does have poor balance and has a history of lumbosacral spine disease.  He has chronic numbness in his legs.  Uses a walker to assist with ambulation.  No recent falls.  He does have a lot of back pain.  No change in bowel or bladder habits.    The following portions of the patient's history were reviewed and updated as appropriate: allergies, current medications, past family history, past medical history, past social history, past surgical history and problem list.    Review of Systems   Musculoskeletal: Positive for back pain.   Neurological: Positive for weakness and numbness. Negative for dizziness, tremors, seizures, syncope, speech difficulty, light-headedness and headaches.   Hematological: Does not bruise/bleed easily.   Psychiatric/Behavioral: Negative for agitation, behavioral problems, confusion, decreased concentration, hallucinations, self-injury, sleep disturbance and suicidal ideas. The patient is not nervous/anxious.       I have reviewed the review of systems above performed by my medical assistant.      Vitals:    01/20/23 1514   BP: 134/88   Pulse: 65   SpO2: 97%       Neurologic Exam     Mental Status   Oriented to person, place, and time.   Concentration: normal.   Level of consciousness: alert  Knowledge: consistent with education (No deficits found.).     Cranial Nerves     CN II   Visual fields full to confrontation.     CN III, IV, VI   Pupils are equal, round, and reactive to light.  Extraocular motions are normal.   CN III: no CN III palsy  CN VI:  no CN VI palsy    CN V   Facial sensation intact.     CN VII   Facial expression full, symmetric.     CN VIII   CN VIII normal.     CN IX, X   CN IX normal.   CN X normal.     CN XI   CN XI normal.     CN XII   CN XII normal.     Motor Exam     Strength   Right neck flexion: 5/5  Left neck flexion: 5/5  Right neck extension: 5/5  Left neck extension: 5/5  Right deltoid: 5/5  Left deltoid: 5/5  Right biceps: 5/5  Left biceps: 5/5  Right triceps: 5/5  Left triceps: 5/5  Right wrist flexion: 5/5  Left wrist flexion: 5/5  Right wrist extension: 5/5  Left wrist extension: 5/5  Right interossei: 5/5  Left interossei: 5/5  Right abdominals: 5/5  Left abdominals: 5/5  Right iliopsoas: 5/5  Left iliopsoas: 5/5  Right quadriceps: 5/5  Left quadriceps: 5/5  Right hamstrin/5  Left hamstrin/5  Right glutei: 5/5  Left glutei: 5/5  Right anterior tibial: 5/5  Left anterior tibial: 5/5  Right posterior tibial: 5/5  Left posterior tibial: 5/5  Right peroneal: 5/5  Left peroneal: 5/5  Right gastroc: 5/5  Left gastroc: 5/5    Sensory Exam   Right leg light touch: decreased from toes  Left leg light touch: decreased from toes  Right leg vibration: decreased from ankle  Left leg vibration: decreased from ankle    Gait, Coordination, and Reflexes     Gait  Gait: wide-based    Reflexes   Right brachioradialis: 2+  Left brachioradialis: 2+  Right biceps: 2+  Left biceps: 2+  Right triceps: 2+  Left triceps: 2+  Right patellar: 0  Left patellar: 0  Right achilles: 0  Left achilles: 0  Right : 2+  Left : 2+Station is normal.       Physical Exam  Vitals reviewed.   Constitutional:       Appearance: He is well-developed.   HENT:      Head: Normocephalic and atraumatic.   Eyes:      Extraocular Movements: EOM normal.      Pupils: Pupils are equal, round, and reactive to light.   Cardiovascular:      Rate and Rhythm: Normal rate and regular rhythm.   Pulmonary:      Breath sounds: Normal breath sounds.   Musculoskeletal:          General: Normal range of motion.   Skin:     General: Skin is warm.   Neurological:      Mental Status: He is oriented to person, place, and time.      Deep Tendon Reflexes:      Reflex Scores:       Tricep reflexes are 2+ on the right side and 2+ on the left side.       Bicep reflexes are 2+ on the right side and 2+ on the left side.       Brachioradialis reflexes are 2+ on the right side and 2+ on the left side.       Patellar reflexes are 0 on the right side and 0 on the left side.       Achilles reflexes are 0 on the right side and 0 on the left side.        Procedures    Assessment/Plan: We are going to continue with conservative therapy for now.  We did talk about physical therapy however he like to hold off on that for now.  We will see him back in 6 months to check in on his balance as well as well.  He has had any further episodes of amnesia.  A total of 30 minutes was spent face-to-face with patient today.  Of that greater than 50% of this time was transillumination, impairment of balance, patient education, plan of care and prognosis.       Diagnoses and all orders for this visit:    1. Transient global amnesia (Primary)    2. Impairment of balance    3. Chronic bilateral low back pain without sciatica    4. Lower extremity numbness           Constantine Carrera II, MD

## 2023-01-24 DIAGNOSIS — M48.062 SPINAL STENOSIS OF LUMBAR REGION WITH NEUROGENIC CLAUDICATION: ICD-10-CM

## 2023-01-24 RX ORDER — MELOXICAM 7.5 MG/1
TABLET ORAL
Qty: 45 TABLET | Refills: 3 | Status: SHIPPED | OUTPATIENT
Start: 2023-01-24

## 2023-01-24 NOTE — TELEPHONE ENCOUNTER
Rx Refill Note  Requested Prescriptions     Pending Prescriptions Disp Refills   • meloxicam (MOBIC) 7.5 MG tablet [Pharmacy Med Name: MELOXICAM TABS 7.5MG] 45 tablet 3     Sig: TAKE 1 TABLET EVERY OTHER DAY      Last office visit with prescribing clinician: 11/15/2022   Last telemedicine visit with prescribing clinician: 3/3/2023   Next office visit with prescribing clinician: 3/3/2023                         Would you like a call back once the refill request has been completed: [] Yes [] No    If the office needs to give you a call back, can they leave a voicemail: [] Yes [] No    Porsche Small MA/LMR  01/24/23, 07:39 EST

## 2023-02-02 DIAGNOSIS — K59.04 CHRONIC IDIOPATHIC CONSTIPATION: ICD-10-CM

## 2023-02-02 RX ORDER — AMOXICILLIN 250 MG
1 CAPSULE ORAL 2 TIMES DAILY PRN
Qty: 180 TABLET | Refills: 2 | Status: SHIPPED | OUTPATIENT
Start: 2023-02-02

## 2023-02-02 NOTE — TELEPHONE ENCOUNTER
Rx Refill Note  Requested Prescriptions     Pending Prescriptions Disp Refills   • sennosides-docusate (Senna S) 8.6-50 MG per tablet 180 tablet 2     Sig: Take 1 tablet by mouth 2 (Two) Times a Day As Needed for Constipation.      Last office visit with prescribing clinician: 11/15/2022   Last telemedicine visit with prescribing clinician: 3/3/2023   Next office visit with prescribing clinician: 3/3/2023                         Would you like a call back once the refill request has been completed: [] Yes [] No    If the office needs to give you a call back, can they leave a voicemail: [] Yes [] No    Porsche Small MA/LMR  02/02/23, 13:50 EST

## 2023-02-02 NOTE — TELEPHONE ENCOUNTER
Caller: Chandana Kevin W    Relationship: Self    Best call back number: [phone]    Requested Prescriptions:   Requested Prescriptions     Pending Prescriptions Disp Refills   • sennosides-docusate (Senna S) 8.6-50 MG per tablet 180 tablet 2     Sig: Take 1 tablet by mouth 2 (Two) Times a Day As Needed for Constipation.        Pharmacy where request should be sent: [facility] - [address] [phone] [facility] [phone]      Additional details provided by patient: THE PATIENT IS RUNNING LOW ON THIS PRESCRIPTION.    Does the patient have less than a 3 day supply:  [x] Yes  [] No    Would you like a call back once the refill request has been completed: [x] Yes [] No    If the office needs to give you a call back, can they leave a voicemail: [x] Yes [] No    Uziel Brooks Rep   02/02/23 13:26 EST

## 2023-02-20 DIAGNOSIS — G47.419 PRIMARY NARCOLEPSY WITHOUT CATAPLEXY: ICD-10-CM

## 2023-02-20 RX ORDER — MODAFINIL 100 MG/1
100 TABLET ORAL DAILY
Qty: 30 TABLET | Refills: 2 | Status: SHIPPED | OUTPATIENT
Start: 2023-02-20 | End: 2023-02-23 | Stop reason: SDUPTHER

## 2023-02-20 NOTE — TELEPHONE ENCOUNTER
Rx Refill Note  Requested Prescriptions     Pending Prescriptions Disp Refills   • modafinil (PROVIGIL) 100 MG tablet 30 tablet 2     Sig: Take 1 tablet by mouth Daily.      Last office visit with prescribing clinician: 11/15/2022   Last telemedicine visit with prescribing clinician: 3/3/2023   Next office visit with prescribing clinician: 3/3/2023                         Would you like a call back once the refill request has been completed: [] Yes [] No    If the office needs to give you a call back, can they leave a voicemail: [] Yes [] No    Jairo Griffin MA  02/20/23, 12:35 EST

## 2023-02-20 NOTE — TELEPHONE ENCOUNTER
Caller: Chandana Kevin W    Relationship: Self    Best call back number: [phone] (Home)    Requested Prescriptions:   Requested Prescriptions     Pending Prescriptions Disp Refills   • modafinil (PROVIGIL) 100 MG tablet 30 tablet 2     Sig: Take 1 tablet by mouth Daily.        Pharmacy where request should be sent: [id number] [address] AT [facility] [phone] [facility] [phone]      Additional details provided by patient: PATIENT'S ONLY HAS 3-4 DAYS LEFT OF MEDICATION AND NEEDS REFILLED ASAP    Does the patient have less than a 3 day supply:  [x] Yes  [] No      Uziel Avalos Rep   02/20/23 12:12 EST

## 2023-02-23 DIAGNOSIS — G47.419 PRIMARY NARCOLEPSY WITHOUT CATAPLEXY: ICD-10-CM

## 2023-02-23 RX ORDER — MODAFINIL 100 MG/1
100 TABLET ORAL DAILY
Qty: 30 TABLET | Refills: 2 | Status: SHIPPED | OUTPATIENT
Start: 2023-02-23

## 2023-02-23 NOTE — TELEPHONE ENCOUNTER
Caller: ChandanaBrynuha OSHEA    Relationship: Self    Best call back number: [phone]     Requested Prescriptions:   Requested Prescriptions     Pending Prescriptions Disp Refills   • modafinil (PROVIGIL) 100 MG tablet 30 tablet 2     Sig: Take 1 tablet by mouth Daily.        Pharmacy where request should be sent: [facility][id number] [facility] [address] AT [facility] [address]  [place] - [phone] [facility] [phone] FX     Additional details provided by patient: PREVIOUS REFILL WENT TO WRONG PHARMACY, PATIENT IS OUT OF MEDICATION - PHARMACY WOULD NOT TRANSFER MEDICATION.    Does the patient have less than a 3 day supply:  [x] Yes  [] No    Would you like a call back once the refill request has been completed: [] Yes [x] No    If the office needs to give you a call back, can they leave a voicemail: [] Yes [x] No    Uziel Laureano Rep   02/23/23 11:26 EST

## 2023-04-19 DIAGNOSIS — K21.9 GASTROESOPHAGEAL REFLUX DISEASE WITHOUT ESOPHAGITIS: ICD-10-CM

## 2023-04-19 RX ORDER — PANTOPRAZOLE SODIUM 40 MG/1
40 TABLET, DELAYED RELEASE ORAL DAILY
Qty: 90 TABLET | Refills: 0 | Status: SHIPPED | OUTPATIENT
Start: 2023-04-19

## 2023-04-19 NOTE — TELEPHONE ENCOUNTER
Caller: ChandanaBrynuha OSHEA    Relationship: Self    Best call back number: [phone]    Requested Prescriptions:   Requested Prescriptions     Pending Prescriptions Disp Refills   • pantoprazole (PROTONIX) 40 MG EC tablet 30 tablet 1     Sig: Take 1 tablet by mouth Daily.        Pharmacy where request should be sent: [facility] - [address] [phone] [facility] [phone]      Last office visit with prescribing clinician: 11/15/2022   Last telemedicine visit with prescribing clinician: Visit date not found   Next office visit with prescribing clinician: Visit date not found     Additional details provided by patient: HE WOULD LIKE A 90 DAY SUPPLY    Does the patient have less than a 3 day supply:  [x] Yes  [] No    Would you like a call back once the refill request has been completed: [] Yes [x] No    If the office needs to give you a call back, can they leave a voicemail: [] Yes [x] No    Uziel Handy Rep   04/19/23 10:25 EDT

## 2023-05-19 DIAGNOSIS — G47.419 PRIMARY NARCOLEPSY WITHOUT CATAPLEXY: ICD-10-CM

## 2023-05-19 RX ORDER — MODAFINIL 100 MG/1
100 TABLET ORAL DAILY
Qty: 30 TABLET | Refills: 2 | Status: SHIPPED | OUTPATIENT
Start: 2023-05-19

## 2023-05-19 NOTE — TELEPHONE ENCOUNTER
Caller: Kevin Ellington DORIE    Relationship: Self    Best call back number: [phone]    Requested Prescriptions:   Requested Prescriptions     Pending Prescriptions Disp Refills   • modafinil (PROVIGIL) 100 MG tablet 30 tablet 2     Sig: Take 1 tablet by mouth Daily.        Pharmacy where request should be sent: [facility] #[id number] [facility] [address]  AT [facility] [address]  [phone] [facility] [phone] FX     Last office visit with prescribing clinician: 11/15/2022   Last telemedicine visit with prescribing clinician: 4/19/2023   Next office visit with prescribing clinician: Visit date not found     Additional details provided by patient: HAS A WEEK LEFT    Does the patient have less than a 3 day supply:  [] Yes  [x] No    Would you like a call back once the refill request has been completed: [] Yes [x] No    If the office needs to give you a call back, can they leave a voicemail: [] Yes [x] No    Uziel Handy Rep   05/19/23 10:42 EDT

## 2023-05-19 NOTE — TELEPHONE ENCOUNTER
Rx Refill Note  Requested Prescriptions     Pending Prescriptions Disp Refills   • modafinil (PROVIGIL) 100 MG tablet 30 tablet 2     Sig: Take 1 tablet by mouth Daily.      Last office visit with prescribing clinician: 11/15/2022   Last telemedicine visit with prescribing clinician: 4/19/2023   Next office visit with prescribing clinician: Visit date not found                         Would you like a call back once the refill request has been completed: [] Yes [] No    If the office needs to give you a call back, can they leave a voicemail: [] Yes [] No    Porsche Small MA/LMR  05/19/23, 10:47 EDT

## 2023-05-23 ENCOUNTER — OFFICE VISIT (OUTPATIENT)
Dept: CARDIOLOGY | Facility: CLINIC | Age: 88
End: 2023-05-23
Payer: MEDICARE

## 2023-05-23 VITALS
HEIGHT: 70 IN | HEART RATE: 74 BPM | OXYGEN SATURATION: 98 % | DIASTOLIC BLOOD PRESSURE: 70 MMHG | SYSTOLIC BLOOD PRESSURE: 122 MMHG | WEIGHT: 213.4 LBS | BODY MASS INDEX: 30.55 KG/M2

## 2023-05-23 DIAGNOSIS — I65.23 BILATERAL CAROTID ARTERY STENOSIS: ICD-10-CM

## 2023-05-23 DIAGNOSIS — I25.10 CORONARY ARTERY DISEASE INVOLVING NATIVE CORONARY ARTERY OF NATIVE HEART WITHOUT ANGINA PECTORIS: Primary | ICD-10-CM

## 2023-05-23 DIAGNOSIS — G45.9 TIA (TRANSIENT ISCHEMIC ATTACK): ICD-10-CM

## 2023-05-23 DIAGNOSIS — N18.31 STAGE 3A CHRONIC KIDNEY DISEASE: ICD-10-CM

## 2023-05-23 DIAGNOSIS — E78.5 HYPERLIPIDEMIA, UNSPECIFIED HYPERLIPIDEMIA TYPE: ICD-10-CM

## 2023-05-23 DIAGNOSIS — Z95.5 S/P CORONARY ARTERY STENT PLACEMENT: ICD-10-CM

## 2023-05-23 DIAGNOSIS — I10 ESSENTIAL HYPERTENSION: ICD-10-CM

## 2023-05-23 RX ORDER — METHYLPREDNISOLONE 4 MG/1
TABLET ORAL
COMMUNITY
Start: 2023-01-30

## 2023-05-23 NOTE — LETTER
May 23, 2023       No Recipients    Patient: Kevin Ellington   YOB: 1935   Date of Visit: 5/23/2023       Dear Leanna Ryder MD    Kevin Ellington was in my office today. Below is a copy of my note.    If you have questions, please do not hesitate to call me. I look forward to following Kevin along with you.         Sincerely,        Halima Salmeron MD        CC:   No Recipients        Subjective:     Encounter Date:05/23/2023     Patient ID: Kevin Ellington is a 88 y.o. male.    Chief Complaint:  History of Present Illness      ROS      Current Outpatient Medications:   •  aspirin 81 MG tablet, Take 1 tablet by mouth Daily., Disp: , Rfl:   •  atorvastatin (LIPITOR) 40 MG tablet, TAKE 1 TABLET DAILY, Disp: 90 tablet, Rfl: 3  •  cetirizine (zyrTEC) 10 MG tablet, Take 1 tablet by mouth Daily., Disp: , Rfl:   •  chlorthalidone (HYGROTEN) 50 MG tablet, TAKE 1 TABLET DAILY, Disp: 90 tablet, Rfl: 3  •  cloNIDine (CATAPRES) 0.1 MG tablet, TAKE 1 TABLET TWICE A DAY, Disp: 180 tablet, Rfl: 3  •  coenzyme Q10 100 MG capsule, Take 3 capsules by mouth Daily., Disp: , Rfl:   •  econazole nitrate (SPECTAZOLE) 1 % cream, Apply  topically to the appropriate area as directed As Needed., Disp: , Rfl:   •  Flaxseed, Linseed, (FLAX SEED OIL PO), Take  by mouth., Disp: , Rfl:   •  fluticasone (FLONASE) 50 MCG/ACT nasal spray, 1 spray into the nostril(s) as directed by provider Daily., Disp: 48 g, Rfl: 3  •  losartan (COZAAR) 100 MG tablet, TAKE 1 TABLET DAILY, Disp: 90 tablet, Rfl: 3  •  meloxicam (MOBIC) 7.5 MG tablet, TAKE 1 TABLET EVERY OTHER DAY, Disp: 45 tablet, Rfl: 3  •  methylPREDNISolone (MEDROL) 4 MG dose pack, follow package directions, Disp: , Rfl:   •  metoprolol succinate XL (TOPROL-XL) 100 MG 24 hr tablet, TAKE 1 TABLET DAILY, Disp: 90 tablet, Rfl: 3  •  metroNIDAZOLE (FLAGYL) 0.75 % lotion lotion, Daily., Disp: , Rfl:   •  Misc Natural Products (JOINT HEALTH PO), Take  by mouth., Disp: , Rfl:   •   modafinil (PROVIGIL) 100 MG tablet, Take 1 tablet by mouth Daily., Disp: 30 tablet, Rfl: 2  •  Multiple Vitamins-Minerals (MULTIVITAMIN PO), Take 1 tablet by mouth Daily., Disp: , Rfl:   •  pantoprazole (PROTONIX) 40 MG EC tablet, Take 1 tablet by mouth Daily., Disp: 90 tablet, Rfl: 0  •  potassium chloride (K-DUR,KLOR-CON) 10 MEQ CR tablet, TAKE 2 TABLETS DAILY, Disp: 180 tablet, Rfl: 3  •  Saw Palmetto, Serenoa repens, 320 MG capsule, Take 320 mg by mouth 2 (two) times a day., Disp: , Rfl:   •  sennosides-docusate (Senna S) 8.6-50 MG per tablet, Take 1 tablet by mouth 2 (Two) Times a Day As Needed for Constipation., Disp: 180 tablet, Rfl: 2  •  tamsulosin (FLOMAX) 0.4 MG capsule 24 hr capsule, TAKE 2 CAPSULES DAILY, Disp: 180 capsule, Rfl: 3    Past Medical History:   Diagnosis Date   • Allergic 1970   • Arthritis    • Asthma 05/23/2019    shortness of breath-exertion   • Benign prostatic hyperplasia    • Bilateral carotid artery stenosis 05/12/2022   • Cataract    • Cervical radiculopathy    • Colon polyp    • Coronary artery disease 2001    Stent   • CTS (carpal tunnel syndrome) 1998    Surgery both hands in 1999   • Erectile dysfunction    • GERD (gastroesophageal reflux disease)    • HTN (hypertension)    • Hyperlipidemia    • Kidney stone    • Low back pain     2 Prior surgeries   • Lumbosacral disc disease    • Melanoma 01/15/2009    DR WILLY SIMMS ( Left forearm)   • Obesity    • Radiculitis, thoracic    • Scoliosis    • Spondylosis of lumbar region without myelopathy or radiculopathy 09/09/2020   • Stage 3a chronic kidney disease 10/21/2021   • Stented coronary artery    • Thoracic disc disorder     Surgery by Dr. Aldridge (sp)   • TIA (transient ischemic attack) 05/12/2022       Past Surgical History:   Procedure Laterality Date   • ABLATION OF DYSRHYTHMIC FOCUS  nov. 2020    radio frequency /lower back   • ANKLE FUSION Left 2007    reconstruction and fusion   • BACK SURGERY      HERNIATED  "LUMBAR DISK ,,   • CARDIAC CATHETERIZATION     • CARPAL TUNNEL RELEASE     • CATARACT EXTRACTION, BILATERAL Bilateral    • COLONOSCOPY     • CORONARY ANGIOPLASTY WITH STENT PLACEMENT     • CORONARY STENT PLACEMENT     • ENDOSCOPY N/A 2022    Procedure: ESOPHAGOGASTRODUODENOSCOPY WITH BIOPSY;  Surgeon: Rashard Mathews MD;  Location: [facility];  Service: Gastroenterology;  Laterality: N/A;  gastric polyps, hiatal hernia   • EPIDURAL BLOCK  Several at [facility]   • EYE SURGERY     • JOINT REPLACEMENT     • REPLACEMENT TOTAL KNEE Left    • REPLACEMENT TOTAL KNEE Right    • SKIN CANCER EXCISION      MELANOMA   • SPINE SURGERY     • THORACIC SPINE SURGERY         Family History   Problem Relation Age of Onset   • Hypertension Mother    • Arthritis Mother            • Skin cancer Mother    • Heart disease Father    • Early death Father         Aortic aneurism age 58   • Hypertension Father    • Aortic aneurysm Father 58   • Cancer Neg Hx    • Colon cancer Neg Hx    • Colon polyps Neg Hx    • Crohn's disease Neg Hx    • Irritable bowel syndrome Neg Hx    • Ulcerative colitis Neg Hx        Social History     Tobacco Use   • Smoking status: Never   • Smokeless tobacco: Never   • Tobacco comments:     caffeine use   Vaping Use   • Vaping Use: Never used   Substance Use Topics   • Alcohol use: No   • Drug use: No       Procedures      Objective:     Visit Vitals  /70 (BP Location: Right arm, Patient Position: Sitting, Cuff Size: Adult)   Pulse 74   Ht 177.8 cm (70\")   Wt 96.8 kg (213 lb 6.4 oz)   SpO2 98%   BMI 30.62 kg/m²        Physical Exam    Lab Review:      Assessment:         Diagnosis Plan   1. Coronary artery disease involving native coronary artery of native heart without angina pectoris        2. Essential hypertension        3. Hyperlipidemia, unspecified hyperlipidemia type        4. Bilateral carotid artery stenosis      "   5. S/P coronary artery stent placement        6. Stage 3a chronic kidney disease        7. TIA (transient ischemic attack)              Plan:
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
## 2023-06-12 ENCOUNTER — APPOINTMENT (OUTPATIENT)
Dept: GENERAL RADIOLOGY | Facility: HOSPITAL | Age: 88
End: 2023-06-12
Payer: MEDICARE

## 2023-06-12 ENCOUNTER — TELEPHONE (OUTPATIENT)
Dept: FAMILY MEDICINE CLINIC | Facility: CLINIC | Age: 88
End: 2023-06-12
Payer: MEDICARE

## 2023-06-12 ENCOUNTER — HOSPITAL ENCOUNTER (OUTPATIENT)
Facility: HOSPITAL | Age: 88
Setting detail: OBSERVATION
LOS: 3 days | Discharge: HOME | End: 2023-06-16
Attending: EMERGENCY MEDICINE | Admitting: STUDENT IN AN ORGANIZED HEALTH CARE EDUCATION/TRAINING PROGRAM
Payer: MEDICARE

## 2023-06-12 DIAGNOSIS — R53.1 GENERALIZED WEAKNESS: ICD-10-CM

## 2023-06-12 DIAGNOSIS — U07.1 COVID-19 VIRUS INFECTION: ICD-10-CM

## 2023-06-12 DIAGNOSIS — R05.1 ACUTE COUGH: ICD-10-CM

## 2023-06-12 DIAGNOSIS — U07.1 COVID-19: ICD-10-CM

## 2023-06-12 DIAGNOSIS — R50.9 FEVER AND CHILLS: Primary | ICD-10-CM

## 2023-06-12 LAB
ALBUMIN SERPL-MCNC: 4.4 G/DL (ref 3.5–5.2)
ALBUMIN/GLOB SERPL: 1.6 G/DL
ALP SERPL-CCNC: 114 U/L (ref 39–117)
ALT SERPL W P-5'-P-CCNC: 17 U/L (ref 1–41)
ANION GAP SERPL CALCULATED.3IONS-SCNC: 13 MMOL/L (ref 5–15)
APTT PPP: 38.9 SECONDS (ref 22.7–35.4)
AST SERPL-CCNC: 22 U/L (ref 1–40)
B PARAPERT DNA SPEC QL NAA+PROBE: NOT DETECTED
B PERT DNA SPEC QL NAA+PROBE: NOT DETECTED
BASOPHILS # BLD AUTO: 0.03 10*3/MM3 (ref 0–0.2)
BASOPHILS NFR BLD AUTO: 0.2 % (ref 0–1.5)
BILIRUB SERPL-MCNC: 1.2 MG/DL (ref 0–1.2)
BILIRUB UR QL STRIP: NEGATIVE
BUN SERPL-MCNC: 23 MG/DL (ref 8–23)
BUN/CREAT SERPL: 17.7 (ref 7–25)
C PNEUM DNA NPH QL NAA+NON-PROBE: NOT DETECTED
CALCIUM SPEC-SCNC: 9.9 MG/DL (ref 8.6–10.5)
CHLORIDE SERPL-SCNC: 102 MMOL/L (ref 98–107)
CLARITY UR: CLEAR
CO2 SERPL-SCNC: 24 MMOL/L (ref 22–29)
COLOR UR: YELLOW
CREAT SERPL-MCNC: 1.3 MG/DL (ref 0.76–1.27)
D-LACTATE SERPL-SCNC: 1.3 MMOL/L (ref 0.5–2)
DEPRECATED RDW RBC AUTO: 39.4 FL (ref 37–54)
EGFRCR SERPLBLD CKD-EPI 2021: 52.8 ML/MIN/1.73
EOSINOPHIL # BLD AUTO: 0.02 10*3/MM3 (ref 0–0.4)
EOSINOPHIL NFR BLD AUTO: 0.2 % (ref 0.3–6.2)
ERYTHROCYTE [DISTWIDTH] IN BLOOD BY AUTOMATED COUNT: 12.3 % (ref 12.3–15.4)
FLUAV SUBTYP SPEC NAA+PROBE: NOT DETECTED
FLUBV RNA ISLT QL NAA+PROBE: NOT DETECTED
GLOBULIN UR ELPH-MCNC: 2.7 GM/DL
GLUCOSE SERPL-MCNC: 129 MG/DL (ref 65–99)
GLUCOSE UR STRIP-MCNC: NEGATIVE MG/DL
HADV DNA SPEC NAA+PROBE: NOT DETECTED
HCOV 229E RNA SPEC QL NAA+PROBE: NOT DETECTED
HCOV HKU1 RNA SPEC QL NAA+PROBE: NOT DETECTED
HCOV NL63 RNA SPEC QL NAA+PROBE: NOT DETECTED
HCOV OC43 RNA SPEC QL NAA+PROBE: NOT DETECTED
HCT VFR BLD AUTO: 40.7 % (ref 37.5–51)
HGB BLD-MCNC: 14.4 G/DL (ref 13–17.7)
HGB UR QL STRIP.AUTO: NEGATIVE
HMPV RNA NPH QL NAA+NON-PROBE: NOT DETECTED
HPIV1 RNA ISLT QL NAA+PROBE: NOT DETECTED
HPIV2 RNA SPEC QL NAA+PROBE: NOT DETECTED
HPIV3 RNA NPH QL NAA+PROBE: NOT DETECTED
HPIV4 P GENE NPH QL NAA+PROBE: NOT DETECTED
IMM GRANULOCYTES # BLD AUTO: 0.06 10*3/MM3 (ref 0–0.05)
IMM GRANULOCYTES NFR BLD AUTO: 0.5 % (ref 0–0.5)
INR PPP: 1.12 (ref 0.9–1.1)
KETONES UR QL STRIP: NEGATIVE
LEUKOCYTE ESTERASE UR QL STRIP.AUTO: NEGATIVE
LYMPHOCYTES # BLD AUTO: 0.96 10*3/MM3 (ref 0.7–3.1)
LYMPHOCYTES NFR BLD AUTO: 7.6 % (ref 19.6–45.3)
M PNEUMO IGG SER IA-ACNC: NOT DETECTED
MCH RBC QN AUTO: 31 PG (ref 26.6–33)
MCHC RBC AUTO-ENTMCNC: 35.4 G/DL (ref 31.5–35.7)
MCV RBC AUTO: 87.5 FL (ref 79–97)
MONOCYTES # BLD AUTO: 0.7 10*3/MM3 (ref 0.1–0.9)
MONOCYTES NFR BLD AUTO: 5.5 % (ref 5–12)
NEUTROPHILS NFR BLD AUTO: 10.91 10*3/MM3 (ref 1.7–7)
NEUTROPHILS NFR BLD AUTO: 86 % (ref 42.7–76)
NITRITE UR QL STRIP: NEGATIVE
NRBC BLD AUTO-RTO: 0 /100 WBC (ref 0–0.2)
NT-PROBNP SERPL-MCNC: 299 PG/ML (ref 0–1800)
PH UR STRIP.AUTO: 6.5 [PH] (ref 5–8)
PLATELET # BLD AUTO: 180 10*3/MM3 (ref 140–450)
PMV BLD AUTO: 8.8 FL (ref 6–12)
POTASSIUM SERPL-SCNC: 4.4 MMOL/L (ref 3.5–5.2)
PROCALCITONIN SERPL-MCNC: 0.11 NG/ML (ref 0–0.25)
PROT SERPL-MCNC: 7.1 G/DL (ref 6–8.5)
PROT UR QL STRIP: NEGATIVE
PROTHROMBIN TIME: 14.5 SECONDS (ref 11.7–14.2)
RBC # BLD AUTO: 4.65 10*6/MM3 (ref 4.14–5.8)
RHINOVIRUS RNA SPEC NAA+PROBE: NOT DETECTED
RSV RNA NPH QL NAA+NON-PROBE: NOT DETECTED
SARS-COV-2 RNA NPH QL NAA+NON-PROBE: DETECTED
SODIUM SERPL-SCNC: 139 MMOL/L (ref 136–145)
SP GR UR STRIP: 1.01 (ref 1–1.03)
TROPONIN T SERPL HS-MCNC: 31 NG/L
UROBILINOGEN UR QL STRIP: NORMAL
WBC NRBC COR # BLD: 12.68 10*3/MM3 (ref 3.4–10.8)

## 2023-06-12 PROCEDURE — 83880 ASSAY OF NATRIURETIC PEPTIDE: CPT | Performed by: EMERGENCY MEDICINE

## 2023-06-12 PROCEDURE — 80053 COMPREHEN METABOLIC PANEL: CPT | Performed by: EMERGENCY MEDICINE

## 2023-06-12 PROCEDURE — 85730 THROMBOPLASTIN TIME PARTIAL: CPT | Performed by: EMERGENCY MEDICINE

## 2023-06-12 PROCEDURE — 96374 THER/PROPH/DIAG INJ IV PUSH: CPT

## 2023-06-12 PROCEDURE — 0202U NFCT DS 22 TRGT SARS-COV-2: CPT | Performed by: EMERGENCY MEDICINE

## 2023-06-12 PROCEDURE — 36415 COLL VENOUS BLD VENIPUNCTURE: CPT

## 2023-06-12 PROCEDURE — 71045 X-RAY EXAM CHEST 1 VIEW: CPT

## 2023-06-12 PROCEDURE — 84145 PROCALCITONIN (PCT): CPT | Performed by: EMERGENCY MEDICINE

## 2023-06-12 PROCEDURE — 85610 PROTHROMBIN TIME: CPT | Performed by: EMERGENCY MEDICINE

## 2023-06-12 PROCEDURE — 93005 ELECTROCARDIOGRAM TRACING: CPT | Performed by: EMERGENCY MEDICINE

## 2023-06-12 PROCEDURE — 87040 BLOOD CULTURE FOR BACTERIA: CPT | Performed by: EMERGENCY MEDICINE

## 2023-06-12 PROCEDURE — 83605 ASSAY OF LACTIC ACID: CPT | Performed by: EMERGENCY MEDICINE

## 2023-06-12 PROCEDURE — 84484 ASSAY OF TROPONIN QUANT: CPT | Performed by: EMERGENCY MEDICINE

## 2023-06-12 PROCEDURE — 25010000002 DEXAMETHASONE SODIUM PHOSPHATE 20 MG/5ML SOLUTION: Performed by: EMERGENCY MEDICINE

## 2023-06-12 PROCEDURE — 81003 URINALYSIS AUTO W/O SCOPE: CPT | Performed by: EMERGENCY MEDICINE

## 2023-06-12 PROCEDURE — 99285 EMERGENCY DEPT VISIT HI MDM: CPT

## 2023-06-12 PROCEDURE — 85025 COMPLETE CBC W/AUTO DIFF WBC: CPT | Performed by: EMERGENCY MEDICINE

## 2023-06-12 RX ORDER — CALCIUM CARBONATE 500 MG/1
2 TABLET, CHEWABLE ORAL 2 TIMES DAILY PRN
Status: DISCONTINUED | OUTPATIENT
Start: 2023-06-12 | End: 2023-06-16 | Stop reason: HOSPADM

## 2023-06-12 RX ORDER — ACETAMINOPHEN 650 MG/1
650 SUPPOSITORY RECTAL EVERY 4 HOURS PRN
Status: DISCONTINUED | OUTPATIENT
Start: 2023-06-12 | End: 2023-06-16 | Stop reason: HOSPADM

## 2023-06-12 RX ORDER — ONDANSETRON 2 MG/ML
4 INJECTION INTRAMUSCULAR; INTRAVENOUS EVERY 6 HOURS PRN
Status: DISCONTINUED | OUTPATIENT
Start: 2023-06-12 | End: 2023-06-16 | Stop reason: HOSPADM

## 2023-06-12 RX ORDER — SODIUM CHLORIDE 0.9 % (FLUSH) 0.9 %
10 SYRINGE (ML) INJECTION AS NEEDED
Status: DISCONTINUED | OUTPATIENT
Start: 2023-06-12 | End: 2023-06-16 | Stop reason: HOSPADM

## 2023-06-12 RX ORDER — ENOXAPARIN SODIUM 100 MG/ML
40 INJECTION SUBCUTANEOUS DAILY
Status: DISCONTINUED | OUTPATIENT
Start: 2023-06-13 | End: 2023-06-16

## 2023-06-12 RX ORDER — ACETAMINOPHEN 500 MG
1000 TABLET ORAL ONCE
Status: COMPLETED | OUTPATIENT
Start: 2023-06-12 | End: 2023-06-12

## 2023-06-12 RX ORDER — DEXAMETHASONE SODIUM PHOSPHATE 4 MG/ML
4 INJECTION, SOLUTION INTRA-ARTICULAR; INTRALESIONAL; INTRAMUSCULAR; INTRAVENOUS; SOFT TISSUE ONCE
Status: COMPLETED | OUTPATIENT
Start: 2023-06-12 | End: 2023-06-12

## 2023-06-12 RX ORDER — SODIUM CHLORIDE 0.9 % (FLUSH) 0.9 %
10 SYRINGE (ML) INJECTION EVERY 12 HOURS SCHEDULED
Status: DISCONTINUED | OUTPATIENT
Start: 2023-06-12 | End: 2023-06-16 | Stop reason: HOSPADM

## 2023-06-12 RX ORDER — DEXAMETHASONE SODIUM PHOSPHATE 10 MG/ML
6 INJECTION INTRAMUSCULAR; INTRAVENOUS DAILY
Status: DISCONTINUED | OUTPATIENT
Start: 2023-06-13 | End: 2023-06-13

## 2023-06-12 RX ORDER — ACETAMINOPHEN 160 MG/5ML
650 SOLUTION ORAL EVERY 4 HOURS PRN
Status: DISCONTINUED | OUTPATIENT
Start: 2023-06-12 | End: 2023-06-16 | Stop reason: HOSPADM

## 2023-06-12 RX ORDER — ACETAMINOPHEN 325 MG/1
650 TABLET ORAL EVERY 4 HOURS PRN
Status: DISCONTINUED | OUTPATIENT
Start: 2023-06-12 | End: 2023-06-16 | Stop reason: HOSPADM

## 2023-06-12 RX ORDER — NITROGLYCERIN 0.4 MG/1
0.4 TABLET SUBLINGUAL
Status: DISCONTINUED | OUTPATIENT
Start: 2023-06-12 | End: 2023-06-16 | Stop reason: HOSPADM

## 2023-06-12 RX ORDER — SODIUM CHLORIDE 9 MG/ML
40 INJECTION, SOLUTION INTRAVENOUS AS NEEDED
Status: DISCONTINUED | OUTPATIENT
Start: 2023-06-12 | End: 2023-06-16 | Stop reason: HOSPADM

## 2023-06-12 RX ORDER — ONDANSETRON 4 MG/1
4 TABLET, FILM COATED ORAL EVERY 6 HOURS PRN
Status: DISCONTINUED | OUTPATIENT
Start: 2023-06-12 | End: 2023-06-16 | Stop reason: HOSPADM

## 2023-06-12 RX ADMIN — Medication 10 ML: at 23:11

## 2023-06-12 RX ADMIN — DEXAMETHASONE SODIUM PHOSPHATE 4 MG: 4 INJECTION, SOLUTION INTRAMUSCULAR; INTRAVENOUS at 22:36

## 2023-06-12 RX ADMIN — SODIUM CHLORIDE 1000 ML: 9 INJECTION, SOLUTION INTRAVENOUS at 22:35

## 2023-06-12 RX ADMIN — ACETAMINOPHEN 1000 MG: 500 TABLET ORAL at 22:38

## 2023-06-12 NOTE — TELEPHONE ENCOUNTER
Spoke with Tiffanie they are willing to do supportive care since they are doing a little better  ,will call back if need it

## 2023-06-12 NOTE — TELEPHONE ENCOUNTER
I left a voice message to mrs Moreno to check on her COVID and  COVID symptoms if they are mild is better to do supportive  care since paxlovid does not seem to be very effective  ,he will need positive covid test anyway to confirm he actually has covid  and advised to look afetr soa if o2 lower than 90 advised to go er

## 2023-06-12 NOTE — ED TRIAGE NOTES
Patient from home via EMS reporting generalized weakness and cough x 2 days. Patient's wife had a positive at-home covid test yesterday.

## 2023-06-12 NOTE — TELEPHONE ENCOUNTER
"Patient's wife called stating she tested positive for COVID last night and now the patient is beginning to show symptoms. Patient's wife wanted to inform provider and requested to get \"COVID medicine\" prescribed as soon as possible for both of them. Please advise, thank you.  "

## 2023-06-13 LAB
ALBUMIN SERPL-MCNC: 3.9 G/DL (ref 3.5–5.2)
ALBUMIN/GLOB SERPL: 1.6 G/DL
ALP SERPL-CCNC: 96 U/L (ref 39–117)
ALT SERPL W P-5'-P-CCNC: 17 U/L (ref 1–41)
ANION GAP SERPL CALCULATED.3IONS-SCNC: 11.4 MMOL/L (ref 5–15)
AST SERPL-CCNC: 18 U/L (ref 1–40)
BASOPHILS # BLD AUTO: 0.01 10*3/MM3 (ref 0–0.2)
BASOPHILS NFR BLD AUTO: 0.1 % (ref 0–1.5)
BILIRUB SERPL-MCNC: 1.1 MG/DL (ref 0–1.2)
BUN SERPL-MCNC: 22 MG/DL (ref 8–23)
BUN/CREAT SERPL: 20.2 (ref 7–25)
CALCIUM SPEC-SCNC: 9.6 MG/DL (ref 8.6–10.5)
CHLORIDE SERPL-SCNC: 104 MMOL/L (ref 98–107)
CO2 SERPL-SCNC: 22.6 MMOL/L (ref 22–29)
CREAT SERPL-MCNC: 1.09 MG/DL (ref 0.76–1.27)
CRP SERPL-MCNC: 3.26 MG/DL (ref 0–0.5)
CRP SERPL-MCNC: 4.66 MG/DL (ref 0–0.5)
D DIMER PPP FEU-MCNC: 0.85 MCGFEU/ML (ref 0–0.88)
DEPRECATED RDW RBC AUTO: 39.9 FL (ref 37–54)
EGFRCR SERPLBLD CKD-EPI 2021: 65.3 ML/MIN/1.73
EOSINOPHIL # BLD AUTO: 0 10*3/MM3 (ref 0–0.4)
EOSINOPHIL NFR BLD AUTO: 0 % (ref 0.3–6.2)
ERYTHROCYTE [DISTWIDTH] IN BLOOD BY AUTOMATED COUNT: 12.3 % (ref 12.3–15.4)
GEN 5 2HR TROPONIN T REFLEX: 31 NG/L
GLOBULIN UR ELPH-MCNC: 2.5 GM/DL
GLUCOSE SERPL-MCNC: 176 MG/DL (ref 65–99)
HCT VFR BLD AUTO: 39.7 % (ref 37.5–51)
HGB BLD-MCNC: 13.8 G/DL (ref 13–17.7)
IMM GRANULOCYTES # BLD AUTO: 0.05 10*3/MM3 (ref 0–0.05)
IMM GRANULOCYTES NFR BLD AUTO: 0.5 % (ref 0–0.5)
LYMPHOCYTES # BLD AUTO: 0.91 10*3/MM3 (ref 0.7–3.1)
LYMPHOCYTES NFR BLD AUTO: 9.5 % (ref 19.6–45.3)
MCH RBC QN AUTO: 30.7 PG (ref 26.6–33)
MCHC RBC AUTO-ENTMCNC: 34.8 G/DL (ref 31.5–35.7)
MCV RBC AUTO: 88.2 FL (ref 79–97)
MONOCYTES # BLD AUTO: 0.22 10*3/MM3 (ref 0.1–0.9)
MONOCYTES NFR BLD AUTO: 2.3 % (ref 5–12)
NEUTROPHILS NFR BLD AUTO: 8.38 10*3/MM3 (ref 1.7–7)
NEUTROPHILS NFR BLD AUTO: 87.6 % (ref 42.7–76)
NRBC BLD AUTO-RTO: 0 /100 WBC (ref 0–0.2)
PLATELET # BLD AUTO: 157 10*3/MM3 (ref 140–450)
PMV BLD AUTO: 8.7 FL (ref 6–12)
POTASSIUM SERPL-SCNC: 3.9 MMOL/L (ref 3.5–5.2)
PROCALCITONIN SERPL-MCNC: 0.12 NG/ML (ref 0–0.25)
PROT SERPL-MCNC: 6.4 G/DL (ref 6–8.5)
QT INTERVAL: 396 MS
QT INTERVAL: 409 MS
RBC # BLD AUTO: 4.5 10*6/MM3 (ref 4.14–5.8)
SODIUM SERPL-SCNC: 138 MMOL/L (ref 136–145)
TROPONIN T DELTA: 0 NG/L
TROPONIN T SERPL HS-MCNC: 26 NG/L
TSH SERPL DL<=0.05 MIU/L-ACNC: 1.31 UIU/ML (ref 0.27–4.2)
WBC NRBC COR # BLD: 9.57 10*3/MM3 (ref 3.4–10.8)

## 2023-06-13 PROCEDURE — 80053 COMPREHEN METABOLIC PANEL: CPT | Performed by: NURSE PRACTITIONER

## 2023-06-13 PROCEDURE — 93005 ELECTROCARDIOGRAM TRACING: CPT | Performed by: NURSE PRACTITIONER

## 2023-06-13 PROCEDURE — 86140 C-REACTIVE PROTEIN: CPT | Performed by: NURSE PRACTITIONER

## 2023-06-13 PROCEDURE — 25010000002 ENOXAPARIN PER 10 MG: Performed by: NURSE PRACTITIONER

## 2023-06-13 PROCEDURE — 25010000002 REMDESIVIR 100 MG/20ML SOLUTION 1 EACH VIAL: Performed by: STUDENT IN AN ORGANIZED HEALTH CARE EDUCATION/TRAINING PROGRAM

## 2023-06-13 PROCEDURE — 84484 ASSAY OF TROPONIN QUANT: CPT | Performed by: NURSE PRACTITIONER

## 2023-06-13 PROCEDURE — 85379 FIBRIN DEGRADATION QUANT: CPT | Performed by: NURSE PRACTITIONER

## 2023-06-13 PROCEDURE — 84443 ASSAY THYROID STIM HORMONE: CPT | Performed by: NURSE PRACTITIONER

## 2023-06-13 PROCEDURE — 86140 C-REACTIVE PROTEIN: CPT | Performed by: STUDENT IN AN ORGANIZED HEALTH CARE EDUCATION/TRAINING PROGRAM

## 2023-06-13 PROCEDURE — 36415 COLL VENOUS BLD VENIPUNCTURE: CPT | Performed by: EMERGENCY MEDICINE

## 2023-06-13 PROCEDURE — 84145 PROCALCITONIN (PCT): CPT | Performed by: NURSE PRACTITIONER

## 2023-06-13 PROCEDURE — 85025 COMPLETE CBC W/AUTO DIFF WBC: CPT | Performed by: NURSE PRACTITIONER

## 2023-06-13 PROCEDURE — 84484 ASSAY OF TROPONIN QUANT: CPT | Performed by: EMERGENCY MEDICINE

## 2023-06-13 PROCEDURE — 63710000001 DEXAMETHASONE PER 0.25 MG: Performed by: NURSE PRACTITIONER

## 2023-06-13 RX ORDER — CLONIDINE HYDROCHLORIDE 0.1 MG/1
0.1 TABLET ORAL 2 TIMES DAILY
Status: DISCONTINUED | OUTPATIENT
Start: 2023-06-13 | End: 2023-06-16 | Stop reason: HOSPADM

## 2023-06-13 RX ORDER — ASPIRIN 81 MG/1
81 TABLET ORAL DAILY
Status: DISCONTINUED | OUTPATIENT
Start: 2023-06-13 | End: 2023-06-16 | Stop reason: HOSPADM

## 2023-06-13 RX ORDER — GUAIFENESIN 600 MG/1
600 TABLET, EXTENDED RELEASE ORAL EVERY 12 HOURS SCHEDULED
Status: DISCONTINUED | OUTPATIENT
Start: 2023-06-13 | End: 2023-06-16 | Stop reason: HOSPADM

## 2023-06-13 RX ORDER — AMOXICILLIN 250 MG
1 CAPSULE ORAL 2 TIMES DAILY PRN
Status: DISCONTINUED | OUTPATIENT
Start: 2023-06-13 | End: 2023-06-16 | Stop reason: HOSPADM

## 2023-06-13 RX ORDER — PANTOPRAZOLE SODIUM 40 MG/1
40 TABLET, DELAYED RELEASE ORAL DAILY
Status: DISCONTINUED | OUTPATIENT
Start: 2023-06-13 | End: 2023-06-16 | Stop reason: HOSPADM

## 2023-06-13 RX ORDER — FLUTICASONE PROPIONATE 50 MCG
1 SPRAY, SUSPENSION (ML) NASAL DAILY
Status: DISCONTINUED | OUTPATIENT
Start: 2023-06-13 | End: 2023-06-16 | Stop reason: HOSPADM

## 2023-06-13 RX ORDER — POTASSIUM CHLORIDE 750 MG/1
20 TABLET, FILM COATED, EXTENDED RELEASE ORAL DAILY
Status: DISCONTINUED | OUTPATIENT
Start: 2023-06-13 | End: 2023-06-16 | Stop reason: HOSPADM

## 2023-06-13 RX ORDER — METRONIDAZOLE 7.5 MG/G
LOTION TOPICAL DAILY
Status: DISCONTINUED | OUTPATIENT
Start: 2023-06-13 | End: 2023-06-16 | Stop reason: HOSPADM

## 2023-06-13 RX ORDER — TAMSULOSIN HYDROCHLORIDE 0.4 MG/1
0.8 CAPSULE ORAL DAILY
Status: DISCONTINUED | OUTPATIENT
Start: 2023-06-13 | End: 2023-06-16 | Stop reason: HOSPADM

## 2023-06-13 RX ORDER — METOPROLOL SUCCINATE 100 MG/1
100 TABLET, EXTENDED RELEASE ORAL DAILY
Status: DISCONTINUED | OUTPATIENT
Start: 2023-06-13 | End: 2023-06-16 | Stop reason: HOSPADM

## 2023-06-13 RX ORDER — CETIRIZINE HYDROCHLORIDE 10 MG/1
10 TABLET ORAL DAILY
Status: DISCONTINUED | OUTPATIENT
Start: 2023-06-13 | End: 2023-06-16 | Stop reason: HOSPADM

## 2023-06-13 RX ORDER — HYDROCODONE BITARTRATE AND HOMATROPINE METHYLBROMIDE ORAL SOLUTION 5; 1.5 MG/5ML; MG/5ML
5 LIQUID ORAL EVERY 4 HOURS PRN
Status: DISCONTINUED | OUTPATIENT
Start: 2023-06-13 | End: 2023-06-16 | Stop reason: HOSPADM

## 2023-06-13 RX ORDER — LOSARTAN POTASSIUM 100 MG/1
100 TABLET ORAL DAILY
Status: DISCONTINUED | OUTPATIENT
Start: 2023-06-13 | End: 2023-06-16 | Stop reason: HOSPADM

## 2023-06-13 RX ORDER — CHLORTHALIDONE 25 MG/1
50 TABLET ORAL DAILY
Status: DISCONTINUED | OUTPATIENT
Start: 2023-06-13 | End: 2023-06-16 | Stop reason: HOSPADM

## 2023-06-13 RX ORDER — ATORVASTATIN CALCIUM 20 MG/1
40 TABLET, FILM COATED ORAL DAILY
Status: DISCONTINUED | OUTPATIENT
Start: 2023-06-13 | End: 2023-06-16 | Stop reason: HOSPADM

## 2023-06-13 RX ADMIN — CETIRIZINE HYDROCHLORIDE 10 MG: 10 TABLET ORAL at 09:17

## 2023-06-13 RX ADMIN — CLONIDINE HYDROCHLORIDE 0.1 MG: 0.1 TABLET ORAL at 09:16

## 2023-06-13 RX ADMIN — TAMSULOSIN HYDROCHLORIDE 0.8 MG: 0.4 CAPSULE ORAL at 09:16

## 2023-06-13 RX ADMIN — Medication 10 ML: at 20:03

## 2023-06-13 RX ADMIN — GUAIFENESIN 600 MG: 600 TABLET, EXTENDED RELEASE ORAL at 03:28

## 2023-06-13 RX ADMIN — PANTOPRAZOLE SODIUM 40 MG: 40 TABLET, DELAYED RELEASE ORAL at 09:16

## 2023-06-13 RX ADMIN — REMDESIVIR 200 MG: 100 INJECTION, POWDER, LYOPHILIZED, FOR SOLUTION INTRAVENOUS at 16:32

## 2023-06-13 RX ADMIN — METOPROLOL SUCCINATE 100 MG: 100 TABLET, EXTENDED RELEASE ORAL at 09:16

## 2023-06-13 RX ADMIN — ENOXAPARIN SODIUM 40 MG: 100 INJECTION SUBCUTANEOUS at 09:15

## 2023-06-13 RX ADMIN — GUAIFENESIN 600 MG: 600 TABLET, EXTENDED RELEASE ORAL at 20:03

## 2023-06-13 RX ADMIN — LOSARTAN POTASSIUM 100 MG: 100 TABLET, FILM COATED ORAL at 09:16

## 2023-06-13 RX ADMIN — Medication 10 ML: at 09:17

## 2023-06-13 RX ADMIN — CHLORTHALIDONE 50 MG: 25 TABLET ORAL at 09:16

## 2023-06-13 RX ADMIN — POTASSIUM CHLORIDE 20 MEQ: 750 TABLET, EXTENDED RELEASE ORAL at 09:17

## 2023-06-13 RX ADMIN — GUAIFENESIN 600 MG: 600 TABLET, EXTENDED RELEASE ORAL at 09:17

## 2023-06-13 RX ADMIN — ATORVASTATIN CALCIUM 40 MG: 20 TABLET, FILM COATED ORAL at 09:17

## 2023-06-13 RX ADMIN — FLUTICASONE PROPIONATE 1 SPRAY: 50 SPRAY, METERED NASAL at 09:15

## 2023-06-13 RX ADMIN — CLONIDINE HYDROCHLORIDE 0.1 MG: 0.1 TABLET ORAL at 20:03

## 2023-06-13 RX ADMIN — ASPIRIN 81 MG: 81 TABLET, COATED ORAL at 09:17

## 2023-06-13 RX ADMIN — DEXAMETHASONE 6 MG: 4 TABLET ORAL at 09:17

## 2023-06-13 NOTE — CONSULTS
[facility] Consult    Patient Name: Kevin Ellington  Age/Sex: 88 y.o. male  : 1935  MRN: [id number]    Date of Admission: 2023  Date of Encounter Visit: 23  Encounter Provider: Halima Salmeron MD  Referring Provider: No ref. provider found  Place of Service: [facility] CARDIOLOGY  Patient Care Team:  Leanna Ryder MD as PCP - General (Family Medicine)  Kaitlin Chew APRN as Nurse Practitioner (Gastroenterology)    Subjective:     Consulted for: Atrial fibrillation    Chief Complaint: Fevers, weakness, cough    History of Present Illness:  Kevin Ellington is a 88 y.o. male with coronary artery disease status post remote stenting of his LAD, dyslipidemia, hypertension, prior suspected TIA, who presented to the emergency room on 2023 with complaints of fevers, and generalized weakness.    The patient reports that he has been having symptoms for the last few days of worsening generalized weakness and fevers.  He did report that his wife recently tested positive for COVID-19.  Following his arrival to the emergency room his work-up was significant for being positive for COVID-19.  He was febrile at 102 on arrival.  White blood count was elevated 12.68.  Procalcitonin, lactic acid, proBNP were normal.  His troponin was in the indeterminate range around 31 but this was associated with an elevated creatinine to 1.3.  His troponins since then have been relatively flat at 26 this morning.  Chest x-ray was unremarkable.  His creatinine is improved this morning following administration of IV fluids.  His initial EKG on arrival was concerning for an irregular rhythm possibly atrial fibrillation.    The patient does admit in addition to generalized weakness and fevers he has been having a cough.  Associated with this cough with some shortness of breath but he otherwise denies any other significant shortness of breath.  He denies any chest pain or  palpitations.      Past Medical History:  Past Medical History:   Diagnosis Date    Allergic 1970    Arthritis     Asthma 05/23/2019    shortness of breath-exertion    Benign prostatic hyperplasia     Bilateral carotid artery stenosis 05/12/2022    Cataract     Cervical radiculopathy     Colon polyp     Coronary artery disease 2001    Stent    CTS (carpal tunnel syndrome) 1998    Surgery both hands in 1999    Erectile dysfunction     GERD (gastroesophageal reflux disease)     HTN (hypertension)     Hyperlipidemia     Kidney stone     Low back pain     2 Prior surgeries    Lumbosacral disc disease     Melanoma 01/15/2009    DR WILLY SIMMS ( Left forearm)    Obesity     Radiculitis, thoracic     Scoliosis     Spondylosis of lumbar region without myelopathy or radiculopathy 09/09/2020    Stage 3a chronic kidney disease 10/21/2021    Stented coronary artery     Thoracic disc disorder     Surgery by Dr. Aldridge (sp)    TIA (transient ischemic attack) 05/12/2022       Past Surgical History:   Procedure Laterality Date    ABLATION OF DYSRHYTHMIC FOCUS  nov. 2020    radio frequency /lower back    ANKLE FUSION Left 2007    reconstruction and fusion    BACK SURGERY      HERNIATED LUMBAR DISK 1975,2000,2012    CARDIAC CATHETERIZATION  2001    CARPAL TUNNEL RELEASE      CATARACT EXTRACTION, BILATERAL Bilateral 2002    COLONOSCOPY      CORONARY ANGIOPLASTY WITH STENT PLACEMENT  2001    CORONARY STENT PLACEMENT      ENDOSCOPY N/A 12/19/2022    Procedure: ESOPHAGOGASTRODUODENOSCOPY WITH BIOPSY;  Surgeon: Rashard Mathews MD;  [place] [facility] [place]  Service: Gastroenterology;  Laterality: N/A;  gastric polyps, hiatal hernia    EPIDURAL BLOCK  Several at [facility]    and [facility]    EYE SURGERY      JOINT REPLACEMENT      REPLACEMENT TOTAL KNEE Left 2015    REPLACEMENT TOTAL KNEE Right     SKIN CANCER EXCISION      MELANOMA    SPINE SURGERY      THORACIC SPINE SURGERY         Home Medications:    Medications Prior to Admission   Medication Sig Dispense Refill Last Dose    aspirin 81 MG tablet Take 1 tablet by mouth Daily.   6/12/2023    atorvastatin (LIPITOR) 40 MG tablet TAKE 1 TABLET DAILY 90 tablet 3 6/12/2023    cetirizine (zyrTEC) 10 MG tablet Take 1 tablet by mouth Daily.   6/12/2023    chlorthalidone (HYGROTEN) 50 MG tablet TAKE 1 TABLET DAILY 90 tablet 3 6/12/2023    cloNIDine (CATAPRES) 0.1 MG tablet TAKE 1 TABLET TWICE A  tablet 3 6/12/2023    coenzyme Q10 100 MG capsule Take 3 capsules by mouth Daily.   6/12/2023    fluticasone (FLONASE) 50 MCG/ACT nasal spray 1 spray into the nostril(s) as directed by provider Daily. 48 g 3 6/12/2023    losartan (COZAAR) 100 MG tablet TAKE 1 TABLET DAILY 90 tablet 3 6/12/2023    meloxicam (MOBIC) 7.5 MG tablet TAKE 1 TABLET EVERY OTHER DAY 45 tablet 3 6/12/2023    metoprolol succinate XL (TOPROL-XL) 100 MG 24 hr tablet TAKE 1 TABLET DAILY 90 tablet 3 6/12/2023    modafinil (PROVIGIL) 100 MG tablet Take 1 tablet by mouth Daily. 30 tablet 2 6/12/2023    Multiple Vitamins-Minerals (MULTIVITAMIN PO) Take 1 tablet by mouth Daily.   6/12/2023    pantoprazole (PROTONIX) 40 MG EC tablet Take 1 tablet by mouth Daily. 90 tablet 0 6/12/2023    potassium chloride (K-DUR,KLOR-CON) 10 MEQ CR tablet TAKE 2 TABLETS DAILY 180 tablet 3 6/12/2023    Saw Palmetto, Serenoa repens, 320 MG capsule Take 320 mg by mouth 2 (two) times a day.   6/12/2023    sennosides-docusate (Senna S) 8.6-50 MG per tablet Take 1 tablet by mouth 2 (Two) Times a Day As Needed for Constipation. 180 tablet 2 6/12/2023    tamsulosin (FLOMAX) 0.4 MG capsule 24 hr capsule TAKE 2 CAPSULES DAILY 180 capsule 3 6/12/2023    econazole nitrate (SPECTAZOLE) 1 % cream Apply  topically to the appropriate area as directed As Needed.       Flaxseed, Linseed, (FLAX SEED OIL PO) Take  by mouth.       methylPREDNISolone (MEDROL) 4 MG dose pack follow package directions       metroNIDAZOLE (FLAGYL) 0.75 % lotion  lotion Daily.       Misc Natural Products (JOINT HEALTH PO) Take  by mouth.          Allergies:  Allergies   Allergen Reactions    Codeine GI Intolerance    Hydrocodone GI Intolerance     SEVERE CONSTIPATION    Sulfa Antibiotics Unknown (See Comments)     Unable to remember       Past Social History:  Social History     Socioeconomic History    Marital status:     Number of children: 1    Years of education: BA [employer]   Tobacco Use    Smoking status: Never    Smokeless tobacco: Never    Tobacco comments:     caffeine use   Vaping Use    Vaping Use: Never used   Substance and Sexual Activity    Alcohol use: No    Drug use: No    Sexual activity: Not Currently     Partners: Female       Past Family History:  Family History   Problem Relation Age of Onset    Hypertension Mother     Arthritis Mother             Skin cancer Mother     Heart disease Father     Early death Father         Aortic aneurism age 58    Hypertension Father     Aortic aneurysm Father 58    Cancer Neg Hx     Colon cancer Neg Hx     Colon polyps Neg Hx     Crohn's disease Neg Hx     Irritable bowel syndrome Neg Hx     Ulcerative colitis Neg Hx        Review of Systems:   All systems reviewed. Pertinent positives identified in HPI. All other systems are negative.    Objective:   Temp:  [98.4 °F (36.9 °C)-102 °F (38.9 °C)] 98.4 °F (36.9 °C)  Heart Rate:  [73-86] 78  Resp:  [16-20] 20  BP: (118-149)/(73-89) 138/76     Intake/Output Summary (Last 24 hours) at 2023 0740  Last data filed at 2023 0325  Gross per 24 hour   Intake 1000 ml   Output 625 ml   Net 375 ml     Body mass index is 26.97 kg/m².      23  0043   Weight: 87.7 kg (193 lb 5.5 oz)     Weight change:     Physical Exam:   General Appearance:    Alert, cooperative, in no acute distress   Head:    Normocephalic, without obvious abnormality, atraumatic   Eyes:            Lids and lashes normal, conjunctivae and sclerae normal, no   icterus, no pallor, corneas  clear, PERRLA   Ears:    Ears appear intact with no abnormalities noted   Neck:   No adenopathy, supple, trachea midline, no thyromegaly, no   carotid bruit, no JVD   Lungs:     Clear to auscultation,respirations regular, even and unlabored    Heart:    Regular rhythm and normal rate, normal S1 and S2, no murmur, no gallop, no rub, no click   Chest Wall:    No abnormalities observed   Abdomen:     Normal bowel sounds, no masses, no organomegaly, soft        non-tender, non-distended, no guarding, no rebound  tenderness   Extremities:   Moves all extremities well, no edema, no cyanosis, no redness   Pulses:   Pulses palpable and equal bilaterally. Normal radial, carotid, femoral, dorsalis pedis and posterior tibial pulses bilaterally. Normal abdominal aorta   Skin:  Psychiatric:   No bleeding, bruising or rash    Alert and oriented x 3, normal mood and affect       Lab Review:   Results from last 7 days   Lab Units 06/13/23 0604 06/12/23 2128   SODIUM mmol/L 138 139   POTASSIUM mmol/L 3.9 4.4   CHLORIDE mmol/L 104 102   CO2 mmol/L 22.6 24.0   BUN mg/dL 22 23   CREATININE mg/dL 1.09 1.30*   GLUCOSE mg/dL 176* 129*   CALCIUM mg/dL 9.6 9.9   AST (SGOT) U/L 18 22   ALT (SGPT) U/L 17 17     Results from last 7 days   Lab Units 06/13/23  0604 06/13/23  0051 06/12/23 2128   HSTROP T ng/L 26* 31* 31*     Results from last 7 days   Lab Units 06/13/23  0604 06/12/23 2128   WBC 10*3/mm3 9.57 12.68*   HEMOGLOBIN g/dL 13.8 14.4   HEMATOCRIT % 39.7 40.7   PLATELETS 10*3/mm3 157 180     Results from last 7 days   Lab Units 06/12/23 2128   INR  1.12*   APTT seconds 38.9*               Invalid input(s): LDLCALC  Results from last 7 days   Lab Units 06/12/23 2128   PROBNP pg/mL 299.0     Results from last 7 days   Lab Units 06/13/23 0604   TSH uIU/mL 1.310       Echo EF Estimated  Lab Results   Component Value Date    ECHOEFEST 65 06/06/2022         Imaging:  Imaging Results (Most Recent)       Procedure Component Value Units  Date/Time    XR Chest 1 View [[id number]] Collected: 06/12/23 2201     Updated: 06/12/23 2204    Narrative:      SINGLE VIEW OF THE CHEST     HISTORY: Weakness and cough     COMPARISON: None available.     FINDINGS:  There is cardiomegaly. No pneumothorax or pleural effusion is seen.  Right lung is clear. Patient is suspected to have some scarring within  the left lung. There is calcification of the aorta.       Impression:      No definite acute infiltrates seen.     This report was finalized on 6/12/2023 10:01 PM by Dr. Talia Orlando M.D.               I personally viewed and interpreted the patient's EKG    Assessment/Plan:     1.  Irregular rhythm.  Concern that this represented atrial fibrillation on admission.  I reviewed his EKG on admission and his telemetry.  I do not see any evidence of atrial fibrillation.  Instead of it appears that he has been in sinus rhythm with frequent PACs.  This is improved this morning with although he has been having intermittent episodes of frequent PVCs.  All of this appears to be asymptomatic.  2.  COVID-19.  On treatment with dexamethasone per the primary team.  3.  Coronary artery disease.  Troponins and EKG unremarkable.  He is not having any symptoms concerning for angina.  4.  Hypertension.  Blood pressures well controlled on his home regimen of medications.  5.  Hyperlipidemia.  On atorvastatin.    - At this point without any evidence of atrial fibrillation would not change his current cardiac management.  - We will discontinue his echocardiogram for now.  - We will see again as needed as an inpatient.  Lease contact us if any further cardiac concerns or issues arise.    Thank you for allowing me to participate in the care of Kevin Ellington. Feel free to contact me directly with any further questions or concerns.    Halima Salmeron MD  [facility]  06/13/23  07:40 EDT

## 2023-06-13 NOTE — H&P
Patient Name:  Kevin Ellington  YOB: 1935  MRN:  [id number]  Admit Date:  6/12/2023  Patient Care Team:  Leanna Ryder MD as PCP - General (Family Medicine)  Kaitlin Chew APRN as Nurse Practitioner (Gastroenterology)      Subjective   History Present Illness     Chief Complaint   Patient presents with    Weakness - Generalized    Cough       Mr. Ellington is a 88 y.o. non-smoker with a history of hypertension, hyperlipidemia, CAD, and CKD stage 3a that presents to [facility] complaining of generalized weakness and cough.  He reports fever, chills, sore throat, and a nonproductive cough for the past few days.  He reports shortness of breath and difficulty ambulating today due to generalized weakness.  He states his wife was recently diagnosed with COVID 19 and he thought he may have it as well so he presented to the ED.  He was febrile on arrival with a temperature of 102.  Lab work revealed troponin 31, creatinine 1.30, and WBC 12.68.   A respiratory viral panel was positive for COVID 19.  He is being admitted for further evaluation.       History of Present Illness  Review of Systems   Constitutional:  Positive for chills, fatigue and fever.   HENT:  Positive for sore throat. Negative for congestion.    Eyes:  Negative for photophobia and visual disturbance.   Respiratory:  Positive for cough and shortness of breath. Negative for chest tightness and wheezing.    Cardiovascular:  Negative for chest pain, palpitations and leg swelling.   Gastrointestinal:  Negative for abdominal pain, nausea and vomiting.   Genitourinary:  Negative for decreased urine volume, dysuria, flank pain, frequency, hematuria and urgency.   Musculoskeletal:  Negative for arthralgias and myalgias.   Neurological:  Positive for weakness (generalized). Negative for dizziness, light-headedness, numbness and headaches.      Personal History     Past Medical History:   Diagnosis Date    Allergic 1970     Arthritis     Asthma 2019    shortness of breath-exertion    Benign prostatic hyperplasia     Bilateral carotid artery stenosis 2022    Cataract     Cervical radiculopathy     Colon polyp     Coronary artery disease     Stent    CTS (carpal tunnel syndrome) 1998    Surgery both hands in     Erectile dysfunction     GERD (gastroesophageal reflux disease)     HTN (hypertension)     Hyperlipidemia     Kidney stone     Low back pain     2 Prior surgeries    Lumbosacral disc disease     Melanoma 01/15/2009    DR WILLY SIMMS ( Left forearm)    Obesity     Radiculitis, thoracic     Scoliosis     Spondylosis of lumbar region without myelopathy or radiculopathy 2020    Stage 3a chronic kidney disease 10/21/2021    Stented coronary artery     Thoracic disc disorder     Surgery by Dr. Aldridge (sp)    TIA (transient ischemic attack) 2022     Past Surgical History:   Procedure Laterality Date    ABLATION OF DYSRHYTHMIC FOCUS  2020    radio frequency /lower back    ANKLE FUSION Left 2007    reconstruction and fusion    BACK SURGERY      HERNIATED LUMBAR DISK 1975,,    CARDIAC CATHETERIZATION      CARPAL TUNNEL RELEASE      CATARACT EXTRACTION, BILATERAL Bilateral     COLONOSCOPY      CORONARY ANGIOPLASTY WITH STENT PLACEMENT      CORONARY STENT PLACEMENT      ENDOSCOPY N/A 2022    Procedure: ESOPHAGOGASTRODUODENOSCOPY WITH BIOPSY;  Surgeon: Rashard Mathews MD;  Location: [facility] MAIN OR;  Service: Gastroenterology;  Laterality: N/A;  gastric polyps, hiatal hernia    EPIDURAL BLOCK  Several at [facility]    and [facility]    EYE SURGERY      JOINT REPLACEMENT      REPLACEMENT TOTAL KNEE Left 2015    REPLACEMENT TOTAL KNEE Right     SKIN CANCER EXCISION      MELANOMA    SPINE SURGERY      THORACIC SPINE SURGERY       Family History   Problem Relation Age of Onset    Hypertension Mother     Arthritis Mother             Skin cancer  Mother     Heart disease Father     Early death Father         Aortic aneurism age 58    Hypertension Father     Aortic aneurysm Father 58    Cancer Neg Hx     Colon cancer Neg Hx     Colon polyps Neg Hx     Crohn's disease Neg Hx     Irritable bowel syndrome Neg Hx     Ulcerative colitis Neg Hx      Social History     Tobacco Use    Smoking status: Never    Smokeless tobacco: Never    Tobacco comments:     caffeine use   Vaping Use    Vaping Use: Never used   Substance Use Topics    Alcohol use: No    Drug use: No     (Not in a hospital admission)    Allergies:    Allergies   Allergen Reactions    Codeine GI Intolerance    Hydrocodone GI Intolerance     SEVERE CONSTIPATION    Sulfa Antibiotics Unknown (See Comments)     Unable to remember       Objective    Objective     Vital Signs  Temp:  [102 °F (38.9 °C)] 102 °F (38.9 °C)  Heart Rate:  [78] 78  Resp:  [16] 16  BP: (118)/(73) 118/73  SpO2:  [95 %] 95 %  on   ;   Device (Oxygen Therapy): room air  There is no height or weight on file to calculate BMI.    Physical Exam  Vitals and nursing note reviewed.   Constitutional:       Appearance: Normal appearance.   HENT:      Head: Normocephalic and atraumatic.      Nose: Nose normal.      Mouth/Throat:      Mouth: Mucous membranes are moist.      Pharynx: Oropharynx is clear.   Eyes:      Extraocular Movements: Extraocular movements intact.      Conjunctiva/sclera: Conjunctivae normal.   Cardiovascular:      Rate and Rhythm: Normal rate and regular rhythm.      Pulses: Normal pulses.      Heart sounds: Normal heart sounds.   Pulmonary:      Effort: Pulmonary effort is normal.      Breath sounds: Examination of the right-lower field reveals decreased breath sounds. Examination of the left-lower field reveals decreased breath sounds. Decreased breath sounds present.   Abdominal:      General: Bowel sounds are normal.      Palpations: Abdomen is soft.   Musculoskeletal:         General: Normal range of motion.       Cervical back: Normal range of motion and neck supple.   Skin:     General: Skin is warm and dry.   Neurological:      General: No focal deficit present.      Mental Status: He is alert and oriented to person, place, and time.   Psychiatric:         Mood and Affect: Mood normal.         Behavior: Behavior normal.       Results Review:  I reviewed the patient's new clinical results.  I reviewed the patient's new imaging results and agree with the interpretation.  I reviewed the patient's other test results and agree with the interpretation  I personally viewed and interpreted the patient's EKG/Telemetry data  Discussed with ED provider.    Lab Results (last 24 hours)       Procedure Component Value Units Date/Time    CBC & Differential [[id number]]  (Abnormal) Collected: 06/12/23 2128    Specimen: Blood Updated: 06/12/23 2140    Narrative:      The following orders were created for panel order CBC & Differential.  Procedure                               Abnormality         Status                     ---------                               -----------         ------                     CBC Auto Differential[[id number]]        Abnormal            Final result                 Please view results for these tests on the individual orders.    Comprehensive Metabolic Panel [[id number]]  (Abnormal) Collected: 06/12/23 2128    Specimen: Blood Updated: 06/12/23 2200     Glucose 129 mg/dL      BUN 23 mg/dL      Creatinine 1.30 mg/dL      Sodium 139 mmol/L      Potassium 4.4 mmol/L      Chloride 102 mmol/L      CO2 24.0 mmol/L      Calcium 9.9 mg/dL      Total Protein 7.1 g/dL      Albumin 4.4 g/dL      ALT (SGPT) 17 U/L      AST (SGOT) 22 U/L      Alkaline Phosphatase 114 U/L      Total Bilirubin 1.2 mg/dL      Globulin 2.7 gm/dL      A/G Ratio 1.6 g/dL      BUN/Creatinine Ratio 17.7     Anion Gap 13.0 mmol/L      eGFR 52.8 mL/min/1.73     Narrative:      GFR Normal >60  Chronic Kidney Disease <60  Kidney Failure <15    The GFR  formula is only valid for adults with stable renal function between ages 18 and 70.    Respiratory Panel PCR w/COVID-19(SARS-CoV-2) ALBERT/INDERJIT/MARLENY/PAD/COR/MAD/ABEL In-House, NP Swab in UTM/VTM, 3-4 HR TAT - Swab, Nasopharynx [[id number]]  (Abnormal) Collected: 06/12/23 2128    Specimen: Swab from Nasopharynx Updated: 06/12/23 2226     ADENOVIRUS, PCR Not Detected     Coronavirus 229E Not Detected     Coronavirus HKU1 Not Detected     Coronavirus NL63 Not Detected     Coronavirus OC43 Not Detected     COVID19 Detected     Human Metapneumovirus Not Detected     Human Rhinovirus/Enterovirus Not Detected     Influenza A PCR Not Detected     Influenza B PCR Not Detected     Parainfluenza Virus 1 Not Detected     Parainfluenza Virus 2 Not Detected     Parainfluenza Virus 3 Not Detected     Parainfluenza Virus 4 Not Detected     RSV, PCR Not Detected     Bordetella pertussis pcr Not Detected     Bordetella parapertussis PCR Not Detected     Chlamydophila pneumoniae PCR Not Detected     Mycoplasma pneumo by PCR Not Detected    Narrative:      In the setting of a positive respiratory panel with a viral infection PLUS a negative procalcitonin without other underlying concern for bacterial infection, consider observing off antibiotics or discontinuation of antibiotics and continue supportive care. If the respiratory panel is positive for atypical bacterial infection (Bordetella pertussis, Chlamydophila pneumoniae, or Mycoplasma pneumoniae), consider antibiotic de-escalation to target atypical bacterial infection.    Protime-INR [[id number]]  (Abnormal) Collected: 06/12/23 2128    Specimen: Blood Updated: 06/12/23 2152     Protime 14.5 Seconds      INR 1.12    aPTT [[id number]]  (Abnormal) Collected: 06/12/23 2128    Specimen: Blood Updated: 06/12/23 2152     PTT 38.9 seconds     High Sensitivity Troponin T [[id number]]  (Abnormal) Collected: 06/12/23 2128    Specimen: Blood Updated: 06/12/23 2207     HS Troponin T 31 ng/L      Narrative:      High Sensitive Troponin T Reference Range:  <10.0 ng/L- Negative Female for AMI  <15.0 ng/L- Negative Male for AMI  >=10 - Abnormal Female indicating possible myocardial injury.  >=15 - Abnormal Male indicating possible myocardial injury.   Clinicians would have to utilize clinical acumen, EKG, Troponin, and serial changes to determine if it is an Acute Myocardial Infarction or myocardial injury due to an underlying chronic condition.         BNP [[id number]]  (Normal) Collected: 06/12/23 2128    Specimen: Blood Updated: 06/12/23 2207     proBNP 299.0 pg/mL     Narrative:      Among patients with dyspnea, NT-proBNP is highly sensitive for the detection of acute congestive heart failure. In addition NT-proBNP of <300 pg/ml effectively rules out acute congestive heart failure with 99% negative predictive value.    Results may be falsely decreased if patient taking Biotin.      Blood Culture - Blood, Arm, Left [[id number]] Collected: 06/12/23 2128    Specimen: Blood from Arm, Left Updated: 06/12/23 2133    Blood Culture - Blood, Arm, Left [[id number]] Collected: 06/12/23 2128    Specimen: Blood from Arm, Left Updated: 06/12/23 2133    Lactic Acid, Plasma [[id number]]  (Normal) Collected: 06/12/23 2128    Specimen: Blood Updated: 06/12/23 2155     Lactate 1.3 mmol/L     Procalcitonin [[id number]]  (Normal) Collected: 06/12/23 2128    Specimen: Blood Updated: 06/12/23 2207     Procalcitonin 0.11 ng/mL     Narrative:      As a Marker for Sepsis (Non-Neonates):    1. <0.5 ng/mL represents a low risk of severe sepsis and/or septic shock.  2. >2 ng/mL represents a high risk of severe sepsis and/or septic shock.    As a Marker for Lower Respiratory Tract Infections that require antibiotic therapy:    PCT on Admission    Antibiotic Therapy       6-12 Hrs later    >0.5                Strongly Recommended  >0.25 - <0.5        Recommended   0.1 - 0.25          Discouraged              Remeasure/reassess PCT  <0.1        "         Strongly Discouraged     Remeasure/reassess PCT    As 28 day mortality risk marker: \"Change in Procalcitonin Result\" (>80% or <=80%) if Day 0 (or Day 1) and Day 4 values are available. Refer to http://www.[facility]-pct-calculator.com    Change in PCT <=80%  A decrease of PCT levels below or equal to 80% defines a positive change in PCT test result representing a higher risk for 28-day all-cause mortality of patients diagnosed with severe sepsis for septic shock.    Change in PCT >80%  A decrease of PCT levels of more than 80% defines a negative change in PCT result representing a lower risk for 28-day all-cause mortality of patients diagnosed with severe sepsis or septic shock.       CBC Auto Differential [[id number]]  (Abnormal) Collected: 06/12/23 2128    Specimen: Blood Updated: 06/12/23 2140     WBC 12.68 10*3/mm3      RBC 4.65 10*6/mm3      Hemoglobin 14.4 g/dL      Hematocrit 40.7 %      MCV 87.5 fL      MCH 31.0 pg      MCHC 35.4 g/dL      RDW 12.3 %      RDW-SD 39.4 fl      MPV 8.8 fL      Platelets 180 10*3/mm3      Neutrophil % 86.0 %      Lymphocyte % 7.6 %      Monocyte % 5.5 %      Eosinophil % 0.2 %      Basophil % 0.2 %      Immature Grans % 0.5 %      Neutrophils, Absolute 10.91 10*3/mm3      Lymphocytes, Absolute 0.96 10*3/mm3      Monocytes, Absolute 0.70 10*3/mm3      Eosinophils, Absolute 0.02 10*3/mm3      Basophils, Absolute 0.03 10*3/mm3      Immature Grans, Absolute 0.06 10*3/mm3      nRBC 0.0 /100 WBC     Urinalysis With Microscopic If Indicated (No Culture) - Urine, Clean Catch [[id number]]  (Normal) Collected: 06/12/23 2252    Specimen: Urine, Clean Catch Updated: 06/12/23 2340     Color, UA Yellow     Appearance, UA Clear     pH, UA 6.5     Specific Gravity, UA 1.012     Glucose, UA Negative     Ketones, UA Negative     Bilirubin, UA Negative     Blood, UA Negative     Protein, UA Negative     Leuk Esterase, UA Negative     Nitrite, UA Negative     Urobilinogen, UA 0.2 E.U./dL    " Narrative:      Urine microscopic not indicated.            Imaging Results (Last 24 Hours)       Procedure Component Value Units Date/Time    XR Chest 1 View [[id number]] Collected: 06/12/23 2201     Updated: 06/12/23 2204    Narrative:      SINGLE VIEW OF THE CHEST     HISTORY: Weakness and cough     COMPARISON: None available.     FINDINGS:  There is cardiomegaly. No pneumothorax or pleural effusion is seen.  Right lung is clear. Patient is suspected to have some scarring within  the left lung. There is calcification of the aorta.       Impression:      No definite acute infiltrates seen.     This report was finalized on 6/12/2023 10:01 PM by Dr. Talia Orlando M.D.               Results for orders placed during the hospital encounter of 06/06/22    Adult Transthoracic Echo Complete w/ Color, Spectral and Contrast if Necessary Per Protocol    Interpretation Summary  · Calculated left ventricular 3D EF = 65% Estimated left ventricular EF = 65% Left ventricular systolic function is normal.  · Left ventricular wall thickness is consistent with borderline concentric hypertrophy.  · Left ventricular diastolic function is consistent with (grade I) impaired relaxation.  · Estimated right ventricular systolic pressure from tricuspid regurgitation is normal (<35 mmHg).      ECG 12 Lead Other; weakness   Preliminary Result   HEART RATE= 76  bpm   RR Interval= 789  ms   VA Interval=   ms   P Horizontal Axis=   deg   P Front Axis=   deg   QRSD Interval= 105  ms   QT Interval= 396  ms   QRS Axis= 29  deg   T Wave Axis= 48  deg   - ABNORMAL ECG -   Atrial fibrillation   Probable anteroseptal infarct, old   Electronically Signed By:    Date and Time of Study: 2023-06-12 21:25:41           Assessment/Plan     Active Hospital Problems    Diagnosis  POA    **COVID-19 [U07.1]  Unknown    Stage 3a chronic kidney disease [N18.31]  Yes    Essential hypertension [I10]  Yes    Hyperlipemia [E78.5]  Yes    Coronary artery disease  involving native coronary artery of native heart without angina pectoris [I25.10]  Yes       COVID 19  -He is currently sating 95-97% on room air  -He reports extreme fatigue and generalized weakness and states he could not ambulate today  -Initiate Decadron daily  -Symptomatic treatment with IS, Mucinex, and chloraseptic spray  -Monitor daily COVID labs  -PT consult  -His troponin is elevated. He denies chest pain, EKG shows atrial fibrillation. Repeat troponin in AM  -Lovenox for DVT prophylaxis    Atrial Fibrillation  -Rate controlled  -He does not appear to have a history of Afib  -Check echo  -Cardiology consult    Hypertension  -Blood pressures stable. Continue home regimen  -Monitor    CKD Stage 3a  -His creatinine is near his baseline which is 1.24  -Avoid nephrotoxins  -Repeat lab work in AM    Hyperlipidemia/Coronary Artery Disease/Bilateral Carotid Artery Stenosis  -S/p coronary artery stent placement  -Continue aspirin and statin    GERD  -Continue home PPI    -I discussed the patients findings and my recommendations with patient.    VTE Prophylaxis - Pharmacy to dose Lovenox.  Code Status - Full code.       NANCY Spencer  [facility]  06/13/23  00:08 EDT

## 2023-06-13 NOTE — ED PROVIDER NOTES
EMERGENCY DEPARTMENT ENCOUNTER    Room Number:  12/12  PCP: Leanna Ryder MD  Historian: Patient      HPI:  Chief Complaint: Cough  A complete HPI/ROS/PMH/PSH/SH/FH are unobtainable due to: None  Context: Kevin Ellington is a 88 y.o. male who presents to the ED c/o a nonproductive cough that has been present now for the past several days.  The patient also reports some associated shortness of breath, fever/chills, as well as generalized weakness related to the symptoms.  He states that his wife did recently test positive for the COVID-19 virus.  He denies chest pain, back pain, dysuria/hematuria, or headaches.            PAST MEDICAL HISTORY  Active Ambulatory Problems     Diagnosis Date Noted    Bulging lumbar disc 06/16/2016    DDD (degenerative disc disease), lumbar 06/16/2016    Leg pain 06/16/2016    Spinal stenosis of lumbar region with neurogenic claudication 06/16/2016    Radiculitis, thoracic 06/16/2016    Status post total left knee replacement using cement 06/16/2016    Status post total right knee replacement using cement 06/16/2016    Coronary artery disease involving native coronary artery of native heart without angina pectoris 08/23/2016    Essential hypertension 08/23/2016    Hyperlipemia 08/23/2016    S/P coronary artery stent placement 09/06/2017    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/05/2017    Benign prostatic hyperplasia with urinary frequency 10/05/2017    Cervical radiculopathy 02/06/2018    Bilateral carpal tunnel syndrome 08/20/2018    Cervical spinal stenosis 08/20/2018    Arthritis of ankle 12/06/2018    Chronic pain of right ankle 12/06/2018    Shortness of breath 07/09/2019    Bilateral leg weakness 10/21/2019    History of melanoma 08/27/2020    Spondylosis of lumbar region without myelopathy or radiculopathy 09/09/2020    Stage 3a chronic kidney disease 10/21/2021    Pedal edema 10/21/2021    Gastroesophageal reflux disease without esophagitis 04/29/2022    TIA (transient  ischemic attack) 05/12/2022    Bilateral carotid artery stenosis 05/12/2022    Cerebral artery occlusion 08/09/2022    Hyperglycemia 08/09/2022    Hoarseness 12/13/2022    Hiatal hernia 12/13/2022    Abdominal fullness 12/13/2022     Resolved Ambulatory Problems     Diagnosis Date Noted    Health care maintenance 10/05/2017    Cancer 10/04/2018    Acute bronchitis 02/27/2019     Past Medical History:   Diagnosis Date    Allergic 1970    Arthritis     Asthma 05/23/2019    Benign prostatic hyperplasia     Cataract     Colon polyp     Coronary artery disease 2001    CTS (carpal tunnel syndrome) 1998    Erectile dysfunction     GERD (gastroesophageal reflux disease)     HTN (hypertension)     Hyperlipidemia     Kidney stone     Low back pain     Lumbosacral disc disease     Melanoma 01/15/2009    Obesity     Scoliosis     Stented coronary artery     Thoracic disc disorder          PAST SURGICAL HISTORY  Past Surgical History:   Procedure Laterality Date    ABLATION OF DYSRHYTHMIC FOCUS  nov. 2020    radio frequency /lower back    ANKLE FUSION Left 2007    reconstruction and fusion    BACK SURGERY      HERNIATED LUMBAR DISK 1975,2000,2012    CARDIAC CATHETERIZATION  2001    CARPAL TUNNEL RELEASE      CATARACT EXTRACTION, BILATERAL Bilateral 2002    COLONOSCOPY      CORONARY ANGIOPLASTY WITH STENT PLACEMENT  2001    CORONARY STENT PLACEMENT      ENDOSCOPY N/A 12/19/2022    Procedure: ESOPHAGOGASTRODUODENOSCOPY WITH BIOPSY;  Surgeon: Rashard Mathews MD;  Location: [facility] MAIN OR;  Service: Gastroenterology;  Laterality: N/A;  gastric polyps, hiatal hernia    EPIDURAL BLOCK  Several at [facility]    and [facility]    EYE SURGERY      JOINT REPLACEMENT      REPLACEMENT TOTAL KNEE Left 2015    REPLACEMENT TOTAL KNEE Right     SKIN CANCER EXCISION      MELANOMA    SPINE SURGERY      THORACIC SPINE SURGERY           FAMILY HISTORY  Family History   Problem Relation Age of Onset    Hypertension Mother      Arthritis Mother             Skin cancer Mother     Heart disease Father     Early death Father         Aortic aneurism age 58    Hypertension Father     Aortic aneurysm Father 58    Cancer Neg Hx     Colon cancer Neg Hx     Colon polyps Neg Hx     Crohn's disease Neg Hx     Irritable bowel syndrome Neg Hx     Ulcerative colitis Neg Hx          SOCIAL HISTORY  Social History     Socioeconomic History    Marital status:     Number of children: 1    Years of education: BA DEGREE   Tobacco Use    Smoking status: Never    Smokeless tobacco: Never    Tobacco comments:     caffeine use   Vaping Use    Vaping Use: Never used   Substance and Sexual Activity    Alcohol use: No    Drug use: No    Sexual activity: Not Currently     Partners: Female         ALLERGIES  Codeine, Hydrocodone, and Sulfa antibiotics        REVIEW OF SYSTEMS  Review of Systems   Constitutional:  Positive for chills and fever. Negative for activity change and appetite change.   HENT:  Negative for congestion and sore throat.    Eyes: Negative.    Respiratory:  Positive for cough and shortness of breath.    Cardiovascular:  Negative for chest pain and leg swelling.   Gastrointestinal:  Negative for abdominal pain, diarrhea and vomiting.   Endocrine: Negative.    Genitourinary:  Negative for decreased urine volume and dysuria.   Musculoskeletal:  Negative for neck pain.   Skin:  Negative for rash and wound.   Allergic/Immunologic: Negative.    Neurological:  Positive for weakness. Negative for numbness and headaches.   Hematological: Negative.    Psychiatric/Behavioral: Negative.     All other systems reviewed and are negative.         PHYSICAL EXAM  ED Triage Vitals   Temp Heart Rate Resp BP SpO2   23   (!) 102 °F (38.9 °C) 78 16 118/73 95 %      Temp src Heart Rate Source Patient Position BP Location FiO2 (%)   -- -- -- -- --              Physical Exam  Constitutional:        General: He is not in acute distress.     Appearance: Normal appearance. He is not ill-appearing or toxic-appearing.   HENT:      Head: Normocephalic and atraumatic.   Eyes:      Extraocular Movements: Extraocular movements intact.      Pupils: Pupils are equal, round, and reactive to light.   Cardiovascular:      Rate and Rhythm: Normal rate and regular rhythm.      Heart sounds: No murmur heard.    No friction rub. No gallop.   Pulmonary:      Effort: Pulmonary effort is normal.      Breath sounds: Normal breath sounds. Examination of the right-lower field reveals rhonchi. Examination of the left-lower field reveals rhonchi.   Abdominal:      General: Abdomen is flat. There is no distension.      Palpations: Abdomen is soft.      Tenderness: There is no abdominal tenderness.   Musculoskeletal:         General: No swelling or tenderness. Normal range of motion.      Cervical back: Normal range of motion and neck supple.   Skin:     General: Skin is warm and dry.   Neurological:      General: No focal deficit present.      Mental Status: He is alert and oriented to person, place, and time.      Sensory: No sensory deficit.      Motor: No weakness.   Psychiatric:         Mood and Affect: Mood normal.         Behavior: Behavior normal.         Vital signs and nursing notes reviewed.          LAB RESULTS  Recent Results (from the past 24 hour(s))   ECG 12 Lead Other; weakness    Collection Time: 06/12/23  9:25 PM   Result Value Ref Range    QT Interval 396 ms   Comprehensive Metabolic Panel    Collection Time: 06/12/23  9:28 PM    Specimen: Blood   Result Value Ref Range    Glucose 129 (H) 65 - 99 mg/dL    BUN 23 8 - 23 mg/dL    Creatinine 1.30 (H) 0.76 - 1.27 mg/dL    Sodium 139 136 - 145 mmol/L    Potassium 4.4 3.5 - 5.2 mmol/L    Chloride 102 98 - 107 mmol/L    CO2 24.0 22.0 - 29.0 mmol/L    Calcium 9.9 8.6 - 10.5 mg/dL    Total Protein 7.1 6.0 - 8.5 g/dL    Albumin 4.4 3.5 - 5.2 g/dL    ALT (SGPT) 17 1 - 41  U/L    AST (SGOT) 22 1 - 40 U/L    Alkaline Phosphatase 114 39 - 117 U/L    Total Bilirubin 1.2 0.0 - 1.2 mg/dL    Globulin 2.7 gm/dL    A/G Ratio 1.6 g/dL    BUN/Creatinine Ratio 17.7 7.0 - 25.0    Anion Gap 13.0 5.0 - 15.0 mmol/L    eGFR 52.8 (L) >60.0 mL/min/1.73   Respiratory Panel PCR w/COVID-19(SARS-CoV-2) ALBERT/INDERJIT/MARLENY/PAD/COR/MAD/ABEL In-House, NP Swab in UTM/VTM, 3-4 HR TAT - Swab, Nasopharynx    Collection Time: 06/12/23  9:28 PM    Specimen: Nasopharynx; Swab   Result Value Ref Range    ADENOVIRUS, PCR Not Detected Not Detected    Coronavirus 229E Not Detected Not Detected    Coronavirus HKU1 Not Detected Not Detected    Coronavirus NL63 Not Detected Not Detected    Coronavirus OC43 Not Detected Not Detected    COVID19 Detected (C) Not Detected - Ref. Range    Human Metapneumovirus Not Detected Not Detected    Human Rhinovirus/Enterovirus Not Detected Not Detected    Influenza A PCR Not Detected Not Detected    Influenza B PCR Not Detected Not Detected    Parainfluenza Virus 1 Not Detected Not Detected    Parainfluenza Virus 2 Not Detected Not Detected    Parainfluenza Virus 3 Not Detected Not Detected    Parainfluenza Virus 4 Not Detected Not Detected    RSV, PCR Not Detected Not Detected    Bordetella pertussis pcr Not Detected Not Detected    Bordetella parapertussis PCR Not Detected Not Detected    Chlamydophila pneumoniae PCR Not Detected Not Detected    Mycoplasma pneumo by PCR Not Detected Not Detected   Protime-INR    Collection Time: 06/12/23  9:28 PM    Specimen: Blood   Result Value Ref Range    Protime 14.5 (H) 11.7 - 14.2 Seconds    INR 1.12 (H) 0.90 - 1.10   aPTT    Collection Time: 06/12/23  9:28 PM    Specimen: Blood   Result Value Ref Range    PTT 38.9 (H) 22.7 - 35.4 seconds   High Sensitivity Troponin T    Collection Time: 06/12/23  9:28 PM    Specimen: Blood   Result Value Ref Range    HS Troponin T 31 (H) <15 ng/L   BNP    Collection Time: 06/12/23  9:28 PM    Specimen: Blood   Result  Value Ref Range    proBNP 299.0 0.0 - 1,800.0 pg/mL   Lactic Acid, Plasma    Collection Time: 06/12/23  9:28 PM    Specimen: Blood   Result Value Ref Range    Lactate 1.3 0.5 - 2.0 mmol/L   Procalcitonin    Collection Time: 06/12/23  9:28 PM    Specimen: Blood   Result Value Ref Range    Procalcitonin 0.11 0.00 - 0.25 ng/mL   CBC Auto Differential    Collection Time: 06/12/23  9:28 PM    Specimen: Blood   Result Value Ref Range    WBC 12.68 (H) 3.40 - 10.80 10*3/mm3    RBC 4.65 4.14 - 5.80 10*6/mm3    Hemoglobin 14.4 13.0 - 17.7 g/dL    Hematocrit 40.7 37.5 - 51.0 %    MCV 87.5 79.0 - 97.0 fL    MCH 31.0 26.6 - 33.0 pg    MCHC 35.4 31.5 - 35.7 g/dL    RDW 12.3 12.3 - 15.4 %    RDW-SD 39.4 37.0 - 54.0 fl    MPV 8.8 6.0 - 12.0 fL    Platelets 180 140 - 450 10*3/mm3    Neutrophil % 86.0 (H) 42.7 - 76.0 %    Lymphocyte % 7.6 (L) 19.6 - 45.3 %    Monocyte % 5.5 5.0 - 12.0 %    Eosinophil % 0.2 (L) 0.3 - 6.2 %    Basophil % 0.2 0.0 - 1.5 %    Immature Grans % 0.5 0.0 - 0.5 %    Neutrophils, Absolute 10.91 (H) 1.70 - 7.00 10*3/mm3    Lymphocytes, Absolute 0.96 0.70 - 3.10 10*3/mm3    Monocytes, Absolute 0.70 0.10 - 0.90 10*3/mm3    Eosinophils, Absolute 0.02 0.00 - 0.40 10*3/mm3    Basophils, Absolute 0.03 0.00 - 0.20 10*3/mm3    Immature Grans, Absolute 0.06 (H) 0.00 - 0.05 10*3/mm3    nRBC 0.0 0.0 - 0.2 /100 WBC       Ordered the above labs and reviewed the results.        RADIOLOGY  XR Chest 1 View    Result Date: 6/12/2023  SINGLE VIEW OF THE CHEST  HISTORY: Weakness and cough  COMPARISON: None available.  FINDINGS: There is cardiomegaly. No pneumothorax or pleural effusion is seen. Right lung is clear. Patient is suspected to have some scarring within the left lung. There is calcification of the aorta.      No definite acute infiltrates seen.  This report was finalized on 6/12/2023 10:01 PM by Dr. Talia Orlando M.D.       Ordered the above noted radiological studies. Reviewed by me in PACS.             PROCEDURES  Procedures    EKG independently interpreted by myself as follows:    EKG          EKG time: 2125  Rhythm/Rate: NSR, 76  P waves and FL: nml  QRS, axis: nml, nml   ST and T waves: nml     Interpreted Contemporaneously by me, independently viewed  No previous EKG available for comparison            MEDICATIONS GIVEN IN ER  Medications   sodium chloride 0.9 % flush 10 mL (has no administration in time range)   sodium chloride 0.9 % bolus 1,000 mL (1,000 mL Intravenous New Bag 6/12/23 2235)   acetaminophen (TYLENOL) tablet 1,000 mg (1,000 mg Oral Given 6/12/23 2238)   dexamethasone sodium phosphate injection 4 mg (4 mg Intravenous Given 6/12/23 2236)                   MEDICAL DECISION MAKING, PROGRESS, and CONSULTS    All labs have been independently reviewed by me.  All radiology studies have been reviewed by me and I have also reviewed the radiology report.   EKG's independently viewed and interpreted by me.  Discussion below represents my analysis of pertinent findings related to patient's condition, differential diagnosis, treatment plan and final disposition.      Additional sources:  - Discussed/ obtained information from independent historians: History obtained from the patient himself at bedside    - External (non-ED) record review: Upon medical records review, the patient was last seen and evaluated in the office of his cardiologist on 5/23/2023 for follow-up for his known coronary artery disease.    - Chronic or social conditions impacting care: Coronary artery disease, recent exposure to possibly COVID-19    - Shared decision making: Admission decision based on shared conversations have between myself as well as the patient at bedside.    Case discussed with NANCY Marquez for [facility], who will admit the patient to the hospital today for Dr. Chavez.        Orders placed during this visit:  Orders Placed This Encounter   Procedures    Respiratory Panel PCR w/COVID-19(SARS-CoV-2)  ALBERT/INDERJIT/MARLENY/PAD/COR/MAD/ABEL In-House, NP Swab in UTM/VTM, 3-4 HR TAT - Swab, Nasopharynx    Blood Culture - Blood,    Blood Culture - Blood,    XR Chest 1 View    Comprehensive Metabolic Panel    Protime-INR    aPTT    Urinalysis With Microscopic If Indicated (No Culture) - Urine, Clean Catch    High Sensitivity Troponin T    BNP    Lactic Acid, Plasma    Procalcitonin    CBC Auto Differential    High Sensitivity Troponin T 2Hr    LHA (on-call MD unless specified) Details    ECG 12 Lead Other; weakness    Insert Peripheral IV    Inpatient Admission    CBC & Differential             Differential diagnosis includes but is not limited to:    Differential diagnosis includes but is not limited to pneumonia, acute bronchitis, COVID-19, viral upper respiratory infection, acute sinusitis, sepsis, or urinary tract infection.      Independent interpretation of labs, radiology studies, and discussions with consultants:    Chest x-ray was independently interpreted by myself with my interpretation showing no areas of edema, infiltrate, or pneumothorax.        ED Course as of 06/12/23 2251 Mon Jun 12, 2023 2229 On reevaluation, the patient is resting comfortably with stable vital signs.  Oxygen level is currently 95% on room air.  Given the patient's advanced age as well as his fever, COVID-19 positivity status, as well as elevated troponin, I would like to treat with a dose of IV Decadron and admit him to the hospital today for further management and treatment.  The patient is in agreement with that plan and all questions have been answered. [BM]   2250 The patient's presentation, work-up, as well as diagnosis and treatment plan was discussed at length with NANCY Marquez for A.  She agrees to admit the patient to the hospital today for Dr. Chavez. [BM]      ED Course User Index  [BM] Nacho Sanders MD               DIAGNOSIS  Final diagnoses:   Fever and chills   COVID-19 virus infection   Acute cough    Generalized weakness         DISPOSITION  ADMISSION    Discussed treatment plan and reason for admission with pt/family and admitting physician.  Pt/family voiced understanding of the plan for admission for further testing/treatment as needed.               Latest Documented Vital Signs:  As of 22:51 EDT  BP- 118/73 HR- 78 Temp- (!) 102 °F (38.9 °C) O2 sat- 95%              --    Please note that portions of this were completed with a voice recognition program.       Note Disclaimer: At [facility], we believe that sharing information builds trust and better relationships. You are receiving this note because you are receiving care at [facility] or recently visited. It is possible you will see health information before a provider has talked with you about it. This kind of information can be easy to misunderstand. To help you fully understand what it means for your health, we urge you to discuss this note with your provider.             Nacho Sanders MD  06/12/23 1114

## 2023-06-13 NOTE — ED NOTES
Nursing report ED to floor  Kevin Ellington  88 y.o.  male    HPI :   Chief Complaint   Patient presents with    Weakness - Generalized    Cough       Admitting doctor:   James Chavez MD    Admitting diagnosis:   The primary encounter diagnosis was Fever and chills. Diagnoses of COVID-19 virus infection, Acute cough, and Generalized weakness were also pertinent to this visit.    Code status:   Current Code Status       Date Active Code Status Order ID Comments User Context       Not on file            Allergies:   Codeine, Hydrocodone, and Sulfa antibiotics    Isolation:   Enhanced Droplet/Contact     Intake and Output  No intake or output data in the 24 hours ending 06/12/23 2255    Weight:   There were no vitals filed for this visit.    Most recent vitals:   Vitals:    06/12/23 1952 06/12/23 1953   BP: 118/73    Pulse: 78    Resp: 16    Temp:  (!) 102 °F (38.9 °C)   SpO2:  95%       Active LDAs/IV Access:   Lines, Drains & Airways       Active LDAs       Name Placement date Placement time Site Days    Peripheral IV 06/12/23 2128 Right Antecubital 06/12/23 2128  Antecubital  less than 1                    Labs (abnormal labs have a star):   Labs Reviewed   RESPIRATORY PANEL PCR W/ COVID-19 (SARS-COV-2) ALBERT/INDERJIT/MARLENY/PAD/COR/MAD/ABEL IN-HOUSE, NP SWAB IN UTM/VTP, 3-4 HR TAT - Abnormal; Notable for the following components:       Result Value    COVID19 Detected (*)     All other components within normal limits    Narrative:     In the setting of a positive respiratory panel with a viral infection PLUS a negative procalcitonin without other underlying concern for bacterial infection, consider observing off antibiotics or discontinuation of antibiotics and continue supportive care. If the respiratory panel is positive for atypical bacterial infection (Bordetella pertussis, Chlamydophila pneumoniae, or Mycoplasma pneumoniae), consider antibiotic de-escalation to target atypical bacterial infection.    COMPREHENSIVE METABOLIC PANEL - Abnormal; Notable for the following components:    Glucose 129 (*)     Creatinine 1.30 (*)     eGFR 52.8 (*)     All other components within normal limits    Narrative:     GFR Normal >60  Chronic Kidney Disease <60  Kidney Failure <15    The GFR formula is only valid for adults with stable renal function between ages 18 and 70.   PROTIME-INR - Abnormal; Notable for the following components:    Protime 14.5 (*)     INR 1.12 (*)     All other components within normal limits   APTT - Abnormal; Notable for the following components:    PTT 38.9 (*)     All other components within normal limits   TROPONIN - Abnormal; Notable for the following components:    HS Troponin T 31 (*)     All other components within normal limits    Narrative:     High Sensitive Troponin T Reference Range:  <10.0 ng/L- Negative Female for AMI  <15.0 ng/L- Negative Male for AMI  >=10 - Abnormal Female indicating possible myocardial injury.  >=15 - Abnormal Male indicating possible myocardial injury.   Clinicians would have to utilize clinical acumen, EKG, Troponin, and serial changes to determine if it is an Acute Myocardial Infarction or myocardial injury due to an underlying chronic condition.        CBC WITH AUTO DIFFERENTIAL - Abnormal; Notable for the following components:    WBC 12.68 (*)     Neutrophil % 86.0 (*)     Lymphocyte % 7.6 (*)     Eosinophil % 0.2 (*)     Neutrophils, Absolute 10.91 (*)     Immature Grans, Absolute 0.06 (*)     All other components within normal limits   BNP (IN-HOUSE) - Normal    Narrative:     Among patients with dyspnea, NT-proBNP is highly sensitive for the detection of acute congestive heart failure. In addition NT-proBNP of <300 pg/ml effectively rules out acute congestive heart failure with 99% negative predictive value.    Results may be falsely decreased if patient taking Biotin.     LACTIC ACID, PLASMA - Normal   PROCALCITONIN - Normal    Narrative:     As a Marker  "for Sepsis (Non-Neonates):    1. <0.5 ng/mL represents a low risk of severe sepsis and/or septic shock.  2. >2 ng/mL represents a high risk of severe sepsis and/or septic shock.    As a Marker for Lower Respiratory Tract Infections that require antibiotic therapy:    PCT on Admission    Antibiotic Therapy       6-12 Hrs later    >0.5                Strongly Recommended  >0.25 - <0.5        Recommended   0.1 - 0.25          Discouraged              Remeasure/reassess PCT  <0.1                Strongly Discouraged     Remeasure/reassess PCT    As 28 day mortality risk marker: \"Change in Procalcitonin Result\" (>80% or <=80%) if Day 0 (or Day 1) and Day 4 values are available. Refer to [email or web address] AdaPlayLabpct-[email or web address]    Change in PCT <=80%  A decrease of PCT levels below or equal to 80% defines a positive change in PCT test result representing a higher risk for 28-day all-cause mortality of patients diagnosed with severe sepsis for septic shock.    Change in PCT >80%  A decrease of PCT levels of more than 80% defines a negative change in PCT result representing a lower risk for 28-day all-cause mortality of patients diagnosed with severe sepsis or septic shock.      BLOOD CULTURE   BLOOD CULTURE   URINALYSIS W/ MICROSCOPIC IF INDICATED (NO CULTURE)   HIGH SENSITIVITIY TROPONIN T 2HR   CBC AND DIFFERENTIAL    Narrative:     The following orders were created for panel order CBC & Differential.  Procedure                               Abnormality         Status                     ---------                               -----------         ------                     CBC Auto Differential[[id number]]        Abnormal            Final result                 Please view results for these tests on the individual orders.       EKG:   ECG 12 Lead Other; weakness   Preliminary Result   HEART RATE= 76  bpm   RR Interval= 789  ms   MD Interval=   ms   P Horizontal Axis=   deg   P Front Axis=   deg   QRSD Interval= 105  ms   QT " Interval= 396  ms   QRS Axis= 29  deg   T Wave Axis= 48  deg   - ABNORMAL ECG -   Atrial fibrillation   Probable anteroseptal infarct, old   Electronically Signed By:    Date and Time of Study: 2023-06-12 21:25:41          Meds given in ED:   Medications   sodium chloride 0.9 % flush 10 mL (has no administration in time range)   sodium chloride 0.9 % bolus 1,000 mL (1,000 mL Intravenous New Bag 6/12/23 2235)   acetaminophen (TYLENOL) tablet 1,000 mg (1,000 mg Oral Given 6/12/23 2238)   dexamethasone sodium phosphate injection 4 mg (4 mg Intravenous Given 6/12/23 2236)       Imaging results:  XR Chest 1 View    Result Date: 6/12/2023  No definite acute infiltrates seen.  This report was finalized on 6/12/2023 10:01 PM by Dr. Talia Orlando M.D.       Ambulatory status:   - assist    Social issues:   Social History     Socioeconomic History    Marital status:     Number of children: 1    Years of education: BA DEGREE   Tobacco Use    Smoking status: Never    Smokeless tobacco: Never    Tobacco comments:     caffeine use   Vaping Use    Vaping Use: Never used   Substance and Sexual Activity    Alcohol use: No    Drug use: No    Sexual activity: Not Currently     Partners: Female       NIH Stroke Scale:         Aliza Dacosta RN  06/12/23 22:55 EDT

## 2023-06-13 NOTE — PROGRESS NOTES
[facility]  Clinical Pharmacy Department     Remdesivir Review Note    Kevin Ellington is a 88 y.o. male with confirmed COVID-19 infection on day 1 of hospitalization.     Consulting Provider:  Dr. Pitts  Date of Confirmed SARS-CoV-2: 6/12  Date of Symptom Onset: <5 days prior to admission  Other Antimicrobials: None  Hydroxychloroquine or chloroquine prior to arrival: No    Allergies  Allergies as of 06/12/2023 - Reviewed 06/12/2023   Allergen Reaction Noted    Codeine GI Intolerance 03/10/2016    Hydrocodone GI Intolerance 03/14/2018    Sulfa antibiotics Unknown (See Comments) 03/10/2016       Microbiology:  Microbiology Results (last 10 days)       Procedure Component Value - Date/Time    Respiratory Panel PCR w/COVID-19(SARS-CoV-2) ALBERT/INDERJIT/MARLENY/PAD/COR/MAD/ABEL In-House, NP Swab in UTM/VTM, 3-4 HR TAT - Swab, Nasopharynx [[id number]]  (Abnormal) Collected: 06/12/23 2128    Lab Status: Final result Specimen: Swab from Nasopharynx Updated: 06/12/23 2226     ADENOVIRUS, PCR Not Detected     Coronavirus 229E Not Detected     Coronavirus HKU1 Not Detected     Coronavirus NL63 Not Detected     Coronavirus OC43 Not Detected     COVID19 Detected     Human Metapneumovirus Not Detected     Human Rhinovirus/Enterovirus Not Detected     Influenza A PCR Not Detected     Influenza B PCR Not Detected     Parainfluenza Virus 1 Not Detected     Parainfluenza Virus 2 Not Detected     Parainfluenza Virus 3 Not Detected     Parainfluenza Virus 4 Not Detected     RSV, PCR Not Detected     Bordetella pertussis pcr Not Detected     Bordetella parapertussis PCR Not Detected     Chlamydophila pneumoniae PCR Not Detected     Mycoplasma pneumo by PCR Not Detected    Narrative:      In the setting of a positive respiratory panel with a viral infection PLUS a negative procalcitonin without other underlying concern for bacterial infection, consider observing off antibiotics or discontinuation of antibiotics and continue supportive care.  "If the respiratory panel is positive for atypical bacterial infection (Bordetella pertussis, Chlamydophila pneumoniae, or Mycoplasma pneumoniae), consider antibiotic de-escalation to target atypical bacterial infection.            Vitals/Labs/I&O  Visit Vitals  /85 (BP Location: Left arm, Patient Position: Lying)   Pulse 78   Temp 97.9 °F (36.6 °C) (Oral)   Resp 16   Ht 180.3 cm (71\")   Wt 87.7 kg (193 lb 5.5 oz)   SpO2 97%   BMI 26.97 kg/m²       Results from last 7 days   Lab Units 06/13/23  0604 06/12/23  2128   WBC 10*3/mm3 9.57 12.68*     Results from last 7 days   Lab Units 06/13/23  0051 06/12/23  2128   PROCALCITONIN ng/mL 0.12 0.11     Results from last 7 days   Lab Units 06/13/23  0604 06/12/23  2128   AST (SGOT) U/L 18 22      Results from last 7 days   Lab Units 06/13/23  0604 06/12/23  2128   ALT (SGPT) U/L 17 17       Estimated Creatinine Clearance: 58.1 mL/min (by C-G formula based on SCr of 1.09 mg/dL).  Results from last 7 days   Lab Units 06/13/23  0604 06/12/23  2128   BUN mg/dL 22 23   CREATININE mg/dL 1.09 1.30*     Intake & Output (last 3 days)         06/10 0701  06/11 0700 06/11 0701  06/12 0700 06/12 0701  06/13 0700 06/13 0701  06/14 0700    P.O.    120    IV Piggyback   1000     Total Intake(mL/kg)   1000 (11.4) 120 (1.4)    Urine (mL/kg/hr)   625 1200 (2.1)    Total Output   625 1200    Net   +375 -1080            Urine Unmeasured Occurrence    2 x            Assessment/Plan:    Patient meets the following inclusion/exclusion criteria:  Patient is hospitalized with confirmed COVID-19 infection (and accompanying symptoms)  Patient meets one of the two below criteria:  OR  Patient is symptomatic but not requiring supplemental oxyen or an increase in baseline oxygen AND has at least one of the below high risk criteria for progression to severe illness. High risk criteria:   Age >/= 65  Cancer  Cardiovascular diseases (HF, CAD, or cardiomyopathies)  CKD  Chronic lung disease (COPD, CF, " interstitial lung disease, PE, pulmonary hypertension, bronchopulmonary dysplasia, bronchiectasis)  Diabetes mellitis (insulin dependent or poorly controlled)  Immunocompromising conditions or receipt of immunosuppressive medications  Obesity (BMI > 35 kg/m2)  Pregnancy and recent pregnancy (within 7 days of delivery)  Sickle cell disease  Baseline and daily LFTs and Scr have been ordered prior to remdesivir initiation  ALT is not ? 10 times the upper limit of normal  Patient is not on concomitant hydroxychloroquine or chloroquine  Patient does not require invasive mechanical ventilation (IMV) or ECMO  Patient is within 10 days from symptom onset (for criteria 2a) or within 7 days of symptom onset (for criteria 2b)    Remdesivir 200 mg IV x 1 dose, followed by 100 mg IV q24h for 2 days or until hospital discharge (whichever comes first) has been ordered.    Thank you for involving pharmacy in this patient's care. Please contact pharmacy with any questions or concerns.                           Robert Jain, [facility]  Clinical Pharmacist

## 2023-06-13 NOTE — PLAN OF CARE
Goal Outcome Evaluation:  Plan of Care Reviewed With: patient        Progress: improving  Outcome Evaluation: vss. telemetry monitor, sr. alert and oriented x4, room air. up with assist, very weak. remdesivir given.

## 2023-06-14 LAB
ALBUMIN SERPL-MCNC: 3.7 G/DL (ref 3.5–5.2)
ALBUMIN/GLOB SERPL: 1.4 G/DL
ALP SERPL-CCNC: 85 U/L (ref 39–117)
ALT SERPL W P-5'-P-CCNC: 18 U/L (ref 1–41)
ANION GAP SERPL CALCULATED.3IONS-SCNC: 13.7 MMOL/L (ref 5–15)
AST SERPL-CCNC: 28 U/L (ref 1–40)
BASOPHILS # BLD AUTO: 0.02 10*3/MM3 (ref 0–0.2)
BASOPHILS NFR BLD AUTO: 0.2 % (ref 0–1.5)
BILIRUB SERPL-MCNC: 0.6 MG/DL (ref 0–1.2)
BUN SERPL-MCNC: 30 MG/DL (ref 8–23)
BUN/CREAT SERPL: 22.7 (ref 7–25)
CALCIUM SPEC-SCNC: 9.3 MG/DL (ref 8.6–10.5)
CHLORIDE SERPL-SCNC: 101 MMOL/L (ref 98–107)
CO2 SERPL-SCNC: 21.3 MMOL/L (ref 22–29)
CREAT SERPL-MCNC: 1.32 MG/DL (ref 0.76–1.27)
CRP SERPL-MCNC: 4.81 MG/DL (ref 0–0.5)
DEPRECATED RDW RBC AUTO: 38.3 FL (ref 37–54)
EGFRCR SERPLBLD CKD-EPI 2021: 51.9 ML/MIN/1.73
EOSINOPHIL # BLD AUTO: 0.05 10*3/MM3 (ref 0–0.4)
EOSINOPHIL NFR BLD AUTO: 0.5 % (ref 0.3–6.2)
ERYTHROCYTE [DISTWIDTH] IN BLOOD BY AUTOMATED COUNT: 12.2 % (ref 12.3–15.4)
GLOBULIN UR ELPH-MCNC: 2.7 GM/DL
GLUCOSE SERPL-MCNC: 125 MG/DL (ref 65–99)
HCT VFR BLD AUTO: 36.3 % (ref 37.5–51)
HGB BLD-MCNC: 12.7 G/DL (ref 13–17.7)
IMM GRANULOCYTES # BLD AUTO: 0.05 10*3/MM3 (ref 0–0.05)
IMM GRANULOCYTES NFR BLD AUTO: 0.5 % (ref 0–0.5)
LYMPHOCYTES # BLD AUTO: 1.72 10*3/MM3 (ref 0.7–3.1)
LYMPHOCYTES NFR BLD AUTO: 18.7 % (ref 19.6–45.3)
MCH RBC QN AUTO: 30.4 PG (ref 26.6–33)
MCHC RBC AUTO-ENTMCNC: 35 G/DL (ref 31.5–35.7)
MCV RBC AUTO: 86.8 FL (ref 79–97)
MONOCYTES # BLD AUTO: 0.96 10*3/MM3 (ref 0.1–0.9)
MONOCYTES NFR BLD AUTO: 10.4 % (ref 5–12)
NEUTROPHILS NFR BLD AUTO: 6.39 10*3/MM3 (ref 1.7–7)
NEUTROPHILS NFR BLD AUTO: 69.7 % (ref 42.7–76)
NRBC BLD AUTO-RTO: 0 /100 WBC (ref 0–0.2)
PLATELET # BLD AUTO: 161 10*3/MM3 (ref 140–450)
PMV BLD AUTO: 9 FL (ref 6–12)
POTASSIUM SERPL-SCNC: 3.4 MMOL/L (ref 3.5–5.2)
PROT SERPL-MCNC: 6.4 G/DL (ref 6–8.5)
RBC # BLD AUTO: 4.18 10*6/MM3 (ref 4.14–5.8)
SODIUM SERPL-SCNC: 136 MMOL/L (ref 136–145)
WBC NRBC COR # BLD: 9.19 10*3/MM3 (ref 3.4–10.8)

## 2023-06-14 PROCEDURE — 97162 PT EVAL MOD COMPLEX 30 MIN: CPT

## 2023-06-14 PROCEDURE — 36415 COLL VENOUS BLD VENIPUNCTURE: CPT | Performed by: NURSE PRACTITIONER

## 2023-06-14 PROCEDURE — 25010000002 ENOXAPARIN PER 10 MG: Performed by: NURSE PRACTITIONER

## 2023-06-14 PROCEDURE — 25010000002 REMDESIVIR 100 MG/20ML SOLUTION 1 EACH VIAL: Performed by: STUDENT IN AN ORGANIZED HEALTH CARE EDUCATION/TRAINING PROGRAM

## 2023-06-14 PROCEDURE — 80053 COMPREHEN METABOLIC PANEL: CPT | Performed by: NURSE PRACTITIONER

## 2023-06-14 PROCEDURE — 86140 C-REACTIVE PROTEIN: CPT | Performed by: NURSE PRACTITIONER

## 2023-06-14 PROCEDURE — 97116 GAIT TRAINING THERAPY: CPT

## 2023-06-14 PROCEDURE — 85025 COMPLETE CBC W/AUTO DIFF WBC: CPT | Performed by: NURSE PRACTITIONER

## 2023-06-14 RX ADMIN — ACETAMINOPHEN 650 MG: 325 TABLET, FILM COATED ORAL at 01:24

## 2023-06-14 RX ADMIN — METOPROLOL SUCCINATE 100 MG: 100 TABLET, EXTENDED RELEASE ORAL at 09:27

## 2023-06-14 RX ADMIN — GUAIFENESIN 600 MG: 600 TABLET, EXTENDED RELEASE ORAL at 09:27

## 2023-06-14 RX ADMIN — Medication 10 ML: at 21:19

## 2023-06-14 RX ADMIN — CHLORTHALIDONE 50 MG: 25 TABLET ORAL at 09:27

## 2023-06-14 RX ADMIN — ATORVASTATIN CALCIUM 40 MG: 20 TABLET, FILM COATED ORAL at 09:27

## 2023-06-14 RX ADMIN — HYDROCODONE BITARTRATE AND HOMATROPINE METHYLBROMIDE 5 ML: 1.5; 5 SYRUP ORAL at 21:19

## 2023-06-14 RX ADMIN — FLUTICASONE PROPIONATE 1 SPRAY: 50 SPRAY, METERED NASAL at 09:25

## 2023-06-14 RX ADMIN — GUAIFENESIN 600 MG: 600 TABLET, EXTENDED RELEASE ORAL at 21:19

## 2023-06-14 RX ADMIN — CLONIDINE HYDROCHLORIDE 0.1 MG: 0.1 TABLET ORAL at 21:19

## 2023-06-14 RX ADMIN — POTASSIUM CHLORIDE 20 MEQ: 750 TABLET, EXTENDED RELEASE ORAL at 09:27

## 2023-06-14 RX ADMIN — ENOXAPARIN SODIUM 40 MG: 100 INJECTION SUBCUTANEOUS at 09:26

## 2023-06-14 RX ADMIN — PANTOPRAZOLE SODIUM 40 MG: 40 TABLET, DELAYED RELEASE ORAL at 09:27

## 2023-06-14 RX ADMIN — CETIRIZINE HYDROCHLORIDE 10 MG: 10 TABLET ORAL at 09:26

## 2023-06-14 RX ADMIN — TAMSULOSIN HYDROCHLORIDE 0.8 MG: 0.4 CAPSULE ORAL at 09:27

## 2023-06-14 RX ADMIN — Medication 10 ML: at 09:28

## 2023-06-14 RX ADMIN — HYDROCODONE BITARTRATE AND HOMATROPINE METHYLBROMIDE 5 ML: 1.5; 5 SYRUP ORAL at 05:29

## 2023-06-14 RX ADMIN — REMDESIVIR 100 MG: 100 INJECTION, POWDER, LYOPHILIZED, FOR SOLUTION INTRAVENOUS at 13:48

## 2023-06-14 RX ADMIN — CLONIDINE HYDROCHLORIDE 0.1 MG: 0.1 TABLET ORAL at 09:27

## 2023-06-14 RX ADMIN — HYDROCODONE BITARTRATE AND HOMATROPINE METHYLBROMIDE 5 ML: 1.5; 5 SYRUP ORAL at 13:49

## 2023-06-14 RX ADMIN — LOSARTAN POTASSIUM 100 MG: 100 TABLET, FILM COATED ORAL at 09:27

## 2023-06-14 RX ADMIN — ASPIRIN 81 MG: 81 TABLET, COATED ORAL at 09:27

## 2023-06-14 NOTE — PLAN OF CARE
Goal Outcome Evaluation:  Plan of Care Reviewed With: patient        Progress: improving  Outcome Evaluation: Possible DC today.  Up in chair for first couple hours of shift.  Bed alarm in use.  Sats WNL on RA.

## 2023-06-14 NOTE — PAYOR COMM NOTE
"Kevin Ellington (88 y.o. Male)        PLEASE SEE ATTACHED FOR INPT AUTH.    REF#[id number]    PLEASE CALL   OR  [phone] WITH INPT AUTH.    THANK YOU    KRISTIE YARBROUGH LPN CCP       Date of Birth   1935    Social Security Number       Address   [address] [place] [address]    Home Phone   [phone]    MRN   [id number]       [employer]   Nondenominational    Marital Status                               Admission Date   6/12/23    Admission Type   Emergency    Admitting Provider   Christi Pitts MD    Attending Provider   Adarsh Francis MD    Department, Room/Bed   [address], [address]/1       Discharge Date       Discharge Disposition       Discharge Destination                                 Attending Provider: Adarsh Francis MD    Allergies: Codeine, Hydrocodone, Sulfa Antibiotics    Isolation: Enh Drop/Con   Infection: COVID (confirmed) (06/12/23)   Code Status: CPR    Ht: 180.3 cm (71\")   Wt: 87.7 kg (193 lb 5.5 oz)    Admission Cmt: None   Principal Problem: COVID-19 [U07.1]                   Active Insurance as of 6/12/2023       Primary Coverage       Payor Plan Insurance Group Employer/Plan Group    AETNA MEDICARE REPLACEMENT AETNA MEDICARE REPLACEMENT [id number]       Payor Plan Address Payor Plan Phone Number Payor Plan Fax Number Effective Dates    [address] [phone]  1/1/2022 - None Entered    [facility] [address]         Subscriber Name Subscriber Birth Date Member ID       Kevin Ellington 1935 [id number]                     Emergency Contacts        (Rel.) Home Phone Work Phone Mobile Phone    Tiffanie Ellington (Spouse) -- -- [phone]              [place]: [facility] [id number]  Tax ID [id number]     History & Physical        Kaitlin Owens APRN at 06/13/23 0007       Attestation signed by Christi Pitts MD at 06/13/23 1723    I have reviewed the history and plan as obtained by Kaitlin " NANCY Owens. I have personally examined the patient. I have reviewed available clinical results, imaging results, EKG/Telemetry results, and prior records and performed greater than 50% of the work for this patient. My exam confirms their physical findings and I agree with the plan as listed above, with the addition of the followin-year-old gentleman with history of hypertension, CKD 3A, HLD, CAD, bilateral carotid artery stenosis, GERD presenting with generalized weakness, cough, fever/chills for 3 to 4 days.  He also reports some shortness of breath and increasing difficulties with walking secondary to weakness.  His wife was diagnosed with COVID-19 recently so he presented to the ER for further evaluation.  The patient was also diagnosed with COVID-19 in the ER.    Constitutional:       General: Patient is not in acute distress.     Appearance: Ill-appearing appearance. Not toxic-appearing.   HENT:      Head: Normocephalic and atraumatic.   Cardiovascular:      Rate and Rhythm: Normal rate and regular rhythm.   Pulmonary:      Effort: Pulmonary effort is normal. No respiratory distress.      Breath sounds: Diminished breath sounds. No wheezing or rhonchi.  Coughs during exam.  Abdominal:      General: Bowel sounds are normal. There is no distension.      Palpations: Abdomen is soft.      Tenderness: There is no abdominal tenderness. There is no guarding or rebound.   Musculoskeletal:         General: No swelling.      Right lower leg: No edema.      Left lower leg: No edema.   Skin:     General: Skin is warm and dry.   Neurological:      General: No focal deficit present.      Mental Status: Alert and oriented to person, place, and time.   Psychiatric:         Mood and Affect: Mood normal.         Behavior: Behavior normal.       COVID-19 infection  -Discontinue Decadron, not indicated as he is not hypoxic and side effects of Decadron outweigh the benefits  -Discussed with the patient options of  discharging home on oral Paxlovid versus remaining as an inpatient for a course of IV remdesivir  -She is on multiple medications that would have interactions with Paxlovid that he would have to discontinue while taking Paxlovid and he has elected to remain inpatient for IV remdesivir treatment for a short course  -Continue mucolytic's, as needed antitussives, other supportive care  -If patient develops hypoxia will resume Decadron    Irregular rhythm-EKG reviewed, agree with cardiology, sinus rhythm with PACs, telemetry with frequent PVCs, patient is asymptomatic, continue home metoprolol    CAD  -Continue aspirin/statin    Hypertension  - Continue home chlorthalidone, clonidine, losartan, metoprolol    Discussed with patient that if he feels well tomorrow he could receive his second dose of remdesivir of 3 and then discharge or elect to stay for 3 out of 3 doses and discharge after that.  Patient will see how he feels tomorrow.                        Patient Name:  Kevin Ellington  YOB: 1935  MRN:  [id number]  Admit Date:  6/12/2023  Patient Care Team:  Leanna Ryder MD as PCP - General (Family Medicine)  Kaitlin Chew APRN as Nurse Practitioner (Gastroenterology)      Subjective   History Present Illness     Chief Complaint   Patient presents with    Weakness - Generalized    Cough       Mr. Ellington is a 88 y.o. non-smoker with a history of hypertension, hyperlipidemia, CAD, and CKD stage 3a that presents to [facility] complaining of generalized weakness and cough.  He reports fever, chills, sore throat, and a nonproductive cough for the past few days.  He reports shortness of breath and difficulty ambulating today due to generalized weakness.  He states his wife was recently diagnosed with COVID 19 and he thought he may have it as well so he presented to the ED.  He was febrile on arrival with a temperature of 102.  Lab work revealed troponin 31, creatinine 1.30, and WBC  12.68.   A respiratory viral panel was positive for COVID 19.  He is being admitted for further evaluation.       History of Present Illness  Review of Systems   Constitutional:  Positive for chills, fatigue and fever.   HENT:  Positive for sore throat. Negative for congestion.    Eyes:  Negative for photophobia and visual disturbance.   Respiratory:  Positive for cough and shortness of breath. Negative for chest tightness and wheezing.    Cardiovascular:  Negative for chest pain, palpitations and leg swelling.   Gastrointestinal:  Negative for abdominal pain, nausea and vomiting.   Genitourinary:  Negative for decreased urine volume, dysuria, flank pain, frequency, hematuria and urgency.   Musculoskeletal:  Negative for arthralgias and myalgias.   Neurological:  Positive for weakness (generalized). Negative for dizziness, light-headedness, numbness and headaches.      Personal History     Past Medical History:   Diagnosis Date    Allergic 1970    Arthritis     Asthma 05/23/2019    shortness of breath-exertion    Benign prostatic hyperplasia     Bilateral carotid artery stenosis 05/12/2022    Cataract     Cervical radiculopathy     Colon polyp     Coronary artery disease 2001    Stent    CTS (carpal tunnel syndrome) 1998    Surgery both hands in 1999    Erectile dysfunction     GERD (gastroesophageal reflux disease)     HTN (hypertension)     Hyperlipidemia     Kidney stone     Low back pain     2 Prior surgeries    Lumbosacral disc disease     Melanoma 01/15/2009    DR WILLY SIMMS ( Left forearm)    Obesity     Radiculitis, thoracic     Scoliosis     Spondylosis of lumbar region without myelopathy or radiculopathy 09/09/2020    Stage 3a chronic kidney disease 10/21/2021    Stented coronary artery     Thoracic disc disorder     Surgery by Dr. Aldridge (sp)    TIA (transient ischemic attack) 05/12/2022     Past Surgical History:   Procedure Laterality Date    ABLATION OF DYSRHYTHMIC FOCUS  nov. 2020     radio frequency /lower back    ANKLE FUSION Left 2007    reconstruction and fusion    BACK SURGERY      HERNIATED LUMBAR DISK 1975,,2012    CARDIAC CATHETERIZATION  2001    CARPAL TUNNEL RELEASE      CATARACT EXTRACTION, BILATERAL Bilateral     COLONOSCOPY      CORONARY ANGIOPLASTY WITH STENT PLACEMENT      CORONARY STENT PLACEMENT      ENDOSCOPY N/A 2022    Procedure: ESOPHAGOGASTRODUODENOSCOPY WITH BIOPSY;  Surgeon: Rashard Mathews MD;  Location: [facility];  Service: Gastroenterology;  Laterality: N/A;  gastric polyps, hiatal hernia    EPIDURAL BLOCK  Several at [facility]    and [facility]    EYE SURGERY      JOINT REPLACEMENT      REPLACEMENT TOTAL KNEE Left 2015    REPLACEMENT TOTAL KNEE Right     SKIN CANCER EXCISION      MELANOMA    SPINE SURGERY      THORACIC SPINE SURGERY       Family History   Problem Relation Age of Onset    Hypertension Mother     Arthritis Mother             Skin cancer Mother     Heart disease Father     Early death Father         Aortic aneurism age 58    Hypertension Father     Aortic aneurysm Father 58    Cancer Neg Hx     Colon cancer Neg Hx     Colon polyps Neg Hx     Crohn's disease Neg Hx     Irritable bowel syndrome Neg Hx     Ulcerative colitis Neg Hx      Social History     Tobacco Use    Smoking status: Never    Smokeless tobacco: Never    Tobacco comments:     caffeine use   Vaping Use    Vaping Use: Never used   Substance Use Topics    Alcohol use: No    Drug use: No     (Not in a hospital admission)    Allergies:    Allergies   Allergen Reactions    Codeine GI Intolerance    Hydrocodone GI Intolerance     SEVERE CONSTIPATION    Sulfa Antibiotics Unknown (See Comments)     Unable to remember       Objective    Objective     Vital Signs  Temp:  [102 °F (38.9 °C)] 102 °F (38.9 °C)  Heart Rate:  [78] 78  Resp:  [16] 16  BP: (118)/(73) 118/73  SpO2:  [95 %] 95 %  on   ;   Device (Oxygen Therapy): room air  There is no height or  weight on file to calculate BMI.    Physical Exam  Vitals and nursing note reviewed.   Constitutional:       Appearance: Normal appearance.   HENT:      Head: Normocephalic and atraumatic.      Nose: Nose normal.      Mouth/Throat:      Mouth: Mucous membranes are moist.      Pharynx: Oropharynx is clear.   Eyes:      Extraocular Movements: Extraocular movements intact.      Conjunctiva/sclera: Conjunctivae normal.   Cardiovascular:      Rate and Rhythm: Normal rate and regular rhythm.      Pulses: Normal pulses.      Heart sounds: Normal heart sounds.   Pulmonary:      Effort: Pulmonary effort is normal.      Breath sounds: Examination of the right-lower field reveals decreased breath sounds. Examination of the left-lower field reveals decreased breath sounds. Decreased breath sounds present.   Abdominal:      General: Bowel sounds are normal.      Palpations: Abdomen is soft.   Musculoskeletal:         General: Normal range of motion.      Cervical back: Normal range of motion and neck supple.   Skin:     General: Skin is warm and dry.   Neurological:      General: No focal deficit present.      Mental Status: He is alert and oriented to person, place, and time.   Psychiatric:         Mood and Affect: Mood normal.         Behavior: Behavior normal.       Results Review:  I reviewed the patient's new clinical results.  I reviewed the patient's new imaging results and agree with the interpretation.  I reviewed the patient's other test results and agree with the interpretation  I personally viewed and interpreted the patient's EKG/Telemetry data  Discussed with ED provider.    Lab Results (last 24 hours)       Procedure Component Value Units Date/Time    CBC & Differential [[id number]]  (Abnormal) Collected: 06/12/23 2128    Specimen: Blood Updated: 06/12/23 2140    Narrative:      The following orders were created for panel order CBC & Differential.  Procedure                               Abnormality         Status                      ---------                               -----------         ------                     CBC Auto Differential[[id number]]        Abnormal            Final result                 Please view results for these tests on the individual orders.    Comprehensive Metabolic Panel [[id number]]  (Abnormal) Collected: 06/12/23 2128    Specimen: Blood Updated: 06/12/23 2200     Glucose 129 mg/dL      BUN 23 mg/dL      Creatinine 1.30 mg/dL      Sodium 139 mmol/L      Potassium 4.4 mmol/L      Chloride 102 mmol/L      CO2 24.0 mmol/L      Calcium 9.9 mg/dL      Total Protein 7.1 g/dL      Albumin 4.4 g/dL      ALT (SGPT) 17 U/L      AST (SGOT) 22 U/L      Alkaline Phosphatase 114 U/L      Total Bilirubin 1.2 mg/dL      Globulin 2.7 gm/dL      A/G Ratio 1.6 g/dL      BUN/Creatinine Ratio 17.7     Anion Gap 13.0 mmol/L      eGFR 52.8 mL/min/1.73     Narrative:      GFR Normal >60  Chronic Kidney Disease <60  Kidney Failure <15    The GFR formula is only valid for adults with stable renal function between ages 18 and 70.    Respiratory Panel PCR w/COVID-19(SARS-CoV-2) ALBERT/INDERJIT/MARLENY/PAD/COR/MAD/ABEL In-House, NP Swab in [facility], 3-4 HR TAT - Swab, Nasopharynx [[id number]]  (Abnormal) Collected: 06/12/23 2128    Specimen: Swab from Nasopharynx Updated: 06/12/23 2226     ADENOVIRUS, PCR Not Detected     Coronavirus 229E Not Detected     Coronavirus HKU1 Not Detected     Coronavirus NL63 Not Detected     Coronavirus OC43 Not Detected     COVID19 Detected     Human Metapneumovirus Not Detected     Human Rhinovirus/Enterovirus Not Detected     Influenza A PCR Not Detected     Influenza B PCR Not Detected     Parainfluenza Virus 1 Not Detected     Parainfluenza Virus 2 Not Detected     Parainfluenza Virus 3 Not Detected     Parainfluenza Virus 4 Not Detected     RSV, PCR Not Detected     Bordetella pertussis pcr Not Detected     Bordetella parapertussis PCR Not Detected     Chlamydophila pneumoniae PCR Not Detected      Mycoplasma pneumo by PCR Not Detected    Narrative:      In the setting of a positive respiratory panel with a viral infection PLUS a negative procalcitonin without other underlying concern for bacterial infection, consider observing off antibiotics or discontinuation of antibiotics and continue supportive care. If the respiratory panel is positive for atypical bacterial infection (Bordetella pertussis, Chlamydophila pneumoniae, or Mycoplasma pneumoniae), consider antibiotic de-escalation to target atypical bacterial infection.    Protime-INR [[id number]]  (Abnormal) Collected: 06/12/23 2128    Specimen: Blood Updated: 06/12/23 2152     Protime 14.5 Seconds      INR 1.12    aPTT [[id number]]  (Abnormal) Collected: 06/12/23 2128    Specimen: Blood Updated: 06/12/23 2152     PTT 38.9 seconds     High Sensitivity Troponin T [[id number]]  (Abnormal) Collected: 06/12/23 2128    Specimen: Blood Updated: 06/12/23 2207     HS Troponin T 31 ng/L     Narrative:      High Sensitive Troponin T Reference Range:  <10.0 ng/L- Negative Female for AMI  <15.0 ng/L- Negative Male for AMI  >=10 - Abnormal Female indicating possible myocardial injury.  >=15 - Abnormal Male indicating possible myocardial injury.   Clinicians would have to utilize clinical acumen, EKG, Troponin, and serial changes to determine if it is an Acute Myocardial Infarction or myocardial injury due to an underlying chronic condition.         BNP [[id number]]  (Normal) Collected: 06/12/23 2128    Specimen: Blood Updated: 06/12/23 2207     proBNP 299.0 pg/mL     Narrative:      Among patients with dyspnea, NT-proBNP is highly sensitive for the detection of acute congestive heart failure. In addition NT-proBNP of <300 pg/ml effectively rules out acute congestive heart failure with 99% negative predictive value.    Results may be falsely decreased if patient taking Biotin.      Blood Culture - Blood, Arm, Left [[id number]] Collected: 06/12/23 2128    Specimen: Blood from  "Arm, Left Updated: 06/12/23 2133    Blood Culture - Blood, Arm, Left [[id number]] Collected: 06/12/23 2128    Specimen: Blood from Arm, Left Updated: 06/12/23 2133    Lactic Acid, Plasma [[id number]]  (Normal) Collected: 06/12/23 2128    Specimen: Blood Updated: 06/12/23 2155     Lactate 1.3 mmol/L     Procalcitonin [[id number]]  (Normal) Collected: 06/12/23 2128    Specimen: Blood Updated: 06/12/23 2207     Procalcitonin 0.11 ng/mL     Narrative:      As a Marker for Sepsis (Non-Neonates):    1. <0.5 ng/mL represents a low risk of severe sepsis and/or septic shock.  2. >2 ng/mL represents a high risk of severe sepsis and/or septic shock.    As a Marker for Lower Respiratory Tract Infections that require antibiotic therapy:    PCT on Admission    Antibiotic Therapy       6-12 Hrs later    >0.5                Strongly Recommended  >0.25 - <0.5        Recommended   0.1 - 0.25          Discouraged              Remeasure/reassess PCT  <0.1                Strongly Discouraged     Remeasure/reassess PCT    As 28 day mortality risk marker: \"Change in Procalcitonin Result\" (>80% or <=80%) if Day 0 (or Day 1) and Day 4 values are available. Refer to http://www.[facility]-pct-calculator.com    Change in PCT <=80%  A decrease of PCT levels below or equal to 80% defines a positive change in PCT test result representing a higher risk for 28-day all-cause mortality of patients diagnosed with severe sepsis for septic shock.    Change in PCT >80%  A decrease of PCT levels of more than 80% defines a negative change in PCT result representing a lower risk for 28-day all-cause mortality of patients diagnosed with severe sepsis or septic shock.       CBC Auto Differential [[id number]]  (Abnormal) Collected: 06/12/23 2128    Specimen: Blood Updated: 06/12/23 2140     WBC 12.68 10*3/mm3      RBC 4.65 10*6/mm3      Hemoglobin 14.4 g/dL      Hematocrit 40.7 %      MCV 87.5 fL      MCH 31.0 pg      MCHC 35.4 g/dL      RDW 12.3 %      RDW-SD 39.4 fl "      MPV 8.8 fL      Platelets 180 10*3/mm3      Neutrophil % 86.0 %      Lymphocyte % 7.6 %      Monocyte % 5.5 %      Eosinophil % 0.2 %      Basophil % 0.2 %      Immature Grans % 0.5 %      Neutrophils, Absolute 10.91 10*3/mm3      Lymphocytes, Absolute 0.96 10*3/mm3      Monocytes, Absolute 0.70 10*3/mm3      Eosinophils, Absolute 0.02 10*3/mm3      Basophils, Absolute 0.03 10*3/mm3      Immature Grans, Absolute 0.06 10*3/mm3      nRBC 0.0 /100 WBC     Urinalysis With Microscopic If Indicated (No Culture) - Urine, Clean Catch [[id number]]  (Normal) Collected: 06/12/23 2252    Specimen: Urine, Clean Catch Updated: 06/12/23 2340     Color, UA Yellow     Appearance, UA Clear     pH, UA 6.5     Specific Gravity, UA 1.012     Glucose, UA Negative     Ketones, UA Negative     Bilirubin, UA Negative     Blood, UA Negative     Protein, UA Negative     Leuk Esterase, UA Negative     Nitrite, UA Negative     Urobilinogen, UA 0.2 E.U./dL    Narrative:      Urine microscopic not indicated.            Imaging Results (Last 24 Hours)       Procedure Component Value Units Date/Time    XR Chest 1 View [[id number]] Collected: 06/12/23 2201     Updated: 06/12/23 2204    Narrative:      SINGLE VIEW OF THE CHEST     HISTORY: Weakness and cough     COMPARISON: None available.     FINDINGS:  There is cardiomegaly. No pneumothorax or pleural effusion is seen.  Right lung is clear. Patient is suspected to have some scarring within  the left lung. There is calcification of the aorta.       Impression:      No definite acute infiltrates seen.     This report was finalized on 6/12/2023 10:01 PM by Dr. Talia Orlando M.D.               Results for orders placed during the hospital encounter of 06/06/22    Adult Transthoracic Echo Complete w/ Color, Spectral and Contrast if Necessary Per Protocol    Interpretation Summary  · Calculated left ventricular 3D EF = 65% Estimated left ventricular EF = 65% Left ventricular systolic function is  normal.  · Left ventricular wall thickness is consistent with borderline concentric hypertrophy.  · Left ventricular diastolic function is consistent with (grade I) impaired relaxation.  · Estimated right ventricular systolic pressure from tricuspid regurgitation is normal (<35 mmHg).      ECG 12 Lead Other; weakness   Preliminary Result   HEART RATE= 76  bpm   RR Interval= 789  ms   CT Interval=   ms   P Horizontal Axis=   deg   P Front Axis=   deg   QRSD Interval= 105  ms   QT Interval= 396  ms   QRS Axis= 29  deg   T Wave Axis= 48  deg   - ABNORMAL ECG -   Atrial fibrillation   Probable anteroseptal infarct, old   Electronically Signed By:    Date and Time of Study: 2023-06-12 21:25:41           Assessment/Plan     Active Hospital Problems    Diagnosis  POA    **COVID-19 [U07.1]  Unknown    Stage 3a chronic kidney disease [N18.31]  Yes    Essential hypertension [I10]  Yes    Hyperlipemia [E78.5]  Yes    Coronary artery disease involving native coronary artery of native heart without angina pectoris [I25.10]  Yes       COVID 19  -He is currently sating 95-97% on room air  -He reports extreme fatigue and generalized weakness and states he could not ambulate today  -Initiate Decadron daily  -Symptomatic treatment with IS, Mucinex, and chloraseptic spray  -Monitor daily COVID labs  -PT consult  -His troponin is elevated. He denies chest pain, EKG shows atrial fibrillation. Repeat troponin in AM  -Lovenox for DVT prophylaxis    Atrial Fibrillation  -Rate controlled  -He does not appear to have a history of Afib  -Check echo  -Cardiology consult    Hypertension  -Blood pressures stable. Continue home regimen  -Monitor    CKD Stage 3a  -His creatinine is near his baseline which is 1.24  -Avoid nephrotoxins  -Repeat lab work in AM    Hyperlipidemia/Coronary Artery Disease/Bilateral Carotid Artery Stenosis  -S/p coronary artery stent placement  -Continue aspirin and statin    GERD  -Continue home PPI    -I discussed the  patients findings and my recommendations with patient.    VTE Prophylaxis - Pharmacy to dose Lovenox.  Code Status - Full code.       NANCY Spencer  [facility]  23  00:08 EDT      Electronically signed by Christi Pitts MD at 23 1723          Discharge Summaryl        Reyna English, PT at 23 1138          Patient Name: Kevin Ellington  : 1935    MRN: [id number]                              Today's Date: 2023       Admit Date: 2023    Visit Dx:     ICD-10-CM ICD-9-CM   1. Fever and chills  R50.9 780.60   2. COVID-19 virus infection  U07.1 079.89   3. Acute cough  R05.1 786.2   4. Generalized weakness  R53.1 780.79     Patient Active Problem List   Diagnosis    Bulging lumbar disc    DDD (degenerative disc disease), lumbar    Leg pain    Spinal stenosis of lumbar region with neurogenic claudication    Radiculitis, thoracic    Status post total left knee replacement using cement    Status post total right knee replacement using cement    Coronary artery disease involving native coronary artery of native heart without angina pectoris    Essential hypertension    Hyperlipemia    S/P coronary artery stent placement    Primary narcolepsy without cataplexy    Benign prostatic hyperplasia with urinary frequency    Cervical radiculopathy    Bilateral carpal tunnel syndrome    Cervical spinal stenosis    Arthritis of ankle    Chronic pain of right ankle    Shortness of breath    Bilateral leg weakness    History of melanoma    Spondylosis of lumbar region without myelopathy or radiculopathy    Stage 3a chronic kidney disease    Pedal edema    Gastroesophageal reflux disease without esophagitis    TIA (transient ischemic attack)    Bilateral carotid artery stenosis    Cerebral artery occlusion    Hyperglycemia    Hoarseness    Hiatal hernia    Abdominal fullness    Fever and chills    COVID-19     Past Medical History:   Diagnosis Date    Allergic  1970    Arthritis     Asthma 05/23/2019    shortness of breath-exertion    Benign prostatic hyperplasia     Bilateral carotid artery stenosis 05/12/2022    Cataract     Cervical radiculopathy     Colon polyp     Coronary artery disease 2001    Stent    CTS (carpal tunnel syndrome) 1998    Surgery both hands in 1999    Erectile dysfunction     GERD (gastroesophageal reflux disease)     HTN (hypertension)     Hyperlipidemia     Kidney stone     Low back pain     2 Prior surgeries    Lumbosacral disc disease     Melanoma 01/15/2009    DR WILLY SIMMS ( Left forearm)    Obesity     Radiculitis, thoracic     Scoliosis     Spondylosis of lumbar region without myelopathy or radiculopathy 09/09/2020    Stage 3a chronic kidney disease 10/21/2021    Stented coronary artery     Thoracic disc disorder     Surgery by Dr. Aldridge (sp)    TIA (transient ischemic attack) 05/12/2022     Past Surgical History:   Procedure Laterality Date    ABLATION OF DYSRHYTHMIC FOCUS  nov. 2020    radio frequency /lower back    ANKLE FUSION Left 2007    reconstruction and fusion    BACK SURGERY      HERNIATED LUMBAR DISK 1975,2000,2012    CARDIAC CATHETERIZATION  2001    CARPAL TUNNEL RELEASE      CATARACT EXTRACTION, BILATERAL Bilateral 2002    COLONOSCOPY      CORONARY ANGIOPLASTY WITH STENT PLACEMENT  2001    CORONARY STENT PLACEMENT      ENDOSCOPY N/A 12/19/2022    Procedure: ESOPHAGOGASTRODUODENOSCOPY WITH BIOPSY;  Surgeon: Rashard Mathews MD;  Location: [facility] MAIN OR;  Service: Gastroenterology;  Laterality: N/A;  gastric polyps, hiatal hernia    EPIDURAL BLOCK  Several at [facility]    and [facility]    EYE SURGERY      JOINT REPLACEMENT      REPLACEMENT TOTAL KNEE Left 2015    REPLACEMENT TOTAL KNEE Right     SKIN CANCER EXCISION      MELANOMA    SPINE SURGERY      THORACIC SPINE SURGERY        General Information       Row Name 06/14/23 1124          Physical Therapy Time and Intention    Document Type  evaluation  -CW     Mode of Treatment individual therapy;physical therapy  -CW       Row Name 06/14/23 1124          General Information    Patient Profile Reviewed yes  -CW     Prior Level of Function independent:;transfer;all household mobility;bed mobility  uses walker and cane  -CW     Existing Precautions/Restrictions fall  -CW     Barriers to Rehab medically complex  -CW       Row Name 06/14/23 1124          Living Environment    People in Home spouse  -CW       Row Name 06/14/23 1124          Home Main Entrance    Number of Stairs, Main Entrance one  -CW       Row Name 06/14/23 1124          Stairs Within Home, Primary    Number of Stairs, Within Home, Primary none  -CW       Row Name 06/14/23 1124          Cognition    Orientation Status (Cognition) oriented x 4  -CW       Row Name 06/14/23 1124          Safety Issues, Functional Mobility    Impairments Affecting Function (Mobility) balance;endurance/activity tolerance;strength  -CW               User Key  (r) = Recorded By, (t) = Taken By, (c) = Cosigned By      Initials Name Provider Type    CW Reyna English, PT Physical Therapist                   Mobility       Row Name 06/14/23 1126          Bed Mobility    Comment, (Bed Mobility) UIC at arrival and departure  -CW       Row Name 06/14/23 1126          Bed-Chair Transfer    Bed-Chair [place] (Transfers) contact guard  -     Assistive Device (Bed-Chair Transfers) walker, front-wheeled  -CW       Row Name 06/14/23 1126          Sit-Stand Transfer    Sit-Stand [place] (Transfers) contact guard;verbal cues  -CW     Assistive Device (Sit-Stand Transfers) walker, front-wheeled  -CW       Row Name 06/14/23 1126          Gait/Stairs (Locomotion)    [place] Level (Gait) contact guard;verbal cues  -CW     Assistive Device (Gait) walker, front-wheeled  -CW     Distance in Feet (Gait) 90' in room  -CW     Deviations/Abnormal Patterns (Gait) sherman decreased;gait speed decreased;stride length  decreased  -CW     Bilateral Gait Deviations forward flexed posture  -CW     Comment, (Gait/Stairs) No overt loss of balance  -CW               User Key  (r) = Recorded By, (t) = Taken By, (c) = Cosigned By      Initials Name Provider Type    Reyna Chirinos PT Physical Therapist                   Obj/Interventions       Row Name 06/14/23 1129          Range of Motion Comprehensive    General Range of Motion bilateral lower extremity ROM WFL  -CW       Row Name 06/14/23 1129          Strength Comprehensive (MMT)    Comment, General Manual Muscle Testing (MMT) Assessment Generalized weakness  -CW       Row Name 06/14/23 1129          Balance    Balance Assessment standing dynamic balance;standing static balance;sitting static balance;sitting dynamic balance  -CW     Static Sitting Balance verbal cues  -CW     Dynamic Sitting Balance verbal cues  -CW     Position, Sitting Balance sitting edge of bed  -CW     Static Standing Balance standby assist  -CW     Dynamic Standing Balance contact guard  -CW     Position/Device Used, Standing Balance supported;walker, front-wheeled  -CW       Row Name 06/14/23 1129          Sensory Assessment (Somatosensory)    Sensory Assessment (Somatosensory) sensation intact  -CW               User Key  (r) = Recorded By, (t) = Taken By, (c) = Cosigned By      Initials Name Provider Type    Reyna Chirinos PT Physical Therapist                   Goals/Plan       Row Name 06/14/23 1137          Bed Mobility Goal 1 (PT)    Activity/Assistive Device (Bed Mobility Goal 1, PT) bed mobility activities, all  -CW     [place] Level/Cues Needed (Bed Mobility Goal 1, PT) independent  -CW     Time Frame (Bed Mobility Goal 1, PT) 2 weeks  -CW       Row Name 06/14/23 1137          Transfer Goal 1 (PT)    Activity/Assistive Device (Transfer Goal 1, PT) sit-to-stand/stand-to-sit;bed-to-chair/chair-to-bed  -CW     [place] Level/Cues Needed (Transfer Goal 1, PT) independent  -CW      Time Frame (Transfer Goal 1, PT) 2 weeks  -CW       Row Name 06/14/23 1137          Gait Training Goal 1 (PT)    Activity/Assistive Device (Gait Training Goal 1, PT) gait (walking locomotion);walker, rolling  -CW     [place] Level (Gait Training Goal 1, PT) modified independence  -CW     Distance (Gait Training Goal 1, PT) 150'  -CW     Time Frame (Gait Training Goal 1, PT) 2 weeks  -CW       Row Name 06/14/23 1137          Therapy Assessment/Plan (PT)    Planned Therapy Interventions (PT) balance training;bed mobility training;gait training;postural re-education;transfer training;ROM (range of motion);strengthening;patient/family education  -CW               User Key  (r) = Recorded By, (t) = Taken By, (c) = Cosigned By      Initials Name Provider Type    CW Reyna English, PT Physical Therapist                   Clinical Impression       Row Name 06/14/23 1131          Pain    Pretreatment Pain Rating 0/10 - no pain  -CW     Posttreatment Pain Rating 0/10 - no pain  -CW       Row Name 06/14/23 1131          Plan of Care Review    Plan of Care Reviewed With patient  -CW     Outcome Evaluation Pt is an 87 yo male admitted with COVID. Pt lives with spouse, one step to enter, uses a cane and walker at baseline. Today, he was in the chair at arrival. He stood cga and ambulated 90' cga to sba using a walker. No baalnce deficits noted. Appears to be functioning close to his baseline level. Recommend mobility to bathroom with nsg staff instead of urinal use to increase mobility levels. Plans home at d/c. PT will follow at 3x/week.  -CW       Row Name 06/14/23 1131          Therapy Assessment/Plan (PT)    Rehab Potential (PT) good, to achieve stated therapy goals  -CW     Criteria for Skilled Interventions Met (PT) yes  -CW     Therapy Frequency (PT) 3 times/wk  -CW       Row Name 06/14/23 1131          Vital Signs    Pre SpO2 (%) 95  -CW     O2 Delivery Pre Treatment room air  -CW     O2 Delivery Intra Treatment  room air  -CW     O2 Delivery Post Treatment room air  -CW       Row Name 06/14/23 1131          Positioning and Restraints    Pre-Treatment Position sitting in chair/recliner  -CW     Post Treatment Position chair  -CW     In Chair sitting;call light within reach;encouraged to call for assist;exit alarm on;notified nsg  -CW               User Key  (r) = Recorded By, (t) = Taken By, (c) = Cosigned By      Initials Name Provider Type    Reyna Chirinos, SCOOBY Physical Therapist                   Outcome Measures       Row Name 06/14/23 1137          How much help from another person do you currently need...    Turning from your back to your side while in flat bed without using bedrails? 3  -CW     Moving from lying on back to sitting on the side of a flat bed without bedrails? 3  -CW     Moving to and from a bed to a chair (including a wheelchair)? 3  -CW     Standing up from a chair using your arms (e.g., wheelchair, bedside chair)? 3  -CW     Climbing 3-5 steps with a railing? 3  -CW     To walk in hospital room? 3  -CW     AM-PAC 6 Clicks Score (PT) 18  -CW     Highest level of mobility 6 --> Walked 10 steps or more  -CW       Row Name 06/14/23 1137          Functional Assessment    Outcome Measure Options AM-PAC 6 Clicks Basic Mobility (PT)  -CW               User Key  (r) = Recorded By, (t) = Taken By, (c) = Cosigned By      Initials Name Provider Type    Reyna Chirinos, SCOOBY Physical Therapist                                 Physical Therapy Education       Title: PT OT SLP Therapies (In Progress)       Topic: Physical Therapy (In Progress)       Point: Mobility training (Done)       Learning Progress Summary             Patient Acceptance, E, VU by RAYMUNDO at 6/14/2023 1137                         Point: Home exercise program (Not Started)       Learner Progress:  Not documented in this visit.              Point: Body mechanics (Not Started)       Learner Progress:  Not documented in this visit.               Point: Precautions (Not Started)       Learner Progress:  Not documented in this visit.                              User Key       Initials Effective Dates Name Provider Type Discipline     12/13/22 -  Reyna English PT Physical Therapist PT                  PT Recommendation and Plan  Planned Therapy Interventions (PT): balance training, bed mobility training, gait training, postural re-education, transfer training, ROM (range of motion), strengthening, patient/family education  Plan of Care Reviewed With: patient  Outcome Evaluation: Pt is an 89 yo male admitted with COVID. Pt lives with spouse, one step to enter, uses a cane and walker at baseline. Today, he was in the chair at arrival. He stood cga and ambulated 90' cga to sba using a walker. No baalnce deficits noted. Appears to be functioning close to his baseline level. Recommend mobility to bathroom with [facility] staff instead of urinal use to increase mobility levels. Plans home at d/c. PT will follow at 3x/week.     Time Calculation:    PT Charges       Row Name 06/14/23 1124             Time Calculation    Start Time 1040  -CW      Stop Time 1054  -CW      Time Calculation (min) 14 min  -CW      PT Received On 06/14/23  -CW      PT - Next Appointment 06/16/23  -CW      PT Goal Re-Cert Due Date 06/28/23  -CW         Time Calculation- PT    Total Timed Code Minutes- PT 8 minute(s)  -CW         Timed Charges    89921 - Gait Training Minutes  8  -CW         Total Minutes    Timed Charges Total Minutes 8  -CW       Total Minutes 8  -CW                User Key  (r) = Recorded By, (t) = Taken By, (c) = Cosigned By      Initials Name Provider Type    CW Reyna English PT Physical Therapist                  Therapy Charges for Today       Code Description Service Date Service Provider Modifiers Qty    94039434383 HC PT EVAL MOD COMPLEXITY 3 6/14/2023 Reyna English, PT GP 1    57725233716 HC GAIT TRAINING EA 15 MIN 6/14/2023 Reyna English, PT GP 1             PT G-Codes  Outcome Measure Options: AM-PAC 6 Clicks Basic Mobility (PT)  AM-PAC 6 Clicks Score (PT): 18  PT Discharge Summary  Anticipated Discharge Disposition (PT): home with assist, home    Reyna English PT  6/14/2023      Electronically signed by Reyna English, PT at 06/14/23 1138       Reyna English, PT at 06/14/23 1137          Goal Outcome Evaluation:  Plan of Care Reviewed With: patient           Outcome Evaluation: Pt is an 89 yo male admitted with COVID. Pt lives with spouse, one step to enter, uses a cane and walker at baseline. Today, he was in the chair at arrival. He stood cga and ambulated 90' cga to sba using a walker. No baalnce deficits noted. Appears to be functioning close to his baseline level. Recommend mobility to bathroom with nsg staff instead of urinal use to increase mobility levels. Plans home at d/c. PT will follow at 3x/week.           Electronically signed by Reyna English, PT at 06/14/23 1138       Maverick Brunson, RN at 06/14/23 0426          Goal Outcome Evaluation:  Plan of Care Reviewed With: patient        Progress: improving  Outcome Evaluation: Possible DC today.  Up in chair for first couple hours of shift.  Bed alarm in use.  Sats WNL on RA.           Electronically signed by Maverick Brunson, RN at 06/14/23 0426       Inna Price, RN at 06/13/23 4691          Goal Outcome Evaluation:  Plan of Care Reviewed With: patient        Progress: improving  Outcome Evaluation: vss. telemetry monitor, sr. alert and oriented x4, room air. up with assist, very weak. remdesivir given.           Electronically signed by Inna Price, RN at 06/13/23 7413       Robert Jain [facility] at 06/13/23 1336          [facility]  Clinical Pharmacy Department     Remdesivir Review Note    Kevin Ellington is a 88 y.o. male with confirmed COVID-19 infection on day 1 of hospitalization.     Consulting Provider:  Dr. Pitts  Date of Confirmed SARS-CoV-2: 6/12  Date of  Symptom Onset: <5 days prior to admission  Other Antimicrobials: None  Hydroxychloroquine or chloroquine prior to arrival: No    Allergies  Allergies as of 06/12/2023 - Reviewed 06/12/2023   Allergen Reaction Noted    Codeine GI Intolerance 03/10/2016    Hydrocodone GI Intolerance 03/14/2018    Sulfa antibiotics Unknown (See Comments) 03/10/2016       Microbiology:  Microbiology Results (last 10 days)       Procedure Component Value - Date/Time    Respiratory Panel PCR w/COVID-19(SARS-CoV-2) ALBERT/INDERJIT/MARLENY/PAD/COR/MAD/ABEL In-House, NP Swab in UTM/VTM, 3-4 HR TAT - Swab, Nasopharynx [[id number]]  (Abnormal) Collected: 06/12/23 2128    Lab Status: Final result Specimen: Swab from Nasopharynx Updated: 06/12/23 2226     ADENOVIRUS, PCR Not Detected     Coronavirus 229E Not Detected     Coronavirus HKU1 Not Detected     Coronavirus NL63 Not Detected     Coronavirus OC43 Not Detected     COVID19 Detected     Human Metapneumovirus Not Detected     Human Rhinovirus/Enterovirus Not Detected     Influenza A PCR Not Detected     Influenza B PCR Not Detected     Parainfluenza Virus 1 Not Detected     Parainfluenza Virus 2 Not Detected     Parainfluenza Virus 3 Not Detected     Parainfluenza Virus 4 Not Detected     RSV, PCR Not Detected     Bordetella pertussis pcr Not Detected     Bordetella parapertussis PCR Not Detected     Chlamydophila pneumoniae PCR Not Detected     Mycoplasma pneumo by PCR Not Detected    Narrative:      In the setting of a positive respiratory panel with a viral infection PLUS a negative procalcitonin without other underlying concern for bacterial infection, consider observing off antibiotics or discontinuation of antibiotics and continue supportive care. If the respiratory panel is positive for atypical bacterial infection (Bordetella pertussis, Chlamydophila pneumoniae, or Mycoplasma pneumoniae), consider antibiotic de-escalation to target atypical bacterial infection.            Vitals/Labs/I&O  Visit  "Vitals  /85 (BP Location: Left arm, Patient Position: Lying)   Pulse 78   Temp 97.9 °F (36.6 °C) (Oral)   Resp 16   Ht 180.3 cm (71\")   Wt 87.7 kg (193 lb 5.5 oz)   SpO2 97%   BMI 26.97 kg/m²       Results from last 7 days   Lab Units 06/13/23  0604 06/12/23 2128   WBC 10*3/mm3 9.57 12.68*     Results from last 7 days   Lab Units 06/13/23  0051 06/12/23 2128   PROCALCITONIN ng/mL 0.12 0.11     Results from last 7 days   Lab Units 06/13/23  0604 06/12/23  2128   AST (SGOT) U/L 18 22      Results from last 7 days   Lab Units 06/13/23  0604 06/12/23 2128   ALT (SGPT) U/L 17 17       Estimated Creatinine Clearance: 58.1 mL/min (by C-G formula based on SCr of 1.09 mg/dL).  Results from last 7 days   Lab Units 06/13/23  0604 06/12/23 2128   BUN mg/dL 22 23   CREATININE mg/dL 1.09 1.30*     Intake & Output (last 3 days)         06/10 0701  06/11 0700 06/11 0701  06/12 0700 06/12 0701  06/13 0700 06/13 0701  06/14 0700    P.O.    120    IV Piggyback   1000     Total Intake(mL/kg)   1000 (11.4) 120 (1.4)    Urine (mL/kg/hr)   625 1200 (2.1)    Total Output   625 1200    Net   +375 -1080            Urine Unmeasured Occurrence    2 x            Assessment/Plan:    Patient meets the following inclusion/exclusion criteria:  Patient is hospitalized with confirmed COVID-19 infection (and accompanying symptoms)  Patient meets one of the two below criteria:  OR  Patient is symptomatic but not requiring supplemental oxyen or an increase in baseline oxygen AND has at least one of the below high risk criteria for progression to severe illness. High risk criteria:   Age >/= 65  Cancer  Cardiovascular diseases (HF, CAD, or cardiomyopathies)  CKD  Chronic lung disease (COPD, CF, interstitial lung disease, PE, pulmonary hypertension, bronchopulmonary dysplasia, bronchiectasis)  Diabetes mellitis (insulin dependent or poorly controlled)  Immunocompromising conditions or receipt of immunosuppressive medications  Obesity (BMI > 35 " kg/m2)  Pregnancy and recent pregnancy (within 7 days of delivery)  Sickle cell disease  Baseline and daily LFTs and Scr have been ordered prior to remdesivir initiation  ALT is not ? 10 times the upper limit of normal  Patient is not on concomitant hydroxychloroquine or chloroquine  Patient does not require invasive mechanical ventilation (IMV) or ECMO  Patient is within 10 days from symptom onset (for criteria 2a) or within 7 days of symptom onset (for criteria 2b)    Remdesivir 200 mg IV x 1 dose, followed by 100 mg IV q24h for 2 days or until hospital discharge (whichever comes first) has been ordered.    Thank you for involving pharmacy in this patient's care. Please contact pharmacy with any questions or concerns.                           Robert Jain [facility]  Clinical Pharmacist      Electronically signed by Robert Jain RP at 23 [phone]       Halima Salmeron MD at 23 0739        Consult Orders    1. Inpatient Cardiology Consult [[id number]] ordered by Kaitlin Owens APRN at 23 0149                   [facility] Consult    Patient Name: Kevin Ellington  Age/Sex: 88 y.o. male  : 1935  MRN: [id number]    Date of Admission: 2023  Date of Encounter Visit: 23  Encounter Provider: Halima Salmeron MD  Referring Provider: No ref. provider found  Place of Service: [facility] CARDIOLOGY  Patient Care Team:  Leanna Ryder MD as PCP - General (Family Medicine)  Kaitlin Chew APRN as Nurse Practitioner (Gastroenterology)    Subjective:     Consulted for: Atrial fibrillation    Chief Complaint: Fevers, weakness, cough    History of Present Illness:  Kevin Ellington is a 88 y.o. male with coronary artery disease status post remote stenting of his LAD, dyslipidemia, hypertension, prior suspected TIA, who presented to the emergency room on 2023 with complaints of fevers, and generalized weakness.    The patient reports  that he has been having symptoms for the last few days of worsening generalized weakness and fevers.  He did report that his wife recently tested positive for COVID-19.  Following his arrival to the emergency room his work-up was significant for being positive for COVID-19.  He was febrile at 102 on arrival.  White blood count was elevated 12.68.  Procalcitonin, lactic acid, proBNP were normal.  His troponin was in the indeterminate range around 31 but this was associated with an elevated creatinine to 1.3.  His troponins since then have been relatively flat at 26 this morning.  Chest x-ray was unremarkable.  His creatinine is improved this morning following administration of IV fluids.  His initial EKG on arrival was concerning for an irregular rhythm possibly atrial fibrillation.    The patient does admit in addition to generalized weakness and fevers he has been having a cough.  Associated with this cough with some shortness of breath but he otherwise denies any other significant shortness of breath.  He denies any chest pain or palpitations.      Past Medical History:  Past Medical History:   Diagnosis Date    Allergic 1970    Arthritis     Asthma 05/23/2019    shortness of breath-exertion    Benign prostatic hyperplasia     Bilateral carotid artery stenosis 05/12/2022    Cataract     Cervical radiculopathy     Colon polyp     Coronary artery disease 2001    Stent    CTS (carpal tunnel syndrome) 1998    Surgery both hands in 1999    Erectile dysfunction     GERD (gastroesophageal reflux disease)     HTN (hypertension)     Hyperlipidemia     Kidney stone     Low back pain     2 Prior surgeries    Lumbosacral disc disease     Melanoma 01/15/2009    DR WILLY SIMMS ( Left forearm)    Obesity     Radiculitis, thoracic     Scoliosis     Spondylosis of lumbar region without myelopathy or radiculopathy 09/09/2020    Stage 3a chronic kidney disease 10/21/2021    Stented coronary artery     Thoracic disc disorder      Surgery by Dr. Aldridge (sp)    TIA (transient ischemic attack) 05/12/2022       Past Surgical History:   Procedure Laterality Date    ABLATION OF DYSRHYTHMIC FOCUS  nov. 2020    radio frequency /lower back    ANKLE FUSION Left 2007    reconstruction and fusion    BACK SURGERY      HERNIATED LUMBAR DISK 1975,2000,2012    CARDIAC CATHETERIZATION  2001    CARPAL TUNNEL RELEASE      CATARACT EXTRACTION, BILATERAL Bilateral 2002    COLONOSCOPY      CORONARY ANGIOPLASTY WITH STENT PLACEMENT  2001    CORONARY STENT PLACEMENT      ENDOSCOPY N/A 12/19/2022    Procedure: ESOPHAGOGASTRODUODENOSCOPY WITH BIOPSY;  Surgeon: Rashard Mathews MD;  Location: [facility] MAIN [facility];  Service: Gastroenterology;  Laterality: N/A;  gastric polyps, hiatal hernia    EPIDURAL BLOCK  Several at [facility]    and [facility]    EYE SURGERY      JOINT REPLACEMENT      REPLACEMENT TOTAL KNEE Left 2015    REPLACEMENT TOTAL KNEE Right     SKIN CANCER EXCISION      MELANOMA    SPINE SURGERY      THORACIC SPINE SURGERY         Home Medications:   Medications Prior to Admission   Medication Sig Dispense Refill Last Dose    aspirin 81 MG tablet Take 1 tablet by mouth Daily.   6/12/2023    atorvastatin (LIPITOR) 40 MG tablet TAKE 1 TABLET DAILY 90 tablet 3 6/12/2023    cetirizine (zyrTEC) 10 MG tablet Take 1 tablet by mouth Daily.   6/12/2023    chlorthalidone (HYGROTEN) 50 MG tablet TAKE 1 TABLET DAILY 90 tablet 3 6/12/2023    cloNIDine (CATAPRES) 0.1 MG tablet TAKE 1 TABLET TWICE A  tablet 3 6/12/2023    coenzyme Q10 100 MG capsule Take 3 capsules by mouth Daily.   6/12/2023    fluticasone (FLONASE) 50 MCG/ACT nasal spray 1 spray into the nostril(s) as directed by provider Daily. 48 g 3 6/12/2023    losartan (COZAAR) 100 MG tablet TAKE 1 TABLET DAILY 90 tablet 3 6/12/2023    meloxicam (MOBIC) 7.5 MG tablet TAKE 1 TABLET EVERY OTHER DAY 45 tablet 3 6/12/2023    metoprolol succinate XL (TOPROL-XL) 100 MG 24 hr tablet TAKE 1  TABLET DAILY 90 tablet 3 2023    modafinil (PROVIGIL) 100 MG tablet Take 1 tablet by mouth Daily. 30 tablet 2 2023    Multiple Vitamins-Minerals (MULTIVITAMIN PO) Take 1 tablet by mouth Daily.   2023    pantoprazole (PROTONIX) 40 MG EC tablet Take 1 tablet by mouth Daily. 90 tablet 0 2023    potassium chloride (K-DUR,KLOR-CON) 10 MEQ CR tablet TAKE 2 TABLETS DAILY 180 tablet 3 2023    Saw Palmetto, Serenoa repens, 320 MG capsule Take 320 mg by mouth 2 (two) times a day.   2023    sennosides-docusate (Senna S) 8.6-50 MG per tablet Take 1 tablet by mouth 2 (Two) Times a Day As Needed for Constipation. 180 tablet 2 2023    tamsulosin (FLOMAX) 0.4 MG capsule 24 hr capsule TAKE 2 CAPSULES DAILY 180 capsule 3 2023    econazole nitrate (SPECTAZOLE) 1 % cream Apply  topically to the appropriate area as directed As Needed.       Flaxseed, Linseed, (FLAX SEED OIL PO) Take  by mouth.       methylPREDNISolone (MEDROL) 4 MG dose pack follow package directions       metroNIDAZOLE (FLAGYL) 0.75 % lotion lotion Daily.       Misc Natural Products (JOINT HEALTH PO) Take  by mouth.          Allergies:  Allergies   Allergen Reactions    Codeine GI Intolerance    Hydrocodone GI Intolerance     SEVERE CONSTIPATION    Sulfa Antibiotics Unknown (See Comments)     Unable to remember       Past Social History:  Social History     Socioeconomic History    Marital status:     Number of children: 1    Years of education: [employer] DE[employer]   Tobacco Use    Smoking status: Never    Smokeless tobacco: Never    Tobacco comments:     caffeine use   Vaping Use    Vaping Use: Never used   Substance and Sexual Activity    Alcohol use: No    Drug use: No    Sexual activity: Not Currently     Partners: Female       Past Family History:  Family History   Problem Relation Age of Onset    Hypertension Mother     Arthritis Mother             Skin cancer Mother     Heart disease Father     Early death Father          Aortic aneurism age 58    Hypertension Father     Aortic aneurysm Father 58    Cancer Neg Hx     Colon cancer Neg Hx     Colon polyps Neg Hx     Crohn's disease Neg Hx     Irritable bowel syndrome Neg Hx     Ulcerative colitis Neg Hx        Review of Systems:   All systems reviewed. Pertinent positives identified in HPI. All other systems are negative.    Objective:   Temp:  [98.4 °F (36.9 °C)-102 °F (38.9 °C)] 98.4 °F (36.9 °C)  Heart Rate:  [73-86] 78  Resp:  [16-20] 20  BP: (118-149)/(73-89) 138/76     Intake/Output Summary (Last 24 hours) at 6/13/2023 0740  Last data filed at 6/13/2023 0325  Gross per 24 hour   Intake 1000 ml   Output 625 ml   Net 375 ml     Body mass index is 26.97 kg/m².      06/13/23  0043   Weight: 87.7 kg (193 lb 5.5 oz)     Weight change:     Physical Exam:   General Appearance:    Alert, cooperative, in no acute distress   Head:    Normocephalic, without obvious abnormality, atraumatic   Eyes:            Lids and lashes normal, conjunctivae and sclerae normal, no   icterus, no pallor, corneas clear, PERRLA   Ears:    Ears appear intact with no abnormalities noted   Neck:   No adenopathy, supple, trachea midline, no thyromegaly, no   carotid bruit, no JVD   Lungs:     Clear to auscultation,respirations regular, even and unlabored    Heart:    Regular rhythm and normal rate, normal S1 and S2, no murmur, no gallop, no rub, no click   Chest Wall:    No abnormalities observed   Abdomen:     Normal bowel sounds, no masses, no organomegaly, soft        non-tender, non-distended, no guarding, no rebound  tenderness   Extremities:   Moves all extremities well, no edema, no cyanosis, no redness   Pulses:   Pulses palpable and equal bilaterally. Normal radial, carotid, femoral, dorsalis pedis and posterior tibial pulses bilaterally. Normal abdominal aorta   Skin:  Psychiatric:   No bleeding, bruising or rash    Alert and oriented x 3, normal mood and affect       Lab Review:   Results from last 7  days   Lab Units 06/13/23 0604 06/12/23 2128   SODIUM mmol/L 138 139   POTASSIUM mmol/L 3.9 4.4   CHLORIDE mmol/L 104 102   CO2 mmol/L 22.6 24.0   BUN mg/dL 22 23   CREATININE mg/dL 1.09 1.30*   GLUCOSE mg/dL 176* 129*   CALCIUM mg/dL 9.6 9.9   AST (SGOT) U/L 18 22   ALT (SGPT) U/L 17 17     Results from last 7 days   Lab Units 06/13/23  0604 06/13/23  0051 06/12/23 2128   HSTROP T ng/L 26* 31* 31*     Results from last 7 days   Lab Units 06/13/23  0604 06/12/23 2128   WBC 10*3/mm3 9.57 12.68*   HEMOGLOBIN g/dL 13.8 14.4   HEMATOCRIT % 39.7 40.7   PLATELETS 10*3/mm3 157 180     Results from last 7 days   Lab Units 06/12/23 2128   INR  1.12*   APTT seconds 38.9*               Invalid input(s): LDLCALC  Results from last 7 days   Lab Units 06/12/23 2128   PROBNP pg/mL 299.0     Results from last 7 days   Lab Units 06/13/23 0604   TSH uIU/mL 1.310       Echo EF Estimated  Lab Results   Component Value Date    ECHOEFEST 65 06/06/2022         Imaging:  Imaging Results (Most Recent)       Procedure Component Value Units Date/Time    XR Chest 1 View [[id number]] Collected: 06/12/23 2201     Updated: 06/12/23 2204    Narrative:      SINGLE VIEW OF THE CHEST     HISTORY: Weakness and cough     COMPARISON: None available.     FINDINGS:  There is cardiomegaly. No pneumothorax or pleural effusion is seen.  Right lung is clear. Patient is suspected to have some scarring within  the left lung. There is calcification of the aorta.       Impression:      No definite acute infiltrates seen.     This report was finalized on 6/12/2023 10:01 PM by Dr. Talia Orlando M.D.               I personally viewed and interpreted the patient's EKG    Assessment/Plan:     1.  Irregular rhythm.  Concern that this represented atrial fibrillation on admission.  I reviewed his EKG on admission and his telemetry.  I do not see any evidence of atrial fibrillation.  Instead of it appears that he has been in sinus rhythm with frequent PACs.   This is improved this morning with although he has been having intermittent episodes of frequent PVCs.  All of this appears to be asymptomatic.  2.  COVID-19.  On treatment with dexamethasone per the primary team.  3.  Coronary artery disease.  Troponins and EKG unremarkable.  He is not having any symptoms concerning for angina.  4.  Hypertension.  Blood pressures well controlled on his home regimen of medications.  5.  Hyperlipidemia.  On atorvastatin.    - At this point without any evidence of atrial fibrillation would not change his current cardiac management.  - We will discontinue his echocardiogram for now.  - We will see again as needed as an inpatient.  Lease contact us if any further cardiac concerns or issues arise.    Thank you for allowing me to participate in the care of Kevin Ellington. Feel free to contact me directly with any further questions or concerns.    Halima Salmeron MD  [facility]  23  07:40 EDT          Electronically signed by Halima Salmeron MD at 23 0914       Kaitlin Owens APRN at 23 0007       Attestation signed by Christi Pitts MD at 23 1723    I have reviewed the history and plan as obtained by NANCY Dawn. I have personally examined the patient. I have reviewed available clinical results, imaging results, EKG/Telemetry results, and prior records and performed greater than 50% of the work for this patient. My exam confirms their physical findings and I agree with the plan as listed above, with the addition of the followin-year-old gentleman with history of hypertension, CKD 3A, HLD, CAD, bilateral carotid artery stenosis, GERD presenting with generalized weakness, cough, fever/chills for 3 to 4 days.  He also reports some shortness of breath and increasing difficulties with walking secondary to weakness.  His wife was diagnosed with COVID-19 recently so he presented to the ER for further evaluation.  The  patient was also diagnosed with COVID-19 in the ER.    Constitutional:       General: Patient is not in acute distress.     Appearance: Ill-appearing appearance. Not toxic-appearing.   HENT:      Head: Normocephalic and atraumatic.   Cardiovascular:      Rate and Rhythm: Normal rate and regular rhythm.   Pulmonary:      Effort: Pulmonary effort is normal. No respiratory distress.      Breath sounds: Diminished breath sounds. No wheezing or rhonchi.  Coughs during exam.  Abdominal:      General: Bowel sounds are normal. There is no distension.      Palpations: Abdomen is soft.      Tenderness: There is no abdominal tenderness. There is no guarding or rebound.   Musculoskeletal:         General: No swelling.      Right lower leg: No edema.      Left lower leg: No edema.   Skin:     General: Skin is warm and dry.   Neurological:      General: No focal deficit present.      Mental Status: Alert and oriented to person, place, and time.   Psychiatric:         Mood and Affect: Mood normal.         Behavior: Behavior normal.       COVID-19 infection  -Discontinue Decadron, not indicated as he is not hypoxic and side effects of Decadron outweigh the benefits  -Discussed with the patient options of discharging home on oral Paxlovid versus remaining as an inpatient for a course of IV remdesivir  -She is on multiple medications that would have interactions with Paxlovid that he would have to discontinue while taking Paxlovid and he has elected to remain inpatient for IV remdesivir treatment for a short course  -Continue mucolytic's, as needed antitussives, other supportive care  -If patient develops hypoxia will resume Decadron    Irregular rhythm-EKG reviewed, agree with cardiology, sinus rhythm with PACs, telemetry with frequent PVCs, patient is asymptomatic, continue home metoprolol    CAD  -Continue aspirin/statin    Hypertension  - Continue home chlorthalidone, clonidine, losartan, metoprolol    Discussed with patient that  if he feels well tomorrow he could receive his second dose of remdesivir of 3 and then discharge or elect to stay for 3 out of 3 doses and discharge after that.  Patient will see how he feels tomorrow.                        Patient Name:  Kevin Ellington  YOB: 1935  MRN:  [id number]  Admit Date:  6/12/2023  Patient Care Team:  Leanna Ryder MD as PCP - General (Family Medicine)  Kaitlin Chew APRN as Nurse Practitioner (Gastroenterology)      Subjective   History Present Illness     Chief Complaint   Patient presents with    Weakness - Generalized    Cough       Mr. Ellington is a 88 y.o. non-smoker with a history of hypertension, hyperlipidemia, CAD, and CKD stage 3a that presents to [facility] complaining of generalized weakness and cough.  He reports fever, chills, sore throat, and a nonproductive cough for the past few days.  He reports shortness of breath and difficulty ambulating today due to generalized weakness.  He states his wife was recently diagnosed with COVID 19 and he thought he may have it as well so he presented to the ED.  He was febrile on arrival with a temperature of 102.  Lab work revealed troponin 31, creatinine 1.30, and WBC 12.68.   A respiratory viral panel was positive for COVID 19.  He is being admitted for further evaluation.       History of Present Illness  Review of Systems   Constitutional:  Positive for chills, fatigue and fever.   HENT:  Positive for sore throat. Negative for congestion.    Eyes:  Negative for photophobia and visual disturbance.   Respiratory:  Positive for cough and shortness of breath. Negative for chest tightness and wheezing.    Cardiovascular:  Negative for chest pain, palpitations and leg swelling.   Gastrointestinal:  Negative for abdominal pain, nausea and vomiting.   Genitourinary:  Negative for decreased urine volume, dysuria, flank pain, frequency, hematuria and urgency.   Musculoskeletal:  Negative for arthralgias  and myalgias.   Neurological:  Positive for weakness (generalized). Negative for dizziness, light-headedness, numbness and headaches.      Personal History     Past Medical History:   Diagnosis Date    Allergic 1970    Arthritis     Asthma 05/23/2019    shortness of breath-exertion    Benign prostatic hyperplasia     Bilateral carotid artery stenosis 05/12/2022    Cataract     Cervical radiculopathy     Colon polyp     Coronary artery disease 2001    Stent    CTS (carpal tunnel syndrome) 1998    Surgery both hands in 1999    Erectile dysfunction     GERD (gastroesophageal reflux disease)     HTN (hypertension)     Hyperlipidemia     Kidney stone     Low back pain     2 Prior surgeries    Lumbosacral disc disease     Melanoma 01/15/2009    DR WILLY SIMMS ( Left forearm)    Obesity     Radiculitis, thoracic     Scoliosis     Spondylosis of lumbar region without myelopathy or radiculopathy 09/09/2020    Stage 3a chronic kidney disease 10/21/2021    Stented coronary artery     Thoracic disc disorder     Surgery by Dr. Aldridge ()    TIA (transient ischemic attack) 05/12/2022     Past Surgical History:   Procedure Laterality Date    ABLATION OF DYSRHYTHMIC FOCUS  nov. 2020    radio frequency /lower back    ANKLE FUSION Left 2007    reconstruction and fusion    BACK SURGERY      HERNIATED LUMBAR DISK 1975,2000,2012    CARDIAC CATHETERIZATION  2001    CARPAL TUNNEL RELEASE      CATARACT EXTRACTION, BILATERAL Bilateral 2002    COLONOSCOPY      CORONARY ANGIOPLASTY WITH STENT PLACEMENT  2001    CORONARY STENT PLACEMENT      ENDOSCOPY N/A 12/19/2022    Procedure: ESOPHAGOGASTRODUODENOSCOPY WITH BIOPSY;  Surgeon: Rashard Mathews MD;  Location: [facility] MAIN OR;  Service: Gastroenterology;  Laterality: N/A;  gastric polyps, hiatal hernia    EPIDURAL BLOCK  Several at [facility]    and [facility]    EYE SURGERY      JOINT REPLACEMENT      REPLACEMENT TOTAL KNEE Left 2015    REPLACEMENT TOTAL KNEE  Right     SKIN CANCER EXCISION      MELANOMA    SPINE SURGERY      THORACIC SPINE SURGERY       Family History   Problem Relation Age of Onset    Hypertension Mother     Arthritis Mother             Skin cancer Mother     Heart disease Father     Early death Father         Aortic aneurism age 58    Hypertension Father     Aortic aneurysm Father 58    Cancer Neg Hx     Colon cancer Neg Hx     Colon polyps Neg Hx     Crohn's disease Neg Hx     Irritable bowel syndrome Neg Hx     Ulcerative colitis Neg Hx      Social History     Tobacco Use    Smoking status: Never    Smokeless tobacco: Never    Tobacco comments:     caffeine use   Vaping Use    Vaping Use: Never used   Substance Use Topics    Alcohol use: No    Drug use: No     (Not in a hospital admission)    Allergies:    Allergies   Allergen Reactions    Codeine GI Intolerance    Hydrocodone GI Intolerance     SEVERE CONSTIPATION    Sulfa Antibiotics Unknown (See Comments)     Unable to remember       Objective    Objective     Vital Signs  Temp:  [102 °F (38.9 °C)] 102 °F (38.9 °C)  Heart Rate:  [78] 78  Resp:  [16] 16  BP: (118)/(73) 118/73  SpO2:  [95 %] 95 %  on   ;   Device (Oxygen Therapy): room air  There is no height or weight on file to calculate BMI.    Physical Exam  Vitals and nursing note reviewed.   Constitutional:       Appearance: Normal appearance.   HENT:      Head: Normocephalic and atraumatic.      Nose: Nose normal.      Mouth/Throat:      Mouth: Mucous membranes are moist.      Pharynx: Oropharynx is clear.   Eyes:      Extraocular Movements: Extraocular movements intact.      Conjunctiva/sclera: Conjunctivae normal.   Cardiovascular:      Rate and Rhythm: Normal rate and regular rhythm.      Pulses: Normal pulses.      Heart sounds: Normal heart sounds.   Pulmonary:      Effort: Pulmonary effort is normal.      Breath sounds: Examination of the right-lower field reveals decreased breath sounds. Examination of the left-lower field  reveals decreased breath sounds. Decreased breath sounds present.   Abdominal:      General: Bowel sounds are normal.      Palpations: Abdomen is soft.   Musculoskeletal:         General: Normal range of motion.      Cervical back: Normal range of motion and neck supple.   Skin:     General: Skin is warm and dry.   Neurological:      General: No focal deficit present.      Mental Status: He is alert and oriented to person, place, and time.   Psychiatric:         Mood and Affect: Mood normal.         Behavior: Behavior normal.       Results Review:  I reviewed the patient's new clinical results.  I reviewed the patient's new imaging results and agree with the interpretation.  I reviewed the patient's other test results and agree with the interpretation  I personally viewed and interpreted the patient's EKG/Telemetry data  Discussed with ED provider.    Lab Results (last 24 hours)       Procedure Component Value Units Date/Time    CBC & Differential [[id number]]  (Abnormal) Collected: 06/12/23 2128    Specimen: Blood Updated: 06/12/23 2140    Narrative:      The following orders were created for panel order CBC & Differential.  Procedure                               Abnormality         Status                     ---------                               -----------         ------                     CBC Auto Differential[[id number]]        Abnormal            Final result                 Please view results for these tests on the individual orders.    Comprehensive Metabolic Panel [[id number]]  (Abnormal) Collected: 06/12/23 2128    Specimen: Blood Updated: 06/12/23 2200     Glucose 129 mg/dL      BUN 23 mg/dL      Creatinine 1.30 mg/dL      Sodium 139 mmol/L      Potassium 4.4 mmol/L      Chloride 102 mmol/L      CO2 24.0 mmol/L      Calcium 9.9 mg/dL      Total Protein 7.1 g/dL      Albumin 4.4 g/dL      ALT (SGPT) 17 U/L      AST (SGOT) 22 U/L      Alkaline Phosphatase 114 U/L      Total Bilirubin 1.2 mg/dL       Globulin 2.7 gm/dL      A/G Ratio 1.6 g/dL      BUN/Creatinine Ratio 17.7     Anion Gap 13.0 mmol/L      eGFR 52.8 mL/min/1.73     Narrative:      GFR Normal >60  Chronic Kidney Disease <60  Kidney Failure <15    The GFR formula is only valid for adults with stable renal function between ages 18 and 70.    Respiratory Panel PCR w/COVID-19(SARS-CoV-2) ALBERT/INDERJIT/MARLENY/PAD/COR/MAD/ABEL In-House, NP Swab in UTM/VTM, 3-4 HR TAT - Swab, Nasopharynx [[id number]]  (Abnormal) Collected: 06/12/23 2128    Specimen: Swab from Nasopharynx Updated: 06/12/23 2226     ADENOVIRUS, PCR Not Detected     Coronavirus 229E Not Detected     Coronavirus HKU1 Not Detected     Coronavirus NL63 Not Detected     Coronavirus OC43 Not Detected     COVID19 Detected     Human Metapneumovirus Not Detected     Human Rhinovirus/Enterovirus Not Detected     Influenza A PCR Not Detected     Influenza B PCR Not Detected     Parainfluenza Virus 1 Not Detected     Parainfluenza Virus 2 Not Detected     Parainfluenza Virus 3 Not Detected     Parainfluenza Virus 4 Not Detected     RSV, PCR Not Detected     Bordetella pertussis pcr Not Detected     Bordetella parapertussis PCR Not Detected     Chlamydophila pneumoniae PCR Not Detected     Mycoplasma pneumo by PCR Not Detected    Narrative:      In the setting of a positive respiratory panel with a viral infection PLUS a negative procalcitonin without other underlying concern for bacterial infection, consider observing off antibiotics or discontinuation of antibiotics and continue supportive care. If the respiratory panel is positive for atypical bacterial infection (Bordetella pertussis, Chlamydophila pneumoniae, or Mycoplasma pneumoniae), consider antibiotic de-escalation to target atypical bacterial infection.    Protime-INR [[id number]]  (Abnormal) Collected: 06/12/23 2128    Specimen: Blood Updated: 06/12/23 2152     Protime 14.5 Seconds      INR 1.12    aPTT [[id number]]  (Abnormal) Collected: 06/12/23 2128     Specimen: Blood Updated: 06/12/23 2152     PTT 38.9 seconds     High Sensitivity Troponin T [[id number]]  (Abnormal) Collected: 06/12/23 2128    Specimen: Blood Updated: 06/12/23 2207     HS Troponin T 31 ng/L     Narrative:      High Sensitive Troponin T Reference Range:  <10.0 ng/L- Negative Female for AMI  <15.0 ng/L- Negative Male for AMI  >=10 - Abnormal Female indicating possible myocardial injury.  >=15 - Abnormal Male indicating possible myocardial injury.   Clinicians would have to utilize clinical acumen, EKG, Troponin, and serial changes to determine if it is an Acute Myocardial Infarction or myocardial injury due to an underlying chronic condition.         BNP [[id number]]  (Normal) Collected: 06/12/23 2128    Specimen: Blood Updated: 06/12/23 2207     proBNP 299.0 pg/mL     Narrative:      Among patients with dyspnea, NT-proBNP is highly sensitive for the detection of acute congestive heart failure. In addition NT-proBNP of <300 pg/ml effectively rules out acute congestive heart failure with 99% negative predictive value.    Results may be falsely decreased if patient taking Biotin.      Blood Culture - Blood, Arm, Left [[id number]] Collected: 06/12/23 2128    Specimen: Blood from Arm, Left Updated: 06/12/23 2133    Blood Culture - Blood, Arm, Left [[id number]] Collected: 06/12/23 2128    Specimen: Blood from Arm, Left Updated: 06/12/23 2133    Lactic Acid, Plasma [[id number]]  (Normal) Collected: 06/12/23 2128    Specimen: Blood Updated: 06/12/23 2155     Lactate 1.3 mmol/L     Procalcitonin [[id number]]  (Normal) Collected: 06/12/23 2128    Specimen: Blood Updated: 06/12/23 2207     Procalcitonin 0.11 ng/mL     Narrative:      As a Marker for Sepsis (Non-Neonates):    1. <0.5 ng/mL represents a low risk of severe sepsis and/or septic shock.  2. >2 ng/mL represents a high risk of severe sepsis and/or septic shock.    As a Marker for Lower Respiratory Tract Infections that require antibiotic therapy:    PCT  "on Admission    Antibiotic Therapy       6-12 Hrs later    >0.5                Strongly Recommended  >0.25 - <0.5        Recommended   0.1 - 0.25          Discouraged              Remeasure/reassess PCT  <0.1                Strongly Discouraged     Remeasure/reassess PCT    As 28 day mortality risk marker: \"Change in Procalcitonin Result\" (>80% or <=80%) if Day 0 (or Day 1) and Day 4 values are available. Refer to http://www.[facility]-pct-calculator.com    Change in PCT <=80%  A decrease of PCT levels below or equal to 80% defines a positive change in PCT test result representing a higher risk for 28-day all-cause mortality of patients diagnosed with severe sepsis for septic shock.    Change in PCT >80%  A decrease of PCT levels of more than 80% defines a negative change in PCT result representing a lower risk for 28-day all-cause mortality of patients diagnosed with severe sepsis or septic shock.       CBC Auto Differential [[id number]]  (Abnormal) Collected: 06/12/23 2128    Specimen: Blood Updated: 06/12/23 2140     WBC 12.68 10*3/mm3      RBC 4.65 10*6/mm3      Hemoglobin 14.4 g/dL      Hematocrit 40.7 %      MCV 87.5 fL      MCH 31.0 pg      MCHC 35.4 g/dL      RDW 12.3 %      RDW-SD 39.4 fl      MPV 8.8 fL      Platelets 180 10*3/mm3      Neutrophil % 86.0 %      Lymphocyte % 7.6 %      Monocyte % 5.5 %      Eosinophil % 0.2 %      Basophil % 0.2 %      Immature Grans % 0.5 %      Neutrophils, Absolute 10.91 10*3/mm3      Lymphocytes, Absolute 0.96 10*3/mm3      Monocytes, Absolute 0.70 10*3/mm3      Eosinophils, Absolute 0.02 10*3/mm3      Basophils, Absolute 0.03 10*3/mm3      Immature Grans, Absolute 0.06 10*3/mm3      nRBC 0.0 /100 WBC     Urinalysis With Microscopic If Indicated (No Culture) - Urine, Clean Catch [[id number]]  (Normal) Collected: 06/12/23 2252    Specimen: Urine, Clean Catch Updated: 06/12/23 2340     Color, UA Yellow     Appearance, UA Clear     pH, UA 6.5     Specific Gravity, UA 1.012     " Glucose, UA Negative     Ketones, UA Negative     Bilirubin, UA Negative     Blood, UA Negative     Protein, UA Negative     Leuk Esterase, UA Negative     Nitrite, UA Negative     Urobilinogen, UA 0.2 E.U./dL    Narrative:      Urine microscopic not indicated.            Imaging Results (Last 24 Hours)       Procedure Component Value Units Date/Time    XR Chest 1 View [[id number]] Collected: 06/12/23 2201     Updated: 06/12/23 2204    Narrative:      SINGLE VIEW OF THE CHEST     HISTORY: Weakness and cough     COMPARISON: None available.     FINDINGS:  There is cardiomegaly. No pneumothorax or pleural effusion is seen.  Right lung is clear. Patient is suspected to have some scarring within  the left lung. There is calcification of the aorta.       Impression:      No definite acute infiltrates seen.     This report was finalized on 6/12/2023 10:01 PM by Dr. Talia Orlando M.D.               Results for orders placed during the hospital encounter of 06/06/22    Adult Transthoracic Echo Complete w/ Color, Spectral and Contrast if Necessary Per Protocol    Interpretation Summary  · Calculated left ventricular 3D EF = 65% Estimated left ventricular EF = 65% Left ventricular systolic function is normal.  · Left ventricular wall thickness is consistent with borderline concentric hypertrophy.  · Left ventricular diastolic function is consistent with (grade I) impaired relaxation.  · Estimated right ventricular systolic pressure from tricuspid regurgitation is normal (<35 mmHg).      ECG 12 Lead Other; weakness   Preliminary Result   HEART RATE= 76  bpm   RR Interval= 789  ms   MA Interval=   ms   P Horizontal Axis=   deg   P Front Axis=   deg   QRSD Interval= 105  ms   QT Interval= 396  ms   QRS Axis= 29  deg   T Wave Axis= 48  deg   - ABNORMAL ECG -   Atrial fibrillation   Probable anteroseptal infarct, old   Electronically Signed By:    Date and Time of Study: 2023-06-12 21:25:41           Assessment/Plan     Active  Hospital Problems    Diagnosis  POA    **COVID-19 [U07.1]  Unknown    Stage 3a chronic kidney disease [N18.31]  Yes    Essential hypertension [I10]  Yes    Hyperlipemia [E78.5]  Yes    Coronary artery disease involving native coronary artery of native heart without angina pectoris [I25.10]  Yes       COVID 19  -He is currently sating 95-97% on room air  -He reports extreme fatigue and generalized weakness and states he could not ambulate today  -Initiate Decadron daily  -Symptomatic treatment with IS, Mucinex, and chloraseptic spray  -Monitor daily COVID labs  -PT consult  -His troponin is elevated. He denies chest pain, EKG shows atrial fibrillation. Repeat troponin in AM  -Lovenox for DVT prophylaxis    Atrial Fibrillation  -Rate controlled  -He does not appear to have a history of Afib  -Check echo  -Cardiology consult    Hypertension  -Blood pressures stable. Continue home regimen  -Monitor    CKD Stage 3a  -His creatinine is near his baseline which is 1.24  -Avoid nephrotoxins  -Repeat lab work in AM    Hyperlipidemia/Coronary Artery Disease/Bilateral Carotid Artery Stenosis  -S/p coronary artery stent placement  -Continue aspirin and statin    GERD  -Continue home PPI    -I discussed the patients findings and my recommendations with patient.    VTE Prophylaxis - Pharmacy to dose Lovenox.  Code Status - Full code.       NANCY Spencer  [facility]  06/13/23  00:08 EDT      Electronically signed by Christi Pitts MD at 06/13/23 8592       Aliza Dacosta, RN at 06/12/23 8286          Nursing report ED to floor  Kevin Ellington  88 y.o.  male    HPI :   Chief Complaint   Patient presents with    Weakness - Generalized    Cough       Admitting doctor:   James Chavez MD    Admitting diagnosis:   The primary encounter diagnosis was Fever and chills. Diagnoses of COVID-19 virus infection, Acute cough, and Generalized weakness were also pertinent to this  visit.    Code status:   Current Code Status       Date Active Code Status Order ID Comments User Context       Not on file            Allergies:   Codeine, Hydrocodone, and Sulfa antibiotics    Isolation:   Enhanced Droplet/Contact     Intake and Output  No intake or output data in the 24 hours ending 06/12/23 2255    Weight:   There were no vitals filed for this visit.    Most recent vitals:   Vitals:    06/12/23 1952 06/12/23 1953   BP: 118/73    Pulse: 78    Resp: 16    Temp:  (!) 102 °F (38.9 °C)   SpO2:  95%       Active LDAs/IV Access:   Lines, Drains & Airways       Active LDAs       Name Placement date Placement time Site Days    Peripheral IV 06/12/23 2128 Right Antecubital 06/12/23 2128  Antecubital  less than 1                    Labs (abnormal labs have a star):   Labs Reviewed   RESPIRATORY PANEL PCR W/ COVID-19 (SARS-COV-2) ALBERT/INDERJIT/MARLENY/PAD/COR/MAD/ABEL IN-HOUSE, NP SWAB IN UTM/VTP, 3-4 HR TAT - Abnormal; Notable for the following components:       Result Value    COVID19 Detected (*)     All other components within normal limits    Narrative:     In the setting of a positive respiratory panel with a viral infection PLUS a negative procalcitonin without other underlying concern for bacterial infection, consider observing off antibiotics or discontinuation of antibiotics and continue supportive care. If the respiratory panel is positive for atypical bacterial infection (Bordetella pertussis, Chlamydophila pneumoniae, or Mycoplasma pneumoniae), consider antibiotic de-escalation to target atypical bacterial infection.   COMPREHENSIVE METABOLIC PANEL - Abnormal; Notable for the following components:    Glucose 129 (*)     Creatinine 1.30 (*)     eGFR 52.8 (*)     All other components within normal limits    Narrative:     GFR Normal >60  Chronic Kidney Disease <60  Kidney Failure <15    The GFR formula is only valid for adults with stable renal function between ages 18 and 70.   PROTIME-INR - Abnormal;  Notable for the following components:    Protime 14.5 (*)     INR 1.12 (*)     All other components within normal limits   APTT - Abnormal; Notable for the following components:    PTT 38.9 (*)     All other components within normal limits   TROPONIN - Abnormal; Notable for the following components:    HS Troponin T 31 (*)     All other components within normal limits    Narrative:     High Sensitive Troponin T Reference Range:  <10.0 ng/L- Negative Female for AMI  <15.0 ng/L- Negative Male for AMI  >=10 - Abnormal Female indicating possible myocardial injury.  >=15 - Abnormal Male indicating possible myocardial injury.   Clinicians would have to utilize clinical acumen, EKG, Troponin, and serial changes to determine if it is an Acute Myocardial Infarction or myocardial injury due to an underlying chronic condition.        CBC WITH AUTO DIFFERENTIAL - Abnormal; Notable for the following components:    WBC 12.68 (*)     Neutrophil % 86.0 (*)     Lymphocyte % 7.6 (*)     Eosinophil % 0.2 (*)     Neutrophils, Absolute 10.91 (*)     Immature Grans, Absolute 0.06 (*)     All other components within normal limits   BNP (IN-HOUSE) - Normal    Narrative:     Among patients with dyspnea, NT-proBNP is highly sensitive for the detection of acute congestive heart failure. In addition NT-proBNP of <300 pg/ml effectively rules out acute congestive heart failure with 99% negative predictive value.    Results may be falsely decreased if patient taking Biotin.     LACTIC ACID, PLASMA - Normal   PROCALCITONIN - Normal    Narrative:     As a Marker for Sepsis (Non-Neonates):    1. <0.5 ng/mL represents a low risk of severe sepsis and/or septic shock.  2. >2 ng/mL represents a high risk of severe sepsis and/or septic shock.    As a Marker for Lower Respiratory Tract Infections that require antibiotic therapy:    PCT on Admission    Antibiotic Therapy       6-12 Hrs later    >0.5                Strongly Recommended  >0.25 - <0.5         "Recommended   0.1 - 0.25          Discouraged              Remeasure/reassess PCT  <0.1                Strongly Discouraged     Remeasure/reassess PCT    As 28 day mortality risk marker: \"Change in Procalcitonin Result\" (>80% or <=80%) if Day 0 (or Day 1) and Day 4 values are available. Refer to http://[email or web address]pct-calculator.[email or web address]    Change in PCT <=80%  A decrease of PCT levels below or equal to 80% defines a positive change in PCT test result representing a higher risk for 28-day all-cause mortality of patients diagnosed with severe sepsis for septic shock.    Change in PCT >80%  A decrease of PCT levels of more than 80% defines a negative change in PCT result representing a lower risk for 28-day all-cause mortality of patients diagnosed with severe sepsis or septic shock.      BLOOD CULTURE   BLOOD CULTURE   URINALYSIS W/ MICROSCOPIC IF INDICATED (NO CULTURE)   HIGH SENSITIVITIY TROPONIN T 2HR   CBC AND DIFFERENTIAL    Narrative:     The following orders were created for panel order CBC & Differential.  Procedure                               Abnormality         Status                     ---------                               -----------         ------                     CBC Auto Differential[[id number]]        Abnormal            Final result                 Please view results for these tests on the individual orders.       EKG:   ECG 12 Lead Other; weakness   Preliminary Result   HEART RATE= 76  bpm   RR Interval= 789  ms   IL Interval=   ms   P Horizontal Axis=   deg   P Front Axis=   deg   QRSD Interval= 105  ms   QT Interval= 396  ms   QRS Axis= 29  deg   T Wave Axis= 48  deg   - ABNORMAL ECG -   Atrial fibrillation   Probable anteroseptal infarct, old   Electronically Signed By:    Date and Time of Study: 2023-06-12 21:25:41          Meds given in ED:   Medications   sodium chloride 0.9 % flush 10 mL (has no administration in time range)   sodium chloride 0.9 % bolus 1,000 mL (1,000 mL Intravenous New " Bag 6/12/23 2235)   acetaminophen (TYLENOL) tablet 1,000 mg (1,000 mg Oral Given 6/12/23 2238)   dexamethasone sodium phosphate injection 4 mg (4 mg Intravenous Given 6/12/23 2236)       Imaging results:  XR Chest 1 View    Result Date: 6/12/2023  No definite acute infiltrates seen.  This report was finalized on 6/12/2023 10:01 PM by Dr. Talia Orlando M.D.       Ambulatory status:   - assist    Social issues:   Social History     Socioeconomic History    Marital status:     Number of children: 1    Years of education: BA DE[employer]REE   Tobacco Use    Smoking status: Never    Smokeless tobacco: Never    Tobacco comments:     caffeine use   Vaping Use    Vaping Use: Never used   Substance and Sexual Activity    Alcohol use: No    Drug use: No    Sexual activity: Not Currently     Partners: Female       NIH Stroke Scale:         Aliza Dacosta RN  06/12/23 22:55 EDT          Electronically signed by Aliza Dacosta RN at 06/12/23 5359       Nacho Sanders MD at 06/12/23 2202           EMERGENCY DEPARTMENT ENCOUNTER    Room Number:  12/12  PCP: Leanna Ryder MD  Historian: Patient      HPI:  Chief Complaint: Cough  A complete HPI/ROS/PMH/PSH/SH/FH are unobtainable due to: None  Context: Kevin Ellington is a 88 y.o. male who presents to the ED c/o a nonproductive cough that has been present now for the past several days.  The patient also reports some associated shortness of breath, fever/chills, as well as generalized weakness related to the symptoms.  He states that his wife did recently test positive for the COVID-19 virus.  He denies chest pain, back pain, dysuria/hematuria, or headaches.            PAST MEDICAL HISTORY  Active Ambulatory Problems     Diagnosis Date Noted    Bulging lumbar disc 06/16/2016    DDD (degenerative disc disease), lumbar 06/16/2016    Leg pain 06/16/2016    Spinal stenosis of lumbar region with neurogenic claudication 06/16/2016    Radiculitis, thoracic  06/16/2016    Status post total left knee replacement using cement 06/16/2016    Status post total right knee replacement using cement 06/16/2016    Coronary artery disease involving native coronary artery of native heart without angina pectoris 08/23/2016    Essential hypertension 08/23/2016    Hyperlipemia 08/23/2016    S/P coronary artery stent placement 09/06/2017    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/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    Benign prostatic hyperplasia with urinary frequency 10/05/2017    Cervical radiculopathy 02/06/2018    Bilateral carpal tunnel syndrome 08/20/2018    Cervical spinal stenosis 08/20/2018    Arthritis of ankle 12/06/2018    Chronic pain of right ankle 12/06/2018    Shortness of breath 07/09/2019    Bilateral leg weakness 10/21/2019    History of melanoma 08/27/2020    Spondylosis of lumbar region without myelopathy or radiculopathy 09/09/2020    Stage 3a chronic kidney disease 10/21/2021    Pedal edema 10/21/2021    Gastroesophageal reflux disease without esophagitis 04/29/2022    TIA (transient ischemic attack) 05/12/2022    Bilateral carotid artery stenosis 05/12/2022    Cerebral artery occlusion 08/09/2022    Hyperglycemia 08/09/2022    Hoarseness 12/13/2022    Hiatal hernia 12/13/2022    Abdominal fullness 12/13/2022     Resolved Ambulatory Problems     Diagnosis Date Noted    Health care maintenance 10/05/2017    Cancer 10/04/2018    Acute bronchitis 02/27/2019     Past Medical History:   Diagnosis Date    Allergic 1970    Arthritis     Asthma 05/23/2019    Benign prostatic hyperplasia     Cataract     Colon polyp     Coronary artery disease 2001    CTS (carpal tunnel syndrome) 1998    Erectile dysfunction     GERD (gastroesophageal reflux disease)     HTN (hypertension)     Hyperlipidemia     Kidney stone     Low back pain     Lumbosacral disc disease     Melanoma 01/15/2009    Obesity     Scoliosis     Stented coronary artery     Thoracic disc disorder          PAST SURGICAL HISTORY  Past  Surgical History:   Procedure Laterality Date    ABLATION OF DYSRHYTHMIC FOCUS  2020    radio frequency /lower back    ANKLE FUSION Left 2007    reconstruction and fusion    BACK SURGERY      HERNIATED LUMBAR DISK 1975,,2012    CARDIAC CATHETERIZATION  2001    CARPAL TUNNEL RELEASE      CATARACT EXTRACTION, BILATERAL Bilateral     COLONOSCOPY      CORONARY ANGIOPLASTY WITH STENT PLACEMENT      CORONARY STENT PLACEMENT      ENDOSCOPY N/A 2022    Procedure: ESOPHAGOGASTRODUODENOSCOPY WITH BIOPSY;  Surgeon: Rashard Mathews MD;  Location: [facility] MAIN OR;  Service: Gastroenterology;  Laterality: N/A;  gastric polyps, hiatal hernia    EPIDURAL BLOCK  Several at [facility]    and [facility]    EYE SURGERY      JOINT REPLACEMENT      REPLACEMENT TOTAL KNEE Left 2015    REPLACEMENT TOTAL KNEE Right     SKIN CANCER EXCISION      MELANOMA    SPINE SURGERY      THORACIC SPINE SURGERY           FAMILY HISTORY  Family History   Problem Relation Age of Onset    Hypertension Mother     Arthritis Mother             Skin cancer Mother     Heart disease Father     Early death Father         Aortic aneurism age 58    Hypertension Father     Aortic aneurysm Father 58    Cancer Neg Hx     Colon cancer Neg Hx     Colon polyps Neg Hx     Crohn's disease Neg Hx     Irritable bowel syndrome Neg Hx     Ulcerative colitis Neg Hx          SOCIAL HISTORY  Social History     Socioeconomic History    Marital status:     Number of children: 1    Years of education: BA DEGREE   Tobacco Use    Smoking status: Never    Smokeless tobacco: Never    Tobacco comments:     caffeine use   Vaping Use    Vaping Use: Never used   Substance and Sexual Activity    Alcohol use: No    Drug use: No    Sexual activity: Not Currently     Partners: Female         ALLERGIES  Codeine, Hydrocodone, and Sulfa antibiotics        REVIEW OF SYSTEMS  Review of Systems   Constitutional:  Positive for chills and fever.  Negative for activity change and appetite change.   HENT:  Negative for congestion and sore throat.    Eyes: Negative.    Respiratory:  Positive for cough and shortness of breath.    Cardiovascular:  Negative for chest pain and leg swelling.   Gastrointestinal:  Negative for abdominal pain, diarrhea and vomiting.   Endocrine: Negative.    Genitourinary:  Negative for decreased urine volume and dysuria.   Musculoskeletal:  Negative for neck pain.   Skin:  Negative for rash and wound.   Allergic/Immunologic: Negative.    Neurological:  Positive for weakness. Negative for numbness and headaches.   Hematological: Negative.    Psychiatric/Behavioral: Negative.     All other systems reviewed and are negative.         PHYSICAL EXAM  ED Triage Vitals   Temp Heart Rate Resp BP SpO2   06/12/23 1953 06/12/23 1952 06/12/23 1952 06/12/23 1952 06/12/23 1953   (!) 102 °F (38.9 °C) 78 16 118/73 95 %      Temp src Heart Rate Source Patient Position BP Location FiO2 (%)   -- -- -- -- --              Physical Exam  Constitutional:       General: He is not in acute distress.     Appearance: Normal appearance. He is not ill-appearing or toxic-appearing.   HENT:      Head: Normocephalic and atraumatic.   Eyes:      Extraocular Movements: Extraocular movements intact.      Pupils: Pupils are equal, round, and reactive to light.   Cardiovascular:      Rate and Rhythm: Normal rate and regular rhythm.      Heart sounds: No murmur heard.    No friction rub. No gallop.   Pulmonary:      Effort: Pulmonary effort is normal.      Breath sounds: Normal breath sounds. Examination of the right-lower field reveals rhonchi. Examination of the left-lower field reveals rhonchi.   Abdominal:      General: Abdomen is flat. There is no distension.      Palpations: Abdomen is soft.      Tenderness: There is no abdominal tenderness.   Musculoskeletal:         General: No swelling or tenderness. Normal range of motion.      Cervical back: Normal range of  motion and neck supple.   Skin:     General: Skin is warm and dry.   Neurological:      General: No focal deficit present.      Mental Status: He is alert and oriented to person, place, and time.      Sensory: No sensory deficit.      Motor: No weakness.   Psychiatric:         Mood and Affect: Mood normal.         Behavior: Behavior normal.         Vital signs and nursing notes reviewed.          LAB RESULTS  Recent Results (from the past 24 hour(s))   ECG 12 Lead Other; weakness    Collection Time: 06/12/23  9:25 PM   Result Value Ref Range    QT Interval 396 ms   Comprehensive Metabolic Panel    Collection Time: 06/12/23  9:28 PM    Specimen: Blood   Result Value Ref Range    Glucose 129 (H) 65 - 99 mg/dL    BUN 23 8 - 23 mg/dL    Creatinine 1.30 (H) 0.76 - 1.27 mg/dL    Sodium 139 136 - 145 mmol/L    Potassium 4.4 3.5 - 5.2 mmol/L    Chloride 102 98 - 107 mmol/L    CO2 24.0 22.0 - 29.0 mmol/L    Calcium 9.9 8.6 - 10.5 mg/dL    Total Protein 7.1 6.0 - 8.5 g/dL    Albumin 4.4 3.5 - 5.2 g/dL    ALT (SGPT) 17 1 - 41 U/L    AST (SGOT) 22 1 - 40 U/L    Alkaline Phosphatase 114 39 - 117 U/L    Total Bilirubin 1.2 0.0 - 1.2 mg/dL    Globulin 2.7 gm/dL    A/G Ratio 1.6 g/dL    BUN/Creatinine Ratio 17.7 7.0 - 25.0    Anion Gap 13.0 5.0 - 15.0 mmol/L    eGFR 52.8 (L) >60.0 mL/min/1.73   Respiratory Panel PCR w/COVID-19(SARS-CoV-2) ALBERT/INDERJIT/MARLENY/PAD/COR/MAD/ABEL In-House, NP Swab in UTM/VTM, 3-4 HR TAT - Swab, Nasopharynx    Collection Time: 06/12/23  9:28 PM    Specimen: Nasopharynx; Swab   Result Value Ref Range    ADENOVIRUS, PCR Not Detected Not Detected    Coronavirus 229E Not Detected Not Detected    Coronavirus HKU1 Not Detected Not Detected    Coronavirus NL63 Not Detected Not Detected    Coronavirus OC43 Not Detected Not Detected    COVID19 Detected (C) Not Detected - Ref. Range    Human Metapneumovirus Not Detected Not Detected    Human Rhinovirus/Enterovirus Not Detected Not Detected    Influenza A PCR Not Detected  Not Detected    Influenza B PCR Not Detected Not Detected    Parainfluenza Virus 1 Not Detected Not Detected    Parainfluenza Virus 2 Not Detected Not Detected    Parainfluenza Virus 3 Not Detected Not Detected    Parainfluenza Virus 4 Not Detected Not Detected    RSV, PCR Not Detected Not Detected    Bordetella pertussis pcr Not Detected Not Detected    Bordetella parapertussis PCR Not Detected Not Detected    Chlamydophila pneumoniae PCR Not Detected Not Detected    Mycoplasma pneumo by PCR Not Detected Not Detected   Protime-INR    Collection Time: 06/12/23  9:28 PM    Specimen: Blood   Result Value Ref Range    Protime 14.5 (H) 11.7 - 14.2 Seconds    INR 1.12 (H) 0.90 - 1.10   aPTT    Collection Time: 06/12/23  9:28 PM    Specimen: Blood   Result Value Ref Range    PTT 38.9 (H) 22.7 - 35.4 seconds   High Sensitivity Troponin T    Collection Time: 06/12/23  9:28 PM    Specimen: Blood   Result Value Ref Range    HS Troponin T 31 (H) <15 ng/L   BNP    Collection Time: 06/12/23  9:28 PM    Specimen: Blood   Result Value Ref Range    proBNP 299.0 0.0 - 1,800.0 pg/mL   Lactic Acid, Plasma    Collection Time: 06/12/23  9:28 PM    Specimen: Blood   Result Value Ref Range    Lactate 1.3 0.5 - 2.0 mmol/L   Procalcitonin    Collection Time: 06/12/23  9:28 PM    Specimen: Blood   Result Value Ref Range    Procalcitonin 0.11 0.00 - 0.25 ng/mL   CBC Auto Differential    Collection Time: 06/12/23  9:28 PM    Specimen: Blood   Result Value Ref Range    WBC 12.68 (H) 3.40 - 10.80 10*3/mm3    RBC 4.65 4.14 - 5.80 10*6/mm3    Hemoglobin 14.4 13.0 - 17.7 g/dL    Hematocrit 40.7 37.5 - 51.0 %    MCV 87.5 79.0 - 97.0 fL    MCH 31.0 26.6 - 33.0 pg    MCHC 35.4 31.5 - 35.7 g/dL    RDW 12.3 12.3 - 15.4 %    RDW-SD 39.4 37.0 - 54.0 fl    MPV 8.8 6.0 - 12.0 fL    Platelets 180 140 - 450 10*3/mm3    Neutrophil % 86.0 (H) 42.7 - 76.0 %    Lymphocyte % 7.6 (L) 19.6 - 45.3 %    Monocyte % 5.5 5.0 - 12.0 %    Eosinophil % 0.2 (L) 0.3 - 6.2 %     Basophil % 0.2 0.0 - 1.5 %    Immature Grans % 0.5 0.0 - 0.5 %    Neutrophils, Absolute 10.91 (H) 1.70 - 7.00 10*3/mm3    Lymphocytes, Absolute 0.96 0.70 - 3.10 10*3/mm3    Monocytes, Absolute 0.70 0.10 - 0.90 10*3/mm3    Eosinophils, Absolute 0.02 0.00 - 0.40 10*3/mm3    Basophils, Absolute 0.03 0.00 - 0.20 10*3/mm3    Immature Grans, Absolute 0.06 (H) 0.00 - 0.05 10*3/mm3    nRBC 0.0 0.0 - 0.2 /100 WBC       Ordered the above labs and reviewed the results.        RADIOLOGY  XR Chest 1 View    Result Date: 6/12/2023  SINGLE VIEW OF THE CHEST  HISTORY: Weakness and cough  COMPARISON: None available.  FINDINGS: There is cardiomegaly. No pneumothorax or pleural effusion is seen. Right lung is clear. Patient is suspected to have some scarring within the left lung. There is calcification of the aorta.      No definite acute infiltrates seen.  This report was finalized on 6/12/2023 10:01 PM by Dr. Talia Orlando M.D.       Ordered the above noted radiological studies. Reviewed by me in PACS.            PROCEDURES  Procedures    EKG independently interpreted by myself as follows:    EKG          EKG time: 2125  Rhythm/Rate: NSR, 76  P waves and SC: nml  QRS, axis: nml, nml   ST and T waves: nml     Interpreted Contemporaneously by me, independently viewed  No previous EKG available for comparison            MEDICATIONS GIVEN IN ER  Medications   sodium chloride 0.9 % flush 10 mL (has no administration in time range)   sodium chloride 0.9 % bolus 1,000 mL (1,000 mL Intravenous New Bag 6/12/23 2235)   acetaminophen (TYLENOL) tablet 1,000 mg (1,000 mg Oral Given 6/12/23 2238)   dexamethasone sodium phosphate injection 4 mg (4 mg Intravenous Given 6/12/23 2236)                   MEDICAL DECISION MAKING, PROGRESS, and CONSULTS    All labs have been independently reviewed by me.  All radiology studies have been reviewed by me and I have also reviewed the radiology report.   EKG's independently viewed and interpreted by  me.  Discussion below represents my analysis of pertinent findings related to patient's condition, differential diagnosis, treatment plan and final disposition.      Additional sources:  - Discussed/ obtained information from independent historians: History obtained from the patient himself at bedside    - External (non-ED) record review: Upon medical records review, the patient was last seen and evaluated in the office of his cardiologist on 5/23/2023 for follow-up for his known coronary artery disease.    - Chronic or social conditions impacting care: Coronary artery disease, recent exposure to possibly COVID-19    - Shared decision making: Admission decision based on shared conversations have between myself as well as the patient at bedside.    Case discussed with NANCY Marquez for A, who will admit the patient to the hospital today for Dr. Chavez.        Orders placed during this visit:  Orders Placed This Encounter   Procedures    Respiratory Panel PCR w/COVID-19(SARS-CoV-2) ALBERT/INDERJIT/MARLENY/PAD/COR/MAD/ABEL In-House, NP Swab in UTM/VTM, 3-4 HR TAT - Swab, Nasopharynx    Blood Culture - Blood,    Blood Culture - Blood,    XR Chest 1 View    Comprehensive Metabolic Panel    Protime-INR    aPTT    Urinalysis With Microscopic If Indicated (No Culture) - Urine, Clean Catch    High Sensitivity Troponin T    BNP    Lactic Acid, Plasma    Procalcitonin    CBC Auto Differential    High Sensitivity Troponin T 2Hr    LHA (on-call MD unless specified) Details    ECG 12 Lead Other; weakness    Insert Peripheral IV    Inpatient Admission    CBC & Differential             Differential diagnosis includes but is not limited to:    Differential diagnosis includes but is not limited to pneumonia, acute bronchitis, COVID-19, viral upper respiratory infection, acute sinusitis, sepsis, or urinary tract infection.      Independent interpretation of labs, radiology studies, and discussions with consultants:    Chest x-ray was  independently interpreted by myself with my interpretation showing no areas of edema, infiltrate, or pneumothorax.        ED Course as of 06/12/23 2251 Mon Jun 12, 2023 2229 On reevaluation, the patient is resting comfortably with stable vital signs.  Oxygen level is currently 95% on room air.  Given the patient's advanced age as well as his fever, COVID-19 positivity status, as well as elevated troponin, I would like to treat with a dose of IV Decadron and admit him to the hospital today for further management and treatment.  The patient is in agreement with that plan and all questions have been answered. [BM]   2250 The patient's presentation, work-up, as well as diagnosis and treatment plan was discussed at length with NANCY Marquez for A.  She agrees to admit the patient to the hospital today for Dr. Chavez. [BM]      ED Course User Index  [BM] Nacho Sanders MD               DIAGNOSIS  Final diagnoses:   Fever and chills   COVID-19 virus infection   Acute cough   Generalized weakness         DISPOSITION  ADMISSION    Discussed treatment plan and reason for admission with pt/family and admitting physician.  Pt/family voiced understanding of the plan for admission for further testing/treatment as needed.               Latest Documented Vital Signs:  As of 22:51 EDT  BP- 118/73 HR- 78 Temp- (!) 102 °F (38.9 °C) O2 sat- 95%              --    Please note that portions of this were completed with a voice recognition program.       Note Disclaimer: At [facility], we believe that sharing information builds trust and better relationships. You are receiving this note because you are receiving care at [facility] or recently visited. It is possible you will see health information before a provider has talked with you about it. This kind of information can be easy to misunderstand. To help you fully understand what it means for your health, we urge you to discuss this note with your  provider.             Nacho Sanders MD  06/12/23 2251      Electronically signed by Nacho Sanders MD at 06/12/23 2251       Shaista Wiley, RN at 06/12/23 1951          Patient from home via EMS reporting generalized weakness and cough x 2 days. Patient's wife had a positive at-home covid test yesterday.     Electronically signed by Shaista Wiley, RN at 06/12/23 1952       Oxygen Therapy (since admission)       Date/Time SpO2 Device (Oxygen Therapy) Flow (L/min) Oxygen Concentration (%) ETCO2 (mmHg)    06/14/23 0700 97 -- -- -- --    06/13/23 2337 96 room air -- -- --    06/13/23 2010 -- room air -- -- --    06/13/23 1942 97 room air -- -- --    06/13/23 1454 96 -- -- -- --    06/13/23 0830 -- room air -- -- --    06/13/23 0754 97 -- -- -- --    06/13/23 0325 97 -- -- -- --    06/13/23 0200 -- room air -- -- --    06/13/23 0043 96 room air -- -- --    06/13/23 0010 95 -- -- -- --    06/13/23 0001 94 -- -- -- --    06/12/23 2331 95 -- -- -- --    06/12/23 2301 98 -- -- -- --    06/12/23 2231 99 -- -- -- --    06/12/23 2201 98 -- -- -- --    06/12/23 2131 100 -- -- -- --    06/12/23 2118 97 -- -- -- --    06/12/23 2116 97 -- -- -- --    06/12/23 1953 95 room air -- -- --          Intake & Output (last 3 days)         06/11 0701 06/12 0700 06/12 0701 06/13 0700 06/13 0701 06/14 0700 06/14 0701  06/15 0700    P.O.   240     IV Piggyback  1000      Total Intake(mL/kg)  1000 (11.4) 240 (2.7)     Urine (mL/kg/hr)  625 1400 (0.7)     Stool   0     Total Output  625 1400     Net  +375 -1160             Urine Unmeasured Occurrence   4 x     Stool Unmeasured Occurrence   1 x            Lines, Drains & Airways       Active LDAs       Name Placement date Placement time Site Days    Peripheral IV 06/12/23 2128 Right Antecubital 06/12/23 2128  Antecubital  1              Inactive LDAs       None                  Medication Administration Report for Kevin Ellington as of 06/14/23 1302     Legend:     Given Hold Not Given Due Canceled Entry Other Actions    Time Time (Time) Time Time-Action         Discontinued     Completed     Future     MAR Hold     Linked             Medications 06/12/23 06/13/23 06/14/23      acetaminophen (TYLENOL) tablet 650 mg  Dose: 650 mg  Freq: Every 4 Hours PRN Route: PO  PRN Reason: Mild Pain  Start: 06/12/23 2255   Admin Instructions:   Based on patient request - if ordered for moderate or severe pain, provider allows for administration of a medication prescribed for a lower pain scale.  Do not exceed 4 grams of acetaminophen in a 24 hr period. Max dose of 2gm for AST/ALT greater than 120 units/L    If given for fever, use fever parameter: fever greater than 100.4 °F.    If given for pain, use the following pain scale:   Mild Pain = Pain Score of 1-3, CPOT 1-2  Moderate Pain = Pain Score of 4-6, CPOT 3-4  Severe Pain = Pain Score of 7-10, CPOT 5-8      0124-Given            Or  acetaminophen (TYLENOL) 160 MG/5ML solution 650 mg  Dose: 650 mg  Freq: Every 4 Hours PRN Route: PO  PRN Reason: Mild Pain  Start: 06/12/23 2255   Admin Instructions:   Based on patient request - if ordered for moderate or severe pain, provider allows for administration of a medication prescribed for a lower pain scale.  Do not exceed 4 grams of acetaminophen in a 24 hr period. Max dose of 2gm for AST/ALT greater than 120 units/L    If given for fever, use fever parameter: fever greater than 100.4 °F.    If given for pain, use the following pain scale:   Mild Pain = Pain Score of 1-3, CPOT 1-2  Moderate Pain = Pain Score of 4-6, CPOT 3-4  Severe Pain = Pain Score of 7-10, CPOT 5-8      0124-Not Given:  See Alt            Or  acetaminophen (TYLENOL) suppository 650 mg  Dose: 650 mg  Freq: Every 4 Hours PRN Route: RE  PRN Reason: Mild Pain  Start: 06/12/23 2255   Admin Instructions:   Based on patient request - if ordered for moderate or severe pain, provider allows for administration of a medication prescribed  for a lower pain scale.  Do not exceed 4 grams of acetaminophen in a 24 hr period. Max dose of 2gm for AST/ALT greater than 120 units/L    If given for fever, use fever parameter: fever greater than 100.4 °F.    If given for pain, use the following pain scale:   Mild Pain = Pain Score of 1-3, CPOT 1-2  Moderate Pain = Pain Score of 4-6, CPOT 3-4  Severe Pain = Pain Score of 7-10, CPOT 5-8      0124-Not Given:  See Alt             aspirin EC tablet 81 mg  Dose: 81 mg  Freq: Daily Route: PO  Start: 06/13/23 0900   Admin Instructions:   Do not exceed 4 grams of aspirin in a 24 hr period.    If given for pain, use the following pain scale:   Mild Pain = Pain Score of 1-3, CPOT 1-2  Moderate Pain = Pain Score of 4-6, CPOT 3-4  Severe Pain = Pain Score of 7-10, CPOT 5-8     0917-Given          0927-Given             atorvastatin (LIPITOR) tablet 40 mg  Dose: 40 mg  Freq: Daily Route: PO  Start: 06/13/23 0900   Admin Instructions:   Avoid grapefruit juice.     0917-Given          0927-Given             calcium carbonate (TUMS) chewable tablet 500 mg (200 mg elemental)  Dose: 2 tablet  Freq: 2 Times Daily PRN Route: PO  PRN Reason: Heartburn  Start: 06/12/23 4215   Admin Instructions:   One tablet contains 200 mg elemental calcium.  Take with food.          cetirizine (zyrTEC) tablet 10 mg  Dose: 10 mg  Freq: Daily Route: PO  Start: 06/13/23 0900 0917-Given          0926-Given             chlorthalidone (HYGROTON) tablet 50 mg  Dose: 50 mg  Freq: Daily Route: PO  Start: 06/13/23 0900     0916-Given          0927-Given             cloNIDine (CATAPRES) tablet 0.1 mg  Dose: 0.1 mg  Freq: 2 Times Daily Route: PO  Start: 06/13/23 0900   Admin Instructions:   Caution: Look alike/sound alike drug alert. Please read the label.  Caution: Look alike/sound alike drug alert.     0916-Given     2003-Given 0927-Given     2100            Enoxaparin Sodium (LOVENOX) syringe 40 mg  Dose: 40 mg  Freq: Daily Route: SC  Indications  of Use: PROPHYLAXIS OF VENOUS THROMBOEMBOLISM  Start: 06/13/23 0900   Admin Instructions:   Give subcutaneous in abdomen only. Do not massage site after injection.     0915-Given          0926-Given             fluticasone (FLONASE) 50 MCG/ACT nasal spray 1 spray  Dose: 1 spray  Freq: Daily Route: NA  Start: 06/13/23 0900     0915-Given          0925-Given             guaiFENesin (MUCINEX) 12 hr tablet 600 mg  Dose: 600 mg  Freq: Every 12 Hours Scheduled Route: PO  Start: 06/13/23 0245   Admin Instructions:   Caution: Look alike/sound alike drug alert               Do not crush, split, or chew.     0328-Given     0917-Given     2003-Given        0927-Given     2100            HYDROcodone Bit-Homatrop MBr (HYCODAN) 5-1.5 MG/5ML solution 5 mL  Dose: 5 mL  Freq: Every 4 Hours PRN Route: PO  PRN Reason: Cough  Start: 06/13/23 1322   End: 06/20/23 1321   Admin Instructions:         0529-Given             losartan (COZAAR) tablet 100 mg  Dose: 100 mg  Freq: Daily Route: PO  Start: 06/13/23 0900 0916-Given          0927-Given             metoprolol succinate XL (TOPROL-XL) 24 hr tablet 100 mg  Dose: 100 mg  Freq: Daily Route: PO  Start: 06/13/23 0900   Admin Instructions:   Do not crush or chew.     0916-Given          0927-Given             metroNIDAZOLE (FLAGYL) 0.75 % lotion  Freq: Daily Route: TOP  Start: 06/13/23 0900   Admin Instructions:   Apply to affected areas  Caution: Look alike/sound alike drug alert     (0917)-Not Given          (0928)-Not Given             nitroglycerin (NITROSTAT) SL tablet 0.4 mg  Dose: 0.4 mg  Freq: Every 5 Minutes PRN Route: SL  PRN Reason: Chest Pain  PRN Comment: Only if SBP Greater Than 100  Start: 06/12/23 2258   Admin Instructions:   If Pain Unrelieved After 3 Doses Notify MD          ondansetron (ZOFRAN) tablet 4 mg  Dose: 4 mg  Freq: Every 6 Hours PRN Route: PO  PRN Reasons: Nausea,Vomiting  Start: 06/12/23 2255   Admin Instructions:   If BOTH ondansetron (ZOFRAN) and  promethazine (PHENERGAN) are ordered use ondansetron first and THEN promethazine IF ondansetron is ineffective.         Or  ondansetron (ZOFRAN) injection 4 mg  Dose: 4 mg  Freq: Every 6 Hours PRN Route: IV  PRN Reasons: Nausea,Vomiting  Start: 06/12/23 7415   Admin Instructions:   If BOTH ondansetron (ZOFRAN) and promethazine (PHENERGAN) are ordered use ondansetron first and THEN promethazine IF ondansetron is ineffective.          pantoprazole (PROTONIX) EC tablet 40 mg  Dose: 40 mg  Freq: Daily Route: PO  Start: 06/13/23 0900   Admin Instructions:   Swallow whole; do not crush, split, or chew.     0916-Given          0927-Given             Pharmacy Consult - Remdesivir for Mild to Moderate COVID-19 (Within 5 days of symptom onset)- only if contraindicated to Paxlovid  Freq: Continuous PRN Route: XX  PRN Reason: Consult  PRN Comment: Mild to moderate COVID-19: Within 5 days of symptom onset --> Remdesivir x 3 days or hospital discharge (whichever comes first)  Start: 06/13/23 1322   End: 06/16/23 1321   Admin Instructions:   Verify date of symptom onset and contraindication to paxlovid   Order specific questions:   Days Since Symptom Onset: </= 7 Days  Patient must be considered high risk for progressing to severe COVID-19 and/or hospitalization per the EUA. Please select all of the following high risk criteria that apply. (SEE LINK ABOVE for list of immunocompromising conditions) Age >/= 65 years  Patient must be considered high risk for progressing to severe COVID-19 and/or hospitalization per the EUA. Please select all of the following high risk criteria that apply. (SEE LINK ABOVE for list of immunocompromising conditions) Cardiovascular disease (heart failure, coronary artery disease, or cardiomyopathies)            phenol (CHLORASEPTIC) 1.4 % liquid 1 spray  Dose: 1 spray  Freq: Every 2 Hours PRN Route: MT  PRN Reason: Sore Throat  Start: 06/13/23 0145   Admin Instructions:             potassium chloride  (K-DUR,KLOR-CON) ER tablet 20 mEq  Dose: 20 mEq  Freq: Daily Route: PO  Start: 06/13/23 0900   Admin Instructions:   Do not crush. Take with food.  Swallow whole; do not crush, split, or chew.     0917-Given          0927-Given             remdesivir 200 mg in sodium chloride 0.9 % 290 mL IVPB (powder vial)  Dose: 200 mg  Freq: Every 24 Hours Route: IV  Start: 06/13/23 1430   End: 06/13/23 1732   Admin Instructions:   Ensure all of the drug is administered. Flush line with 30mL NS after administration.   Order specific questions:   Days Since Symptom Onset: </= 7 Days  Patient must be considered high risk for progressing to severe COVID-19 and/or hospitalization per the EUA. Please select all of the following high risk criteria that apply. (SEE LINK ABOVE for list of immunocompromising conditions) Age >/= 65 years  Patient must be considered high risk for progressing to severe COVID-19 and/or hospitalization per the EUA. Please select all of the following high risk criteria that apply. (SEE LINK ABOVE for list of immunocompromising conditions) Cardiovascular disease (heart failure, coronary artery disease, or cardiomyopathies)  Patient must be considered high risk for progressing to severe COVID-19 and/or hospitalization per the EUA. Please select all of the following high risk criteria that apply. (SEE LINK ABOVE for list of immunocompromising conditions) Chronic kidney disease       1632-New Bag             Followed by  remdesivir 100 mg in sodium chloride 0.9 % 250 mL IVPB (powder vial)  Dose: 100 mg  Freq: Every 24 Hours Route: IV  Start: 06/14/23 1200   End: 06/16/23 1159   Admin Instructions:   Ensure all of the drug is administered. Flush line with 30mL NS after administration.      1200             sennosides-docusate (PERICOLACE) 8.6-50 MG per tablet 1 tablet  Dose: 1 tablet  Freq: 2 Times Daily PRN Route: PO  PRN Reason: Constipation  Start: 06/13/23 0150          sodium chloride 0.9 % flush 10 mL  Dose:  10 mL  Freq: As Needed Route: IV  PRN Reason: Line Care  Start: 06/12/23 2255          sodium chloride 0.9 % flush 10 mL  Dose: 10 mL  Freq: Every 12 Hours Scheduled Route: IV  Start: 06/12/23 2313    2311-Given          0917-Given     2003-Given         0928-Given     2100            sodium chloride 0.9 % flush 10 mL  Dose: 10 mL  Freq: As Needed Route: IV  PRN Reason: Line Care  Start: 06/12/23 2113          sodium chloride 0.9 % infusion 40 mL  Dose: 40 mL  Freq: As Needed Route: IV  PRN Reason: Line Care  Start: 06/12/23 2255   Admin Instructions:   Following administration of an IV intermittent medication, flush line with 40mL NS at 100mL/hr.          tamsulosin (FLOMAX) 24 hr capsule 0.8 mg  Dose: 0.8 mg  Freq: Daily Route: PO  Start: 06/13/23 0900   Admin Instructions:   Swallow whole. Do not crush or chew.     0916-Given          0927-Given            Completed Medications  Medications 06/12/23 06/13/23 06/14/23       acetaminophen (TYLENOL) tablet 1,000 mg  Dose: 1,000 mg  Freq: Once Route: PO  Start: 06/12/23 2222   End: 06/12/23 2238   Admin Instructions:   Based on patient request - if ordered for moderate or severe pain, provider allows for administration of a medication prescribed for a lower pain scale.  Do not exceed 4 grams of acetaminophen in a 24 hr period. Max dose of 2gm for AST/ALT greater than 120 units/L    If given for fever, use fever parameter: fever greater than 100.4 °F.    If given for pain, use the following pain scale:   Mild Pain = Pain Score of 1-3, CPOT 1-2  Moderate Pain = Pain Score of 4-6, CPOT 3-4  Severe Pain = Pain Score of 7-10, CPOT 5-8    2238-Given               dexamethasone sodium phosphate injection 4 mg  Dose: 4 mg  Freq: Once Route: IV  Start: 06/12/23 2245   End: 06/12/23 2236   Admin Instructions:   When administering by push, give over 1 minute.    2236-Given               sodium chloride 0.9 % bolus 1,000 mL  Dose: 1,000 mL  Freq: Once Route: IV  Start: 06/12/23  2222   End: 06/12/23 2343    2235-New Bag     2343-Stopped             Discontinued Medications  Medications 06/12/23 06/13/23 06/14/23       dexamethasone (DECADRON) tablet 6 mg  Dose: 6 mg  Freq: Daily Route: PO  Indications of Use: INFECTION CAUSED BY 2019 NOVEL CORONAVIRUS  Start: 06/13/23 0900   End: 06/13/23 1043   Admin Instructions:   Switch to IV if unable to take medication by mouth  Take with food.     0917-Given             Or  dexamethasone (DECADRON) injection 6 mg  Dose: 6 mg  Freq: Daily Route: IV  Start: 06/13/23 0900   End: 06/13/23 1043   Admin Instructions:   Switch to IV if unable to take medication by mouth  For IV administration.  May be pushed over a minimum of 1 minute.     0917-Not Given:  See Alt              Pharmacy Consult - Remdesivir for Mild to Moderate COVID-19 (Within 6-7 days of symptom onset)  Freq: Continuous PRN Route: XX  PRN Reason: Consult  PRN Comment: Mild to moderate COVID-19: Within 6-7 days of symptom onset --> Remdesivir x 3 days or hospital discharge (whichever comes first)  Start: 06/13/23 1326   End: 06/13/23 1329   Admin Instructions:   Pharmacy to select duration of therapy based upon supplemental O2 requirements. If supplemental O2 is required or increase in baseline or SpO2 </= 94%, duration is 5 days. If no supplemental O2 is required or no increase in baseline, duration is 3 days.   Order specific questions:   Days Since Symptom Onset: </= 7 Days  Patient must be considered high risk for progressing to severe COVID-19 and/or hospitalization per the EUA. Please select all of the following high risk criteria that apply. (SEE LINK ABOVE for list of immunocompromising conditions) Age >/= 65 years  Patient must be considered high risk for progressing to severe COVID-19 and/or hospitalization per the EUA. Please select all of the following high risk criteria that apply. (SEE LINK ABOVE for list of immunocompromising conditions) Cardiovascular disease (heart failure,  coronary artery disease, or cardiomyopathies)  Patient must be considered high risk for progressing to severe COVID-19 and/or hospitalization per the EUA. Please select all of the following high risk criteria that apply. (SEE LINK ABOVE for list of immunocompromising conditions) Chronic kidney disease

## 2023-06-14 NOTE — THERAPY EVALUATION
Patient Name: Kevin Ellington  : 1935    MRN: [id number]                              Today's Date: 2023       Admit Date: 2023    Visit Dx:     ICD-10-CM ICD-9-CM   1. Fever and chills  R50.9 780.60   2. COVID-19 virus infection  U07.1 079.89   3. Acute cough  R05.1 786.2   4. Generalized weakness  R53.1 780.79     Patient Active Problem List   Diagnosis   • Bulging lumbar disc   • DDD (degenerative disc disease), lumbar   • Leg pain   • Spinal stenosis of lumbar region with neurogenic claudication   • Radiculitis, thoracic   • Status post total left knee replacement using cement   • Status post total right knee replacement using cement   • Coronary artery disease involving native coronary artery of native heart without angina pectoris   • Essential hypertension   • Hyperlipemia   • S/P coronary artery stent placement   • Primary narcolepsy without cataplexy   • Benign prostatic hyperplasia with urinary frequency   • Cervical radiculopathy   • Bilateral carpal tunnel syndrome   • Cervical spinal stenosis   • Arthritis of ankle   • Chronic pain of right ankle   • Shortness of breath   • Bilateral leg weakness   • History of melanoma   • Spondylosis of lumbar region without myelopathy or radiculopathy   • Stage 3a chronic kidney disease   • Pedal edema   • Gastroesophageal reflux disease without esophagitis   • TIA (transient ischemic attack)   • Bilateral carotid artery stenosis   • Cerebral artery occlusion   • Hyperglycemia   • Hoarseness   • Hiatal hernia   • Abdominal fullness   • Fever and chills   • COVID-19     Past Medical History:   Diagnosis Date   • Allergic 1970   • Arthritis    • Asthma 2019    shortness of breath-exertion   • Benign prostatic hyperplasia    • Bilateral carotid artery stenosis 2022   • Cataract    • Cervical radiculopathy    • Colon polyp    • Coronary artery disease     Stent   • CTS (carpal tunnel syndrome) 1998    Surgery both hands in    •  Erectile dysfunction    • GERD (gastroesophageal reflux disease)    • HTN (hypertension)    • Hyperlipidemia    • Kidney stone    • Low back pain     2 Prior surgeries   • Lumbosacral disc disease    • Melanoma 01/15/2009    DR WILLY SIMMS ( Left forearm)   • Obesity    • Radiculitis, thoracic    • Scoliosis    • Spondylosis of lumbar region without myelopathy or radiculopathy 09/09/2020   • Stage 3a chronic kidney disease 10/21/2021   • Stented coronary artery    • Thoracic disc disorder     Surgery by Dr. Aldridge (sp)   • TIA (transient ischemic attack) 05/12/2022     Past Surgical History:   Procedure Laterality Date   • ABLATION OF DYSRHYTHMIC FOCUS  nov. 2020    radio frequency /lower back   • ANKLE FUSION Left 2007    reconstruction and fusion   • BACK SURGERY      HERNIATED LUMBAR DISK 1975,2000,2012   • CARDIAC CATHETERIZATION  2001   • CARPAL TUNNEL RELEASE     • CATARACT EXTRACTION, BILATERAL Bilateral 2002   • COLONOSCOPY     • CORONARY ANGIOPLASTY WITH STENT PLACEMENT  2001   • CORONARY STENT PLACEMENT     • ENDOSCOPY N/A 12/19/2022    Procedure: ESOPHAGOGASTRODUODENOSCOPY WITH BIOPSY;  Surgeon: Rashard Mathews MD;  Location: [facility] [place] MAIN OR;  Service: Gastroenterology;  Laterality: N/A;  gastric polyps, hiatal hernia   • EPIDURAL BLOCK  Several at [facility]    and [facility]   • EYE SURGERY     • JOINT REPLACEMENT     • REPLACEMENT TOTAL KNEE Left 2015   • REPLACEMENT TOTAL KNEE Right    • SKIN CANCER EXCISION      MELANOMA   • SPINE SURGERY     • THORACIC SPINE SURGERY        General Information     Row Name 06/14/23 1124          Physical Therapy Time and Intention    Document Type evaluation  -CW     Mode of Treatment individual therapy;physical therapy  -CW     Row Name 06/14/23 1124          General Information    Patient Profile Reviewed yes  -CW     Prior Level of Function independent:;transfer;all household mobility;bed mobility  uses walker and cane  -CW      Existing Precautions/Restrictions fall  -CW     Barriers to Rehab medically complex  -CW     Row Name 06/14/23 1124          Living Environment    People in Home spouse  -CW     Row Name 06/14/23 1124          Home Main Entrance    Number of Stairs, Main Entrance one  -CW     Row Name 06/14/23 1124          Stairs Within Home, Primary    Number of Stairs, Within Home, Primary none  -CW     Row Name 06/14/23 1124          Cognition    Orientation Status (Cognition) oriented x 4  -CW     Row Name 06/14/23 1124          Safety Issues, Functional Mobility    Impairments Affecting Function (Mobility) balance;endurance/activity tolerance;strength  -CW           User Key  (r) = Recorded By, (t) = Taken By, (c) = Cosigned By    Initials Name Provider Type    Reyna Chirinos PT Physical Therapist               Mobility     Row Name 06/14/23 1126          Bed Mobility    Comment, (Bed Mobility) UIC at arrival and departure  -CW     Row Name 06/14/23 1126          Bed-Chair Transfer    Bed-Chair [place] (Transfers) contact guard  -     Assistive Device (Bed-Chair Transfers) walker, front-wheeled  -CW     Row Name 06/14/23 1126          Sit-Stand Transfer    Sit-Stand [place] (Transfers) contact guard;verbal cues  -CW     Assistive Device (Sit-Stand Transfers) walker, front-wheeled  -CW     Row Name 06/14/23 1126          Gait/Stairs (Locomotion)    [place] Level (Gait) contact guard;verbal cues  -CW     Assistive Device (Gait) walker, front-wheeled  -CW     Distance in Feet (Gait) 90' in room  -CW     Deviations/Abnormal Patterns (Gait) sherman decreased;gait speed decreased;stride length decreased  -CW     Bilateral Gait Deviations forward flexed posture  -CW     Comment, (Gait/Stairs) No overt loss of balance  -CW           User Key  (r) = Recorded By, (t) = Taken By, (c) = Cosigned By    Initials Name Provider Type    Reyna Chirinos PT Physical Therapist               Obj/Interventions     Row  Name 06/14/23 1129          Range of Motion Comprehensive    General Range of Motion bilateral lower extremity ROM WFL  -CW     Row Name 06/14/23 1129          Strength Comprehensive (MMT)    Comment, General Manual Muscle Testing (MMT) Assessment Generalized weakness  -CW     Row Name 06/14/23 1129          Balance    Balance Assessment standing dynamic balance;standing static balance;sitting static balance;sitting dynamic balance  -CW     Static Sitting Balance verbal cues  -CW     Dynamic Sitting Balance verbal cues  -CW     Position, Sitting Balance sitting edge of bed  -CW     Static Standing Balance standby assist  -CW     Dynamic Standing Balance contact guard  -CW     Position/Device Used, Standing Balance supported;walker, front-wheeled  -CW     Row Name 06/14/23 1129          Sensory Assessment (Somatosensory)    Sensory Assessment (Somatosensory) sensation intact  -CW           User Key  (r) = Recorded By, (t) = Taken By, (c) = Cosigned By    Initials Name Provider Type    CW Reyna English, PT Physical Therapist               Goals/Plan     Row Name 06/14/23 1137          Bed Mobility Goal 1 (PT)    Activity/Assistive Device (Bed Mobility Goal 1, PT) bed mobility activities, all  -CW     [place] Level/Cues Needed (Bed Mobility Goal 1, PT) independent  -CW     Time Frame (Bed Mobility Goal 1, PT) 2 weeks  -CW     Row Name 06/14/23 1137          Transfer Goal 1 (PT)    Activity/Assistive Device (Transfer Goal 1, PT) sit-to-stand/stand-to-sit;bed-to-chair/chair-to-bed  -CW     [place] Level/Cues Needed (Transfer Goal 1, PT) independent  -CW     Time Frame (Transfer Goal 1, PT) 2 weeks  -CW     Row Name 06/14/23 1137          Gait Training Goal 1 (PT)    Activity/Assistive Device (Gait Training Goal 1, PT) gait (walking locomotion);walker, rolling  -CW     [place] Level (Gait Training Goal 1, PT) modified independence  -CW     Distance (Gait Training Goal 1, PT) 150'  -CW     Time Frame  (Gait Training Goal 1, PT) 2 weeks  -CW     Row Name 06/14/23 1137          Therapy Assessment/Plan (PT)    Planned Therapy Interventions (PT) balance training;bed mobility training;gait training;postural re-education;transfer training;ROM (range of motion);strengthening;patient/family education  -CW           User Key  (r) = Recorded By, (t) = Taken By, (c) = Cosigned By    Initials Name Provider Type    CW Reyna English, PT Physical Therapist               Clinical Impression     Row Name 06/14/23 1131          Pain    Pretreatment Pain Rating 0/10 - no pain  -CW     Posttreatment Pain Rating 0/10 - no pain  -CW     Row Name 06/14/23 1131          Plan of Care Review    Plan of Care Reviewed With patient  -CW     Outcome Evaluation Pt is an 89 yo male admitted with COVID. Pt lives with spouse, one step to enter, uses a cane and walker at baseline. Today, he was in the chair at arrival. He stood cga and ambulated 90' cga to sba using a walker. No baalnce deficits noted. Appears to be functioning close to his baseline level. Recommend mobility to bathroom with nsg staff instead of urinal use to increase mobility levels. Plans home at d/c. PT will follow at 3x/week.  -CW     Row Name 06/14/23 1131          Therapy Assessment/Plan (PT)    Rehab Potential (PT) good, to achieve stated therapy goals  -CW     Criteria for Skilled Interventions Met (PT) yes  -CW     Therapy Frequency (PT) 3 times/wk  -CW     Row Name 06/14/23 1131          Vital Signs    Pre SpO2 (%) 95  -CW     O2 Delivery Pre Treatment room air  -CW     O2 Delivery Intra Treatment room air  -CW     O2 Delivery Post Treatment room air  -CW     Row Name 06/14/23 1131          Positioning and Restraints    Pre-Treatment Position sitting in chair/recliner  -CW     Post Treatment Position chair  -CW     In Chair sitting;call light within reach;encouraged to call for assist;exit alarm on;notified nsg  -CW           User Key  (r) = Recorded By, (t) = Taken  By, (c) = Cosigned By    Initials Name Provider Type    CW Reyna English PT Physical Therapist               Outcome Measures     Row Name 06/14/23 1137          How much help from another person do you currently need...    Turning from your back to your side while in flat bed without using bedrails? 3  -CW     Moving from lying on back to sitting on the side of a flat bed without bedrails? 3  -CW     Moving to and from a bed to a chair (including a wheelchair)? 3  -CW     Standing up from a chair using your arms (e.g., wheelchair, bedside chair)? 3  -CW     Climbing 3-5 steps with a railing? 3  -CW     To walk in hospital room? 3  -CW     AM-PAC 6 Clicks Score (PT) 18  -CW     Highest level of mobility 6 --> Walked 10 steps or more  -CW     Row Name 06/14/23 1137          Functional Assessment    Outcome Measure Options AM-PAC 6 Clicks Basic Mobility (PT)  -CW           User Key  (r) = Recorded By, (t) = Taken By, (c) = Cosigned By    Initials Name Provider Type    Reyna Chirinos PT Physical Therapist                             Physical Therapy Education     Title: PT OT SLP Therapies (In Progress)     Topic: Physical Therapy (In Progress)     Point: Mobility training (Done)     Learning Progress Summary           Patient Acceptance, E, VU by  at 6/14/2023 1137                   Point: Home exercise program (Not Started)     Learner Progress:  Not documented in this visit.          Point: Body mechanics (Not Started)     Learner Progress:  Not documented in this visit.          Point: Precautions (Not Started)     Learner Progress:  Not documented in this visit.                      User Key     Initials Effective Dates Name Provider Type Discipline     12/13/22 -  Reyna English PT Physical Therapist PT              PT Recommendation and Plan  Planned Therapy Interventions (PT): balance training, bed mobility training, gait training, postural re-education, transfer training, ROM (range of  motion), strengthening, patient/family education  Plan of Care Reviewed With: patient  Outcome Evaluation: Pt is an 87 yo male admitted with COVID. Pt lives with spouse, one step to enter, uses a cane and walker at baseline. Today, he was in the chair at arrival. He stood cga and ambulated 90' cga to sba using a walker. No baalnce deficits noted. Appears to be functioning close to his baseline level. Recommend mobility to bathroom with nsg staff instead of urinal use to increase mobility levels. Plans home at d/c. PT will follow at 3x/week.     Time Calculation:    PT Charges     Row Name 06/14/23 1124             Time Calculation    Start Time 1040  -CW      Stop Time 1054  -CW      Time Calculation (min) 14 min  -CW      PT Received On 06/14/23  -CW      PT - Next Appointment 06/16/23  -CW      PT Goal Re-Cert Due Date 06/28/23  -CW         Time Calculation- PT    Total Timed Code Minutes- PT 8 minute(s)  -CW         Timed Charges    88045 - Gait Training Minutes  8  -CW         Total Minutes    Timed Charges Total Minutes 8  -CW       Total Minutes 8  -CW            User Key  (r) = Recorded By, (t) = Taken By, (c) = Cosigned By    Initials Name Provider Type    CW Reyna English, PT Physical Therapist              Therapy Charges for Today     Code Description Service Date Service Provider Modifiers Qty    36832092959 HC PT EVAL MOD COMPLEXITY 3 6/14/2023 Reyna English, PT GP 1    73093665410 HC GAIT TRAINING EA 15 MIN 6/14/2023 Reyna English, PT GP 1          PT G-Codes  Outcome Measure Options: AM-PAC 6 Clicks Basic Mobility (PT)  AM-PAC 6 Clicks Score (PT): 18  PT Discharge Summary  Anticipated Discharge Disposition (PT): home with assist, home    Reyna English PT  6/14/2023

## 2023-06-14 NOTE — PLAN OF CARE
Goal Outcome Evaluation:  Plan of Care Reviewed With: patient        Progress: improving  Outcome Evaluation: vss. telemetry monitor, sr. alert and oriented x4, room air. up with assist, x1. remdesivir given. up to chair

## 2023-06-14 NOTE — PROGRESS NOTES
"DAILY PROGRESS NOTE  [facility]    Patient Identification:  Name: Kevin Ellington  Age: 88 y.o.  Sex: male  :  1935  MRN: [id number]         Primary Care Physician: Leanna Ryder MD    Subjective:  Interval History:He complains of cough.    Objective:    Scheduled Meds:aspirin, 81 mg, Oral, Daily  atorvastatin, 40 mg, Oral, Daily  cetirizine, 10 mg, Oral, Daily  chlorthalidone, 50 mg, Oral, Daily  cloNIDine, 0.1 mg, Oral, BID  enoxaparin, 40 mg, Subcutaneous, Daily  fluticasone, 1 spray, Nasal, Daily  guaiFENesin, 600 mg, Oral, Q12H  losartan, 100 mg, Oral, Daily  metoprolol succinate XL, 100 mg, Oral, Daily  metroNIDAZOLE, , Topical, Daily  pantoprazole, 40 mg, Oral, Daily  potassium chloride, 20 mEq, Oral, Daily  remdesivir, 100 mg, Intravenous, Q24H  sodium chloride, 10 mL, Intravenous, Q12H  tamsulosin, 0.8 mg, Oral, Daily      Continuous Infusions:Pharmacy Consult - Remdesivir,         Vital signs in last 24 hours:  Temp:  [97.5 °F (36.4 °C)-98.5 °F (36.9 °C)] 97.9 °F (36.6 °C)  Heart Rate:  [56-76] 64  Resp:  [18] 18  BP: (116-130)/(68-83) 129/68    Intake/Output:    Intake/Output Summary (Last 24 hours) at 2023 1626  Last data filed at 2023  Gross per 24 hour   Intake 120 ml   Output 200 ml   Net -80 ml       Exam:  /68 (BP Location: Left arm, Patient Position: Lying)   Pulse 64   Temp 97.9 °F (36.6 °C) (Oral)   Resp 18   Ht 180.3 cm (71\")   Wt 87.7 kg (193 lb 5.5 oz)   SpO2 92%   BMI 26.97 kg/m²     General Appearance:    Alert, cooperative, no distress   Head:    Normocephalic, without obvious abnormality, atraumatic   Eyes:       Throat:   Lips, tongue, gums normal   Neck:   Supple, symmetrical, trachea midline, no JVD   Lungs:     Clear to auscultation bilaterally, respirations unlabored   Chest Wall:    No tenderness or deformity    Heart:    Regular rate and rhythm, S1 and S2 normal, no murmur,no  Rub or gallop   Abdomen:     Soft, nontender, " bowel sounds active, no masses, no organomegaly    Extremities:   Extremities normal, atraumatic, no cyanosis or edema   Pulses:      Skin:   Skin is warm and dry,  no rashes or palpable lesions   Neurologic:   no focal deficits noted      Lab Results (last 72 hours)     Procedure Component Value Units Date/Time    Comprehensive Metabolic Panel [[id number]]  (Abnormal) Collected: 06/14/23 0550    Specimen: Blood Updated: 06/14/23 0652     Glucose 125 mg/dL      BUN 30 mg/dL      Creatinine 1.32 mg/dL      Sodium 136 mmol/L      Potassium 3.4 mmol/L      Chloride 101 mmol/L      CO2 21.3 mmol/L      Calcium 9.3 mg/dL      Total Protein 6.4 g/dL      Albumin 3.7 g/dL      ALT (SGPT) 18 U/L      AST (SGOT) 28 U/L      Alkaline Phosphatase 85 U/L      Total Bilirubin 0.6 mg/dL      Globulin 2.7 gm/dL      A/G Ratio 1.4 g/dL      BUN/Creatinine Ratio 22.7     Anion Gap 13.7 mmol/L      eGFR 51.9 mL/min/1.73     Narrative:      GFR Normal >60  Chronic Kidney Disease <60  Kidney Failure <15    The GFR formula is only valid for adults with stable renal function between ages 18 and 70.    C-reactive Protein [[id number]]  (Abnormal) Collected: 06/14/23 0550    Specimen: Blood Updated: 06/14/23 0652     C-Reactive Protein 4.81 mg/dL     CBC & Differential [[id number]]  (Abnormal) Collected: 06/14/23 0550    Specimen: Blood Updated: 06/14/23 0635    Narrative:      The following orders were created for panel order CBC & Differential.  Procedure                               Abnormality         Status                     ---------                               -----------         ------                     CBC Auto Differential[[id number]]        Abnormal            Final result                 Please view results for these tests on the individual orders.    CBC Auto Differential [[id number]]  (Abnormal) Collected: 06/14/23 0550    Specimen: Blood Updated: 06/14/23 0635     WBC 9.19 10*3/mm3      RBC 4.18 10*6/mm3      Hemoglobin  12.7 g/dL      Hematocrit 36.3 %      MCV 86.8 fL      MCH 30.4 pg      MCHC 35.0 g/dL      RDW 12.2 %      RDW-SD 38.3 fl      MPV 9.0 fL      Platelets 161 10*3/mm3      Neutrophil % 69.7 %      Lymphocyte % 18.7 %      Monocyte % 10.4 %      Eosinophil % 0.5 %      Basophil % 0.2 %      Immature Grans % 0.5 %      Neutrophils, Absolute 6.39 10*3/mm3      Lymphocytes, Absolute 1.72 10*3/mm3      Monocytes, Absolute 0.96 10*3/mm3      Eosinophils, Absolute 0.05 10*3/mm3      Basophils, Absolute 0.02 10*3/mm3      Immature Grans, Absolute 0.05 10*3/mm3      nRBC 0.0 /100 WBC     Blood Culture - Blood, Arm, Left [[id number]]  (Normal) Collected: 06/12/23 2128    Specimen: Blood from Arm, Left Updated: 06/13/23 2147     Blood Culture No growth at 24 hours    Blood Culture - Blood, Arm, Left [[id number]]  (Normal) Collected: 06/12/23 2128    Specimen: Blood from Arm, Left Updated: 06/13/23 2147     Blood Culture No growth at 24 hours    C-reactive Protein [[id number]]  (Abnormal) Collected: 06/13/23 0051    Specimen: Blood Updated: 06/13/23 1652     C-Reactive Protein 3.26 mg/dL     High Sensitivity Troponin T [[id number]]  (Abnormal) Collected: 06/13/23 0604    Specimen: Blood Updated: 06/13/23 0650     HS Troponin T 26 ng/L     Narrative:      High Sensitive Troponin T Reference Range:  <10.0 ng/L- Negative Female for AMI  <15.0 ng/L- Negative Male for AMI  >=10 - Abnormal Female indicating possible myocardial injury.  >=15 - Abnormal Male indicating possible myocardial injury.   Clinicians would have to utilize clinical acumen, EKG, Troponin, and serial changes to determine if it is an Acute Myocardial Infarction or myocardial injury due to an underlying chronic condition.         TSH [[id number]]  (Normal) Collected: 06/13/23 0604    Specimen: Blood Updated: 06/13/23 0650     TSH 1.310 uIU/mL     Comprehensive Metabolic Panel [[id number]]  (Abnormal) Collected: 06/13/23 0604    Specimen: Blood Updated: 06/13/23 0644      "Glucose 176 mg/dL      BUN 22 mg/dL      Creatinine 1.09 mg/dL      Sodium 138 mmol/L      Potassium 3.9 mmol/L      Chloride 104 mmol/L      CO2 22.6 mmol/L      Calcium 9.6 mg/dL      Total Protein 6.4 g/dL      Albumin 3.9 g/dL      ALT (SGPT) 17 U/L      AST (SGOT) 18 U/L      Alkaline Phosphatase 96 U/L      Total Bilirubin 1.1 mg/dL      Globulin 2.5 gm/dL      A/G Ratio 1.6 g/dL      BUN/Creatinine Ratio 20.2     Anion Gap 11.4 mmol/L      eGFR 65.3 mL/min/1.73     Narrative:      GFR Normal >60  Chronic Kidney Disease <60  Kidney Failure <15    The GFR formula is only valid for adults with stable renal function between ages 18 and 70.    C-reactive Protein [[id number]]  (Abnormal) Collected: 06/13/23 0604    Specimen: Blood Updated: 06/13/23 0644     C-Reactive Protein 4.66 mg/dL     D-dimer, Quantitative [[id number]]  (Normal) Collected: 06/13/23 0604    Specimen: Blood from Arm, Left Updated: 06/13/23 0643     D-Dimer, Quantitative 0.85 MCGFEU/mL     Narrative:      According to the assay 's published package insert, a normal (<0.50 MCGFEU/mL) D-dimer result in conjunction with a non-high clinical probability assessment, excludes deep vein thrombosis (DVT) and pulmonary embolism (PE) with high sensitivity.    D-dimer values increase with age and this can make VTE exclusion of an older population difficult. To address this, the [employer], based on best available evidence and recent guidelines, recommends that clinicians use age-adjusted D-dimer thresholds in patients greater than 50 years of age with: a) a low probability of PE who do not meet all Pulmonary Embolism Rule Out Criteria, or b) in those with intermediate probability of PE.   The formula for an age-adjusted D-dimer cut-off is \"age/100\".  For example, a 60 year old patient would have an age-adjusted cut-off of 0.60 MCGFEU/mL and an 80 year old 0.80 MCGFEU/mL.    CBC & Differential [[id number]]  (Abnormal) Collected: " 06/13/23 0604    Specimen: Blood Updated: 06/13/23 0624    Narrative:      The following orders were created for panel order CBC & Differential.  Procedure                               Abnormality         Status                     ---------                               -----------         ------                     CBC Auto Differential[[id number]]        Abnormal            Final result                 Please view results for these tests on the individual orders.    CBC Auto Differential [[id number]]  (Abnormal) Collected: 06/13/23 0604    Specimen: Blood Updated: 06/13/23 0624     WBC 9.57 10*3/mm3      RBC 4.50 10*6/mm3      Hemoglobin 13.8 g/dL      Hematocrit 39.7 %      MCV 88.2 fL      MCH 30.7 pg      MCHC 34.8 g/dL      RDW 12.3 %      RDW-SD 39.9 fl      MPV 8.7 fL      Platelets 157 10*3/mm3      Neutrophil % 87.6 %      Lymphocyte % 9.5 %      Monocyte % 2.3 %      Eosinophil % 0.0 %      Basophil % 0.1 %      Immature Grans % 0.5 %      Neutrophils, Absolute 8.38 10*3/mm3      Lymphocytes, Absolute 0.91 10*3/mm3      Monocytes, Absolute 0.22 10*3/mm3      Eosinophils, Absolute 0.00 10*3/mm3      Basophils, Absolute 0.01 10*3/mm3      Immature Grans, Absolute 0.05 10*3/mm3      nRBC 0.0 /100 WBC     High Sensitivity Troponin T 2Hr [[id number]]  (Abnormal) Collected: 06/13/23 0051    Specimen: Blood Updated: 06/13/23 0127     HS Troponin T 31 ng/L      Troponin T Delta 0 ng/L     Narrative:      High Sensitive Troponin T Reference Range:  <10.0 ng/L- Negative Female for AMI  <15.0 ng/L- Negative Male for AMI  >=10 - Abnormal Female indicating possible myocardial injury.  >=15 - Abnormal Male indicating possible myocardial injury.   Clinicians would have to utilize clinical acumen, EKG, Troponin, and serial changes to determine if it is an Acute Myocardial Infarction or myocardial injury due to an underlying chronic condition.         Procalcitonin [[id number]]  (Normal) Collected: 06/13/23 0051     "Specimen: Blood Updated: 06/13/23 0127     Procalcitonin 0.12 ng/mL     Narrative:      As a Marker for Sepsis (Non-Neonates):    1. <0.5 ng/mL represents a low risk of severe sepsis and/or septic shock.  2. >2 ng/mL represents a high risk of severe sepsis and/or septic shock.    As a Marker for Lower Respiratory Tract Infections that require antibiotic therapy:    PCT on Admission    Antibiotic Therapy       6-12 Hrs later    >0.5                Strongly Recommended  >0.25 - <0.5        Recommended   0.1 - 0.25          Discouraged              Remeasure/reassess PCT  <0.1                Strongly Discouraged     Remeasure/reassess PCT    As 28 day mortality risk marker: \"Change in Procalcitonin Result\" (>80% or <=80%) if Day 0 (or Day 1) and Day 4 values are available. Refer to http://www.[employer].pwpct-calculator.com    Change in PCT <=80%  A decrease of PCT levels below or equal to 80% defines a positive change in PCT test result representing a higher risk for 28-day all-cause mortality of patients diagnosed with severe sepsis for septic shock.    Change in PCT >80%  A decrease of PCT levels of more than 80% defines a negative change in PCT result representing a lower risk for 28-day all-cause mortality of patients diagnosed with severe sepsis or septic shock.       Urinalysis With Microscopic If Indicated (No Culture) - Urine, Clean Catch [[id number]]  (Normal) Collected: 06/12/23 2252    Specimen: Urine, Clean Catch Updated: 06/12/23 2340     Color, UA Yellow     Appearance, UA Clear     pH, UA 6.5     Specific Gravity, UA 1.012     Glucose, UA Negative     Ketones, UA Negative     Bilirubin, UA Negative     Blood, UA Negative     Protein, UA Negative     Leuk Esterase, UA Negative     Nitrite, UA Negative     Urobilinogen, UA 0.2 E.U./dL    Narrative:      Urine microscopic not indicated.    Respiratory Panel PCR w/COVID-19(SARS-CoV-2) ALBERT/INDERJIT/MARLENY/PAD/COR/MAD/ABEL In-House, NP Swab in UTM/VTM, 3-4 HR TAT - Swab, " Nasopharynx [[id number]]  (Abnormal) Collected: 06/12/23 2128    Specimen: Swab from Nasopharynx Updated: 06/12/23 2226     ADENOVIRUS, PCR Not Detected     Coronavirus 229E Not Detected     Coronavirus HKU1 Not Detected     Coronavirus NL63 Not Detected     Coronavirus OC43 Not Detected     COVID19 Detected     Human Metapneumovirus Not Detected     Human Rhinovirus/Enterovirus Not Detected     Influenza A PCR Not Detected     Influenza B PCR Not Detected     Parainfluenza Virus 1 Not Detected     Parainfluenza Virus 2 Not Detected     Parainfluenza Virus 3 Not Detected     Parainfluenza Virus 4 Not Detected     RSV, PCR Not Detected     Bordetella pertussis pcr Not Detected     Bordetella parapertussis PCR Not Detected     Chlamydophila pneumoniae PCR Not Detected     Mycoplasma pneumo by PCR Not Detected    Narrative:      In the setting of a positive respiratory panel with a viral infection PLUS a negative procalcitonin without other underlying concern for bacterial infection, consider observing off antibiotics or discontinuation of antibiotics and continue supportive care. If the respiratory panel is positive for atypical bacterial infection (Bordetella pertussis, Chlamydophila pneumoniae, or Mycoplasma pneumoniae), consider antibiotic de-escalation to target atypical bacterial infection.    High Sensitivity Troponin T [[id number]]  (Abnormal) Collected: 06/12/23 2128    Specimen: Blood Updated: 06/12/23 2207     HS Troponin T 31 ng/L     Narrative:      High Sensitive Troponin T Reference Range:  <10.0 ng/L- Negative Female for AMI  <15.0 ng/L- Negative Male for AMI  >=10 - Abnormal Female indicating possible myocardial injury.  >=15 - Abnormal Male indicating possible myocardial injury.   Clinicians would have to utilize clinical acumen, EKG, Troponin, and serial changes to determine if it is an Acute Myocardial Infarction or myocardial injury due to an underlying chronic condition.         BNP [[id number]]   "(Normal) Collected: 06/12/23 2128    Specimen: Blood Updated: 06/12/23 2207     proBNP 299.0 pg/mL     Narrative:      Among patients with dyspnea, NT-proBNP is highly sensitive for the detection of acute congestive heart failure. In addition NT-proBNP of <300 pg/ml effectively rules out acute congestive heart failure with 99% negative predictive value.    Results may be falsely decreased if patient taking Biotin.      Procalcitonin [[id number]]  (Normal) Collected: 06/12/23 2128    Specimen: Blood Updated: 06/12/23 2207     Procalcitonin 0.11 ng/mL     Narrative:      As a Marker for Sepsis (Non-Neonates):    1. <0.5 ng/mL represents a low risk of severe sepsis and/or septic shock.  2. >2 ng/mL represents a high risk of severe sepsis and/or septic shock.    As a Marker for Lower Respiratory Tract Infections that require antibiotic therapy:    PCT on Admission    Antibiotic Therapy       6-12 Hrs later    >0.5                Strongly Recommended  >0.25 - <0.5        Recommended   0.1 - 0.25          Discouraged              Remeasure/reassess PCT  <0.1                Strongly Discouraged     Remeasure/reassess PCT    As 28 day mortality risk marker: \"Change in Procalcitonin Result\" (>80% or <=80%) if Day 0 (or Day 1) and Day 4 values are available. Refer to http://www.[facility]-pct-calculator.com    Change in PCT <=80%  A decrease of PCT levels below or equal to 80% defines a positive change in PCT test result representing a higher risk for 28-day all-cause mortality of patients diagnosed with severe sepsis for septic shock.    Change in PCT >80%  A decrease of PCT levels of more than 80% defines a negative change in PCT result representing a lower risk for 28-day all-cause mortality of patients diagnosed with severe sepsis or septic shock.       Comprehensive Metabolic Panel [[id number]]  (Abnormal) Collected: 06/12/23 2128    Specimen: Blood Updated: 06/12/23 2200     Glucose 129 mg/dL      BUN 23 mg/dL      Creatinine " 1.30 mg/dL      Sodium 139 mmol/L      Potassium 4.4 mmol/L      Chloride 102 mmol/L      CO2 24.0 mmol/L      Calcium 9.9 mg/dL      Total Protein 7.1 g/dL      Albumin 4.4 g/dL      ALT (SGPT) 17 U/L      AST (SGOT) 22 U/L      Alkaline Phosphatase 114 U/L      Total Bilirubin 1.2 mg/dL      Globulin 2.7 gm/dL      A/G Ratio 1.6 g/dL      BUN/Creatinine Ratio 17.7     Anion Gap 13.0 mmol/L      eGFR 52.8 mL/min/1.73     Narrative:      GFR Normal >60  Chronic Kidney Disease <60  Kidney Failure <15    The GFR formula is only valid for adults with stable renal function between ages 18 and 70.    Lactic Acid, Plasma [[id number]]  (Normal) Collected: 06/12/23 2128    Specimen: Blood Updated: 06/12/23 2155     Lactate 1.3 mmol/L     aPTT [[id number]]  (Abnormal) Collected: 06/12/23 2128    Specimen: Blood Updated: 06/12/23 2152     PTT 38.9 seconds     Protime-INR [[id number]]  (Abnormal) Collected: 06/12/23 2128    Specimen: Blood Updated: 06/12/23 2152     Protime 14.5 Seconds      INR 1.12    CBC & Differential [[id number]]  (Abnormal) Collected: 06/12/23 2128    Specimen: Blood Updated: 06/12/23 2140    Narrative:      The following orders were created for panel order CBC & Differential.  Procedure                               Abnormality         Status                     ---------                               -----------         ------                     CBC Auto Differential[[id number]]        Abnormal            Final result                 Please view results for these tests on the individual orders.    CBC Auto Differential [[id number]]  (Abnormal) Collected: 06/12/23 2128    Specimen: Blood Updated: 06/12/23 2140     WBC 12.68 10*3/mm3      RBC 4.65 10*6/mm3      Hemoglobin 14.4 g/dL      Hematocrit 40.7 %      MCV 87.5 fL      MCH 31.0 pg      MCHC 35.4 g/dL      RDW 12.3 %      RDW-SD 39.4 fl      MPV 8.8 fL      Platelets 180 10*3/mm3      Neutrophil % 86.0 %      Lymphocyte % 7.6 %      Monocyte % 5.5 %       Eosinophil % 0.2 %      Basophil % 0.2 %      Immature Grans % 0.5 %      Neutrophils, Absolute 10.91 10*3/mm3      Lymphocytes, Absolute 0.96 10*3/mm3      Monocytes, Absolute 0.70 10*3/mm3      Eosinophils, Absolute 0.02 10*3/mm3      Basophils, Absolute 0.03 10*3/mm3      Immature Grans, Absolute 0.06 10*3/mm3      nRBC 0.0 /100 WBC         Data Review:  Results from last 7 days   Lab Units 06/14/23  0550 06/13/23  0604 06/12/23 2128   SODIUM mmol/L 136 138 139   POTASSIUM mmol/L 3.4* 3.9 4.4   CHLORIDE mmol/L 101 104 102   CO2 mmol/L 21.3* 22.6 24.0   BUN mg/dL 30* 22 23   CREATININE mg/dL 1.32* 1.09 1.30*   GLUCOSE mg/dL 125* 176* 129*   CALCIUM mg/dL 9.3 9.6 9.9     Results from last 7 days   Lab Units 06/14/23  0550 06/13/23  0604 06/12/23 2128   WBC 10*3/mm3 9.19 9.57 12.68*   HEMOGLOBIN g/dL 12.7* 13.8 14.4   HEMATOCRIT % 36.3* 39.7 40.7   PLATELETS 10*3/mm3 161 157 180     Results from last 7 days   Lab Units 06/13/23  0604   TSH uIU/mL 1.310         Lab Results   Lab Value Date/Time    TROPONINT 26 (H) 06/13/2023 0604    TROPONINT 31 (H) 06/13/2023 0051    TROPONINT 31 (H) 06/12/2023 2128         Results from last 7 days   Lab Units 06/14/23  0550 06/13/23  0604 06/12/23 2128   ALK PHOS U/L 85 96 114   BILIRUBIN mg/dL 0.6 1.1 1.2   ALT (SGPT) U/L 18 17 17   AST (SGOT) U/L 28 18 22     Results from last 7 days   Lab Units 06/13/23  0604   TSH uIU/mL 1.310         No results found for: POCGLU  Results from last 7 days   Lab Units 06/12/23 2128   INR  1.12*       Past Medical History:   Diagnosis Date   • Allergic 1970   • Arthritis    • Asthma 05/23/2019    shortness of breath-exertion   • Benign prostatic hyperplasia    • Bilateral carotid artery stenosis 05/12/2022   • Cataract    • Cervical radiculopathy    • Colon polyp    • Coronary artery disease 2001    Stent   • CTS (carpal tunnel syndrome) 1998    Surgery both hands in 1999   • Erectile dysfunction    • GERD (gastroesophageal reflux disease)     • HTN (hypertension)    • Hyperlipidemia    • Kidney stone    • Low back pain     2 Prior surgeries   • Lumbosacral disc disease    • Melanoma 01/15/2009    DR WILLY SIMMS ( Left forearm)   • Obesity    • Radiculitis, thoracic    • Scoliosis    • Spondylosis of lumbar region without myelopathy or radiculopathy 09/09/2020   • Stage 3a chronic kidney disease 10/21/2021   • Stented coronary artery    • Thoracic disc disorder     Surgery by Dr. Aldridge (sp)   • TIA (transient ischemic attack) 05/12/2022       Assessment:  Active Hospital Problems    Diagnosis  POA   • **COVID-19 [U07.1]  Unknown   • Bilateral carotid artery stenosis [I65.23]  Yes   • Stage 3a chronic kidney disease [N18.31]  Yes   • Essential hypertension [I10]  Yes   • Hyperlipemia [E78.5]  Yes   • Coronary artery disease involving native coronary artery of native heart without angina pectoris [I25.10]  Yes      Resolved Hospital Problems   No resolved problems to display.       Plan:  Continue with remdesivir.  Last dose is tomorrow probably can go home tomorrow.  He complains of cough but really seems to be doing okay on room air.  DC planning.    Adarsh Francis MD  6/14/2023  16:26 EDT

## 2023-06-14 NOTE — PLAN OF CARE
Goal Outcome Evaluation:  Plan of Care Reviewed With: patient           Outcome Evaluation: Pt is an 87 yo male admitted with COVID. Pt lives with spouse, one step to enter, uses a cane and walker at baseline. Today, he was in the chair at arrival. He stood cga and ambulated 90' cga to sba using a walker. No baalnce deficits noted. Appears to be functioning close to his baseline level. Recommend mobility to bathroom with nsg staff instead of urinal use to increase mobility levels. Plans home at d/c. PT will follow at 3x/week.

## 2023-06-15 PROBLEM — I48.91 ATRIAL FIBRILLATION: Status: ACTIVE | Noted: 2023-06-15

## 2023-06-15 PROBLEM — I48.20 CHRONIC ATRIAL FIBRILLATION: Status: ACTIVE | Noted: 2023-06-15

## 2023-06-15 LAB
ALBUMIN SERPL-MCNC: 3.3 G/DL (ref 3.5–5.2)
ALBUMIN/GLOB SERPL: 1.1 G/DL
ALP SERPL-CCNC: 82 U/L (ref 39–117)
ALT SERPL W P-5'-P-CCNC: 31 U/L (ref 1–41)
ANION GAP SERPL CALCULATED.3IONS-SCNC: 9.3 MMOL/L (ref 5–15)
AST SERPL-CCNC: 35 U/L (ref 1–40)
BASOPHILS # BLD AUTO: 0.02 10*3/MM3 (ref 0–0.2)
BASOPHILS NFR BLD AUTO: 0.2 % (ref 0–1.5)
BILIRUB SERPL-MCNC: 0.5 MG/DL (ref 0–1.2)
BUN SERPL-MCNC: 27 MG/DL (ref 8–23)
BUN/CREAT SERPL: 22.5 (ref 7–25)
CALCIUM SPEC-SCNC: 9.2 MG/DL (ref 8.6–10.5)
CHLORIDE SERPL-SCNC: 103 MMOL/L (ref 98–107)
CO2 SERPL-SCNC: 24.7 MMOL/L (ref 22–29)
CREAT SERPL-MCNC: 1.2 MG/DL (ref 0.76–1.27)
CRP SERPL-MCNC: 2.9 MG/DL (ref 0–0.5)
D DIMER PPP FEU-MCNC: 0.82 MCGFEU/ML (ref 0–0.88)
DEPRECATED RDW RBC AUTO: 38.9 FL (ref 37–54)
EGFRCR SERPLBLD CKD-EPI 2021: 58.2 ML/MIN/1.73
EOSINOPHIL # BLD AUTO: 0.2 10*3/MM3 (ref 0–0.4)
EOSINOPHIL NFR BLD AUTO: 2.4 % (ref 0.3–6.2)
ERYTHROCYTE [DISTWIDTH] IN BLOOD BY AUTOMATED COUNT: 12.4 % (ref 12.3–15.4)
GLOBULIN UR ELPH-MCNC: 3 GM/DL
GLUCOSE SERPL-MCNC: 113 MG/DL (ref 65–99)
HCT VFR BLD AUTO: 40.5 % (ref 37.5–51)
HGB BLD-MCNC: 14.1 G/DL (ref 13–17.7)
IMM GRANULOCYTES # BLD AUTO: 0.06 10*3/MM3 (ref 0–0.05)
IMM GRANULOCYTES NFR BLD AUTO: 0.7 % (ref 0–0.5)
LYMPHOCYTES # BLD AUTO: 1.6 10*3/MM3 (ref 0.7–3.1)
LYMPHOCYTES NFR BLD AUTO: 19.4 % (ref 19.6–45.3)
MCH RBC QN AUTO: 30.1 PG (ref 26.6–33)
MCHC RBC AUTO-ENTMCNC: 34.8 G/DL (ref 31.5–35.7)
MCV RBC AUTO: 86.5 FL (ref 79–97)
MONOCYTES # BLD AUTO: 1.11 10*3/MM3 (ref 0.1–0.9)
MONOCYTES NFR BLD AUTO: 13.4 % (ref 5–12)
NEUTROPHILS NFR BLD AUTO: 5.27 10*3/MM3 (ref 1.7–7)
NEUTROPHILS NFR BLD AUTO: 63.9 % (ref 42.7–76)
NRBC BLD AUTO-RTO: 0 /100 WBC (ref 0–0.2)
PLATELET # BLD AUTO: 179 10*3/MM3 (ref 140–450)
PMV BLD AUTO: 8.9 FL (ref 6–12)
POTASSIUM SERPL-SCNC: 3.7 MMOL/L (ref 3.5–5.2)
PROCALCITONIN SERPL-MCNC: 0.1 NG/ML (ref 0–0.25)
PROT SERPL-MCNC: 6.3 G/DL (ref 6–8.5)
QT INTERVAL: 345 MS
RBC # BLD AUTO: 4.68 10*6/MM3 (ref 4.14–5.8)
SODIUM SERPL-SCNC: 137 MMOL/L (ref 136–145)
WBC NRBC COR # BLD: 8.26 10*3/MM3 (ref 3.4–10.8)

## 2023-06-15 PROCEDURE — 85379 FIBRIN DEGRADATION QUANT: CPT | Performed by: NURSE PRACTITIONER

## 2023-06-15 PROCEDURE — 86140 C-REACTIVE PROTEIN: CPT | Performed by: NURSE PRACTITIONER

## 2023-06-15 PROCEDURE — 93005 ELECTROCARDIOGRAM TRACING: CPT | Performed by: HOSPITALIST

## 2023-06-15 PROCEDURE — 25010000002 ENOXAPARIN PER 10 MG: Performed by: NURSE PRACTITIONER

## 2023-06-15 PROCEDURE — 85025 COMPLETE CBC W/AUTO DIFF WBC: CPT | Performed by: NURSE PRACTITIONER

## 2023-06-15 PROCEDURE — 25010000002 REMDESIVIR 100 MG/20ML SOLUTION 1 EACH VIAL: Performed by: STUDENT IN AN ORGANIZED HEALTH CARE EDUCATION/TRAINING PROGRAM

## 2023-06-15 PROCEDURE — G0378 HOSPITAL OBSERVATION PER HR: HCPCS

## 2023-06-15 PROCEDURE — 36415 COLL VENOUS BLD VENIPUNCTURE: CPT | Performed by: NURSE PRACTITIONER

## 2023-06-15 PROCEDURE — 84145 PROCALCITONIN (PCT): CPT | Performed by: NURSE PRACTITIONER

## 2023-06-15 PROCEDURE — 80053 COMPREHEN METABOLIC PANEL: CPT | Performed by: NURSE PRACTITIONER

## 2023-06-15 RX ADMIN — CLONIDINE HYDROCHLORIDE 0.1 MG: 0.1 TABLET ORAL at 20:07

## 2023-06-15 RX ADMIN — TAMSULOSIN HYDROCHLORIDE 0.8 MG: 0.4 CAPSULE ORAL at 10:30

## 2023-06-15 RX ADMIN — GUAIFENESIN 600 MG: 600 TABLET, EXTENDED RELEASE ORAL at 10:30

## 2023-06-15 RX ADMIN — Medication 10 ML: at 10:32

## 2023-06-15 RX ADMIN — CHLORTHALIDONE 50 MG: 25 TABLET ORAL at 10:30

## 2023-06-15 RX ADMIN — ASPIRIN 81 MG: 81 TABLET, COATED ORAL at 10:30

## 2023-06-15 RX ADMIN — ATORVASTATIN CALCIUM 40 MG: 20 TABLET, FILM COATED ORAL at 10:30

## 2023-06-15 RX ADMIN — DOCUSATE SODIUM 50MG AND SENNOSIDES 8.6MG 1 TABLET: 8.6; 5 TABLET, FILM COATED ORAL at 10:42

## 2023-06-15 RX ADMIN — CETIRIZINE HYDROCHLORIDE 10 MG: 10 TABLET ORAL at 10:30

## 2023-06-15 RX ADMIN — HYDROCODONE BITARTRATE AND HOMATROPINE METHYLBROMIDE 5 ML: 1.5; 5 SYRUP ORAL at 16:05

## 2023-06-15 RX ADMIN — LOSARTAN POTASSIUM 100 MG: 100 TABLET, FILM COATED ORAL at 10:30

## 2023-06-15 RX ADMIN — METOPROLOL SUCCINATE 100 MG: 100 TABLET, EXTENDED RELEASE ORAL at 10:30

## 2023-06-15 RX ADMIN — POTASSIUM CHLORIDE 20 MEQ: 750 TABLET, EXTENDED RELEASE ORAL at 10:30

## 2023-06-15 RX ADMIN — HYDROCODONE BITARTRATE AND HOMATROPINE METHYLBROMIDE 5 ML: 1.5; 5 SYRUP ORAL at 03:52

## 2023-06-15 RX ADMIN — REMDESIVIR 100 MG: 100 INJECTION, POWDER, LYOPHILIZED, FOR SOLUTION INTRAVENOUS at 14:00

## 2023-06-15 RX ADMIN — GUAIFENESIN 600 MG: 600 TABLET, EXTENDED RELEASE ORAL at 20:07

## 2023-06-15 RX ADMIN — CLONIDINE HYDROCHLORIDE 0.1 MG: 0.1 TABLET ORAL at 10:30

## 2023-06-15 RX ADMIN — PANTOPRAZOLE SODIUM 40 MG: 40 TABLET, DELAYED RELEASE ORAL at 10:30

## 2023-06-15 RX ADMIN — Medication 10 ML: at 20:10

## 2023-06-15 RX ADMIN — FLUTICASONE PROPIONATE 1 SPRAY: 50 SPRAY, METERED NASAL at 10:30

## 2023-06-15 RX ADMIN — ENOXAPARIN SODIUM 40 MG: 100 INJECTION SUBCUTANEOUS at 10:30

## 2023-06-15 NOTE — PROGRESS NOTES
"DAILY PROGRESS NOTE  [facility]    Patient Identification:  Name: Kevin Ellington  Age: 88 y.o.  Sex: male  :  1935  MRN: [id number]         Primary Care Physician: Leanna Ryder MD    Subjective:  Interval History:He complains of cough.  He went into A-fib last night.    Objective:    Scheduled Meds:aspirin, 81 mg, Oral, Daily  atorvastatin, 40 mg, Oral, Daily  cetirizine, 10 mg, Oral, Daily  chlorthalidone, 50 mg, Oral, Daily  cloNIDine, 0.1 mg, Oral, BID  enoxaparin, 40 mg, Subcutaneous, Daily  fluticasone, 1 spray, Nasal, Daily  guaiFENesin, 600 mg, Oral, Q12H  losartan, 100 mg, Oral, Daily  metoprolol succinate XL, 100 mg, Oral, Daily  metroNIDAZOLE, , Topical, Daily  pantoprazole, 40 mg, Oral, Daily  potassium chloride, 20 mEq, Oral, Daily  remdesivir, 100 mg, Intravenous, Q24H  sodium chloride, 10 mL, Intravenous, Q12H  tamsulosin, 0.8 mg, Oral, Daily      Continuous Infusions:Pharmacy Consult - Remdesivir,         Vital signs in last 24 hours:  Temp:  [97.1 °F (36.2 °C)-99.4 °F (37.4 °C)] 99.4 °F (37.4 °C)  Heart Rate:  [64-78] 78  Resp:  [18] 18  BP: (129-149)/(68-85) 129/81    Intake/Output:    Intake/Output Summary (Last 24 hours) at 6/15/2023 1224  Last data filed at 6/15/2023 1037  Gross per 24 hour   Intake --   Output 2100 ml   Net -2100 ml       Exam:  /81 (BP Location: Left arm, Patient Position: Lying)   Pulse 78   Temp 99.4 °F (37.4 °C) (Oral)   Resp 18   Ht 180.3 cm (71\")   Wt 87.7 kg (193 lb 5.5 oz)   SpO2 96%   BMI 26.97 kg/m²     General Appearance:    Alert, cooperative, no distress   Head:    Normocephalic, without obvious abnormality, atraumatic   Eyes:       Throat:   Lips, tongue, gums normal   Neck:   Supple, symmetrical, trachea midline, no JVD   Lungs:     Clear to auscultation bilaterally, respirations unlabored   Chest Wall:    No tenderness or deformity    Heart:    Regular rate and rhythm, S1 and S2 normal, no murmur,no  Rub or gallop "   Abdomen:     Soft, nontender, bowel sounds active, no masses, no organomegaly    Extremities:   Extremities normal, atraumatic, no cyanosis or edema   Pulses:      Skin:   Skin is warm and dry,  no rashes or palpable lesions   Neurologic:   no focal deficits noted      Lab Results (last 72 hours)       Procedure Component Value Units Date/Time    Comprehensive Metabolic Panel [[id number]]  (Abnormal) Collected: 06/14/23 0550    Specimen: Blood Updated: 06/14/23 0652     Glucose 125 mg/dL      BUN 30 mg/dL      Creatinine 1.32 mg/dL      Sodium 136 mmol/L      Potassium 3.4 mmol/L      Chloride 101 mmol/L      CO2 21.3 mmol/L      Calcium 9.3 mg/dL      Total Protein 6.4 g/dL      Albumin 3.7 g/dL      ALT (SGPT) 18 U/L      AST (SGOT) 28 U/L      Alkaline Phosphatase 85 U/L      Total Bilirubin 0.6 mg/dL      Globulin 2.7 gm/dL      A/G Ratio 1.4 g/dL      BUN/Creatinine Ratio 22.7     Anion Gap 13.7 mmol/L      eGFR 51.9 mL/min/1.73     Narrative:      GFR Normal >60  Chronic Kidney Disease <60  Kidney Failure <15    The GFR formula is only valid for adults with stable renal function between ages 18 and 70.    C-reactive Protein [[id number]]  (Abnormal) Collected: 06/14/23 0550    Specimen: Blood Updated: 06/14/23 0652     C-Reactive Protein 4.81 mg/dL     CBC & Differential [[id number]]  (Abnormal) Collected: 06/14/23 0550    Specimen: Blood Updated: 06/14/23 0635    Narrative:      The following orders were created for panel order CBC & Differential.  Procedure                               Abnormality         Status                     ---------                               -----------         ------                     CBC Auto Differential[[id number]]        Abnormal            Final result                 Please view results for these tests on the individual orders.    CBC Auto Differential [[id number]]  (Abnormal) Collected: 06/14/23 0550    Specimen: Blood Updated: 06/14/23 0635     WBC 9.19 10*3/mm3      RBC  4.18 10*6/mm3      Hemoglobin 12.7 g/dL      Hematocrit 36.3 %      MCV 86.8 fL      MCH 30.4 pg      MCHC 35.0 g/dL      RDW 12.2 %      RDW-SD 38.3 fl      MPV 9.0 fL      Platelets 161 10*3/mm3      Neutrophil % 69.7 %      Lymphocyte % 18.7 %      Monocyte % 10.4 %      Eosinophil % 0.5 %      Basophil % 0.2 %      Immature Grans % 0.5 %      Neutrophils, Absolute 6.39 10*3/mm3      Lymphocytes, Absolute 1.72 10*3/mm3      Monocytes, Absolute 0.96 10*3/mm3      Eosinophils, Absolute 0.05 10*3/mm3      Basophils, Absolute 0.02 10*3/mm3      Immature Grans, Absolute 0.05 10*3/mm3      nRBC 0.0 /100 WBC     Blood Culture - Blood, Arm, Left [[id number]]  (Normal) Collected: 06/12/23 2128    Specimen: Blood from Arm, Left Updated: 06/13/23 2147     Blood Culture No growth at 24 hours    Blood Culture - Blood, Arm, Left [[id number]]  (Normal) Collected: 06/12/23 2128    Specimen: Blood from Arm, Left Updated: 06/13/23 2147     Blood Culture No growth at 24 hours    C-reactive Protein [[id number]]  (Abnormal) Collected: 06/13/23 0051    Specimen: Blood Updated: 06/13/23 1652     C-Reactive Protein 3.26 mg/dL     High Sensitivity Troponin T [[id number]]  (Abnormal) Collected: 06/13/23 0604    Specimen: Blood Updated: 06/13/23 0650     HS Troponin T 26 ng/L     Narrative:      High Sensitive Troponin T Reference Range:  <10.0 ng/L- Negative Female for AMI  <15.0 ng/L- Negative Male for AMI  >=10 - Abnormal Female indicating possible myocardial injury.  >=15 - Abnormal Male indicating possible myocardial injury.   Clinicians would have to utilize clinical acumen, EKG, Troponin, and serial changes to determine if it is an Acute Myocardial Infarction or myocardial injury due to an underlying chronic condition.         TSH [[id number]]  (Normal) Collected: 06/13/23 0604    Specimen: Blood Updated: 06/13/23 0650     TSH 1.310 uIU/mL     Comprehensive Metabolic Panel [[id number]]  (Abnormal) Collected: 06/13/23 0604    Specimen:  "Blood Updated: 06/13/23 0644     Glucose 176 mg/dL      BUN 22 mg/dL      Creatinine 1.09 mg/dL      Sodium 138 mmol/L      Potassium 3.9 mmol/L      Chloride 104 mmol/L      CO2 22.6 mmol/L      Calcium 9.6 mg/dL      Total Protein 6.4 g/dL      Albumin 3.9 g/dL      ALT (SGPT) 17 U/L      AST (SGOT) 18 U/L      Alkaline Phosphatase 96 U/L      Total Bilirubin 1.1 mg/dL      Globulin 2.5 gm/dL      A/G Ratio 1.6 g/dL      BUN/Creatinine Ratio 20.2     Anion Gap 11.4 mmol/L      eGFR 65.3 mL/min/1.73     Narrative:      GFR Normal >60  Chronic Kidney Disease <60  Kidney Failure <15    The GFR formula is only valid for adults with stable renal function between ages 18 and 70.    C-reactive Protein [[id number]]  (Abnormal) Collected: 06/13/23 0604    Specimen: Blood Updated: 06/13/23 0644     C-Reactive Protein 4.66 mg/dL     D-dimer, Quantitative [[id number]]  (Normal) Collected: 06/13/23 0604    Specimen: Blood from Arm, Left Updated: 06/13/23 0643     D-Dimer, Quantitative 0.85 MCGFEU/mL     Narrative:      According to the assay 's published package insert, a normal (<0.50 MCGFEU/mL) D-dimer result in conjunction with a non-high clinical probability assessment, excludes deep vein thrombosis (DVT) and pulmonary embolism (PE) with high sensitivity.    D-dimer values increase with age and this can make VTE exclusion of an older population difficult. To address this, the [employer], based on best available evidence and recent guidelines, recommends that clinicians use age-adjusted D-dimer thresholds in patients greater than 50 years of age with: a) a low probability of PE who do not meet all Pulmonary Embolism Rule Out Criteria, or b) in those with intermediate probability of PE.   The formula for an age-adjusted D-dimer cut-off is \"age/100\".  For example, a 60 year old patient would have an age-adjusted cut-off of 0.60 MCGFEU/mL and an 80 year old 0.80 MCGFEU/mL.    CBC & Differential " [[id number]]  (Abnormal) Collected: 06/13/23 0604    Specimen: Blood Updated: 06/13/23 0624    Narrative:      The following orders were created for panel order CBC & Differential.  Procedure                               Abnormality         Status                     ---------                               -----------         ------                     CBC Auto Differential[[id number]]        Abnormal            Final result                 Please view results for these tests on the individual orders.    CBC Auto Differential [[id number]]  (Abnormal) Collected: 06/13/23 0604    Specimen: Blood Updated: 06/13/23 0624     WBC 9.57 10*3/mm3      RBC 4.50 10*6/mm3      Hemoglobin 13.8 g/dL      Hematocrit 39.7 %      MCV 88.2 fL      MCH 30.7 pg      MCHC 34.8 g/dL      RDW 12.3 %      RDW-SD 39.9 fl      MPV 8.7 fL      Platelets 157 10*3/mm3      Neutrophil % 87.6 %      Lymphocyte % 9.5 %      Monocyte % 2.3 %      Eosinophil % 0.0 %      Basophil % 0.1 %      Immature Grans % 0.5 %      Neutrophils, Absolute 8.38 10*3/mm3      Lymphocytes, Absolute 0.91 10*3/mm3      Monocytes, Absolute 0.22 10*3/mm3      Eosinophils, Absolute 0.00 10*3/mm3      Basophils, Absolute 0.01 10*3/mm3      Immature Grans, Absolute 0.05 10*3/mm3      nRBC 0.0 /100 WBC     High Sensitivity Troponin T 2Hr [[id number]]  (Abnormal) Collected: 06/13/23 0051    Specimen: Blood Updated: 06/13/23 0127     HS Troponin T 31 ng/L      Troponin T Delta 0 ng/L     Narrative:      High Sensitive Troponin T Reference Range:  <10.0 ng/L- Negative Female for AMI  <15.0 ng/L- Negative Male for AMI  >=10 - Abnormal Female indicating possible myocardial injury.  >=15 - Abnormal Male indicating possible myocardial injury.   Clinicians would have to utilize clinical acumen, EKG, Troponin, and serial changes to determine if it is an Acute Myocardial Infarction or myocardial injury due to an underlying chronic condition.         Procalcitonin [[id number]]  (Normal)  "Collected: 06/13/23 0051    Specimen: Blood Updated: 06/13/23 0127     Procalcitonin 0.12 ng/mL     Narrative:      As a Marker for Sepsis (Non-Neonates):    1. <0.5 ng/mL represents a low risk of severe sepsis and/or septic shock.  2. >2 ng/mL represents a high risk of severe sepsis and/or septic shock.    As a Marker for Lower Respiratory Tract Infections that require antibiotic therapy:    PCT on Admission    Antibiotic Therapy       6-12 Hrs later    >0.5                Strongly Recommended  >0.25 - <0.5        Recommended   0.1 - 0.25          Discouraged              Remeasure/reassess PCT  <0.1                Strongly Discouraged     Remeasure/reassess PCT    As 28 day mortality risk marker: \"Change in Procalcitonin Result\" (>80% or <=80%) if Day 0 (or Day 1) and Day 4 values are available. Refer to http://www.[employer]-pct-calculator.com    Change in PCT <=80%  A decrease of PCT levels below or equal to 80% defines a positive change in PCT test result representing a higher risk for 28-day all-cause mortality of patients diagnosed with severe sepsis for septic shock.    Change in PCT >80%  A decrease of PCT levels of more than 80% defines a negative change in PCT result representing a lower risk for 28-day all-cause mortality of patients diagnosed with severe sepsis or septic shock.       Urinalysis With Microscopic If Indicated (No Culture) - Urine, Clean Catch [[id number]]  (Normal) Collected: 06/12/23 2252    Specimen: Urine, Clean Catch Updated: 06/12/23 2340     Color, UA Yellow     Appearance, UA Clear     pH, UA 6.5     Specific Gravity, UA 1.012     Glucose, UA Negative     Ketones, UA Negative     Bilirubin, UA Negative     Blood, UA Negative     Protein, UA Negative     Leuk Esterase, UA Negative     Nitrite, UA Negative     Urobilinogen, UA 0.2 E.U./dL    Narrative:      Urine microscopic not indicated.    Respiratory Panel PCR w/COVID-19(SARS-CoV-2) ALBERT/INDERJIT/MARLENY/PAD/COR/MAD/ABEL In-House, NP Swab in " UTM/VTM, 3-4 HR TAT - Swab, Nasopharynx [[id number]]  (Abnormal) Collected: 06/12/23 2128    Specimen: Swab from Nasopharynx Updated: 06/12/23 2226     ADENOVIRUS, PCR Not Detected     Coronavirus 229E Not Detected     Coronavirus HKU1 Not Detected     Coronavirus NL63 Not Detected     Coronavirus OC43 Not Detected     COVID19 Detected     Human Metapneumovirus Not Detected     Human Rhinovirus/Enterovirus Not Detected     Influenza A PCR Not Detected     Influenza B PCR Not Detected     Parainfluenza Virus 1 Not Detected     Parainfluenza Virus 2 Not Detected     Parainfluenza Virus 3 Not Detected     Parainfluenza Virus 4 Not Detected     RSV, PCR Not Detected     Bordetella pertussis pcr Not Detected     Bordetella parapertussis PCR Not Detected     Chlamydophila pneumoniae PCR Not Detected     Mycoplasma pneumo by PCR Not Detected    Narrative:      In the setting of a positive respiratory panel with a viral infection PLUS a negative procalcitonin without other underlying concern for bacterial infection, consider observing off antibiotics or discontinuation of antibiotics and continue supportive care. If the respiratory panel is positive for atypical bacterial infection (Bordetella pertussis, Chlamydophila pneumoniae, or Mycoplasma pneumoniae), consider antibiotic de-escalation to target atypical bacterial infection.    High Sensitivity Troponin T [[id number]]  (Abnormal) Collected: 06/12/23 2128    Specimen: Blood Updated: 06/12/23 2207     HS Troponin T 31 ng/L     Narrative:      High Sensitive Troponin T Reference Range:  <10.0 ng/L- Negative Female for AMI  <15.0 ng/L- Negative Male for AMI  >=10 - Abnormal Female indicating possible myocardial injury.  >=15 - Abnormal Male indicating possible myocardial injury.   Clinicians would have to utilize clinical acumen, EKG, Troponin, and serial changes to determine if it is an Acute Myocardial Infarction or myocardial injury due to an underlying chronic  "condition.         BNP [[id number]]  (Normal) Collected: 06/12/23 2128    Specimen: Blood Updated: 06/12/23 2207     proBNP 299.0 pg/mL     Narrative:      Among patients with dyspnea, NT-proBNP is highly sensitive for the detection of acute congestive heart failure. In addition NT-proBNP of <300 pg/ml effectively rules out acute congestive heart failure with 99% negative predictive value.    Results may be falsely decreased if patient taking Biotin.      Procalcitonin [[id number]]  (Normal) Collected: 06/12/23 2128    Specimen: Blood Updated: 06/12/23 2207     Procalcitonin 0.11 ng/mL     Narrative:      As a Marker for Sepsis (Non-Neonates):    1. <0.5 ng/mL represents a low risk of severe sepsis and/or septic shock.  2. >2 ng/mL represents a high risk of severe sepsis and/or septic shock.    As a Marker for Lower Respiratory Tract Infections that require antibiotic therapy:    PCT on Admission    Antibiotic Therapy       6-12 Hrs later    >0.5                Strongly Recommended  >0.25 - <0.5        Recommended   0.1 - 0.25          Discouraged              Remeasure/reassess PCT  <0.1                Strongly Discouraged     Remeasure/reassess PCT    As 28 day mortality risk marker: \"Change in Procalcitonin Result\" (>80% or <=80%) if Day 0 (or Day 1) and Day 4 values are available. Refer to http://www.[employer]-pct-calculator.com    Change in PCT <=80%  A decrease of PCT levels below or equal to 80% defines a positive change in PCT test result representing a higher risk for 28-day all-cause mortality of patients diagnosed with severe sepsis for septic shock.    Change in PCT >80%  A decrease of PCT levels of more than 80% defines a negative change in PCT result representing a lower risk for 28-day all-cause mortality of patients diagnosed with severe sepsis or septic shock.       Comprehensive Metabolic Panel [[id number]]  (Abnormal) Collected: 06/12/23 2128    Specimen: Blood Updated: 06/12/23 2200     Glucose 129 " mg/dL      BUN 23 mg/dL      Creatinine 1.30 mg/dL      Sodium 139 mmol/L      Potassium 4.4 mmol/L      Chloride 102 mmol/L      CO2 24.0 mmol/L      Calcium 9.9 mg/dL      Total Protein 7.1 g/dL      Albumin 4.4 g/dL      ALT (SGPT) 17 U/L      AST (SGOT) 22 U/L      Alkaline Phosphatase 114 U/L      Total Bilirubin 1.2 mg/dL      Globulin 2.7 gm/dL      A/G Ratio 1.6 g/dL      BUN/Creatinine Ratio 17.7     Anion Gap 13.0 mmol/L      eGFR 52.8 mL/min/1.73     Narrative:      GFR Normal >60  Chronic Kidney Disease <60  Kidney Failure <15    The GFR formula is only valid for adults with stable renal function between ages 18 and 70.    Lactic Acid, Plasma [[id number]]  (Normal) Collected: 06/12/23 2128    Specimen: Blood Updated: 06/12/23 2155     Lactate 1.3 mmol/L     aPTT [[id number]]  (Abnormal) Collected: 06/12/23 2128    Specimen: Blood Updated: 06/12/23 2152     PTT 38.9 seconds     Protime-INR [[id number]]  (Abnormal) Collected: 06/12/23 2128    Specimen: Blood Updated: 06/12/23 2152     Protime 14.5 Seconds      INR 1.12    CBC & Differential [[id number]]  (Abnormal) Collected: 06/12/23 2128    Specimen: Blood Updated: 06/12/23 2140    Narrative:      The following orders were created for panel order CBC & Differential.  Procedure                               Abnormality         Status                     ---------                               -----------         ------                     CBC Auto Differential[[id number]]        Abnormal            Final result                 Please view results for these tests on the individual orders.    CBC Auto Differential [[id number]]  (Abnormal) Collected: 06/12/23 2128    Specimen: Blood Updated: 06/12/23 2140     WBC 12.68 10*3/mm3      RBC 4.65 10*6/mm3      Hemoglobin 14.4 g/dL      Hematocrit 40.7 %      MCV 87.5 fL      MCH 31.0 pg      MCHC 35.4 g/dL      RDW 12.3 %      RDW-SD 39.4 fl      MPV 8.8 fL      Platelets 180 10*3/mm3      Neutrophil % 86.0 %       Lymphocyte % 7.6 %      Monocyte % 5.5 %      Eosinophil % 0.2 %      Basophil % 0.2 %      Immature Grans % 0.5 %      Neutrophils, Absolute 10.91 10*3/mm3      Lymphocytes, Absolute 0.96 10*3/mm3      Monocytes, Absolute 0.70 10*3/mm3      Eosinophils, Absolute 0.02 10*3/mm3      Basophils, Absolute 0.03 10*3/mm3      Immature Grans, Absolute 0.06 10*3/mm3      nRBC 0.0 /100 WBC           Data Review:  Results from last 7 days   Lab Units 06/15/23  0617 06/14/23  0550 06/13/23  0604   SODIUM mmol/L 137 136 138   POTASSIUM mmol/L 3.7 3.4* 3.9   CHLORIDE mmol/L 103 101 104   CO2 mmol/L 24.7 21.3* 22.6   BUN mg/dL 27* 30* 22   CREATININE mg/dL 1.20 1.32* 1.09   GLUCOSE mg/dL 113* 125* 176*   CALCIUM mg/dL 9.2 9.3 9.6     Results from last 7 days   Lab Units 06/15/23  0617 06/14/23  0550 06/13/23  0604   WBC 10*3/mm3 8.26 9.19 9.57   HEMOGLOBIN g/dL 14.1 12.7* 13.8   HEMATOCRIT % 40.5 36.3* 39.7   PLATELETS 10*3/mm3 179 161 157     Results from last 7 days   Lab Units 06/13/23  0604   TSH uIU/mL 1.310         Lab Results   Lab Value Date/Time    TROPONINT 26 (H) 06/13/2023 0604    TROPONINT 31 (H) 06/13/2023 0051    TROPONINT 31 (H) 06/12/2023 2128         Results from last 7 days   Lab Units 06/15/23  0617 06/14/23  0550 06/13/23  0604   ALK PHOS U/L 82 85 96   BILIRUBIN mg/dL 0.5 0.6 1.1   ALT (SGPT) U/L 31 18 17   AST (SGOT) U/L 35 28 18     Results from last 7 days   Lab Units 06/13/23  0604   TSH uIU/mL 1.310         No results found for: POCGLU  Results from last 7 days   Lab Units 06/12/23 2128   INR  1.12*       Past Medical History:   Diagnosis Date    Allergic 1970    Arthritis     Asthma 05/23/2019    shortness of breath-exertion    Benign prostatic hyperplasia     Bilateral carotid artery stenosis 05/12/2022    Cataract     Cervical radiculopathy     Chronic atrial fibrillation 6/15/2023    Colon polyp     Coronary artery disease 2001    Stent    CTS (carpal tunnel syndrome) 1998    Surgery both hands in  1999    Erectile dysfunction     GERD (gastroesophageal reflux disease)     HTN (hypertension)     Hyperlipidemia     Kidney stone     Low back pain     2 Prior surgeries    Lumbosacral disc disease     Melanoma 01/15/2009    DR WILLY SIMMS ( Left forearm)    Obesity     Radiculitis, thoracic     Scoliosis     Spondylosis of lumbar region without myelopathy or radiculopathy 09/09/2020    Stage 3a chronic kidney disease 10/21/2021    Stented coronary artery     Thoracic disc disorder     Surgery by Dr. Aldridge (sp)    TIA (transient ischemic attack) 05/12/2022       Assessment:  Active Hospital Problems    Diagnosis  POA    **COVID-19 [U07.1]  Unknown    Atrial fibrillation [I48.91]  Unknown    Fever and chills [R50.9]  Yes    Bilateral carotid artery stenosis [I65.23]  Yes    Stage 3a chronic kidney disease [N18.31]  Yes    Essential hypertension [I10]  Yes    Hyperlipemia [E78.5]  Yes    Coronary artery disease involving native coronary artery of native heart without angina pectoris [I25.10]  Yes      Resolved Hospital Problems   No resolved problems to display.       Plan:  Continue with remdesivir.  Last dose is today probably   He complains of cough but really seems to be doing okay on room air.  DC planning.  Cardiology consult noted.  We will monitor another day to see if he remains in A-fib.  Heart rate seems to be fairly well controlled.    Adarsh Francis MD  6/15/2023  12:24 EDT

## 2023-06-15 NOTE — CASE MANAGEMENT/SOCIAL WORK
Discharge Planning Assessment  [facility]     Patient Name: Kevin Ellington  MRN: [id number]  Today's Date: 6/15/2023    Admit Date: 6/12/2023    Plan: Home with wife   Discharge Needs Assessment     Row Name 06/15/23 1547       Living Environment    People in Home spouse    Current Living Arrangements home    Potentially Unsafe Housing Conditions none    Family Caregiver if Needed spouse    Quality of Family Relationships helpful;involved    Able to Return to Prior Arrangements yes       Resource/Environmental Concerns    Resource/Environmental Concerns none       Transition Planning    Patient/Family Anticipated Services at Transition none    Transportation Anticipated car, drives self;family or friend will provide       Discharge Needs Assessment    Equipment Currently Used at Home rollator;walker, rolling;cane, straight;grab bar    Concerns to be Addressed adjustment to diagnosis/illness    Anticipated Changes Related to Illness none    Equipment Needed After Discharge none               Discharge Plan     Row Name 06/15/23 1548       Plan    Plan Home with wife    Patient/Family in Agreement with Plan yes    Plan Comments Spoke to patient by phone, face sheet and pharmacy information verified. Pt lives with his wife Tiffanie in one level home with no steps to enter, he has a rollator, cane and gb, no HH or SNF history. He plans home with wife to transport if she is feeling up to it, if not he will need w/c transport. Denies other d/c needs.  CCP will follow - Mary WORTHINGTON              Continued Care and Services - Admitted Since 6/12/2023    Coordination has not been started for this encounter.       Expected Discharge Date and Time     Expected Discharge Date Expected Discharge Time    Ravinder 15, 2023          Demographic Summary     Row Name 06/15/23 1547       General Information    Admission Type observation               Functional Status     Row Name 06/15/23 1547       Functional Status    Usual Activity  Tolerance excellent    Current Activity Tolerance good               Psychosocial    No documentation.                Abuse/Neglect    No documentation.                Legal     Row Name 06/15/23 1547       Financial/Legal    Who Manages Finances if Patient Unable wife               Substance Abuse    No documentation.                Patient Forms    No documentation.                   Mary Obregon RN

## 2023-06-15 NOTE — NURSING NOTE
Telemetry note showing Afib around 0215, got EKG showed Afib, spoke to Yolette Gregory had consult to cardiology to see in AM    Also noted patient heart rate up to 160 when ambulating to bathroom.

## 2023-06-15 NOTE — PROGRESS NOTES
[facility] Follow Up    Chief Complaint: Afib    Interval History: Converted to atrial fibrillation at 0100 this morning.  Largely rate controlled rates in the 80s to 100s.  Heart rates occasionally rise to the 120s to 130s but nothing sustained.  Denies any symptoms.    Objective:     Objective:  Temp:  [97.1 °F (36.2 °C)-99.4 °F (37.4 °C)] 99.4 °F (37.4 °C)  Heart Rate:  [64-78] 78  Resp:  [18] 18  BP: (129-149)/(68-85) 129/81     Intake/Output Summary (Last 24 hours) at 6/15/2023 0740  Last data filed at 6/15/2023 0300  Gross per 24 hour   Intake --   Output 1800 ml   Net -1800 ml     Body mass index is 26.97 kg/m².      06/13/23  0043   Weight: 87.7 kg (193 lb 5.5 oz)     Weight change:       Physical Exam:   General : Alert, cooperative, in no acute distress.  Neuro: Alert,cooperative and oriented.  Lungs: CTAB. Normal respiratory effort and rate.  CV: irregularly, irregular, normal S1 and S2, no murmurs, gallops or rubs.  ABD: Soft, nontender, nondistended. Positive bowel sounds.  Extr: No edema or cyanosis, moves all extremities.    Lab Review:   Results from last 7 days   Lab Units 06/15/23  0617 06/14/23  0550   SODIUM mmol/L 137 136   POTASSIUM mmol/L 3.7 3.4*   CHLORIDE mmol/L 103 101   CO2 mmol/L 24.7 21.3*   BUN mg/dL 27* 30*   CREATININE mg/dL 1.20 1.32*   GLUCOSE mg/dL 113* 125*   CALCIUM mg/dL 9.2 9.3   AST (SGOT) U/L 35 28   ALT (SGPT) U/L 31 18     Results from last 7 days   Lab Units 06/13/23  0604 06/13/23  0051 06/12/23  2128   HSTROP T ng/L 26* 31* 31*     Results from last 7 days   Lab Units 06/15/23  0617 06/14/23  0550   WBC 10*3/mm3 8.26 9.19   HEMOGLOBIN g/dL 14.1 12.7*   HEMATOCRIT % 40.5 36.3*   PLATELETS 10*3/mm3 179 161     Results from last 7 days   Lab Units 06/12/23 2128   INR  1.12*   APTT seconds 38.9*               Invalid input(s): LDLCALC  Results from last 7 days   Lab Units 06/12/23 2128   PROBNP pg/mL 299.0     Results from last 7 days   Lab Units  06/13/23  0604   TSH uIU/mL 1.310     I reviewed the patient's new clinical results.  I personally viewed and interpreted the patient's EKG  Current Medications:   Scheduled Meds:aspirin, 81 mg, Oral, Daily  atorvastatin, 40 mg, Oral, Daily  cetirizine, 10 mg, Oral, Daily  chlorthalidone, 50 mg, Oral, Daily  cloNIDine, 0.1 mg, Oral, BID  enoxaparin, 40 mg, Subcutaneous, Daily  fluticasone, 1 spray, Nasal, Daily  guaiFENesin, 600 mg, Oral, Q12H  losartan, 100 mg, Oral, Daily  metoprolol succinate XL, 100 mg, Oral, Daily  metroNIDAZOLE, , Topical, Daily  pantoprazole, 40 mg, Oral, Daily  potassium chloride, 20 mEq, Oral, Daily  remdesivir, 100 mg, Intravenous, Q24H  sodium chloride, 10 mL, Intravenous, Q12H  tamsulosin, 0.8 mg, Oral, Daily      Continuous Infusions:Pharmacy Consult - Remdesivir,         Allergies:  Allergies   Allergen Reactions    Codeine GI Intolerance    Hydrocodone GI Intolerance     SEVERE CONSTIPATION    Sulfa Antibiotics Unknown (See Comments)     Unable to remember       Assessment/Plan:     Paroxsymal atrial fibrillation.  Converted to atrial fibrillation this morning.  Likely triggered by COVID-19.  Remains in atrial fibrillation currently with rates in the 80s to 100s.  Heart rates to rise into the 120s to 130s with activity.  He is asymptomatic.  He does have a [id number] or of 6.  2.  COVID-19.  On treatment with remdesivir per the primary team.  3.  Coronary artery disease.  Troponins and EKG unremarkable.  He is not having any symptoms concerning for angina.  4.  Hypertension.  Blood pressures well controlled on his home regimen of medications.  5.  Hyperlipidemia.  On atorvastatin.  6.  History of suspected TIA    -He is already on high-dose metoprolol succinate.  Will monitor his heart rates on this.  - We will hold off on anticoagulation for now to see if he converts back to sinus rhythm on his own.  If he remains in atrial fibrillation for the next 24 hours would have a low  threshold to start anticoagulation, possibly with apixaban in light of his elevated [id number] score and history of possible prior TIA.      Halima Salmeron MD  06/15/23  07:40 EDT

## 2023-06-15 NOTE — PLAN OF CARE
Problem: Adult Inpatient Plan of Care  Goal: Plan of Care Review  6/15/2023 1532 by Randi Ness, RN  Flowsheets (Taken 6/15/2023 1532)  Outcome Evaluation: VSS, A-fib on  monitor. Cardio consulted, AOX4, Assist X1, on room air, No c//o pain or SOA. Bed alarm on , call light wihin reach  6/15/2023 1430 by Randi Ness, RN  Outcome: Ongoing, Progressing   Goal Outcome Evaluation:              Outcome Evaluation: VSS, A-fib on  monitor. Cardio consulted, AOX4, Assist X1, on room air, No c//o pain or SOA. Bed alarm on , call light wihin reach

## 2023-06-15 NOTE — PLAN OF CARE
Goal Outcome Evaluation:  Plan of Care Reviewed With: patient           Outcome Evaluation: Pt. VSS, room air, alert and oriented x4, up with assist x1, noted Afib at 2am, EKG done, informed [facility], called cardiology for consult

## 2023-06-16 ENCOUNTER — READMISSION MANAGEMENT (OUTPATIENT)
Dept: CALL CENTER | Facility: HOSPITAL | Age: 88
End: 2023-06-16
Payer: MEDICARE

## 2023-06-16 ENCOUNTER — TELEPHONE (OUTPATIENT)
Dept: NEUROLOGY | Facility: CLINIC | Age: 88
End: 2023-06-16
Payer: MEDICARE

## 2023-06-16 VITALS
WEIGHT: 193.34 LBS | TEMPERATURE: 98.4 F | RESPIRATION RATE: 18 BRPM | HEIGHT: 71 IN | OXYGEN SATURATION: 95 % | BODY MASS INDEX: 27.07 KG/M2 | DIASTOLIC BLOOD PRESSURE: 93 MMHG | SYSTOLIC BLOOD PRESSURE: 117 MMHG | HEART RATE: 78 BPM

## 2023-06-16 PROBLEM — D89.832 CYTOKINE RELEASE SYNDROME, GRADE 2: Status: ACTIVE | Noted: 2023-06-16

## 2023-06-16 LAB
ALBUMIN SERPL-MCNC: 3.3 G/DL (ref 3.5–5.2)
ALBUMIN/GLOB SERPL: 1.4 G/DL
ALP SERPL-CCNC: 84 U/L (ref 39–117)
ALT SERPL W P-5'-P-CCNC: 26 U/L (ref 1–41)
ANION GAP SERPL CALCULATED.3IONS-SCNC: 11 MMOL/L (ref 5–15)
AST SERPL-CCNC: 29 U/L (ref 1–40)
BASOPHILS # BLD AUTO: 0.02 10*3/MM3 (ref 0–0.2)
BASOPHILS NFR BLD AUTO: 0.2 % (ref 0–1.5)
BILIRUB SERPL-MCNC: 0.4 MG/DL (ref 0–1.2)
BUN SERPL-MCNC: 32 MG/DL (ref 8–23)
BUN/CREAT SERPL: 23.5 (ref 7–25)
CALCIUM SPEC-SCNC: 8.9 MG/DL (ref 8.6–10.5)
CHLORIDE SERPL-SCNC: 102 MMOL/L (ref 98–107)
CO2 SERPL-SCNC: 22 MMOL/L (ref 22–29)
CREAT SERPL-MCNC: 1.36 MG/DL (ref 0.76–1.27)
CRP SERPL-MCNC: 2.6 MG/DL (ref 0–0.5)
DEPRECATED RDW RBC AUTO: 38.9 FL (ref 37–54)
EGFRCR SERPLBLD CKD-EPI 2021: 50.1 ML/MIN/1.73
EOSINOPHIL # BLD AUTO: 0.21 10*3/MM3 (ref 0–0.4)
EOSINOPHIL NFR BLD AUTO: 2.4 % (ref 0.3–6.2)
ERYTHROCYTE [DISTWIDTH] IN BLOOD BY AUTOMATED COUNT: 12.3 % (ref 12.3–15.4)
GLOBULIN UR ELPH-MCNC: 2.4 GM/DL
GLUCOSE SERPL-MCNC: 124 MG/DL (ref 65–99)
HCT VFR BLD AUTO: 39 % (ref 37.5–51)
HGB BLD-MCNC: 13.7 G/DL (ref 13–17.7)
IMM GRANULOCYTES # BLD AUTO: 0.04 10*3/MM3 (ref 0–0.05)
IMM GRANULOCYTES NFR BLD AUTO: 0.5 % (ref 0–0.5)
LYMPHOCYTES # BLD AUTO: 2.31 10*3/MM3 (ref 0.7–3.1)
LYMPHOCYTES NFR BLD AUTO: 26.9 % (ref 19.6–45.3)
MCH RBC QN AUTO: 30.5 PG (ref 26.6–33)
MCHC RBC AUTO-ENTMCNC: 35.1 G/DL (ref 31.5–35.7)
MCV RBC AUTO: 86.9 FL (ref 79–97)
MONOCYTES # BLD AUTO: 0.98 10*3/MM3 (ref 0.1–0.9)
MONOCYTES NFR BLD AUTO: 11.4 % (ref 5–12)
NEUTROPHILS NFR BLD AUTO: 5.04 10*3/MM3 (ref 1.7–7)
NEUTROPHILS NFR BLD AUTO: 58.6 % (ref 42.7–76)
NRBC BLD AUTO-RTO: 0.1 /100 WBC (ref 0–0.2)
PLATELET # BLD AUTO: 177 10*3/MM3 (ref 140–450)
PMV BLD AUTO: 9.1 FL (ref 6–12)
POTASSIUM SERPL-SCNC: 3.6 MMOL/L (ref 3.5–5.2)
PROT SERPL-MCNC: 5.7 G/DL (ref 6–8.5)
RBC # BLD AUTO: 4.49 10*6/MM3 (ref 4.14–5.8)
SODIUM SERPL-SCNC: 135 MMOL/L (ref 136–145)
WBC NRBC COR # BLD: 8.6 10*3/MM3 (ref 3.4–10.8)

## 2023-06-16 PROCEDURE — 85025 COMPLETE CBC W/AUTO DIFF WBC: CPT | Performed by: HOSPITALIST

## 2023-06-16 PROCEDURE — 86140 C-REACTIVE PROTEIN: CPT | Performed by: HOSPITALIST

## 2023-06-16 PROCEDURE — 80053 COMPREHEN METABOLIC PANEL: CPT | Performed by: HOSPITALIST

## 2023-06-16 PROCEDURE — G0378 HOSPITAL OBSERVATION PER HR: HCPCS

## 2023-06-16 RX ORDER — HYDROCODONE BITARTRATE AND HOMATROPINE METHYLBROMIDE ORAL SOLUTION 5; 1.5 MG/5ML; MG/5ML
5 LIQUID ORAL EVERY 4 HOURS PRN
Qty: 120 ML | Refills: 0 | Status: SHIPPED | OUTPATIENT
Start: 2023-06-16 | End: 2023-06-20

## 2023-06-16 RX ADMIN — CETIRIZINE HYDROCHLORIDE 10 MG: 10 TABLET ORAL at 10:17

## 2023-06-16 RX ADMIN — GUAIFENESIN 600 MG: 600 TABLET, EXTENDED RELEASE ORAL at 10:17

## 2023-06-16 RX ADMIN — CLONIDINE HYDROCHLORIDE 0.1 MG: 0.1 TABLET ORAL at 10:17

## 2023-06-16 RX ADMIN — ATORVASTATIN CALCIUM 40 MG: 20 TABLET, FILM COATED ORAL at 10:17

## 2023-06-16 RX ADMIN — METOPROLOL SUCCINATE 100 MG: 100 TABLET, EXTENDED RELEASE ORAL at 10:17

## 2023-06-16 RX ADMIN — FLUTICASONE PROPIONATE 1 SPRAY: 50 SPRAY, METERED NASAL at 10:22

## 2023-06-16 RX ADMIN — CHLORTHALIDONE 50 MG: 25 TABLET ORAL at 10:17

## 2023-06-16 RX ADMIN — TAMSULOSIN HYDROCHLORIDE 0.8 MG: 0.4 CAPSULE ORAL at 10:17

## 2023-06-16 RX ADMIN — APIXABAN 5 MG: 5 TABLET, FILM COATED ORAL at 10:28

## 2023-06-16 RX ADMIN — POTASSIUM CHLORIDE 20 MEQ: 750 TABLET, EXTENDED RELEASE ORAL at 10:17

## 2023-06-16 RX ADMIN — ASPIRIN 81 MG: 81 TABLET, COATED ORAL at 10:17

## 2023-06-16 RX ADMIN — Medication 10 ML: at 10:22

## 2023-06-16 RX ADMIN — HYDROCODONE BITARTRATE AND HOMATROPINE METHYLBROMIDE 5 ML: 1.5; 5 SYRUP ORAL at 05:10

## 2023-06-16 RX ADMIN — PANTOPRAZOLE SODIUM 40 MG: 40 TABLET, DELAYED RELEASE ORAL at 10:17

## 2023-06-16 RX ADMIN — LOSARTAN POTASSIUM 100 MG: 100 TABLET, FILM COATED ORAL at 10:17

## 2023-06-16 NOTE — PROGRESS NOTES
[facility] Follow Up    Chief Complaint: Follow up PAF    Interval History: Remains in atrial fibrillation although largely rate controlled.  He denies any symptoms with this.  He denies any shortness of breath or chest pain.    Objective:     Objective:  Temp:  [97.4 °F (36.3 °C)-99.4 °F (37.4 °C)] 98.5 °F (36.9 °C)  Heart Rate:  [72-81] 72  Resp:  [18] 18  BP: ()/(55-68) 93/64     Intake/Output Summary (Last 24 hours) at 6/16/2023 0736  Last data filed at 6/15/2023 2308  Gross per 24 hour   Intake 330 ml   Output 950 ml   Net -620 ml     Body mass index is 26.97 kg/m².      06/13/23 0043   Weight: 87.7 kg (193 lb 5.5 oz)     Weight change:       Physical Exam:   General : Alert, cooperative, in no acute distress.  Neuro: Alert,cooperative and oriented.  Lungs: CTAB. Normal respiratory effort and rate.  CV: Irregularly, irregular, normal S1 and S2, no murmurs, gallops or rubs.  ABD: Soft, nontender, nondistended. Positive bowel sounds.  Extr: No edema or cyanosis, moves all extremities.    Lab Review:   Results from last 7 days   Lab Units 06/16/23  0621 06/15/23  0617   SODIUM mmol/L 135* 137   POTASSIUM mmol/L 3.6 3.7   CHLORIDE mmol/L 102 103   CO2 mmol/L 22.0 24.7   BUN mg/dL 32* 27*   CREATININE mg/dL 1.36* 1.20   GLUCOSE mg/dL 124* 113*   CALCIUM mg/dL 8.9 9.2   AST (SGOT) U/L 29 35   ALT (SGPT) U/L 26 31     Results from last 7 days   Lab Units 06/13/23  0604 06/13/23 0051 06/12/23 2128   HSTROP T ng/L 26* 31* 31*     Results from last 7 days   Lab Units 06/16/23 0621 06/15/23  0617   WBC 10*3/mm3 8.60 8.26   HEMOGLOBIN g/dL 13.7 14.1   HEMATOCRIT % 39.0 40.5   PLATELETS 10*3/mm3 177 179     Results from last 7 days   Lab Units 06/12/23 2128   INR  1.12*   APTT seconds 38.9*               Invalid input(s): LDLCALC  Results from last 7 days   Lab Units 06/12/23 2128   PROBNP pg/mL 299.0     Results from last 7 days   Lab Units 06/13/23 0604   TSH uIU/mL 1.310     I reviewed  the patient's new clinical results.  I personally viewed and interpreted the patient's EKG  Current Medications:   Scheduled Meds:aspirin, 81 mg, Oral, Daily  atorvastatin, 40 mg, Oral, Daily  cetirizine, 10 mg, Oral, Daily  chlorthalidone, 50 mg, Oral, Daily  cloNIDine, 0.1 mg, Oral, BID  enoxaparin, 40 mg, Subcutaneous, Daily  fluticasone, 1 spray, Nasal, Daily  guaiFENesin, 600 mg, Oral, Q12H  losartan, 100 mg, Oral, Daily  metoprolol succinate XL, 100 mg, Oral, Daily  metroNIDAZOLE, , Topical, Daily  pantoprazole, 40 mg, Oral, Daily  potassium chloride, 20 mEq, Oral, Daily  sodium chloride, 10 mL, Intravenous, Q12H  tamsulosin, 0.8 mg, Oral, Daily      Continuous Infusions:Pharmacy Consult - Remdesivir,         Allergies:  Allergies   Allergen Reactions    Codeine GI Intolerance    Hydrocodone GI Intolerance     SEVERE CONSTIPATION    Sulfa Antibiotics Unknown (See Comments)     Unable to remember       Assessment/Plan:     Paroxsymal atrial fibrillation.  Converted to atrial fibrillation this morning.  Likely triggered by COVID-19.  Remains in atrial fibrillation currently with rates in the 80s to 100s.  Heart rates to rise into the 120s to 130s with activity.  He is asymptomatic.  He does have a [id number] or of 6.  2.  COVID-19.  On treatment with remdesivir per the primary team.  3.  Coronary artery disease.  Troponins and EKG unremarkable.  He is not having any symptoms concerning for angina.  4.  Hypertension.  Blood pressures well controlled on his home regimen of medications.  5.  Hyperlipidemia.  On atorvastatin.  6.  History of suspected TIA    -Continue metoprolol at current dose.  - Since he remains in atrial fibrillation today I am going to go ahead and start him on apixaban for anticoagulation in light of his elevated chads Vascor and history of suspected prior TIA.  -She is otherwise okay for discharge home from my standpoint.  We will arrange for office follow-up in a week.    Halima Salmeron,  MD  06/16/23  07:36 EDT

## 2023-06-16 NOTE — CASE MANAGEMENT/SOCIAL WORK
Case Management Discharge Note      Final Note: home no needs         Selected Continued Care - Discharged on 6/16/2023 Admission date: 6/12/2023 - Discharge disposition: Home or Self Care      Destination    No services have been selected for the patient.                Durable Medical Equipment    No services have been selected for the patient.                Dialysis/Infusion    No services have been selected for the patient.                Home Medical Care    No services have been selected for the patient.                Therapy    No services have been selected for the patient.                Community Resources    No services have been selected for the patient.                Community & DME    No services have been selected for the patient.                    Transportation Services  Private: Car    Final Discharge Disposition Code: 01 - home or self-care

## 2023-06-16 NOTE — PLAN OF CARE
Goal Outcome Evaluation:  Plan of Care Reviewed With: patient        Progress: improving  Outcome Evaluation: Possible DC today, but still in Afib.  Rate mostly controlled, but increases to 130's when up to BR.  Bed alarm on for safety.  NP cough.

## 2023-06-16 NOTE — DISCHARGE SUMMARY
PHYSICIAN DISCHARGE SUMMARY                                                                        [facility]    Patient Identification:  Name: Kevin Ellington  Age: 88 y.o.  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          PMHX:   Past Medical History:   Diagnosis Date   • Allergic 1970   • Arthritis    • Asthma 2019    shortness of breath-exertion   • Benign prostatic hyperplasia    • Bilateral carotid artery stenosis 2022   • Cataract    • Cervical radiculopathy    • Chronic atrial fibrillation 6/15/2023   • Colon polyp    • Coronary artery disease 2001    Stent   • CTS (carpal tunnel syndrome) 1998    Surgery both hands in    • Erectile dysfunction    • GERD (gastroesophageal reflux disease)    • HTN (hypertension)    • Hyperlipidemia    • Kidney stone    • Low back pain     2 Prior surgeries   • Lumbosacral disc disease    • Melanoma 01/15/2009    DR WILLY SIMMS ( Left forearm)   • Obesity    • Radiculitis, thoracic    • Scoliosis    • Spondylosis of lumbar region without myelopathy or radiculopathy 2020   • Stage 3a chronic kidney disease 10/21/2021   • Stented coronary artery    • Thoracic disc  disorder     Surgery by Dr. Aldridge (sp)   • TIA (transient ischemic attack) 05/12/2022     PSHX:   Past Surgical History:   Procedure Laterality Date   • ABLATION OF DYSRHYTHMIC FOCUS  nov. 2020    radio frequency /lower back   • ANKLE FUSION Left 2007    reconstruction and fusion   • BACK SURGERY      HERNIATED LUMBAR DISK 1975,2000,2012   • CARDIAC CATHETERIZATION  2001   • CARPAL TUNNEL RELEASE     • CATARACT EXTRACTION, BILATERAL Bilateral 2002   • COLONOSCOPY     • CORONARY ANGIOPLASTY WITH STENT PLACEMENT  2001   • CORONARY STENT PLACEMENT     • ENDOSCOPY N/A 12/19/2022    Procedure: ESOPHAGOGASTRODUODENOSCOPY WITH BIOPSY;  Surgeon: Rashard Mathews MD;  Location: [facility] [place]IN OR;  Service: Gastroenterology;  Laterality: N/A;  gastric polyps, hiatal hernia   • EPIDURAL BLOCK  Several at [facility]    and [facility]   • EYE SURGERY     • JOINT REPLACEMENT     • REPLACEMENT TOTAL KNEE Left 2015   • REPLACEMENT TOTAL KNEE Right    • SKIN CANCER EXCISION      MELANOMA   • SPINE SURGERY     • THORACIC SPINE SURGERY         Hospital Course: Kevin Ellington   is a 88 y.o. non-smoker with a history of hypertension, hyperlipidemia, CAD, and CKD stage 3a that presents to [facility] complaining of generalized weakness and cough.  He reports fever, chills, sore throat, and a nonproductive cough for the past few days.  He reports shortness of breath and difficulty ambulating today due to generalized weakness.  He states his wife was recently diagnosed with COVID 19 and he thought he may have it as well so he presented to the ED.  He was febrile on arrival with a temperature of 102.  Lab work revealed troponin 31, creatinine 1.30, and WBC 12.68.   A respiratory viral panel was positive for COVID 19.  He is being admitted for further evaluation.  The patient was admitted to the hospital and treated with remdesivir for COVID-19.  He went into [facility] and cardiology saw him.  He appeared to  have persistent A-fib and cardiology recommended he go on Eliquis.  His heart rates controlled with metoprolol.  He finished 3 days of remdesivir and felt well enough to go home.  He will follow-up with his primary care in 1 week for ongoing care.  He will also follow-up with cardiology in regards to the A-fib.      Consults:     Consults     Date and Time Order Name Status Description    6/15/2023  4:03 AM Inpatient Cardiology Consult      6/13/2023  1:49 AM Inpatient Cardiology Consult Completed     6/12/2023 10:32 PM [facility] (on-call MD unless specified) Details          Results from last 7 days   Lab Units 06/16/23  0621   WBC 10*3/mm3 8.60   HEMOGLOBIN g/dL 13.7   HEMATOCRIT % 39.0   PLATELETS 10*3/mm3 177     Results from last 7 days   Lab Units 06/16/23  0621   SODIUM mmol/L 135*   POTASSIUM mmol/L 3.6   CHLORIDE mmol/L 102   CO2 mmol/L 22.0   BUN mg/dL 32*   CREATININE mg/dL 1.36*   GLUCOSE mg/dL 124*   CALCIUM mg/dL 8.9     Significant Diagnostic Studies:   WBC   Date Value Ref Range Status   06/16/2023 8.60 3.40 - 10.80 10*3/mm3 Final     Hemoglobin   Date Value Ref Range Status   06/16/2023 13.7 13.0 - 17.7 g/dL Final     Hematocrit   Date Value Ref Range Status   06/16/2023 39.0 37.5 - 51.0 % Final     Platelets   Date Value Ref Range Status   06/16/2023 177 140 - 450 10*3/mm3 Final     Sodium   Date Value Ref Range Status   06/16/2023 135 (L) 136 - 145 mmol/L Final     Potassium   Date Value Ref Range Status   06/16/2023 3.6 3.5 - 5.2 mmol/L Final     Chloride   Date Value Ref Range Status   06/16/2023 102 98 - 107 mmol/L Final     CO2   Date Value Ref Range Status   06/16/2023 22.0 22.0 - 29.0 mmol/L Final     BUN   Date Value Ref Range Status   06/16/2023 32 (H) 8 - 23 mg/dL Final     Creatinine   Date Value Ref Range Status   06/16/2023 1.36 (H) 0.76 - 1.27 mg/dL Final     Glucose   Date Value Ref Range Status   06/16/2023 124 (H) 65 - 99 mg/dL Final     Calcium   Date Value Ref Range Status   06/16/2023  8.9 8.6 - 10.5 mg/dL Final     AST (SGOT)   Date Value Ref Range Status   06/16/2023 29 1 - 40 U/L Final     ALT (SGPT)   Date Value Ref Range Status   06/16/2023 26 1 - 41 U/L Final     Alkaline Phosphatase   Date Value Ref Range Status   06/16/2023 84 39 - 117 U/L Final     No results found for: APTT, INR  No results found for: COLORU, CLARITYU, SPECGRAV, PHUR, PROTEINUR, GLUCOSEU, KETONESU, BLOODU, NITRITE, LEUKOCYTESUR, BILIRUBINUR, UROBILINOGEN, RBCUA, WBCUA, BACTERIA, UACOMMENT  No results found for: TROPONINT, TROPONINI, BNP  No components found for: HGBA1C;2  No components found for: TSH;2  Imaging Results (All)     Procedure Component Value Units Date/Time    XR Chest 1 View [[id number]] Collected: 06/12/23 2201     Updated: 06/12/23 2204    Narrative:      SINGLE VIEW OF THE CHEST     HISTORY: Weakness and cough     COMPARISON: None available.     FINDINGS:  There is cardiomegaly. No pneumothorax or pleural effusion is seen.  Right lung is clear. Patient is suspected to have some scarring within  the left lung. There is calcification of the aorta.       Impression:      No definite acute infiltrates seen.     This report was finalized on 6/12/2023 10:01 PM by Dr. Talia Orlando M.D.           Lab Results (last 7 days)     Procedure Component Value Units Date/Time    Comprehensive Metabolic Panel [[id number]]  (Abnormal) Collected: 06/16/23 0621    Specimen: Blood Updated: 06/16/23 0732     Glucose 124 mg/dL      BUN 32 mg/dL      Creatinine 1.36 mg/dL      Sodium 135 mmol/L      Potassium 3.6 mmol/L      Chloride 102 mmol/L      CO2 22.0 mmol/L      Calcium 8.9 mg/dL      Total Protein 5.7 g/dL      Albumin 3.3 g/dL      ALT (SGPT) 26 U/L      AST (SGOT) 29 U/L      Alkaline Phosphatase 84 U/L      Total Bilirubin 0.4 mg/dL      Globulin 2.4 gm/dL      A/G Ratio 1.4 g/dL      BUN/Creatinine Ratio 23.5     Anion Gap 11.0 mmol/L      eGFR 50.1 mL/min/1.73     Narrative:      GFR Normal >60  Chronic Kidney  Disease <60  Kidney Failure <15    The GFR formula is only valid for adults with stable renal function between ages 18 and 70.    C-reactive Protein [[id number]]  (Abnormal) Collected: 06/16/23 0621    Specimen: Blood Updated: 06/16/23 0732     C-Reactive Protein 2.60 mg/dL     CBC & Differential [[id number]]  (Abnormal) Collected: 06/16/23 0621    Specimen: Blood Updated: 06/16/23 0716    Narrative:      The following orders were created for panel order CBC & Differential.  Procedure                               Abnormality         Status                     ---------                               -----------         ------                     CBC Auto Differential[[id number]]        Abnormal            Final result                 Please view results for these tests on the individual orders.    CBC Auto Differential [[id number]]  (Abnormal) Collected: 06/16/23 0621    Specimen: Blood Updated: 06/16/23 0716     WBC 8.60 10*3/mm3      RBC 4.49 10*6/mm3      Hemoglobin 13.7 g/dL      Hematocrit 39.0 %      MCV 86.9 fL      MCH 30.5 pg      MCHC 35.1 g/dL      RDW 12.3 %      RDW-SD 38.9 fl      MPV 9.1 fL      Platelets 177 10*3/mm3      Neutrophil % 58.6 %      Lymphocyte % 26.9 %      Monocyte % 11.4 %      Eosinophil % 2.4 %      Basophil % 0.2 %      Immature Grans % 0.5 %      Neutrophils, Absolute 5.04 10*3/mm3      Lymphocytes, Absolute 2.31 10*3/mm3      Monocytes, Absolute 0.98 10*3/mm3      Eosinophils, Absolute 0.21 10*3/mm3      Basophils, Absolute 0.02 10*3/mm3      Immature Grans, Absolute 0.04 10*3/mm3      nRBC 0.1 /100 WBC     Blood Culture - Blood, Arm, Left [[id number]]  (Normal) Collected: 06/12/23 2128    Specimen: Blood from Arm, Left Updated: 06/15/23 2146     Blood Culture No growth at 3 days    Blood Culture - Blood, Arm, Left [[id number]]  (Normal) Collected: 06/12/23 2128    Specimen: Blood from Arm, Left Updated: 06/15/23 2146     Blood Culture No growth at 3 days    Procalcitonin [[id number]]   "(Normal) Collected: 06/15/23 0617    Specimen: Blood Updated: 06/15/23 0740     Procalcitonin 0.10 ng/mL     Narrative:      As a Marker for Sepsis (Non-Neonates):    1. <0.5 ng/mL represents a low risk of severe sepsis and/or septic shock.  2. >2 ng/mL represents a high risk of severe sepsis and/or septic shock.    As a Marker for Lower Respiratory Tract Infections that require antibiotic therapy:    PCT on Admission    Antibiotic Therapy       6-12 Hrs later    >0.5                Strongly Recommended  >0.25 - <0.5        Recommended   0.1 - 0.25          Discouraged              Remeasure/reassess PCT  <0.1                Strongly Discouraged     Remeasure/reassess PCT    As 28 day mortality risk marker: \"Change in Procalcitonin Result\" (>80% or <=80%) if Day 0 (or Day 1) and Day 4 values are available. Refer to http://www.[employer]-pct-calculator.com    Change in PCT <=80%  A decrease of PCT levels below or equal to 80% defines a positive change in PCT test result representing a higher risk for 28-day all-cause mortality of patients diagnosed with severe sepsis for septic shock.    Change in PCT >80%  A decrease of PCT levels of more than 80% defines a negative change in PCT result representing a lower risk for 28-day all-cause mortality of patients diagnosed with severe sepsis or septic shock.       Comprehensive Metabolic Panel [[id number]]  (Abnormal) Collected: 06/15/23 0617    Specimen: Blood Updated: 06/15/23 0734     Glucose 113 mg/dL      BUN 27 mg/dL      Creatinine 1.20 mg/dL      Sodium 137 mmol/L      Potassium 3.7 mmol/L      Chloride 103 mmol/L      CO2 24.7 mmol/L      Calcium 9.2 mg/dL      Total Protein 6.3 g/dL      Albumin 3.3 g/dL      ALT (SGPT) 31 U/L      AST (SGOT) 35 U/L      Alkaline Phosphatase 82 U/L      Total Bilirubin 0.5 mg/dL      Globulin 3.0 gm/dL      A/G Ratio 1.1 g/dL      BUN/Creatinine Ratio 22.5     Anion Gap 9.3 mmol/L      eGFR 58.2 mL/min/1.73     Narrative:      GFR " "Normal >60  Chronic Kidney Disease <60  Kidney Failure <15    The GFR formula is only valid for adults with stable renal function between ages 18 and 70.    C-reactive Protein [[id number]]  (Abnormal) Collected: 06/15/23 0617    Specimen: Blood Updated: 06/15/23 0734     C-Reactive Protein 2.90 mg/dL     D-dimer, Quantitative [[id number]]  (Normal) Collected: 06/15/23 0617    Specimen: Blood Updated: 06/15/23 0729     D-Dimer, Quantitative 0.82 MCGFEU/mL     Narrative:      According to the assay 's published package insert, a normal (<0.50 MCGFEU/mL) D-dimer result in conjunction with a non-high clinical probability assessment, excludes deep vein thrombosis (DVT) and pulmonary embolism (PE) with high sensitivity.    D-dimer values increase with age and this can make VTE exclusion of an older population difficult. To address this, the American [employer], based on best available evidence and recent guidelines, recommends that clinicians use age-adjusted D-dimer thresholds in patients greater than 50 years of age with: a) a low probability of PE who do not meet all Pulmonary Embolism Rule Out Criteria, or b) in those with intermediate probability of PE.   The formula for an age-adjusted D-dimer cut-off is \"age/100\".  For example, a 60 year old patient would have an age-adjusted cut-off of 0.60 MCGFEU/mL and an 80 year old 0.80 MCGFEU/mL.    CBC & Differential [[id number]]  (Abnormal) Collected: 06/15/23 0617    Specimen: Blood Updated: 06/15/23 0721    Narrative:      The following orders were created for panel order CBC & Differential.  Procedure                               Abnormality         Status                     ---------                               -----------         ------                     CBC Auto Differential[[id number]]        Abnormal            Final result                 Please view results for these tests on the individual orders.    CBC Auto Differential [[id number]]  " (Abnormal) Collected: 06/15/23 0617    Specimen: Blood Updated: 06/15/23 0721     WBC 8.26 10*3/mm3      RBC 4.68 10*6/mm3      Hemoglobin 14.1 g/dL      Hematocrit 40.5 %      MCV 86.5 fL      MCH 30.1 pg      MCHC 34.8 g/dL      RDW 12.4 %      RDW-SD 38.9 fl      MPV 8.9 fL      Platelets 179 10*3/mm3      Neutrophil % 63.9 %      Lymphocyte % 19.4 %      Monocyte % 13.4 %      Eosinophil % 2.4 %      Basophil % 0.2 %      Immature Grans % 0.7 %      Neutrophils, Absolute 5.27 10*3/mm3      Lymphocytes, Absolute 1.60 10*3/mm3      Monocytes, Absolute 1.11 10*3/mm3      Eosinophils, Absolute 0.20 10*3/mm3      Basophils, Absolute 0.02 10*3/mm3      Immature Grans, Absolute 0.06 10*3/mm3      nRBC 0.0 /100 WBC     Comprehensive Metabolic Panel [[id number]]  (Abnormal) Collected: 06/14/23 0550    Specimen: Blood Updated: 06/14/23 0652     Glucose 125 mg/dL      BUN 30 mg/dL      Creatinine 1.32 mg/dL      Sodium 136 mmol/L      Potassium 3.4 mmol/L      Chloride 101 mmol/L      CO2 21.3 mmol/L      Calcium 9.3 mg/dL      Total Protein 6.4 g/dL      Albumin 3.7 g/dL      ALT (SGPT) 18 U/L      AST (SGOT) 28 U/L      Alkaline Phosphatase 85 U/L      Total Bilirubin 0.6 mg/dL      Globulin 2.7 gm/dL      A/G Ratio 1.4 g/dL      BUN/Creatinine Ratio 22.7     Anion Gap 13.7 mmol/L      eGFR 51.9 mL/min/1.73     Narrative:      GFR Normal >60  Chronic Kidney Disease <60  Kidney Failure <15    The GFR formula is only valid for adults with stable renal function between ages 18 and 70.    C-reactive Protein [[id number]]  (Abnormal) Collected: 06/14/23 0550    Specimen: Blood Updated: 06/14/23 0652     C-Reactive Protein 4.81 mg/dL     CBC & Differential [[id number]]  (Abnormal) Collected: 06/14/23 0550    Specimen: Blood Updated: 06/14/23 0635    Narrative:      The following orders were created for panel order CBC & Differential.  Procedure                               Abnormality         Status                     ---------                                -----------         ------                     CBC Auto Differential[[id number]]        Abnormal            Final result                 Please view results for these tests on the individual orders.    CBC Auto Differential [[id number]]  (Abnormal) Collected: 06/14/23 0550    Specimen: Blood Updated: 06/14/23 0635     WBC 9.19 10*3/mm3      RBC 4.18 10*6/mm3      Hemoglobin 12.7 g/dL      Hematocrit 36.3 %      MCV 86.8 fL      MCH 30.4 pg      MCHC 35.0 g/dL      RDW 12.2 %      RDW-SD 38.3 fl      MPV 9.0 fL      Platelets 161 10*3/mm3      Neutrophil % 69.7 %      Lymphocyte % 18.7 %      Monocyte % 10.4 %      Eosinophil % 0.5 %      Basophil % 0.2 %      Immature Grans % 0.5 %      Neutrophils, Absolute 6.39 10*3/mm3      Lymphocytes, Absolute 1.72 10*3/mm3      Monocytes, Absolute 0.96 10*3/mm3      Eosinophils, Absolute 0.05 10*3/mm3      Basophils, Absolute 0.02 10*3/mm3      Immature Grans, Absolute 0.05 10*3/mm3      nRBC 0.0 /100 WBC     C-reactive Protein [[id number]]  (Abnormal) Collected: 06/13/23 0051    Specimen: Blood Updated: 06/13/23 1652     C-Reactive Protein 3.26 mg/dL     High Sensitivity Troponin T [[id number]]  (Abnormal) Collected: 06/13/23 0604    Specimen: Blood Updated: 06/13/23 0650     HS Troponin T 26 ng/L     Narrative:      High Sensitive Troponin T Reference Range:  <10.0 ng/L- Negative Female for AMI  <15.0 ng/L- Negative Male for AMI  >=10 - Abnormal Female indicating possible myocardial injury.  >=15 - Abnormal Male indicating possible myocardial injury.   Clinicians would have to utilize clinical acumen, EKG, Troponin, and serial changes to determine if it is an Acute Myocardial Infarction or myocardial injury due to an underlying chronic condition.         TSH [[id number]]  (Normal) Collected: 06/13/23 0604    Specimen: Blood Updated: 06/13/23 0650     TSH 1.310 uIU/mL     Comprehensive Metabolic Panel [[id number]]  (Abnormal) Collected: 06/13/23 0604     "Specimen: Blood Updated: 06/13/23 0644     Glucose 176 mg/dL      BUN 22 mg/dL      Creatinine 1.09 mg/dL      Sodium 138 mmol/L      Potassium 3.9 mmol/L      Chloride 104 mmol/L      CO2 22.6 mmol/L      Calcium 9.6 mg/dL      Total Protein 6.4 g/dL      Albumin 3.9 g/dL      ALT (SGPT) 17 U/L      AST (SGOT) 18 U/L      Alkaline Phosphatase 96 U/L      Total Bilirubin 1.1 mg/dL      Globulin 2.5 gm/dL      A/G Ratio 1.6 g/dL      BUN/Creatinine Ratio 20.2     Anion Gap 11.4 mmol/L      eGFR 65.3 mL/min/1.73     Narrative:      GFR Normal >60  Chronic Kidney Disease <60  Kidney Failure <15    The GFR formula is only valid for adults with stable renal function between ages 18 and 70.    C-reactive Protein [[id number]]  (Abnormal) Collected: 06/13/23 0604    Specimen: Blood Updated: 06/13/23 0644     C-Reactive Protein 4.66 mg/dL     D-dimer, Quantitative [[id number]]  (Normal) Collected: 06/13/23 0604    Specimen: Blood from Arm, Left Updated: 06/13/23 0643     D-Dimer, Quantitative 0.85 MCGFEU/mL     Narrative:      According to the assay 's published package insert, a normal (<0.50 MCGFEU/mL) D-dimer result in conjunction with a non-high clinical probability assessment, excludes deep vein thrombosis (DVT) and pulmonary embolism (PE) with high sensitivity.    D-dimer values increase with age and this can make VTE exclusion of an older population difficult. To address this, the American [employer] of Physicians, based on best available evidence and recent guidelines, recommends that clinicians use age-adjusted D-dimer thresholds in patients greater than 50 years of age with: a) a low probability of PE who do not meet all Pulmonary Embolism Rule Out Criteria, or b) in those with intermediate probability of PE.   The formula for an age-adjusted D-dimer cut-off is \"age/100\".  For example, a 60 year old patient would have an age-adjusted cut-off of 0.60 MCGFEU/mL and an 80 year old 0.80 MCGFEU/mL.    CBC & " Differential [[id number]]  (Abnormal) Collected: 06/13/23 0604    Specimen: Blood Updated: 06/13/23 0624    Narrative:      The following orders were created for panel order CBC & Differential.  Procedure                               Abnormality         Status                     ---------                               -----------         ------                     CBC Auto Differential[[id number]]        Abnormal            Final result                 Please view results for these tests on the individual orders.    CBC Auto Differential [[id number]]  (Abnormal) Collected: 06/13/23 0604    Specimen: Blood Updated: 06/13/23 0624     WBC 9.57 10*3/mm3      RBC 4.50 10*6/mm3      Hemoglobin 13.8 g/dL      Hematocrit 39.7 %      MCV 88.2 fL      MCH 30.7 pg      MCHC 34.8 g/dL      RDW 12.3 %      RDW-SD 39.9 fl      MPV 8.7 fL      Platelets 157 10*3/mm3      Neutrophil % 87.6 %      Lymphocyte % 9.5 %      Monocyte % 2.3 %      Eosinophil % 0.0 %      Basophil % 0.1 %      Immature Grans % 0.5 %      Neutrophils, Absolute 8.38 10*3/mm3      Lymphocytes, Absolute 0.91 10*3/mm3      Monocytes, Absolute 0.22 10*3/mm3      Eosinophils, Absolute 0.00 10*3/mm3      Basophils, Absolute 0.01 10*3/mm3      Immature Grans, Absolute 0.05 10*3/mm3      nRBC 0.0 /100 WBC     High Sensitivity Troponin T 2Hr [[id number]]  (Abnormal) Collected: 06/13/23 0051    Specimen: Blood Updated: 06/13/23 0127     HS Troponin T 31 ng/L      Troponin T Delta 0 ng/L     Narrative:      High Sensitive Troponin T Reference Range:  <10.0 ng/L- Negative Female for AMI  <15.0 ng/L- Negative Male for AMI  >=10 - Abnormal Female indicating possible myocardial injury.  >=15 - Abnormal Male indicating possible myocardial injury.   Clinicians would have to utilize clinical acumen, EKG, Troponin, and serial changes to determine if it is an Acute Myocardial Infarction or myocardial injury due to an underlying chronic condition.         Procalcitonin  "[[id number]]  (Normal) Collected: 06/13/23 0051    Specimen: Blood Updated: 06/13/23 0127     Procalcitonin 0.12 ng/mL     Narrative:      As a Marker for Sepsis (Non-Neonates):    1. <0.5 ng/mL represents a low risk of severe sepsis and/or septic shock.  2. >2 ng/mL represents a high risk of severe sepsis and/or septic shock.    As a Marker for Lower Respiratory Tract Infections that require antibiotic therapy:    PCT on Admission    Antibiotic Therapy       6-12 Hrs later    >0.5                Strongly Recommended  >0.25 - <0.5        Recommended   0.1 - 0.25          Discouraged              Remeasure/reassess PCT  <0.1                Strongly Discouraged     Remeasure/reassess PCT    As 28 day mortality risk marker: \"Change in Procalcitonin Result\" (>80% or <=80%) if Day 0 (or Day 1) and Day 4 values are available. Refer to http://www.[email or web address] [place]pct-calculator.com    Change in PCT <=80%  A decrease of PCT levels below or equal to 80% defines a positive change in PCT test result representing a higher risk for 28-day all-cause mortality of patients diagnosed with severe sepsis for septic shock.    Change in PCT >80%  A decrease of PCT levels of more than 80% defines a negative change in PCT result representing a lower risk for 28-day all-cause mortality of patients diagnosed with severe sepsis or septic shock.       Urinalysis With Microscopic If Indicated (No Culture) - Urine, Clean Catch [[id number]]  (Normal) Collected: 06/12/23 2252    Specimen: Urine, Clean Catch Updated: 06/12/23 2340     Color, UA Yellow     Appearance, UA Clear     pH, UA 6.5     Specific Gravity, UA 1.012     Glucose, UA Negative     Ketones, UA Negative     Bilirubin, UA Negative     Blood, UA Negative     Protein, UA Negative     Leuk Esterase, UA Negative     Nitrite, UA Negative     Urobilinogen, UA 0.2 E.U./dL    Narrative:      Urine microscopic not indicated.    Respiratory Panel PCR w/COVID-19(SARS-CoV-2) ALBERT/INDERJIT/MARLENY/PAD/COR/MAD/ABEL " In-House, NP Swab in [facility] 3-4 HR TAT - Swab, Nasopharynx [[id number]]  (Abnormal) Collected: 06/12/23 2128    Specimen: Swab from Nasopharynx Updated: 06/12/23 2226     ADENOVIRUS, PCR Not Detected     Coronavirus 229E Not Detected     Coronavirus HKU1 Not Detected     Coronavirus NL63 Not Detected     Coronavirus OC43 Not Detected     COVID19 Detected     Human Metapneumovirus Not Detected     Human Rhinovirus/Enterovirus Not Detected     Influenza A PCR Not Detected     Influenza B PCR Not Detected     Parainfluenza Virus 1 Not Detected     Parainfluenza Virus 2 Not Detected     Parainfluenza Virus 3 Not Detected     Parainfluenza Virus 4 Not Detected     RSV, PCR Not Detected     Bordetella pertussis pcr Not Detected     Bordetella parapertussis PCR Not Detected     Chlamydophila pneumoniae PCR Not Detected     Mycoplasma pneumo by PCR Not Detected    Narrative:      In the setting of a positive respiratory panel with a viral infection PLUS a negative procalcitonin without other underlying concern for bacterial infection, consider observing off antibiotics or discontinuation of antibiotics and continue supportive care. If the respiratory panel is positive for atypical bacterial infection (Bordetella pertussis, Chlamydophila pneumoniae, or Mycoplasma pneumoniae), consider antibiotic de-escalation to target atypical bacterial infection.    High Sensitivity Troponin T [[id number]]  (Abnormal) Collected: 06/12/23 2128    Specimen: Blood Updated: 06/12/23 2207     HS Troponin T 31 ng/L     Narrative:      High Sensitive Troponin T Reference Range:  <10.0 ng/L- Negative Female for AMI  <15.0 ng/L- Negative Male for AMI  >=10 - Abnormal Female indicating possible myocardial injury.  >=15 - Abnormal Male indicating possible myocardial injury.   Clinicians would have to utilize clinical acumen, EKG, Troponin, and serial changes to determine if it is an Acute Myocardial Infarction or myocardial injury due to an  "underlying chronic condition.         BNP [[id number]]  (Normal) Collected: 06/12/23 2128    Specimen: Blood Updated: 06/12/23 2207     proBNP 299.0 pg/mL     Narrative:      Among patients with dyspnea, NT-proBNP is highly sensitive for the detection of acute congestive heart failure. In addition NT-proBNP of <300 pg/ml effectively rules out acute congestive heart failure with 99% negative predictive value.    Results may be falsely decreased if patient taking Biotin.      Procalcitonin [[id number]]  (Normal) Collected: 06/12/23 2128    Specimen: Blood Updated: 06/12/23 2207     Procalcitonin 0.11 ng/mL     Narrative:      As a Marker for Sepsis (Non-Neonates):    1. <0.5 ng/mL represents a low risk of severe sepsis and/or septic shock.  2. >2 ng/mL represents a high risk of severe sepsis and/or septic shock.    As a Marker for Lower Respiratory Tract Infections that require antibiotic therapy:    PCT on Admission    Antibiotic Therapy       6-12 Hrs later    >0.5                Strongly Recommended  >0.25 - <0.5        Recommended   0.1 - 0.25          Discouraged              Remeasure/reassess PCT  <0.1                Strongly Discouraged     Remeasure/reassess PCT    As 28 day mortality risk marker: \"Change in Procalcitonin Result\" (>80% or <=80%) if Day 0 (or Day 1) and Day 4 values are available. Refer to http://www.[employer]-pct-calculator.com    Change in PCT <=80%  A decrease of PCT levels below or equal to 80% defines a positive change in PCT test result representing a higher risk for 28-day all-cause mortality of patients diagnosed with severe sepsis for septic shock.    Change in PCT >80%  A decrease of PCT levels of more than 80% defines a negative change in PCT result representing a lower risk for 28-day all-cause mortality of patients diagnosed with severe sepsis or septic shock.       Comprehensive Metabolic Panel [[id number]]  (Abnormal) Collected: 06/12/23 2128    Specimen: Blood Updated: 06/12/23 2200 "     Glucose 129 mg/dL      BUN 23 mg/dL      Creatinine 1.30 mg/dL      Sodium 139 mmol/L      Potassium 4.4 mmol/L      Chloride 102 mmol/L      CO2 24.0 mmol/L      Calcium 9.9 mg/dL      Total Protein 7.1 g/dL      Albumin 4.4 g/dL      ALT (SGPT) 17 U/L      AST (SGOT) 22 U/L      Alkaline Phosphatase 114 U/L      Total Bilirubin 1.2 mg/dL      Globulin 2.7 gm/dL      A/G Ratio 1.6 g/dL      BUN/Creatinine Ratio 17.7     Anion Gap 13.0 mmol/L      eGFR 52.8 mL/min/1.73     Narrative:      GFR Normal >60  Chronic Kidney Disease <60  Kidney Failure <15    The GFR formula is only valid for adults with stable renal function between ages 18 and 70.    Lactic Acid, Plasma [[id number]]  (Normal) Collected: 06/12/23 2128    Specimen: Blood Updated: 06/12/23 2155     Lactate 1.3 mmol/L     aPTT [[id number]]  (Abnormal) Collected: 06/12/23 2128    Specimen: Blood Updated: 06/12/23 2152     PTT 38.9 seconds     Protime-INR [[id number]]  (Abnormal) Collected: 06/12/23 2128    Specimen: Blood Updated: 06/12/23 2152     Protime 14.5 Seconds      INR 1.12    CBC & Differential [[id number]]  (Abnormal) Collected: 06/12/23 2128    Specimen: Blood Updated: 06/12/23 2140    Narrative:      The following orders were created for panel order CBC & Differential.  Procedure                               Abnormality         Status                     ---------                               -----------         ------                     CBC Auto Differential[[id number]]        Abnormal            Final result                 Please view results for these tests on the individual orders.    CBC Auto Differential [[id number]]  (Abnormal) Collected: 06/12/23 2128    Specimen: Blood Updated: 06/12/23 2140     WBC 12.68 10*3/mm3      RBC 4.65 10*6/mm3      Hemoglobin 14.4 g/dL      Hematocrit 40.7 %      MCV 87.5 fL      MCH 31.0 pg      MCHC 35.4 g/dL      RDW 12.3 %      RDW-SD 39.4 fl      MPV 8.8 fL      Platelets 180 10*3/mm3      Neutrophil %  "86.0 %      Lymphocyte % 7.6 %      Monocyte % 5.5 %      Eosinophil % 0.2 %      Basophil % 0.2 %      Immature Grans % 0.5 %      Neutrophils, Absolute 10.91 10*3/mm3      Lymphocytes, Absolute 0.96 10*3/mm3      Monocytes, Absolute 0.70 10*3/mm3      Eosinophils, Absolute 0.02 10*3/mm3      Basophils, Absolute 0.03 10*3/mm3      Immature Grans, Absolute 0.06 10*3/mm3      nRBC 0.0 /100 WBC         /93 (BP Location: Left arm, Patient Position: Lying)   Pulse 78   Temp 98.4 °F (36.9 °C) (Oral)   Resp 18   Ht 180.3 cm (71\")   Wt 87.7 kg (193 lb 5.5 oz)   SpO2 95%   BMI 26.97 kg/m²     Discharge Exam:  General Appearance:    Alert, cooperative, no distress                          Head:    Normocephalic, without obvious abnormality, atraumatic                          Eyes:                            Throat:   Lips, tongue, gums normal                          Neck:   Supple, symmetrical, trachea midline, no JVD                        Lungs:     Clear to auscultation bilaterally, respirations unlabored                Chest Wall:    No tenderness or deformity                        Heart:    Regular rate and rhythm, S1 and S2 normal, no murmur,no  Rub  or gallop                  Abdomen:     Soft, non-tender, bowel sounds active, no masses, no  organomegaly                  Extremities:   Extremities normal, atraumatic, no cyanosis or edema                             Skin:   Skin is warm and dry,  no rashes or palpable lesions                  Neurologic:   no focal deficits noted     Disposition:  Home    Activity as tolerated    Diet as tolerated  Diet Order   Procedures   • Diet: Cardiac Diets; Healthy Heart (2-3 Na+); Texture: Regular Texture (IDDSI 7); Fluid Consistency: Thin (IDDSI 0)       Patient Instructions:      Discharge Medications      New Medications      Instructions Start Date   apixaban 5 MG tablet tablet  Commonly known as: ELIQUIS   5 mg, Oral, Every 12 Hours Scheduled    "   HYDROcodone Bit-Homatrop MBr 5-1.5 MG/5ML solution  Commonly known as: HYCODAN   5 mL, Oral, Every 4 Hours PRN         Continue These Medications      Instructions Start Date   aspirin 81 MG tablet   81 mg, Oral, Daily      atorvastatin 40 MG tablet  Commonly known as: LIPITOR   TAKE 1 TABLET DAILY      cetirizine 10 MG tablet  Commonly known as: zyrTEC   10 mg, Oral, Daily      chlorthalidone 50 MG tablet  Commonly known as: HYGROTEN   TAKE 1 TABLET DAILY      cloNIDine 0.1 MG tablet  Commonly known as: CATAPRES   TAKE 1 TABLET TWICE A DAY      econazole nitrate 1 % cream  Commonly known as: SPECTAZOLE   Topical, As Needed      fluticasone 50 MCG/ACT nasal spray  Commonly known as: FLONASE   1 spray, Nasal, Daily      losartan 100 MG tablet  Commonly known as: COZAAR   TAKE 1 TABLET DAILY      metoprolol succinate  MG 24 hr tablet  Commonly known as: TOPROL-XL   TAKE 1 TABLET DAILY      metroNIDAZOLE 0.75 % lotion lotion  Commonly known as: FLAGYL   Daily      modafinil 100 MG tablet  Commonly known as: PROVIGIL   100 mg, Oral, Daily      multivitamin tablet tablet  Commonly known as: THERAGRAN   1 tablet, Oral, Daily      pantoprazole 40 MG EC tablet  Commonly known as: PROTONIX   40 mg, Oral, Daily      potassium chloride 10 MEQ CR tablet  Commonly known as: K-DUR,KLOR-CON   TAKE 2 TABLETS DAILY      sennosides-docusate 8.6-50 MG per tablet  Commonly known as: Senna S   1 tablet, Oral, 2 Times Daily PRN      tamsulosin 0.4 MG capsule 24 hr capsule  Commonly known as: FLOMAX   TAKE 2 CAPSULES DAILY         Stop These Medications    coenzyme Q10 100 MG capsule     FLAX SEED OIL PO     JOINT HEALTH PO     meloxicam 7.5 MG tablet  Commonly known as: MOBIC     methylPREDNISolone 4 MG dose pack  Commonly known as: MEDROL     Saw [place] (Serenoa repens) 320 MG capsule          Future Appointments   Date Time Provider Department Center   6/23/2023 12:30 PM Lexie Obando APRN [facility]   7/21/2023 11:00  AM Constantine Carrera II, MD [facility]   1/17/2024  1:30 PM Kaitlin Chew APRN [facility]      Follow-up Information     eLanna Ryder MD Follow up in 1 week(s).    Specialty: Family Medicine  Contact information:  [address] [address]  [phone]                       Discharge Order (From admission, onward)     Start     Ordered    06/16/23 1124  Discharge patient  Once        Expected Discharge Date: 06/16/23    Discharge Disposition: Home or Self Care    Physician of Record for Attribution - Please select from Treatment Team: ALBARO FRANCIS [[id number]]    Review needed by CMO to determine Physician of Record: No       Question Answer Comment   Physician of Record for Attribution - Please select from Treatment Team ALBARO FRANCIS    Review needed by CMO to determine Physician of Record No        06/16/23 1132                Total time spent discharging patient including evaluation,post hospitalization follow up,  medication and post hospitalization instructions and education total time exceeds 30 minutes.    Signed:  Albaro Francis MD  6/16/2023  11:32 EDT

## 2023-06-17 LAB
BACTERIA SPEC AEROBE CULT: NORMAL
BACTERIA SPEC AEROBE CULT: NORMAL

## 2023-06-17 NOTE — OUTREACH NOTE
Prep Survey    Flowsheet Row Responses   [facility] patient discharged from? [place]   Is LACE score < 7 ? No   Eligibility [facility]   Date of Admission 06/12/23   Date of Discharge 06/16/23   Discharge Disposition Home or Self Care   Discharge diagnosis COVID-19, new onset A-fib   Does the patient have one of the following disease processes/diagnoses(primary or secondary)? Other   Does the patient have Home health ordered? No   Is there a DME ordered? No   Prep survey completed? Yes          Claudia WORTHINGTON - Registered Nurse

## 2023-06-19 ENCOUNTER — TRANSITIONAL CARE MANAGEMENT TELEPHONE ENCOUNTER (OUTPATIENT)
Dept: CALL CENTER | Facility: HOSPITAL | Age: 88
End: 2023-06-19
Payer: MEDICARE

## 2023-06-19 NOTE — OUTREACH NOTE
Call Center TCM Note      Flowsheet Row Responses   [facility] patient discharged from? [place]   Does the patient have one of the following disease processes/diagnoses(primary or secondary)? Other   TCM attempt successful? Yes   Call start time 0924   Call end time 0936   Discharge diagnosis COVID-19, new onset A-fib   Meds reviewed with patient/caregiver? Yes   Is the patient having any side effects they believe may be caused by any medication additions or changes? No   Does the patient have all medications ordered at discharge? Yes   Is the patient taking all medications as directed (includes completed medication regime)? Yes   Comments Hospital d/c f/u appt on 6/29/23 @10am   Does the patient have an appointment with their PCP within 7 days of discharge? Yes   Has home health visited the patient within 72 hours of discharge? N/A   Psychosocial issues? No   Did the patient receive a copy of their discharge instructions? Yes   Nursing interventions Reviewed instructions with patient   What is the patient's perception of their health status since discharge? Improving   Is the patient/caregiver able to teach back the hierarchy of who to call/visit for symptoms/problems? PCP, Specialist, Home health nurse, Urgent Care, ED, 911 Yes   TCM call completed? Yes   Call end time 0936   Would this patient benefit from a Referral to Amb Social Work? No   Is the patient interested in additional calls from an ambulatory ?  NOTE:  applies to high risk patients requiring additional follow-up. No            Randi Leavitt RN    6/19/2023, 09:36 EDT

## 2023-07-24 DIAGNOSIS — K21.9 GASTROESOPHAGEAL REFLUX DISEASE WITHOUT ESOPHAGITIS: ICD-10-CM

## 2023-07-24 RX ORDER — PANTOPRAZOLE SODIUM 40 MG/1
40 TABLET, DELAYED RELEASE ORAL DAILY
Qty: 90 TABLET | Refills: 0 | Status: SHIPPED | OUTPATIENT
Start: 2023-07-24

## 2023-07-24 NOTE — TELEPHONE ENCOUNTER
Caller: Bry Ellingtonnuha OSHEA    Relationship: Self    Best call back number: [phone]     Requested Prescriptions:   Requested Prescriptions     Pending Prescriptions Disp Refills    pantoprazole (PROTONIX) 40 MG EC tablet 90 tablet 0     Sig: Take 1 tablet by mouth Daily.        Pharmacy where request should be sent: [facility] - [address] [phone] [facility] [phone] FX     Last office visit with prescribing clinician: 6/29/2023   Last telemedicine visit with prescribing clinician: Visit date not found   Next office visit with prescribing clinician: 10/3/2023     Additional details provided by patient: PATIENT IS ORDERING EARLY DUE TO SHIPPING FROM HOME DELIVERY     Does the patient have less than a 3 day supply:  [] Yes  [x] No    Would you like a call back once the refill request has been completed: [] Yes [x] No    If the office needs to give you a call back, can they leave a voicemail: [x] Yes [] No    Uziel Romero Rep   07/24/23 10:55 EDT

## 2023-08-02 ENCOUNTER — TELEPHONE (OUTPATIENT)
Dept: CARDIOLOGY | Facility: CLINIC | Age: 88
End: 2023-08-02
Payer: MEDICARE

## 2023-08-17 DIAGNOSIS — I10 ESSENTIAL HYPERTENSION: ICD-10-CM

## 2023-08-17 RX ORDER — POTASSIUM CHLORIDE 750 MG/1
TABLET, EXTENDED RELEASE ORAL
Qty: 180 TABLET | Refills: 3 | Status: SHIPPED | OUTPATIENT
Start: 2023-08-17

## 2023-08-21 DIAGNOSIS — G47.419 PRIMARY NARCOLEPSY WITHOUT CATAPLEXY: ICD-10-CM

## 2023-08-21 RX ORDER — MODAFINIL 100 MG/1
100 TABLET ORAL DAILY
Qty: 30 TABLET | Refills: 2 | Status: SHIPPED | OUTPATIENT
Start: 2023-08-21

## 2023-08-21 NOTE — TELEPHONE ENCOUNTER
Caller: Kevin Ellington DORIE    Relationship: Self    Best call back number: [phone]     Requested Prescriptions:   Requested Prescriptions     Pending Prescriptions Disp Refills    modafinil (PROVIGIL) 100 MG tablet 30 tablet 2     Sig: Take 1 tablet by mouth Daily.        Pharmacy where request should be sent: [facility] - [address] [phone] [facility] [phone]      Last office visit with prescribing clinician: 6/29/2023   Last telemedicine visit with prescribing clinician: Visit date not found   Next office visit with prescribing clinician: 10/3/2023     Additional details provided by patient: PATIENT IS CALLING IN SO MEDICATION WILL BE THERE BY THE TIME HE IS OUT OF IT.       Does the patient have less than a 3 day supply:  [] Yes  [x] No    Would you like a call back once the refill request has been completed: [] Yes [x] No    If the office needs to give you a call back, can they leave a voicemail: [x] Yes [] No    Uziel Romero Rep   08/21/23 09:28 EDT
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
## 2023-09-19 ENCOUNTER — OFFICE VISIT (OUTPATIENT)
Dept: CARDIOLOGY | Facility: CLINIC | Age: 88
End: 2023-09-19
Payer: MEDICARE

## 2023-09-19 VITALS
HEART RATE: 79 BPM | DIASTOLIC BLOOD PRESSURE: 70 MMHG | SYSTOLIC BLOOD PRESSURE: 120 MMHG | OXYGEN SATURATION: 97 % | BODY MASS INDEX: 30.03 KG/M2 | HEIGHT: 70 IN | WEIGHT: 209.8 LBS

## 2023-09-19 DIAGNOSIS — Z95.5 S/P CORONARY ARTERY STENT PLACEMENT: ICD-10-CM

## 2023-09-19 DIAGNOSIS — I65.23 BILATERAL CAROTID ARTERY STENOSIS: ICD-10-CM

## 2023-09-19 DIAGNOSIS — I48.20 CHRONIC ATRIAL FIBRILLATION: ICD-10-CM

## 2023-09-19 DIAGNOSIS — N18.31 STAGE 3A CHRONIC KIDNEY DISEASE: ICD-10-CM

## 2023-09-19 DIAGNOSIS — I25.10 CORONARY ARTERY DISEASE INVOLVING NATIVE CORONARY ARTERY OF NATIVE HEART WITHOUT ANGINA PECTORIS: Primary | ICD-10-CM

## 2023-09-19 DIAGNOSIS — G45.9 TIA (TRANSIENT ISCHEMIC ATTACK): ICD-10-CM

## 2023-09-19 DIAGNOSIS — E78.5 HYPERLIPIDEMIA, UNSPECIFIED HYPERLIPIDEMIA TYPE: ICD-10-CM

## 2023-09-19 DIAGNOSIS — I10 ESSENTIAL HYPERTENSION: ICD-10-CM
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
## 2023-10-03 ENCOUNTER — OFFICE VISIT (OUTPATIENT)
Dept: FAMILY MEDICINE CLINIC | Facility: CLINIC | Age: 88
End: 2023-10-03
Payer: MEDICARE

## 2023-10-03 VITALS
DIASTOLIC BLOOD PRESSURE: 78 MMHG | WEIGHT: 210 LBS | BODY MASS INDEX: 30.06 KG/M2 | TEMPERATURE: 97.6 F | OXYGEN SATURATION: 98 % | HEART RATE: 76 BPM | SYSTOLIC BLOOD PRESSURE: 118 MMHG | HEIGHT: 70 IN

## 2023-10-03 DIAGNOSIS — R26.89 BALANCE PROBLEM: ICD-10-CM

## 2023-10-03 DIAGNOSIS — Z95.5 S/P CORONARY ARTERY STENT PLACEMENT: ICD-10-CM

## 2023-10-03 DIAGNOSIS — N39.41 URGE INCONTINENCE OF URINE: ICD-10-CM

## 2023-10-03 DIAGNOSIS — G47.419 PRIMARY NARCOLEPSY WITHOUT CATAPLEXY: ICD-10-CM

## 2023-10-03 DIAGNOSIS — R35.0 BENIGN PROSTATIC HYPERPLASIA WITH URINARY FREQUENCY: ICD-10-CM

## 2023-10-03 DIAGNOSIS — Z23 NEED FOR VACCINATION: ICD-10-CM

## 2023-10-03 DIAGNOSIS — M51.36 BULGING LUMBAR DISC: ICD-10-CM

## 2023-10-03 DIAGNOSIS — R29.898 WEAKNESS OF BOTH LOWER EXTREMITIES: ICD-10-CM

## 2023-10-03 DIAGNOSIS — E11.9 TYPE 2 DIABETES MELLITUS WITHOUT COMPLICATION, WITHOUT LONG-TERM CURRENT USE OF INSULIN: ICD-10-CM

## 2023-10-03 DIAGNOSIS — Z12.5 SCREENING PSA (PROSTATE SPECIFIC ANTIGEN): ICD-10-CM

## 2023-10-03 DIAGNOSIS — N40.1 BENIGN PROSTATIC HYPERPLASIA WITH URINARY FREQUENCY: ICD-10-CM

## 2023-10-03 DIAGNOSIS — Z51.81 THERAPEUTIC DRUG MONITORING: ICD-10-CM

## 2023-10-03 DIAGNOSIS — I10 ESSENTIAL HYPERTENSION: Primary | ICD-10-CM

## 2023-10-03 NOTE — PROGRESS NOTES
"Chief Complaint  Daytime Sleepiness (Follow up  ,no complains  doing well ), Hypertension (No complains doing well ), and Immunizations (Would like to check about his vaccines)    Subjective        Kevin Ellington presents to [facility]  History of Present Illness  Pleasant 88-year-old male to follow-up for hypertension which is well controlled, type 2 diabetes that is well controlled, last hemoglobin A1c at 6.6, hypersomnia which is well controlled on Provigil 100 mg daily, history of coronary artery disease with hyperlipidemia that has been stable, his cardiologist Dr. Salmeron recently advised him to stop aspirin as he is now on Eliquis for A-fib.      Since starting Eloquis  - They stopped the meloxicam with eliquis , and saw palmetto.  It was helping with frequent urination and now since covid is having to urinate every couple of hours.  He is taking the tamsulosin     He did bot do PT, his back was in a lot of pain but had covid, andhas been considering getting an orthopedic specialist for epidurals.  He did get one in August, and now he is feeling better, not resolved but improving, But still with weakness in the legs and some trouble walking.     Objective   Vital Signs:  /78   Pulse 76   Temp 97.6 °F (36.4 °C)   Ht 177.8 cm (70\")   Wt 95.3 kg (210 lb)   SpO2 98%   BMI 30.13 kg/m²   Estimated body mass index is 30.13 kg/m² as calculated from the following:    Height as of this encounter: 177.8 cm (70\").    Weight as of this encounter: 95.3 kg (210 lb).       Physical Exam  Vitals and nursing note reviewed.   Constitutional:       General: He is not in acute distress.     Appearance: He is well-developed.   HENT:      Head: Normocephalic.      Nose: Nose normal.   Cardiovascular:      Rate and Rhythm: Normal rate and regular rhythm.      Heart sounds: Normal heart sounds. No murmur heard.  Pulmonary:      Effort: Pulmonary effort is normal. No respiratory distress.    " Pt came in for a vitals check. Pt got labs completed this morning.      BP on right arm with large cuff was 130/84  Pulse ox was 96/60  Respirations: 16   Breath sounds: Normal breath sounds.   Musculoskeletal:         General: Normal range of motion.   Skin:     General: Skin is warm and dry.      Findings: No rash.   Neurological:      Mental Status: He is alert and oriented to person, place, and time.   Psychiatric:         Behavior: Behavior normal.         Thought Content: Thought content normal.         Judgment: Judgment normal.      Result Review :                   Assessment and Plan   Diagnoses and all orders for this visit:    1. Essential hypertension (Primary)  -     Comprehensive Metabolic Panel  -     CBC & Differential  -     Lipid Panel  -     TSH Rfx On Abnormal To Free T4    2. Primary narcolepsy without cataplexy    3. Type 2 diabetes mellitus without complication, without long-term current use of insulin  -     Microalbumin / Creatinine Urine Ratio - Urine, Clean Catch  -     Hemoglobin A1c    4. S/P coronary artery stent placement    5. Therapeutic drug monitoring  -     ToxASSURE Select 13 (MW) - Urine, Clean Catch    6. Screening PSA (prostate specific antigen)  -     PSA SCREENING    7. Benign prostatic hyperplasia with urinary frequency    8. Urge incontinence of urine  -     Mirabegron ER (Myrbetriq) 25 MG tablet sustained-release 24 hour 24 hr tablet; Take 1 tablet by mouth Daily. Bladder emerald  Dispense: 90 tablet; Refill: 2    9. Bulging lumbar disc  -     Ambulatory Referral to Physical Therapy Evaluate and treat (balance)    10. Weakness of both lower extremities  -     Ambulatory Referral to Physical Therapy Evaluate and treat (balance)    11. Balance problem  -     Ambulatory Referral to Physical Therapy Evaluate and treat (balance)    12. Need for vaccination  -     Pneumococcal Conjugate Vaccine 20-Valent (PCV20)  -     Fluzone High-Dose 65+yrs    Pleasant 88-year-old male here to discuss multiple concerns.    Thankfully diabetes hypertension both well controlled, due for labs today as above.    Primary narcolepsy well  controlled on modafinil 100 mg daily, this has been sufficient to treat his symptoms, due for tox today Mike appropriate    Urge incontinence with some worsening BPH after stopping saw palmetto supplement with interactions with Eliquis.  Plan to continue Flomax 0.8 mg daily, start Myrbetriq 25 mg in the morning, and will check PSA to make sure there is not an alarming change.  At 88 I would not recommend that we pursue an elevated PSA aggressively.  He is in agreement.  For now he would prefer not to see a different specialist    Chronic lower back pain that is thankfully improving, continue epidurals with pain management, he is starting to feel like he is able to engage in physical therapy again, he is having more weakness and balance trouble in his lower extremities, referral to PT as above.    Due for immunizations as above         Follow Up   Return in about 3 months (around 1/3/2024), or if symptoms worsen or fail to improve, for Medicare Wellness with fasting labs prior and balance issues (OK to oberbook) .  Patient was given instructions and counseling regarding his condition or for health maintenance advice. Please see specific information pulled into the AVS if appropriate.     Leanna Ryder MD

## 2023-10-04 LAB
ALBUMIN SERPL-MCNC: 4.5 G/DL (ref 3.5–5.2)
ALBUMIN/GLOB SERPL: 2.1 G/DL
ALP SERPL-CCNC: 89 U/L (ref 39–117)
ALT SERPL-CCNC: 21 U/L (ref 1–41)
AST SERPL-CCNC: 24 U/L (ref 1–40)
BASOPHILS # BLD AUTO: 0.03 10*3/MM3 (ref 0–0.2)
BASOPHILS NFR BLD AUTO: 0.3 % (ref 0–1.5)
BILIRUB SERPL-MCNC: 0.8 MG/DL (ref 0–1.2)
BUN SERPL-MCNC: 23 MG/DL (ref 8–23)
BUN/CREAT SERPL: 19 (ref 7–25)
CALCIUM SERPL-MCNC: 9.8 MG/DL (ref 8.6–10.5)
CHLORIDE SERPL-SCNC: 100 MMOL/L (ref 98–107)
CHOLEST SERPL-MCNC: 138 MG/DL (ref 0–200)
CO2 SERPL-SCNC: 26.6 MMOL/L (ref 22–29)
CREAT SERPL-MCNC: 1.21 MG/DL (ref 0.76–1.27)
EGFRCR SERPLBLD CKD-EPI 2021: 57.6 ML/MIN/1.73
EOSINOPHIL # BLD AUTO: 0.12 10*3/MM3 (ref 0–0.4)
EOSINOPHIL NFR BLD AUTO: 1.2 % (ref 0.3–6.2)
ERYTHROCYTE [DISTWIDTH] IN BLOOD BY AUTOMATED COUNT: 13 % (ref 12.3–15.4)
GLOBULIN SER CALC-MCNC: 2.1 GM/DL
GLUCOSE SERPL-MCNC: 118 MG/DL (ref 65–99)
HBA1C MFR BLD: 6.7 % (ref 4.8–5.6)
HCT VFR BLD AUTO: 43.6 % (ref 37.5–51)
HDLC SERPL-MCNC: 40 MG/DL (ref 40–60)
HGB BLD-MCNC: 15.1 G/DL (ref 13–17.7)
IMM GRANULOCYTES # BLD AUTO: 0.06 10*3/MM3 (ref 0–0.05)
IMM GRANULOCYTES NFR BLD AUTO: 0.6 % (ref 0–0.5)
LDLC SERPL CALC-MCNC: 70 MG/DL (ref 0–100)
LYMPHOCYTES # BLD AUTO: 2.13 10*3/MM3 (ref 0.7–3.1)
LYMPHOCYTES NFR BLD AUTO: 21.4 % (ref 19.6–45.3)
MCH RBC QN AUTO: 31.1 PG (ref 26.6–33)
MCHC RBC AUTO-ENTMCNC: 34.6 G/DL (ref 31.5–35.7)
MCV RBC AUTO: 89.7 FL (ref 79–97)
MONOCYTES # BLD AUTO: 0.58 10*3/MM3 (ref 0.1–0.9)
MONOCYTES NFR BLD AUTO: 5.8 % (ref 5–12)
NEUTROPHILS # BLD AUTO: 7.05 10*3/MM3 (ref 1.7–7)
NEUTROPHILS NFR BLD AUTO: 70.7 % (ref 42.7–76)
NRBC BLD AUTO-RTO: 0 /100 WBC (ref 0–0.2)
PLATELET # BLD AUTO: 197 10*3/MM3 (ref 140–450)
POTASSIUM SERPL-SCNC: 4.7 MMOL/L (ref 3.5–5.2)
PROT SERPL-MCNC: 6.6 G/DL (ref 6–8.5)
PSA SERPL-MCNC: 1.53 NG/ML (ref 0–4)
RBC # BLD AUTO: 4.86 10*6/MM3 (ref 4.14–5.8)
SODIUM SERPL-SCNC: 137 MMOL/L (ref 136–145)
TRIGL SERPL-MCNC: 163 MG/DL (ref 0–150)
TSH SERPL DL<=0.005 MIU/L-ACNC: 2.33 UIU/ML (ref 0.27–4.2)
VLDLC SERPL CALC-MCNC: 28 MG/DL (ref 5–40)
WBC # BLD AUTO: 9.97 10*3/MM3 (ref 3.4–10.8)

## 2023-10-06 ENCOUNTER — HOSPITAL ENCOUNTER (OUTPATIENT)
Dept: CARDIOLOGY | Facility: HOSPITAL | Age: 88
Discharge: HOME | End: 2023-10-06
Payer: MEDICARE

## 2023-10-06 VITALS
HEIGHT: 70 IN | WEIGHT: 210.1 LBS | HEART RATE: 70 BPM | SYSTOLIC BLOOD PRESSURE: 122 MMHG | DIASTOLIC BLOOD PRESSURE: 62 MMHG | BODY MASS INDEX: 30.08 KG/M2

## 2023-10-06 DIAGNOSIS — I48.20 CHRONIC ATRIAL FIBRILLATION: ICD-10-CM

## 2023-10-06 LAB
ASCENDING AORTA: 3.3 CM
BH CV ECHO MEAS - ACS: 1.89 CM
BH CV ECHO MEAS - AO MAX PG: 6.4 MMHG
BH CV ECHO MEAS - AO MEAN PG: 3.6 MMHG
BH CV ECHO MEAS - AO ROOT DIAM: 3.2 CM
BH CV ECHO MEAS - AO V2 MAX: 126.8 CM/SEC
BH CV ECHO MEAS - AO V2 VTI: 25.2 CM
BH CV ECHO MEAS - AVA(I,D): 2.36 CM2
BH CV ECHO MEAS - EDV(CUBED): 111.6 ML
BH CV ECHO MEAS - EDV(MOD-SP2): 70 ML
BH CV ECHO MEAS - EDV(MOD-SP4): 59 ML
BH CV ECHO MEAS - EF(MOD-BP): 52.4 %
BH CV ECHO MEAS - EF(MOD-SP2): 52.9 %
BH CV ECHO MEAS - EF(MOD-SP4): 54.2 %
BH CV ECHO MEAS - ESV(CUBED): 34.5 ML
BH CV ECHO MEAS - ESV(MOD-SP2): 33 ML
BH CV ECHO MEAS - ESV(MOD-SP4): 27 ML
BH CV ECHO MEAS - FS: 32.4 %
BH CV ECHO MEAS - IVS/LVPW: 0.93 CM
BH CV ECHO MEAS - IVSD: 1.05 CM
BH CV ECHO MEAS - LAT PEAK E' VEL: 12 CM/SEC
BH CV ECHO MEAS - LV DIASTOLIC VOL/BSA (35-75): 27.7 CM2
BH CV ECHO MEAS - LV MASS(C)D: 191.9 GRAMS
BH CV ECHO MEAS - LV MAX PG: 2.7 MMHG
BH CV ECHO MEAS - LV MEAN PG: 1.51 MMHG
BH CV ECHO MEAS - LV SYSTOLIC VOL/BSA (12-30): 12.7 CM2
BH CV ECHO MEAS - LV V1 MAX: 81.4 CM/SEC
BH CV ECHO MEAS - LV V1 VTI: 17.6 CM
BH CV ECHO MEAS - LVIDD: 4.8 CM
BH CV ECHO MEAS - LVIDS: 3.3 CM
BH CV ECHO MEAS - LVOT AREA: 3.4 CM2
BH CV ECHO MEAS - LVOT DIAM: 2.08 CM
BH CV ECHO MEAS - LVPWD: 1.13 CM
BH CV ECHO MEAS - MED PEAK E' VEL: 9.7 CM/SEC
BH CV ECHO MEAS - MV DEC SLOPE: 405.7 CM/SEC2
BH CV ECHO MEAS - MV DEC TIME: 0.16 SEC
BH CV ECHO MEAS - MV E MAX VEL: 104 CM/SEC
BH CV ECHO MEAS - MV MAX PG: 6.4 MMHG
BH CV ECHO MEAS - MV MEAN PG: 2.16 MMHG
BH CV ECHO MEAS - MV P1/2T: 93.4 MSEC
BH CV ECHO MEAS - MV V2 VTI: 31.5 CM
BH CV ECHO MEAS - MVA(P1/2T): 2.35 CM2
BH CV ECHO MEAS - MVA(VTI): 1.89 CM2
BH CV ECHO MEAS - PA ACC TIME: 0.1 SEC
BH CV ECHO MEAS - PA V2 MAX: 101.6 CM/SEC
BH CV ECHO MEAS - QP/QS: 0.62
BH CV ECHO MEAS - RAP SYSTOLE: 3 MMHG
BH CV ECHO MEAS - RV MAX PG: 1.78 MMHG
BH CV ECHO MEAS - RV V1 MAX: 66.7 CM/SEC
BH CV ECHO MEAS - RV V1 VTI: 12.1 CM
BH CV ECHO MEAS - RVOT DIAM: 1.98 CM
BH CV ECHO MEAS - RVSP: 25.4 MMHG
BH CV ECHO MEAS - SI(MOD-SP2): 17.3 ML/M2
BH CV ECHO MEAS - SI(MOD-SP4): 15 ML/M2
BH CV ECHO MEAS - SV(LVOT): 59.5 ML
BH CV ECHO MEAS - SV(MOD-SP2): 37 ML
BH CV ECHO MEAS - SV(MOD-SP4): 32 ML
BH CV ECHO MEAS - SV(RVOT): 37.2 ML
BH CV ECHO MEAS - TAPSE (>1.6): 1.8 CM
BH CV ECHO MEAS - TR MAX PG: 22.4 MMHG
BH CV ECHO MEAS - TR MAX VEL: 236.5 CM/SEC
BH CV ECHO MEASUREMENTS AVERAGE E/E' RATIO: 9.59
BH CV XLRA - RV BASE: 3.2 CM
BH CV XLRA - RV LENGTH: 6.7 CM
BH CV XLRA - RV MID: 3.3 CM
BH CV XLRA - TDI S': 9.2 CM/SEC
SINUS: 2.9 CM
STJ: 2.6 CM

## 2023-10-06 PROCEDURE — 93306 TTE W/DOPPLER COMPLETE: CPT

## 2023-10-09 DIAGNOSIS — I10 ESSENTIAL HYPERTENSION: ICD-10-CM

## 2023-10-09 LAB
ALBUMIN/CREAT UR: 5 MG/G CREAT (ref 0–29)
CREAT UR-MCNC: 61.4 MG/DL
DRUGS UR: NORMAL
MICROALBUMIN UR-MCNC: 3.2 UG/ML

## 2023-10-09 RX ORDER — LOSARTAN POTASSIUM 100 MG/1
TABLET ORAL
Qty: 90 TABLET | Refills: 3 | Status: SHIPPED | OUTPATIENT
Start: 2023-10-09

## 2023-10-09 RX ORDER — ATORVASTATIN CALCIUM 40 MG/1
TABLET, FILM COATED ORAL
Qty: 90 TABLET | Refills: 3 | Status: SHIPPED | OUTPATIENT
Start: 2023-10-09

## 2023-10-09 RX ORDER — CLONIDINE HYDROCHLORIDE 0.1 MG/1
TABLET ORAL
Qty: 180 TABLET | Refills: 3 | Status: SHIPPED | OUTPATIENT
Start: 2023-10-09

## 2023-10-09 RX ORDER — TAMSULOSIN HYDROCHLORIDE 0.4 MG/1
CAPSULE ORAL
Qty: 180 CAPSULE | Refills: 3 | Status: SHIPPED | OUTPATIENT
Start: 2023-10-09

## 2023-10-09 RX ORDER — METOPROLOL SUCCINATE 100 MG/1
TABLET, EXTENDED RELEASE ORAL
Qty: 90 TABLET | Refills: 3 | Status: SHIPPED | OUTPATIENT
Start: 2023-10-09

## 2023-10-17 DIAGNOSIS — K21.9 GASTROESOPHAGEAL REFLUX DISEASE WITHOUT ESOPHAGITIS: ICD-10-CM

## 2023-10-17 DIAGNOSIS — K59.04 CHRONIC IDIOPATHIC CONSTIPATION: ICD-10-CM

## 2023-10-17 RX ORDER — PANTOPRAZOLE SODIUM 40 MG/1
40 TABLET, DELAYED RELEASE ORAL DAILY
Qty: 90 TABLET | Refills: 3 | Status: SHIPPED | OUTPATIENT
Start: 2023-10-17

## 2023-10-17 RX ORDER — PANTOPRAZOLE SODIUM 40 MG/1
40 TABLET, DELAYED RELEASE ORAL DAILY
Qty: 90 TABLET | Refills: 0 | Status: SHIPPED | OUTPATIENT
Start: 2023-10-17 | End: 2023-10-17

## 2023-10-17 RX ORDER — AMOXICILLIN 250 MG
1 CAPSULE ORAL 2 TIMES DAILY PRN
Qty: 180 TABLET | Refills: 2 | Status: SHIPPED | OUTPATIENT
Start: 2023-10-17

## 2023-10-17 NOTE — TELEPHONE ENCOUNTER
Caller: Kevin Ellington DORIE    Relationship: Self    Best call back number: [phone]     Requested Prescriptions:   Requested Prescriptions     Pending Prescriptions Disp Refills    pantoprazole (PROTONIX) 40 MG EC tablet 90 tablet 0     Sig: Take 1 tablet by mouth Daily.    sennosides-docusate (Senna S) 8.6-50 MG per tablet 180 tablet 2     Sig: Take 1 tablet by mouth 2 (Two) Times a Day As Needed for Constipation.        Pharmacy where request should be sent: [facility] [address] [phone] [facility] [phone]      Last office visit with prescribing clinician: 10/3/2023   Last telemedicine visit with prescribing clinician: Visit date not found   Next office visit with prescribing clinician: 1/9/2024     Additional details provided by patient: REQUESTING 90 DAY SUPPLY     Does the patient have less than a 3 day supply:  [] Yes  [x] No    Would you like a call back once the refill request has been completed: [] Yes [x] No    If the office needs to give you a call back, can they leave a voicemail: [] Yes [x] No    Uziel Rodríguez Rep   10/17/23 12:51 EDT

## 2023-10-30 ENCOUNTER — TREATMENT (OUTPATIENT)
Dept: PHYSICAL THERAPY | Facility: CLINIC | Age: 88
End: 2023-10-30
Payer: MEDICARE

## 2023-10-30 DIAGNOSIS — R29.898 WEAKNESS OF BOTH LOWER EXTREMITIES: ICD-10-CM

## 2023-10-30 DIAGNOSIS — M51.36 BULGING LUMBAR DISC: Primary | ICD-10-CM

## 2023-10-30 DIAGNOSIS — R26.89 BALANCE PROBLEM: ICD-10-CM

## 2023-10-30 PROCEDURE — 97163 PT EVAL HIGH COMPLEX 45 MIN: CPT | Performed by: PHYSICAL THERAPIST

## 2023-10-30 PROCEDURE — 97110 THERAPEUTIC EXERCISES: CPT | Performed by: PHYSICAL THERAPIST

## 2023-10-30 NOTE — PROGRESS NOTES
Physical Therapy Initial Evaluation and Plan of Care    Patient: Kevin Ellington   : 1935  Diagnosis/ICD-10 Code:  Bulging lumbar disc [M51.36]  Referring practitioner: Leanna Ryder MD  Date of Initial Visit: 10/30/2023  Today's Date: 10/30/2023  Patient seen for 1 sessions  PT Clinic location:  [address], [address] [place] [address]           Subjective Questionnaire: Oswestry: 52%  ABC:  = 35%    Subjective Evaluation    History of Present Illness  Mechanism of injury: History of current condition: Presents primarily for balance issues and leg weakness, also some for back pain but he's had this for a long time and doesn't think it will change. Says he has a long history of physical issues, joint replacements and ankle fusion, 3 back surgeries, most recent in , and he has had leg weakness since around that time. Also had COVID this summer which made him weaker. Denies falls - but says he has come close to falling a few times. If he stands too long then his legs go numb.     Reported functional limitation: uses RW at home and community (been using for 3 years), limited ambulation distance (no more than 5-10 minutes - mostly just walks around home). Difficulty getting up out of a chair.         Quality of life: good    Pain  Current pain ratin  At worst pain ratin  Relieving factors: rest  Aggravating factors: ambulation and standing  Progression: worsening    Patient Goals  Patient goals for therapy: decreased pain, improved balance, increased motion, increased strength and independence with ADLs/IADLs  Patient goal: be a little more steady on his feet       Medical history: DM type II, CAD, HTN, asthma, chronic A-fib, Melanoma, stage III chronic kidney disease, TIAs. See chart for further detail.   Therapy Precautions: left ankle fusion  (brace on right ankle), hx 3 back surgeries (last surgery at [facility] in ), right knee replacement  & left in .      Objective          Functional Assessment     Comments  POSTURE: trunk shifted to left, right iliac crest higher, forward flexed posture.  TRANSFERS: requires BUE assist for sit to stand, uncontrolled descent and significant UE assist required  GAIT: ambulates with RW, trunk leans to left, right hip trendelenburg, wide SIS, decreased step length, unsafe with turns with AD.   STRENGTH: BLE strength grossly 4/5 (except ankles n/t due fusion and severe arthritis)  ROM: left ankle fused, right ankle with brace that doesn't allow movement. Decreased bilateral hip extension but not formally measured.  BALANCE:     5 x sit to stand: 39 seconds (with significant BUE assist)     Tinetti:           Assessment & Plan       Assessment  Impairments: abnormal gait, abnormal or restricted ROM, impaired balance, impaired physical strength, lacks appropriate home exercise program and safety issue   Functional limitations: walking and standing   Assessment details: The patient is a 88 y.o. male who presents to physical therapy today for balance problems, BLE weakness, and low back pain. Upon initial evaluation, the patient demonstrates the following impairments: impaired balance, unsteadiness during ambulation, impaired gait mechanics, functional BLE weakness as indicated by sit to stand, postural impairments. Examination findings indicate that he is at high risk for falls. Due to these impairments, the patient is unable to perform or has difficulty with the following functional tasks: standing, walking, difficulty getting up out of chair, reliant on AD for safe ambulation. The patient would benefit from skilled PT services to address functional limitations and impairments and to improve patient quality of life.      Barriers to therapy: complex medical history  Prognosis: good    Goals  Plan Goals: ST. Pt will be independent and compliant with initial HEP in 4 weeks.  2. Pt will improve Tinetti score to >13/24 in 4  weeks.  3. Pt will improve 5 x sit to stand test to <33 seconds in 4 weeks.   4. Pt will demonstrate good safety awareness & decision making to reduce falls risk in 4 weeks.  LT. Pt will be independent with final HEP for self-management of condition by DC.  2. Pt will improve ABC score to >50% to indicate improved balance confidence by DC  3. Pt will improve Tinetti score to >16/24 to indicate decreased falls risk by DC.   4. Pt will improve 5 x sit to stand score to <28 seconds by DC in order to indicate improved functional BLE strength.       Plan  Therapy options: will be seen for skilled therapy services  Planned modality interventions: cryotherapy, thermotherapy (hydrocollator packs) and TENS  Planned therapy interventions: abdominal trunk stabilization, ADL retraining, balance/weight-bearing training, body mechanics training, flexibility, functional ROM exercises, gait training, home exercise program, joint mobilization, manual therapy, motor coordination training, neuromuscular re-education, postural training, soft tissue mobilization, spinal/joint mobilization, strengthening, stretching, therapeutic activities and transfer training  Frequency: 2x week  Duration in weeks: 12  Treatment plan discussed with: patient        See flowsheets for treatment detail.    History # of Personal Factors and/or Comorbidities: HIGH (3+)  Examination of Body System(s): # of elements: HIGH (4+)  Clinical Presentation: UNSTABLE   Clinical Decision Making: HIGH      Timed:         Manual Therapy:         mins  01587;     Therapeutic Exercise:    15     mins  64475;     Neuromuscular Beena:        mins  81720;    Therapeutic Activity:         mins  93906;     Gait Training:           mins  99376;     Ultrasound:          mins  13007;    Ionto                                   mins   76192  Self Care                            mins   48656      Un-Timed:  Electrical Stimulation:         mins  13107 ( );  Dry Needling           mins self-pay  Traction          mins 97457  Low Eval          Mins  98217  Mod Eval          Mins  38427  High Eval                       40     Mins  02277  Re-Eval                               mins  14691      Timed Treatment:   15   mins   Total Treatment:     55   mins    PT SIGNATURE: Mary Rivero, PT   [place] PT license #: [id number]  [place] PT license #: [id number]  DATE TREATMENT INITIATED: 10/30/2023    Initial Certification  Certification Period: 1/27/2024  I certify that the therapy services are furnished while this patient is under my care.  The services outlined above are required by this patient, and will be reviewed every 90 days.    PHYSICIAN: Leanna Ryder MD  NPI: [id number]                                      DATE:     Please sign and return via fax to  [facility] - Fax #: [phone]  . Thank you, [facility].

## 2023-11-01 ENCOUNTER — TREATMENT (OUTPATIENT)
Dept: PHYSICAL THERAPY | Facility: CLINIC | Age: 88
End: 2023-11-01
Payer: MEDICARE

## 2023-11-01 DIAGNOSIS — R29.898 WEAKNESS OF BOTH LOWER EXTREMITIES: ICD-10-CM

## 2023-11-01 DIAGNOSIS — M51.36 BULGING LUMBAR DISC: Primary | ICD-10-CM

## 2023-11-01 DIAGNOSIS — I10 ESSENTIAL HYPERTENSION: ICD-10-CM

## 2023-11-01 DIAGNOSIS — R26.89 BALANCE PROBLEM: ICD-10-CM

## 2023-11-01 PROCEDURE — 97530 THERAPEUTIC ACTIVITIES: CPT | Performed by: PHYSICAL THERAPIST

## 2023-11-01 PROCEDURE — 97110 THERAPEUTIC EXERCISES: CPT | Performed by: PHYSICAL THERAPIST

## 2023-11-01 RX ORDER — CHLORTHALIDONE 50 MG/1
TABLET ORAL
Qty: 90 TABLET | Refills: 3 | Status: SHIPPED | OUTPATIENT
Start: 2023-11-01

## 2023-11-01 NOTE — PROGRESS NOTES
Physical Therapy Daily Treatment Note  [facility] [place]   [address] [place] [address], [address]  [place] [address]  P: [phone]       F: [phone]    Patient: Kevin Ellington   : 1935  Diagnosis/ICD-10 Code:  Bulging lumbar disc [M51.36]  Referring practitioner: Leanna Ryder MD  Date of Initial Visit: Type: THERAPY  Noted: 10/30/2023  Today's Date: 2023  Patient seen for 2 sessions       Kevin Ellington reports: doing okay today, nothing really bothering me. Balance is hardest for me.     Subjective     Objective   See Exercise, Manual, and Modality Logs for complete treatment.       Assessment/Plan  Subjectively, pt reports no increase of pain or discomfort with interventions performed today. Performed well with functional strengthening interventions. Continues to demonstrate good tolerance to added exercises today. Continues to benefit from verbal/tactile cues to ensure proper form and technique for exercise performance.     Progress per Plan of Care           Manual Therapy:         mins  60778;  Therapeutic Exercise:    15     mins  07474;     Neuromuscular Beena:        mins  64753;    Therapeutic Activity:     15     mins  89290;     Gait Training:           mins  71425;     Ultrasound:          mins  00589;    Electrical Stimulation:         mins  55529;  Traction          mins 93029    Timed Treatment:   30   mins   Total Treatment:     30   mins    Tahmina Melgar PTA  Physical Therapist Assistant [id number]

## 2023-11-07 ENCOUNTER — TREATMENT (OUTPATIENT)
Dept: PHYSICAL THERAPY | Facility: CLINIC | Age: 88
End: 2023-11-07
Payer: MEDICARE

## 2023-11-07 DIAGNOSIS — M51.36 BULGING LUMBAR DISC: Primary | ICD-10-CM

## 2023-11-07 DIAGNOSIS — R26.89 BALANCE PROBLEM: ICD-10-CM

## 2023-11-07 DIAGNOSIS — R29.898 WEAKNESS OF BOTH LOWER EXTREMITIES: ICD-10-CM

## 2023-11-07 PROCEDURE — 97110 THERAPEUTIC EXERCISES: CPT | Performed by: PHYSICAL THERAPIST

## 2023-11-07 PROCEDURE — 97530 THERAPEUTIC ACTIVITIES: CPT | Performed by: PHYSICAL THERAPIST

## 2023-11-07 NOTE — PROGRESS NOTES
Physical Therapy Daily Treatment Note  [facility] [place]   [address], [address]  [place] [address]  P: [phone]       F: [phone]    Patient: Kevin Ellington   : 1935  Diagnosis/ICD-10 Code:  Bulging lumbar disc [M51.36]  Referring practitioner: Leanna Ryder MD  Date of Initial Visit: Type: THERAPY  Noted: 10/30/2023  Today's Date: 2023  Patient seen for 3 sessions       Kevin Ellington reports: tired after last session but not too sore.     Subjective     Objective   See Exercise, Manual, and Modality Logs for complete treatment.       Assessment/Plan  Subjectively, pt reports no increase of pain or discomfort with interventions performed today. Performed well with continued functional strengthening and balance activities. Continues to demonstrate increased activity tolerance today. Continues to benefit from verbal/tactile cues to ensure proper form and technique for exercise performance.     Progress per Plan of Care           Manual Therapy:         mins  30127;  Therapeutic Exercise:    20     mins  55076;     Neuromuscular Beena:        mins  17806;    Therapeutic Activity:     15     mins  76504;     Gait Training:           mins  88204;     Ultrasound:          mins  25353;    Electrical Stimulation:         mins  78503;  Traction          mins 66215    Timed Treatment:   35   mins   Total Treatment:     35   mins    Tahmina Melgar PTA  Physical Therapist Assistant [id number]

## 2023-11-09 ENCOUNTER — TREATMENT (OUTPATIENT)
Dept: PHYSICAL THERAPY | Facility: CLINIC | Age: 88
End: 2023-11-09
Payer: MEDICARE

## 2023-11-09 DIAGNOSIS — M51.36 BULGING LUMBAR DISC: Primary | ICD-10-CM

## 2023-11-09 DIAGNOSIS — R29.898 WEAKNESS OF BOTH LOWER EXTREMITIES: ICD-10-CM

## 2023-11-09 DIAGNOSIS — R26.89 BALANCE PROBLEM: ICD-10-CM

## 2023-11-09 PROCEDURE — 97110 THERAPEUTIC EXERCISES: CPT | Performed by: PHYSICAL THERAPIST

## 2023-11-09 PROCEDURE — 97530 THERAPEUTIC ACTIVITIES: CPT | Performed by: PHYSICAL THERAPIST

## 2023-11-09 NOTE — PROGRESS NOTES
Physical Therapy Daily Treatment Note      Patient: Kevin Ellington   : 1935  Referring practitioner: Leanna Ryder MD  Date of Initial Visit: Type: THERAPY  Noted: 10/30/2023  Today's Date: 2023  Patient seen for 4 sessions       Visit Diagnoses:    ICD-10-CM ICD-9-CM   1. Bulging lumbar disc  M51.36 722.52   2. Weakness of both lower extremities  R29.898 729.89   3. Balance problem  R26.89 781.99       Subjective   Pt states his muscles are a little sore since Tuesday's PT session.    Objective   See Exercise, Manual, and Modality Logs for complete treatment.       Assessment/Plan    Subjectively, pt reports no increase of pain or discomfort with interventions performed today. Performed well with continued functional strengthening interventions. Continues to demonstrate good tolerance to exercise progressions. Continues to benefit from verbal/tactile cues to ensure proper form and technique for exercise performance.        Timed:         Manual Therapy:    0     mins  88487;     Therapeutic Exercise:    15     mins  23885;     Neuromuscular Beena:    0    mins  54236;    Therapeutic Activity:     15     mins  53464;     Gait Trainin     mins  44030;     Ultrasound:     0     mins  69497;    Ionto                               0    mins   02939  Self Care                       0     mins   43448  Canalith Repos    0     mins 55878      Un-Timed:  Electrical Stimulation:    0     mins  02110 ( );  Dry Needling     0     mins self-pay  Traction     0     mins 07921      Timed Treatment:   30   mins   Total Treatment:     30   mins    Inga Morley, PT  [place] License: [id number]

## 2023-11-13 ENCOUNTER — TREATMENT (OUTPATIENT)
Dept: PHYSICAL THERAPY | Facility: CLINIC | Age: 88
End: 2023-11-13
Payer: MEDICARE

## 2023-11-13 DIAGNOSIS — M51.36 BULGING LUMBAR DISC: Primary | ICD-10-CM

## 2023-11-13 DIAGNOSIS — R29.898 WEAKNESS OF BOTH LOWER EXTREMITIES: ICD-10-CM

## 2023-11-13 DIAGNOSIS — R26.89 BALANCE PROBLEM: ICD-10-CM

## 2023-11-13 PROCEDURE — 97110 THERAPEUTIC EXERCISES: CPT | Performed by: PHYSICAL THERAPIST

## 2023-11-13 NOTE — PROGRESS NOTES
Physical Therapy Daily Treatment Note     [place]  [address], [address] [place] [address]       Patient: eKvin Ellington   : 1935  Referring practitioner: Leanna Ryder MD  Date of Initial Visit: Type: THERAPY  Noted: 10/30/2023  Today's Date: 2023  Patient seen for 5 sessions       Visit Diagnoses:    ICD-10-CM ICD-9-CM   1. Bulging lumbar disc  M51.36 722.52   2. Weakness of both lower extremities  R29.898 729.89   3. Balance problem  R26.89 781.99       Subjective Evaluation    History of Present Illness    Subjective comment: R ankle brace helping stability but hinders mobility and balance. Not sure if he wants to have fusion on R ankle and admits brace is doing okay. lower back pain around a 6/10. no recent re-injury.       Objective   See Exercise, Manual, and Modality Logs for complete treatment.       Assessment & Plan       Assessment  Assessment details: Pt did well today. Working on education helping pt understand difference between static sway ex and dynamic stepping reactions. Did well after intro to rear LOB and he did not take a protective step. Caught himself with UEs in // bars but now understands importance of a quick step.   Added new back ex.     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           Timed:         Manual Therapy:         mins  81463;     Therapeutic Exercise:    15     mins  34553;     Neuromuscular Beena:    30    mins  40895;    Therapeutic Activity:          mins  63493;     Gait Training:           mins  64065;     Ultrasound:          mins  79665;    Ionto                                   mins   14724  Self Care                            mins   92123  Canalith Repos         mins 18572  Work hardening   __   min 81635/37102      Un-Timed:  Electrical Stimulation:         mins  60855 ( );  Dry Needling          mins self-pay  Traction          mins 08507      Timed Treatment:   45   mins   Total  Treatment:     45   mins    Zorre Zeno Kimura, PT  [place] License: [id number]    [place] License:  [id number]

## 2023-11-15 ENCOUNTER — TREATMENT (OUTPATIENT)
Dept: PHYSICAL THERAPY | Facility: CLINIC | Age: 88
End: 2023-11-15
Payer: MEDICARE

## 2023-11-15 DIAGNOSIS — M51.36 BULGING LUMBAR DISC: Primary | ICD-10-CM

## 2023-11-15 DIAGNOSIS — R26.89 BALANCE PROBLEM: ICD-10-CM

## 2023-11-15 DIAGNOSIS — R29.898 WEAKNESS OF BOTH LOWER EXTREMITIES: ICD-10-CM

## 2023-11-15 PROCEDURE — 97112 NEUROMUSCULAR REEDUCATION: CPT | Performed by: PHYSICAL THERAPIST

## 2023-11-15 PROCEDURE — 97110 THERAPEUTIC EXERCISES: CPT | Performed by: PHYSICAL THERAPIST

## 2023-11-15 NOTE — PROGRESS NOTES
Physical Therapy Daily Treatment Note    Patient: Kevin Ellington  : 1935  Referring practitioner: Leanna Ryder MD  Today's Date: 11/15/2023    VISIT#: 6      Subjective   Kevin Ellington reports: Doing ok, everything about the same. Felt fine after last session.       Objective     See Exercise, Manual, and Modality Logs for complete treatment.     Patient Education: continue HEP    Assessment/Plan  Good response to session, continues to have significant balance impairment and slow balance reactions with LOB. Continuing to focus on increasing strength and balance in order to improve function.     Progress per Plan of Care            Timed:         Manual Therapy:         mins  95312;     Therapeutic Exercise:    10     mins  44642;     Neuromuscular Beena:    20    mins  85243;    Therapeutic Activity:          mins  47462;     Gait Training:           mins  69895;     Ultrasound:          mins  98779;    Ionto:                                   mins   89633  Self Care:                            mins   26155    Un-Timed:  Electrical Stimulation:         mins  76283 ( );  Dry Needling          mins self-pay  Traction          mins 46855  Re-Eval                               mins  54265    Timed Treatment:   30   mins   Total Treatment:     30   mins    Mary Rivero, PT  Physical Therapist  [place] PT license #: [id number]  [place] PT license #: [id number]

## 2023-11-17 DIAGNOSIS — G47.419 PRIMARY NARCOLEPSY WITHOUT CATAPLEXY: ICD-10-CM

## 2023-11-17 RX ORDER — MODAFINIL 100 MG/1
100 TABLET ORAL DAILY
Qty: 30 TABLET | Refills: 2 | Status: SHIPPED | OUTPATIENT
Start: 2023-11-17

## 2023-11-17 NOTE — TELEPHONE ENCOUNTER
Rx Refill Note  Requested Prescriptions     Pending Prescriptions Disp Refills    modafinil (PROVIGIL) 100 MG tablet 30 tablet 2     Sig: Take 1 tablet by mouth Daily.      Last office visit with prescribing clinician: 10/3/2023   Last telemedicine visit with prescribing clinician: Visit date not found   Next office visit with prescribing clinician: 1/9/2024                         Would you like a call back once the refill request has been completed: [] Yes [] No    If the office needs to give you a call back, can they leave a voicemail: [] Yes [] No    Jairo Griffin CMA/LMR  11/17/23, 09:08 EST

## 2023-11-17 NOTE — TELEPHONE ENCOUNTER
Caller: Kevin Ellington DORIE    Relationship: Self    Best call back number: [phone]    Requested Prescriptions:   Requested Prescriptions     Pending Prescriptions Disp Refills    modafinil (PROVIGIL) 100 MG tablet 30 tablet 2     Sig: Take 1 tablet by mouth Daily.        Pharmacy where request should be sent: [facility] - [address] [phone] [facility] [phone]      Last office visit with prescribing clinician: 10/3/2023   Last telemedicine visit with prescribing clinician: Visit date not found   Next office visit with prescribing clinician: 1/9/2024     Additional details provided by patient: THE PATIENT HAS ABOUT 8-9 DAYS WORTH OF THIS MEDICATION LEFT. PLEASE ADVISE.     Does the patient have less than a 3 day supply:  [x] Yes  [] No    Would you like a call back once the refill request has been completed: [x] Yes [] No    If the office needs to give you a call back, can they leave a voicemail: [x] Yes [] No    Uziel Brooks Rep   11/17/23 09:00 EST

## 2023-11-20 ENCOUNTER — TREATMENT (OUTPATIENT)
Dept: PHYSICAL THERAPY | Facility: CLINIC | Age: 88
End: 2023-11-20
Payer: MEDICARE

## 2023-11-20 DIAGNOSIS — M51.36 BULGING LUMBAR DISC: Primary | ICD-10-CM

## 2023-11-20 DIAGNOSIS — R26.89 BALANCE PROBLEM: ICD-10-CM

## 2023-11-20 DIAGNOSIS — R29.898 WEAKNESS OF BOTH LOWER EXTREMITIES: ICD-10-CM

## 2023-11-20 PROCEDURE — 97112 NEUROMUSCULAR REEDUCATION: CPT | Performed by: PHYSICAL THERAPIST

## 2023-11-20 PROCEDURE — 97110 THERAPEUTIC EXERCISES: CPT | Performed by: PHYSICAL THERAPIST

## 2023-11-20 NOTE — PROGRESS NOTES
Physical Therapy Daily Treatment Note      Patient: Kevin Ellington   : 1935  Referring practitioner: Leanna Ryder MD  Date of Initial Visit: Type: THERAPY  Noted: 10/30/2023  Today's Date: 2023  Patient seen for 7 sessions       Visit Diagnoses:    ICD-10-CM ICD-9-CM   1. Bulging lumbar disc  M51.36 722.52   2. Weakness of both lower extremities  R29.898 729.89   3. Balance problem  R26.89 781.99       Subjective   Pt states he is tired today, otherwise OK.     Objective   See Exercise, Manual, and Modality Logs for complete treatment.       Assessment/Plan    Subjectively, pt reports no increase of pain or discomfort with interventions performed today. Performed well with continued functional strengthening and balance interventions. Continues to demonstrate good tolerance to exercise progressions. Continues to benefit from verbal/tactile cues to ensure proper form and technique for exercise performance.        Timed:         Manual Therapy:    0     mins  57456;     Therapeutic Exercise:    10     mins  75518;     Neuromuscular Beena:    20    mins  71967;    Therapeutic Activity:     0     mins  84521;     Gait Trainin     mins  39308;     Ultrasound:     0     mins  27052;    Ionto                               0    mins   42211  Self Care                       0     mins   14045  Canalith Repos    0     mins 38300      Un-Timed:  Electrical Stimulation:    0     mins  29054 ( );  Dry Needling     0     mins self-pay  Traction     0     mins 39369      Timed Treatment:   30   mins   Total Treatment:     30   mins    Inga Morley, PT  [place] License: [id number]

## 2023-11-22 ENCOUNTER — TREATMENT (OUTPATIENT)
Dept: PHYSICAL THERAPY | Facility: CLINIC | Age: 88
End: 2023-11-22
Payer: MEDICARE

## 2023-11-22 DIAGNOSIS — R29.898 WEAKNESS OF BOTH LOWER EXTREMITIES: ICD-10-CM

## 2023-11-22 DIAGNOSIS — R26.89 BALANCE PROBLEM: ICD-10-CM

## 2023-11-22 DIAGNOSIS — M51.36 BULGING LUMBAR DISC: Primary | ICD-10-CM

## 2023-11-22 PROCEDURE — 97110 THERAPEUTIC EXERCISES: CPT | Performed by: PHYSICAL THERAPIST

## 2023-11-22 PROCEDURE — 97112 NEUROMUSCULAR REEDUCATION: CPT | Performed by: PHYSICAL THERAPIST

## 2023-11-27 ENCOUNTER — TREATMENT (OUTPATIENT)
Dept: PHYSICAL THERAPY | Facility: CLINIC | Age: 88
End: 2023-11-27
Payer: MEDICARE

## 2023-11-27 DIAGNOSIS — R26.89 BALANCE PROBLEM: ICD-10-CM

## 2023-11-27 DIAGNOSIS — R29.898 WEAKNESS OF BOTH LOWER EXTREMITIES: ICD-10-CM

## 2023-11-27 DIAGNOSIS — M51.36 BULGING LUMBAR DISC: Primary | ICD-10-CM

## 2023-11-27 PROCEDURE — 97110 THERAPEUTIC EXERCISES: CPT | Performed by: PHYSICAL THERAPIST

## 2023-11-27 PROCEDURE — 97112 NEUROMUSCULAR REEDUCATION: CPT | Performed by: PHYSICAL THERAPIST

## 2023-11-29 ENCOUNTER — TREATMENT (OUTPATIENT)
Dept: PHYSICAL THERAPY | Facility: CLINIC | Age: 88
End: 2023-11-29
Payer: MEDICARE

## 2023-11-29 DIAGNOSIS — R26.89 BALANCE PROBLEM: ICD-10-CM

## 2023-11-29 DIAGNOSIS — M51.36 BULGING LUMBAR DISC: Primary | ICD-10-CM

## 2023-11-29 DIAGNOSIS — R29.898 WEAKNESS OF BOTH LOWER EXTREMITIES: ICD-10-CM

## 2023-11-29 PROCEDURE — 97112 NEUROMUSCULAR REEDUCATION: CPT | Performed by: PHYSICAL THERAPIST

## 2023-11-29 PROCEDURE — 97530 THERAPEUTIC ACTIVITIES: CPT | Performed by: PHYSICAL THERAPIST

## 2023-11-29 NOTE — PROGRESS NOTES
Physical Therapy Progress Note  Patient: Kevin Ellington   : 1935  Diagnosis/ICD-10 Code:  Bulging lumbar disc [M51.36]  Referring practitioner: Leanna Ryder MD  Date of Initial Visit: Type: THERAPY  Noted: 10/30/2023  Today's Date: 2023  Patient seen for 10 sessions         Visit Diagnoses:    ICD-10-CM ICD-9-CM   1. Bulging lumbar disc  M51.36 722.52   2. Weakness of both lower extremities  R29.898 729.89   3. Balance problem  R26.89 781.99         Kevin Ellington reports: he feels like PT has helped him. He feels stronger and more steady.  Subjective Questionnaire: ABC: 54% confidence (improved from 29% at initial evaluation)  Clinical Progress: improved  Home Program Compliance: Yes  Treatment has included: therapeutic exercise, neuromuscular re-education, and therapeutic activity      Subjective       Objective          Functional Assessment     Comments  5xSTS = 25 seconds      Tinetti /24    Assessment/Plan    ST. Pt will be independent and compliant with initial HEP in 4 weeks. (MET)  2. Pt will improve Tinetti score to >13/24 in 4 weeks. (MET)  3. Pt will improve 5 x sit to stand test to <33 seconds in 4 weeks. (MET)  4. Pt will demonstrate good safety awareness & decision making to reduce falls risk in 4 weeks. (MET)  LT. Pt will be independent with final HEP for self-management of condition by DC. (NOT YET MET)  2. Pt will improve ABC score to >50% to indicate improved balance confidence by DC (MET)  3. Pt will improve Tinetti score to >16/24 to indicate decreased falls risk by DC. (MET)  4. Pt will improve 5 x sit to stand score to <28 seconds by DC in order to indicate improved functional BLE strength.  (MET)     Recommendations: Continue as planned for 1-2 more visits to establish long term HEP.   Timeframe: 1 month  Prognosis to achieve goals: good      Timed:         Manual Therapy:    0     mins  28094;     Therapeutic Exercise:    10     mins  35380;      Neuromuscular Beena:   12    mins  49121;    Therapeutic Activity:     8     mins  67597;     Gait Trainin     mins  21230;     Ultrasound:     0     mins  75341;    Ionto                               0    mins   82036  Self Care                       0     mins   94586  Canalith Repos    0     mins 12845      Un-Timed:  Electrical Stimulation:    0     mins  06446 ( );  Dry Needling     0     mins self-pay  Traction     0     mins 73461  Re-Eval                           0    mins  35444      Timed Treatment:   30   mins   Total Treatment:     30   mins          PT: Inga Morley, PT     [place] License:  [id number]

## 2023-12-06 ENCOUNTER — TREATMENT (OUTPATIENT)
Dept: PHYSICAL THERAPY | Facility: CLINIC | Age: 88
End: 2023-12-06
Payer: MEDICARE

## 2023-12-06 DIAGNOSIS — R29.898 WEAKNESS OF BOTH LOWER EXTREMITIES: ICD-10-CM

## 2023-12-06 DIAGNOSIS — M51.36 BULGING LUMBAR DISC: Primary | ICD-10-CM

## 2023-12-06 DIAGNOSIS — R26.89 BALANCE PROBLEM: ICD-10-CM

## 2023-12-06 PROCEDURE — 97110 THERAPEUTIC EXERCISES: CPT | Performed by: PHYSICAL THERAPIST

## 2023-12-06 PROCEDURE — 97112 NEUROMUSCULAR REEDUCATION: CPT | Performed by: PHYSICAL THERAPIST

## 2023-12-06 NOTE — PROGRESS NOTES
Physical Therapy Daily Treatment Note      Patient: Kevin Ellington   : 1935  Referring practitioner: Leanna Ryder MD  Date of Initial Visit: Type: THERAPY  Noted: 10/30/2023  Today's Date: 2023  Patient seen for 9 sessions       Visit Diagnoses:    ICD-10-CM ICD-9-CM   1. Bulging lumbar disc  M51.36 722.52   2. Weakness of both lower extremities  R29.898 729.89   3. Balance problem  R26.89 781.99       Subjective   Back is bothering him a little today.     Objective   See Exercise, Manual, and Modality Logs for complete treatment.       Assessment/Plan    Able to complete exercises without worsened back pain. Improved gait after session.         Timed:         Manual Therapy:    0     mins  25314;     Therapeutic Exercise:    10     mins  34771;     Neuromuscular Beena:    20    mins  77478;    Therapeutic Activity:     0     mins  21362;     Gait Trainin     mins  50037;     Ultrasound:     0     mins  56107;    Ionto                               0    mins   95133  Self Care                       0     mins   56211  Canalith Repos    0     mins 08498      Un-Timed:  Electrical Stimulation:    0     mins  53854 ( );  Dry Needling     0     mins self-pay  Traction     0     mins 83958      Timed Treatment:   30   mins   Total Treatment:     30   mins    Inga Morley, PT  [place] License: [id number]

## 2023-12-06 NOTE — PROGRESS NOTES
Physical Therapy Daily Treatment Note      Patient: Kevin Ellington   : 1935  Referring practitioner: Leanna Ryder MD  Date of Initial Visit: Type: THERAPY  Noted: 10/30/2023  Today's Date: 2023  Patient seen for 11 sessions       Visit Diagnoses:    ICD-10-CM ICD-9-CM   1. Bulging lumbar disc  M51.36 722.52   2. Weakness of both lower extremities  R29.898 729.89   3. Balance problem  R26.89 781.99       Subjective   Feeling ok today, back is a little sore.     Objective   See Exercise, Manual, and Modality Logs for complete treatment.       Assessment/Plan    Subjectively, pt reports no increase of pain or discomfort with interventions performed today. Performed well with continued functional strengthening and balance interventions.         Timed:         Manual Therapy:    0     mins  33272;     Therapeutic Exercise:    10     mins  89702;     Neuromuscular Beena:    20    mins  49540;    Therapeutic Activity:     0     mins  62430;     Gait Trainin     mins  64833;     Ultrasound:     0     mins  98388;    Ionto                               0    mins   95630  Self Care                       0     mins   06518  Canalith Repos    0     mins 25263      Un-Timed:  Electrical Stimulation:    0     mins  84520 ( );  Dry Needling     0     mins self-pay  Traction     0     mins 63399      Timed Treatment:   30   mins   Total Treatment:     30   mins    Inga Morley PT  [place] License: [id number]

## 2023-12-08 ENCOUNTER — TREATMENT (OUTPATIENT)
Dept: PHYSICAL THERAPY | Facility: CLINIC | Age: 88
End: 2023-12-08
Payer: MEDICARE

## 2023-12-08 DIAGNOSIS — R29.898 WEAKNESS OF BOTH LOWER EXTREMITIES: ICD-10-CM

## 2023-12-08 DIAGNOSIS — R26.89 BALANCE PROBLEM: ICD-10-CM

## 2023-12-08 DIAGNOSIS — M51.36 BULGING LUMBAR DISC: Primary | ICD-10-CM

## 2023-12-08 PROCEDURE — 97110 THERAPEUTIC EXERCISES: CPT | Performed by: PHYSICAL THERAPIST

## 2023-12-08 PROCEDURE — 97530 THERAPEUTIC ACTIVITIES: CPT | Performed by: PHYSICAL THERAPIST

## 2023-12-08 NOTE — PROGRESS NOTES
Physical Therapy Daily Treatment Note  [facility] [place]   [address], [address]  [place] [address]  P: [phone]       F: [phone]    Patient: Kevin Ellington   : 1935  Diagnosis/ICD-10 Code:  Bulging lumbar disc [M51.36]  Referring practitioner: Leanna Ryder MD  Date of Initial Visit: Type: THERAPY  Noted: 10/30/2023  Today's Date: 2023  Patient seen for 12 sessions       Kevin Ellington reports: Back and R leg hurting more today than earlier this week.     Subjective     Objective   See Exercise, Manual, and Modality Logs for complete treatment.       Assessment/Plan  Subjectively, pt reports no increase of pain or discomfort with interventions performed today. Performed well with gentle lumbar and LE stretching and mobility interventions. Continues to demonstrate improved pain post treatment session today. Continues to benefit from verbal/tactile cues to ensure proper form and technique for exercise performance.     Progress per Plan of Care           Manual Therapy:         mins  63075;  Therapeutic Exercise:    20     mins  00190;     Neuromuscular Beena:        mins  20863;    Therapeutic Activity:     15     mins  50186;     Gait Training:           mins  67158;     Ultrasound:          mins  92693;    Electrical Stimulation:         mins  32695;  Traction          mins [id number]    Timed Treatment:   35   mins   Total Treatment:     35   mins    Tahmina Melgar PTA  Physical Therapist Assistant [id number]

## 2023-12-28 DIAGNOSIS — Z51.81 THERAPEUTIC DRUG MONITORING: ICD-10-CM

## 2023-12-28 DIAGNOSIS — R73.9 HYPERGLYCEMIA: ICD-10-CM

## 2023-12-28 DIAGNOSIS — I10 ESSENTIAL HYPERTENSION: Primary | ICD-10-CM

## 2023-12-28 DIAGNOSIS — R29.898 WEAKNESS OF BOTH LOWER EXTREMITIES: ICD-10-CM

## 2023-12-28 DIAGNOSIS — E11.9 TYPE 2 DIABETES MELLITUS WITHOUT COMPLICATION, WITHOUT LONG-TERM CURRENT USE OF INSULIN: ICD-10-CM

## 2023-12-28 DIAGNOSIS — N18.31 STAGE 3A CHRONIC KIDNEY DISEASE: ICD-10-CM

## 2023-12-29 DIAGNOSIS — K21.9 GASTROESOPHAGEAL REFLUX DISEASE WITHOUT ESOPHAGITIS: ICD-10-CM

## 2023-12-29 RX ORDER — PANTOPRAZOLE SODIUM 40 MG/1
40 TABLET, DELAYED RELEASE ORAL DAILY
Qty: 90 TABLET | Refills: 3 | Status: SHIPPED | OUTPATIENT
Start: 2023-12-29

## 2024-01-04 LAB
ALBUMIN SERPL-MCNC: 4.4 G/DL (ref 3.5–5.2)
ALBUMIN/GLOB SERPL: 1.7 G/DL
ALP SERPL-CCNC: 88 U/L (ref 39–117)
ALT SERPL-CCNC: 21 U/L (ref 1–41)
AST SERPL-CCNC: 21 U/L (ref 1–40)
BASOPHILS # BLD AUTO: 0.02 10*3/MM3 (ref 0–0.2)
BASOPHILS NFR BLD AUTO: 0.2 % (ref 0–1.5)
BILIRUB SERPL-MCNC: 1.2 MG/DL (ref 0–1.2)
BUN SERPL-MCNC: 24 MG/DL (ref 8–23)
BUN/CREAT SERPL: 19 (ref 7–25)
CALCIUM SERPL-MCNC: 9.6 MG/DL (ref 8.6–10.5)
CHLORIDE SERPL-SCNC: 105 MMOL/L (ref 98–107)
CO2 SERPL-SCNC: 24.9 MMOL/L (ref 22–29)
CREAT SERPL-MCNC: 1.26 MG/DL (ref 0.76–1.27)
EGFRCR SERPLBLD CKD-EPI 2021: 54.9 ML/MIN/1.73
EOSINOPHIL # BLD AUTO: 0.12 10*3/MM3 (ref 0–0.4)
EOSINOPHIL NFR BLD AUTO: 1.4 % (ref 0.3–6.2)
ERYTHROCYTE [DISTWIDTH] IN BLOOD BY AUTOMATED COUNT: 13.3 % (ref 12.3–15.4)
GLOBULIN SER CALC-MCNC: 2.6 GM/DL
GLUCOSE SERPL-MCNC: 127 MG/DL (ref 65–99)
HBA1C MFR BLD: 6.5 % (ref 4.8–5.6)
HCT VFR BLD AUTO: 45 % (ref 37.5–51)
HGB BLD-MCNC: 15.3 G/DL (ref 13–17.7)
IMM GRANULOCYTES # BLD AUTO: 0.02 10*3/MM3 (ref 0–0.05)
IMM GRANULOCYTES NFR BLD AUTO: 0.2 % (ref 0–0.5)
LYMPHOCYTES # BLD AUTO: 2.44 10*3/MM3 (ref 0.7–3.1)
LYMPHOCYTES NFR BLD AUTO: 29.4 % (ref 19.6–45.3)
MCH RBC QN AUTO: 31 PG (ref 26.6–33)
MCHC RBC AUTO-ENTMCNC: 34 G/DL (ref 31.5–35.7)
MCV RBC AUTO: 91.1 FL (ref 79–97)
MONOCYTES # BLD AUTO: 0.58 10*3/MM3 (ref 0.1–0.9)
MONOCYTES NFR BLD AUTO: 7 % (ref 5–12)
NEUTROPHILS # BLD AUTO: 5.11 10*3/MM3 (ref 1.7–7)
NEUTROPHILS NFR BLD AUTO: 61.8 % (ref 42.7–76)
NRBC BLD AUTO-RTO: 0 /100 WBC (ref 0–0.2)
PLATELET # BLD AUTO: 197 10*3/MM3 (ref 140–450)
POTASSIUM SERPL-SCNC: 4 MMOL/L (ref 3.5–5.2)
PROT SERPL-MCNC: 7 G/DL (ref 6–8.5)
RBC # BLD AUTO: 4.94 10*6/MM3 (ref 4.14–5.8)
SODIUM SERPL-SCNC: 142 MMOL/L (ref 136–145)
WBC # BLD AUTO: 8.29 10*3/MM3 (ref 3.4–10.8)

## 2024-01-09 ENCOUNTER — OFFICE VISIT (OUTPATIENT)
Dept: FAMILY MEDICINE CLINIC | Facility: CLINIC | Age: 89
End: 2024-01-09
Payer: MEDICARE

## 2024-01-09 VITALS
BODY MASS INDEX: 30.06 KG/M2 | HEIGHT: 70 IN | OXYGEN SATURATION: 99 % | SYSTOLIC BLOOD PRESSURE: 114 MMHG | TEMPERATURE: 97.6 F | DIASTOLIC BLOOD PRESSURE: 76 MMHG | WEIGHT: 210 LBS | HEART RATE: 88 BPM

## 2024-01-09 DIAGNOSIS — Z00.00 MEDICARE ANNUAL WELLNESS VISIT, SUBSEQUENT: Primary | ICD-10-CM

## 2024-01-09 PROCEDURE — 1159F MED LIST DOCD IN RCRD: CPT | Performed by: FAMILY MEDICINE

## 2024-01-09 PROCEDURE — 1170F FXNL STATUS ASSESSED: CPT | Performed by: FAMILY MEDICINE

## 2024-01-09 PROCEDURE — 1160F RVW MEDS BY RX/DR IN RCRD: CPT | Performed by: FAMILY MEDICINE

## 2024-01-09 PROCEDURE — G0439 PPPS, SUBSEQ VISIT: HCPCS | Performed by: FAMILY MEDICINE

## 2024-01-09 NOTE — PROGRESS NOTES
The ABCs of the Annual Wellness Visit  Subsequent Medicare Wellness Visit    Subjective      Kevin Ellington is a 88 y.o. male who presents for a Subsequent Medicare Wellness Visit.    The following portions of the patient's history were reviewed and   updated as appropriate: allergies, current medications, past family history, past medical history, past social history, past surgical history, and problem list.    Compared to one year ago, the patient feels his physical   health is worse.    Compared to one year ago, the patient feels his mental   health is the same.    Recent Hospitalizations:  This patient has had a [facility] admission record on file within the last 365 days.    Current Medical Providers:  Patient Care Team:  Leanna Ryder MD as PCP - General (Family Medicine)  Kaitlin Chew APRN as Nurse Practitioner (Gastroenterology)    Outpatient Medications Prior to Visit   Medication Sig Dispense Refill    apixaban (ELIQUIS) 5 MG tablet tablet Take 1 tablet by mouth Every 12 (Twelve) Hours. Indications: Atrial Fibrillation 180 tablet 3    atorvastatin (LIPITOR) 40 MG tablet TAKE 1 TABLET DAILY 90 tablet 3    cetirizine (zyrTEC) 10 MG tablet Take 1 tablet by mouth Daily.      chlorthalidone (HYGROTEN) 50 MG tablet TAKE 1 TABLET DAILY 90 tablet 3    cloNIDine (CATAPRES) 0.1 MG tablet TAKE 1 TABLET TWICE A  tablet 3    econazole nitrate (SPECTAZOLE) 1 % cream Apply  topically to the appropriate area as directed As Needed.      fluticasone (FLONASE) 50 MCG/ACT nasal spray 1 spray into the nostril(s) as directed by provider Daily. 48 g 3    losartan (COZAAR) 100 MG tablet TAKE 1 TABLET DAILY 90 tablet 3    metoprolol succinate XL (TOPROL-XL) 100 MG 24 hr tablet TAKE 1 TABLET DAILY 90 tablet 3    metroNIDAZOLE (FLAGYL) 0.75 % lotion lotion Daily.      Mirabegron ER (Myrbetriq) 25 MG tablet sustained-release 24 hour 24 hr tablet Take 1 tablet by mouth Daily. Bladder emerald 90 tablet 2     modafinil (PROVIGIL) 100 MG tablet Take 1 tablet by mouth Daily. 30 tablet 2    Multiple Vitamins-Minerals (MULTIVITAMIN PO) Take 1 tablet by mouth Daily.      pantoprazole (PROTONIX) 40 MG EC tablet TAKE 1 TABLET DAILY 90 tablet 3    potassium chloride (K-DUR,KLOR-CON) 10 MEQ CR tablet TAKE 2 TABLETS DAILY 180 tablet 3    sennosides-docusate (Senna S) 8.6-50 MG per tablet Take 1 tablet by mouth 2 (Two) Times a Day As Needed for Constipation. 180 tablet 2    tamsulosin (FLOMAX) 0.4 MG capsule 24 hr capsule TAKE 2 CAPSULES DAILY 180 capsule 3     No facility-administered medications prior to visit.       No opioid medication identified on active medication list. I have reviewed chart for other potential  high risk medication/s and harmful drug interactions in the elderly.        Aspirin is not on active medication list.  Aspirin use is contraindicated for this patient due to: current use of Eliquis.  .    Patient Active Problem List   Diagnosis    Bulging lumbar disc    DDD (degenerative disc disease), lumbar    Leg pain    Spinal stenosis of lumbar region with neurogenic claudication    Radiculitis, thoracic    Status post total left knee replacement using cement    Status post total right knee replacement using cement    Coronary artery disease involving native coronary artery of native heart without angina pectoris    Essential hypertension    Hyperlipemia    S/P coronary artery stent placement    Primary narcolepsy without cataplexy    Benign prostatic hyperplasia with urinary frequency    Cervical radiculopathy    Bilateral carpal tunnel syndrome    Cervical spinal stenosis    Arthritis of ankle    Chronic pain of right ankle    Shortness of breath    Bilateral leg weakness    History of melanoma    Spondylosis of lumbar region without myelopathy or radiculopathy    Stage 3a chronic kidney disease    Pedal edema    Gastroesophageal reflux disease without esophagitis    TIA (transient ischemic attack)     "Bilateral carotid artery stenosis    Cerebral artery occlusion    Hyperglycemia    Hoarseness    Hiatal hernia    Abdominal fullness    Fever and chills    COVID-19    Chronic atrial fibrillation    Atrial fibrillation    Cytokine release syndrome, grade 2    Type 2 diabetes mellitus without complication, without long-term current use of insulin     Advance Care Planning   Advance Care Planning     Advance Directive is on file.  ACP discussion was held with the patient during this visit. Patient has an advance directive in EMR which is still valid.      Objective    Vitals:    01/09/24 1112   BP: 114/76   Pulse: 88   Temp: 97.6 °F (36.4 °C)   SpO2: 99%   Weight: 95.3 kg (210 lb)   Height: 177.8 cm (70\")   PainSc:   4   PainLoc: Back     Estimated body mass index is 30.13 kg/m² as calculated from the following:    Height as of this encounter: 177.8 cm (70\").    Weight as of this encounter: 95.3 kg (210 lb).       Physical Exam  Vitals and nursing note reviewed.   Constitutional:       General: He is not in acute distress.     Appearance: Normal appearance. He is well-developed.   HENT:      Head: Normocephalic.      Right Ear: Tympanic membrane and ear canal normal.      Left Ear: Tympanic membrane and ear canal normal.      Nose: Nose normal.   Cardiovascular:      Rate and Rhythm: Normal rate and regular rhythm.      Heart sounds: Normal heart sounds. No murmur heard.  Pulmonary:      Effort: Pulmonary effort is normal. No respiratory distress.      Breath sounds: Normal breath sounds.   Abdominal:      General: Abdomen is flat. There is no distension.      Palpations: Abdomen is soft.      Tenderness: There is no abdominal tenderness.   Musculoskeletal:         General: Normal range of motion.   Skin:     General: Skin is warm and dry.      Findings: No rash.   Neurological:      Mental Status: He is alert and oriented to person, place, and time.   Psychiatric:         Mood and Affect: Mood normal.         " Behavior: Behavior normal.         Thought Content: Thought content normal.         Judgment: Judgment normal.       Does the patient have evidence of cognitive impairment?   No    Lab Results   Component Value Date    HGBA1C 6.50 (H) 2024          HEALTH RISK ASSESSMENT    Smoking Status:  Social History     Tobacco Use   Smoking Status Never   Smokeless Tobacco Never   Tobacco Comments    caffeine use     Alcohol Consumption:  Social History     Substance and Sexual Activity   Alcohol Use No     Fall Risk Screen:    [place] Fall Risk Assessment was completed, and patient is at MODERATE risk for falls. Assessment completed on:2024    Depression Screenin/9/2024    11:18 AM   PHQ-2/PHQ-9 Depression Screening   Little Interest or Pleasure in Doing Things 0-->not at all   Feeling Down, Depressed or Hopeless 0-->not at all   PHQ-9: Brief Depression Severity Measure Score 0       Health Habits and Functional and Cognitive Screenin/9/2024    11:00 AM   Functional & Cognitive Status   Do you have difficulty preparing food and eating? No   Do you have difficulty bathing yourself, getting dressed or grooming yourself? No   Do you have difficulty using the toilet? No   Do you have difficulty moving around from place to place? No   Do you have trouble with steps or getting out of a bed or a chair? No   Current Diet Well Balanced Diet   Eye Exam Not up to date   Exercise (times per week) 1 times per week   Current Exercises Include Walking        Exercise Comment sopmetimes pt   Do you need help using the phone?  No   Are you deaf or do you have serious difficulty hearing?  No   Do you need help to go to places out of walking distance? Yes   Do you need help shopping? No   Do you need help preparing meals?  No   Do you need help with housework?  No   Do you need help with laundry? No   Do you need help taking your medications? No   Do you need help managing money? No   Do you ever drive or ride in a  car without wearing a seat belt? No   Have you felt unusual stress, anger or loneliness in the last month? No   Who do you live with? Spouse   If you need help, do you have trouble finding someone available to you? No   Have you been bothered in the last four weeks by sexual problems? No   Do you have difficulty concentrating, remembering or making decisions? No       Age-appropriate Screening Schedule:  Refer to the list below for future screening recommendations based on patient's age, sex and/or medical conditions. Orders for these recommended tests are listed in the plan section. The patient has been provided with a written plan.    Health Maintenance   Topic Date Due    DIABETIC EYE EXAM  Never done    COVID-19 Vaccine (7 - 2023-24 season) 09/01/2023    HEMOGLOBIN A1C  07/03/2024    LIPID PANEL  10/03/2024    URINE MICROALBUMIN  10/03/2024    BMI FOLLOWUP  10/03/2024    ANNUAL WELLNESS VISIT  01/09/2025    TDAP/TD VACCINES (2 - Tdap) 04/09/2028    INFLUENZA VACCINE  Completed    Pneumococcal Vaccine 65+  Completed    ZOSTER VACCINE  Addressed                  CMS Preventative Services Quick Reference  Risk Factors Identified During Encounter:    Chronic Pain:  continue with pain mgmt and physical therapy   Hearing Problem:  routine FU   Inactivity/Sedentary: Patient was advised to exercise at least 150 minutes a week per CDC recommendations.  Dental Screening Recommended  Vision Screening Recommended    The above risks/problems have been discussed with the patient.  Pertinent information has been shared with the patient in the After Visit Summary.    Diagnoses and all orders for this visit:    1. Medicare annual wellness visit, subsequent (Primary)    Here for annual exam, fasting labs reviewed with pt, immunizations up-to-date, colon cancer screeningNot indicated, cardiovascular screening up-to-date, counseled patient to increase vegetable intake, water and 150 minutes of exercise weekly combining  weightbearing exercises and aerobic activity.    Still with lower back pain and residual weakness, he was discharged from PT but still having some difficulty with standing from a seated position.  Discussed doing his home exercises, has been the new year and more opportunity for PT it may be something to reconsider but he will let me know when he is ready.    Diabetes stable, follow-up in 3 months.    Follow Up:   Next Medicare Wellness visit to be scheduled in 1 year.    Return in about 3 months (around 4/9/2024), or if symptoms worsen or fail to improve, for Recheck diabetes and narcolepsy .    An After Visit Summary and PPPS were made available to the patient.    Leanna Ryder MD

## 2024-01-17 ENCOUNTER — OFFICE VISIT (OUTPATIENT)
Dept: GASTROENTEROLOGY | Facility: CLINIC | Age: 89
End: 2024-01-17
Payer: MEDICARE

## 2024-01-17 VITALS
HEART RATE: 51 BPM | OXYGEN SATURATION: 100 % | TEMPERATURE: 96.5 F | DIASTOLIC BLOOD PRESSURE: 70 MMHG | WEIGHT: 210 LBS | BODY MASS INDEX: 30.06 KG/M2 | HEIGHT: 70 IN | SYSTOLIC BLOOD PRESSURE: 110 MMHG

## 2024-01-17 DIAGNOSIS — Z12.11 COLON CANCER SCREENING: ICD-10-CM

## 2024-01-17 DIAGNOSIS — K21.9 GASTROESOPHAGEAL REFLUX DISEASE, UNSPECIFIED WHETHER ESOPHAGITIS PRESENT: Primary | Chronic | ICD-10-CM

## 2024-01-17 DIAGNOSIS — K44.9 HIATAL HERNIA: Chronic | ICD-10-CM

## 2024-01-17 DIAGNOSIS — K59.00 CONSTIPATION, UNSPECIFIED CONSTIPATION TYPE: Chronic | ICD-10-CM

## 2024-01-17 DIAGNOSIS — K21.9 GASTROESOPHAGEAL REFLUX DISEASE WITHOUT ESOPHAGITIS: ICD-10-CM

## 2024-01-17 RX ORDER — PANTOPRAZOLE SODIUM 40 MG/1
40 TABLET, DELAYED RELEASE ORAL DAILY
Qty: 90 TABLET | Refills: 3 | Status: SHIPPED | OUTPATIENT
Start: 2024-01-17

## 2024-01-17 NOTE — PROGRESS NOTES
"Chief Complaint   Patient presents with    Follow-up     GERD, alternating bowel habits         History of Present Illness  88-year-old male presents the office today for his annual office follow-up.  He was last seen in office on 1/17/2023.  He has a history of heartburn, 5 cm hiatal hernia, fundic gland polyps, and diarrhea.    GERD is currently managed with pantoprazole 40 mg daily.  He has to avoid eating large meals and eating close to bedtime as this will trigger symptoms. He has only had 2 episodes of reflux since his last OV.  He denies any nausea, vomiting, or dysphagia.    He reported some diarrhea at his last office follow-up but I continue to take stool softeners.  When he discontinued use of stool softeners he experienced constipation.  He uses MiraLAX intermittently, about every other day.  He does not require any further colonoscopies due to age.  He denies any family history of colon cancer.    Result Review :       Office Visit with Kaitlin Chew APRN (01/17/2023)   UPPER GI ENDOSCOPY (12/19/2022 07:47)   Tissue Pathology Exam (12/19/2022 07:58)     Vital Signs:   /70   Pulse 51   Temp 96.5 °F (35.8 °C)   Ht 177.8 cm (70\")   Wt 95.3 kg (210 lb)   SpO2 100%   BMI 30.13 kg/m²     Body mass index is 30.13 kg/m².     Physical Exam  Vitals reviewed.   Constitutional:       Appearance: Normal appearance.   Pulmonary:      Effort: Pulmonary effort is normal. No respiratory distress.   Abdominal:      General: Abdomen is flat. Bowel sounds are normal. There is no distension.      Palpations: Abdomen is soft. There is no mass.      Tenderness: There is no abdominal tenderness. There is no guarding.   Musculoskeletal:      Comments: Patient ambulates with walker for longer distances   Skin:     General: Skin is warm and dry.   Neurological:      General: No focal deficit present.      Mental Status: He is alert and oriented to person, place, and time.   Psychiatric:         Mood and Affect: " Mood normal.         Behavior: Behavior normal.         Thought Content: Thought content normal.         Judgment: Judgment normal.       Assessment and Plan    Diagnoses and all orders for this visit:    1. Gastroesophageal reflux disease, unspecified whether esophagitis present (Primary)    2. Hiatal hernia    3. Constipation, unspecified constipation type    4. Colon cancer screening    5. Gastroesophageal reflux disease without esophagitis  -     pantoprazole (PROTONIX) 40 MG EC tablet; Take 1 tablet by mouth Daily.  Dispense: 90 tablet; Refill: 3           Patient Instructions   1.  For GERD and hiatal hernia continue pantoprazole 40 mg once daily.  Refills have been sent to your pharmacy.    2. For GERD, we recommend avoiding eating 3-4 hours before bedtime, eating smaller more frequent meals, and avoiding any known food triggers including spicy foods, tomatoes and tomato-based sauces, chocolate, coffee/tea, citrus fruits, carbonated  beverages and alcohol.     3.  You may continue to use MiraLAX intermittently for management of constipation. You may continue to use stool softeners daily.     4.  No further colonoscopies needed due to age.    5.  Recommend annual office follow-up for reassessment of symptoms and medication refills or sooner should your symptoms worsen/recur.      Discussion:  Overall the patient is doing very well with management of his upper and lower GI symptoms.  Will continue his pantoprazole 40 mg once daily and have sent in refills to his pharmacy.  Antireflux precautions encouraged.    For constipation patient to continue to use stool softeners and MiraLAX.  Recommend next follow-up in 1 year for reassessment of symptoms and medication refills or sooner should his symptoms worsen/recur.  Patient verbalized understanding of above plan of care and is in agreement.  All questions answered and support provided.    EMR Dragon/Transcription Disclaimer:  This document has been Dictated  utilizing Dragon dictation.

## 2024-01-17 NOTE — PATIENT INSTRUCTIONS
1.  For GERD and hiatal hernia continue pantoprazole 40 mg once daily.  Refills have been sent to your pharmacy.    2. For GERD, we recommend avoiding eating 3-4 hours before bedtime, eating smaller more frequent meals, and avoiding any known food triggers including spicy foods, tomatoes and tomato-based sauces, chocolate, coffee/tea, citrus fruits, carbonated  beverages and alcohol.     3.  You may continue to use MiraLAX intermittently for management of constipation. You may continue to use stool softeners daily.     4.  No further colonoscopies needed due to age.    5.  Recommend annual office follow-up for reassessment of symptoms and medication refills or sooner should your symptoms worsen/recur.

## 2024-01-19 DIAGNOSIS — M48.062 SPINAL STENOSIS OF LUMBAR REGION WITH NEUROGENIC CLAUDICATION: ICD-10-CM

## 2024-01-19 RX ORDER — MELOXICAM 7.5 MG/1
TABLET ORAL
Qty: 45 TABLET | Refills: 3 | OUTPATIENT
Start: 2024-01-19

## 2024-02-15 DIAGNOSIS — G47.419 PRIMARY NARCOLEPSY WITHOUT CATAPLEXY: ICD-10-CM

## 2024-02-15 RX ORDER — MODAFINIL 100 MG/1
100 TABLET ORAL DAILY
Qty: 30 TABLET | Refills: 2 | Status: SHIPPED | OUTPATIENT
Start: 2024-02-15

## 2024-03-18 ENCOUNTER — OFFICE VISIT (OUTPATIENT)
Dept: FAMILY MEDICINE CLINIC | Facility: CLINIC | Age: 89
End: 2024-03-18
Payer: MEDICARE

## 2024-03-18 VITALS
SYSTOLIC BLOOD PRESSURE: 126 MMHG | BODY MASS INDEX: 28.63 KG/M2 | TEMPERATURE: 97.8 F | DIASTOLIC BLOOD PRESSURE: 82 MMHG | HEIGHT: 70 IN | HEART RATE: 78 BPM | WEIGHT: 200 LBS | OXYGEN SATURATION: 97 %

## 2024-03-18 DIAGNOSIS — R39.15 URINARY URGENCY: ICD-10-CM

## 2024-03-18 DIAGNOSIS — R35.0 FREQUENCY OF URINATION AND POLYURIA: Primary | ICD-10-CM

## 2024-03-18 DIAGNOSIS — R35.89 FREQUENCY OF URINATION AND POLYURIA: Primary | ICD-10-CM

## 2024-03-18 LAB
BILIRUB BLD-MCNC: NEGATIVE MG/DL
CLARITY, POC: CLEAR
COLOR UR: YELLOW
EXPIRATION DATE: NORMAL
GLUCOSE UR STRIP-MCNC: NEGATIVE MG/DL
KETONES UR QL: NEGATIVE
LEUKOCYTE EST, POC: NEGATIVE
Lab: NORMAL
NITRITE UR-MCNC: NEGATIVE MG/ML
PH UR: 6 [PH] (ref 5–8)
PROT UR STRIP-MCNC: NEGATIVE MG/DL
RBC # UR STRIP: NEGATIVE /UL
SP GR UR: 1.01 (ref 1–1.03)
UROBILINOGEN UR QL: NORMAL

## 2024-03-18 RX ORDER — CEPHALEXIN 500 MG/1
500 CAPSULE ORAL 2 TIMES DAILY
Qty: 20 CAPSULE | Refills: 0 | Status: SHIPPED | OUTPATIENT
Start: 2024-03-18 | End: 2024-03-28

## 2024-03-18 NOTE — PROGRESS NOTES
"Chief Complaint  Daytime Sleepiness (Follow up no complains doing well ) and Urinary Frequency (Having urgency and u frequency past days really bad )    Subjective        Kevin Ellington presents to [facility]  History of Present Illness  Pleasant 89-year-old male to follow-up for hypersomnia, thankfully well-controlled on modafinil 100 mg daily, managed by sleep medicine.    He is also had some symptoms of increased urinary urgency - the frequency is about the same but his control is not what it should be, sometimes doesn't make it.  No fever, no odor but urine was cloudy.  But over the weekend he feels that things are normal, no pain, no pain with sitting.  Urgency stared about 5 days ago.     Objective   Vital Signs:  /82   Pulse 78   Temp 97.8 °F (36.6 °C)   Ht 177.8 cm (70\")   Wt 90.7 kg (200 lb)   SpO2 97%   BMI 28.70 kg/m²   Estimated body mass index is 28.7 kg/m² as calculated from the following:    Height as of this encounter: 177.8 cm (70\").    Weight as of this encounter: 90.7 kg (200 lb).       Physical Exam  Vitals and nursing note reviewed.   Constitutional:       General: He is not in acute distress.     Appearance: He is well-developed.   HENT:      Head: Normocephalic.      Nose: Nose normal.   Cardiovascular:      Heart sounds: No murmur heard.  Pulmonary:      Effort: Pulmonary effort is normal. No respiratory distress.   Abdominal:      Comments: No CVA tenderness and no suprapubic tenderness.    Musculoskeletal:         General: Normal range of motion.   Skin:     General: Skin is warm and dry.      Findings: No rash.   Neurological:      Mental Status: He is alert and oriented to person, place, and time.   Psychiatric:         Behavior: Behavior normal.         Thought Content: Thought content normal.         Judgment: Judgment normal.        Result Review :    The following data was reviewed by: Leanna Ryder MD on 03/18/2024:      POC Urinalysis " Dipstick, Automated (03/18/2024 13:04)            Assessment and Plan     Diagnoses and all orders for this visit:    1. Frequency of urination and polyuria (Primary)  -     POC Urinalysis Dipstick, Automated  -     cephalexin (Keflex) 500 MG capsule; Take 1 capsule by mouth 2 (Two) Times a Day for 10 days.  Dispense: 20 capsule; Refill: 0  -     Urine Culture - Urine, Urine, Clean Catch    2. Urinary urgency  -     Urine Culture - Urine, Urine, Clean Catch    Patient with 5 days of urgency and frequency, urine today is completely normal but due to his symptoms I do think treatment would be necessary.  Will start a 10-day course of Keflex, longer as you would be considered a complicated UTI being a male.  Will see what culture grows.  Depending on what the culture shows we may have him discontinue or potentially continue treatments.         Follow Up     Return if symptoms worsen or fail to improve, for Recheck diabetes and hypersomnia.  Patient was given instructions and counseling regarding his condition or for health maintenance advice. Please see specific information pulled into the AVS if appropriate.

## 2024-03-20 ENCOUNTER — OFFICE VISIT (OUTPATIENT)
Dept: CARDIOLOGY | Facility: CLINIC | Age: 89
End: 2024-03-20
Payer: MEDICARE

## 2024-03-20 VITALS
HEIGHT: 70 IN | WEIGHT: 201 LBS | BODY MASS INDEX: 28.77 KG/M2 | HEART RATE: 95 BPM | SYSTOLIC BLOOD PRESSURE: 128 MMHG | DIASTOLIC BLOOD PRESSURE: 74 MMHG

## 2024-03-20 DIAGNOSIS — Z95.5 S/P CORONARY ARTERY STENT PLACEMENT: ICD-10-CM

## 2024-03-20 DIAGNOSIS — I25.10 CORONARY ARTERY DISEASE INVOLVING NATIVE CORONARY ARTERY OF NATIVE HEART WITHOUT ANGINA PECTORIS: ICD-10-CM

## 2024-03-20 DIAGNOSIS — I48.20 CHRONIC ATRIAL FIBRILLATION: ICD-10-CM

## 2024-03-20 DIAGNOSIS — I10 ESSENTIAL HYPERTENSION: ICD-10-CM

## 2024-03-20 DIAGNOSIS — I48.91 ATRIAL FIBRILLATION, UNSPECIFIED TYPE: Primary | ICD-10-CM

## 2024-03-20 DIAGNOSIS — I65.23 BILATERAL CAROTID ARTERY STENOSIS: ICD-10-CM

## 2024-03-20 DIAGNOSIS — E78.5 HYPERLIPIDEMIA, UNSPECIFIED HYPERLIPIDEMIA TYPE: ICD-10-CM

## 2024-03-20 LAB
BACTERIA UR CULT: NO GROWTH
BACTERIA UR CULT: NORMAL
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
## 2024-04-22 DIAGNOSIS — Z91.09 ENVIRONMENTAL ALLERGIES: ICD-10-CM

## 2024-04-22 RX ORDER — FLUTICASONE PROPIONATE 50 MCG
2 SPRAY, SUSPENSION (ML) NASAL DAILY
Qty: 48 G | Refills: 3 | Status: SHIPPED | OUTPATIENT
Start: 2024-04-22

## 2024-05-20 DIAGNOSIS — G47.419 PRIMARY NARCOLEPSY WITHOUT CATAPLEXY: ICD-10-CM

## 2024-05-20 RX ORDER — MODAFINIL 100 MG/1
100 TABLET ORAL DAILY
Qty: 30 TABLET | Refills: 2 | Status: SHIPPED | OUTPATIENT
Start: 2024-05-20

## 2024-05-20 NOTE — TELEPHONE ENCOUNTER
Caller: Chandana Kevin W    Relationship: Self    Best call back number: [phone]     Requested Prescriptions:   Requested Prescriptions     Pending Prescriptions Disp Refills    modafinil (PROVIGIL) 100 MG tablet 30 tablet 2     Sig: Take 1 tablet by mouth Daily.        [id number] [address] AT [facility] [address]  [phone] [facility] [phone] FX     Last office visit with prescribing clinician: 3/18/2024   Last telemedicine visit with prescribing clinician: Visit date not found   Next office visit with prescribing clinician: 6/18/2024     Additional details provided by patient: REQUESTING 30 DAY SUPPLY NO REFILLS    Does the patient have less than a 3 day supply:  [] Yes  [x] No    Uziel Garcia Rep   05/20/24 14:09 EDT

## 2024-05-20 NOTE — TELEPHONE ENCOUNTER
Rx Refill Note  Requested Prescriptions     Pending Prescriptions Disp Refills    modafinil (PROVIGIL) 100 MG tablet 30 tablet 2     Sig: Take 1 tablet by mouth Daily.      Last office visit with prescribing clinician: 3/18/2024   Last telemedicine visit with prescribing clinician: Visit date not found   Next office visit with prescribing clinician: 6/18/2024                         Would you like a call back once the refill request has been completed: [] Yes [] No    If the office needs to give you a call back, can they leave a voicemail: [] Yes [] No    Jairo Griffin CMA/LMR  05/20/24, 14:18 EDT

## 2024-06-17 ENCOUNTER — OFFICE VISIT (OUTPATIENT)
Dept: FAMILY MEDICINE CLINIC | Facility: CLINIC | Age: 89
End: 2024-06-17
Payer: MEDICARE

## 2024-06-17 VITALS
HEIGHT: 70 IN | WEIGHT: 200 LBS | DIASTOLIC BLOOD PRESSURE: 76 MMHG | TEMPERATURE: 97.6 F | HEART RATE: 58 BPM | BODY MASS INDEX: 28.63 KG/M2 | OXYGEN SATURATION: 96 % | SYSTOLIC BLOOD PRESSURE: 114 MMHG

## 2024-06-17 DIAGNOSIS — N39.41 URGE INCONTINENCE OF URINE: ICD-10-CM

## 2024-06-17 DIAGNOSIS — E11.9 TYPE 2 DIABETES MELLITUS WITHOUT COMPLICATION, WITHOUT LONG-TERM CURRENT USE OF INSULIN: Primary | ICD-10-CM

## 2024-06-17 DIAGNOSIS — G47.419 PRIMARY NARCOLEPSY WITHOUT CATAPLEXY: ICD-10-CM

## 2024-06-17 DIAGNOSIS — M48.062 SPINAL STENOSIS OF LUMBAR REGION WITH NEUROGENIC CLAUDICATION: ICD-10-CM

## 2024-06-17 DIAGNOSIS — R29.898 WEAKNESS OF BOTH LOWER EXTREMITIES: ICD-10-CM

## 2024-06-17 DIAGNOSIS — I10 ESSENTIAL HYPERTENSION: ICD-10-CM

## 2024-06-17 PROBLEM — M25.373 CHRONIC INSTABILITY OF ANKLE: Status: ACTIVE | Noted: 2022-09-06

## 2024-06-17 PROBLEM — R73.9 HYPERGLYCEMIA: Status: RESOLVED | Noted: 2022-08-09 | Resolved: 2024-06-17

## 2024-06-17 PROBLEM — M96.1 LUMBAR POST-LAMINECTOMY SYNDROME: Status: ACTIVE | Noted: 2022-06-13

## 2024-06-17 PROBLEM — M19.079 PRIMARY LOCALIZED OSTEOARTHROSIS OF ANKLE AND FOOT: Status: ACTIVE | Noted: 2022-09-06

## 2024-06-17 LAB
EXPIRATION DATE: ABNORMAL
HBA1C MFR BLD: 6.4 % (ref 4.5–5.7)
Lab: ABNORMAL

## 2024-06-17 PROCEDURE — 1160F RVW MEDS BY RX/DR IN RCRD: CPT | Performed by: FAMILY MEDICINE

## 2024-06-17 PROCEDURE — 1159F MED LIST DOCD IN RCRD: CPT | Performed by: FAMILY MEDICINE

## 2024-06-17 PROCEDURE — 99214 OFFICE O/P EST MOD 30 MIN: CPT | Performed by: FAMILY MEDICINE

## 2024-06-17 PROCEDURE — 83036 HEMOGLOBIN GLYCOSYLATED A1C: CPT | Performed by: FAMILY MEDICINE

## 2024-06-17 PROCEDURE — 3044F HG A1C LEVEL LT 7.0%: CPT | Performed by: FAMILY MEDICINE

## 2024-06-17 PROCEDURE — 1126F AMNT PAIN NOTED NONE PRSNT: CPT | Performed by: FAMILY MEDICINE

## 2024-06-17 RX ORDER — PREGABALIN 25 MG/1
25 CAPSULE ORAL 3 TIMES DAILY PRN
Qty: 60 CAPSULE | Refills: 2 | Status: SHIPPED | OUTPATIENT
Start: 2024-06-17

## 2024-06-17 RX ORDER — MIRABEGRON 25 MG/1
25 TABLET, FILM COATED, EXTENDED RELEASE ORAL DAILY
Qty: 90 TABLET | Refills: 2 | Status: SHIPPED | OUTPATIENT
Start: 2024-06-17

## 2024-06-17 NOTE — PROGRESS NOTES
"Chief Complaint  Daytime Sleepiness, Diabetes, and Balance Issues (Hard to walk  die to his bones in legs and muscle weakness ) diabetes, numbness in his feet.    Subjective        Kevin Ellington presents to [facility]  Diabetes      Pleasant 89-year-old male here to follow-up for hypersomnia which has been a chronic problem for decades, he is currently doing well on modafinil but down to 100 mg daily with good improvement.    Balance issues with weakness, he is using a walker more, harder to balance with a cane alone. He did not help with taking PT, he thinks one exercise pulled his R hip.  His back is not doing well and is not getting better despite an epidural every 3 months. Wearing off faster.  He has numbness with standing.  He has had 3 back surgeries.     Diabetes well-controlled on no medication currently-lifestyle alone.  No hypo or hyperglycemic events.    Urinary urgency is better with Myrbetric-he stopped the medication as he thought it was not helpful but quickly realized that it was improving his symptoms and would like a refill of Myrbetriq 25 mg daily.    Objective   Vital Signs:  /76   Pulse 58   Temp 97.6 °F (36.4 °C)   Ht 177.8 cm (70\")   Wt 90.7 kg (200 lb)   SpO2 96%   BMI 28.70 kg/m²   Estimated body mass index is 28.7 kg/m² as calculated from the following:    Height as of this encounter: 177.8 cm (70\").    Weight as of this encounter: 90.7 kg (200 lb).       Physical Exam  Vitals and nursing note reviewed.   Constitutional:       General: He is not in acute distress.     Appearance: He is well-developed.   HENT:      Head: Normocephalic.      Nose: Nose normal.   Cardiovascular:      Rate and Rhythm: Normal rate and regular rhythm.      Heart sounds: Normal heart sounds. No murmur heard.  Pulmonary:      Effort: Pulmonary effort is normal. No respiratory distress.      Breath sounds: Normal breath sounds.   Musculoskeletal:         General: " Normal range of motion.   Skin:     General: Skin is warm and dry.      Findings: No rash.   Neurological:      Mental Status: He is alert and oriented to person, place, and time.   Psychiatric:         Behavior: Behavior normal.         Thought Content: Thought content normal.         Judgment: Judgment normal.        Result Review :    The following data was reviewed by: Leanna Ryder MD on 06/17/2024:  A1C Last 3 Results          10/3/2023    13:48 1/3/2024    10:23 6/17/2024    13:30   HGBA1C Last 3 Results   Hemoglobin A1C 6.70  6.50  6.4                   Assessment and Plan     Diagnoses and all orders for this visit:    1. Type 2 diabetes mellitus without complication, without long-term current use of insulin (Primary)  -     POC Glycosylated Hemoglobin (Hb A1C)    2. Essential hypertension    3. Spinal stenosis of lumbar region with neurogenic claudication  -     pregabalin (Lyrica) 25 MG capsule; Take 1 capsule by mouth 3 (Three) Times a Day As Needed (pain/ numbness in legs).  Dispense: 60 capsule; Refill: 2    4. Primary narcolepsy without cataplexy    5. Urge incontinence of urine  -     Mirabegron ER (Myrbetriq) 25 MG tablet sustained-release 24 hour 24 hr tablet; Take 1 tablet by mouth Daily. Bladder emerald  Dispense: 90 tablet; Refill: 2    6. Weakness of both lower extremities    Pleasant 89-year-old male to follow-up for type 2 diabetes is thankfully well-controlled, hypertension with well-controlled    Narcolepsy that is well-controlled on modafinil 100 mg daily no adverse effects.  Contract up-to-date, reviewed Mike which is appropriate.    Urge incontinence that is improving with Myrbetriq, he would like to restart medication order as above.    Lastly discussed the numbness in his feet after standing that seems to impact his gait and general weakness.  He has a longstanding back history with multiple back surgeries, epidurals.  We discussed starting Lyrica to see if it would improve his  symptoms.    Gait instability: We discussed using his walker regularly.  Physical therapy which we did last in October, he does not feel like it made him difference.         Follow Up     Return in about 3 months (around 9/17/2024), or if symptoms worsen or fail to improve, for Recheck Back pain .  Patient was given instructions and counseling regarding his condition or for health maintenance advice. Please see specific information pulled into the AVS if appropriate.

## 2024-07-31 DIAGNOSIS — Z91.09 ENVIRONMENTAL ALLERGIES: ICD-10-CM

## 2024-07-31 DIAGNOSIS — K59.04 CHRONIC IDIOPATHIC CONSTIPATION: ICD-10-CM

## 2024-07-31 RX ORDER — AMOXICILLIN 250 MG
1 CAPSULE ORAL 2 TIMES DAILY PRN
Qty: 180 TABLET | Refills: 2 | Status: SHIPPED | OUTPATIENT
Start: 2024-07-31

## 2024-07-31 RX ORDER — FLUTICASONE PROPIONATE 50 MCG
2 SPRAY, SUSPENSION (ML) NASAL DAILY
Qty: 48 G | Refills: 3 | Status: SHIPPED | OUTPATIENT
Start: 2024-07-31

## 2024-07-31 NOTE — TELEPHONE ENCOUNTER
Caller: Kevin Ellington    Relationship: Self    Best call back number: [phone]     Requested Prescriptions:   Requested Prescriptions     Pending Prescriptions Disp Refills    apixaban (ELIQUIS) 5 MG tablet tablet 180 tablet 3     Sig: Take 1 tablet by mouth Every 12 (Twelve) Hours. Indications: Atrial Fibrillation    sennosides-docusate (Senna S) 8.6-50 MG per tablet 180 tablet 2     Sig: Take 1 tablet by mouth 2 (Two) Times a Day As Needed for Constipation.    fluticasone (FLONASE) 50 MCG/ACT nasal spray 48 g 3     Si sprays into the nostril(s) as directed by provider Daily.        Pharmacy where request should be sent: [facility] [address] [phone] [facility] [phone]      Last office visit with prescribing clinician: 2024   Last telemedicine visit with prescribing clinician: Visit date not found   Next office visit with prescribing clinician: 2024     Additional details provided by patient: WILL NEED NEW PRESCRIPTION AND WITH 90 DAY SUPPLY.  PATIENT REQUESTED TO MAKE SURE THE FLUTICASONE (FLONASE) THAT THE INSTRUCTIONS SAY 2 PUFFS PER NOSTRIL PER DAY.      Does the patient have less than a 3 day supply:  [] Yes  [x] No    Would you like a call back once the refill request has been completed: [] Yes [] No    If the office needs to give you a call back, can they leave a voicemail: [] Yes [] No    Uziel Medrano   24 11:26 EDT

## 2024-08-06 DIAGNOSIS — G47.419 PRIMARY NARCOLEPSY WITHOUT CATAPLEXY: ICD-10-CM

## 2024-08-06 RX ORDER — MODAFINIL 100 MG/1
100 TABLET ORAL DAILY
Qty: 30 TABLET | Refills: 2 | Status: SHIPPED | OUTPATIENT
Start: 2024-08-06

## 2024-08-12 DIAGNOSIS — I10 ESSENTIAL HYPERTENSION: ICD-10-CM

## 2024-08-12 RX ORDER — POTASSIUM CHLORIDE 750 MG/1
TABLET, EXTENDED RELEASE ORAL
Qty: 180 TABLET | Refills: 3 | Status: SHIPPED | OUTPATIENT
Start: 2024-08-12

## 2024-08-14 ENCOUNTER — TELEPHONE (OUTPATIENT)
Dept: CARDIOLOGY | Facility: CLINIC | Age: 89
End: 2024-08-14

## 2024-08-14 NOTE — TELEPHONE ENCOUNTER
Caller: Tiffanie Ellington    Relationship to patient: Emergency Contact    Best call back number:  [phone]    Patient is needing: PATIENT TODAY GOT WEAK AND CLAMMY. PATIENT WASN'T HAVING ANY PAIN. PATIENTS BP WAS NORMAL, PULSE WAS 89. PATIENT REQUESTING TO BE SEEN SOONER THAN OCTOBER APPOINTMENT. BP /70
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
## 2024-08-16 ENCOUNTER — OFFICE VISIT (OUTPATIENT)
Age: 89
End: 2024-08-16
Payer: MEDICARE

## 2024-08-16 VITALS
OXYGEN SATURATION: 99 % | BODY MASS INDEX: 28.69 KG/M2 | WEIGHT: 200.4 LBS | SYSTOLIC BLOOD PRESSURE: 122 MMHG | DIASTOLIC BLOOD PRESSURE: 70 MMHG | HEIGHT: 70 IN | HEART RATE: 68 BPM

## 2024-08-16 DIAGNOSIS — N18.31 STAGE 3A CHRONIC KIDNEY DISEASE: ICD-10-CM

## 2024-08-16 DIAGNOSIS — I10 ESSENTIAL HYPERTENSION: ICD-10-CM

## 2024-08-16 DIAGNOSIS — E11.9 TYPE 2 DIABETES MELLITUS WITHOUT COMPLICATION, WITHOUT LONG-TERM CURRENT USE OF INSULIN: ICD-10-CM

## 2024-08-16 DIAGNOSIS — I48.20 CHRONIC ATRIAL FIBRILLATION: ICD-10-CM

## 2024-08-16 DIAGNOSIS — Z95.5 S/P CORONARY ARTERY STENT PLACEMENT: ICD-10-CM

## 2024-08-16 DIAGNOSIS — I25.10 CORONARY ARTERY DISEASE INVOLVING NATIVE CORONARY ARTERY OF NATIVE HEART WITHOUT ANGINA PECTORIS: Primary | ICD-10-CM

## 2024-08-16 DIAGNOSIS — E78.5 HYPERLIPIDEMIA, UNSPECIFIED HYPERLIPIDEMIA TYPE: ICD-10-CM

## 2024-08-16 PROBLEM — I48.91 ATRIAL FIBRILLATION: Status: RESOLVED | Noted: 2023-06-15 | Resolved: 2024-08-16

## 2024-08-16 NOTE — LETTER
August 16, 2024       No Recipients    Patient: Kevin Ellington   YOB: 1935   Date of Visit: 8/16/2024       Dear Leanna Ryder MD,    Kevin Ellington was in my office today. Below are the relevant portions of my assessment and plan of care.           If you have questions, please do not hesitate to call me. I look forward to following Kevin along with you.         Sincerely,        Halima Salmeron MD        CC:   No Recipients

## 2024-08-19 ENCOUNTER — OFFICE VISIT (OUTPATIENT)
Dept: FAMILY MEDICINE CLINIC | Facility: CLINIC | Age: 89
End: 2024-08-19
Payer: MEDICARE

## 2024-08-19 VITALS
WEIGHT: 195 LBS | DIASTOLIC BLOOD PRESSURE: 82 MMHG | BODY MASS INDEX: 27.92 KG/M2 | HEART RATE: 80 BPM | HEIGHT: 70 IN | TEMPERATURE: 97.8 F | OXYGEN SATURATION: 96 % | SYSTOLIC BLOOD PRESSURE: 124 MMHG

## 2024-08-19 DIAGNOSIS — E11.9 TYPE 2 DIABETES MELLITUS WITHOUT COMPLICATION, WITHOUT LONG-TERM CURRENT USE OF INSULIN: ICD-10-CM

## 2024-08-19 DIAGNOSIS — R53.1 WEAKNESS: ICD-10-CM

## 2024-08-19 DIAGNOSIS — R61 DIAPHORESIS: ICD-10-CM

## 2024-08-19 DIAGNOSIS — R39.15 URINARY URGENCY: Primary | ICD-10-CM

## 2024-08-19 LAB
BILIRUB BLD-MCNC: NEGATIVE MG/DL
CLARITY, POC: CLEAR
COLOR UR: YELLOW
EXPIRATION DATE: NORMAL
EXPIRATION DATE: NORMAL
GLUCOSE UR STRIP-MCNC: NEGATIVE MG/DL
INTERNAL CONTROL: NORMAL
KETONES UR QL: NEGATIVE
LEUKOCYTE EST, POC: NEGATIVE
Lab: NORMAL
Lab: NORMAL
NITRITE UR-MCNC: NEGATIVE MG/ML
PH UR: 6.5 [PH] (ref 5–8)
PROT UR STRIP-MCNC: NEGATIVE MG/DL
RBC # UR STRIP: NEGATIVE /UL
SARS-COV-2 AG UPPER RESP QL IA.RAPID: NOT DETECTED
SP GR UR: 1.01 (ref 1–1.03)
UROBILINOGEN UR QL: NORMAL

## 2024-08-19 PROCEDURE — 99214 OFFICE O/P EST MOD 30 MIN: CPT | Performed by: FAMILY MEDICINE

## 2024-08-19 PROCEDURE — 1159F MED LIST DOCD IN RCRD: CPT | Performed by: FAMILY MEDICINE

## 2024-08-19 PROCEDURE — 1160F RVW MEDS BY RX/DR IN RCRD: CPT | Performed by: FAMILY MEDICINE

## 2024-08-19 PROCEDURE — 81003 URINALYSIS AUTO W/O SCOPE: CPT | Performed by: FAMILY MEDICINE

## 2024-08-19 PROCEDURE — 1125F AMNT PAIN NOTED PAIN PRSNT: CPT | Performed by: FAMILY MEDICINE

## 2024-08-19 PROCEDURE — 87426 SARSCOV CORONAVIRUS AG IA: CPT | Performed by: FAMILY MEDICINE

## 2024-08-19 RX ORDER — LANCETS 28 GAUGE
EACH MISCELLANEOUS
Qty: 100 EACH | Refills: 12 | Status: SHIPPED | OUTPATIENT
Start: 2024-08-19

## 2024-08-19 RX ORDER — BLOOD-GLUCOSE METER
KIT MISCELLANEOUS
Qty: 100 EACH | Refills: 12 | Status: SHIPPED | OUTPATIENT
Start: 2024-08-19

## 2024-08-19 RX ORDER — DIPHENHYDRAMINE HCL 25 MG
1 TABLET ORAL DAILY
Qty: 1 KIT | Refills: 0 | Status: SHIPPED | OUTPATIENT
Start: 2024-08-19

## 2024-08-19 NOTE — PROGRESS NOTES
"Chief Complaint  Fatigue (Clammy  ,fatigue and weakness for about 5 days   feeling a little better  still has some nausea )    Subjective        Kevin Ellington presents to [facility]  History of Present Illness  Pleasant 89-year-old male here for symptoms of feeling weakness, cold, clammy that started 4-5 days ago.  He had 4-5 dys of feeling increased urination, feeling clammy and weak and saw Cardiology and stopped clonidine and his BP has improved.  She said that at his age we allow his BP to be around 140/90.  He is still nauseated after his meals and he is not urinating as much.        Objective   Vital Signs:  /82   Pulse 80   Temp 97.8 °F (36.6 °C)   Ht 177.8 cm (70\")   Wt 88.5 kg (195 lb)   SpO2 96%   BMI 27.98 kg/m²   Estimated body mass index is 27.98 kg/m² as calculated from the following:    Height as of this encounter: 177.8 cm (70\").    Weight as of this encounter: 88.5 kg (195 lb).            Physical Exam  Vitals and nursing note reviewed.   Constitutional:       General: He is not in acute distress.     Appearance: He is well-developed.   HENT:      Head: Normocephalic.      Nose: Nose normal.   Cardiovascular:      Rate and Rhythm: Normal rate and regular rhythm.      Heart sounds: Normal heart sounds. No murmur heard.  Pulmonary:      Effort: Pulmonary effort is normal. No respiratory distress.      Breath sounds: Normal breath sounds.   Musculoskeletal:         General: Normal range of motion.   Skin:     General: Skin is warm and dry.      Findings: No rash.   Neurological:      Mental Status: He is alert and oriented to person, place, and time.   Psychiatric:         Behavior: Behavior normal.         Thought Content: Thought content normal.         Judgment: Judgment normal.        Result Review :  The following data was reviewed by: Leanna Ryder MD on 08/19/2024:           Office Visit on 08/19/2024   Component Date Value Ref Range Status    " Color 08/19/2024 Yellow  Yellow, Straw, Dark Yellow, Marley Final    Clarity, UA 08/19/2024 Clear  Clear Final    Specific Gravity  08/19/2024 1.015  1.005 - 1.030 Final    pH, Urine 08/19/2024 6.5  5.0 - 8.0 Final    Leukocytes 08/19/2024 Negative  Negative Final    Nitrite, UA 08/19/2024 Negative  Negative Final    Protein, POC 08/19/2024 Negative  Negative mg/dL Final    Glucose, UA 08/19/2024 Negative  Negative mg/dL Final    Ketones, UA 08/19/2024 Negative  Negative Final    Urobilinogen, UA 08/19/2024 Normal  Normal, 0.2 E.U./dL Final    Bilirubin 08/19/2024 Negative  Negative Final    Blood, UA 08/19/2024 Negative  Negative Final    Lot Number 08/19/2024 [id number]   Final    Expiration Date 08/19/2024 2/26   Final    SARS Antigen 08/19/2024 Not Detected  Not Detected, Presumptive Negative Final    Internal Control 08/19/2024 Passed  Passed Final    Lot Number 08/19/2024 [id number]   Final    Expiration Date 08/19/2024 3/25   Final          Assessment and Plan   Diagnoses and all orders for this visit:    1. Urinary urgency (Primary)  -     POCT urinalysis dipstick, automated  -     Urine Culture - Urine, Urine, Clean Catch    2. Weakness  -     POCT SARS-CoV-2 Antigen GALO    3. Type 2 diabetes mellitus without complication, without long-term current use of insulin  -     glucose blood (FREESTYLE LITE) test strip; Test daily  Dispense: 100 each; Refill: 12  -     Blood Glucose Monitoring Suppl (True Metrix Air Glucose Meter) w/Device kit; Use 1 each Daily.  Dispense: 1 kit; Refill: 0  -     Lancets (freestyle) lancets; Check one daily  Dispense: 100 each; Refill: 12    4. Diaphoresis    Pleasant 89-year-old male here for 5 days of weakness, nausea, episodes of diaphoresis and some increased urination that seem to be overall improving, I do agree with stopping clonidine.  We discussed also stopping Lyrica and Myrbetriq which are both recent medications to see if these could be worsening his symptoms.  Most likely  his symptoms are from a viral illness, COVID test today negative.  It is also possible that his symptoms are for hypoglycemia.  He did have improvement with eating an orange.  We discussed eating frequent meals and checking his blood sugar in addition to blood pressure if he feels poorly.  Warnings to ER given.          Follow Up   Return if symptoms worsen or fail to improve.  Patient was given instructions and counseling regarding his condition or for health maintenance advice. Please see specific information pulled into the AVS if appropriate.

## 2024-08-24 LAB
BACTERIA UR CULT: ABNORMAL
BACTERIA UR CULT: ABNORMAL
OTHER ANTIBIOTIC SUSC ISLT: ABNORMAL

## 2024-08-26 ENCOUNTER — TELEPHONE (OUTPATIENT)
Dept: FAMILY MEDICINE CLINIC | Facility: CLINIC | Age: 89
End: 2024-08-26
Payer: MEDICARE

## 2024-08-26 NOTE — TELEPHONE ENCOUNTER
Caller: Kevin Ellington    Relationship: Self    Best call back number: [phone]     What test was performed: URINE CULTURE    Additional notes: PATIENT REQUESTS A CALL BACK TO DISCUSS RESULTS WHEN AVAILABLE

## 2024-09-04 ENCOUNTER — OFFICE VISIT (OUTPATIENT)
Dept: GASTROENTEROLOGY | Facility: CLINIC | Age: 89
End: 2024-09-04
Payer: MEDICARE

## 2024-09-04 VITALS
OXYGEN SATURATION: 97 % | TEMPERATURE: 98.2 F | BODY MASS INDEX: 28.71 KG/M2 | HEIGHT: 70 IN | HEART RATE: 85 BPM | WEIGHT: 200.5 LBS | SYSTOLIC BLOOD PRESSURE: 106 MMHG | DIASTOLIC BLOOD PRESSURE: 54 MMHG

## 2024-09-04 DIAGNOSIS — R49.0 HOARSENESS: Chronic | ICD-10-CM

## 2024-09-04 DIAGNOSIS — K21.9 GASTROESOPHAGEAL REFLUX DISEASE, UNSPECIFIED WHETHER ESOPHAGITIS PRESENT: Primary | Chronic | ICD-10-CM

## 2024-09-04 DIAGNOSIS — R10.13 DYSPEPSIA: Chronic | ICD-10-CM

## 2024-09-04 DIAGNOSIS — Z12.11 COLON CANCER SCREENING: ICD-10-CM

## 2024-09-04 DIAGNOSIS — K44.9 HIATAL HERNIA: Chronic | ICD-10-CM

## 2024-09-04 DIAGNOSIS — R19.8 ALTERNATING CONSTIPATION AND DIARRHEA: Chronic | ICD-10-CM

## 2024-09-04 NOTE — PROGRESS NOTES
Chief Complaint   Patient presents with    Follow-up     GERD, constipation, fecal incontinence, colon polyps, colon cancer screening         History of Present Illness  89-year-old male presents the office today for follow-up. His wife has accompanied him today and has helped to facilitate answering questions.  He was last seen in office on 1/17/2024.  He has a history of a 5 cm hiatal hernia, heartburn, fundic gland polyps, diarrhea.    GERD is currently managed with pantoprazole 40 mg once daily.  He avoids eating close to bedtime and consuming large meals as this will trigger symptoms. If he has reflux, symptoms occur at night.  He reports hoarseness, but symptoms were much worse a couple of weeks ago. He followed up with his PCP. He did not have an upper respiratory infection. He is not having any abdominal or epigastric pain. He did have one bad bout of reflux once night.  He denies any vomiting or dysphagia. He does report nausea that occurs about 4 hours after he eats. At times he will get diaphoretic. He is borderline diabetic. His blood sugar runs around 120s. He denies having any RUQ pain that radiates into his back.     His wife reported that he has been weak. He has recently been taken off of one of his hypertensive medications. About 4 hours after he eats he will have weakness.     He has a history of alternating diarrhea and constipation.  There was some confusion at his last office visit as he was continuing to take stool softeners in the setting of diarrhea.  Last night he woke up and had a loose Bm in the middle of the night-which is not typical. He does not take fiber.     He uses MiraLAX intermittently if he gets constipated. He eats a lot of raw vegetables.  Due to age, no further colonoscopies indicated.  He denies any family history of colon cancer.     Result Review :       Office Visit with Kaitlin Chew APRN (01/17/2024)   UPPER GI ENDOSCOPY (12/19/2022 07:47)   Tissue Pathology Exam  "(12/19/2022 07:58)     Vital Signs:   /54   Pulse 85   Temp 98.2 °F (36.8 °C)   Ht 177.8 cm (70\")   Wt 90.9 kg (200 lb 8 oz)   SpO2 97%   BMI 28.77 kg/m²     Body mass index is 28.77 kg/m².     Physical Exam  Vitals reviewed.   Constitutional:       Appearance: Normal appearance.   Pulmonary:      Effort: Pulmonary effort is normal. No respiratory distress.   Abdominal:      General: Abdomen is flat. Bowel sounds are normal. There is no distension.      Palpations: Abdomen is soft. There is no mass.      Tenderness: There is no abdominal tenderness. There is no guarding.   Musculoskeletal:      Comments: Patient ambulates with walker   Skin:     General: Skin is warm and dry.   Neurological:      General: No focal deficit present.      Mental Status: He is alert and oriented to person, place, and time.   Psychiatric:         Mood and Affect: Mood normal.         Behavior: Behavior normal.         Thought Content: Thought content normal.         Judgment: Judgment normal.       Assessment and Plan    Diagnoses and all orders for this visit:    1. Gastroesophageal reflux disease, unspecified whether esophagitis present (Primary)    2. Hiatal hernia  Comments:  5 cm    3. Dyspepsia    4. Hoarseness    5. Alternating constipation and diarrhea    6. Colon cancer screening           Patient Instructions   Continue pantoprazole 40 mg once daily. We will have you take your medication 30-60 minutes before your evening meal.     2.  For GERD, we recommend avoiding eating 3-4 hours before bedtime, eating smaller more frequent meals, and avoiding any known food triggers including spicy foods, tomatoes and tomato-based sauces, chocolate, coffee/tea, citrus fruits, carbonated  beverages and alcohol.     3.  We also recommend use of FDGard. This is a medical food that helps in the management of functional dyspepsia. Follow package instructions for use. This can be purchases OTC at your local grocery or pharmacy.     4. " To help regulate your bowel habits, we recommend use of a daily fiber supplement such as Benefiber, Metamucil, or Citrucel-generic formula is also fine. You will start with 2 tsp daily and can adjust your dosing as needed based upon your bowel habits.     5.  As long as you are not having diarrhea, you can continue your daily stool softener. If you are having loose stool-I would hold the stool softener that day.     6.  Recommend next office follow up visit in 3 months for reassessment of symptoms or sooner should symptoms worsen or fail to improve.       Discussion:    We will have the patient continue pantoprazole and have him change the timing of his medication to 30 to 60 minutes before his last meal of the day.  We have recommended use of FD guard for functional dyspepsia.  Given the patient's age, we would not want to do anything invasive unless absolutely necessary.  Could consider use of Zofran if needed; however I feel the FD guard will be helpful.  In regards to the patient's hoarseness, this may be an ENT issue.  We will try the FD guard to see if this helps to manage symptoms, and if not, could consider referral to ENT for further workup and nasal endoscopy.    His symptoms do not seem to be of gallbladder etiology, but could consider HIDA scan and right upper quadrant ultrasound if indicated.  I do not feel that this is warranted at this time.    The patient reports alternating diarrhea and constipation but continues to take stool softeners when he has loose stool.  We have recommended the addition of a fiber supplement to regulate his bowel habits.  We have instructed the patient to hold his stool softener if he were to have loose stools.  We have recommended office follow-up in 3 months for reassessment of symptoms or sooner should his symptoms worsen or fail to improve.  Both the patient and his wife verbalized understanding of above plan of care and are in agreement.  All questions answered and  support provided.    EMR Dragon/Transcription Disclaimer:  This document has been Dictated utilizing Dragon dictation.

## 2024-09-04 NOTE — PATIENT INSTRUCTIONS
Continue pantoprazole 40 mg once daily. We will have you take your medication 30-60 minutes before your evening meal.     2.  For GERD, we recommend avoiding eating 3-4 hours before bedtime, eating smaller more frequent meals, and avoiding any known food triggers including spicy foods, tomatoes and tomato-based sauces, chocolate, coffee/tea, citrus fruits, carbonated  beverages and alcohol.     3.  We also recommend use of FDGard. This is a medical food that helps in the management of functional dyspepsia. Follow package instructions for use. This can be purchases OTC at your local grocery or pharmacy.     4. To help regulate your bowel habits, we recommend use of a daily fiber supplement such as Benefiber, Metamucil, or Citrucel-generic formula is also fine. You will start with 2 tsp daily and can adjust your dosing as needed based upon your bowel habits.     5.  As long as you are not having diarrhea, you can continue your daily stool softener. If you are having loose stool-I would hold the stool softener that day.     6.  Recommend next office follow up visit in 3 months for reassessment of symptoms or sooner should symptoms worsen or fail to improve.

## 2024-09-17 ENCOUNTER — OFFICE VISIT (OUTPATIENT)
Dept: FAMILY MEDICINE CLINIC | Facility: CLINIC | Age: 89
End: 2024-09-17
Payer: MEDICARE

## 2024-09-17 VITALS
HEIGHT: 70 IN | OXYGEN SATURATION: 94 % | BODY MASS INDEX: 29.63 KG/M2 | HEART RATE: 81 BPM | TEMPERATURE: 97.6 F | SYSTOLIC BLOOD PRESSURE: 114 MMHG | DIASTOLIC BLOOD PRESSURE: 56 MMHG | WEIGHT: 207 LBS

## 2024-09-17 DIAGNOSIS — S41.111A LACERATION OF RIGHT UPPER EXTREMITY, INITIAL ENCOUNTER: ICD-10-CM

## 2024-09-17 DIAGNOSIS — M47.816 SPONDYLOSIS OF LUMBAR REGION WITHOUT MYELOPATHY OR RADICULOPATHY: ICD-10-CM

## 2024-09-17 DIAGNOSIS — I10 ESSENTIAL HYPERTENSION: ICD-10-CM

## 2024-09-17 DIAGNOSIS — E11.9 TYPE 2 DIABETES MELLITUS WITHOUT COMPLICATION, WITHOUT LONG-TERM CURRENT USE OF INSULIN: Primary | ICD-10-CM

## 2024-09-17 PROCEDURE — 1159F MED LIST DOCD IN RCRD: CPT | Performed by: FAMILY MEDICINE

## 2024-09-17 PROCEDURE — 1126F AMNT PAIN NOTED NONE PRSNT: CPT | Performed by: FAMILY MEDICINE

## 2024-09-17 PROCEDURE — 99214 OFFICE O/P EST MOD 30 MIN: CPT | Performed by: FAMILY MEDICINE

## 2024-09-17 PROCEDURE — 1160F RVW MEDS BY RX/DR IN RCRD: CPT | Performed by: FAMILY MEDICINE

## 2024-09-17 PROCEDURE — G2211 COMPLEX E/M VISIT ADD ON: HCPCS | Performed by: FAMILY MEDICINE

## 2024-09-18 LAB
ALBUMIN SERPL-MCNC: 4.3 G/DL (ref 3.7–4.7)
ALBUMIN/CREAT UR: 7 MG/G CREAT (ref 0–29)
ALP SERPL-CCNC: 91 IU/L (ref 44–121)
ALT SERPL-CCNC: 18 IU/L (ref 0–44)
AST SERPL-CCNC: 19 IU/L (ref 0–40)
BASOPHILS # BLD AUTO: 0.1 X10E3/UL (ref 0–0.2)
BASOPHILS NFR BLD AUTO: 1 %
BILIRUB SERPL-MCNC: 0.6 MG/DL (ref 0–1.2)
BUN SERPL-MCNC: 24 MG/DL (ref 8–27)
BUN/CREAT SERPL: 18 (ref 10–24)
CALCIUM SERPL-MCNC: 9.4 MG/DL (ref 8.6–10.2)
CHLORIDE SERPL-SCNC: 101 MMOL/L (ref 96–106)
CHOLEST SERPL-MCNC: 131 MG/DL (ref 100–199)
CO2 SERPL-SCNC: 22 MMOL/L (ref 20–29)
CREAT SERPL-MCNC: 1.36 MG/DL (ref 0.76–1.27)
CREAT UR-MCNC: 117.9 MG/DL
EGFRCR SERPLBLD CKD-EPI 2021: 50 ML/MIN/1.73
EOSINOPHIL # BLD AUTO: 0.1 X10E3/UL (ref 0–0.4)
EOSINOPHIL NFR BLD AUTO: 1 %
ERYTHROCYTE [DISTWIDTH] IN BLOOD BY AUTOMATED COUNT: 12.9 % (ref 11.6–15.4)
GLOBULIN SER CALC-MCNC: 2.1 G/DL (ref 1.5–4.5)
GLUCOSE SERPL-MCNC: 129 MG/DL (ref 70–99)
HBA1C MFR BLD: 7.1 % (ref 4.8–5.6)
HCT VFR BLD AUTO: 44.2 % (ref 37.5–51)
HDLC SERPL-MCNC: 38 MG/DL
HGB BLD-MCNC: 14.8 G/DL (ref 13–17.7)
IMM GRANULOCYTES # BLD AUTO: 0.1 X10E3/UL (ref 0–0.1)
IMM GRANULOCYTES NFR BLD AUTO: 1 %
LDLC SERPL CALC-MCNC: 63 MG/DL (ref 0–99)
LYMPHOCYTES # BLD AUTO: 2.1 X10E3/UL (ref 0.7–3.1)
LYMPHOCYTES NFR BLD AUTO: 19 %
MCH RBC QN AUTO: 30.5 PG (ref 26.6–33)
MCHC RBC AUTO-ENTMCNC: 33.5 G/DL (ref 31.5–35.7)
MCV RBC AUTO: 91 FL (ref 79–97)
MICROALBUMIN UR-MCNC: 7.7 UG/ML
MONOCYTES # BLD AUTO: 0.7 X10E3/UL (ref 0.1–0.9)
MONOCYTES NFR BLD AUTO: 6 %
NEUTROPHILS # BLD AUTO: 8.1 X10E3/UL (ref 1.4–7)
NEUTROPHILS NFR BLD AUTO: 72 %
PLATELET # BLD AUTO: 239 X10E3/UL (ref 150–450)
POTASSIUM SERPL-SCNC: 4.3 MMOL/L (ref 3.5–5.2)
PROT SERPL-MCNC: 6.4 G/DL (ref 6–8.5)
RBC # BLD AUTO: 4.85 X10E6/UL (ref 4.14–5.8)
SODIUM SERPL-SCNC: 138 MMOL/L (ref 134–144)
TRIGL SERPL-MCNC: 180 MG/DL (ref 0–149)
TSH SERPL DL<=0.005 MIU/L-ACNC: 1.75 UIU/ML (ref 0.45–4.5)
VLDLC SERPL CALC-MCNC: 30 MG/DL (ref 5–40)
WBC # BLD AUTO: 11.1 X10E3/UL (ref 3.4–10.8)

## 2024-09-22 ENCOUNTER — HOSPITAL ENCOUNTER (OUTPATIENT)
Facility: HOSPITAL | Age: 89
Discharge: HOME | End: 2024-09-22
Attending: EMERGENCY MEDICINE | Admitting: EMERGENCY MEDICINE
Payer: MEDICARE

## 2024-09-22 VITALS
RESPIRATION RATE: 16 BRPM | DIASTOLIC BLOOD PRESSURE: 87 MMHG | SYSTOLIC BLOOD PRESSURE: 118 MMHG | OXYGEN SATURATION: 97 % | HEIGHT: 70 IN | HEART RATE: 88 BPM | TEMPERATURE: 97.7 F | BODY MASS INDEX: 29.7 KG/M2

## 2024-09-22 DIAGNOSIS — S51.811D SKIN TEAR OF RIGHT FOREARM WITHOUT COMPLICATION, SUBSEQUENT ENCOUNTER: Primary | ICD-10-CM

## 2024-09-22 DIAGNOSIS — S41.112A SKIN TEAR OF LEFT UPPER ARM WITHOUT COMPLICATION, INITIAL ENCOUNTER: ICD-10-CM

## 2024-09-22 PROCEDURE — 90471 IMMUNIZATION ADMIN: CPT | Performed by: PHYSICIAN ASSISTANT

## 2024-09-22 PROCEDURE — 90472 IMMUNIZATION ADMIN EACH ADD: CPT | Performed by: PHYSICIAN ASSISTANT

## 2024-09-22 PROCEDURE — 25010000002 TETANUS-DIPHTH-ACELL PERTUSSIS 5-2.5-18.5 LF-MCG/0.5 SUSPENSION PREFILLED SYRINGE: Performed by: PHYSICIAN ASSISTANT

## 2024-09-22 PROCEDURE — 90715 TDAP VACCINE 7 YRS/> IM: CPT | Performed by: PHYSICIAN ASSISTANT

## 2024-09-22 PROCEDURE — G0463 HOSPITAL OUTPT CLINIC VISIT: HCPCS | Performed by: PHYSICIAN ASSISTANT

## 2024-09-22 PROCEDURE — 99203 OFFICE O/P NEW LOW 30 MIN: CPT | Performed by: PHYSICIAN ASSISTANT

## 2024-09-22 RX ORDER — CEPHALEXIN 500 MG/1
500 CAPSULE ORAL 3 TIMES DAILY
Qty: 21 CAPSULE | Refills: 0 | Status: SHIPPED | OUTPATIENT
Start: 2024-09-22 | End: 2024-09-29

## 2024-09-22 RX ADMIN — TETANUS TOXOID, REDUCED DIPHTHERIA TOXOID AND ACELLULAR PERTUSSIS VACCINE, ADSORBED 0.5 ML: 5; 2.5; 8; 8; 2.5 SUSPENSION INTRAMUSCULAR at 15:27

## 2024-09-22 NOTE — ED NOTES
WOUNDS WRAPPED WITH NON-ADHERENT DRESSING AND GAUZE ON THE R ARM AND NON-ADHERENT DRESSING ON THE L FOREARM PER PROVIDER INSTRUCTIONS.

## 2024-09-22 NOTE — FSED PROVIDER NOTE
Subjective   History of Present Illness    Patient, history of A-fib and coronary artery disease, takes Eliquis, reports that he was trying to move a TV tray about 7 days ago when he accidentally cut his right arm.  The spouse has been changing the wound dressing every 2 days and every time she removes the wound dressing there is some mild amount of bleeding.  Patient went to see primary care regarding this wound 4 days ago and redressed it.  In addition, patient cut his left arm while lifting up a pot of mums and has been changing the wound dressing every 2 days for that wound as well.  Spouse wanted patient to come in to recheck the wound.  Unknown of last tetanus shot.    Review of Systems   Constitutional:  Negative for activity change and appetite change.   Eyes:  Negative for pain.   Respiratory:  Negative for shortness of breath.    Gastrointestinal:  Negative for nausea and vomiting.   Musculoskeletal:  Negative for arthralgias.   Skin:  Positive for wound. Negative for color change.   Neurological:  Negative for dizziness.   All other systems reviewed and are negative.      Past Medical History:   Diagnosis Date    Abnormal ECG 6-    Taking Eliquis    Allergic 1970    Arrhythmia 6-    Taking Eliquis    Arthritis     Asthma 05/23/2019    shortness of breath-exertion    Benign prostatic hyperplasia     Bilateral carotid artery stenosis 05/12/2022    Cataract     Cervical radiculopathy     Chronic atrial fibrillation 06/15/2023    Colon polyp     Coronary artery disease 2001    Stent    COVID-19 06/12/2023    CTS (carpal tunnel syndrome) 1998    Surgery both hands in 1999    Erectile dysfunction     GERD (gastroesophageal reflux disease)     HTN (hypertension)     Hyperlipidemia     Kidney stone     Low back pain     2 Prior surgeries    Lumbosacral disc disease     Melanoma 01/15/2009    DR WILLY SIMMS ( Left forearm)    Obesity     Radiculitis, thoracic     Scoliosis     Spondylosis of  lumbar region without myelopathy or radiculopathy 2020    Stage 3a chronic kidney disease 10/21/2021    Stented coronary artery     Thoracic disc disorder     Surgery by Dr. Aldridge (sp)    TIA (transient ischemic attack) 2022    Type 2 diabetes mellitus without complication, without long-term current use of insulin 10/03/2023       Allergies   Allergen Reactions    Codeine GI Intolerance    Hydrocodone GI Intolerance     SEVERE CONSTIPATION    Sulfa Antibiotics Unknown (See Comments)     Unable to remember       Past Surgical History:   Procedure Laterality Date    ABLATION OF DYSRHYTHMIC FOCUS  2020    radio frequency /lower back    ANKLE FUSION Left 2007    reconstruction and fusion    BACK SURGERY      HERNIATED LUMBAR DISK 1975,,    CARDIAC CATHETERIZATION      CARPAL TUNNEL RELEASE      CATARACT EXTRACTION, BILATERAL Bilateral     COLONOSCOPY      CORONARY ANGIOPLASTY WITH STENT PLACEMENT      CORONARY STENT PLACEMENT      ENDOSCOPY N/A 2022    Procedure: ESOPHAGOGASTRODUODENOSCOPY WITH BIOPSY;  Surgeon: Rashard Mathews MD;  [place] [facility] [place]  Service: Gastroenterology;  Laterality: N/A;  gastric polyps, hiatal hernia    EPIDURAL BLOCK  Several at [facility]    and [facility]    EYE SURGERY      JOINT REPLACEMENT      REPLACEMENT TOTAL KNEE Left 2015    REPLACEMENT TOTAL KNEE Right     SKIN CANCER EXCISION      MELANOMA    SPINE SURGERY      THORACIC SPINE SURGERY      UPPER GASTROINTESTINAL ENDOSCOPY         Family History   Problem Relation Age of Onset    Hypertension Mother     Arthritis Mother             Skin cancer Mother     Heart disease Father     Early death Father         heart disease    Hypertension Father     Aortic aneurysm Father 58    Cancer Neg Hx     Colon cancer Neg Hx     Colon polyps Neg Hx     Crohn's disease Neg Hx     Irritable bowel syndrome Neg Hx     Ulcerative colitis Neg Hx        Social History      Socioeconomic History    Marital status:     Number of children: 1    Years of education: BA DEGREE   Tobacco Use    Smoking status: Never    Smokeless tobacco: Never    Tobacco comments:     caffeine use   Vaping Use    Vaping status: Never Used   Substance and Sexual Activity    Alcohol use: No    Drug use: No    Sexual activity: Not Currently     Partners: Female           Objective   Physical Exam  Vitals and nursing note reviewed.   Constitutional:       Appearance: Normal appearance. He is normal weight.   HENT:      Head: Normocephalic and atraumatic.      Nose: Nose normal.      Mouth/Throat:      Mouth: Mucous membranes are moist.   Eyes:      Pupils: Pupils are equal, round, and reactive to light.   Cardiovascular:      Rate and Rhythm: Normal rate and regular rhythm.      Pulses: Normal pulses.      Heart sounds: Normal heart sounds.   Pulmonary:      Effort: Pulmonary effort is normal.      Breath sounds: Normal breath sounds.   Musculoskeletal:         General: Normal range of motion.      Cervical back: Normal range of motion.      Right lower leg: No edema.      Left lower leg: No edema.   Skin:     General: Skin is warm.             Comments: Right proximal forearm: There are 3 superficial skin tears noted, no active bleeding    Left distal forearm: There is approximately a 3 cm linear skin wound noted; no active bleeding   Neurological:      General: No focal deficit present.      Mental Status: He is alert.   Psychiatric:         Behavior: Behavior is cooperative.         Procedures           ED Course                                           Medical Decision Making  Problems Addressed:  Skin tear of left upper arm without complication, initial encounter: complicated acute illness or injury  Skin tear of right forearm without complication, subsequent encounter: complicated acute illness or injury    Risk  Prescription drug management.        Final diagnoses:   Skin tear of right forearm  without complication, subsequent encounter   Skin tear of left upper arm without complication, initial encounter       ED Disposition  ED Disposition       ED Disposition   Discharge    Condition   Stable    Comment   --               [facility]  [address]  [facility] [address] [phone]  Call            Medication List        New Prescriptions      cephalexin 500 MG capsule  Commonly known as: KEFLEX  Take 1 capsule by mouth 3 (Three) Times a Day for 7 days.               Where to Get Your Medications        These medications were sent to [id number] - [place], [place] - [address] AT [facility] [address] - [phone]  - [phone]   [address], [facility] [address]      Phone: [phone]   cephalexin 500 MG capsule

## 2024-09-22 NOTE — DISCHARGE INSTRUCTIONS
Please take the medication as prescribed.  I advise you call the [facility] wound care clinic tomorrow for close follow-up regarding your wounds.  They are expecting your call.

## 2024-09-29 ENCOUNTER — NURSE TRIAGE (OUTPATIENT)
Dept: CALL CENTER | Facility: HOSPITAL | Age: 89
End: 2024-09-29
Payer: MEDICARE
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
## 2024-10-02 NOTE — TELEPHONE ENCOUNTER
Rx Refill Note  Requested Prescriptions     Pending Prescriptions Disp Refills    cloNIDine (CATAPRES) 0.1 MG tablet [Pharmacy Med Name: CLONIDINE HCL TABS 0.1MG] 180 tablet 3     Sig: TAKE 1 TABLET TWICE A DAY    atorvastatin (LIPITOR) 40 MG tablet [Pharmacy Med Name: ATORVASTATIN TABS 40MG] 90 tablet 3     Sig: TAKE 1 TABLET DAILY    losartan (COZAAR) 100 MG tablet [Pharmacy Med Name: LOSARTAN TABS 100MG] 90 tablet 3     Sig: TAKE 1 TABLET DAILY    metoprolol succinate XL (TOPROL-XL) 100 MG 24 hr tablet [Pharmacy Med Name: METOPROLOL SUCCINATE ER TABS 100MG] 90 tablet 3     Sig: TAKE 1 TABLET DAILY    tamsulosin (FLOMAX) 0.4 MG capsule 24 hr capsule [Pharmacy Med Name: TAMSULOSIN HCL CAPS 0.4MG] 180 capsule 3     Sig: TAKE 2 CAPSULES DAILY      Last office visit with prescribing clinician: 9/17/2024   Last telemedicine visit with prescribing clinician: Visit date not found   Next office visit with prescribing clinician: 3/24/2025                         Would you like a call back once the refill request has been completed: [] Yes [] No    If the office needs to give you a call back, can they leave a voicemail: [] Yes [] No    Jairo Griffin CMA/LMR  10/02/24, 08:13 EDT

## 2024-10-03 ENCOUNTER — OFFICE VISIT (OUTPATIENT)
Dept: WOUND CARE | Facility: HOSPITAL | Age: 89
End: 2024-10-03
Payer: MEDICARE

## 2024-10-03 ENCOUNTER — OFFICE VISIT (OUTPATIENT)
Age: 89
End: 2024-10-03
Payer: MEDICARE

## 2024-10-03 VITALS
WEIGHT: 202 LBS | BODY MASS INDEX: 28.92 KG/M2 | OXYGEN SATURATION: 97 % | DIASTOLIC BLOOD PRESSURE: 60 MMHG | HEART RATE: 81 BPM | SYSTOLIC BLOOD PRESSURE: 100 MMHG | HEIGHT: 70 IN

## 2024-10-03 DIAGNOSIS — I48.20 CHRONIC ATRIAL FIBRILLATION: ICD-10-CM

## 2024-10-03 DIAGNOSIS — E78.5 HYPERLIPIDEMIA, UNSPECIFIED HYPERLIPIDEMIA TYPE: ICD-10-CM

## 2024-10-03 DIAGNOSIS — I10 ESSENTIAL HYPERTENSION: ICD-10-CM

## 2024-10-03 DIAGNOSIS — N18.31 STAGE 3A CHRONIC KIDNEY DISEASE: ICD-10-CM

## 2024-10-03 DIAGNOSIS — E11.9 TYPE 2 DIABETES MELLITUS WITHOUT COMPLICATION, WITHOUT LONG-TERM CURRENT USE OF INSULIN: ICD-10-CM

## 2024-10-03 DIAGNOSIS — Z95.5 S/P CORONARY ARTERY STENT PLACEMENT: ICD-10-CM

## 2024-10-03 DIAGNOSIS — I25.10 CORONARY ARTERY DISEASE INVOLVING NATIVE CORONARY ARTERY OF NATIVE HEART WITHOUT ANGINA PECTORIS: Primary | ICD-10-CM

## 2024-10-03 PROCEDURE — G0463 HOSPITAL OUTPT CLINIC VISIT: HCPCS

## 2024-10-03 PROCEDURE — 97602 WOUND(S) CARE NON-SELECTIVE: CPT

## 2024-10-03 RX ORDER — MIRABEGRON 25 MG/1
TABLET, FILM COATED, EXTENDED RELEASE ORAL
COMMUNITY
End: 2024-10-03

## 2024-10-03 RX ORDER — ATORVASTATIN CALCIUM 40 MG/1
TABLET, FILM COATED ORAL
Qty: 90 TABLET | Refills: 3 | Status: SHIPPED | OUTPATIENT
Start: 2024-10-03

## 2024-10-03 RX ORDER — CLONIDINE HYDROCHLORIDE 0.1 MG/1
0.1 TABLET ORAL 2 TIMES DAILY
Qty: 180 TABLET | Refills: 3 | Status: SHIPPED | OUTPATIENT
Start: 2024-10-03

## 2024-10-03 RX ORDER — TAMSULOSIN HYDROCHLORIDE 0.4 MG/1
CAPSULE ORAL
Qty: 180 CAPSULE | Refills: 3 | Status: SHIPPED | OUTPATIENT
Start: 2024-10-03

## 2024-10-03 RX ORDER — METOPROLOL SUCCINATE 100 MG/1
TABLET, EXTENDED RELEASE ORAL
Qty: 90 TABLET | Refills: 3 | Status: SHIPPED | OUTPATIENT
Start: 2024-10-03

## 2024-10-03 RX ORDER — PREGABALIN 25 MG/1
CAPSULE ORAL
COMMUNITY
End: 2024-10-03

## 2024-10-03 RX ORDER — LOSARTAN POTASSIUM 100 MG/1
TABLET ORAL
Qty: 90 TABLET | Refills: 3 | OUTPATIENT
Start: 2024-10-03

## 2024-10-03 RX ORDER — LOSARTAN POTASSIUM 50 MG/1
50 TABLET ORAL DAILY
Qty: 90 TABLET | Refills: 1 | Status: SHIPPED | OUTPATIENT
Start: 2024-10-03

## 2024-10-03 NOTE — LETTER
October 3, 2024       No Recipients    Patient: Kevin Ellington   YOB: 1935   Date of Visit: 10/3/2024       Dear Leanna Ryder MD,    Kevin Ellington was in my office today. Below are the relevant portions of my assessment and plan of care.           If you have questions, please do not hesitate to call me. I look forward to following Kevin along with you.         Sincerely,        Halima Salmeron MD        CC:   No Recipients

## 2024-10-17 ENCOUNTER — OFFICE VISIT (OUTPATIENT)
Dept: WOUND CARE | Facility: HOSPITAL | Age: 89
End: 2024-10-17
Payer: MEDICARE

## 2024-10-28 DIAGNOSIS — I10 ESSENTIAL HYPERTENSION: ICD-10-CM

## 2024-10-28 RX ORDER — CHLORTHALIDONE 50 MG/1
TABLET ORAL
Qty: 90 TABLET | Refills: 3 | Status: SHIPPED | OUTPATIENT
Start: 2024-10-28

## 2024-11-13 NOTE — TELEPHONE ENCOUNTER
Pt states previous refill was sent to incorrect pharmacy  Rx Refill Note  Requested Prescriptions     Pending Prescriptions Disp Refills   • modafinil (PROVIGIL) 100 MG tablet 30 tablet 2     Sig: Take 1 tablet by mouth Daily.      Last office visit with prescribing clinician: 11/15/2022   Last telemedicine visit with prescribing clinician: 3/3/2023   Next office visit with prescribing clinician: 3/3/2023                         Would you like a call back once the refill request has been completed: [] Yes [] No    If the office needs to give you a call back, can they leave a voicemail: [] Yes [] No    Porsche Small MA/LMR  02/23/23, 13:21 EST   [place]

## 2024-11-14 DIAGNOSIS — G47.419 PRIMARY NARCOLEPSY WITHOUT CATAPLEXY: ICD-10-CM

## 2024-11-14 RX ORDER — MODAFINIL 100 MG/1
100 TABLET ORAL DAILY
Qty: 30 TABLET | Refills: 2 | Status: SHIPPED | OUTPATIENT
Start: 2024-11-14

## 2024-11-14 NOTE — TELEPHONE ENCOUNTER
Rx Refill Note  Requested Prescriptions     Pending Prescriptions Disp Refills    modafinil (PROVIGIL) 100 MG tablet 30 tablet 2     Sig: Take 1 tablet by mouth Daily.      Last office visit with prescribing clinician: 9/17/2024   Last telemedicine visit with prescribing clinician: Visit date not found   Next office visit with prescribing clinician: 3/24/2025                         Would you like a call back once the refill request has been completed: [] Yes [] No    If the office needs to give you a call back, can they leave a voicemail: [] Yes [] No    Julio Arrington MA  11/14/24, 13:44 EST

## 2024-11-14 NOTE — TELEPHONE ENCOUNTER
Caller: Chandana Kevin W    Relationship: Self    Best call back number: [phone]    Requested Prescriptions:   Requested Prescriptions     Pending Prescriptions Disp Refills    modafinil (PROVIGIL) 100 MG tablet 30 tablet 2     Sig: Take 1 tablet by mouth Daily.      Pharmacy where request should be sent: [facility] - [address] [phone] [facility] [phone]      Last office visit with prescribing clinician: 9/17/2024   Last telemedicine visit with prescribing clinician: Visit date not found   Next office visit with prescribing clinician: 3/24/2025     Additional details provided by patient: PT HAS 1 WEEK LEFT OF MEDICATION.     Does the patient have less than a 3 day supply:  [] Yes  [x] No    Would you like a call back once the refill request has been completed: [] Yes [x] No    Uziel Ortiz Rep   11/14/24 11:57 EST

## 2024-12-04 ENCOUNTER — OFFICE VISIT (OUTPATIENT)
Dept: GASTROENTEROLOGY | Facility: CLINIC | Age: 89
End: 2024-12-04
Payer: MEDICARE

## 2024-12-04 VITALS
WEIGHT: 199.2 LBS | DIASTOLIC BLOOD PRESSURE: 64 MMHG | HEART RATE: 61 BPM | TEMPERATURE: 96.8 F | BODY MASS INDEX: 28.52 KG/M2 | HEIGHT: 70 IN | OXYGEN SATURATION: 98 % | SYSTOLIC BLOOD PRESSURE: 104 MMHG

## 2024-12-04 DIAGNOSIS — K44.9 HIATAL HERNIA: ICD-10-CM

## 2024-12-04 DIAGNOSIS — K21.9 GASTROESOPHAGEAL REFLUX DISEASE WITHOUT ESOPHAGITIS: Primary | ICD-10-CM

## 2024-12-04 DIAGNOSIS — K59.00 CONSTIPATION, UNSPECIFIED CONSTIPATION TYPE: ICD-10-CM

## 2024-12-04 PROCEDURE — 99213 OFFICE O/P EST LOW 20 MIN: CPT | Performed by: NURSE PRACTITIONER

## 2024-12-04 PROCEDURE — 1160F RVW MEDS BY RX/DR IN RCRD: CPT | Performed by: NURSE PRACTITIONER

## 2024-12-04 PROCEDURE — 1159F MED LIST DOCD IN RCRD: CPT | Performed by: NURSE PRACTITIONER

## 2024-12-04 RX ORDER — PANTOPRAZOLE SODIUM 40 MG/1
40 TABLET, DELAYED RELEASE ORAL DAILY
Qty: 90 TABLET | Refills: 3 | Status: SHIPPED | OUTPATIENT
Start: 2024-12-04

## 2024-12-04 NOTE — PROGRESS NOTES
"Chief Complaint   Patient presents with    GI Problem         History of Present Illness  Patient is an 89-year-old male who presents today for Follow-up.  He is a new patient to me.  He has a history of hiatal hernia, GERD, and alternating diarrhea and constipation.    Patient presents today for follow-up.  He continues on pantoprazole for GERD.  He has occasional issues with reflux at night but overall symptoms have improved and are fairly well-controlled at this time.  He has changed his diet as well and has been trying to eat earlier in the day.    He denies any dysphagia, nausea, or vomiting.  Denies any abdominal pain.    His bowels have been moving more regularly.  Reports he is generally having a bowel movement every other day.  If he starts to feel constipated, he takes MiraLAX on an as-needed basis.  He denies any blood or mucus in the stool.    He has been having issues with back pain, has previously done epidural injections and is considering getting a stimulator.     Result Review :       Tissue Pathology Exam (12/19/2022 07:58)    UPPER GI ENDOSCOPY (12/19/2022 07:47)    Office Visit with Kaitlin Chew APRN (09/04/2024)    Vital Signs:   /64   Pulse 61   Temp 96.8 °F (36 °C)   Ht 177.8 cm (70\")   Wt 90.4 kg (199 lb 3.2 oz)   SpO2 98%   BMI 28.58 kg/m²     Body mass index is 28.58 kg/m².     Physical Exam  Vitals reviewed.   Constitutional:       General: He is not in acute distress.     Appearance: He is well-developed.   HENT:      Head: Normocephalic and atraumatic.   Pulmonary:      Effort: Pulmonary effort is normal. No respiratory distress.   Abdominal:      General: Abdomen is flat. Bowel sounds are normal. There is no distension.      Palpations: Abdomen is soft.      Tenderness: There is no abdominal tenderness.   Skin:     General: Skin is dry.      Coloration: Skin is not pale.   Neurological:      Mental Status: He is alert and oriented to person, place, and time. "   Psychiatric:         Thought Content: Thought content normal.           Assessment and Plan    Diagnoses and all orders for this visit:    1. Gastroesophageal reflux disease without esophagitis (Primary)  -     pantoprazole (PROTONIX) 40 MG EC tablet; Take 1 tablet by mouth Daily.  Dispense: 90 tablet; Refill: 3    2. Hiatal hernia    3. Constipation, unspecified constipation type         Discussion  Patient presents today for follow-up.  Overall he is doing well from a GI standpoint.  Reflux symptoms are controlled on pantoprazole and will continue current treatment.  Reinforced dietary modifications to help with reflux.    Bowel movements have been more regular with the use of MiraLAX as needed.  Will continue current treatment.    Will plan for follow-up visit in 1 year and patient instructed to call for any new or worsening symptoms.          Follow Up   Return in about 1 year (around 12/4/2025), or if symptoms worsen or fail to improve.    Patient Instructions   For GERD, continue pantoprazole daily as prescribed.    For GERD, follow antireflux precautions.  Recommend avoiding eating within 3 to 4 hours of bedtime.  Avoid foods that can trigger symptoms which may include citrus fruits, spicy foods, tomatoes and red sauces, chocolate, coffee/tea, caffeinated or carbonated beverages, alcohol.     For constipation, okay to continue using MiraLAX as needed.

## 2024-12-04 NOTE — PATIENT INSTRUCTIONS
For GERD, continue pantoprazole daily as prescribed.    For GERD, follow antireflux precautions.  Recommend avoiding eating within 3 to 4 hours of bedtime.  Avoid foods that can trigger symptoms which may include citrus fruits, spicy foods, tomatoes and red sauces, chocolate, coffee/tea, caffeinated or carbonated beverages, alcohol.     For constipation, okay to continue using MiraLAX as needed.

## 2025-01-03 ENCOUNTER — OFFICE VISIT (OUTPATIENT)
Dept: CARDIOLOGY | Facility: CLINIC | Age: OVER 89
End: 2025-01-03
Payer: MEDICARE

## 2025-01-03 VITALS
WEIGHT: 198 LBS | DIASTOLIC BLOOD PRESSURE: 72 MMHG | SYSTOLIC BLOOD PRESSURE: 136 MMHG | HEIGHT: 70 IN | HEART RATE: 82 BPM | BODY MASS INDEX: 28.35 KG/M2

## 2025-01-03 DIAGNOSIS — I48.0 PAROXYSMAL ATRIAL FIBRILLATION: Primary | ICD-10-CM

## 2025-01-03 DIAGNOSIS — I48.21 PERMANENT ATRIAL FIBRILLATION: ICD-10-CM

## 2025-01-03 PROCEDURE — 93000 ELECTROCARDIOGRAM COMPLETE: CPT | Performed by: NURSE PRACTITIONER

## 2025-01-03 PROCEDURE — 1160F RVW MEDS BY RX/DR IN RCRD: CPT | Performed by: NURSE PRACTITIONER

## 2025-01-03 PROCEDURE — 99214 OFFICE O/P EST MOD 30 MIN: CPT | Performed by: NURSE PRACTITIONER

## 2025-01-03 PROCEDURE — 1159F MED LIST DOCD IN RCRD: CPT | Performed by: NURSE PRACTITIONER
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
## 2025-01-20 ENCOUNTER — HOSPITAL ENCOUNTER (OUTPATIENT)
Facility: HOSPITAL | Age: OVER 89
Setting detail: OBSERVATION
Discharge: HOME | End: 2025-01-24
Attending: EMERGENCY MEDICINE | Admitting: INTERNAL MEDICINE
Payer: MEDICARE

## 2025-01-20 ENCOUNTER — APPOINTMENT (OUTPATIENT)
Dept: GENERAL RADIOLOGY | Facility: HOSPITAL | Age: OVER 89
End: 2025-01-20
Payer: MEDICARE

## 2025-01-20 ENCOUNTER — APPOINTMENT (OUTPATIENT)
Dept: CARDIOLOGY | Facility: HOSPITAL | Age: OVER 89
End: 2025-01-20
Payer: MEDICARE

## 2025-01-20 DIAGNOSIS — M79.89 RIGHT LEG SWELLING: Primary | ICD-10-CM

## 2025-01-20 DIAGNOSIS — M25.571 ACUTE RIGHT ANKLE PAIN: ICD-10-CM

## 2025-01-20 PROBLEM — R60.0 LEG EDEMA, RIGHT: Status: ACTIVE | Noted: 2025-01-20

## 2025-01-20 LAB
ALBUMIN SERPL-MCNC: 3.4 G/DL (ref 3.5–5.2)
ALBUMIN/GLOB SERPL: 1.4 G/DL
ALP SERPL-CCNC: 82 U/L (ref 39–117)
ALT SERPL W P-5'-P-CCNC: 12 U/L (ref 1–41)
ANION GAP SERPL CALCULATED.3IONS-SCNC: 10.6 MMOL/L (ref 5–15)
ANION GAP SERPL CALCULATED.3IONS-SCNC: 11.2 MMOL/L (ref 5–15)
AST SERPL-CCNC: 15 U/L (ref 1–40)
BASOPHILS # BLD AUTO: 0.05 10*3/MM3 (ref 0–0.2)
BASOPHILS NFR BLD AUTO: 0.4 % (ref 0–1.5)
BH CV LOWER VASCULAR LEFT COMMON FEMORAL AUGMENT: NORMAL
BH CV LOWER VASCULAR LEFT COMMON FEMORAL COMPETENT: NORMAL
BH CV LOWER VASCULAR LEFT COMMON FEMORAL COMPRESS: NORMAL
BH CV LOWER VASCULAR LEFT COMMON FEMORAL PHASIC: NORMAL
BH CV LOWER VASCULAR LEFT COMMON FEMORAL SPONT: NORMAL
BH CV LOWER VASCULAR RIGHT COMMON FEMORAL AUGMENT: NORMAL
BH CV LOWER VASCULAR RIGHT COMMON FEMORAL COMPETENT: NORMAL
BH CV LOWER VASCULAR RIGHT COMMON FEMORAL COMPRESS: NORMAL
BH CV LOWER VASCULAR RIGHT COMMON FEMORAL PHASIC: NORMAL
BH CV LOWER VASCULAR RIGHT COMMON FEMORAL SPONT: NORMAL
BH CV LOWER VASCULAR RIGHT DISTAL FEMORAL COMPRESS: NORMAL
BH CV LOWER VASCULAR RIGHT GASTRONEMIUS COMPRESS: NORMAL
BH CV LOWER VASCULAR RIGHT GREATER SAPH AK COMPRESS: NORMAL
BH CV LOWER VASCULAR RIGHT GREATER SAPH BK COMPRESS: NORMAL
BH CV LOWER VASCULAR RIGHT LESSER SAPH COMPRESS: NORMAL
BH CV LOWER VASCULAR RIGHT MID FEMORAL AUGMENT: NORMAL
BH CV LOWER VASCULAR RIGHT MID FEMORAL COMPETENT: NORMAL
BH CV LOWER VASCULAR RIGHT MID FEMORAL COMPRESS: NORMAL
BH CV LOWER VASCULAR RIGHT MID FEMORAL PHASIC: NORMAL
BH CV LOWER VASCULAR RIGHT MID FEMORAL SPONT: NORMAL
BH CV LOWER VASCULAR RIGHT PERONEAL COMPRESS: NORMAL
BH CV LOWER VASCULAR RIGHT POPLITEAL AUGMENT: NORMAL
BH CV LOWER VASCULAR RIGHT POPLITEAL COMPETENT: NORMAL
BH CV LOWER VASCULAR RIGHT POPLITEAL COMPRESS: NORMAL
BH CV LOWER VASCULAR RIGHT POPLITEAL PHASIC: NORMAL
BH CV LOWER VASCULAR RIGHT POPLITEAL SPONT: NORMAL
BH CV LOWER VASCULAR RIGHT POSTERIOR TIBIAL COMPRESS: NORMAL
BH CV LOWER VASCULAR RIGHT PROFUNDA FEMORAL COMPRESS: NORMAL
BH CV LOWER VASCULAR RIGHT PROXIMAL FEMORAL COMPRESS: NORMAL
BH CV LOWER VASCULAR RIGHT SAPHENOFEMORAL JUNCTION COMPRESS: NORMAL
BILIRUB SERPL-MCNC: 1.1 MG/DL (ref 0–1.2)
BUN SERPL-MCNC: 22 MG/DL (ref 8–23)
BUN SERPL-MCNC: 22 MG/DL (ref 8–23)
BUN/CREAT SERPL: 19.3 (ref 7–25)
BUN/CREAT SERPL: 20.6 (ref 7–25)
CALCIUM SPEC-SCNC: 8.5 MG/DL (ref 8.6–10.5)
CALCIUM SPEC-SCNC: 8.6 MG/DL (ref 8.6–10.5)
CHLORIDE SERPL-SCNC: 104 MMOL/L (ref 98–107)
CHLORIDE SERPL-SCNC: 106 MMOL/L (ref 98–107)
CO2 SERPL-SCNC: 20.8 MMOL/L (ref 22–29)
CO2 SERPL-SCNC: 24.4 MMOL/L (ref 22–29)
CREAT SERPL-MCNC: 1.07 MG/DL (ref 0.76–1.27)
CREAT SERPL-MCNC: 1.14 MG/DL (ref 0.76–1.27)
D DIMER PPP FEU-MCNC: 0.92 MCGFEU/ML (ref 0–0.89)
DEPRECATED RDW RBC AUTO: 40.7 FL (ref 37–54)
DEPRECATED RDW RBC AUTO: 42.2 FL (ref 37–54)
EGFRCR SERPLBLD CKD-EPI 2021: 61.5 ML/MIN/1.73
EGFRCR SERPLBLD CKD-EPI 2021: 66.3 ML/MIN/1.73
EOSINOPHIL # BLD AUTO: 0.06 10*3/MM3 (ref 0–0.4)
EOSINOPHIL NFR BLD AUTO: 0.5 % (ref 0.3–6.2)
ERYTHROCYTE [DISTWIDTH] IN BLOOD BY AUTOMATED COUNT: 12.3 % (ref 12.3–15.4)
ERYTHROCYTE [DISTWIDTH] IN BLOOD BY AUTOMATED COUNT: 12.7 % (ref 12.3–15.4)
GLOBULIN UR ELPH-MCNC: 2.5 GM/DL
GLUCOSE BLDC GLUCOMTR-MCNC: 103 MG/DL (ref 70–130)
GLUCOSE BLDC GLUCOMTR-MCNC: 126 MG/DL (ref 70–130)
GLUCOSE BLDC GLUCOMTR-MCNC: 144 MG/DL (ref 70–130)
GLUCOSE BLDC GLUCOMTR-MCNC: 145 MG/DL (ref 70–130)
GLUCOSE SERPL-MCNC: 145 MG/DL (ref 65–99)
GLUCOSE SERPL-MCNC: 155 MG/DL (ref 65–99)
HCT VFR BLD AUTO: 38.8 % (ref 37.5–51)
HCT VFR BLD AUTO: 41.2 % (ref 37.5–51)
HGB BLD-MCNC: 13.8 G/DL (ref 13–17.7)
HGB BLD-MCNC: 14.1 G/DL (ref 13–17.7)
IMM GRANULOCYTES # BLD AUTO: 0.05 10*3/MM3 (ref 0–0.05)
IMM GRANULOCYTES NFR BLD AUTO: 0.4 % (ref 0–0.5)
LYMPHOCYTES # BLD AUTO: 1.69 10*3/MM3 (ref 0.7–3.1)
LYMPHOCYTES NFR BLD AUTO: 15.1 % (ref 19.6–45.3)
MCH RBC QN AUTO: 31.1 PG (ref 26.6–33)
MCH RBC QN AUTO: 32.4 PG (ref 26.6–33)
MCHC RBC AUTO-ENTMCNC: 34.2 G/DL (ref 31.5–35.7)
MCHC RBC AUTO-ENTMCNC: 35.6 G/DL (ref 31.5–35.7)
MCV RBC AUTO: 90.9 FL (ref 79–97)
MCV RBC AUTO: 91.1 FL (ref 79–97)
MONOCYTES # BLD AUTO: 1.13 10*3/MM3 (ref 0.1–0.9)
MONOCYTES NFR BLD AUTO: 10.1 % (ref 5–12)
NEUTROPHILS NFR BLD AUTO: 73.5 % (ref 42.7–76)
NEUTROPHILS NFR BLD AUTO: 8.23 10*3/MM3 (ref 1.7–7)
NRBC BLD AUTO-RTO: 0 /100 WBC (ref 0–0.2)
PLATELET # BLD AUTO: 164 10*3/MM3 (ref 140–450)
PLATELET # BLD AUTO: 170 10*3/MM3 (ref 140–450)
PMV BLD AUTO: 8.7 FL (ref 6–12)
PMV BLD AUTO: 8.8 FL (ref 6–12)
POTASSIUM SERPL-SCNC: 3.4 MMOL/L (ref 3.5–5.2)
POTASSIUM SERPL-SCNC: 3.9 MMOL/L (ref 3.5–5.2)
PROT SERPL-MCNC: 5.9 G/DL (ref 6–8.5)
RBC # BLD AUTO: 4.26 10*6/MM3 (ref 4.14–5.8)
RBC # BLD AUTO: 4.53 10*6/MM3 (ref 4.14–5.8)
SODIUM SERPL-SCNC: 138 MMOL/L (ref 136–145)
SODIUM SERPL-SCNC: 139 MMOL/L (ref 136–145)
WBC NRBC COR # BLD AUTO: 11.21 10*3/MM3 (ref 3.4–10.8)
WBC NRBC COR # BLD AUTO: 9.53 10*3/MM3 (ref 3.4–10.8)

## 2025-01-20 PROCEDURE — 73610 X-RAY EXAM OF ANKLE: CPT

## 2025-01-20 PROCEDURE — 97530 THERAPEUTIC ACTIVITIES: CPT

## 2025-01-20 PROCEDURE — G0378 HOSPITAL OBSERVATION PER HR: HCPCS

## 2025-01-20 PROCEDURE — 36415 COLL VENOUS BLD VENIPUNCTURE: CPT | Performed by: NURSE PRACTITIONER

## 2025-01-20 PROCEDURE — 82948 REAGENT STRIP/BLOOD GLUCOSE: CPT

## 2025-01-20 PROCEDURE — 85027 COMPLETE CBC AUTOMATED: CPT | Performed by: NURSE PRACTITIONER

## 2025-01-20 PROCEDURE — 85025 COMPLETE CBC W/AUTO DIFF WBC: CPT | Performed by: EMERGENCY MEDICINE

## 2025-01-20 PROCEDURE — 80053 COMPREHEN METABOLIC PANEL: CPT | Performed by: EMERGENCY MEDICINE

## 2025-01-20 PROCEDURE — 99285 EMERGENCY DEPT VISIT HI MDM: CPT

## 2025-01-20 PROCEDURE — 93971 EXTREMITY STUDY: CPT | Performed by: SURGERY

## 2025-01-20 PROCEDURE — 93971 EXTREMITY STUDY: CPT

## 2025-01-20 PROCEDURE — 85379 FIBRIN DEGRADATION QUANT: CPT | Performed by: EMERGENCY MEDICINE

## 2025-01-20 PROCEDURE — 97162 PT EVAL MOD COMPLEX 30 MIN: CPT

## 2025-01-20 PROCEDURE — 36415 COLL VENOUS BLD VENIPUNCTURE: CPT

## 2025-01-20 RX ORDER — POLYETHYLENE GLYCOL 3350 17 G/17G
17 POWDER, FOR SOLUTION ORAL DAILY PRN
Status: DISCONTINUED | OUTPATIENT
Start: 2025-01-20 | End: 2025-01-24 | Stop reason: HOSPADM

## 2025-01-20 RX ORDER — PANTOPRAZOLE SODIUM 40 MG/1
40 TABLET, DELAYED RELEASE ORAL DAILY
Status: DISCONTINUED | OUTPATIENT
Start: 2025-01-20 | End: 2025-01-24 | Stop reason: HOSPADM

## 2025-01-20 RX ORDER — FAMOTIDINE 20 MG/1
20 TABLET, FILM COATED ORAL 2 TIMES DAILY PRN
Status: DISCONTINUED | OUTPATIENT
Start: 2025-01-20 | End: 2025-01-24 | Stop reason: HOSPADM

## 2025-01-20 RX ORDER — CEPHALEXIN 500 MG/1
500 CAPSULE ORAL 3 TIMES DAILY
Status: DISCONTINUED | OUTPATIENT
Start: 2025-01-20 | End: 2025-01-24 | Stop reason: HOSPADM

## 2025-01-20 RX ORDER — POTASSIUM CHLORIDE 750 MG/1
20 TABLET, FILM COATED, EXTENDED RELEASE ORAL DAILY
Status: DISCONTINUED | OUTPATIENT
Start: 2025-01-20 | End: 2025-01-24 | Stop reason: HOSPADM

## 2025-01-20 RX ORDER — BISACODYL 5 MG/1
5 TABLET, DELAYED RELEASE ORAL DAILY PRN
Status: DISCONTINUED | OUTPATIENT
Start: 2025-01-20 | End: 2025-01-24 | Stop reason: HOSPADM

## 2025-01-20 RX ORDER — ONDANSETRON 2 MG/ML
4 INJECTION INTRAMUSCULAR; INTRAVENOUS EVERY 6 HOURS PRN
Status: DISCONTINUED | OUTPATIENT
Start: 2025-01-20 | End: 2025-01-24 | Stop reason: HOSPADM

## 2025-01-20 RX ORDER — DOXYCYCLINE 100 MG/1
100 CAPSULE ORAL ONCE
Status: COMPLETED | OUTPATIENT
Start: 2025-01-20 | End: 2025-01-20

## 2025-01-20 RX ORDER — IBUPROFEN 600 MG/1
1 TABLET ORAL
Status: DISCONTINUED | OUTPATIENT
Start: 2025-01-20 | End: 2025-01-24 | Stop reason: HOSPADM

## 2025-01-20 RX ORDER — BISACODYL 10 MG
10 SUPPOSITORY, RECTAL RECTAL DAILY PRN
Status: DISCONTINUED | OUTPATIENT
Start: 2025-01-20 | End: 2025-01-24 | Stop reason: HOSPADM

## 2025-01-20 RX ORDER — CETIRIZINE HYDROCHLORIDE 10 MG/1
10 TABLET ORAL DAILY
Status: DISCONTINUED | OUTPATIENT
Start: 2025-01-20 | End: 2025-01-24 | Stop reason: HOSPADM

## 2025-01-20 RX ORDER — ATORVASTATIN CALCIUM 20 MG/1
40 TABLET, FILM COATED ORAL DAILY
Status: DISCONTINUED | OUTPATIENT
Start: 2025-01-20 | End: 2025-01-24 | Stop reason: HOSPADM

## 2025-01-20 RX ORDER — CHLORTHALIDONE 25 MG/1
50 TABLET ORAL DAILY
Status: DISCONTINUED | OUTPATIENT
Start: 2025-01-20 | End: 2025-01-24 | Stop reason: HOSPADM

## 2025-01-20 RX ORDER — INSULIN LISPRO 100 [IU]/ML
2-7 INJECTION, SOLUTION INTRAVENOUS; SUBCUTANEOUS
Status: DISCONTINUED | OUTPATIENT
Start: 2025-01-20 | End: 2025-01-24 | Stop reason: HOSPADM

## 2025-01-20 RX ORDER — METOPROLOL SUCCINATE 50 MG/1
100 TABLET, EXTENDED RELEASE ORAL DAILY
Status: DISCONTINUED | OUTPATIENT
Start: 2025-01-20 | End: 2025-01-24 | Stop reason: HOSPADM

## 2025-01-20 RX ORDER — DEXTROSE MONOHYDRATE 25 G/50ML
25 INJECTION, SOLUTION INTRAVENOUS
Status: DISCONTINUED | OUTPATIENT
Start: 2025-01-20 | End: 2025-01-24 | Stop reason: HOSPADM

## 2025-01-20 RX ORDER — NICOTINE POLACRILEX 4 MG
15 LOZENGE BUCCAL
Status: DISCONTINUED | OUTPATIENT
Start: 2025-01-20 | End: 2025-01-24 | Stop reason: HOSPADM

## 2025-01-20 RX ORDER — SODIUM CHLORIDE 0.9 % (FLUSH) 0.9 %
10 SYRINGE (ML) INJECTION EVERY 12 HOURS SCHEDULED
Status: DISCONTINUED | OUTPATIENT
Start: 2025-01-20 | End: 2025-01-24 | Stop reason: HOSPADM

## 2025-01-20 RX ORDER — TAMSULOSIN HYDROCHLORIDE 0.4 MG/1
0.8 CAPSULE ORAL DAILY
Status: DISCONTINUED | OUTPATIENT
Start: 2025-01-20 | End: 2025-01-24 | Stop reason: HOSPADM

## 2025-01-20 RX ORDER — SODIUM CHLORIDE 0.9 % (FLUSH) 0.9 %
10 SYRINGE (ML) INJECTION AS NEEDED
Status: DISCONTINUED | OUTPATIENT
Start: 2025-01-20 | End: 2025-01-24 | Stop reason: HOSPADM

## 2025-01-20 RX ORDER — ACETAMINOPHEN 650 MG/1
650 SUPPOSITORY RECTAL EVERY 4 HOURS PRN
Status: DISCONTINUED | OUTPATIENT
Start: 2025-01-20 | End: 2025-01-24 | Stop reason: HOSPADM

## 2025-01-20 RX ORDER — AMOXICILLIN 250 MG
2 CAPSULE ORAL 2 TIMES DAILY PRN
Status: DISCONTINUED | OUTPATIENT
Start: 2025-01-20 | End: 2025-01-24 | Stop reason: HOSPADM

## 2025-01-20 RX ORDER — SODIUM CHLORIDE 9 MG/ML
40 INJECTION, SOLUTION INTRAVENOUS AS NEEDED
Status: DISCONTINUED | OUTPATIENT
Start: 2025-01-20 | End: 2025-01-24 | Stop reason: HOSPADM

## 2025-01-20 RX ORDER — HYDROCODONE BITARTRATE AND ACETAMINOPHEN 5; 325 MG/1; MG/1
1 TABLET ORAL EVERY 6 HOURS PRN
Status: DISCONTINUED | OUTPATIENT
Start: 2025-01-20 | End: 2025-01-24 | Stop reason: HOSPADM

## 2025-01-20 RX ORDER — FLUTICASONE PROPIONATE 50 MCG
2 SPRAY, SUSPENSION (ML) NASAL DAILY
Status: DISCONTINUED | OUTPATIENT
Start: 2025-01-20 | End: 2025-01-24 | Stop reason: HOSPADM

## 2025-01-20 RX ORDER — ACETAMINOPHEN 325 MG/1
650 TABLET ORAL EVERY 4 HOURS PRN
Status: DISCONTINUED | OUTPATIENT
Start: 2025-01-20 | End: 2025-01-24 | Stop reason: HOSPADM

## 2025-01-20 RX ORDER — ACETAMINOPHEN 160 MG/5ML
650 SOLUTION ORAL EVERY 4 HOURS PRN
Status: DISCONTINUED | OUTPATIENT
Start: 2025-01-20 | End: 2025-01-24 | Stop reason: HOSPADM

## 2025-01-20 RX ORDER — LOSARTAN POTASSIUM 50 MG/1
50 TABLET ORAL DAILY
Status: DISCONTINUED | OUTPATIENT
Start: 2025-01-20 | End: 2025-01-20

## 2025-01-20 RX ORDER — MODAFINIL 100 MG/1
100 TABLET ORAL DAILY
Status: DISCONTINUED | OUTPATIENT
Start: 2025-01-20 | End: 2025-01-24 | Stop reason: HOSPADM

## 2025-01-20 RX ADMIN — PANTOPRAZOLE SODIUM 40 MG: 40 TABLET, DELAYED RELEASE ORAL at 10:42

## 2025-01-20 RX ADMIN — MODAFINIL 100 MG: 100 TABLET ORAL at 13:40

## 2025-01-20 RX ADMIN — ATORVASTATIN CALCIUM 40 MG: 20 TABLET, FILM COATED ORAL at 10:43

## 2025-01-20 RX ADMIN — CETIRIZINE HYDROCHLORIDE 10 MG: 10 TABLET, FILM COATED ORAL at 10:42

## 2025-01-20 RX ADMIN — Medication 10 ML: at 10:42

## 2025-01-20 RX ADMIN — CEPHALEXIN 500 MG: 500 CAPSULE ORAL at 14:16

## 2025-01-20 RX ADMIN — CEPHALEXIN 500 MG: 500 CAPSULE ORAL at 21:12

## 2025-01-20 RX ADMIN — ACETAMINOPHEN 325MG 650 MG: 325 TABLET ORAL at 22:44

## 2025-01-20 RX ADMIN — TAMSULOSIN HYDROCHLORIDE 0.8 MG: 0.4 CAPSULE ORAL at 10:43

## 2025-01-20 RX ADMIN — Medication 10 ML: at 21:12

## 2025-01-20 RX ADMIN — METOPROLOL SUCCINATE 100 MG: 50 TABLET, EXTENDED RELEASE ORAL at 10:42

## 2025-01-20 RX ADMIN — CHLORTHALIDONE 50 MG: 25 TABLET ORAL at 10:42

## 2025-01-20 RX ADMIN — ACETAMINOPHEN 325MG 650 MG: 325 TABLET ORAL at 10:43

## 2025-01-20 RX ADMIN — POTASSIUM CHLORIDE 20 MEQ: 750 TABLET, EXTENDED RELEASE ORAL at 10:42

## 2025-01-20 RX ADMIN — DOXYCYCLINE 100 MG: 100 CAPSULE ORAL at 03:45

## 2025-01-20 NOTE — H&P
Patient Name:  Kevin Ellington  YOB: 1935  MRN:  [id number]  Admit Date:  1/20/2025  Patient Care Team:  Leanna Ryder MD as PCP - General (Family Medicine)  Kaitlin Chew APRN as Nurse Practitioner (Gastroenterology)      Subjective   History Present Illness     Chief Complaint   Patient presents with    Leg Pain       Mr. Ellington is a 89 y.o. with a history of HTN, CKD, Afib, CAD, spinal stenosis. He is on chronic eliquis though this has been held for a few days at least when he got a spinal stimulator placed recently through [facility]. He had been on doxy post op for a few days.    He presented with right lower extremity swollen and red. Concern for DVT has he had been off a/c. He also has history of chronic deformity and degenerative changes/fracture to his right ankle. He had previously been evaluated by Dr. Hodge at [facility] but would been an extensive surgery so no current plans for surgery.       History of Present Illness  Review of Systems   Constitutional:  Positive for activity change, chills and fever.   HENT: Negative.     Eyes: Negative.    Respiratory: Negative.     Cardiovascular:  Positive for leg swelling.   Gastrointestinal: Negative.    Endocrine: Negative.    Genitourinary: Negative.    Musculoskeletal:  Positive for arthralgias, back pain and myalgias.   Skin: Negative.    Allergic/Immunologic: Negative.    Neurological: Negative.    Hematological: Negative.    Psychiatric/Behavioral: Negative.          Personal History     Past Medical History:   Diagnosis Date    Abnormal ECG 6-    Taking Eliquis    Allergic 1970    Arrhythmia 6-    Taking Eliquis    Arthritis     Asthma 05/23/2019    shortness of breath-exertion    Benign prostatic hyperplasia     Bilateral carotid artery stenosis 05/12/2022    Cataract     Cervical radiculopathy     Chronic atrial fibrillation 06/15/2023    Colon polyp     Coronary artery disease 2001     Stent    COVID-19 2023    CTS (carpal tunnel syndrome) 1998    Surgery both hands in     Erectile dysfunction     GERD (gastroesophageal reflux disease)     HTN (hypertension)     Hyperlipidemia     Kidney stone     Low back pain     2 Prior surgeries    Lumbosacral disc disease     Melanoma 01/15/2009    DR WILLY SIMMS ( Left forearm)    Obesity     Radiculitis, thoracic     Scoliosis     Spondylosis of lumbar region without myelopathy or radiculopathy 2020    Stage 3a chronic kidney disease 10/21/2021    Stented coronary artery     Thoracic disc disorder     Surgery by Dr. Aldridge (sp)    TIA (transient ischemic attack) 2022    Type 2 diabetes mellitus without complication, without long-term current use of insulin 10/03/2023     Past Surgical History:   Procedure Laterality Date    ABLATION OF DYSRHYTHMIC FOCUS  2020    radio frequency /lower back    ANKLE FUSION Left 2007    reconstruction and fusion    BACK SURGERY      HERNIATED LUMBAR DISK ,,    CARDIAC CATHETERIZATION      CARPAL TUNNEL RELEASE      CATARACT EXTRACTION, BILATERAL Bilateral     COLONOSCOPY      CORONARY ANGIOPLASTY WITH STENT PLACEMENT      CORONARY STENT PLACEMENT      ENDOSCOPY N/A 2022    Procedure: ESOPHAGOGASTRODUODENOSCOPY WITH BIOPSY;  Surgeon: Rashard Mathews MD;  [place] [facility] [place]  Service: Gastroenterology;  Laterality: N/A;  gastric polyps, hiatal hernia    EPIDURAL BLOCK  Several at [facility]    and [facility]    EYE SURGERY      JOINT REPLACEMENT      REPLACEMENT TOTAL KNEE Left 2015    REPLACEMENT TOTAL KNEE Right     SKIN CANCER EXCISION      MELANOMA    SPINE SURGERY      THORACIC SPINE SURGERY      UPPER GASTROINTESTINAL ENDOSCOPY       Family History   Problem Relation Age of Onset    Hypertension Mother     Arthritis Mother             Skin cancer Mother     Heart disease Father     Early death Father         heart  disease    Hypertension Father     Aortic aneurysm Father 58    Cancer Neg Hx     Colon cancer Neg Hx     Colon polyps Neg Hx     Crohn's disease Neg Hx     Irritable bowel syndrome Neg Hx     Ulcerative colitis Neg Hx      Social History     Tobacco Use    Smoking status: Never    Smokeless tobacco: Never    Tobacco comments:     caffeine use   Vaping Use    Vaping status: Never Used   Substance Use Topics    Alcohol use: No    Drug use: No     Medications Prior to Admission   Medication Sig Dispense Refill Last Dose/Taking    apixaban (ELIQUIS) 5 MG tablet tablet Take 1 tablet by mouth Every 12 (Twelve) Hours. Indications: Atrial Fibrillation 180 tablet 3     atorvastatin (LIPITOR) 40 MG tablet TAKE 1 TABLET DAILY 90 tablet 3     Blood Glucose Monitoring Suppl (True Metrix Air Glucose Meter) w/Device kit Use 1 each Daily. 1 kit 0     cetirizine (zyrTEC) 10 MG tablet Take 1 tablet by mouth Daily.       chlorthalidone (HYGROTEN) 50 MG tablet TAKE 1 TABLET DAILY 90 tablet 3     econazole nitrate (SPECTAZOLE) 1 % cream Apply  topically to the appropriate area as directed As Needed.       fluticasone (FLONASE) 50 MCG/ACT nasal spray 2 sprays into the nostril(s) as directed by provider Daily. 48 g 3     glucose blood (FREESTYLE LITE) test strip Test daily 100 each 12     Lancets (freestyle) lancets Check one daily 100 each 12     losartan (COZAAR) 50 MG tablet Take 1 tablet by mouth Daily. 90 tablet 1     metoprolol succinate XL (TOPROL-XL) 100 MG 24 hr tablet TAKE 1 TABLET DAILY 90 tablet 3     metroNIDAZOLE (FLAGYL) 0.75 % lotion lotion Daily.       modafinil (PROVIGIL) 100 MG tablet Take 1 tablet by mouth Daily. 30 tablet 2     Multiple Vitamins-Minerals (MULTIVITAMIN PO) Take 1 tablet by mouth Daily.       pantoprazole (PROTONIX) 40 MG EC tablet Take 1 tablet by mouth Daily. 90 tablet 3     potassium chloride (KLOR-CON M10) 10 MEQ CR tablet TAKE 2 TABLETS DAILY 180 tablet 3     sennosides-docusate (Senna S) 8.6-50  MG per tablet Take 1 tablet by mouth 2 (Two) Times a Day As Needed for Constipation. 180 tablet 2     tamsulosin (FLOMAX) 0.4 MG capsule 24 hr capsule TAKE 2 CAPSULES DAILY 180 capsule 3      Allergies:    Allergies   Allergen Reactions    Codeine GI Intolerance    Hydrocodone GI Intolerance     SEVERE CONSTIPATION    Sulfa Antibiotics Unknown (See Comments)     Unable to remember       Objective    Objective     Vital Signs  Temp:  [97 °F (36.1 °C)-98.7 °F (37.1 °C)] 97 °F (36.1 °C)  Heart Rate:  [] 82  Resp:  [16-18] 16  BP: (115-132)/(69-77) 131/76  SpO2:  [96 %-98 %] 96 %  on   ;   Device (Oxygen Therapy): room air  Body mass index is 28.7 kg/m².    Physical Exam  Vitals and nursing note reviewed.   Constitutional:       General: He is not in acute distress.     Appearance: He is well-developed. He is not diaphoretic.   HENT:      Head: Normocephalic and atraumatic.   Eyes:      General: No scleral icterus.     Conjunctiva/sclera: Conjunctivae normal.   Neck:      Vascular: No JVD.   Cardiovascular:      Rate and Rhythm: Normal rate and regular rhythm.      Heart sounds: Normal heart sounds. No murmur heard.  Pulmonary:      Effort: Pulmonary effort is normal. No respiratory distress.      Breath sounds: Normal breath sounds.   Abdominal:      General: Bowel sounds are normal.      Palpations: Abdomen is soft.      Tenderness: There is no abdominal tenderness.   Musculoskeletal:         General: Deformity (right lower extremity) present. Normal range of motion.      Cervical back: Normal range of motion and neck supple.   Skin:     General: Skin is warm and dry.      Findings: Erythema present.   Neurological:      Mental Status: He is alert and oriented to person, place, and time.      Cranial Nerves: No cranial nerve deficit.      Motor: No abnormal muscle tone.   Psychiatric:         Behavior: Behavior normal.         Thought Content: Thought content normal.         Results Review:  I reviewed the  patient's new clinical results.    Lab Results (last 24 hours)       Procedure Component Value Units Date/Time    CBC & Differential [[id number]]  (Abnormal) Collected: 01/20/25 0211    Specimen: Blood Updated: 01/20/25 0220    Narrative:      The following orders were created for panel order CBC & Differential.  Procedure                               Abnormality         Status                     ---------                               -----------         ------                     CBC Auto Differential[[id number]]        Abnormal            Final result                 Please view results for these tests on the individual orders.    Basic Metabolic Panel [[id number]]  (Abnormal) Collected: 01/20/25 0211    Specimen: Blood Updated: 01/20/25 0241     Glucose 145 mg/dL      BUN 22 mg/dL      Creatinine 1.07 mg/dL      Sodium 138 mmol/L      Potassium 3.9 mmol/L      Chloride 106 mmol/L      CO2 20.8 mmol/L      Calcium 8.6 mg/dL      BUN/Creatinine Ratio 20.6     Anion Gap 11.2 mmol/L      eGFR 66.3 mL/min/1.73     Narrative:      GFR Categories in Chronic Kidney Disease (CKD)      GFR Category          GFR (mL/min/1.73)    Interpretation  G1                     90 or greater         Normal or high (1)  G2                      60-89                Mild decrease (1)  G3a                   45-59                Mild to moderate decrease  G3b                   30-44                Moderate to severe decrease  G4                    15-29                Severe decrease  G5                    14 or less           Kidney failure          (1)In the absence of evidence of kidney disease, neither GFR category G1 or G2 fulfill the criteria for CKD.    eGFR calculation 2021 CKD-EPI creatinine equation, which does not include race as a factor    D-dimer, Quantitative [[id number]]  (Abnormal) Collected: 01/20/25 0211    Specimen: Blood Updated: 01/20/25 0237     D-Dimer, Quantitative 0.92 MCGFEU/mL     Narrative:      According to the  "assay 's published package insert, a normal (<0.50 MCGFEU/mL) D-dimer result in conjunction with a non-high clinical probability assessment, excludes deep vein thrombosis (DVT) and pulmonary embolism (PE) with high sensitivity.    D-dimer values increase with age and this can make VTE exclusion of an older population difficult. To address this, the American College of [employer], based on best available evidence and recent guidelines, recommends that clinicians use age-adjusted D-dimer thresholds in patients greater than 50 years of age with: a) a low probability of PE who do not meet all Pulmonary Embolism Rule Out Criteria, or b) in those with intermediate probability of PE.   The formula for an age-adjusted D-dimer cut-off is \"age/100\".  For example, a 60 year old patient would have an age-adjusted cut-off of 0.60 MCGFEU/mL and an 80 year old 0.80 MCGFEU/mL.    CBC Auto Differential [[id number]]  (Abnormal) Collected: 01/20/25 0211    Specimen: Blood Updated: 01/20/25 0220     WBC 11.21 10*3/mm3      RBC 4.53 10*6/mm3      Hemoglobin 14.1 g/dL      Hematocrit 41.2 %      MCV 90.9 fL      MCH 31.1 pg      MCHC 34.2 g/dL      RDW 12.7 %      RDW-SD 42.2 fl      MPV 8.7 fL      Platelets 164 10*3/mm3      Neutrophil % 73.5 %      Lymphocyte % 15.1 %      Monocyte % 10.1 %      Eosinophil % 0.5 %      Basophil % 0.4 %      Immature Grans % 0.4 %      Neutrophils, Absolute 8.23 10*3/mm3      Lymphocytes, Absolute 1.69 10*3/mm3      Monocytes, Absolute 1.13 10*3/mm3      Eosinophils, Absolute 0.06 10*3/mm3      Basophils, Absolute 0.05 10*3/mm3      Immature Grans, Absolute 0.05 10*3/mm3      nRBC 0.0 /100 WBC     CBC (No Diff) [[id number]]  (Normal) Collected: 01/20/25 0923    Specimen: Blood Updated: 01/20/25 0943     WBC 9.53 10*3/mm3      RBC 4.26 10*6/mm3      Hemoglobin 13.8 g/dL      Hematocrit 38.8 %      MCV 91.1 fL      MCH 32.4 pg      MCHC 35.6 g/dL      RDW 12.3 %      RDW-SD 40.7 fl      MPV 8.8 " fL      Platelets 170 10*3/mm3     Comprehensive Metabolic Panel [[id number]]  (Abnormal) Collected: 01/20/25 0923    Specimen: Blood Updated: 01/20/25 1000     Glucose 155 mg/dL      BUN 22 mg/dL      Creatinine 1.14 mg/dL      Sodium 139 mmol/L      Potassium 3.4 mmol/L      Chloride 104 mmol/L      CO2 24.4 mmol/L      Calcium 8.5 mg/dL      Total Protein 5.9 g/dL      Albumin 3.4 g/dL      ALT (SGPT) 12 U/L      AST (SGOT) 15 U/L      Alkaline Phosphatase 82 U/L      Total Bilirubin 1.1 mg/dL      Globulin 2.5 gm/dL      A/G Ratio 1.4 g/dL      BUN/Creatinine Ratio 19.3     Anion Gap 10.6 mmol/L      eGFR 61.5 mL/min/1.73     Narrative:      GFR Categories in Chronic Kidney Disease (CKD)      GFR Category          GFR (mL/min/1.73)    Interpretation  G1                     90 or greater         Normal or high (1)  G2                      60-89                Mild decrease (1)  G3a                   45-59                Mild to moderate decrease  G3b                   30-44                Moderate to severe decrease  G4                    15-29                Severe decrease  G5                    14 or less           Kidney failure          (1)In the absence of evidence of kidney disease, neither GFR category G1 or G2 fulfill the criteria for CKD.    eGFR calculation 2021 CKD-EPI creatinine equation, which does not include race as a factor    POC Glucose Once [[id number]]  (Abnormal) Collected: 01/20/25 0934    Specimen: Blood Updated: 01/20/25 0935     Glucose 145 mg/dL     POC Glucose Once [[id number]]  (Normal) Collected: 01/20/25 1200    Specimen: Blood Updated: 01/20/25 1202     Glucose 126 mg/dL             Imaging Results (Last 24 Hours)       Procedure Component Value Units Date/Time    XR Ankle 3+ View Right [[id number]] Collected: 01/20/25 0234     Updated: 01/20/25 0241    Narrative:      3 VIEWS RIGHT ANKLE     HISTORY: Right ankle pain     COMPARISON: None available.     FINDINGS:  Unfortunately, no  prior imaging is available for comparison. I am not  convinced it can see definite acute fracture or subluxation. Advanced  degenerative changes are noted involving the tibiotalar joint, as well  as the talofibular joint. Multiple well-corticated ossific fragments are  seen, favored to be chronic. There is a chronic medial malleolar  fracture. There is soft tissue swelling noted about the ankle. Dense  vascular calcifications are noted. There is calcaneal spurring.       Impression:      This examination is very difficult to interpret in the absence of any  prior studies for comparison, and given the presence of advanced  degenerative change. Not convinced that can see an acute fracture on the  current study. Ossific fragments seen surrounding the ankle bilaterally  appear to be well-corticated, and are favored to be chronic.     This report was finalized on 1/20/2025 2:38 AM by Dr. Talia Orlando M.D on Workstation: [id number]               Results for orders placed during the hospital encounter of 10/06/23    Adult Transthoracic Echo Complete w/ Color, Spectral and Contrast if Necessary Per Protocol    Interpretation Summary    Left ventricular systolic function is normal. Calculated left ventricular EF = 52.4%    Left ventricular diastolic function was normal.      No orders to display        Assessment/Plan     Active Hospital Problems    Diagnosis  POA    **Leg edema, right [R60.0]  Yes    Type 2 diabetes mellitus without complication, without long-term current use of insulin [E11.9]  Yes    Chronic atrial fibrillation [I48.20]  Yes    Stage 3a chronic kidney disease [N18.31]  Yes    Essential hypertension [I10]  Yes    Hyperlipemia [E78.5]  Yes     Mr. Ellington is a 89 y.o. with a history of HTN, CKD, Afib, CAD, spinal stenosis. He is on chronic eliquis though this has been held for a few days at least when he got a spinal stimulator placed recently through [facility]. He had been on doxy  post op for a few days.    He presented with right lower extremity swollen and red. Concern for DVT has he had been off a/c. He also has history of chronic deformity and degenerative changes/fracture to his right ankle. He had previously been evaluated by Dr. Hodge at [facility] but would been an extensive surgery so no current plans for surgery.       U/S was negative for DVT. I think this is a lot related to his chronic deformity. There isn't much erythema today but he had gotten abx and it is improved. Will continue keflex and doxy for possible mild cellulitis (he also had low grade fever and chills).     PT consultation    Resume most home medications, though he said he is supposed to continue to hold the eliquis until the temporary spinal stimulator is removed. He is on a chronic diuretic.         Justin Arroyo MD  [facility]  01/20/25  13:37 EST

## 2025-01-20 NOTE — ED NOTES
"Nursing report ED to floor  Kevin Ellington  89 y.o.  male    HPI :  HPI  Stated Reason for Visit: Pt presents to ED with complaints of increased right foot/ankle pain that is chronic but has now started to be sharp in nature that radiates up his leg. Denies any new injury. EMS and pt states pt's right ankle is chronically deformed and is at baseline for him at this time.  History Obtained From: patient, EMS    Chief Complaint  Chief Complaint   Patient presents with    Leg Pain       Admitting doctor:   Mary Alcantar MD    Admitting diagnosis:   The primary encounter diagnosis was Right leg swelling. A diagnosis of Acute right ankle pain was also pertinent to this visit.    Code status:   Current Code Status       Date Active Code Status Order ID Comments User Context       Prior            Allergies:   Codeine, Hydrocodone, and Sulfa antibiotics    Isolation:   No active isolations    Intake and Output  No intake or output data in the 24 hours ending 01/20/25 0404    Weight:       01/20/25  0141   Weight: 90.7 kg (200 lb)       Most recent vitals:   Vitals:    01/20/25 0141 01/20/25 0348 01/20/25 0349   BP: 132/77 126/77    BP Location: Right arm     Patient Position: Sitting     Pulse: 103  85   Resp: 16     Temp: 98.7 °F (37.1 °C)     TempSrc: Tympanic     SpO2: 98%  97%   Weight: 90.7 kg (200 lb)     Height: 177.8 cm (70\")         Active LDAs/IV Access:   Lines, Drains & Airways       Active LDAs       None                    Labs (abnormal labs have a star):   Labs Reviewed   BASIC METABOLIC PANEL - Abnormal; Notable for the following components:       Result Value    Glucose 145 (*)     CO2 20.8 (*)     All other components within normal limits    Narrative:     GFR Categories in Chronic Kidney Disease (CKD)      GFR Category          GFR (mL/min/1.73)    Interpretation  G1                     90 or greater         Normal or high (1)  G2                      60-89                Mild decrease (1)  G3a      " "             45-59                Mild to moderate decrease  G3b                   30-44                Moderate to severe decrease  G4                    15-29                Severe decrease  G5                    14 or less           Kidney failure          (1)In the absence of evidence of kidney disease, neither GFR category G1 or G2 fulfill the criteria for CKD.    eGFR calculation 2021 CKD-EPI creatinine equation, which does not include race as a factor   D-DIMER, QUANTITATIVE - Abnormal; Notable for the following components:    D-Dimer, Quantitative 0.92 (*)     All other components within normal limits    Narrative:     According to the assay 's published package insert, a normal (<0.50 MCGFEU/mL) D-dimer result in conjunction with a non-high clinical probability assessment, excludes deep vein thrombosis (DVT) and pulmonary embolism (PE) with high sensitivity.    D-dimer values increase with age and this can make VTE exclusion of an older population difficult. To address this, the American College of Physicians, based on best available evidence and recent guidelines, recommends that clinicians use age-adjusted D-dimer thresholds in patients greater than 50 years of age with: a) a low probability of PE who do not meet all Pulmonary Embolism Rule Out Criteria, or b) in those with intermediate probability of PE.   The formula for an age-adjusted D-dimer cut-off is \"age/100\".  For example, a 60 year old patient would have an age-adjusted cut-off of 0.60 MCGFEU/mL and an 80 year old 0.80 MCGFEU/mL.   CBC WITH AUTO DIFFERENTIAL - Abnormal; Notable for the following components:    WBC 11.21 (*)     Lymphocyte % 15.1 (*)     Neutrophils, Absolute 8.23 (*)     Monocytes, Absolute 1.13 (*)     All other components within normal limits   CBC AND DIFFERENTIAL    Narrative:     The following orders were created for panel order CBC & Differential.  Procedure                               Abnormality         " Status                     ---------                               -----------         ------                     CBC Auto Differential[[id number]]        Abnormal            Final result                 Please view results for these tests on the individual orders.       EKG:   No orders to display       Meds given in ED:   Medications   HYDROcodone-acetaminophen (NORCO) 5-325 MG per tablet 1 tablet (has no administration in time range)   doxycycline (MONODOX) capsule 100 mg (100 mg Oral Given 1/20/25 0345)       Imaging results:  XR Ankle 3+ View Right    Result Date: 1/20/2025  This examination is very difficult to interpret in the absence of any prior studies for comparison, and given the presence of advanced degenerative change. Not convinced that can see an acute fracture on the current study. Ossific fragments seen surrounding the ankle bilaterally appear to be well-corticated, and are favored to be chronic.  This report was finalized on 1/20/2025 2:38 AM by Dr. Talia Orlando M.D on Workstation: [id number]       Ambulatory status:   - assist    Social issues:   Social History     Socioeconomic History    Marital status:     Number of children: 1    Years of education: BA DEGREE   Tobacco Use    Smoking status: Never    Smokeless tobacco: Never    Tobacco comments:     caffeine use   Vaping Use    Vaping status: Never Used   Substance and Sexual Activity    Alcohol use: No    Drug use: No    Sexual activity: Not Currently     Partners: Female       Peripheral Neurovascular  Peripheral Neurovascular (Adult)  Peripheral Neurovascular WDL: .WDL except, all  Pulse Assessment: dorsalis pedis  RLE Neurovascular Assessment  Temperature RLE: warm  Color RLE: red  Sensation RLE: no numbness, no tingling, tenderness present    Neuro Cognitive  Neuro Cognitive (Adult)  Cognitive/Neuro/Behavioral WDL: WDL    Learning  Learning Assessment  Learning Readiness and Ability: no barriers identified  Education  Provided  Person Taught: patient, spouse    Respiratory  Respiratory WDL  Respiratory WDL: WDL    Abdominal Pain       Pain Assessments  Pain (Adult)  (0-10) Pain Rating: Rest: 4  (0-10) Pain Rating: Activity: 4    NIH Stroke Scale       Venita Ohara RN  01/20/25 04:04 EST

## 2025-01-20 NOTE — PROGRESS NOTES
Discharge Planning Assessment  [facility]     Patient Name: Kevin Ellington  MRN: [id number]  Today's Date: 1/20/2025    Admit Date: 1/20/2025    Plan: pending   Discharge Needs Assessment    No documentation.                  Discharge Plan       Row Name 01/20/25 1128       Plan    Plan pending    Plan Comments TRANSFER FROM ER ON 1/20/25. CCP will continue to follow for any needs. MAX Barrett RN, CCP.                  Continued Care and Services - Admitted Since 1/20/2025    No active coordination exists for this encounter.       Expected Discharge Date and Time       Expected Discharge Date Expected Discharge Time    Jan 21, 2025            Demographic Summary    No documentation.                  Functional Status    No documentation.                  Psychosocial    No documentation.                  Abuse/Neglect    No documentation.                  Legal    No documentation.                  Substance Abuse    No documentation.                  Patient Forms    No documentation.                     Sara Barrett RN

## 2025-01-20 NOTE — PLAN OF CARE
Goal Outcome Evaluation:  Plan of Care Reviewed With: patient           Outcome Evaluation: Pt is a 88 yo M who was admitted with R LE edema and pain. Pt presents to PT with impaired functional mobility and gait secondary to R LE pain and decreased activity tolerance. Pt has a history R ankle injury with deformity and L ankle surgical fusion, therefore pt with limited ankle ROM bilaterally. Pt may benefit from skilled PT to address strength, mobility, and gait.    Anticipated Discharge Disposition (PT): home with home health, home with outpatient therapy services

## 2025-01-20 NOTE — PLAN OF CARE
Goal Outcome Evaluation:  Plan of Care Reviewed With: patient        Progress: no change   Patient admitted from the ED with complaints of right ankle pain and swelling. He is alert and oriented times four. Patient rates his pain as a 2/10 while in bed, he states it becomes very painful with any weight bearing.Will continue to monitor.

## 2025-01-20 NOTE — ED PROVIDER NOTES
EMERGENCY DEPARTMENT ENCOUNTER    History  Chief Complaint   Patient presents with    Leg Pain       History provided by: Patient and Spouse    HPI:  Chief Complaint: right leg pain     Context: Pt is a 89 y.o. male who presents with complaints of acute right leg pain.  Patient has a history of chronic arthritis and previous fractures which have led to a chronic deformity in both ankles.  The left ankle has been fused, but he has not had any operative intervention on the right ankle.  He has chronic pain, which is acutely worse today.  He has noticed some new redness and swelling of the extremity which she does not normally get.  He has pain radiating up the lateral aspect of the ankle up to the calf.  Mostly painful when he walks on it.  He has a history of atrial fibrillation and has been off anticoagulants for a spinal stimulator placement      Active Ambulatory Problems     Diagnosis Date Noted    Bulging lumbar disc 06/16/2016    DDD (degenerative disc disease), lumbar 06/16/2016    Leg pain 06/16/2016    Spinal stenosis of lumbar region with neurogenic claudication 06/16/2016    Radiculitis, thoracic 06/16/2016    Status post total left knee replacement using cement 06/16/2016    Status post total right knee replacement using cement 06/16/2016    Coronary artery disease involving native coronary artery of native heart without angina pectoris 08/23/2016    Essential hypertension 08/23/2016    Hyperlipemia 08/23/2016    S/P coronary artery stent placement 09/06/2017    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/05/2017    Benign prostatic hyperplasia with urinary frequency 10/05/2017    Cervical radiculopathy 02/06/2018    Bilateral carpal tunnel syndrome 08/20/2018    Cervical spinal stenosis 08/20/2018    Arthritis of ankle 12/06/2018    Chronic pain of right ankle 12/06/2018    Shortness of breath 07/09/2019    Bilateral leg weakness 10/21/2019    History of melanoma 08/27/2020    Spondylosis of lumbar region  without myelopathy or radiculopathy 09/09/2020    Stage 3a chronic kidney disease 10/21/2021    Pedal edema 10/21/2021    Gastroesophageal reflux disease without esophagitis 04/29/2022    TIA (transient ischemic attack) 05/12/2022    Bilateral carotid artery stenosis 05/12/2022    Cerebral artery occlusion 08/09/2022    Hoarseness 12/13/2022    Hiatal hernia 12/13/2022    Abdominal fullness 12/13/2022    Fever and chills 06/12/2023    COVID-19 06/12/2023    Chronic atrial fibrillation 06/15/2023    Cytokine release syndrome, grade 2 06/16/2023    Type 2 diabetes mellitus without complication, without long-term current use of insulin 10/03/2023    Chronic instability of ankle 09/06/2022    Lumbar post-laminectomy syndrome 06/13/2022    Primary localized osteoarthrosis of ankle and foot 09/06/2022     Resolved Ambulatory Problems     Diagnosis Date Noted    Health care maintenance 10/05/2017    Cancer 10/04/2018    Acute bronchitis 02/27/2019    Hyperglycemia 08/09/2022    Atrial fibrillation 06/15/2023     Past Medical History:   Diagnosis Date    Abnormal ECG 6-    Allergic 1970    Arrhythmia 6-    Arthritis     Asthma 05/23/2019    Benign prostatic hyperplasia     Cataract     Colon polyp     Coronary artery disease 2001    CTS (carpal tunnel syndrome) 1998    Erectile dysfunction     GERD (gastroesophageal reflux disease)     HTN (hypertension)     Hyperlipidemia     Kidney stone     Low back pain     Lumbosacral disc disease     Melanoma 01/15/2009    Obesity     Scoliosis     Stented coronary artery     Thoracic disc disorder        Past Surgical History:   Procedure Laterality Date    ABLATION OF DYSRHYTHMIC FOCUS  nov. 2020    radio frequency /lower back    ANKLE FUSION Left 2007    reconstruction and fusion    BACK SURGERY      HERNIATED LUMBAR DISK 1975,2000,2012    CARDIAC CATHETERIZATION  2001    CARPAL TUNNEL RELEASE      CATARACT EXTRACTION, BILATERAL Bilateral 2002    COLONOSCOPY  2015     CORONARY ANGIOPLASTY WITH STENT PLACEMENT  2001    CORONARY STENT PLACEMENT      ENDOSCOPY N/A 12/19/2022    Procedure: ESOPHAGOGASTRODUODENOSCOPY WITH BIOPSY;  Surgeon: Rashard Mathews MD;  [place] [facility] [place]  Service: Gastroenterology;  Laterality: N/A;  gastric polyps, hiatal hernia    EPIDURAL BLOCK  Several at [facility]    and [facility]    EYE SURGERY      JOINT REPLACEMENT      REPLACEMENT TOTAL KNEE Left 2015    REPLACEMENT TOTAL KNEE Right     SKIN CANCER EXCISION      MELANOMA    SPINE SURGERY      THORACIC SPINE SURGERY      UPPER GASTROINTESTINAL ENDOSCOPY         Physical Exam  ED Triage Vitals [01/20/25 0141]   Temp Heart Rate Resp BP SpO2   98.7 °F (37.1 °C) 103 16 132/77 98 %      Temp src Heart Rate Source Patient Position BP Location FiO2 (%)   Tympanic Monitor Sitting Right arm --     Physical Exam  Constitutional:       Appearance: He is not ill-appearing.   Eyes:      Conjunctiva/sclera: Conjunctivae normal.   Pulmonary:      Effort: Pulmonary effort is normal. No respiratory distress.   Musculoskeletal:      Comments: RLE: Ankles/toes- intact Plantarflexion and dorsiflexion.  Sensation intact to light touch in all peripheral nerves.  2+ dorsalis pedis/posterior tibial pulses.  Brisk cap refill all digits.  There is a chronic appearing deformity to the ankle and bilateral malleoli, there is some mild diffuse redness throughout the calf, acutely worse over the malleoli bilaterally, mild pitting edema which is more pronounced than the left leg, about 2 cm increased circumference       Neurological:      Mental Status: He is alert and oriented to person, place, and time.         Medical Decision Making:    Differential Diagnosis includes but not limited to: DVT, cellulitis, acute on chronic pain syndrome, ankle arthritis    Alternative Historian Required Due To: Contribute additional details of history    Medical Record Review: Reviewed cardiology office visit note  1/3/2025    Labs Results:  Recent Results (from the past 24 hours)   Basic Metabolic Panel    Collection Time: 01/20/25  2:11 AM    Specimen: Blood   Result Value Ref Range    Glucose 145 (H) 65 - 99 mg/dL    BUN 22 8 - 23 mg/dL    Creatinine 1.07 0.76 - 1.27 mg/dL    Sodium 138 136 - 145 mmol/L    Potassium 3.9 3.5 - 5.2 mmol/L    Chloride 106 98 - 107 mmol/L    CO2 20.8 (L) 22.0 - 29.0 mmol/L    Calcium 8.6 8.6 - 10.5 mg/dL    BUN/Creatinine Ratio 20.6 7.0 - 25.0    Anion Gap 11.2 5.0 - 15.0 mmol/L    eGFR 66.3 >60.0 mL/min/1.73   D-dimer, Quantitative    Collection Time: 01/20/25  2:11 AM    Specimen: Blood   Result Value Ref Range    D-Dimer, Quantitative 0.92 (H) 0.00 - 0.89 MCGFEU/mL   CBC Auto Differential    Collection Time: 01/20/25  2:11 AM    Specimen: Blood   Result Value Ref Range    WBC 11.21 (H) 3.40 - 10.80 10*3/mm3    RBC 4.53 4.14 - 5.80 10*6/mm3    Hemoglobin 14.1 13.0 - 17.7 g/dL    Hematocrit 41.2 37.5 - 51.0 %    MCV 90.9 79.0 - 97.0 fL    MCH 31.1 26.6 - 33.0 pg    MCHC 34.2 31.5 - 35.7 g/dL    RDW 12.7 12.3 - 15.4 %    RDW-SD 42.2 37.0 - 54.0 fl    MPV 8.7 6.0 - 12.0 fL    Platelets 164 140 - 450 10*3/mm3    Neutrophil % 73.5 42.7 - 76.0 %    Lymphocyte % 15.1 (L) 19.6 - 45.3 %    Monocyte % 10.1 5.0 - 12.0 %    Eosinophil % 0.5 0.3 - 6.2 %    Basophil % 0.4 0.0 - 1.5 %    Immature Grans % 0.4 0.0 - 0.5 %    Neutrophils, Absolute 8.23 (H) 1.70 - 7.00 10*3/mm3    Lymphocytes, Absolute 1.69 0.70 - 3.10 10*3/mm3    Monocytes, Absolute 1.13 (H) 0.10 - 0.90 10*3/mm3    Eosinophils, Absolute 0.06 0.00 - 0.40 10*3/mm3    Basophils, Absolute 0.05 0.00 - 0.20 10*3/mm3    Immature Grans, Absolute 0.05 0.00 - 0.05 10*3/mm3    nRBC 0.0 0.0 - 0.2 /100 WBC     Lab Comments:  My independent interpretation of the above labs: + ddimer       Radiology:  XR Ankle 3+ View Right    Result Date: 1/20/2025  3 VIEWS RIGHT ANKLE  HISTORY: Right ankle pain  COMPARISON: None available.  FINDINGS: Unfortunately, no  prior imaging is available for comparison. I am not convinced it can see definite acute fracture or subluxation. Advanced degenerative changes are noted involving the tibiotalar joint, as well as the talofibular joint. Multiple well-corticated ossific fragments are seen, favored to be chronic. There is a chronic medial malleolar fracture. There is soft tissue swelling noted about the ankle. Dense vascular calcifications are noted. There is calcaneal spurring.      This examination is very difficult to interpret in the absence of any prior studies for comparison, and given the presence of advanced degenerative change. Not convinced that can see an acute fracture on the current study. Ossific fragments seen surrounding the ankle bilaterally appear to be well-corticated, and are favored to be chronic.  This report was finalized on 1/20/2025 2:38 AM by Dr. Talia Orlando M.D on Workstation: [id number]     Radiology Comments:  I ordered the above imaging and reviewed the results.    My independent interpretation of the ankle x-ray: Chronic appearing changes        Medications given in ED:  Medications   HYDROcodone-acetaminophen (NORCO) 5-325 MG per tablet 1 tablet (has no administration in time range)   doxycycline (MONODOX) capsule 100 mg (100 mg Oral Given 1/20/25 0345)             Rationale:  This is an overall well-appearing 89-year-old male who is presenting today secondary to complaints of pain and swelling of the right lower extremity.  He is neurovascularly intact with soft compartments.  He has a history of chronic ankle pain secondary to longstanding deformity/previous injury.    His leg is swollen as compared to the left.  He does have an elevated Wells score.  He was therefore evaluated with a D-dimer, which is elevated and he will need further evaluation with ultrasound imaging.  Wells' Criteria for DVT - MDCalc  Calculated on Jan 20 2025 3:35 AM  1 points -> Moderate risk group for DVT. See Next Steps  for details.    He was evaluated with an ankle x-ray, which did not demonstrate any acute bony findings.    Progress Notes:  3:30 AM: I saw and reevaluated the patient.  Discussed my concerns for possible DVT.  Other consideration would be cellulitis.  Discussed that we do not have vascular ultrasound, but this is required due to his elevated D-dimer.  I spoke with the patient about his ED work up and diagnosis. I informed the patient that he will be observed in the hospital for further evaluation/treatment. All questions answered.      Consult:  3:55 AM EST: Discussed case with WARD De La Vega with [facility].  Reviewed history, exam, results and treatments.  Will observe, obtain duplex us         Diagnosis:  Final diagnoses:   Right leg swelling   Acute right ankle pain             EMR Dragon/Transcription disclaimer:  Some of this encounter note is an electronic transcription/translation of spoken language to printed text. The electronic translation of spoken language may permit erroneous, or at times, nonsensical words or phrases to be inadvertently transcribed; Although I have reviewed the note for such errors, some may still exist.        Provider Note Signed by:     July Martínez MD  01/20/25 0513

## 2025-01-20 NOTE — THERAPY EVALUATION
Patient Name: Kevin Ellington  : 1935    MRN: [id number]                              Today's Date: 2025       Admit Date: 2025    Visit Dx:     ICD-10-CM ICD-9-CM   1. Right leg swelling  M79.89 729.81   2. 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    Benign prostatic hyperplasia with urinary frequency    Cervical radiculopathy    Bilateral carpal tunnel syndrome    Cervical spinal stenosis    Arthritis of ankle    Chronic pain of right ankle    Shortness of breath    Bilateral leg weakness    History of melanoma    Spondylosis of lumbar region without myelopathy or radiculopathy    Stage 3a chronic kidney disease    Pedal edema    Gastroesophageal reflux disease without esophagitis    TIA (transient ischemic attack)    Bilateral carotid artery stenosis    Cerebral artery occlusion    Hoarseness    Hiatal hernia    Abdominal fullness    Fever and chills    COVID-19    Chronic atrial fibrillation    Cytokine release syndrome, grade 2    Type 2 diabetes mellitus without complication, without long-term current use of insulin    Chronic instability of ankle    Lumbar post-laminectomy syndrome    Primary localized osteoarthrosis of ankle and foot    Leg edema, right     Past Medical History:   Diagnosis Date    Abnormal ECG 2023    Taking Eliquis    Allergic 1970    Arrhythmia 2023    Taking Eliquis    Arthritis     Asthma 2019    shortness  of breath-exertion    Benign prostatic hyperplasia     Bilateral carotid artery stenosis 05/12/2022    Cataract     Cervical radiculopathy     Chronic atrial fibrillation 06/15/2023    Colon polyp     Coronary artery disease 2001    Stent    COVID-19 06/12/2023    CTS (carpal tunnel syndrome) 1998    Surgery both hands in 1999    Erectile dysfunction     GERD (gastroesophageal reflux disease)     HTN (hypertension)     Hyperlipidemia     Kidney stone     Low back pain     2 Prior surgeries    Lumbosacral disc disease     Melanoma 01/15/2009    DR WILLY SIMMS ( Left forearm)    Obesity     Radiculitis, thoracic     Scoliosis     Spondylosis of lumbar region without myelopathy or radiculopathy 09/09/2020    Stage 3a chronic kidney disease 10/21/2021    Stented coronary artery     Thoracic disc disorder     Surgery by Dr. Aldridge (sp)    TIA (transient ischemic attack) 05/12/2022    Type 2 diabetes mellitus without complication, without long-term current use of insulin 10/03/2023     Past Surgical History:   Procedure Laterality Date    ABLATION OF DYSRHYTHMIC FOCUS  nov. 2020    radio frequency /lower back    ANKLE FUSION Left 2007    reconstruction and fusion    BACK SURGERY      HERNIATED LUMBAR DISK 1975,2000,2012    CARDIAC CATHETERIZATION  2001    CARPAL TUNNEL RELEASE      CATARACT EXTRACTION, BILATERAL Bilateral 2002    COLONOSCOPY  2015    CORONARY ANGIOPLASTY WITH STENT PLACEMENT  2001    CORONARY STENT PLACEMENT      ENDOSCOPY N/A 12/19/2022    Procedure: ESOPHAGOGASTRODUODENOSCOPY WITH BIOPSY;  Surgeon: Rashard Mathews MD;  [place] [facility] [place];  Service: Gastroenterology;  Laterality: N/A;  gastric polyps, hiatal hernia    EPIDURAL BLOCK  Several at [facility]    and [facility]    EYE SURGERY      JOINT REPLACEMENT      REPLACEMENT TOTAL KNEE Left 2015    REPLACEMENT TOTAL KNEE Right     SKIN CANCER EXCISION      MELANOMA    SPINE SURGERY      THORACIC SPINE SURGERY       UPPER GASTROINTESTINAL ENDOSCOPY        General Information       Row Name 01/20/25 1500          Physical Therapy Time and Intention    Document Type evaluation  -     Mode of Treatment individual therapy;physical therapy  -       Row Name 01/20/25 1500          General Information    Prior Level of Function independent:;gait;transfer;bed mobility  -     Existing Precautions/Restrictions fall  -     Barriers to Rehab medically complex  -       Row Name 01/20/25 1500          Living Environment    People in Home spouse  -       Row Name 01/20/25 1500          Cognition    Orientation Status (Cognition) oriented x 4  -       Row Name 01/20/25 1500          Safety Issues/Impairments Affecting Functional Mobility    Impairments Affecting Function (Mobility) balance;endurance/activity tolerance;pain;range of motion (ROM)  -               User Key  (r) = Recorded By, (t) = Taken By, (c) = Cosigned By      Initials Name Provider Type     Charlee Townsend, PT Physical Therapist                   Mobility       Row Name 01/20/25 1501          Bed Mobility    Bed Mobility supine-sit;sit-supine  -     Supine-Sit [place] (Bed Mobility) verbal cues;nonverbal cues (demo/gesture);contact guard  -     Sit-Supine [place] (Bed Mobility) verbal cues;nonverbal cues (demo/gesture);contact guard  [facility]     Assistive Device (Bed Mobility) bed rails;head of bed elevated  -     Comment, (Bed Mobility) upon returning to supine, pt reports he has to use the restroom, PT assisted pt to restroom at end of session and notified nursing  -       Row Name 01/20/25 1501          Sit-Stand Transfer    Sit-Stand [place] (Transfers) verbal cues;nonverbal cues (demo/gesture);contact guard  -     Assistive Device (Sit-Stand Transfers) walker, front-wheeled  -       Row Name 01/20/25 1501          Gait/Stairs (Locomotion)    [place] Level (Gait) verbal cues;nonverbal cues (demo/gesture);contact guard  -      Assistive Device (Gait) walker, front-wheeled  -     Distance in Feet (Gait) 30  +10 ft  -     Deviations/Abnormal Patterns (Gait) base of support, narrow;sherman decreased;antalgic  -     Bilateral Gait Deviations forward flexed posture;heel strike decreased  -               User Key  (r) = Recorded By, (t) = Taken By, (c) = Cosigned By      Initials Name Provider Type     Charlee Townsend, PT Physical Therapist                   Obj/Interventions       Row Name 01/20/25 1504          Range of Motion Comprehensive    Comment, General Range of Motion B ankle ROM limited  -       Row Name 01/20/25 1504          Strength Comprehensive (MMT)    Comment, General Manual Muscle Testing (MMT) Assessment generalized weakness noted with functional mobility, B LE grossly 3+/5  -       Row Name 01/20/25 1504          Balance    Balance Assessment standing static balance;standing dynamic balance  -     Static Standing Balance verbal cues;non-verbal cues (demo/gesture);contact guard  -     Dynamic Standing Balance verbal cues;non-verbal cues (demo/gesture);contact guard  -     Position/Device Used, Standing Balance walker, rolling  -               User Key  (r) = Recorded By, (t) = Taken By, (c) = Cosigned By      Initials Name Provider Type     Charlee Townsend, PT Physical Therapist                   Goals/Plan       Row Name 01/20/25 1503          Bed Mobility Goal 1 (PT)    Activity/Assistive Device (Bed Mobility Goal 1, PT) bed mobility activities, all  -     [place] Level/Cues Needed (Bed Mobility Goal 1, PT) supervision required  -     Time Frame (Bed Mobility Goal 1, PT) 1 week  -       Row Name 01/20/25 1505          Transfer Goal 1 (PT)    Activity/Assistive Device (Transfer Goal 1, PT) transfers, all;walker, rolling  -     [place] Level/Cues Needed (Transfer Goal 1, PT) supervision required  -     Time Frame (Transfer Goal 1, PT) 1 week  -       Row Name 01/20/25  1508          Gait Training Goal 1 (PT)    Activity/Assistive Device (Gait Training Goal 1, PT) gait (walking locomotion);walker, rolling  -     [place] Level (Gait Training Goal 1, PT) supervision required  -     Distance (Gait Training Goal 1, PT) 150  -CH     Time Frame (Gait Training Goal 1, PT) 1 week  -       Row Name 01/20/25 1508          Therapy Assessment/Plan (PT)    Planned Therapy Interventions (PT) balance training;bed mobility training;gait training;home exercise program;patient/family education;strengthening;transfer training  -               User Key  (r) = Recorded By, (t) = Taken By, (c) = Cosigned By      Initials Name Provider Type     Charlee Townsend, PT Physical Therapist                   Clinical Impression       Row Name 01/20/25 1502          Pain    Pretreatment Pain Rating 0/10 - no pain  -     Posttreatment Pain Rating 5/10  -     Pain Location ankle  -     Pain Side/Orientation right  -     Pain Management Interventions cold applied;exercise or physical activity utilized  -     Response to Pain Interventions activity participation with tolerable pain  -       Row Name 01/20/25 1508          Plan of Care Review    Plan of Care Reviewed With patient  -     Outcome Evaluation Pt is a 89 yo M who was admitted with R LE edema and pain. Pt presents to PT with impaired functional mobility and gait secondary to R LE pain and decreased activity tolerance. Pt has a history R ankle injury with deformity and L ankle surgical fusion, therefore pt with limited ankle ROM bilaterally. Pt may benefit from skilled PT to address strength, mobility, and gait.  -       Row Name 01/20/25 1507          Therapy Assessment/Plan (PT)    Patient/Family Therapy Goals Statement (PT) to return to [facility]  -     Rehab Potential (PT) good  -     Criteria for Skilled Interventions Met (PT) skilled treatment is necessary  -     Therapy Frequency (PT) 5 times/wk  -       Row Name  01/20/25 1505          Positioning and Restraints    Pre-Treatment Position in bed  -     Post Treatment Position bathroom  -     Bathroom notified nsg;sitting;call light within reach;encouraged to call for assist;with family/caregiver  RN notified pt in bathroom trying to have a BM and pt/wife instructed to pull red cord when ready to get up.  -               User Key  (r) = Recorded By, (t) = Taken By, (c) = Cosigned By      Initials Name Provider Type     Charlee Townsend, PT Physical Therapist                   Outcome Measures       Row Name 01/20/25 1511 01/20/25 0611       How much help from another person do you currently need...    Turning from your back to your side while in flat bed without using bedrails? 3  - 3  -LS    Moving from lying on back to sitting on the side of a flat bed without bedrails? 3  - 3  -LS    Moving to and from a bed to a chair (including a wheelchair)? 3  -CH 3  -LS    Standing up from a chair using your arms (e.g., wheelchair, bedside chair)? 3  - 3  -LS    Climbing 3-5 steps with a railing? 3  -CH 1  -LS    To walk in hospital room? 3  -CH 2  -LS    AM-PAC 6 Clicks Score (PT) 18  -CH 15  -LS    Highest Level of Mobility Goal 6 --> Walk 10 steps or more  - 4 --> Transfer to chair/commode  -LS      Row Name 01/20/25 0500          How much help from another person do you currently need...    Turning from your back to your side while in flat bed without using bedrails? 4  -LS     Moving from lying on back to sitting on the side of a flat bed without bedrails? 4  -LS     Moving to and from a bed to a chair (including a wheelchair)? 3  -LS     Standing up from a chair using your arms (e.g., wheelchair, bedside chair)? 2  -LS     Climbing 3-5 steps with a railing? 1  -LS     To walk in hospital room? 2  -LS     AM-PAC 6 Clicks Score (PT) 16  -LS     Highest Level of Mobility Goal 5 --> Static standing  -LS       Row Name 01/20/25 1511          Functional Assessment     Outcome Measure Options AM-PAC 6 Clicks Basic Mobility (PT)  -               User Key  (r) = Recorded By, (t) = Taken By, (c) = Cosigned By      Initials Name Provider Type     Charlee Townsend PT Physical Therapist    Rowan Angel RN Registered Nurse                                 Physical Therapy Education       Title: PT OT SLP Therapies (In Progress)       Topic: Physical Therapy (In Progress)       Point: Mobility training (Done)       Learning Progress Summary            Patient Acceptance, E,TB,D, VU,NR by  at 1/20/2025 1511                      Point: Home exercise program (Not Started)       Learner Progress:  Not documented in this visit.              Point: Body mechanics (Done)       Learning Progress Summary            Patient Acceptance, E,TB,D, VU,NR by  at 1/20/2025 1511                      Point: Precautions (Done)       Learning Progress Summary            Patient Acceptance, E,TB,D, VU,NR by  at 1/20/2025 1511                                      User Key       Initials Effective Dates Name Provider Type [facility] 06/16/21 -  Charlee Townsend PT Physical Therapist PT                  PT Recommendation and Plan  Planned Therapy Interventions (PT): balance training, bed mobility training, gait training, home exercise program, patient/family education, strengthening, transfer training  Outcome Evaluation: Pt is a 88 yo M who was admitted with R LE edema and pain. Pt presents to PT with impaired functional mobility and gait secondary to R LE pain and decreased activity tolerance. Pt has a history R ankle injury with deformity and L ankle surgical fusion, therefore pt with limited ankle ROM bilaterally. Pt may benefit from skilled PT to address strength, mobility, and gait.     Time Calculation:         PT Charges       Row Name 01/20/25 1512             Time Calculation    Start Time 1428  -      Stop Time 1446  -      Time Calculation (min) 18 min  -      PT  Received On 01/20/25  -      PT - Next Appointment 01/21/25  -      PT Goal Re-Cert Due Date 01/27/25  -         Time Calculation- PT    Total Timed Code Minutes- PT 12 minute(s)  -         Timed Charges    48439 - PT Therapeutic Activity Minutes 12  -CH         Total Minutes    Timed Charges Total Minutes 12  -CH       Total Minutes 12  -CH                User Key  (r) = Recorded By, (t) = Taken By, (c) = Cosigned By      Initials Name Provider Type     Charlee Townsend, PT Physical Therapist                  Therapy Charges for Today       Code Description Service Date Service Provider Modifiers Qty    [id number]  PT THERAPEUTIC ACT EA 15 MIN 1/20/2025 Charlee Townsend, PT GP 1    [id number] HC PT EVAL MOD COMPLEXITY 2 1/20/2025 Charlee Townsend, PT GP 1            PT G-Codes  Outcome Measure Options: AM-PAC 6 Clicks Basic Mobility (PT)  AM-PAC 6 Clicks Score (PT): 18  PT Discharge Summary  Anticipated Discharge Disposition (PT): home with home health, home with outpatient therapy services    Charlee Townsend, PT  1/20/2025

## 2025-01-20 NOTE — PLAN OF CARE
Goal Outcome Evaluation:              Outcome Evaluation: Patient is alert and oriented x 4, pleasant demeanor.  Respirations even and non labored.  Right lower leg edema present.  Gait is unsteady.  US performed this day, negative for blood clots.

## 2025-01-21 LAB
ANION GAP SERPL CALCULATED.3IONS-SCNC: 11 MMOL/L (ref 5–15)
BUN SERPL-MCNC: 19 MG/DL (ref 8–23)
BUN/CREAT SERPL: 18.1 (ref 7–25)
CALCIUM SPEC-SCNC: 8.8 MG/DL (ref 8.6–10.5)
CHLORIDE SERPL-SCNC: 101 MMOL/L (ref 98–107)
CO2 SERPL-SCNC: 26 MMOL/L (ref 22–29)
CREAT SERPL-MCNC: 1.05 MG/DL (ref 0.76–1.27)
CRP SERPL-MCNC: 8.69 MG/DL (ref 0–0.5)
DEPRECATED RDW RBC AUTO: 39.6 FL (ref 37–54)
EGFRCR SERPLBLD CKD-EPI 2021: 67.9 ML/MIN/1.73
ERYTHROCYTE [DISTWIDTH] IN BLOOD BY AUTOMATED COUNT: 12 % (ref 12.3–15.4)
GLUCOSE BLDC GLUCOMTR-MCNC: 121 MG/DL (ref 70–130)
GLUCOSE BLDC GLUCOMTR-MCNC: 122 MG/DL (ref 70–130)
GLUCOSE BLDC GLUCOMTR-MCNC: 133 MG/DL (ref 70–130)
GLUCOSE BLDC GLUCOMTR-MCNC: 148 MG/DL (ref 70–130)
GLUCOSE BLDC GLUCOMTR-MCNC: 166 MG/DL (ref 70–130)
GLUCOSE SERPL-MCNC: 123 MG/DL (ref 65–99)
HCT VFR BLD AUTO: 38.5 % (ref 37.5–51)
HGB BLD-MCNC: 13.1 G/DL (ref 13–17.7)
MCH RBC QN AUTO: 30.7 PG (ref 26.6–33)
MCHC RBC AUTO-ENTMCNC: 34 G/DL (ref 31.5–35.7)
MCV RBC AUTO: 90.2 FL (ref 79–97)
PLATELET # BLD AUTO: 167 10*3/MM3 (ref 140–450)
PMV BLD AUTO: 9.4 FL (ref 6–12)
POTASSIUM SERPL-SCNC: 3.5 MMOL/L (ref 3.5–5.2)
PROCALCITONIN SERPL-MCNC: 0.06 NG/ML (ref 0–0.25)
RBC # BLD AUTO: 4.27 10*6/MM3 (ref 4.14–5.8)
SODIUM SERPL-SCNC: 138 MMOL/L (ref 136–145)
WBC NRBC COR # BLD AUTO: 10.18 10*3/MM3 (ref 3.4–10.8)

## 2025-01-21 PROCEDURE — 97530 THERAPEUTIC ACTIVITIES: CPT

## 2025-01-21 PROCEDURE — 97166 OT EVAL MOD COMPLEX 45 MIN: CPT

## 2025-01-21 PROCEDURE — 80048 BASIC METABOLIC PNL TOTAL CA: CPT | Performed by: INTERNAL MEDICINE

## 2025-01-21 PROCEDURE — 84550 ASSAY OF BLOOD/URIC ACID: CPT | Performed by: HOSPITALIST

## 2025-01-21 PROCEDURE — 82948 REAGENT STRIP/BLOOD GLUCOSE: CPT

## 2025-01-21 PROCEDURE — 97535 SELF CARE MNGMENT TRAINING: CPT

## 2025-01-21 PROCEDURE — 84145 PROCALCITONIN (PCT): CPT | Performed by: HOSPITALIST

## 2025-01-21 PROCEDURE — G0378 HOSPITAL OBSERVATION PER HR: HCPCS

## 2025-01-21 PROCEDURE — 85027 COMPLETE CBC AUTOMATED: CPT | Performed by: INTERNAL MEDICINE

## 2025-01-21 PROCEDURE — 63710000001 INSULIN LISPRO (HUMAN) PER 5 UNITS: Performed by: INTERNAL MEDICINE

## 2025-01-21 PROCEDURE — 87040 BLOOD CULTURE FOR BACTERIA: CPT | Performed by: HOSPITALIST

## 2025-01-21 PROCEDURE — 86140 C-REACTIVE PROTEIN: CPT | Performed by: HOSPITALIST

## 2025-01-21 RX ADMIN — INSULIN LISPRO 2 UNITS: 100 INJECTION, SOLUTION INTRAVENOUS; SUBCUTANEOUS at 14:55

## 2025-01-21 RX ADMIN — CEPHALEXIN 500 MG: 500 CAPSULE ORAL at 14:39

## 2025-01-21 RX ADMIN — ATORVASTATIN CALCIUM 40 MG: 20 TABLET, FILM COATED ORAL at 08:15

## 2025-01-21 RX ADMIN — METOPROLOL SUCCINATE 100 MG: 50 TABLET, EXTENDED RELEASE ORAL at 08:15

## 2025-01-21 RX ADMIN — TAMSULOSIN HYDROCHLORIDE 0.8 MG: 0.4 CAPSULE ORAL at 08:16

## 2025-01-21 RX ADMIN — FLUTICASONE PROPIONATE 2 SPRAY: 50 SPRAY, METERED NASAL at 08:16

## 2025-01-21 RX ADMIN — CEPHALEXIN 500 MG: 500 CAPSULE ORAL at 08:16

## 2025-01-21 RX ADMIN — ACETAMINOPHEN 325MG 650 MG: 325 TABLET ORAL at 19:37

## 2025-01-21 RX ADMIN — PANTOPRAZOLE SODIUM 40 MG: 40 TABLET, DELAYED RELEASE ORAL at 08:16

## 2025-01-21 RX ADMIN — ACETAMINOPHEN 325MG 650 MG: 325 TABLET ORAL at 09:17

## 2025-01-21 RX ADMIN — Medication 10 ML: at 21:17

## 2025-01-21 RX ADMIN — CHLORTHALIDONE 50 MG: 25 TABLET ORAL at 08:16

## 2025-01-21 RX ADMIN — Medication 10 ML: at 08:16

## 2025-01-21 RX ADMIN — APIXABAN 5 MG: 5 TABLET, FILM COATED ORAL at 21:17

## 2025-01-21 RX ADMIN — CEPHALEXIN 500 MG: 500 CAPSULE ORAL at 21:17

## 2025-01-21 RX ADMIN — POTASSIUM CHLORIDE 20 MEQ: 750 TABLET, EXTENDED RELEASE ORAL at 08:16

## 2025-01-21 RX ADMIN — CETIRIZINE HYDROCHLORIDE 10 MG: 10 TABLET, FILM COATED ORAL at 08:16

## 2025-01-21 RX ADMIN — MODAFINIL 100 MG: 100 TABLET ORAL at 08:16

## 2025-01-21 NOTE — PROGRESS NOTES
Discharge Planning Assessment  [facility]     Patient Name: Kevin Ellington  MRN: [id number]  Today's Date: 1/21/2025    Admit Date: 1/20/2025    Plan: pending   Discharge Needs Assessment    No documentation.                  Discharge Plan       Row Name 01/21/25 1251       Plan    Plan Comments Called the patient's spouse and she would like to meet today at 13:00. MAX Barrett RN [facility]                  Continued Care and Services - Admitted Since 1/20/2025    No active coordination exists for this encounter.       Expected Discharge Date and Time       Expected Discharge Date Expected Discharge Time    Jan 21, 2025            Demographic Summary    No documentation.                  Functional Status    No documentation.                  Psychosocial    No documentation.                  Abuse/Neglect    No documentation.                  Legal    No documentation.                  Substance Abuse    No documentation.                  Patient Forms    No documentation.                     Sara Barrett, RN

## 2025-01-21 NOTE — PLAN OF CARE
Goal Outcome Evaluation:  Plan of Care Reviewed With: patient        Progress: no change  Outcome Evaluation: Pt is an 88 yo male admitted with R LE edema and pain. Pt seen this date for OT RONY [id number], reports hx R ankle injury with deformity. He reports he lives at home with his spouse, (I) with ADLs, use of cane or rwx at baseline. He presents near his baseline this date, some deficits in balance and decreased activity tolerance noted however. He is able to demo CGA for func transfers and mobility to BR commode and sinkside, ADLs with SBA/CGA in BR. no overt LOBs noted however unsteady at times with rwx. Continue to follow to maximize (I) prior to d/c, anticipate home with spouse and rec HHOT to ensure safe transition home.    Anticipated Discharge Disposition (OT): home with assist, home with home health

## 2025-01-21 NOTE — PLAN OF CARE
Goal Outcome Evaluation:  Plan of Care Reviewed With: patient        Progress: no change     Patient is alert and oriented times four. Up with assistance of one and a walker. His gait is unsteady. Tylenol given once for complaints of foot pain. Will continue to monitor

## 2025-01-21 NOTE — THERAPY TREATMENT NOTE
Patient Name: Kevin Ellington  : 1935    MRN: [id number]                              Today's Date: 2025       Admit Date: 2025    Visit Dx:     ICD-10-CM ICD-9-CM   1. Right leg swelling  M79.89 729.81   2. 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    Benign prostatic hyperplasia with urinary frequency    Cervical radiculopathy    Bilateral carpal tunnel syndrome    Cervical spinal stenosis    Arthritis of ankle    Chronic pain of right ankle    Shortness of breath    Bilateral leg weakness    History of melanoma    Spondylosis of lumbar region without myelopathy or radiculopathy    Stage 3a chronic kidney disease    Pedal edema    Gastroesophageal reflux disease without esophagitis    TIA (transient ischemic attack)    Bilateral carotid artery stenosis    Cerebral artery occlusion    Hoarseness    Hiatal hernia    Abdominal fullness    Fever and chills    COVID-19    Chronic atrial fibrillation    Cytokine release syndrome, grade 2    Type 2 diabetes mellitus without complication, without long-term current use of insulin    Chronic instability of ankle    Lumbar post-laminectomy syndrome    Primary localized osteoarthrosis of ankle and foot    Leg edema, right     Past Medical History:   Diagnosis Date    Abnormal ECG 2023    Taking Eliquis    Allergic 1970    Arrhythmia 2023    Taking Eliquis    Arthritis     Asthma 2019    shortness  of breath-exertion    Benign prostatic hyperplasia     Bilateral carotid artery stenosis 05/12/2022    Cataract     Cervical radiculopathy     Chronic atrial fibrillation 06/15/2023    Colon polyp     Coronary artery disease 2001    Stent    COVID-19 06/12/2023    CTS (carpal tunnel syndrome) 1998    Surgery both hands in 1999    Erectile dysfunction     GERD (gastroesophageal reflux disease)     HTN (hypertension)     Hyperlipidemia     Kidney stone     Low back pain     2 Prior surgeries    Lumbosacral disc disease     Melanoma 01/15/2009    DR WILLY SIMMS ( Left forearm)    Obesity     Radiculitis, thoracic     Scoliosis     Spondylosis of lumbar region without myelopathy or radiculopathy 09/09/2020    Stage 3a chronic kidney disease 10/21/2021    Stented coronary artery     Thoracic disc disorder     Surgery by Dr. Aldridge (sp)    TIA (transient ischemic attack) 05/12/2022    Type 2 diabetes mellitus without complication, without long-term current use of insulin 10/03/2023     Past Surgical History:   Procedure Laterality Date    ABLATION OF DYSRHYTHMIC FOCUS  nov. 2020    radio frequency /lower back    ANKLE FUSION Left 2007    reconstruction and fusion    BACK SURGERY      HERNIATED LUMBAR DISK 1975,2000,2012    CARDIAC CATHETERIZATION  2001    CARPAL TUNNEL RELEASE      CATARACT EXTRACTION, BILATERAL Bilateral 2002    COLONOSCOPY  2015    CORONARY ANGIOPLASTY WITH STENT PLACEMENT  2001    CORONARY STENT PLACEMENT      ENDOSCOPY N/A 12/19/2022    Procedure: ESOPHAGOGASTRODUODENOSCOPY WITH BIOPSY;  Surgeon: Rashard Mathews MD;  [place] [facility] [place];  Service: Gastroenterology;  Laterality: N/A;  gastric polyps, hiatal hernia    EPIDURAL BLOCK  [facility] at [facility]    and [facility]    EYE SURGERY      JOINT REPLACEMENT      REPLACEMENT TOTAL KNEE Left 2015    REPLACEMENT TOTAL KNEE Right     SKIN CANCER EXCISION      MELANOMA    SPINE SURGERY      THORACIC SPINE SURGERY       UPPER GASTROINTESTINAL ENDOSCOPY        General Information       Row Name 01/21/25 1613          Physical Therapy Time and Intention    Document Type therapy note (daily note)  -     Mode of Treatment individual therapy;physical therapy  -       Row Name 01/21/25 1613          General Information    Existing Precautions/Restrictions fall  -       Row Name 01/21/25 1613          Cognition    Orientation Status (Cognition) oriented x 4  -       Row Name 01/21/25 1613          Safety Issues/Impairments Affecting Functional Mobility    Impairments Affecting Function (Mobility) balance;endurance/activity tolerance;pain;range of motion (ROM)  -               User Key  (r) = Recorded By, (t) = Taken By, (c) = Cosigned By      Initials Name Provider Type     Charlee Townsend, PT Physical Therapist                   Mobility       Row Name 01/21/25 1614          Bed Mobility    Bed Mobility supine-sit;sit-supine  -     Supine-Sit [place] (Bed Mobility) verbal cues;standby assist  -     Sit-Supine [place] (Bed Mobility) verbal cues;standby assist  -     Assistive Device (Bed Mobility) bed rails;head of bed elevated  -       Row Name 01/21/25 1614          Sit-Stand Transfer    Sit-Stand [place] (Transfers) verbal cues;nonverbal cues (demo/gesture);contact guard  -     Assistive Device (Sit-Stand Transfers) walker, front-wheeled  -       Row Name 01/21/25 1614          Gait/Stairs (Locomotion)    [place] Level (Gait) verbal cues;nonverbal cues (demo/gesture);contact guard;standby assist  -     Assistive Device (Gait) walker, front-wheeled  -     Distance in Feet (Gait) 30  -     Deviations/Abnormal Patterns (Gait) base of support, narrow;sherman decreased;antalgic  -     Bilateral Gait Deviations forward flexed posture;heel strike decreased  -     Comment, (Gait/Stairs) gait distance limited secondary to bilateral ankle pain this date  -               User Key  (r) =  Recorded By, (t) = Taken By, (c) = Cosigned By      Initials Name Provider Type     Charlee Townsend, PT Physical Therapist                   Obj/Interventions    No documentation.                  Goals/Plan    No documentation.                  Clinical Impression       Row Name 01/21/25 1615          Pain    Pretreatment Pain Rating 2/10  -     Posttreatment Pain Rating 5/10  -     Pain Location ankle  -     Pain Side/Orientation bilateral  -     Pain Management Interventions exercise or physical activity utilized;nursing notified  -     Response to Pain Interventions activity participation with increased pain  -       Row Name 01/21/25 1615          Plan of Care Review    Plan of Care Reviewed With patient  -     Outcome Evaluation Pt demonstrates some increased functional mobility and strength as he required less assistance. However, gait distance continues to be limited by bilateral ankle pain. PT will continue to follow to address strength, mobility, and gait.  -       Row Name 01/21/25 1615          Positioning and Restraints    Pre-Treatment Position in bed  -     Post Treatment Position bed  -     In Bed supine;call light within reach;encouraged to call for assist;exit alarm on  -               User Key  (r) = Recorded By, (t) = Taken By, (c) = Cosigned By      Initials Name Provider Type     Charlee Townsend, PT Physical Therapist                   Outcome Measures       Row Name 01/21/25 1617 01/21/25 0746       How much help from another person do you currently need...    Turning from your back to your side while in flat bed without using bedrails? 4  -CH 4  -BC    Moving from lying on back to sitting on the side of a flat bed without bedrails? 3  -CH 4  -BC    Moving to and from a bed to a chair (including a wheelchair)? 3  -CH 3  -BC    Standing up from a chair using your arms (e.g., wheelchair, bedside chair)? 3  -CH 2  -BC    Climbing 3-5 steps with a railing? 3  -CH 2   -BC    To walk in hospital room? 3  - 3  -BC    AM-PAC 6 Clicks Score (PT) 19  - 18  -BC    Highest Level of Mobility Goal 6 --> Walk 10 steps or more  - 6 --> Walk 10 steps or more  -BC      Row Name 01/21/25 1617 01/21/25 1138       Functional Assessment    Outcome Measure Options AM-PAC 6 Clicks Basic Mobility (PT)  - AM-PAC 6 Clicks Daily Activity (OT);Modified [place]  -MM              User Key  (r) = Recorded By, (t) = Taken By, (c) = Cosigned By      Initials Name Provider Type     Charlee Townsend, PT Physical Therapist    Shelbi Anderson OT Occupational Therapist    Vandana Rea, RN Registered Nurse                                 Physical Therapy Education       Title: PT OT SLP Therapies (In Progress)       Topic: Physical Therapy (In Progress)       Point: Mobility training (Done)       Learning Progress Summary            Patient Acceptance, E,TB,D, VU,NR by  at 1/21/2025 1617    Acceptance, E,TB,D, VU,NR by  at 1/20/2025 1511                      Point: Home exercise program (Not Started)       Learner Progress:  Not documented in this visit.              Point: Body mechanics (Done)       Learning Progress Summary            Patient Acceptance, E,TB,D, VU,NR by  at 1/21/2025 1617    Acceptance, E,TB,D, VU,NR by  at 1/20/2025 1511                      Point: Precautions (Done)       Learning Progress Summary            Patient Acceptance, E,TB,D, VU,NR by  at 1/21/2025 1617    Acceptance, E,TB,D, VU,NR by  at 1/20/2025 1511                                      User Key       Initials Effective Dates Name Provider Type Discipline     06/16/21 -  Charlee Townsend, PT Physical Therapist PT                  PT Recommendation and Plan  Planned Therapy Interventions (PT): balance training, bed mobility training, gait training, home exercise program, patient/family education, strengthening, transfer training  Outcome Evaluation: Pt demonstrates some increased functional  mobility and strength as he required less assistance. However, gait distance continues to be limited by bilateral ankle pain. PT will continue to follow to address strength, mobility, and gait.     Time Calculation:         PT Charges       Row Name 01/21/25 1618             Time Calculation    Start Time 0859  -      Stop Time 0912  -      Time Calculation (min) 13 min  -CH      PT Received On 01/21/25  -      PT - Next Appointment 01/22/25  -         Time Calculation- PT    Total Timed Code Minutes- PT 13 minute(s)  -CH         Timed Charges    73054 - PT Therapeutic Activity Minutes 13  -CH         Total Minutes    Timed Charges Total Minutes 13  -CH       Total Minutes 13  -CH                User Key  (r) = Recorded By, (t) = Taken By, (c) = Cosigned By      Initials Name Provider Type    Charlee Fernandez PT Physical Therapist                  Therapy Charges for Today       Code Description Service Date Service Provider Modifiers Qty    94425270745  PT THERAPEUTIC ACT EA 15 MIN 1/20/2025 Charlee Townsend, PT GP 1    [id number]  PT EVAL MOD COMPLEXITY 2 1/20/2025 Charlee Townsend, PT GP 1    [id number]  PT THERAPEUTIC ACT EA 15 MIN 1/21/2025 Charlee Townsend, PT GP 1            PT G-Codes  Outcome Measure Options: AM-PAC 6 Clicks Basic Mobility (PT)  AM-PAC 6 Clicks Score (PT): 19  AM-PAC 6 Clicks Score (OT): 21  PT Discharge Summary  Anticipated Discharge Disposition (PT): home with home health, home with outpatient therapy services    Charlee Townsend PT  1/21/2025

## 2025-01-21 NOTE — PROGRESS NOTES
[facility]     LOS: 0 days     Subjective:    CC:Leg Pain    DIET:  Diet Order   Procedures    Diet: Cardiac, Diabetic; Healthy Heart (2-3 Na+); Consistent Carbohydrate; Fluid Consistency: Thin (IDDSI 0)       Objective:    Vital Signs:  Temp:  [97.7 °F (36.5 °C)-99.3 °F (37.4 °C)] 98.9 °F (37.2 °C)  Heart Rate:  [] 107  Resp:  [16] 16  BP: (107-139)/(63-85) 125/77    SpO2:  [95 %-97 %] 97 %  on   ;   Device (Oxygen Therapy): room air  Body mass index is 28.7 kg/m².    Physical Exam  General Awake alert  Heart regular rate rhythm no murmurs rubs gallop  Lungs clear to auscultation  Back with no evidence of infection, area where it appears patient had temporary wires placed  Ankle erythma of the right    Results Review:    Glucose   Date Value Ref Range Status   01/21/2025 123 (H) 65 - 99 mg/dL Final   01/20/2025 155 (H) 65 - 99 mg/dL Final   01/20/2025 145 (H) 65 - 99 mg/dL Final     Results from last 7 days   Lab Units 01/21/25  0352   WBC 10*3/mm3 10.18   HEMOGLOBIN g/dL 13.1   HEMATOCRIT % 38.5   PLATELETS 10*3/mm3 167     Results from last 7 days   Lab Units 01/21/25  0352 01/20/25  0923   SODIUM mmol/L 138 139   POTASSIUM mmol/L 3.5 3.4*   CHLORIDE mmol/L 101 104   CO2 mmol/L 26.0 24.4   BUN mg/dL 19 22   CREATININE mg/dL 1.05 1.14   CALCIUM mg/dL 8.8 8.5*   BILIRUBIN mg/dL  --  1.1   ALK PHOS U/L  --  82   ALT (SGPT) U/L  --  12   AST (SGOT) U/L  --  15   GLUCOSE mg/dL 123* 155*                 Cultures:  No results found for: \"BLOODCX\", \"URINECX\", \"WOUNDCX\", \"MRSACX\", \"RESPCX\", \"STOOLCX\"    I have reviewed daily medications and changes in CPOE    Scheduled meds  apixaban, 5 mg, Oral, Q12H  atorvastatin, 40 mg, Oral, Daily  cephalexin, 500 mg, Oral, TID  cetirizine, 10 mg, Oral, Daily  chlorthalidone, 50 mg, Oral, Daily  fluticasone, 2 spray, Nasal, Daily  insulin lispro, 2-7 Units, Subcutaneous, 4x Daily AC & at Bedtime  metoprolol succinate XL, 100 mg, Oral, " Daily  modafinil, 100 mg, Oral, Daily  pantoprazole, 40 mg, Oral, Daily  potassium chloride, 20 mEq, Oral, Daily  sodium chloride, 10 mL, Intravenous, Q12H  tamsulosin, 0.8 mg, Oral, Daily           PRN meds    acetaminophen **OR** acetaminophen **OR** acetaminophen    senna-docusate sodium **AND** polyethylene glycol **AND** bisacodyl **AND** bisacodyl    dextrose    dextrose    famotidine    glucagon (human recombinant)    HYDROcodone-acetaminophen    melatonin    ondansetron    sodium chloride    sodium chloride        Leg edema, right    Essential hypertension    Hyperlipemia    Stage 3a chronic kidney disease    Chronic atrial fibrillation    Type 2 diabetes mellitus without complication, without long-term current use of insulin        Assessment/Plan:    Weakness  -Patient had some chills this afternoon  -Check blood culture  -Monitor  -I have a call to Dr. Fitch from local orthopedic  -Consult to Dr. Hodge    Concern for gout versus cellulitis versus right septic joint  -Infectious disease and orthopedic consultation  -For now continue with oral antibiotic    Hypertension-BP stable    Chronic kidney disease-creatinine remained stable    A-fib-heart rate stable, restart Eliquis  -Patient states Eliquis was held for spinal cord stimulator but was told he could restart it, we will restart    Type 2 diabetes- Slight scale insulin      Valentin De Leon MD  01/21/25  18:03 EST

## 2025-01-21 NOTE — PLAN OF CARE
Pt aox4, po abx given for possible cellulitis. MD also would like to r/o septic joint. He is an ast x1 to the bathroom w/ walker. He is going home with HH and help getting to PT at [facility]

## 2025-01-21 NOTE — PROGRESS NOTES
Discharge Planning Assessment  [facility]     Patient Name: Kevin Ellington  MRN: [id number]  Today's Date: 1/21/2025    Admit Date: 1/20/2025    Plan: home with [facility] outpatient and [facility] to assist with in home care. MAX Barrett RN, CCP.   Discharge Needs Assessment       Row Name 01/21/25 1638       Living Environment    People in Home spouse    Current Living Arrangements independent living facility    Potentially Unsafe Housing Conditions none    In the past 12 months has the electric, gas, oil, or water company threatened to shut off services in your home? No    Primary Care Provided by spouse/significant other    Provides Primary Care For no one, unable/limited ability to care for self    Family Caregiver if Needed spouse    Quality of Family Relationships helpful;involved;supportive    Able to Return to Prior Arrangements yes    Living Arrangement Comments home with spouse       Resource/Environmental Concerns    Resource/Environmental Concerns none    Transportation Concerns none       Transportation Needs    In the past 12 months, has lack of transportation kept you from medical appointments or from getting medications? no    In the past 12 months, has lack of transportation kept you from meetings, work, or from getting things needed for daily living? No       Food Insecurity    Within the past 12 months, you worried that your food would run out before you got the money to buy more. Never true    Within the past 12 months, the food you bought just didn't last and you didn't have money to get more. Never true       Transition Planning    Patient/Family Anticipates Transition to home with family    Patient/Family Anticipated Services at Transition outpatient care    Transportation Anticipated family or friend will provide       Discharge Needs Assessment    Equipment Currently Used at Home wound care supplies;grab bar;shower chair;cane, quad tip;walker, rolling;scales;rollator;pulse ox;bp cuff;bath  bench;other (see comments)  brace right ankle hurts to wear    Concerns to be Addressed other (see comments)  [facility] outpatient    Do you want help finding or keeping work or a job? I do not need or want help    Do you want help with school or training? For example, starting or completing job training or getting a high school diploma, GED or equivalent No    Anticipated Changes Related to Illness inability to care for self    Outpatient/Agency/Support Group Needs outpatient therapy    Provided Post Acute Provider List? Yes    Post Acute Provider List Home Health;Outpatient Therapy    Provided Post Acute Provider Quality & Resource List? Yes    Post Acute Provider Quality and Resource List Home Health    Delivered To Support Person    Support Person spouse    Method of Delivery In person    Offered/Gave Vendor List yes    Current Discharge Risk chronically ill                   Discharge Plan       Row Name 01/21/25 6760       Plan    Plan home with [facility] outpatient and [facility] to assist with in home care. MAX Barrett RN, CCP.    Plan Comments Spoke with the patient and spouse and verified the face sheet. The patient uses [facility]. The patient is not current with home health. The patient would like to go home with [facility] outpatient and [facility] to assist with in home care. Adeola with [facility] is here to speak with the patient and spouse about home needs. MAX Barrett RN, CCP.                  Continued Care and Services - Admitted Since 1/20/2025       Therapy       Service Provider Request Status Services Address Phone Fax Patient Preferred    [facility] [address] [facility] [address] [phone] --              Community Resources       Service Provider Request Status Services Address Phone Fax Patient Preferred    [facility] Pending - Request Sent -- [address] [facility] [address]  [phone] --                  Expected Discharge Date and Time       Expected Discharge Date Expected Discharge Time    Jan 23, 2025            Demographic Summary       Row Name 01/21/25 1638       General Information    Admission Type observation    Arrived From emergency department    Referral Source admission list    Reason for Consult discharge planning    Preferred Language English       Contact Information    Permission Granted to Share Info With     Contact Information Obtained for                    Functional Status       Row Name 01/21/25 1644       Functional Status    Usual Activity Tolerance moderate    Current Activity Tolerance moderate       Assessment of Health Literacy    How often do you have someone help you read hospital materials? Occasionally    How often do you have problems learning about your medical condition because of difficulty understanding written information? Occasionally    How often do you have a problem understanding what is told to you about your medical condition? Occasionally    How confident are you filling out medical forms by yourself? Quite a bit    Health Literacy Excellent       Functional Status, IADL    Medications assistive equipment and person    Meal Preparation completely dependent    Housekeeping completely dependent    Laundry completely dependent    Shopping completely dependent    If for any reason you need help with day-to-day activities such as bathing, preparing meals, shopping, managing finances, etc., do you get the help you need? I could use a little more help       Mental Status    General Appearance WDL WDL       Mental Status Summary    Recent Changes in Mental Status/Cognitive Functioning no changes       Employment/    Employment Status , previous service;retired    Current or Previous Occupation professional    Employment/ Comments [employer]                    Psychosocial    No documentation.                  Abuse/Neglect    No documentation.                  Legal    No documentation.                  Substance Abuse    No documentation.                  Patient Forms    No documentation.                     Sara Barrett RN

## 2025-01-21 NOTE — THERAPY EVALUATION
Patient Name: Kevin Ellington  : 1935    MRN: [id number]                              Today's Date: 2025       Admit Date: 2025    Visit Dx:     ICD-10-CM ICD-9-CM   1. Right leg swelling  M79.89 729.81   2. 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    Benign prostatic hyperplasia with urinary frequency    Cervical radiculopathy    Bilateral carpal tunnel syndrome    Cervical spinal stenosis    Arthritis of ankle    Chronic pain of right ankle    Shortness of breath    Bilateral leg weakness    History of melanoma    Spondylosis of lumbar region without myelopathy or radiculopathy    Stage 3a chronic kidney disease    Pedal edema    Gastroesophageal reflux disease without esophagitis    TIA (transient ischemic attack)    Bilateral carotid artery stenosis    Cerebral artery occlusion    Hoarseness    Hiatal hernia    Abdominal fullness    Fever and chills    COVID-19    Chronic atrial fibrillation    Cytokine release syndrome, grade 2    Type 2 diabetes mellitus without complication, without long-term current use of insulin    Chronic instability of ankle    Lumbar post-laminectomy syndrome    Primary localized osteoarthrosis of ankle and foot    Leg edema, right     Past Medical History:   Diagnosis Date    Abnormal ECG 2023    Taking Eliquis    Allergic 1970    Arrhythmia 2023    Taking Eliquis    Arthritis     Asthma 2019    shortness  of breath-exertion    Benign prostatic hyperplasia     Bilateral carotid artery stenosis 05/12/2022    Cataract     Cervical radiculopathy     Chronic atrial fibrillation 06/15/2023    Colon polyp     Coronary artery disease 2001    Stent    COVID-19 06/12/2023    CTS (carpal tunnel syndrome) 1998    Surgery both hands in 1999    Erectile dysfunction     GERD (gastroesophageal reflux disease)     HTN (hypertension)     Hyperlipidemia     Kidney stone     Low back pain     2 Prior surgeries    Lumbosacral disc disease     Melanoma 01/15/2009    DR WILLY SIMMS ( Left forearm)    Obesity     Radiculitis, thoracic     Scoliosis     Spondylosis of lumbar region without myelopathy or radiculopathy 09/09/2020    Stage 3a chronic kidney disease 10/21/2021    Stented coronary artery     Thoracic disc disorder     Surgery by Dr. Aldridge (sp)    TIA (transient ischemic attack) 05/12/2022    Type 2 diabetes mellitus without complication, without long-term current use of insulin 10/03/2023     Past Surgical History:   Procedure Laterality Date    ABLATION OF DYSRHYTHMIC FOCUS  nov. 2020    radio frequency /lower back    ANKLE FUSION Left 2007    reconstruction and fusion    BACK SURGERY      HERNIATED LUMBAR DISK 1975,2000,2012    CARDIAC CATHETERIZATION  2001    CARPAL TUNNEL RELEASE      CATARACT EXTRACTION, BILATERAL Bilateral 2002    COLONOSCOPY  2015    CORONARY ANGIOPLASTY WITH STENT PLACEMENT  2001    CORONARY STENT PLACEMENT      ENDOSCOPY N/A 12/19/2022    Procedure: ESOPHAGOGASTRODUODENOSCOPY WITH BIOPSY;  Surgeon: Rashard Mathews MD;  Location: [facility] MAIN OR;  Service: Gastroenterology;  Laterality: N/A;  gastric polyps, hiatal hernia    EPIDURAL BLOCK  Several at [facility]    and [facility]    EYE SURGERY      JOINT REPLACEMENT      REPLACEMENT TOTAL KNEE Left 2015    REPLACEMENT TOTAL KNEE Right     SKIN CANCER EXCISION      MELANOMA    SPINE SURGERY      THORACIC SPINE SURGERY       UPPER GASTROINTESTINAL ENDOSCOPY        General Information       Row Name 01/21/25 1131          OT Time and Intention    Document Type evaluation  -MM     Mode of Treatment occupational therapy;individual therapy  -MM     Patient Effort adequate  -MM     Symptoms Noted During/After Treatment none  -MM       Row Name 01/21/25 1131          General Information    Patient Profile Reviewed yes  -MM     Prior Level of Function independent:;ADL's;transfer;all household mobility  use of cane/rollator at baseline, reports no recent falls  -MM     Existing Precautions/Restrictions fall  -MM     Barriers to Rehab previous functional deficit  -MM       Row Name 01/21/25 1131          Living Environment    People in Home spouse  -MM       Row Name 01/21/25 1131          Home Main Entrance    Number of Stairs, Main Entrance none  -MM       Row Name 01/21/25 1131          Cognition    Orientation Status (Cognition) oriented x 4  -MM       Row Name 01/21/25 1131          Safety Issues/Impairments Affecting Functional Mobility    Safety Issues Affecting Function (Mobility) safety precaution awareness;judgment  -MM     Impairments Affecting Function (Mobility) balance;endurance/activity tolerance;pain;range of motion (ROM)  -MM               User Key  (r) = Recorded By, (t) = Taken By, (c) = Cosigned By      Initials Name Provider Type    MM Shelbi Chisholm OT Occupational Therapist                     Mobility/ADL's       Row Name 01/21/25 1132          Bed Mobility    Bed Mobility supine-sit;sit-supine  -MM     Supine-Sit [place] (Bed Mobility) verbal cues;nonverbal cues (demo/gesture);contact guard  -MM     Sit-Supine [place] (Bed Mobility) verbal cues;nonverbal cues (demo/gesture);contact guard  -MM     Assistive Device (Bed Mobility) bed rails;head of bed elevated  -MM     Comment, (Bed Mobility) declined up to chair for breakfast  -MM       Row Name 01/21/25 1132          Transfers    Transfers sit-stand  transfer;stand-sit transfer;toilet transfer  -MM       Row Name 01/21/25 1132          Sit-Stand Transfer    Sit-Stand [place] (Transfers) verbal cues;nonverbal cues (demo/gesture);contact guard  -MM     Assistive Device (Sit-Stand Transfers) walker, front-wheeled  -MM       Row Name 01/21/25 1132          Stand-Sit Transfer    Stand-Sit [place] (Transfers) contact guard  -MM     Assistive Device (Stand-Sit Transfers) walker, front-wheeled  -MM       Row Name 01/21/25 1132          Toilet Transfer    Type (Toilet Transfer) sit-stand;stand-sit  -MM     [place] Level (Toilet Transfer) contact guard;verbal cues  -MM     Assistive Device (Toilet Transfer) commode;walker, front-wheeled  -MM     Comment, (Toilet Transfer) cues for use of grab bar to assist with descend onto commode  -MM       Row Name 01/21/25 1132          Functional Mobility    Functional Mobility- Ind. Level contact guard assist  -MM     Functional Mobility- Device walker, front-wheeled  -MM     Functional Mobility- Comment to BR commode and sinkside  -MM       Row Name 01/21/25 1132          Activities of Daily Living    BADL Assessment/Intervention lower body dressing;grooming;toileting  -MM       Row Name 01/21/25 1132          Lower Body Dressing Assessment/Training    [place] Level (Lower Body Dressing) don;pants/bottoms;doff;contact guard assist  -MM       Row Name 01/21/25 1132          Grooming Assessment/Training    [place] Level (Grooming) grooming skills;contact guard assist  -MM       Row Name 01/21/25 1132          Toileting Assessment/Training    [place] Level (Toileting) toileting skills;contact guard assist;change pad/brief;adjust/manage clothing  -MM     Assistive Devices (Toileting) commode  -MM               User Key  (r) = Recorded By, (t) = Taken By, (c) = Cosigned By      Initials Name Provider Type    Shelbi Anderson OT Occupational Therapist                   Obj/Interventions       Row Name  01/21/25 1134          Sensory Assessment (Somatosensory)    Sensory Assessment (Somatosensory) UE sensation intact  -MM       Row Name 01/21/25 1134          Vision Assessment/Intervention    Visual Impairment/Limitations WFL  -MM       Row Name 01/21/25 1134          Range of Motion Comprehensive    General Range of Motion bilateral upper extremity ROM WNL  -MM       Row Name 01/21/25 1134          Strength Comprehensive (MMT)    General Manual Muscle Testing (MMT) Assessment upper extremity strength deficits identified  -MM     Comment, General Manual Muscle Testing (MMT) Assessment generalized weakness noted, BUE grossly 3+/5  -MM       Row Name 01/21/25 1134          Motor Skills    Motor Skills functional endurance  -MM     Functional Endurance unsteadiness, quick to fatigue with short distances  -MM       Row Name 01/21/25 1134          Balance    Balance Assessment sitting static balance;sitting dynamic balance;standing static balance;standing dynamic balance;sit to stand dynamic balance  -MM     Static Sitting Balance standby assist  -MM     Dynamic Sitting Balance standby assist  -MM     Position, Sitting Balance sitting edge of bed  -MM     Sit to Stand Dynamic Balance contact guard  -MM     Static Standing Balance contact guard  -MM     Dynamic Standing Balance contact guard  -MM     Position/Device Used, Standing Balance supported;walker, front-wheeled  -MM     Balance Interventions sitting;standing;sit to stand;static;dynamic;supported;moderate challenge;weight shifting activity;occupation based/functional task  -MM     Comment, Balance no overt LOBs, however unsteadiness noted  -MM               User Key  (r) = Recorded By, (t) = Taken By, (c) = Cosigned By      Initials Name Provider Type    MM Shelbi Chisholm OT Occupational Therapist                   Goals/Plan       Row Name 01/21/25 1137          Transfer Goal 1 (OT)    Activity/Assistive Device (Transfer Goal 1, OT)  sit-to-stand/stand-to-sit;bed-to-chair/chair-to-bed;toilet  -MM     [place] Level/Cues Needed (Transfer Goal 1, OT) modified independence  -MM     Time Frame (Transfer Goal 1, OT) short term goal (STG);2 weeks  -MM     Progress/Outcome (Transfer Goal 1, OT) goal ongoing  -MM       Row Name 01/21/25 1137          Bathing Goal 1 (OT)    Activity/Device (Bathing Goal 1, OT) bathing skills, all  -MM     [place] Level/Cues Needed (Bathing Goal 1, OT) standby assist  -MM     Time Frame (Bathing Goal 1, OT) short term goal (STG);2 weeks  -MM     Progress/Outcomes (Bathing Goal 1, OT) goal ongoing  -MM       Row Name 01/21/25 1137          Dressing Goal 1 (OT)    Activity/Device (Dressing Goal 1, OT) dressing skills, all  -MM     [place]/Cues Needed (Dressing Goal 1, OT) modified independence  -MM     Time Frame (Dressing Goal 1, OT) short term goal (STG);2 weeks  -MM     Progress/Outcome (Dressing Goal 1, OT) goal ongoing  -MM       Row Name 01/21/25 1137          Toileting Goal 1 (OT)    Activity/Device (Toileting Goal 1, OT) toileting skills, all  -MM     [place] Level/Cues Needed (Toileting Goal 1, OT) modified independence  -MM     Time Frame (Toileting Goal 1, OT) short term goal (STG);2 weeks  -MM     Progress/Outcome (Toileting Goal 1, OT) goal ongoing  -MM       Row Name 01/21/25 1137          Grooming Goal 1 (OT)    Activity/Device (Grooming Goal 1, OT) grooming skills, all  -MM     [place] (Grooming Goal 1, OT) modified independence  -MM     Time Frame (Grooming Goal 1, OT) short term goal (STG);2 weeks  -MM     Progress/Outcome (Grooming Goal 1, OT) goal ongoing  -MM       Row Name 01/21/25 1137          Therapy Assessment/Plan (OT)    Planned Therapy Interventions (OT) activity tolerance training;neuromuscular control/coordination retraining;patient/caregiver education/training;transfer/mobility retraining;ROM/therapeutic exercise;occupation/activity based interventions;strengthening  exercise;functional balance retraining  -               User Key  (r) = Recorded By, (t) = Taken By, (c) = Cosigned By      Initials Name Provider Type    MM Shelbi Chisholm OT Occupational Therapist                   Clinical Impression       Row Name 01/21/25 1135          Pain Assessment    Pretreatment Pain Rating 0/10 - no pain  -MM     Posttreatment Pain Rating 0/10 - no pain  -MM       Row Name 01/21/25 1135          Plan of Care Review    Plan of Care Reviewed With patient  -MM     Progress no change  -MM     Outcome Evaluation Pt is an 88 yo male admitted with R LE edema and pain. Pt seen this date for OT RONY martinez&Zaira4, reports hx R ankle injury with deformity. He reports he lives at home with his spouse, (I) with ADLs, use of cane or rwx at baseline. He presents near his baseline this date, some deficits in balance and decreased activity tolerance noted however. He is able to demo CGA for func transfers and mobility to BR commode and sinkside, ADLs with SBA/CGA in BR. no overt LOBs noted however unsteady at times with rwx. Continue to follow to maximize (I) prior to d/c, anticipate home with spouse and rec HHOT to ensure safe transition home.  -MM       Row Name 01/21/25 1132          Therapy Assessment/Plan (OT)    Rehab Potential (OT) good  -MM     Criteria for Skilled Therapeutic Interventions Met (OT) meets criteria;skilled treatment is necessary  -MM     Therapy Frequency (OT) 3 times/wk  -MM       Row Name 01/21/25 1135          Therapy Plan Review/Discharge Plan (OT)    Anticipated Discharge Disposition (OT) home with assist;home with home health  -MM       Row Name 01/21/25 1135          Vital Signs    O2 Delivery Pre Treatment room air  -       Row Name 01/21/25 1135          Positioning and Restraints    Pre-Treatment Position in bed  -MM     Post Treatment Position bed  -MM     In Bed notified nsg;fowlers;call light within reach;encouraged to call for assist;exit alarm on;side rails up x3  -MM                User Key  (r) = Recorded By, (t) = Taken By, (c) = Cosigned By      Initials Name Provider Type    Shelbi Anderson OT Occupational Therapist                   Outcome Measures       Row Name 01/21/25 1138          How much help from another is currently needed...    Putting on and taking off regular lower body clothing? 3  -MM     Bathing (including washing, rinsing, and drying) 3  -MM     Toileting (which includes using toilet bed pan or urinal) 3  -MM     Putting on and taking off regular upper body clothing 4  -MM     Taking care of personal grooming (such as brushing teeth) 4  -MM     Eating meals 4  -MM     AM-PAC 6 Clicks Score (OT) 21  -MM       Row Name 01/21/25 0746          How much help from another person do you currently need...    Turning from your back to your side while in flat bed without using bedrails? 4  -BC     Moving from lying on back to sitting on the side of a flat bed without bedrails? 4  -BC     Moving to and from a bed to a chair (including a wheelchair)? 3  -BC     Standing up from a chair using your arms (e.g., wheelchair, bedside chair)? 2  -BC     Climbing 3-5 steps with a railing? 2  -BC     To walk in hospital room? 3  -BC     AM-PAC 6 Clicks Score (PT) 18  -BC     Highest Level of Mobility Goal 6 --> Walk 10 steps or more  -BC       Row Name 01/21/25 1138          Functional Assessment    Outcome Measure Options AM-PAC 6 Clicks Daily Activity (OT);Modified [place]  -MM               User Key  (r) = Recorded By, (t) = Taken By, (c) = Cosigned By      Initials Name Provider Type    Shelbi Anderson OT Occupational Therapist    BC Vandana Kerr RN Registered Nurse                    Occupational Therapy Education       Title: PT OT SLP Therapies (In Progress)       Topic: Occupational Therapy (Done)       Point: ADL training (Done)       Description:   Instruct learner(s) on proper safety adaptation and remediation techniques during self care or transfers.   Instruct  in proper use of assistive devices.                  Learning Progress Summary            Patient Acceptance, E, VU by MM at 1/21/2025 1138    Comment: role of OT                      Point: Precautions (Done)       Description:   Instruct learner(s) on prescribed precautions during self-care and functional transfers.                  Learning Progress Summary            Patient Acceptance, E, VU by MM at 1/21/2025 1138    Comment: role of OT                      Point: Body mechanics (Done)       Description:   Instruct learner(s) on proper positioning and spine alignment during self-care, functional mobility activities and/or exercises.                  Learning Progress Summary            Patient Acceptance, E, VU by MM at 1/21/2025 1138    Comment: role of OT                                      User Key       Initials Effective Dates Name Provider Type Discipline    STEPHANIE 05/31/24 -  Shelbi Chisholm OT Occupational Therapist OT                  OT Recommendation and Plan  Planned Therapy Interventions (OT): activity tolerance training, neuromuscular control/coordination retraining, patient/caregiver education/training, transfer/mobility retraining, ROM/therapeutic exercise, occupation/activity based interventions, strengthening exercise, functional balance retraining  Therapy Frequency (OT): 3 times/wk  Plan of Care Review  Plan of Care Reviewed With: patient  Progress: no change  Outcome Evaluation: Pt is an 89 yo male admitted with R LE edema and pain. Pt seen this date for OT RONY martinez&Ox4, reports hx R ankle injury with deformity. He reports he lives at home with his spouse, (I) with ADLs, use of cane or rwx at baseline. He presents near his baseline this date, some deficits in balance and decreased activity tolerance noted however. He is able to demo CGA for func transfers and mobility to BR commode and sinkside, ADLs with SBA/CGA in BR. no overt LOBs noted however unsteady at times with rwx. Continue to follow  to maximize (I) prior to d/c, anticipate home with spouse and rec HHOT to ensure safe transition home.     Time Calculation:   Evaluation Complexity (OT)  Review Occupational Profile/Medical/Therapy History Complexity: expanded/moderate complexity  Assessment, Occupational Performance/Identification of Deficit Complexity: 3-5 performance deficits  Clinical Decision Making Complexity (OT): detailed assessment/moderate complexity  Overall Complexity of Evaluation (OT): moderate complexity     Time Calculation- OT       Row Name 01/21/25 1139             Time Calculation- OT    OT Start Time 0821  -MM      OT Stop Time 0838  -MM      OT Time Calculation (min) 17 min  -MM      Total Timed Code Minutes- OT 10 minute(s)  -MM      OT Received On 01/21/25  -MM      OT - Next Appointment 01/23/25  -MM      OT Goal Re-Cert Due Date 02/04/25  -MM         Timed Charges    28275 - OT Self Care/Mgmt Minutes 10  -MM         Untimed Charges    OT Eval/Re-eval Minutes 7  -MM         Total Minutes    Timed Charges Total Minutes 10  -MM      Untimed Charges Total Minutes 7  -MM       Total Minutes 17  -MM                User Key  (r) = Recorded By, (t) = Taken By, (c) = Cosigned By      Initials Name Provider Type    MM Shelbi Chisholm OT Occupational Therapist                  Therapy Charges for Today       Code Description Service Date Service Provider Modifiers Qty    22383790980 HC OT SELF CARE/MGMT/TRAIN EA 15 MIN 1/21/2025 Shelbi Chisholm OT GO 1    42665105519 HC OT EVAL MOD COMPLEXITY 2 1/21/2025 Shelbi Chisholm OT GO 1                 Shelbi Chisholm OT  1/21/2025

## 2025-01-21 NOTE — PLAN OF CARE
Goal Outcome Evaluation:  Plan of Care Reviewed With: patient           Outcome Evaluation: Pt demonstrates some increased functional mobility and strength as he required less assistance. However, gait distance continues to be limited by bilateral ankle pain. PT will continue to follow to address strength, mobility, and gait.    Anticipated Discharge Disposition (PT): home with home health, home with outpatient therapy services

## 2025-01-21 NOTE — DISCHARGE PLACEMENT REQUEST
"Kevin Ellington (89 y.o. Male)       Date of Birth   1935    Social Security Number       Address   [address] [place] [address]    Home Phone   [phone]    MRN   [id number]       Muslim   Anglican    Marital Status                               Admission Date   1/20/25    Admission Type   Emergency    Admitting Provider   Justin Arroyo MD    Attending Provider   Valentin De Leon MD    Department, Room/Bed   [address], 96/[address]       Discharge Date       Discharge Disposition       Discharge Destination                                 Attending Provider: Valentin De Leon MD    Allergies: Codeine, Hydrocodone, Sulfa Antibiotics    Isolation: None   Infection: None   Code Status: CPR    Ht: 177.8 cm (70\")   Wt: 90.7 kg (200 lb)    Admission Cmt: None   Principal Problem: Leg edema, right [R60.0]                   Active Insurance as of 1/20/2025       Primary Coverage       Payor Plan Insurance Group Employer/Plan Group    AETNA MEDICARE REPLACEMENT AETNA MEDICARE REPLACEMENT [id number]       Payor Plan Address Payor Plan Phone Number Payor Plan Fax Number Effective Dates    [address] [phone]  1/1/2022 - None Entered    [place] [address]         Subscriber Name Subscriber Birth Date Member ID       KEVIN ELLINGTON 1935 [id number]                     Emergency Contacts        (Rel.) Home Phone Work Phone Mobile Phone    Tiffanie Ellington (Spouse) [phone] -- [phone]                "

## 2025-01-21 NOTE — PROGRESS NOTES
[facility]     LOS: 0 days     Subjective:    CC:Leg Pain    DIET:  Diet Order   Procedures    Diet: Cardiac, Diabetic; Healthy Heart (2-3 Na+); Consistent Carbohydrate; Fluid Consistency: Thin (IDDSI 0)       Objective:    Vital Signs:  Temp:  [97.7 °F (36.5 °C)-99.3 °F (37.4 °C)] 98.9 °F (37.2 °C)  Heart Rate:  [] 107  Resp:  [16] 16  BP: (107-139)/(63-85) 125/77    SpO2:  [95 %-97 %] 97 %  on   ;   Device (Oxygen Therapy): room air  Body mass index is 28.7 kg/m².    Physical Exam  General Awake alert  Heart regular rate rhythm no murmurs rubs gallop  Lungs clear to auscultation  Back with no evidence of infection, area where it appears patient had temporary wires placed    Results Review:    Glucose   Date Value Ref Range Status   01/21/2025 123 (H) 65 - 99 mg/dL Final   01/20/2025 155 (H) 65 - 99 mg/dL Final   01/20/2025 145 (H) 65 - 99 mg/dL Final     Results from last 7 days   Lab Units 01/21/25  0352   WBC 10*3/mm3 10.18   HEMOGLOBIN g/dL 13.1   HEMATOCRIT % 38.5   PLATELETS 10*3/mm3 167     Results from last 7 days   Lab Units 01/21/25  0352 01/20/25  0923   SODIUM mmol/L 138 139   POTASSIUM mmol/L 3.5 3.4*   CHLORIDE mmol/L 101 104   CO2 mmol/L 26.0 24.4   BUN mg/dL 19 22   CREATININE mg/dL 1.05 1.14   CALCIUM mg/dL 8.8 8.5*   BILIRUBIN mg/dL  --  1.1   ALK PHOS U/L  --  82   ALT (SGPT) U/L  --  12   AST (SGOT) U/L  --  15   GLUCOSE mg/dL 123* 155*                 Cultures:  No results found for: \"BLOODCX\", \"URINECX\", \"WOUNDCX\", \"MRSACX\", \"RESPCX\", \"STOOLCX\"    I have reviewed daily medications and changes in CPOE    Scheduled meds  apixaban, 5 mg, Oral, Q12H  atorvastatin, 40 mg, Oral, Daily  cephalexin, 500 mg, Oral, TID  cetirizine, 10 mg, Oral, Daily  chlorthalidone, 50 mg, Oral, Daily  fluticasone, 2 spray, Nasal, Daily  insulin lispro, 2-7 Units, Subcutaneous, 4x Daily AC & at Bedtime  metoprolol succinate XL, 100 mg, Oral, Daily  modafinil, 100 mg, Oral, " Daily  pantoprazole, 40 mg, Oral, Daily  potassium chloride, 20 mEq, Oral, Daily  sodium chloride, 10 mL, Intravenous, Q12H  tamsulosin, 0.8 mg, Oral, Daily           PRN meds    acetaminophen **OR** acetaminophen **OR** acetaminophen    senna-docusate sodium **AND** polyethylene glycol **AND** bisacodyl **AND** bisacodyl    dextrose    dextrose    famotidine    glucagon (human recombinant)    HYDROcodone-acetaminophen    melatonin    ondansetron    sodium chloride    sodium chloride        Leg edema, right    Essential hypertension    Hyperlipemia    Stage 3a chronic kidney disease    Chronic atrial fibrillation    Type 2 diabetes mellitus without complication, without long-term current use of insulin        Assessment/Plan:    Weakness  -Patient had some chills this afternoon  -Check blood culture  -Monitor  -I have a call to Dr. Fitch from local orthopedic  -Consult to Dr. Hodge    Concern for gout versus cellulitis versus right septic joint  -Infectious disease and orthopedic consultation  -For now continue with oral antibiotic    Hypertension-BP stable    Chronic kidney disease-creatinine remained stable    A-fib-heart rate stable, restart Eliquis  -Patient states Eliquis was held for spinal cord stimulator but was told he could restart it, we will restart    Type 2 diabetes- Slight scale insulin      Valentin De Leon MD  01/21/25  18:03 EST

## 2025-01-22 LAB
GLUCOSE BLDC GLUCOMTR-MCNC: 116 MG/DL (ref 70–130)
GLUCOSE BLDC GLUCOMTR-MCNC: 123 MG/DL (ref 70–130)
GLUCOSE BLDC GLUCOMTR-MCNC: 158 MG/DL (ref 70–130)
GLUCOSE BLDC GLUCOMTR-MCNC: 162 MG/DL (ref 70–130)
URATE SERPL-MCNC: 5.9 MG/DL (ref 3.4–7)

## 2025-01-22 PROCEDURE — 82948 REAGENT STRIP/BLOOD GLUCOSE: CPT

## 2025-01-22 PROCEDURE — G0378 HOSPITAL OBSERVATION PER HR: HCPCS

## 2025-01-22 PROCEDURE — 63710000001 INSULIN LISPRO (HUMAN) PER 5 UNITS: Performed by: INTERNAL MEDICINE

## 2025-01-22 RX ADMIN — POTASSIUM CHLORIDE 20 MEQ: 750 TABLET, EXTENDED RELEASE ORAL at 08:07

## 2025-01-22 RX ADMIN — FLUTICASONE PROPIONATE 2 SPRAY: 50 SPRAY, METERED NASAL at 08:08

## 2025-01-22 RX ADMIN — ACETAMINOPHEN 325MG 650 MG: 325 TABLET ORAL at 04:01

## 2025-01-22 RX ADMIN — CEPHALEXIN 500 MG: 500 CAPSULE ORAL at 22:06

## 2025-01-22 RX ADMIN — CEPHALEXIN 500 MG: 500 CAPSULE ORAL at 14:57

## 2025-01-22 RX ADMIN — CETIRIZINE HYDROCHLORIDE 10 MG: 10 TABLET, FILM COATED ORAL at 08:07

## 2025-01-22 RX ADMIN — APIXABAN 5 MG: 5 TABLET, FILM COATED ORAL at 08:08

## 2025-01-22 RX ADMIN — MODAFINIL 100 MG: 100 TABLET ORAL at 08:07

## 2025-01-22 RX ADMIN — Medication 10 ML: at 08:08

## 2025-01-22 RX ADMIN — PANTOPRAZOLE SODIUM 40 MG: 40 TABLET, DELAYED RELEASE ORAL at 08:08

## 2025-01-22 RX ADMIN — Medication 10 ML: at 22:09

## 2025-01-22 RX ADMIN — ACETAMINOPHEN 325MG 650 MG: 325 TABLET ORAL at 11:27

## 2025-01-22 RX ADMIN — INSULIN LISPRO 2 UNITS: 100 INJECTION, SOLUTION INTRAVENOUS; SUBCUTANEOUS at 12:24

## 2025-01-22 RX ADMIN — METOPROLOL SUCCINATE 100 MG: 50 TABLET, EXTENDED RELEASE ORAL at 08:07

## 2025-01-22 RX ADMIN — CHLORTHALIDONE 50 MG: 25 TABLET ORAL at 08:07

## 2025-01-22 RX ADMIN — TAMSULOSIN HYDROCHLORIDE 0.8 MG: 0.4 CAPSULE ORAL at 08:07

## 2025-01-22 RX ADMIN — CEPHALEXIN 500 MG: 500 CAPSULE ORAL at 08:07

## 2025-01-22 RX ADMIN — APIXABAN 5 MG: 5 TABLET, FILM COATED ORAL at 22:07

## 2025-01-22 RX ADMIN — ATORVASTATIN CALCIUM 40 MG: 20 TABLET, FILM COATED ORAL at 08:07

## 2025-01-22 RX ADMIN — INSULIN LISPRO 2 UNITS: 100 INJECTION, SOLUTION INTRAVENOUS; SUBCUTANEOUS at 22:09

## 2025-01-22 RX ADMIN — ACETAMINOPHEN 325MG 650 MG: 325 TABLET ORAL at 22:06

## 2025-01-22 NOTE — PROGRESS NOTES
[facility]     LOS: 0 days     Subjective:    CC:Leg Pain    DIET:  Diet Order   Procedures    Diet: Cardiac, Diabetic; Healthy Heart (2-3 Na+); Consistent Carbohydrate; Fluid Consistency: Thin (IDDSI 0)   Patient states he is having chills, he had an episode yesterday, none overnight    Objective:    Vital Signs:  Temp:  [97.1 °F (36.2 °C)-98.9 °F (37.2 °C)] 97.1 °F (36.2 °C)  Heart Rate:  [] 113  Resp:  [16] 16  BP: (125-139)/(71-85) 126/71    SpO2:  [94 %-97 %] 97 %  on   ;   Device (Oxygen Therapy): room air  Body mass index is 28.7 kg/m².    Physical Exam  General Awake alert  Heart regular rate rhythm no murmurs rubs gallop  Lungs clear to auscultation  Back with no evidence of infection, area where it appears patient had temporary wires placed  Ankle with erythema and minimal edema on the right    Results Review:    Glucose   Date Value Ref Range Status   01/21/2025 123 (H) 65 - 99 mg/dL Final   01/20/2025 155 (H) 65 - 99 mg/dL Final   01/20/2025 145 (H) 65 - 99 mg/dL Final     Results from last 7 days   Lab Units 01/21/25  0352   WBC 10*3/mm3 10.18   HEMOGLOBIN g/dL 13.1   HEMATOCRIT % 38.5   PLATELETS 10*3/mm3 167     Results from last 7 days   Lab Units 01/21/25  0352 01/20/25  0923   SODIUM mmol/L 138 139   POTASSIUM mmol/L 3.5 3.4*   CHLORIDE mmol/L 101 104   CO2 mmol/L 26.0 24.4   BUN mg/dL 19 22   CREATININE mg/dL 1.05 1.14   CALCIUM mg/dL 8.8 8.5*   BILIRUBIN mg/dL  --  1.1   ALK PHOS U/L  --  82   ALT (SGPT) U/L  --  12   AST (SGOT) U/L  --  15   GLUCOSE mg/dL 123* 155*                 Cultures:  No results found for: \"BLOODCX\", \"URINECX\", \"WOUNDCX\", \"MRSACX\", \"RESPCX\", \"STOOLCX\"    I have reviewed daily medications and changes in CPOE    Scheduled meds  apixaban, 5 mg, Oral, Q12H  atorvastatin, 40 mg, Oral, Daily  cephalexin, 500 mg, Oral, TID  cetirizine, 10 mg, Oral, Daily  chlorthalidone, 50 mg, Oral, Daily  fluticasone, 2 spray, Nasal, Daily  insulin lispro, " 2-7 Units, Subcutaneous, 4x Daily AC & at Bedtime  metoprolol succinate XL, 100 mg, Oral, Daily  modafinil, 100 mg, Oral, Daily  pantoprazole, 40 mg, Oral, Daily  potassium chloride, 20 mEq, Oral, Daily  sodium chloride, 10 mL, Intravenous, Q12H  tamsulosin, 0.8 mg, Oral, Daily           PRN meds    acetaminophen **OR** acetaminophen **OR** acetaminophen    senna-docusate sodium **AND** polyethylene glycol **AND** bisacodyl **AND** bisacodyl    dextrose    dextrose    famotidine    glucagon (human recombinant)    HYDROcodone-acetaminophen    melatonin    ondansetron    sodium chloride    sodium chloride        Leg edema, right    Essential hypertension    Hyperlipemia    Stage 3a chronic kidney disease    Chronic atrial fibrillation    Type 2 diabetes mellitus without complication, without long-term current use of insulin        Assessment/Plan:    Weakness  -Patient had some chills this afternoon  -Check blood culture  -Monitor  -I have a call to Dr. Fitch from local orthopedic  -Consult to Dr. Hodge    Concern for gout versus cellulitis versus right septic joint  -Infectious disease and orthopedic consultation  -For now continue with oral antibiotic  -minimal erythema of the ankle    Hypertension-BP stable    Chronic kidney disease-creatinine remained stable    A-fib-heart rate stable, restart Eliquis  -Patient states Eliquis was held for spinal cord stimulator but was told he could restart it, we will restart    Type 2 diabetes- Slight scale insulin      Valentin De Leon MD  01/22/25  10:45 EST

## 2025-01-22 NOTE — PLAN OF CARE
Problem: Adult Inpatient Plan of Care  Goal: Plan of Care Review  Outcome: Progressing     Problem: Adult Inpatient Plan of Care  Goal: Absence of Hospital-Acquired Illness or Injury  Intervention: Identify and Manage Fall Risk  Recent Flowsheet Documentation  Taken 1/22/2025 1800 by Vandana Kerr RN  Safety Promotion/Fall Prevention:   safety round/check completed   fall prevention program maintained  Taken 1/22/2025 1600 by Vandana Kerr RN  Safety Promotion/Fall Prevention:   safety round/check completed   fall prevention program maintained  Taken 1/22/2025 1400 by Vandana Kerr RN  Safety Promotion/Fall Prevention:   safety round/check completed   fall prevention program maintained  Taken 1/22/2025 1200 by Vandana Kerr RN  Safety Promotion/Fall Prevention:   safety round/check completed   fall prevention program maintained  Taken 1/22/2025 1000 by Vandana Kerr RN  Safety Promotion/Fall Prevention:   safety round/check completed   fall prevention program maintained  Taken 1/22/2025 0819 by Vandana Kerr RN  Safety Promotion/Fall Prevention:   safety round/check completed   fall prevention program maintained     Problem: Skin Injury Risk Increased  Goal: Skin Health and Integrity  Intervention: Optimize Skin Protection  Recent Flowsheet Documentation  Taken 1/22/2025 1800 by Vandana Kerr RN  Head of Bed (HOB) Positioning: HOB elevated  Taken 1/22/2025 1600 by Vandana Kerr RN  Head of Bed (HOB) Positioning: HOB elevated  Taken 1/22/2025 1400 by Vandana Kerr RN  Head of Bed (HOB) Positioning: HOB elevated  Taken 1/22/2025 1200 by Vandana Kerr RN  Head of Bed (HOB) Positioning: HOB elevated  Taken 1/22/2025 1000 by Vandana Kerr RN  Head of Bed (HOB) Positioning: HOB elevated  Taken 1/22/2025 0819 by Vandana Kerr RN  Head of Bed (HOB) Positioning: HOB elevated     Problem: Fall Injury Risk  Goal: Absence of Fall and Fall-Related Injury  Intervention: Promote Injury-Free  Environment  Recent Flowsheet Documentation  Taken 1/22/2025 1800 by Vandana Kerr, RN  Safety Promotion/Fall Prevention:   safety round/check completed   fall prevention program maintained  Taken 1/22/2025 1600 by Vandana Kerr, RN  Safety Promotion/Fall Prevention:   safety round/check completed   fall prevention program maintained  Taken 1/22/2025 1400 by Vandana Kerr, RN  Safety Promotion/Fall Prevention:   safety round/check completed   fall prevention program maintained  Taken 1/22/2025 1200 by Vandana Kerr, RN  Safety Promotion/Fall Prevention:   safety round/check completed   fall prevention program maintained  Taken 1/22/2025 1000 by Vandana Kerr, RN  Safety Promotion/Fall Prevention:   safety round/check completed   fall prevention program maintained  Taken 1/22/2025 0819 by Vandana Kerr, RN  Safety Promotion/Fall Prevention:   safety round/check completed   fall prevention program maintained   Goal Outcome Evaluation:

## 2025-01-22 NOTE — PROGRESS NOTES
"  Infectious Diseases Progress Note    Simi Candelario MD     [facility]  [place]: 0 days  Patient Identification:  Name: Kevin Ellington  Age: 89 y.o.  Sex: male  :  1935  MRN: [id number]         Primary Care Physician: Leanna Ryder MD        Subjective: Denies any fever and chills.  Complaining of worse pain today in his ankle compared to yesterday.  Interval History: See consultation note.    Objective:    Scheduled Meds:apixaban, 5 mg, Oral, Q12H  atorvastatin, 40 mg, Oral, Daily  cephalexin, 500 mg, Oral, TID  cetirizine, 10 mg, Oral, Daily  chlorthalidone, 50 mg, Oral, Daily  fluticasone, 2 spray, Nasal, Daily  insulin lispro, 2-7 Units, Subcutaneous, 4x Daily AC & at Bedtime  metoprolol succinate XL, 100 mg, Oral, Daily  modafinil, 100 mg, Oral, Daily  pantoprazole, 40 mg, Oral, Daily  potassium chloride, 20 mEq, Oral, Daily  sodium chloride, 10 mL, Intravenous, Q12H  tamsulosin, 0.8 mg, Oral, Daily      Continuous Infusions:     Vital signs in last 24 hours:  Temp:  [97.1 °F (36.2 °C)-98.9 °F (37.2 °C)] 97.2 °F (36.2 °C)  Heart Rate:  [] 96  Resp:  [16-18] 18  BP: (125-139)/(71-85) 134/76    Intake/Output:    Intake/Output Summary (Last 24 hours) at 2025 1201  Last data filed at 2025 0700  Gross per 24 hour   Intake 360 ml   Output 800 ml   Net -440 ml       Exam:  /76 (BP Location: Right arm, Patient Position: Lying)   Pulse 96   Temp 97.2 °F (36.2 °C) (Oral)   Resp 18   Ht 177.8 cm (70\")   Wt 90.7 kg (200 lb)   SpO2 93%   BMI 28.70 kg/m²   Patient is examined using the personal protective equipment as per guidelines from infection control for this particular patient as enacted.  Hand washing was performed before and after patient interaction.  General Appearance:    Alert, cooperative, no distress, AAOx3                          Head:    Normocephalic, without obvious abnormality, atraumatic                           Eyes:    PERRL, conjunctivae/corneas " clear, EOM's intact, both eyes                         Throat:   Lips, tongue, gums normal; oral mucosa pink and moist                           Neck:   Supple, symmetrical, trachea midline, no JVD                         Lungs:    Clear to auscultation bilaterally, respirations unlabored                 Chest Wall:    No tenderness or deformity                          Heart:  S1-S2 regular                  Abdomen:   Soft nontender                 Extremities: Right ankle appears the same size compared to 24 hours ago but passive movement of the right ankle is more tender and uncomfortable for him today.  Erythema is more trending towards evolving bruise at the lateral and medial malleolus.  No significant warmth noted.                        Pulses:   Pulses palpable in all extremities                    Neurologic: Awake interactive grossly nonfocal examination       Data Review:    I reviewed the patient's new clinical results.  Results from last 7 days   Lab Units 01/21/25  0352 01/20/25  0923 01/20/25  0211   WBC 10*3/mm3 10.18 9.53 11.21*   HEMOGLOBIN g/dL 13.1 13.8 14.1   PLATELETS 10*3/mm3 167 170 164     Results from last 7 days   Lab Units 01/21/25  0352 01/20/25  0923 01/20/25  0211   SODIUM mmol/L 138 139 138   POTASSIUM mmol/L 3.5 3.4* 3.9   CHLORIDE mmol/L 101 104 106   CO2 mmol/L 26.0 24.4 20.8*   BUN mg/dL 19 22 22   CREATININE mg/dL 1.05 1.14 1.07   CALCIUM mg/dL 8.8 8.5* 8.6   GLUCOSE mg/dL 123* 155* 145*     Microbiology Results (last 10 days)       Procedure Component Value - Date/Time    Blood Culture - Blood, Arm, Left [[id number]]  (Normal) Collected: 01/21/25 1549    Lab Status: Preliminary result Specimen: Blood from Arm, Left Updated: 01/22/25 1615     Blood Culture No growth at 24 hours    Blood Culture - Blood, Arm, Right [[id number]]  (Normal) Collected: 01/21/25 1542    Lab Status: Preliminary result Specimen: Blood from Arm, Right Updated: 01/22/25 1615     Blood Culture No growth at  24 hours    Narrative:      Less than seven (7) mL's of blood was collected.  Insufficient quantity may yield false negative results.              Assessment:    Leg edema, right    Essential hypertension    Hyperlipemia    Stage 3a chronic kidney disease    Chronic atrial fibrillation    Type 2 diabetes mellitus without complication, without long-term current use of insulin  1-superficial cellulitis of the right ankle in the setting of chronic deformity and swelling of the leg without any systemic signs and symptoms of sepsis and mild leukocytosis improved on oral Keflex  2-chronic arthritis and fracture of the right ankle with elevated inflammatory markers unclear if it is due to chronic septic arthritis or osteomyelitis or elevated inflammatory marker is reflection of acute cellulitis.  3-possibility of gout or pseudogout is a concern but no significant discomfort on passive movement without any specific treatment of gout or pseudogout argues against it.  4-possibility of acute exacerbation of chronic osteoarthritis due to deformity and fracture.  5-other diagnoses include  Type 2 diabetes,   chronic kidney disease,   chronic atrial fibrillation,   hypertension.  Recommendations/Discussions:  See my discussion and recommendations on 1/21/2025.  Discussed with Dr. De Leon about MRI of right ankle to rule out osteomyelitis as review system and physical examination are unreliable in this patient with some memory issues.  Continue with Keflex for now.  Simi Candelario MD  1/22/2025  12:01 EST    Parts of this note may be an electronic transcription/translation of spoken language to printed text using the Dragon dictation system.

## 2025-01-22 NOTE — SIGNIFICANT NOTE
Family concerned about new area of redness on R ankle, would rather wait until more testing, did already amb to [facility] x2 per pt , RLE elevated, will F/U 1/23

## 2025-01-22 NOTE — CONSULTS
CONSULT NOTE    Infectious Diseases - Simi Milan MD  [facility]       Patient Identification:  Name: Kevin Ellington  Age: 89 y.o.  Sex: male  :  1935  MRN: [id number]             Date of Consultation: 2025.      Primary Care Physician: Leanna Ryder MD                               Requesting Physician: Dr. De Leon  Reason for Consultation: Cellulitis versus gout of the right ankle    History of presenting illness: Patient is a 89-year-old male who is not the best of the historian has past medical history remarkable for hypertension, chronic kidney disease, atrial fibrillation, coronary artery disease and spinal stenosis as well as chronic deformity of his both ankles due to arthritis and previous fractures and has had follow-up with the orthopedic surgery service presented to the emergency room on 2025 with acute pain and discomfort in the right leg.  This is associated with significant change in the appearance of the ankle with redness.  X-rays of the ankle performed and patient was noted to have degenerative changes and evidence of chronic fracture of the medial malleolus with soft tissue swelling.  Patient was started on doxycycline and pain medication.  Patient was tested for DVT which was negative.  Patient will be started on Keflex.  Patient denies any fever and chills.  As mentioned he is not the best of the historian and on examination is noted to have mild warmth of the right ankle with localized erythema inferior to his medial and lateral malleolus with passive range of motion of the ankle joint is not associated with significant discomfort.  Gait not tested.  Patient denies systemic fever and chills.  Impression:  This presentation in the above context is concerning for:  1-superficial cellulitis of the right ankle in the setting of chronic deformity and swelling of the leg without any systemic signs and symptoms of sepsis and mild leukocytosis improved on oral  Keflex  2-chronic arthritis and fracture of the right ankle with elevated inflammatory markers unclear if it is due to chronic septic arthritis or osteomyelitis or elevated inflammatory marker is reflection of acute cellulitis.  3-possibility of gout or pseudogout is a concern but no significant discomfort on passive movement without any specific treatment of gout or pseudogout argues against it.  4-possibility of acute exacerbation of chronic osteoarthritis due to deformity and fracture.  5-other diagnoses include  Type 2 diabetes,   chronic kidney disease,   chronic atrial fibrillation,   hypertension.  Recommendations/Discussions:  At this juncture I agree with the care plan consisting of elevation of right lower extremity, empiric Keflex which seems to have shown improvement in terms of resolution of leukocytosis and decrease in erythema and no significant change in range of motion and discomfort related to passive movement of the right ankle and noted to be preserved at the time of admission.  The best course of action would be to get an MRI of the right ankle while patient is receiving treatment with oral Keflex for superficial cellulitis.  If there is no concern for deep infection or osteomyelitis and if acute pain could very well be due to superficial cellulitis or exacerbation of chronic osteoarthritis which would require symptomatic management.  His lack of signs and symptoms of systemic sepsis argues against acute deep infectious process.  Will discuss with Dr. De Leon.  Thank you Dr. De Leon for letting me be the part of your patient care please see above impression and recommendations          Past Medical History:  Past Medical History:   Diagnosis Date    Abnormal ECG 6-    Taking Eliquis    Allergic 1970    Arrhythmia 6-    Taking Eliquis    Arthritis     Asthma 05/23/2019    shortness of breath-exertion    Benign prostatic hyperplasia     Bilateral carotid artery stenosis 05/12/2022     Cataract     Cervical radiculopathy     Chronic atrial fibrillation 06/15/2023    Colon polyp     Coronary artery disease 2001    Stent    COVID-19 06/12/2023    CTS (carpal tunnel syndrome) 1998    Surgery both hands in 1999    Erectile dysfunction     GERD (gastroesophageal reflux disease)     HTN (hypertension)     Hyperlipidemia     Kidney stone     Low back pain     2 Prior surgeries    Lumbosacral disc disease     Melanoma 01/15/2009    DR WILLY SIMMS ( Left forearm)    Obesity     Radiculitis, thoracic     Scoliosis     Spondylosis of lumbar region without myelopathy or radiculopathy 09/09/2020    Stage 3a chronic kidney disease 10/21/2021    Stented coronary artery     Thoracic disc disorder     Surgery by Dr. Aldridge (sp)    TIA (transient ischemic attack) 05/12/2022    Type 2 diabetes mellitus without complication, without long-term current use of insulin 10/03/2023     Past Surgical History:  Past Surgical History:   Procedure Laterality Date    ABLATION OF DYSRHYTHMIC FOCUS  nov. 2020    radio frequency /lower back    ANKLE FUSION Left 2007    reconstruction and fusion    BACK SURGERY      HERNIATED LUMBAR DISK 1975,2000,2012    CARDIAC CATHETERIZATION  2001    CARPAL TUNNEL RELEASE      CATARACT EXTRACTION, BILATERAL Bilateral 2002    COLONOSCOPY  2015    CORONARY ANGIOPLASTY WITH STENT PLACEMENT  2001    CORONARY STENT PLACEMENT      ENDOSCOPY N/A 12/19/2022    Procedure: ESOPHAGOGASTRODUODENOSCOPY WITH BIOPSY;  Surgeon: Rashard Mathews MD;  Location: [facility];  Service: Gastroenterology;  Laterality: N/A;  gastric polyps, hiatal hernia    EPIDURAL BLOCK  Several at [facility]    and [facility]    EYE SURGERY      JOINT REPLACEMENT      REPLACEMENT TOTAL KNEE Left 2015    REPLACEMENT TOTAL KNEE Right     SKIN CANCER EXCISION      MELANOMA    SPINE SURGERY      THORACIC SPINE SURGERY      UPPER GASTROINTESTINAL ENDOSCOPY        Home Meds:  Medications Prior to  Admission   Medication Sig Dispense Refill Last Dose/Taking    apixaban (ELIQUIS) 5 MG tablet tablet Take 1 tablet by mouth Every 12 (Twelve) Hours. Indications: Atrial Fibrillation 180 tablet 3     atorvastatin (LIPITOR) 40 MG tablet TAKE 1 TABLET DAILY 90 tablet 3     Blood Glucose Monitoring Suppl (True Metrix Air Glucose Meter) w/Device kit Use 1 each Daily. 1 kit 0     cetirizine (zyrTEC) 10 MG tablet Take 1 tablet by mouth Daily.       chlorthalidone (HYGROTEN) 50 MG tablet TAKE 1 TABLET DAILY 90 tablet 3     econazole nitrate (SPECTAZOLE) 1 % cream Apply  topically to the appropriate area as directed As Needed.       fluticasone (FLONASE) 50 MCG/ACT nasal spray 2 sprays into the nostril(s) as directed by provider Daily. 48 g 3     glucose blood (FREESTYLE LITE) test strip Test daily 100 each 12     Lancets (freestyle) lancets Check one daily 100 each 12     losartan (COZAAR) 50 MG tablet Take 1 tablet by mouth Daily. 90 tablet 1     metoprolol succinate XL (TOPROL-XL) 100 MG 24 hr tablet TAKE 1 TABLET DAILY 90 tablet 3     metroNIDAZOLE (FLAGYL) 0.75 % lotion lotion Daily.       modafinil (PROVIGIL) 100 MG tablet Take 1 tablet by mouth Daily. 30 tablet 2     Multiple Vitamins-Minerals (MULTIVITAMIN PO) Take 1 tablet by mouth Daily.       pantoprazole (PROTONIX) 40 MG EC tablet Take 1 tablet by mouth Daily. 90 tablet 3     potassium chloride (KLOR-CON M10) 10 MEQ CR tablet TAKE 2 TABLETS DAILY 180 tablet 3     sennosides-docusate (Senna S) 8.6-50 MG per tablet Take 1 tablet by mouth 2 (Two) Times a Day As Needed for Constipation. 180 tablet 2     tamsulosin (FLOMAX) 0.4 MG capsule 24 hr capsule TAKE 2 CAPSULES DAILY 180 capsule 3      Current Meds:     Current Facility-Administered Medications:     acetaminophen (TYLENOL) tablet 650 mg, 650 mg, Oral, Q4H PRN, 650 mg at 01/21/25 0917 **OR** acetaminophen (TYLENOL) 160 MG/5ML oral solution 650 mg, 650 mg, Oral, Q4H PRN **OR** acetaminophen (TYLENOL) suppository  650 mg, 650 mg, Rectal, Q4H PRN, Justin Arroyo MD    apixaban (ELIQUIS) tablet 5 mg, 5 mg, Oral, Q12H, Valentin De Leon MD    atorvastatin (LIPITOR) tablet 40 mg, 40 mg, Oral, Daily, Justin Arroyo MD, 40 mg at 01/21/25 0815    sennosides-docusate (PERICOLACE) 8.6-50 MG per tablet 2 tablet, 2 tablet, Oral, BID PRN **AND** polyethylene glycol (MIRALAX) packet 17 g, 17 g, Oral, Daily PRN **AND** bisacodyl (DULCOLAX) EC tablet 5 mg, 5 mg, Oral, Daily PRN **AND** bisacodyl (DULCOLAX) suppository 10 mg, 10 mg, Rectal, Daily PRN, Justin Arroyo MD    cephalexin (KEFLEX) capsule 500 mg, 500 mg, Oral, TID, Justin Arroyo MD, 500 mg at 01/21/25 1439    cetirizine (zyrTEC) tablet 10 mg, 10 mg, Oral, Daily, Justin Arroyo MD, 10 mg at 01/21/25 0816    chlorthalidone (HYGROTON) tablet 50 mg, 50 mg, Oral, Daily, Justin Arroyo MD, 50 mg at 01/21/25 0816    dextrose (D50W) (25 g/50 mL) IV injection 25 g, 25 g, Intravenous, Q15 Min PRN, Justin Arroyo MD    dextrose (GLUTOSE) oral gel 15 g, 15 g, Oral, Q15 Min PRN, Justin Arroyo MD    famotidine (PEPCID) tablet 20 mg, 20 mg, Oral, BID PRN, Justin Arroyo MD    fluticasone (FLONASE) 50 MCG/ACT nasal spray 2 spray, 2 spray, Nasal, Daily, Justin Arroyo MD, 2 spray at 01/21/25 0816    glucagon (GLUCAGEN) injection 1 mg, 1 mg, Intramuscular, Q15 Min PRN, Justin Arroyo MD    HYDROcodone-acetaminophen (NORCO) 5-325 MG per tablet 1 tablet, 1 tablet, Oral, Q6H PRN, Justin Arroyo MD    insulin lispro (HUMALOG/ADMELOG) injection 2-7 Units, 2-7 Units, Subcutaneous, 4x Daily AC & at Bedtime, Justin Arroyo MD, 2 Units at 01/21/25 1455    melatonin tablet 5 mg, 5 mg, Oral, Nightly PRN, Justin Arroyo MD    metoprolol succinate XL (TOPROL-XL) 24 hr tablet 100 mg, 100 mg, Oral, Daily, Justin Arroyo MD, 100 mg at 01/21/25 0815    modafinil (PROVIGIL) tablet 100 mg, 100 mg, Oral, Daily, Justin Arroyo  "MD Clark, 100 mg at 25 0816    ondansetron (ZOFRAN) injection 4 mg, 4 mg, Intravenous, Q6H PRN, Justin Arroyo MD    pantoprazole (PROTONIX) EC tablet 40 mg, 40 mg, Oral, Daily, Justin Arroyo MD, 40 mg at 25 0816    potassium chloride (K-DUR,KLOR-CON) ER tablet 20 mEq, 20 mEq, Oral, Daily, Justin Arroyo MD, 20 mEq at 25 0816    sodium chloride 0.9 % flush 10 mL, 10 mL, Intravenous, Q12H, Justin Arroyo MD, 10 mL at 25 0816    sodium chloride 0.9 % flush 10 mL, 10 mL, Intravenous, PRN, Justin Arroyo MD    sodium chloride 0.9 % infusion 40 mL, 40 mL, Intravenous, PRN, Justin Arroyo MD    tamsulosin (FLOMAX) 24 hr capsule 0.8 mg, 0.8 mg, Oral, Daily, Justin Arroyo MD, 0.8 mg at 25 0816  Allergies:  Allergies   Allergen Reactions    Codeine GI Intolerance    Hydrocodone GI Intolerance     SEVERE CONSTIPATION    Sulfa Antibiotics Unknown (See Comments)     Unable to remember     Social History:   Social History     Tobacco Use    Smoking status: Never    Smokeless tobacco: Never    Tobacco comments:     caffeine use   Substance Use Topics    Alcohol use: No      Family History:  Family History   Problem Relation Age of Onset    Hypertension Mother     Arthritis Mother             Skin cancer Mother     Heart disease Father     Early death Father         heart disease    Hypertension Father     Aortic aneurysm Father 58    Cancer Neg Hx     Colon cancer Neg Hx     Colon polyps Neg Hx     Crohn's disease Neg Hx     Irritable bowel syndrome Neg Hx     Ulcerative colitis Neg Hx           Review of Systems  See history of present illness and past medical history.        Vitals:   /77 (BP Location: Right arm, Patient Position: Lying)   Pulse 107   Temp 98.9 °F (37.2 °C) (Oral)   Resp 16   Ht 177.8 cm (70\")   Wt 90.7 kg (200 lb)   SpO2 97%   BMI 28.70 kg/m²   I/O:   Intake/Output Summary (Last 24 hours) at 2025 192  Last " data filed at 1/21/2025 1337  Gross per 24 hour   Intake 480 ml   Output 1130 ml   Net -650 ml     Exam:  Patient is examined using the personal protective equipment as per guidelines from infection control for this particular patient as enacted.  Hand washing was performed before and after patient interaction.  General Appearance:    Alert, cooperative, no distress, appears stated age   Head:    Normocephalic, without obvious abnormality, atraumatic   Eyes:    PERRL, conjunctivae/corneas clear, EOM's intact, both eyes   Ears:    Normal external ear canals, both ears   Nose:   Nares normal, septum midline, mucosa normal, no drainage    or sinus tenderness   Throat:   Lips, tongue, gums normal; oral mucosa pink and moist   Neck: Supple and no adenopathy   Back:     Symmetric, no curvature, ROM normal, no CVA tenderness   Lungs:     Clear to auscultation bilaterally, respirations unlabored   Chest Wall:    No tenderness or deformity    Heart:  S1-S2 irregular   Abdomen:   Soft nontender   Extremities: Bilateral ankle deformity noted mild erythema and warmth of the lateral and medial aspect of the right ankle below the malleolus with passive range of motion of the ankle in terms of dorsiflexion plantarflexion and eversion inversion intact without eliciting any significant discomfort.  No significant warmth of the joint noted.   Pulses:   Pulses palpable in all extremities; symmetric all extremities   Skin: Erythematous changes over right ankle as detailed above.   Neurologic: Grossly nonfocal but forgetful.       Data Review:    I reviewed the patient's new clinical results.  Results from last 7 days   Lab Units 01/21/25  0352 01/20/25  0923 01/20/25  0211   WBC 10*3/mm3 10.18 9.53 11.21*   HEMOGLOBIN g/dL 13.1 13.8 14.1   PLATELETS 10*3/mm3 167 170 164     Results from last 7 days   Lab Units 01/21/25  0352 01/20/25  0923 01/20/25  0211   SODIUM mmol/L 138 139 138   POTASSIUM mmol/L 3.5 3.4* 3.9   CHLORIDE mmol/L 101  104 106   CO2 mmol/L 26.0 24.4 20.8*   BUN mg/dL 19 22 22   CREATININE mg/dL 1.05 1.14 1.07   CALCIUM mg/dL 8.8 8.5* 8.6   GLUCOSE mg/dL 123* 155* 145*     Microbiology Results (last 10 days)       ** No results found for the last 240 hours. **          XR Ankle 3+ View Right    Result Date: 1/20/2025  This examination is very difficult to interpret in the absence of any prior studies for comparison, and given the presence of advanced degenerative change. Not convinced that can see an acute fracture on the current study. Ossific fragments seen surrounding the ankle bilaterally appear to be well-corticated, and are favored to be chronic.  This report was finalized on 1/20/2025 2:38 AM by Dr. Talia Orlando M.D on Workstation: [id number]         Assessment:  Active Hospital Problems    Diagnosis  POA    **Leg edema, right [R60.0]  Yes    Type 2 diabetes mellitus without complication, without long-term current use of insulin [E11.9]  Yes    Chronic atrial fibrillation [I48.20]  Yes    Stage 3a chronic kidney disease [N18.31]  Yes    Essential hypertension [I10]  Yes    Hyperlipemia [E78.5]  Yes      Resolved Hospital Problems   No resolved problems to display.         Plan:  See above  Simi Candelario MD   1/21/2025  19:26 EST    Parts of this note may be an electronic transcription/translation of spoken language to printed text using the Dragon dictation system.

## 2025-01-22 NOTE — PLAN OF CARE
Goal Outcome Evaluation:   VSS. A&Ox4. PRN Tylenol given x2 for pain. Pt uses urinal. ID MD came to see pt last evening. Pt up to bathroom this morning with assist x1 and walker. Care continuing.

## 2025-01-23 ENCOUNTER — APPOINTMENT (OUTPATIENT)
Dept: MRI IMAGING | Facility: HOSPITAL | Age: OVER 89
End: 2025-01-23
Payer: MEDICARE

## 2025-01-23 LAB
ALBUMIN SERPL-MCNC: 3.1 G/DL (ref 3.5–5.2)
ALBUMIN/GLOB SERPL: 0.8 G/DL
ALP SERPL-CCNC: 107 U/L (ref 39–117)
ALT SERPL W P-5'-P-CCNC: 21 U/L (ref 1–41)
ANION GAP SERPL CALCULATED.3IONS-SCNC: 15.6 MMOL/L (ref 5–15)
AST SERPL-CCNC: 21 U/L (ref 1–40)
BASOPHILS # BLD AUTO: 0.03 10*3/MM3 (ref 0–0.2)
BASOPHILS NFR BLD AUTO: 0.2 % (ref 0–1.5)
BILIRUB SERPL-MCNC: 1.2 MG/DL (ref 0–1.2)
BUN SERPL-MCNC: 22 MG/DL (ref 8–23)
BUN/CREAT SERPL: 18.8 (ref 7–25)
CALCIUM SPEC-SCNC: 9.1 MG/DL (ref 8.6–10.5)
CHLORIDE SERPL-SCNC: 99 MMOL/L (ref 98–107)
CO2 SERPL-SCNC: 22.4 MMOL/L (ref 22–29)
CREAT SERPL-MCNC: 1.17 MG/DL (ref 0.76–1.27)
CRP SERPL-MCNC: 20.84 MG/DL (ref 0–0.5)
DEPRECATED RDW RBC AUTO: 39.1 FL (ref 37–54)
EGFRCR SERPLBLD CKD-EPI 2021: 59.6 ML/MIN/1.73
EOSINOPHIL # BLD AUTO: 0.07 10*3/MM3 (ref 0–0.4)
EOSINOPHIL NFR BLD AUTO: 0.6 % (ref 0.3–6.2)
ERYTHROCYTE [DISTWIDTH] IN BLOOD BY AUTOMATED COUNT: 11.9 % (ref 12.3–15.4)
GLOBULIN UR ELPH-MCNC: 3.7 GM/DL
GLUCOSE BLDC GLUCOMTR-MCNC: 115 MG/DL (ref 70–130)
GLUCOSE BLDC GLUCOMTR-MCNC: 141 MG/DL (ref 70–130)
GLUCOSE BLDC GLUCOMTR-MCNC: 148 MG/DL (ref 70–130)
GLUCOSE BLDC GLUCOMTR-MCNC: 157 MG/DL (ref 70–130)
GLUCOSE SERPL-MCNC: 121 MG/DL (ref 65–99)
HCT VFR BLD AUTO: 43.7 % (ref 37.5–51)
HGB BLD-MCNC: 15.1 G/DL (ref 13–17.7)
IMM GRANULOCYTES # BLD AUTO: 0.06 10*3/MM3 (ref 0–0.05)
IMM GRANULOCYTES NFR BLD AUTO: 0.5 % (ref 0–0.5)
LYMPHOCYTES # BLD AUTO: 2.62 10*3/MM3 (ref 0.7–3.1)
LYMPHOCYTES NFR BLD AUTO: 20.6 % (ref 19.6–45.3)
MCH RBC QN AUTO: 31.1 PG (ref 26.6–33)
MCHC RBC AUTO-ENTMCNC: 34.6 G/DL (ref 31.5–35.7)
MCV RBC AUTO: 90.1 FL (ref 79–97)
MONOCYTES # BLD AUTO: 1.23 10*3/MM3 (ref 0.1–0.9)
MONOCYTES NFR BLD AUTO: 9.7 % (ref 5–12)
NEUTROPHILS NFR BLD AUTO: 68.4 % (ref 42.7–76)
NEUTROPHILS NFR BLD AUTO: 8.71 10*3/MM3 (ref 1.7–7)
NRBC BLD AUTO-RTO: 0 /100 WBC (ref 0–0.2)
PLATELET # BLD AUTO: 228 10*3/MM3 (ref 140–450)
PMV BLD AUTO: 9.2 FL (ref 6–12)
POTASSIUM SERPL-SCNC: 3.6 MMOL/L (ref 3.5–5.2)
PROCALCITONIN SERPL-MCNC: 0.14 NG/ML (ref 0–0.25)
PROT SERPL-MCNC: 6.8 G/DL (ref 6–8.5)
RBC # BLD AUTO: 4.85 10*6/MM3 (ref 4.14–5.8)
SODIUM SERPL-SCNC: 137 MMOL/L (ref 136–145)
WBC NRBC COR # BLD AUTO: 12.72 10*3/MM3 (ref 3.4–10.8)

## 2025-01-23 PROCEDURE — 85025 COMPLETE CBC W/AUTO DIFF WBC: CPT | Performed by: HOSPITALIST

## 2025-01-23 PROCEDURE — 73723 MRI JOINT LWR EXTR W/O&W/DYE: CPT

## 2025-01-23 PROCEDURE — 82948 REAGENT STRIP/BLOOD GLUCOSE: CPT

## 2025-01-23 PROCEDURE — 80053 COMPREHEN METABOLIC PANEL: CPT | Performed by: HOSPITALIST

## 2025-01-23 PROCEDURE — G0378 HOSPITAL OBSERVATION PER HR: HCPCS

## 2025-01-23 PROCEDURE — A9577 INJ MULTIHANCE: HCPCS | Performed by: HOSPITALIST

## 2025-01-23 PROCEDURE — 86140 C-REACTIVE PROTEIN: CPT | Performed by: HOSPITALIST

## 2025-01-23 PROCEDURE — 63710000001 INSULIN LISPRO (HUMAN) PER 5 UNITS: Performed by: INTERNAL MEDICINE

## 2025-01-23 PROCEDURE — 25510000002 GADOBENATE DIMEGLUMINE 529 MG/ML SOLUTION: Performed by: HOSPITALIST

## 2025-01-23 PROCEDURE — 97530 THERAPEUTIC ACTIVITIES: CPT

## 2025-01-23 PROCEDURE — 84145 PROCALCITONIN (PCT): CPT | Performed by: HOSPITALIST

## 2025-01-23 PROCEDURE — 97535 SELF CARE MNGMENT TRAINING: CPT

## 2025-01-23 RX ADMIN — CEPHALEXIN 500 MG: 500 CAPSULE ORAL at 08:17

## 2025-01-23 RX ADMIN — CHLORTHALIDONE 50 MG: 25 TABLET ORAL at 08:16

## 2025-01-23 RX ADMIN — MODAFINIL 100 MG: 100 TABLET ORAL at 08:16

## 2025-01-23 RX ADMIN — ACETAMINOPHEN 325MG 650 MG: 325 TABLET ORAL at 21:32

## 2025-01-23 RX ADMIN — CEPHALEXIN 500 MG: 500 CAPSULE ORAL at 17:13

## 2025-01-23 RX ADMIN — ACETAMINOPHEN 325MG 650 MG: 325 TABLET ORAL at 11:19

## 2025-01-23 RX ADMIN — INSULIN LISPRO 2 UNITS: 100 INJECTION, SOLUTION INTRAVENOUS; SUBCUTANEOUS at 21:36

## 2025-01-23 RX ADMIN — GADOBENATE DIMEGLUMINE 18 ML: 529 INJECTION, SOLUTION INTRAVENOUS at 04:50

## 2025-01-23 RX ADMIN — CETIRIZINE HYDROCHLORIDE 10 MG: 10 TABLET, FILM COATED ORAL at 08:16

## 2025-01-23 RX ADMIN — APIXABAN 5 MG: 5 TABLET, FILM COATED ORAL at 08:16

## 2025-01-23 RX ADMIN — ATORVASTATIN CALCIUM 40 MG: 20 TABLET, FILM COATED ORAL at 08:16

## 2025-01-23 RX ADMIN — ACETAMINOPHEN 325MG 650 MG: 325 TABLET ORAL at 17:15

## 2025-01-23 RX ADMIN — POTASSIUM CHLORIDE 20 MEQ: 750 TABLET, EXTENDED RELEASE ORAL at 08:16

## 2025-01-23 RX ADMIN — TAMSULOSIN HYDROCHLORIDE 0.8 MG: 0.4 CAPSULE ORAL at 08:17

## 2025-01-23 RX ADMIN — FLUTICASONE PROPIONATE 2 SPRAY: 50 SPRAY, METERED NASAL at 08:18

## 2025-01-23 RX ADMIN — METOPROLOL SUCCINATE 100 MG: 50 TABLET, EXTENDED RELEASE ORAL at 08:16

## 2025-01-23 RX ADMIN — APIXABAN 5 MG: 5 TABLET, FILM COATED ORAL at 21:34

## 2025-01-23 RX ADMIN — Medication 10 ML: at 08:20

## 2025-01-23 RX ADMIN — Medication 10 ML: at 21:36

## 2025-01-23 RX ADMIN — CEPHALEXIN 500 MG: 500 CAPSULE ORAL at 21:34

## 2025-01-23 RX ADMIN — PANTOPRAZOLE SODIUM 40 MG: 40 TABLET, DELAYED RELEASE ORAL at 08:17

## 2025-01-23 NOTE — PLAN OF CARE
Goal Outcome Evaluation:              Outcome Evaluation: VSS. A&Ox4. ACHS. PRN Tylenol given x2 for pain. Pt up to bathroom with assist x1 and walker. Continue with plan of care.

## 2025-01-23 NOTE — PLAN OF CARE
Goal Outcome Evaluation:  Plan of Care Reviewed With: patient           Outcome Evaluation: Pt demonstrates increased activity tolerance and functional strength as he was able to increase his gait distance and required less assistance.  Patient reports he is now having bilateral ankle pain with the left hurting more than the right but it was tolerable with weightbearing.  PT will continue to follow to address strength, mobility, and gait.    Anticipated Discharge Disposition (PT): home with home health, home with outpatient therapy services

## 2025-01-23 NOTE — PLAN OF CARE
Goal Outcome Evaluation:  Plan of Care Reviewed With: patient, spouse           Outcome Evaluation: Pt seen this afternoon for OT treatment. Performing bed mobility w/ SBA. Able to perform STS and functional mobility w/ CGA + rollator, mild fatigue but overall performance was not hindered, Needing Min A for LBD this date. Pt reports feeling much better than he did upon admission, strength/endurance still minimally below baseline. OT will continue to follow to address functional deficits.    Anticipated Discharge Disposition (OT): home with home health, home with assist

## 2025-01-23 NOTE — THERAPY TREATMENT NOTE
Patient Name: Kevin Ellington  : 1935    MRN: [id number]                              Today's Date: 2025       Admit Date: 2025    Visit Dx:     ICD-10-CM ICD-9-CM   1. Right leg swelling  M79.89 729.81   2. 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    Benign prostatic hyperplasia with urinary frequency    Cervical radiculopathy    Bilateral carpal tunnel syndrome    Cervical spinal stenosis    Arthritis of ankle    Chronic pain of right ankle    Shortness of breath    Bilateral leg weakness    History of melanoma    Spondylosis of lumbar region without myelopathy or radiculopathy    Stage 3a chronic kidney disease    Pedal edema    Gastroesophageal reflux disease without esophagitis    TIA (transient ischemic attack)    Bilateral carotid artery stenosis    Cerebral artery occlusion    Hoarseness    Hiatal hernia    Abdominal fullness    Fever and chills    COVID-19    Chronic atrial fibrillation    Cytokine release syndrome, grade 2    Type 2 diabetes mellitus without complication, without long-term current use of insulin    Chronic instability of ankle    Lumbar post-laminectomy syndrome    Primary localized osteoarthrosis of ankle and foot    Leg edema, right     Past Medical History:   Diagnosis Date    Abnormal ECG 2023    Taking Eliquis    Allergic 1970    Arrhythmia 2023    Taking Eliquis    Arthritis     Asthma 2019    shortness  of breath-exertion    Benign prostatic hyperplasia     Bilateral carotid artery stenosis 05/12/2022    Cataract     Cervical radiculopathy     Chronic atrial fibrillation 06/15/2023    Colon polyp     Coronary artery disease 2001    Stent    COVID-19 06/12/2023    CTS (carpal tunnel syndrome) 1998    Surgery both hands in 1999    Erectile dysfunction     GERD (gastroesophageal reflux disease)     HTN (hypertension)     Hyperlipidemia     Kidney stone     Low back pain     2 Prior surgeries    Lumbosacral disc disease     Melanoma 01/15/2009    DR WILYL SIMMS ( Left forearm)    Obesity     Radiculitis, thoracic     Scoliosis     Spondylosis of lumbar region without myelopathy or radiculopathy 09/09/2020    Stage 3a chronic kidney disease 10/21/2021    Stented coronary artery     Thoracic disc disorder     Surgery by Dr. Aldridge (sp)    TIA (transient ischemic attack) 05/12/2022    Type 2 diabetes mellitus without complication, without long-term current use of insulin 10/03/2023     Past Surgical History:   Procedure Laterality Date    ABLATION OF DYSRHYTHMIC FOCUS  nov. 2020    radio frequency /lower back    ANKLE FUSION Left 2007    reconstruction and fusion    BACK SURGERY      HERNIATED LUMBAR DISK 1975,2000,2012    CARDIAC CATHETERIZATION  2001    CARPAL TUNNEL RELEASE      CATARACT EXTRACTION, BILATERAL Bilateral 2002    COLONOSCOPY  2015    CORONARY ANGIOPLASTY WITH STENT PLACEMENT  2001    CORONARY STENT PLACEMENT      ENDOSCOPY N/A 12/19/2022    Procedure: ESOPHAGOGASTRODUODENOSCOPY WITH BIOPSY;  Surgeon: Rashard Mathews MD;  Location: [facility] MAIN OR;  Service: Gastroenterology;  Laterality: N/A;  gastric polyps, hiatal hernia    EPIDURAL BLOCK  Several at [facility]    and [facility]    EYE SURGERY      JOINT REPLACEMENT      REPLACEMENT TOTAL KNEE Left 2015    REPLACEMENT TOTAL KNEE Right     SKIN CANCER EXCISION      MELANOMA    SPINE SURGERY      THORACIC SPINE SURGERY       UPPER GASTROINTESTINAL ENDOSCOPY        General Information       Row Name 01/23/25 1434          Physical Therapy Time and Intention    Document Type therapy note (daily note)  -     Mode of Treatment individual therapy;physical therapy  -       Row Name 01/23/25 1434          General Information    Existing Precautions/Restrictions fall  -       Row Name 01/23/25 1434          Cognition    Orientation Status (Cognition) oriented x 4  -       Row Name 01/23/25 1434          Safety Issues/Impairments Affecting Functional Mobility    Impairments Affecting Function (Mobility) balance;endurance/activity tolerance;pain;range of motion (ROM)  -               User Key  (r) = Recorded By, (t) = Taken By, (c) = Cosigned By      Initials Name Provider Type     Charlee Townsend, PT Physical Therapist                   Mobility       Row Name 01/23/25 1435          Bed Mobility    Supine-Sit Moore (Bed Mobility) not tested  -     Sit-Supine Moore (Bed Mobility) not tested  -     Comment, (Bed Mobility) sitting in chair  -       Row Name 01/23/25 1435          Sit-Stand Transfer    Sit-Stand Moore (Transfers) verbal cues;nonverbal cues (demo/gesture);contact guard  -     Assistive Device (Sit-Stand Transfers) walker, front-wheeled  -       Row Name 01/23/25 1435          Gait/Stairs (Locomotion)    Moore Level (Gait) verbal cues;nonverbal cues (demo/gesture);contact guard;standby assist  -     Assistive Device (Gait) walker, front-wheeled  -     Distance in Feet (Gait) 80  1 seated rest break due to ankle pain  -     Deviations/Abnormal Patterns (Gait) base of support, narrow;sherman decreased;antalgic  -     Bilateral Gait Deviations forward flexed posture;heel strike decreased  -     Comment, (Gait/Stairs) pt reports B ankle pain this date (L>R) but is better than it was  -               User Key  (r) = Recorded By, (t) = Taken By, (c) = Cosigned By      Initials  Name Provider Type    Charlee Fernandez, PT Physical Therapist                   Obj/Interventions       Row Name 01/23/25 1438          Motor Skills    Therapeutic Exercise --  10 reps B LE seated marches, LAQ, and R AP  -               User Key  (r) = Recorded By, (t) = Taken By, (c) = Cosigned By      Initials Name Provider Type     Charlee Townsend, PT Physical Therapist                   Goals/Plan    No documentation.                  Clinical Impression       Row Name 01/23/25 1439          Pain    Pretreatment Pain Rating 5/10  -     Posttreatment Pain Rating 5/10  -     Pain Location ankle  -     Pain Side/Orientation bilateral  -       Row Name 01/23/25 1439          Plan of Care Review    Plan of Care Reviewed With patient  -     Outcome Evaluation Pt demonstrates increased activity tolerance and functional strength as he was able to increase his gait distance and required less assistance.  Patient reports he is now having bilateral ankle pain with the left hurting more than the right but it was tolerable with weightbearing.  PT will continue to follow to address strength, mobility, and gait.  -       Row Name 01/23/25 1439          Positioning and Restraints    Pre-Treatment Position sitting in chair/recliner  -     Post Treatment Position chair  -     In Chair sitting;call light within reach;encouraged to call for assist;with family/caregiver;exit alarm on  -               User Key  (r) = Recorded By, (t) = Taken By, (c) = Cosigned By      Initials Name Provider Type     Charlee Townsend, PT Physical Therapist                   Outcome Measures       Row Name 01/23/25 1441 01/23/25 0812       How much help from another person do you currently need...    Turning from your back to your side while in flat bed without using bedrails? 4  -CH 4  -LR    Moving from lying on back to sitting on the side of a flat bed without bedrails? 4  -CH 3  -LR    Moving to and from a bed to a  chair (including a wheelchair)? 3  - 3  -LR    Standing up from a chair using your arms (e.g., wheelchair, bedside chair)? 3  -CH 3  -LR    Climbing 3-5 steps with a railing? 3  -CH 2  -LR    To walk in hospital room? 3  -CH 3  -LR    AM-PAC 6 Clicks Score (PT) 20  - 18  -LR    Highest Level of Mobility Goal 6 --> Walk 10 steps or more  - 6 --> Walk 10 steps or more  -LR      Row Name 01/23/25 1441          Functional Assessment    Outcome Measure Options AM-PAC 6 Clicks Basic Mobility (PT)  -               User Key  (r) = Recorded By, (t) = Taken By, (c) = Cosigned By      Initials Name Provider Type     Charlee Townsend PT Physical Therapist    LR Ana Paula Stallings RN Registered Nurse                                 Physical Therapy Education       Title: PT OT SLP Therapies (In Progress)       Topic: Physical Therapy (In Progress)       Point: Mobility training (Done)       Learning Progress Summary            Patient Acceptance, E,TB,D, VU,NR by  at 1/23/2025 1441    Acceptance, E,TB,D, VU,NR by  at 1/21/2025 1617    Acceptance, E,TB,D, VU,NR by  at 1/20/2025 1511                      Point: Home exercise program (Not Started)       Learner Progress:  Not documented in this visit.              Point: Body mechanics (Done)       Learning Progress Summary            Patient Acceptance, E,TB,D, VU,NR by  at 1/23/2025 1441    Acceptance, E,TB,D, VU,NR by  at 1/21/2025 1617    Acceptance, E,TB,D, VU,NR by  at 1/20/2025 1511                      Point: Precautions (Done)       Learning Progress Summary            Patient Acceptance, E,TB,D, VU,NR by  at 1/23/2025 1441    Acceptance, E,TB,D, VU,NR by  at 1/21/2025 1617    Acceptance, E,TB,D, VU,NR by  at 1/20/2025 1511                                      User Key       Initials Effective Dates Name Provider Type [facility] 06/16/21 -  Charlee Townsend, PT Physical Therapist PT                  PT Recommendation and Plan  Planned  Therapy Interventions (PT): balance training, bed mobility training, gait training, home exercise program, patient/family education, strengthening, transfer training  Outcome Evaluation: Pt demonstrates increased activity tolerance and functional strength as he was able to increase his gait distance and required less assistance.  Patient reports he is now having bilateral ankle pain with the left hurting more than the right but it was tolerable with weightbearing.  PT will continue to follow to address strength, mobility, and gait.     Time Calculation:         PT Charges       Row Name 01/23/25 1441             Time Calculation    Start Time 1052  -CH      Stop Time 1105  -      Time Calculation (min) 13 min  -CH      PT Received On 01/23/25  -      PT - Next Appointment 01/24/25  -         Time Calculation- PT    Total Timed Code Minutes- PT 13 minute(s)  -CH         Timed Charges    52828 - PT Therapeutic Activity Minutes 13  -CH         Total Minutes    Timed Charges Total Minutes 13  -CH       Total Minutes 13  -CH                User Key  (r) = Recorded By, (t) = Taken By, (c) = Cosigned By      Initials Name Provider Type     Charlee Townsend PT Physical Therapist                  Therapy Charges for Today       Code Description Service Date Service Provider Modifiers Qty    [id number]  PT THERAPEUTIC ACT EA 15 MIN 1/23/2025 Charlee Townsend PT GP 1            PT G-Codes  Outcome Measure Options: AM-PAC 6 Clicks Basic Mobility (PT)  AM-PAC 6 Clicks Score (PT): 20  AM-PAC 6 Clicks Score (OT): 21  PT Discharge Summary  Anticipated Discharge Disposition (PT): home with home health, home with outpatient therapy services    Charlee Townsend, SCOOBY  1/23/2025

## 2025-01-23 NOTE — PROGRESS NOTES
"  Infectious Diseases Progress Note    Simi Candelario MD     [facility]  [place]: 0 days  Patient Identification:  Name: Kevin Ellington  Age: 89 y.o.  Sex: male  :  1935  MRN: [id number]         Primary Care Physician: Leanna Ryder MD        Subjective: S denies any complaints.  Still has discomfort in his ankles and not complaining of redness in both ankles.  Denies any fever and chills.  Interval History: See consultation note.  2025 MRI of the right ankle with and without contrast performed  Objective:    Scheduled Meds:apixaban, 5 mg, Oral, Q12H  atorvastatin, 40 mg, Oral, Daily  cephalexin, 500 mg, Oral, TID  cetirizine, 10 mg, Oral, Daily  chlorthalidone, 50 mg, Oral, Daily  fluticasone, 2 spray, Nasal, Daily  insulin lispro, 2-7 Units, Subcutaneous, 4x Daily AC & at Bedtime  metoprolol succinate XL, 100 mg, Oral, Daily  modafinil, 100 mg, Oral, Daily  pantoprazole, 40 mg, Oral, Daily  potassium chloride, 20 mEq, Oral, Daily  sodium chloride, 10 mL, Intravenous, Q12H  tamsulosin, 0.8 mg, Oral, Daily      Continuous Infusions:     Vital signs in last 24 hours:  Temp:  [97 °F (36.1 °C)-97.9 °F (36.6 °C)] 97.9 °F (36.6 °C)  Heart Rate:  [] 88  Resp:  [16-18] 16  BP: (124-134)/(74-84) 134/74    Intake/Output:    Intake/Output Summary (Last 24 hours) at 2025 1053  Last data filed at 2025 0823  Gross per 24 hour   Intake 240 ml   Output 575 ml   Net -335 ml       Exam:  /74 (BP Location: Right arm, Patient Position: Lying)   Pulse 88   Temp 97.9 °F (36.6 °C) (Oral)   Resp 16   Ht 177.8 cm (70\")   Wt 90.7 kg (200 lb)   SpO2 95%   BMI 28.70 kg/m²   Patient is examined using the personal protective equipment as per guidelines from infection control for this particular patient as enacted.  Hand washing was performed before and after patient interaction.  General Appearance:    Alert, cooperative, no distress, AAOx3                          Head:    Normocephalic, " without obvious abnormality, atraumatic                           Eyes:    PERRL, conjunctivae/corneas clear, EOM's intact, both eyes                         Throat:   Lips, tongue, gums normal; oral mucosa pink and moist                           Neck:   Supple, symmetrical, trachea midline, no JVD                         Lungs:    Clear to auscultation bilaterally, respirations unlabored                 Chest Wall:    No tenderness or deformity                          Heart:  S1-S2 regular                  Abdomen:   Soft nontender                 Extremities: Bilateral lower extremity deformity at the ankle from previous changes.  Persistent bruising and erythema bilaterally.  Proximal                        Pulses:   Pulses palpable in all extremities                    Neurologic: Awake interactive grossly nonfocal examination       Data Review:    I reviewed the patient's new clinical results.  Results from last 7 days   Lab Units 01/23/25  0512 01/21/25  0352 01/20/25  0923 01/20/25  0211   WBC 10*3/mm3 12.72* 10.18 9.53 11.21*   HEMOGLOBIN g/dL 15.1 13.1 13.8 14.1   PLATELETS 10*3/mm3 228 167 170 164     Results from last 7 days   Lab Units 01/23/25  0512 01/21/25  0352 01/20/25  0923 01/20/25  0211   SODIUM mmol/L 137 138 139 138   POTASSIUM mmol/L 3.6 3.5 3.4* 3.9   CHLORIDE mmol/L 99 101 104 106   CO2 mmol/L 22.4 26.0 24.4 20.8*   BUN mg/dL 22 19 22 22   CREATININE mg/dL 1.17 1.05 1.14 1.07   CALCIUM mg/dL 9.1 8.8 8.5* 8.6   GLUCOSE mg/dL 121* 123* 155* 145*     Microbiology Results (last 10 days)       Procedure Component Value - Date/Time    Blood Culture - Blood, Arm, Left [[id number]]  (Normal) Collected: 01/21/25 1549    Lab Status: Preliminary result Specimen: Blood from Arm, Left Updated: 01/22/25 1615     Blood Culture No growth at 24 hours    Blood Culture - Blood, Arm, Right [[id number]]  (Normal) Collected: 01/21/25 1542    Lab Status: Preliminary result Specimen: Blood from Arm, Right Updated:  01/22/25 1615     Blood Culture No growth at 24 hours    Narrative:      Less than seven (7) mL's of blood was collected.  Insufficient quantity may yield false negative results.          MRI Ankle Right With & Without Contrast    Result Date: 1/23/2025   1. Severe tibiotalar joint arthropathic changes with valgus angulation, chronic deformities of the distal tibia and talar dome, subchondral cystic changes and bone marrow edema-like signal, and anterior and posterior osteophyte formation that likely results in ankle impingement. Trace tibiotalar joint effusion with diffuse enhancing synovial thickening and multiple anterior and lateral ossifications/osteochondral bodies. Findings most likely reflect severe osteoarthritis. Correlate with laboratory values. 2. Small focal 1 cm subchondral region of probable osteonecrosis of the posterior medial talar dome. 3. Sequela of chronic calcaneofibular impingement. 4. 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 5. Moderate peroneus longus tendinopathy and suspected longitudinal split thickness tear of the peroneus brevis tendon. 6. Moderate flexor hallucis longus tendinopathy. 7. Severe diffuse muscular fatty atrophy and mild muscular edema, likely neuropathic.  This report was finalized on 1/23/2025 7:49 AM by Heath Whitman MD on Workstation: [id number]         Assessment:    Leg edema, right    Essential hypertension    Hyperlipemia    Stage 3a chronic kidney disease    Chronic atrial fibrillation    Type 2 diabetes mellitus without complication, without long-term current use of insulin  1-superficial cellulitis of the right ankle in the setting of chronic deformity and swelling of the leg without any systemic signs and symptoms of sepsis and mild leukocytosis improved on oral Keflex  2-chronic arthritis and fracture of the  right ankle with elevated inflammatory markers unclear if it is due to chronic septic arthritis or osteomyelitis or elevated inflammatory marker is reflection of acute cellulitis.  3-possibility of gout or pseudogout is a concern but no significant discomfort on passive movement without any specific treatment of gout or pseudogout argues against it.  4-possibility of acute exacerbation of chronic osteoarthritis due to deformity and fracture.  5-other diagnoses include  Type 2 diabetes,   chronic kidney disease,   chronic atrial fibrillation,   hypertension.  Recommendations/Discussions:  See my discussion and recommendation on 1/22/2025.  Patient does not appear to be toxic and MRI does not show any evidence of deep infection or osteomyelitis or septic arthritis.  Erythema around the ankle bilaterally could very well be due to noninfectious process such as gout or pseudogout for which steroid treatment can be considered and will be deferred to our medical team.  Orthopedic evaluation noted.  Patient has been adequately treated for his cellulitis and would recommend 10-day course of Keflex.  Overall concept of care discussed with patient and patient's son at the bedside.  Simi Candelario MD  1/23/2025  10:53 EST    Parts of this note may be an electronic transcription/translation of spoken language to printed text using the Dragon dictation system.

## 2025-01-23 NOTE — THERAPY TREATMENT NOTE
Patient Name: Kevin Ellington  : 1935    MRN: [id number]                              Today's Date: 2025       Admit Date: 2025    Visit Dx:     ICD-10-CM ICD-9-CM   1. Right leg swelling  M79.89 729.81   2. 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    Benign prostatic hyperplasia with urinary frequency    Cervical radiculopathy    Bilateral carpal tunnel syndrome    Cervical spinal stenosis    Arthritis of ankle    Chronic pain of right ankle    Shortness of breath    Bilateral leg weakness    History of melanoma    Spondylosis of lumbar region without myelopathy or radiculopathy    Stage 3a chronic kidney disease    Pedal edema    Gastroesophageal reflux disease without esophagitis    TIA (transient ischemic attack)    Bilateral carotid artery stenosis    Cerebral artery occlusion    Hoarseness    Hiatal hernia    Abdominal fullness    Fever and chills    COVID-19    Chronic atrial fibrillation    Cytokine release syndrome, grade 2    Type 2 diabetes mellitus without complication, without long-term current use of insulin    Chronic instability of ankle    Lumbar post-laminectomy syndrome    Primary localized osteoarthrosis of ankle and foot    Leg edema, right     Past Medical History:   Diagnosis Date    Abnormal ECG 2023    Taking Eliquis    Allergic 1970    Arrhythmia 2023    Taking Eliquis    Arthritis     Asthma 2019    shortness  of breath-exertion    Benign prostatic hyperplasia     Bilateral carotid artery stenosis 05/12/2022    Cataract     Cervical radiculopathy     Chronic atrial fibrillation 06/15/2023    Colon polyp     Coronary artery disease 2001    Stent    COVID-19 06/12/2023    CTS (carpal tunnel syndrome) 1998    Surgery both hands in 1999    Erectile dysfunction     GERD (gastroesophageal reflux disease)     HTN (hypertension)     Hyperlipidemia     Kidney stone     Low back pain     2 Prior surgeries    Lumbosacral disc disease     Melanoma 01/15/2009    DR WILLY SIMMS ( Left forearm)    Obesity     Radiculitis, thoracic     Scoliosis     Spondylosis of lumbar region without myelopathy or radiculopathy 09/09/2020    Stage 3a chronic kidney disease 10/21/2021    Stented coronary artery     Thoracic disc disorder     Surgery by Dr. Aldridge (sp)    TIA (transient ischemic attack) 05/12/2022    Type 2 diabetes mellitus without complication, without long-term current use of insulin 10/03/2023     Past Surgical History:   Procedure Laterality Date    ABLATION OF DYSRHYTHMIC FOCUS  nov. 2020    radio frequency /lower back    ANKLE FUSION Left 2007    reconstruction and fusion    BACK SURGERY      HERNIATED LUMBAR DISK 1975,2000,2012    CARDIAC CATHETERIZATION  2001    CARPAL TUNNEL RELEASE      CATARACT EXTRACTION, BILATERAL Bilateral 2002    COLONOSCOPY  2015    CORONARY ANGIOPLASTY WITH STENT PLACEMENT  2001    CORONARY STENT PLACEMENT      ENDOSCOPY N/A 12/19/2022    Procedure: ESOPHAGOGASTRODUODENOSCOPY WITH BIOPSY;  Surgeon: Rashard Mathews MD;  [place] [facility] [place];  Service: Gastroenterology;  Laterality: N/A;  gastric polyps, hiatal hernia    EPIDURAL BLOCK  Several at [facility]    and [facility]    EYE SURGERY      JOINT REPLACEMENT      REPLACEMENT TOTAL KNEE Left 2015    REPLACEMENT TOTAL KNEE Right     SKIN CANCER EXCISION      MELANOMA    SPINE SURGERY      THORACIC SPINE SURGERY       UPPER GASTROINTESTINAL ENDOSCOPY        General Information    No documentation.                    Mobility/ADL's       Row Name 01/23/25 1502          Bed Mobility    Bed Mobility sit-supine;supine-sit  -KG     Supine-Sit [place] (Bed Mobility) standby assist  -KG     Sit-Supine [place] (Bed Mobility) standby assist  -KG       Row Name 01/23/25 1502          Transfers    Transfers sit-stand transfer;stand-sit transfer  -KG     Comment, (Transfers) using rollator  -KG       Row Name 01/23/25 1502          Sit-Stand Transfer    Sit-Stand Roosevelt (Transfers) contact guard  -KG       Row Name 01/23/25 1502          Stand-Sit Transfer    Stand-Sit [place] (Transfers) contact guard  -KG       Row Name 01/23/25 1502          Functional Mobility    Functional Mobility- Ind. Level contact guard assist  from EOB to hallway, and back to EOB (simulating household distance)  -KG     Functional Mobility- Device --  rollator  -KG       Row Name 01/23/25 1502          Activities of Daily Living    BADL Assessment/Intervention lower body dressing  -KG       Row Name 01/23/25 1502          Lower Body Dressing Assessment/Training    Roosevelt Level (Lower Body Dressing) doff;don;shoes/slippers;minimum assist (75% patient effort)  -KG     Position (Lower Body Dressing) edge of bed sitting  -KG               User Key  (r) = Recorded By, (t) = Taken By, (c) = Cosigned By      Initials Name Provider Type    Jose Parham OT Occupational Therapist                   Obj/Interventions       Row Name 01/23/25 1503          Motor Skills    Motor Skills functional endurance  -KG     Functional Endurance mild activity tolerance deficits w/ activity  -KG       Row Name 01/23/25 1503          Balance    Balance Assessment sitting static balance;sitting dynamic balance;sit to stand dynamic balance;standing static balance;standing dynamic balance  -KG     Static Sitting Balance standby assist  -KG     Dynamic Sitting  Balance standby assist  -KG     Position, Sitting Balance sitting edge of bed  -KG     Sit to Stand Dynamic Balance contact guard  -KG     Static Standing Balance contact guard  -KG     Dynamic Standing Balance contact guard  -KG     Position/Device Used, Standing Balance walker, front-wheeled  -KG     Balance Interventions sitting;standing;sit to stand;supported;static;dynamic;occupation based/functional task  -KG               User Key  (r) = Recorded By, (t) = Taken By, (c) = Cosigned By      Initials Name Provider Type    KG Jose Patrick OT Occupational Therapist                   Goals/Plan    No documentation.                  Clinical Impression       Row Name 01/23/25 1503          Pain Assessment    Pretreatment Pain Rating 0/10 - no pain  -KG     Posttreatment Pain Rating 0/10 - no pain  -KG       Row Name 01/23/25 1503          Plan of Care Review    Plan of Care Reviewed With patient;spouse  -KG     Outcome Evaluation Pt seen this afternoon for OT treatment. Performing bed mobility w/ SBA. Able to perform STS and functional mobility w/ CGA + rollator, mild fatigue but overall performance was not hindered, Needing Min A for LBD this date. Pt reports feeling much better than he did upon admission, strength/endurance still minimally below baseline. OT will continue to follow to address functional deficits.  -KG       Row Name 01/23/25 1503          Therapy Plan Review/Discharge Plan (OT)    Anticipated Discharge Disposition (OT) home with home health;home with assist  -KG       Row Name 01/23/25 1503          Vital Signs    Pre Patient Position Supine  -KG     Intra Patient Position Standing  -KG     Post Patient Position Supine  -KG       Row Name 01/23/25 1503          Positioning and Restraints    Pre-Treatment Position in bed  -KG     Post Treatment Position bed  -KG     In Bed notified nsg;supine;call light within reach;encouraged to call for assist;exit alarm on;with family/caregiver  -KG                User Key  (r) = Recorded By, (t) = Taken By, (c) = Cosigned By      Initials Name Provider Type    KG Jose Patrick OT Occupational Therapist                   Outcome Measures       Row Name 01/23/25 1441 01/23/25 0812       How much help from another person do you currently need...    Turning from your back to your side while in flat bed without using bedrails? 4  -CH 4  -LR    Moving from lying on back to sitting on the side of a flat bed without bedrails? 4  -CH 3  -LR    Moving to and from a bed to a chair (including a wheelchair)? 3  -CH 3  -LR    Standing up from a chair using your arms (e.g., wheelchair, bedside chair)? 3  -CH 3  -LR    Climbing 3-5 steps with a railing? 3  -CH 2  -LR    To walk in hospital room? 3  -CH 3  -LR    AM-PAC 6 Clicks Score (PT) 20  -CH 18  -LR    Highest Level of Mobility Goal 6 --> Walk 10 steps or more  -CH 6 --> Walk 10 steps or more  -LR      Row Name 01/23/25 1441          Functional Assessment    Outcome Measure Options AM-PAC 6 Clicks Basic Mobility (PT)  -CH               User Key  (r) = Recorded By, (t) = Taken By, (c) = Cosigned By      Initials Name Provider Type    CH Charlee Townsend, PT Physical Therapist    LR Ana Paula Stallings, RN Registered Nurse                    Occupational Therapy Education       Title: PT OT SLP Therapies (In Progress)       Topic: Occupational Therapy (In Progress)       Point: ADL training (Done)       Description:   Instruct learner(s) on proper safety adaptation and remediation techniques during self care or transfers.   Instruct in proper use of assistive devices.                  Learning Progress Summary            Patient Acceptance, E, VU by MM at 1/21/2025 0042    Comment: role of OT                      Point: Home exercise program (Not Started)       Description:   Instruct learner(s) on appropriate technique for monitoring, assisting and/or progressing therapeutic exercises/activities.                  Learner Progress:   Not documented in this visit.              Point: Precautions (Done)       Description:   Instruct learner(s) on prescribed precautions during self-care and functional transfers.                  Learning Progress Summary            Patient Acceptance, E, VU by MM at 1/21/2025 1138    Comment: role of OT                      Point: Body mechanics (Done)       Description:   Instruct learner(s) on proper positioning and spine alignment during self-care, functional mobility activities and/or exercises.                  Learning Progress Summary            Patient Acceptance, E, VU by MM at 1/21/2025 1138    Comment: role of OT                                      User Key       Initials Effective Dates Name Provider Type Discipline    STEPHANIE 05/31/24 -  Shelbi Chisholm OT Occupational Therapist OT                  OT Recommendation and Plan     Plan of Care Review  Plan of Care Reviewed With: patient, spouse  Outcome Evaluation: Pt seen this afternoon for OT treatment. Performing bed mobility w/ SBA. Able to perform STS and functional mobility w/ CGA + rollator, mild fatigue but overall performance was not hindered, Needing Min A for LBD this date. Pt reports feeling much better than he did upon admission, strength/endurance still minimally below baseline. OT will continue to follow to address functional deficits.     Time Calculation:         Time Calculation- OT       Row Name 01/23/25 1506             Time Calculation- OT    OT Start Time 1354  -KG      OT Stop Time 1411  -KG      OT Time Calculation (min) 17 min  -KG      Total Timed Code Minutes- OT 17 minute(s)  -KG      OT Received On 01/23/25  -KG      OT - Next Appointment 01/27/25  -KG         Timed Charges    57993 - OT Self Care/Mgmt Minutes 17  -KG         Total Minutes    Timed Charges Total Minutes 17  -KG       Total Minutes 17  -KG                User Key  (r) = Recorded By, (t) = Taken By, (c) = Cosigned By      Initials Name Provider Type    KG Darnell  RAISA Garcia Occupational Therapist                  Therapy Charges for Today       Code Description Service Date Service Provider Modifiers Qty    [id number] HC OT SELF CARE/MGMT/TRAIN EA 15 MIN 1/23/2025 Jose Patrick OT GO 1                 Jose Patrick OT  1/23/2025

## 2025-01-23 NOTE — CONSULTS
Orthopaedic Surgery  Consult Note  Dr. SU Delaney” Saturnino ROBERSON  [phone]    HPI:  Patient is a 89 y.o. Not  or  male who presents with chronic right ankle pain.  He has apparently had previous surgical intervention that was unsuccessful.  He follows outpatient with my partner Dr. Hodge.  He has had problems with his ankle for quite some time.  However, he was admitted with concern for possible infection.  His white count was noted to be elevated and he did have significant pain in the right leg.  He underwent an MRI earlier this morning that has not yet been read by radiology.  He has been unable to bear weight or mobilize    MEDICAL HISTORY  Past Medical History:   Diagnosis Date    Abnormal ECG 6-    Taking Eliquis    Allergic 1970    Arrhythmia 6-    Taking Eliquis    Arthritis     Asthma 05/23/2019    shortness of breath-exertion    Benign prostatic hyperplasia     Bilateral carotid artery stenosis 05/12/2022    Cataract     Cervical radiculopathy     Chronic atrial fibrillation 06/15/2023    Colon polyp     Coronary artery disease 2001    Stent    COVID-19 06/12/2023    CTS (carpal tunnel syndrome) 1998    Surgery both hands in 1999    Erectile dysfunction     GERD (gastroesophageal reflux disease)     HTN (hypertension)     Hyperlipidemia     Kidney stone     Low back pain     2 Prior surgeries    Lumbosacral disc disease     Melanoma 01/15/2009    DR WILLY SIMMS ( Left forearm)    Obesity     Radiculitis, thoracic     Scoliosis     Spondylosis of lumbar region without myelopathy or radiculopathy 09/09/2020    Stage 3a chronic kidney disease 10/21/2021    Stented coronary artery     Thoracic disc disorder     Surgery by Dr. Aldridge (sp)    TIA (transient ischemic attack) 05/12/2022    Type 2 diabetes mellitus without complication, without long-term current use of insulin 10/03/2023     Past Surgical History:   Procedure Laterality Date    ABLATION OF DYSRHYTHMIC FOCUS   nov. 2020    radio frequency /lower back    ANKLE FUSION Left 2007    reconstruction and fusion    BACK SURGERY      HERNIATED LUMBAR DISK 1975,2000,2012    CARDIAC CATHETERIZATION  2001    CARPAL TUNNEL RELEASE      CATARACT EXTRACTION, BILATERAL Bilateral 2002    COLONOSCOPY  2015    CORONARY ANGIOPLASTY WITH STENT PLACEMENT  2001    CORONARY STENT PLACEMENT      ENDOSCOPY N/A 12/19/2022    Procedure: ESOPHAGOGASTRODUODENOSCOPY WITH BIOPSY;  Surgeon: Rashard Mathews MD;  Location: [facility];  Service: Gastroenterology;  Laterality: N/A;  gastric polyps, hiatal hernia    EPIDURAL BLOCK  Several at [facility]    and [facility]    EYE SURGERY      JOINT REPLACEMENT      REPLACEMENT TOTAL KNEE Left 2015    REPLACEMENT TOTAL KNEE Right     SKIN CANCER EXCISION      MELANOMA    SPINE SURGERY      THORACIC SPINE SURGERY      UPPER GASTROINTESTINAL ENDOSCOPY       Prior to Admission medications    Medication Sig Start Date End Date Taking? Authorizing Provider   apixaban (ELIQUIS) 5 MG tablet tablet Take 1 tablet by mouth Every 12 (Twelve) Hours. Indications: Atrial Fibrillation 7/31/24   Leanna Ryder MD   atorvastatin (LIPITOR) 40 MG tablet TAKE 1 TABLET DAILY 10/3/24   Leanna Ryder MD   Blood Glucose Monitoring Suppl (True Metrix Air Glucose Meter) w/Device kit Use 1 each Daily. 8/19/24   Leanna Ryder MD   cetirizine (zyrTEC) 10 MG tablet Take 1 tablet by mouth Daily.    Provider, MD Claudette   chlorthalidone (HYGROTEN) 50 MG tablet TAKE 1 TABLET DAILY 10/28/24   Leanna Ryder MD   econazole nitrate (SPECTAZOLE) 1 % cream Apply  topically to the appropriate area as directed As Needed. 4/23/16   ProviderClaudette MD   fluticasone (FLONASE) 50 MCG/ACT nasal spray 2 sprays into the nostril(s) as directed by provider Daily. 7/31/24   Leanna Ryder MD   glucose blood (FREESTYLE LITE) test strip Test daily 8/19/24   Leanna Ryder MD   Lancets (freestyle) lancets Check one  daily 8/19/24   Leanna Ryder MD   losartan (COZAAR) 50 MG tablet Take 1 tablet by mouth Daily. 10/3/24   Halima Salmeron MD   metoprolol succinate XL (TOPROL-XL) 100 MG 24 hr tablet TAKE 1 TABLET DAILY 10/3/24   Leanna Ryder MD   metroNIDAZOLE (FLAGYL) 0.75 % lotion lotion Daily. 9/18/20   Claudette Roland MD   modafinil (PROVIGIL) 100 MG tablet Take 1 tablet by mouth Daily. 11/14/24   Leanna Ryder MD   Multiple Vitamins-Minerals (MULTIVITAMIN PO) Take 1 tablet by mouth Daily. 12/31/14   Claudette Roland MD   pantoprazole (PROTONIX) 40 MG EC tablet Take 1 tablet by mouth Daily. 12/4/24   Sheila Irizarry APRN   potassium chloride (KLOR-CON M10) 10 MEQ CR tablet TAKE 2 TABLETS DAILY 8/12/24   Leanna Ryder MD   sennosides-docusate (Senna S) 8.6-50 MG per tablet Take 1 tablet by mouth 2 (Two) Times a Day As Needed for Constipation. 7/31/24   Leanna Ryder MD   tamsulosin (FLOMAX) 0.4 MG capsule 24 hr capsule TAKE 2 CAPSULES DAILY 10/3/24   Leanna Ryder MD     Allergies   Allergen Reactions    Codeine GI Intolerance    Hydrocodone GI Intolerance     SEVERE CONSTIPATION    Sulfa Antibiotics Unknown (See Comments)     Unable to remember     Most Recent Immunizations   Administered Date(s) Administered    31-influenza Vac Quardvalent Preservativ 10/01/2021    COVID-19 (PFIZER) BIVALENT 12+YRS 10/26/2022    COVID-19 (PFIZER) Purple Cap Monovalent 09/22/2021    Covid-19 (Pfizer) Gray Cap Monovalent 04/14/2022    FLUAD TRI 65YR+ 10/28/2019    Fluad Quad 65+ 10/28/2019    Fluzone High-Dose 65+YRS 10/14/2021    Fluzone High-Dose 65+yrs 10/03/2023    Hepatitis A 08/26/2019    Influenza, Unspecified 01/30/2023    Pneumococcal Conjugate 13-Valent (PCV13) 11/30/2018    Pneumococcal Conjugate 20-Valent (PCV20) 10/03/2023    Shingrix 02/10/2020    Td (TDVAX) 04/09/2018    Tdap 09/22/2024    Zostavax 08/26/2019     Social History     Tobacco Use    Smoking status: Never    Smokeless tobacco: Never     "Tobacco comments:     caffeine use   Substance Use Topics    Alcohol use: No      Social History     Substance and Sexual Activity   Drug Use No       VITALS: /84 (BP Location: Right arm, Patient Position: Lying)   Pulse 101   Temp 97 °F (36.1 °C) (Oral)   Resp 16   Ht 177.8 cm (70\")   Wt 90.7 kg (200 lb)   SpO2 95%   BMI 28.70 kg/m²  Body mass index is 28.7 kg/m².    PHYSICAL EXAM:   CONSTITUTIONAL: No acute distress  LUNGS: Equal chest rise, no shortness of air  CARDIOVASCULAR: palpable peripheral pulses  SKIN: no skin lesions in the area examined  LYMPH: no lymphadenopathy in the area examined  Musculoskeletal: Normal neurovascular exam to the right lower extremity.  He has limited range of motion of the ankle but that seems to be his baseline.  Ankle motion does not seem to cause him acute distress.  He has a chronic deformity at the ankle.  No significant redness or cellulitis appearance to the ankle    RADIOLOGY REVIEW:   XR Ankle 3+ View Right    Result Date: 1/20/2025  This examination is very difficult to interpret in the absence of any prior studies for comparison, and given the presence of advanced degenerative change. Not convinced that can see an acute fracture on the current study. Ossific fragments seen surrounding the ankle bilaterally appear to be well-corticated, and are favored to be chronic.  This report was finalized on 1/20/2025 2:38 AM by Dr. Talia Orlando M.D on Workstation: [id number]       LABS:   Results for the past 24 hours:   Recent Results (from the past 24 hours)   POC Glucose Once    Collection Time: 01/22/25  7:16 AM    Specimen: Blood   Result Value Ref Range    Glucose 123 70 - 130 mg/dL   POC Glucose Once    Collection Time: 01/22/25 11:56 AM    Specimen: Blood   Result Value Ref Range    Glucose 162 (H) 70 - 130 mg/dL   POC Glucose Once    Collection Time: 01/22/25  4:45 PM    Specimen: Blood   Result Value Ref Range    Glucose 116 70 - 130 mg/dL   POC Glucose " Once    Collection Time: 01/22/25  9:09 PM    Specimen: Blood   Result Value Ref Range    Glucose 158 (H) 70 - 130 mg/dL   Comprehensive Metabolic Panel    Collection Time: 01/23/25  5:12 AM    Specimen: Blood   Result Value Ref Range    Glucose 121 (H) 65 - 99 mg/dL    BUN 22 8 - 23 mg/dL    Creatinine 1.17 0.76 - 1.27 mg/dL    Sodium 137 136 - 145 mmol/L    Potassium 3.6 3.5 - 5.2 mmol/L    Chloride 99 98 - 107 mmol/L    CO2 22.4 22.0 - 29.0 mmol/L    Calcium 9.1 8.6 - 10.5 mg/dL    Total Protein 6.8 6.0 - 8.5 g/dL    Albumin 3.1 (L) 3.5 - 5.2 g/dL    ALT (SGPT) 21 1 - 41 U/L    AST (SGOT) 21 1 - 40 U/L    Alkaline Phosphatase 107 39 - 117 U/L    Total Bilirubin 1.2 0.0 - 1.2 mg/dL    Globulin 3.7 gm/dL    A/G Ratio 0.8 g/dL    BUN/Creatinine Ratio 18.8 7.0 - 25.0    Anion Gap 15.6 (H) 5.0 - 15.0 mmol/L    eGFR 59.6 (L) >60.0 mL/min/1.73   C-reactive Protein    Collection Time: 01/23/25  5:12 AM    Specimen: Blood   Result Value Ref Range    C-Reactive Protein 20.84 (H) 0.00 - 0.50 mg/dL   Procalcitonin    Collection Time: 01/23/25  5:12 AM    Specimen: Blood   Result Value Ref Range    Procalcitonin 0.14 0.00 - 0.25 ng/mL   CBC Auto Differential    Collection Time: 01/23/25  5:12 AM    Specimen: Blood   Result Value Ref Range    WBC 12.72 (H) 3.40 - 10.80 10*3/mm3    RBC 4.85 4.14 - 5.80 10*6/mm3    Hemoglobin 15.1 13.0 - 17.7 g/dL    Hematocrit 43.7 37.5 - 51.0 %    MCV 90.1 79.0 - 97.0 fL    MCH 31.1 26.6 - 33.0 pg    MCHC 34.6 31.5 - 35.7 g/dL    RDW 11.9 (L) 12.3 - 15.4 %    RDW-SD 39.1 37.0 - 54.0 fl    MPV 9.2 6.0 - 12.0 fL    Platelets 228 140 - 450 10*3/mm3    Neutrophil % 68.4 42.7 - 76.0 %    Lymphocyte % 20.6 19.6 - 45.3 %    Monocyte % 9.7 5.0 - 12.0 %    Eosinophil % 0.6 0.3 - 6.2 %    Basophil % 0.2 0.0 - 1.5 %    Immature Grans % 0.5 0.0 - 0.5 %    Neutrophils, Absolute 8.71 (H) 1.70 - 7.00 10*3/mm3    Lymphocytes, Absolute 2.62 0.70 - 3.10 10*3/mm3    Monocytes, Absolute 1.23 (H) 0.10 - 0.90  "10*3/mm3    Eosinophils, Absolute 0.07 0.00 - 0.40 10*3/mm3    Basophils, Absolute 0.03 0.00 - 0.20 10*3/mm3    Immature Grans, Absolute 0.06 (H) 0.00 - 0.05 10*3/mm3    nRBC 0.0 0.0 - 0.2 /100 WBC       IMPRESSION:  Patient is a 89 y.o. Not  or  male with right ankle pain and concern for infection    PLAN:   Admited to: Justin Arroyo MD  I very much agree with obtaining the MRI.  We will wait to see what radiology says about the result.  If there is concern for infection, there may be indication for aspiration by radiology.  My partner Dr. Hodge is aware of the patient and can be available to help should surgery be needed.    R \"Lyle\" Saturnino ROBERSON MD  Orthopaedic Surgery  [facility]  [phone] - [place] Office  [phone] - [place] Office            "

## 2025-01-23 NOTE — PROGRESS NOTES
[facility]     LOS: 0 days     Subjective:    CC:Leg Pain    DIET:  Diet Order   Procedures    Diet: Cardiac, Diabetic; Healthy Heart (2-3 Na+); Consistent Carbohydrate; Fluid Consistency: Thin (IDDSI 0)   Patient states he is having chills, he had an episode yesterday, none overnight    Objective:    Vital Signs:  Temp:  [97 °F (36.1 °C)-97.9 °F (36.6 °C)] 97.1 °F (36.2 °C)  Heart Rate:  [] 97  Resp:  [16-18] 18  BP: (121-134)/(74-84) 121/77    SpO2:  [95 %-97 %] 97 %  on   ;   Device (Oxygen Therapy): room air  Body mass index is 28.7 kg/m².    Physical Exam  General Awake alert  Heart regular rate rhythm no murmurs rubs gallop  Lungs clear to auscultation  Back with no evidence of infection, area where it appears patient had temporary wires placed  Ankle with erythema and minimal edema on the right    Results Review:    Glucose   Date Value Ref Range Status   01/23/2025 121 (H) 65 - 99 mg/dL Final   01/21/2025 123 (H) 65 - 99 mg/dL Final     Results from last 7 days   Lab Units 01/23/25  0512   WBC 10*3/mm3 12.72*   HEMOGLOBIN g/dL 15.1   HEMATOCRIT % 43.7   PLATELETS 10*3/mm3 228     Results from last 7 days   Lab Units 01/23/25  0512   SODIUM mmol/L 137   POTASSIUM mmol/L 3.6   CHLORIDE mmol/L 99   CO2 mmol/L 22.4   BUN mg/dL 22   CREATININE mg/dL 1.17   CALCIUM mg/dL 9.1   BILIRUBIN mg/dL 1.2   ALK PHOS U/L 107   ALT (SGPT) U/L 21   AST (SGOT) U/L 21   GLUCOSE mg/dL 121*                 Cultures:  No results found for: \"BLOODCX\", \"URINECX\", \"WOUNDCX\", \"MRSACX\", \"RESPCX\", \"STOOLCX\"    I have reviewed daily medications and changes in CPOE    Scheduled meds  apixaban, 5 mg, Oral, Q12H  atorvastatin, 40 mg, Oral, Daily  cephalexin, 500 mg, Oral, TID  cetirizine, 10 mg, Oral, Daily  chlorthalidone, 50 mg, Oral, Daily  fluticasone, 2 spray, Nasal, Daily  insulin lispro, 2-7 Units, Subcutaneous, 4x Daily AC & at Bedtime  metoprolol succinate XL, 100 mg, Oral, Daily  modafinil, 100 " mg, Oral, Daily  pantoprazole, 40 mg, Oral, Daily  potassium chloride, 20 mEq, Oral, Daily  sodium chloride, 10 mL, Intravenous, Q12H  tamsulosin, 0.8 mg, Oral, Daily           PRN meds    acetaminophen **OR** acetaminophen **OR** acetaminophen    senna-docusate sodium **AND** polyethylene glycol **AND** bisacodyl **AND** bisacodyl    dextrose    dextrose    famotidine    glucagon (human recombinant)    HYDROcodone-acetaminophen    melatonin    ondansetron    sodium chloride    sodium chloride        Leg edema, right    Essential hypertension    Hyperlipemia    Stage 3a chronic kidney disease    Chronic atrial fibrillation    Type 2 diabetes mellitus without complication, without long-term current use of insulin        Assessment/Plan:    Weakness  -Patient had some chills this afternoon  -Check blood culture  -Monitor  -patient seen by ortho and MRI is reviewed, no need for further intervention    Concern for gout versus cellulitis versus right septic joint  -Infectious disease and orthopedic consultation  -For now continue with oral antibiotic  -minimal erythema of the ankle    Hypertension-BP stable    Chronic kidney disease-creatinine remained stable    A-fib-heart rate stable, restart Eliquis  -Patient states Eliquis was held for spinal cord stimulator but was told he could restart it, we will restart    Type 2 diabetes- Slight scale insulin      Valentin De Leon MD  01/23/25  16:46 EST

## 2025-01-23 NOTE — PROGRESS NOTES
Case Management Discharge Note           Provided Post Acute Provider List?: Yes  Post Acute Provider List: Home Health, Outpatient Therapy  Provided Post Acute Provider Quality & Resource List?: Yes  Post Acute Provider Quality and Resource List: Home Health  Delivered To: Support Person  Support Person: spouse  Method of Delivery: In person    Selected Continued Care - Admitted Since 1/20/2025       Destination    No services have been selected for the patient.                Durable Medical Equipment    No services have been selected for the patient.                Dialysis/Infusion    No services have been selected for the patient.                Home Medical Care Coordination complete.      Service Provider Services Address Phone Fax Patient Preferred    [facility] [address] [place], [place] [id number] [phone] --              Therapy    No services have been selected for the patient.                Community Resources    No services have been selected for the patient.                Community & DME    No services have been selected for the patient.

## 2025-01-23 NOTE — PLAN OF CARE
Goal Outcome Evaluation:   VSS. A&Ox4. ACHS. PRN Tylenol given x1 for pain. Pt up to bathroom with assist x1 and walker. Pt went down this morning for MRI of right ankle. Ortho consult pending.  Care continuing.

## 2025-01-24 ENCOUNTER — READMISSION MANAGEMENT (OUTPATIENT)
Dept: CALL CENTER | Facility: HOSPITAL | Age: OVER 89
End: 2025-01-24
Payer: MEDICARE

## 2025-01-24 VITALS
HEIGHT: 70 IN | HEART RATE: 104 BPM | OXYGEN SATURATION: 96 % | RESPIRATION RATE: 16 BRPM | WEIGHT: 200 LBS | DIASTOLIC BLOOD PRESSURE: 73 MMHG | BODY MASS INDEX: 28.63 KG/M2 | SYSTOLIC BLOOD PRESSURE: 113 MMHG | TEMPERATURE: 98.8 F

## 2025-01-24 LAB
GLUCOSE BLDC GLUCOMTR-MCNC: 118 MG/DL (ref 70–130)
GLUCOSE BLDC GLUCOMTR-MCNC: 154 MG/DL (ref 70–130)
GLUCOSE BLDC GLUCOMTR-MCNC: 156 MG/DL (ref 70–130)

## 2025-01-24 PROCEDURE — G0378 HOSPITAL OBSERVATION PER HR: HCPCS

## 2025-01-24 PROCEDURE — 82948 REAGENT STRIP/BLOOD GLUCOSE: CPT

## 2025-01-24 PROCEDURE — 63710000001 INSULIN LISPRO (HUMAN) PER 5 UNITS: Performed by: INTERNAL MEDICINE

## 2025-01-24 RX ORDER — CEPHALEXIN 500 MG/1
500 CAPSULE ORAL 3 TIMES DAILY
Qty: 24 CAPSULE | Refills: 0 | Status: SHIPPED | OUTPATIENT
Start: 2025-01-24 | End: 2025-02-01

## 2025-01-24 RX ADMIN — POTASSIUM CHLORIDE 20 MEQ: 750 TABLET, EXTENDED RELEASE ORAL at 09:14

## 2025-01-24 RX ADMIN — MODAFINIL 100 MG: 100 TABLET ORAL at 09:14

## 2025-01-24 RX ADMIN — CEPHALEXIN 500 MG: 500 CAPSULE ORAL at 09:14

## 2025-01-24 RX ADMIN — INSULIN LISPRO 2 UNITS: 100 INJECTION, SOLUTION INTRAVENOUS; SUBCUTANEOUS at 12:16

## 2025-01-24 RX ADMIN — INSULIN LISPRO 2 UNITS: 100 INJECTION, SOLUTION INTRAVENOUS; SUBCUTANEOUS at 17:31

## 2025-01-24 RX ADMIN — FLUTICASONE PROPIONATE 2 SPRAY: 50 SPRAY, METERED NASAL at 09:15

## 2025-01-24 RX ADMIN — CHLORTHALIDONE 50 MG: 25 TABLET ORAL at 09:13

## 2025-01-24 RX ADMIN — CEPHALEXIN 500 MG: 500 CAPSULE ORAL at 15:36

## 2025-01-24 RX ADMIN — Medication 10 ML: at 09:15

## 2025-01-24 RX ADMIN — CETIRIZINE HYDROCHLORIDE 10 MG: 10 TABLET, FILM COATED ORAL at 09:14

## 2025-01-24 RX ADMIN — ATORVASTATIN CALCIUM 40 MG: 20 TABLET, FILM COATED ORAL at 09:14

## 2025-01-24 RX ADMIN — TAMSULOSIN HYDROCHLORIDE 0.8 MG: 0.4 CAPSULE ORAL at 09:14

## 2025-01-24 RX ADMIN — PANTOPRAZOLE SODIUM 40 MG: 40 TABLET, DELAYED RELEASE ORAL at 09:15

## 2025-01-24 RX ADMIN — APIXABAN 5 MG: 5 TABLET, FILM COATED ORAL at 09:14

## 2025-01-24 RX ADMIN — ACETAMINOPHEN 325MG 650 MG: 325 TABLET ORAL at 17:30

## 2025-01-24 RX ADMIN — METOPROLOL SUCCINATE 100 MG: 50 TABLET, EXTENDED RELEASE ORAL at 09:14

## 2025-01-24 NOTE — DISCHARGE SUMMARY
[facility]  [phone]    DISCHARGE SUMMARY  [facility]    Patient Identification:  Name: Kevin Ellington  Age: 89 y.o.  Sex: male  :  1935  MRN: [id number]  Primary Care Physician: Leanna Ryder MD    Admit date: 2025  Discharge date and time:      Discharge Diagnoses:  Leg edema, right    Essential hypertension    Hyperlipemia    Stage 3a chronic kidney disease    Chronic atrial fibrillation    Type 2 diabetes mellitus without complication, without long-term current use of insulin       History of present illness from H&P:  Mr. Ellington is a 89 y.o. with a history of HTN, CKD, Afib, CAD, spinal stenosis. He is on chronic eliquis though this has been held for a few days at least when he got a spinal stimulator placed recently through [facility]. He had been on doxy post op for a few days.     He presented with right lower extremity swollen and red. Concern for DVT has he had been off a/c. He also has history of chronic deformity and degenerative changes/fracture to his right ankle. He had previously been evaluated by Dr. Hodge at [facility] but would been an extensive surgery so no current plans for surgery.       Hospital Course:     Patient came to the hospital because of severe ankle swelling.  He states his ankle was 10 out of 10 as far as pain.  While he has been here he also has some pain in his left ankle.  He is got slight petechial to ecchymotic area on his ankle with slight warmth.  The pain has improved during the hospital stay.  He was started on antibiotics he was seen by infectious disease recommends to complete a course of antibiotics on discharge.    I suspect the patient has gout or pseudogout.  I would recommend stopping his chlorthalidone and restarting his losartan.  If this recurs I would recommend that he try to get in with his orthopedist and have an arthrocentesis to diagnose.  We did have him  seen by his orthopedic group while here in the hospital and with an MRI of the ankle unremarkable for infection no further intervention at this time.  -Uric acid levels 5.9  -If he can in his ankles I recommend a course of steroids and once resolved starting allopurinol.    Hypertension-stop hydrochlorothiazide and potassium, continue with losartan and beta-blocker     Chronic kidney disease-creatinine remained stable     A-fib-heart rate stable, continue with Eliquis on discharge    Type 2 diabetes- Slight scale insulin     The patient was seen and examined on the day of discharge.    Consults:   Consults       Date and Time Order Name Status Description    1/22/2025 10:56 AM Inpatient Orthopedic Surgery Consult Completed     1/21/2025  3:07 PM Inpatient Infectious Diseases Consult Completed     1/20/2025  3:27 AM [facility] (on-call MD unless specified) Details              Results from last 7 days   Lab Units 01/23/25  0512   WBC 10*3/mm3 12.72*   HEMOGLOBIN g/dL 15.1   HEMATOCRIT % 43.7   PLATELETS 10*3/mm3 228       Results from last 7 days   Lab Units 01/23/25  0512   SODIUM mmol/L 137   POTASSIUM mmol/L 3.6   CHLORIDE mmol/L 99   CO2 mmol/L 22.4   BUN mg/dL 22   CREATININE mg/dL 1.17   GLUCOSE mg/dL 121*   CALCIUM mg/dL 9.1       Significant Diagnostic Studies:   WBC   Date Value Ref Range Status   01/23/2025 12.72 (H) 3.40 - 10.80 10*3/mm3 Final     Hemoglobin   Date Value Ref Range Status   01/23/2025 15.1 13.0 - 17.7 g/dL Final     Hematocrit   Date Value Ref Range Status   01/23/2025 43.7 37.5 - 51.0 % Final     Platelets   Date Value Ref Range Status   01/23/2025 228 140 - 450 10*3/mm3 Final     Sodium   Date Value Ref Range Status   01/23/2025 137 136 - 145 mmol/L Final     Potassium   Date Value Ref Range Status   01/23/2025 3.6 3.5 - 5.2 mmol/L Final     Chloride   Date Value Ref Range Status   01/23/2025 99 98 - 107 mmol/L Final     CO2   Date Value Ref Range Status   01/23/2025 22.4 22.0 - 29.0 mmol/L  "Final     BUN   Date Value Ref Range Status   01/23/2025 22 8 - 23 mg/dL Final     Creatinine   Date Value Ref Range Status   01/23/2025 1.17 0.76 - 1.27 mg/dL Final     Glucose   Date Value Ref Range Status   01/23/2025 121 (H) 65 - 99 mg/dL Final     Calcium   Date Value Ref Range Status   01/23/2025 9.1 8.6 - 10.5 mg/dL Final     AST (SGOT)   Date Value Ref Range Status   01/23/2025 21 1 - 40 U/L Final     ALT (SGPT)   Date Value Ref Range Status   01/23/2025 21 1 - 41 U/L Final     Alkaline Phosphatase   Date Value Ref Range Status   01/23/2025 107 39 - 117 U/L Final     No results found for: \"APTT\", \"INR\"  No results found for: \"COLORU\", \"CLARITYU\", \"SPECGRAV\", \"PHUR\", \"PROTEINUR\", \"GLUCOSEU\", \"KETONESU\", \"BLOODU\", \"NITRITE\", \"LEUKOCYTESUR\", \"BILIRUBINUR\", \"UROBILINOGEN\", \"RBCUA\", \"WBCUA\", \"BACTERIA\", \"UACOMMENT\"  No results found for: \"TROPONINT\", \"TROPONINI\", \"BNP\"  No components found for: \"HGBA1C;2\"  No components found for: \"TSH;2\"    Imaging Results (All)       Procedure Component Value Units Date/Time    MRI Ankle Right With & Without Contrast [[id number]] Collected: 01/23/25 0722     Updated: 01/23/25 0753    Narrative:      MRI ANKLE RIGHT W WO CONTRAST-     Date of Exam: 1/23/2025 4:20 AM     Indication: Rule out infection. Right ankle swelling, redness, and pain  for several days. Chronic ankle fracture. No recent trauma.     Comparison: Radiographs 1/20/2025.     Technique: Multiplanar, multisequence MR imaging of the ankle, hindfoot,  and midfoot was performed before and following the intravenous  administration of 18 mL of MultiHance.     FINDINGS:     SOFT TISSUES: Mild diffuse soft tissue edema and amorphous soft tissue  enhancement at the distal leg, ankle, hindfoot, and dorsal and lateral  midfoot. No discrete soft tissue wound is identified. No well-formed or  drainable fluid collection to suggest abscess. No cystic or solid soft  tissue mass. No nodular or masslike enhancement. Complete " effacement of  the fat of the sinus tarsi, which could reflect sinus tarsi syndrome.  Small plantar calcaneal enthesophyte with mild low signal thickening of  the proximal plantar fascia, likely sequela of mild chronic planter  fasciitis.     BONES: Chronic appearing deformity of the distal tibia and the talar  body with impaction deformity of the lateral distal tibial plafond and  talar dome, valgus angulation at the tibiotalar joint, and anterior and  posterior osteophyte formation that likely results in ankle impingement.  Speckled T1 marrow replacement, patchy bone marrow edema-like signal,  and mild bone marrow enhancement of the distal tibial plafond and and  talar dome, most likely reflecting degenerative/stress marrow edema.  Likely degenerative subchondral cystic changes in the distal tibial  plafond and talar dome with a focal 1 cm subchondral signal abnormality  at the posterior medial talar dome demonstrating central high T1 signal  and thin peripheral enhancement, likely focal osteonecrosis. Mild likely  degenerative or stress marrow edema like signal and enhancement at the  lateral malleolus and adjacent lateral calcaneus. 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 No acute fracture. No diffuse T1 marrow replacement or  destructive osseous changes definitive for osteomyelitis. No concerning  osseous lesion.     JOINTS: Severe arthropathic changes at the tibiotalar joint with trace  joint fluid and prominent enhancing synovial thickening at the  anterolateral tibiotalar joint line. Multiple  ossifications/osteochondral bodies are seen along the anterior and  lateral tibiotalar joint line, measuring up to 3.5 cm in diameter.  Abutment of the distal  fibula and the adjacent lateral calcaneus with  bony remodeling and flat pseudoarthrosis, consistent with sequela of  chronic calcaneofibular impingement. Small volume fluid at the  pseudoarthrosis communicating with a small posterior subtalar joint  effusion. Mild chondromalacia at the subtalar joint. The included  midfoot is well aligned.     LIGAMENTS: Partial ossification of the deep components of the deltoid  ligament complex, likely sequela of chronic/remote trauma. The  superficial components of the deltoid ligament complex are intact. The  anterior and posterior distal tibiofibular ligaments are intact.  Ossifications within the expected region of the anterior talofibular  ligament, likely sequela remote trauma. The calcaneofibular ligament is  intact. The Lisfranc ligament is intact.     MUSCLES AND TENDONS: Moderate flexor hallucis longus tendinopathy  posterior and just distal to the tibiotalar joint line. The posterior  tibialis and flexor digitorum longus tendons are intact. Moderate  peroneus longus tendinopathy posterior and just distal to the lateral  malleolus with mild partial-thickness tear. Suspected partial thickness  longitudinal split thickness tear of the peroneus brevis tendon  posterior and just distal to the lateral malleolus. The extensor tendons  are intact. Mild Achilles tendinopathy. Severe diffuse muscular fatty  atrophy, likely neuropathic, with mild diffuse muscular edema that is  also likely neuropathic.       Impression:         1. Severe tibiotalar joint arthropathic changes with valgus angulation,  chronic deformities of the distal tibia and talar dome, subchondral  cystic changes and bone marrow edema-like signal, and anterior and  posterior osteophyte formation that likely results in ankle impingement.  Trace tibiotalar joint effusion with diffuse enhancing synovial  thickening and multiple anterior and lateral ossifications/osteochondral  bodies. 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 Correlate  with laboratory values.  2. Small focal 1 cm subchondral region of probable osteonecrosis of the  posterior medial talar dome.  3. Sequela of chronic calcaneofibular impingement.  4. 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  5. Moderate peroneus longus tendinopathy and suspected longitudinal  split thickness tear of the peroneus brevis tendon.  6. Moderate flexor hallucis longus tendinopathy.  7. Severe diffuse muscular fatty atrophy and mild muscular edema, likely  neuropathic.     This report was finalized on 1/23/2025 7:49 AM by Heath Whitman MD on  Workstation: [id number]       XR Ankle 3+ View Right [[id number]] Collected: 01/20/25 0234     Updated: 01/20/25 0241    Narrative:      3 VIEWS RIGHT ANKLE     HISTORY: Right ankle pain     COMPARISON: None available.     FINDINGS:  Unfortunately, no prior imaging is available for comparison. I am not  convinced it can see definite acute fracture or subluxation. Advanced  degenerative changes are noted involving the tibiotalar joint, as well  as the talofibular joint. Multiple well-corticated ossific fragments are  seen, favored to be chronic. There is a chronic medial malleolar  fracture. There is soft tissue swelling noted about the ankle. Dense  vascular calcifications are noted. There is calcaneal spurring.       Impression:      This examination is very difficult to interpret in the absence of any  prior studies for comparison, and given the presence of advanced  degenerative change. Not convinced that can see an acute fracture on the  current study. Ossific fragments seen surrounding the ankle bilaterally  appear to be well-corticated, and are favored to be chronic.     This report was finalized on 1/20/2025 2:38 AM by Dr. Talia Orlando M.D on Workstation: [id number]     "       No results found for: \"SITE\", \"ALLENTEST\", \"PHART\", \"TQC5QAW\", \"PO2ART\", \"CKF1USY\", \"BASEEXCESS\", \"R0RORUOM\", \"HGBBG\", \"HCTABG\", \"OXYHEMOGLOBI\", \"METHHGBN\", \"CARBOXYHGB\", \"CO2CT\", \"BAROMETRIC\", \"MODALITY\", \"FIO2\"       Discharge Medications        New Medications        Instructions Start Date   cephalexin 500 MG capsule  Commonly known as: KEFLEX   500 mg, Oral, 3 times daily             Continue These Medications        Instructions Start Date   apixaban 5 MG tablet tablet  Commonly known as: ELIQUIS   5 mg, Oral, Every 12 Hours Scheduled      atorvastatin 40 MG tablet  Commonly known as: LIPITOR   TAKE 1 TABLET DAILY      cetirizine 10 MG tablet  Commonly known as: zyrTEC   10 mg, Daily      fluticasone 50 MCG/ACT nasal spray  Commonly known as: FLONASE   2 sprays, Nasal, Daily      freestyle lancets   Check one daily      losartan 50 MG tablet  Commonly known as: COZAAR   50 mg, Oral, Daily      metoprolol succinate  MG 24 hr tablet  Commonly known as: TOPROL-XL   TAKE 1 TABLET DAILY      modafinil 100 MG tablet  Commonly known as: PROVIGIL   100 mg, Oral, Daily      pantoprazole 40 MG EC tablet  Commonly known as: PROTONIX   40 mg, Oral, Daily      tamsulosin 0.4 MG capsule 24 hr capsule  Commonly known as: FLOMAX   TAKE 2 CAPSULES DAILY      True Metrix Air Glucose Meter w/Device kit   1 each, Not Applicable, Daily             Stop These Medications      chlorthalidone 50 MG tablet  Commonly known as: HYGROTEN     econazole nitrate 1 % cream  Commonly known as: SPECTAZOLE     FREESTYLE LITE test strip  Generic drug: glucose blood     metroNIDAZOLE 0.75 % lotion lotion  Commonly known as: FLAGYL     multivitamin tablet tablet  Commonly known as: THERAGRAN     potassium chloride 10 MEQ CR tablet  Commonly known as: KLOR-CON M10     sennosides-docusate 8.6-50 MG per tablet  Commonly known as: Senna S                Patient Instructions:       Future Appointments   Date Time Provider Department Center "   3/17/2025 11:00 AM [facility]   3/24/2025  1:00 PM Leanna Ryder MD [facility]   7/1/2025 11:45 AM Halima Salmeron MD [id number] None   12/1/2025  1:30 PM Sheila Irizarry APRN [facility]         Contact information for follow-up providers       Jarrod Hodge Jr., MD Follow up.    Specialty: Orthopedic Surgery  Contact information:  [address] [facility] [id number] [phone]               Leanna Ryder MD Follow up in 1 week(s).    Specialty: Family Medicine  Contact information:  [address] [facility] [id number] [phone]                       Contact information for after-discharge care       Home Medical Care       [facility] .    Services: Home Nursing, Home Health Services  Contact information:  [address]  [facility] [id number] [phone]                                   Discharge Order (From admission, onward)       Start     Ordered    01/24/25 1542  Discharge patient  Once        Expected Discharge Date: 01/24/25   Discharge Disposition: Home or Self Care   Physician of Record for Attribution - Please select from Treatment Team: KIA BELTRE [[id number]]   Review needed by CMO to determine Physician of Record: No      Question Answer Comment   Physician of Record for Attribution - Please select from Treatment Team KIA BELTRE    Review needed by CMO to determine Physician of Record No        01/24/25 1546                    Diet Order   Procedures    Diet: Cardiac, Diabetic; Healthy Heart (2-3 Na+); Consistent Carbohydrate; Fluid Consistency: Thin (IDDSI 0)       TEST  RESULTS PENDING AT DISCHARGE  Pending Labs       Order Current Status    Blood Culture - Blood, Arm, Left Preliminary result    Blood Culture - Blood, Arm, Right Preliminary result              Discharge instructions:  Follow up with your primary care provider in 1-2 weeks with a cbc and cmp         Total time spent discharging patient  including evaluation, post hospitalization follow up,  medication and post hospitalization instructions and education, total time exceeds 30 minutes.    Signed:  Valentin De Leon MD  1/24/2025  17:06 EST

## 2025-01-24 NOTE — NURSING NOTE
Pt A&Ox4- VSS. Pt used tylenol x1 for pain in neck and ankles.Redness and rash-like appearance noted to both ankles. Pt resting comfortably at this time. Plan of care ongoing.

## 2025-01-25 NOTE — OUTREACH NOTE
Prep Survey      Flowsheet Row Responses   [facility] patient discharged from? [facility]   Is LACE score < 7 ? No   Eligibility [facility]   Date of Admission 01/20/25   Date of Discharge 01/24/25   Discharge Disposition Home-Health Care Sv   Discharge diagnosis Leg edema, right   Does the patient have one of the following disease processes/diagnoses(primary or secondary)? Other   Does the patient have Home health ordered? Yes   What is the Home health agency?  [facility]   Is there a DME ordered? No   Prep survey completed? Yes            ENID GOLD - Registered Nurse

## 2025-01-25 NOTE — CASE MANAGEMENT/SOCIAL WORK
Case Management Discharge Note      Final Note: dc home with [facility] therapy and [facility] for in home caregivers    Provided Post Acute Provider List?: Yes  Post Acute Provider List: Home Health, Outpatient Therapy  Provided Post Acute Provider Quality & Resource List?: Yes  Post Acute Provider Quality and Resource List: Home Health  Delivered To: Support Person  Support Person: spouse  Method of Delivery: In person    Selected Continued Care - Discharged on 1/24/2025 Admission date: 1/20/2025 - Discharge disposition: Home or Self Care      Destination    No services have been selected for the patient.                Durable Medical Equipment    No services have been selected for the patient.                Dialysis/Infusion    No services have been selected for the patient.                Home Medical Care Coordination complete.      Service Provider Services Address Phone Fax Patient Preferred    [facility], Home Health Services [address] [place] [address] [phone] --              Therapy    No services have been selected for the patient.                Community Resources    No services have been selected for the patient.                Community & [facility]    No services have been selected for the patient.                         Final Discharge Disposition Code: 01 - home or self-care

## 2025-01-26 LAB
BACTERIA SPEC AEROBE CULT: NORMAL
BACTERIA SPEC AEROBE CULT: NORMAL

## 2025-01-27 ENCOUNTER — TRANSITIONAL CARE MANAGEMENT TELEPHONE ENCOUNTER (OUTPATIENT)
Dept: CALL CENTER | Facility: HOSPITAL | Age: OVER 89
End: 2025-01-27
Payer: MEDICARE

## 2025-01-27 NOTE — OUTREACH NOTE
Call Center TCM Note      Flowsheet Row Responses   [facility] patient discharged from? [place]   Does the patient have one of the following disease processes/diagnoses(primary or secondary)? Other   TCM attempt successful? Yes   Call start time 0919   Call end time 0924   Discharge diagnosis Leg edema, right   Meds reviewed with patient/caregiver? Yes   Is the patient having any side effects they believe may be caused by any medication additions or changes? No   Does the patient have all medications ordered at discharge? Yes   Is the patient taking all medications as directed (includes completed medication regime)? Yes   Comments HOSP DC FU appt 2/3/25 10 am   Does the patient have an appointment with their PCP within 7-14 days of discharge? Yes   What is the Home health agency?  [facility]   Has home health visited the patient within 72 hours of discharge? Yes   Psychosocial issues? No   Did the patient receive a copy of their discharge instructions? Yes   Nursing interventions Reviewed instructions with patient   What is the patient's perception of their health status since discharge? Improving   Is the patient/caregiver able to teach back signs and symptoms related to disease process for when to call PCP? Yes   Is the patient/caregiver able to teach back signs and symptoms related to disease process for when to call 911? Yes   Is the patient/caregiver able to teach back the hierarchy of who to call/visit for symptoms/problems? PCP, Specialist, Home health nurse, Urgent Care, ED, 911 Yes   TCM call completed? Yes   Wrap up additional comments Wife Reports improvement with Pt. Did state that his neck was sore but improving.   Call end time 0924            Khadra Hart RN    1/27/2025, 09:25 EST

## 2025-02-03 ENCOUNTER — TELEPHONE (OUTPATIENT)
Dept: FAMILY MEDICINE CLINIC | Facility: CLINIC | Age: OVER 89
End: 2025-02-03

## 2025-02-03 ENCOUNTER — OFFICE VISIT (OUTPATIENT)
Dept: FAMILY MEDICINE CLINIC | Facility: CLINIC | Age: OVER 89
End: 2025-02-03
Payer: MEDICARE

## 2025-02-03 VITALS
TEMPERATURE: 97.6 F | HEIGHT: 70 IN | OXYGEN SATURATION: 96 % | BODY MASS INDEX: 28.77 KG/M2 | HEART RATE: 78 BPM | WEIGHT: 201 LBS | DIASTOLIC BLOOD PRESSURE: 66 MMHG | SYSTOLIC BLOOD PRESSURE: 108 MMHG

## 2025-02-03 DIAGNOSIS — R60.0 PEDAL EDEMA: ICD-10-CM

## 2025-02-03 DIAGNOSIS — Z09 HOSPITAL DISCHARGE FOLLOW-UP: Primary | ICD-10-CM

## 2025-02-03 DIAGNOSIS — R35.0 URINATION FREQUENCY: ICD-10-CM

## 2025-02-03 DIAGNOSIS — I10 ESSENTIAL HYPERTENSION: ICD-10-CM

## 2025-02-03 DIAGNOSIS — R39.12 WEAK URINARY STREAM: ICD-10-CM

## 2025-02-03 LAB
BILIRUB BLD-MCNC: NEGATIVE MG/DL
CLARITY, POC: CLEAR
COLOR UR: YELLOW
EXPIRATION DATE: NORMAL
GLUCOSE UR STRIP-MCNC: NEGATIVE MG/DL
KETONES UR QL: NEGATIVE
LEUKOCYTE EST, POC: NEGATIVE
Lab: 567
NITRITE UR-MCNC: NEGATIVE MG/ML
PH UR: 6 [PH] (ref 5–8)
PROT UR STRIP-MCNC: NEGATIVE MG/DL
RBC # UR STRIP: NEGATIVE /UL
SP GR UR: 1.01 (ref 1–1.03)
UROBILINOGEN UR QL: NORMAL

## 2025-02-03 PROCEDURE — 1160F RVW MEDS BY RX/DR IN RCRD: CPT | Performed by: FAMILY MEDICINE

## 2025-02-03 PROCEDURE — 81003 URINALYSIS AUTO W/O SCOPE: CPT | Performed by: FAMILY MEDICINE

## 2025-02-03 PROCEDURE — 1111F DSCHRG MED/CURRENT MED MERGE: CPT | Performed by: FAMILY MEDICINE

## 2025-02-03 PROCEDURE — 99495 TRANSJ CARE MGMT MOD F2F 14D: CPT | Performed by: FAMILY MEDICINE

## 2025-02-03 PROCEDURE — 1125F AMNT PAIN NOTED PAIN PRSNT: CPT | Performed by: FAMILY MEDICINE

## 2025-02-03 PROCEDURE — 1159F MED LIST DOCD IN RCRD: CPT | Performed by: FAMILY MEDICINE

## 2025-02-03 RX ORDER — FUROSEMIDE 20 MG/1
20 TABLET ORAL DAILY PRN
Qty: 30 TABLET | Refills: 2 | Status: SHIPPED | OUTPATIENT
Start: 2025-02-03

## 2025-02-03 RX ORDER — POTASSIUM CHLORIDE 750 MG/1
10 CAPSULE, EXTENDED RELEASE ORAL 2 TIMES DAILY
Qty: 30 CAPSULE | Refills: 2 | Status: SHIPPED | OUTPATIENT
Start: 2025-02-03

## 2025-02-03 NOTE — TELEPHONE ENCOUNTER
Hub staff attempted to follow warm transfer process and was unsuccessful     Caller: Kevin Ellington    Relationship to patient: Self    Best call back number: [phone]    Patient is needing: PATIENT IS RETURNING A CALL FROM THE OFFICE.

## 2025-02-03 NOTE — PROGRESS NOTES
Transitional Care Follow Up Visit  Subjective     Kevin Ellington is a 89 y.o. male who presents for a transitional care management visit.    Within 48 business hours after discharge our office contacted him via telephone to coordinate his care and needs.      I reviewed and discussed the details of that call along with the discharge summary, hospital problems, inpatient lab results, inpatient diagnostic studies, and consultation reports with Kevin.     Current outpatient and discharge medications have been reconciled for the patient.  Reviewed by: Leanna Ryder MD          1/24/2025    11:28 PM   Date of TCM Phone Call   [facility]   Date of Admission 1/20/2025   Date of Discharge 1/24/2025   Discharge Disposition Home-Health Care [facility]     Risk for Readmission (LACE) Score: 10 (1/24/2025  6:00 AM)      History of Present Illness   Course During Hospital Stay: Pleasant 89-year-old male who to follow-up for hospitalization at [facility] January 20 to January 24, 2025.  He presented to the hospital after having a spinal stimulator placed, Eliquis was held for a few days prior to this.  His symptoms consisted of right lower extremity swelling with redness, concern for DVT as he was off anticoagulation for the stimulator.  He had significant pain with some petechial and ecchymosis around his ankle with slight warmth.  He was given doxycycline prior to the stimulator being placed but thought that he could have either a septic joint or DVT.  After having no evidence of DVT on ultrasound, improvement with antibiotics and evaluation with infectious disease, his symptoms were thought to be due to gout or pseudogout.  Recommendations to transition from chlorthalidone to losartan to see if this would improve his uric acid levels and follow-up with Ortho for arthrocentesis.    He did get an MRI of the ankle which showed no evidence of osteomyelitis or infection, uric acid level was  "5.9.    Ultimately the cause of his swelling was not found but likely suspected to be gout or pseudogout.    From a blood pressure standpoint he was stable.  HCTZ and potassium was stopped and he was started on losartan, beta-blocker continued.  He was sent home on metoprolol 100 mg daily and losartan 50 mg daily.    Having a weak stream with urination and since being hospitalized his urinary stream has been weaker.      The following portions of the patient's history were reviewed and updated as appropriate: allergies, current medications, past family history, past medical history, past social history, past surgical history, and problem list.    Review of Systems    Objective   /66   Pulse 78   Temp 97.6 °F (36.4 °C)   Ht 177.8 cm (70\")   Wt 91.2 kg (201 lb)   SpO2 96%   BMI 28.84 kg/m²   Physical Exam  Vitals and nursing note reviewed.   Constitutional:       General: He is not in acute distress.     Appearance: He is well-developed.   HENT:      Head: Normocephalic.      Nose: Nose normal.   Cardiovascular:      Heart sounds: No murmur heard.  Pulmonary:      Effort: Pulmonary effort is normal. No respiratory distress.   Musculoskeletal:         General: Normal range of motion.      Comments: Swollen, slightly tender ankle on the lateral aspect posterior to the malleolus, no bony tenderness.  The alignment of his ankle is abnormal some hypertrophy.  Normal pulses, normal sensation neurovascular intact.   Skin:     General: Skin is warm and dry.      Findings: No rash.   Neurological:      Mental Status: He is alert and oriented to person, place, and time.   Psychiatric:         Behavior: Behavior normal.         Thought Content: Thought content normal.         Judgment: Judgment normal.         Assessment & Plan   Diagnoses and all orders for this visit:    1. Hospital discharge follow-up (Primary)    2. Essential hypertension    3. Pedal edema  -     furosemide (Lasix) 20 MG tablet; Take 1 tablet by " mouth Daily As Needed (swelling).  Dispense: 30 tablet; Refill: 2  -     potassium chloride (MICRO-K) 10 MEQ CR capsule; Take 1 capsule by mouth 2 (Two) Times a Day.  Dispense: 30 capsule; Refill: 2    4. Weak urinary stream  -     Ambulatory Referral to Urology  -     Urine Culture - Urine, Urine, Clean Catch    5. Urination frequency  -     Urine Culture - Urine, Urine, Clean Catch  -     POC Urinalysis Dipstick, Automated    Pleasant 89-year-old male here to follow-up for right ankle swelling that is thankfully negative for infection or DVT.  He is back on his normal meds with the exception of holding chlorthalidone.  I do agree that it appears that his symptoms were likely from gout or pseudogout which could be precipitated by this medicine.  But since stopping chlorthalidone his ankle swelling has gotten worse.    Plan:  - Treat pedal edema with Lasix 20 mg daily, replace potassium above, follow-up as scheduled.  - Continue follow-up with Ortho next week, consider aspiration to determine gout versus pseudogout.  Less symptomatic so will defer to Ortho if this is the best time.    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  He did have a UTI in August.  It does seem to be getting worse since stopping chlorthalidone.    Plan:  - Collect urine specimen to rule out UTI above  - Start diuretic as above  - Follow-up with urology    Return if symptoms worsen or fail to improve, for Recheck follow-up as scheduled.    Leanna Ryder MD

## 2025-02-03 NOTE — PROGRESS NOTES
Please verify patient has been notified by [employer], if not please call with results.  The urinalysis has resulted as negative for infection, culture pending.  Please let us know if you have further questions.     Thank you

## 2025-02-08 LAB
BACTERIA UR CULT: ABNORMAL
BACTERIA UR CULT: ABNORMAL
OTHER ANTIBIOTIC SUSC ISLT: ABNORMAL

## 2025-02-11 DIAGNOSIS — N30.00 ACUTE CYSTITIS WITHOUT HEMATURIA: Primary | ICD-10-CM

## 2025-02-11 RX ORDER — NITROFURANTOIN 25; 75 MG/1; MG/1
100 CAPSULE ORAL 2 TIMES DAILY
Qty: 14 CAPSULE | Refills: 0 | Status: SHIPPED | OUTPATIENT
Start: 2025-02-11 | End: 2025-02-18

## 2025-02-12 ENCOUNTER — CLINICAL SUPPORT (OUTPATIENT)
Dept: FAMILY MEDICINE CLINIC | Facility: CLINIC | Age: OVER 89
End: 2025-02-12
Payer: MEDICARE

## 2025-02-12 DIAGNOSIS — N30.00 ACUTE CYSTITIS WITHOUT HEMATURIA: Primary | ICD-10-CM

## 2025-02-12 PROCEDURE — 96372 THER/PROPH/DIAG INJ SC/IM: CPT | Performed by: FAMILY MEDICINE

## 2025-02-12 RX ORDER — CEFTRIAXONE 1 G/1
1 INJECTION, POWDER, FOR SOLUTION INTRAMUSCULAR; INTRAVENOUS ONCE
Status: COMPLETED | OUTPATIENT
Start: 2025-02-12 | End: 2025-02-12

## 2025-02-12 RX ADMIN — CEFTRIAXONE 1 G: 1 INJECTION, POWDER, FOR SOLUTION INTRAMUSCULAR; INTRAVENOUS at 11:49

## 2025-02-12 NOTE — PROGRESS NOTES
Pt here for injection only appt - Ceftriaxone 1G per 's 2/11/25 note in 2/3/25 UA lab results. See below:       Leanna Ryder MD  2/11/2025  5:43 PM EST Back to Top      I have called the patient patient with results.  I have sent Macrobid but with the delay of antibiotics with [facility] we discussed doing a nurse MA appointment tomorrow for ceftriaxone 1 g.     Staff, could you set up a nurse appointment for ceftriaxone 1 g injection tomorrow? If possible around       I offered pt the option of spitting Ceftriaxone in half for redistributed ml into bilateral or administering into one hip. Pt elected to receive all in RVG.   Pt tolerated injection well. PPE worn.   Pt waited for several minutes w wife after injection prior to leaving to ensure no immediate adverse effect.   Pt will call if needed.

## 2025-03-14 RX ORDER — LOSARTAN POTASSIUM 50 MG/1
50 TABLET ORAL DAILY
Qty: 90 TABLET | Refills: 3 | Status: SHIPPED | OUTPATIENT
Start: 2025-03-14

## 2025-03-17 DIAGNOSIS — E11.9 TYPE 2 DIABETES MELLITUS WITHOUT COMPLICATION, WITHOUT LONG-TERM CURRENT USE OF INSULIN: ICD-10-CM

## 2025-03-17 DIAGNOSIS — N18.31 STAGE 3A CHRONIC KIDNEY DISEASE: ICD-10-CM

## 2025-03-17 DIAGNOSIS — K21.9 GASTROESOPHAGEAL REFLUX DISEASE WITHOUT ESOPHAGITIS: ICD-10-CM

## 2025-03-17 DIAGNOSIS — R53.1 WEAKNESS: ICD-10-CM

## 2025-03-17 DIAGNOSIS — I10 ESSENTIAL HYPERTENSION: Primary | ICD-10-CM

## 2025-03-18 DIAGNOSIS — G47.419 PRIMARY NARCOLEPSY WITHOUT CATAPLEXY: ICD-10-CM

## 2025-03-18 RX ORDER — MODAFINIL 100 MG/1
100 TABLET ORAL DAILY
Qty: 30 TABLET | Refills: 2 | Status: SHIPPED | OUTPATIENT
Start: 2025-03-18

## 2025-03-18 NOTE — TELEPHONE ENCOUNTER
Caller: ChandanaBrynuha OSHEA    Relationship: Self    Best call back number:     [phone] (Mobile)       Requested Prescriptions:   Requested Prescriptions     Pending Prescriptions Disp Refills    modafinil (PROVIGIL) 100 MG tablet 30 tablet 2     Sig: Take 1 tablet by mouth Daily.        Pharmacy where request should be sent: [facility] [address] [phone] [facility] [phone]      Last office visit with prescribing clinician: 2/3/2025   Last telemedicine visit with prescribing clinician: Visit date not found   Next office visit with prescribing clinician: 3/24/2025     Additional details provided by patient:     Does the patient have less than a 3 day supply:  [] Yes  [] No    Would you like a call back once the refill request has been completed: [] Yes [] No    If the office needs to give you a call back, can they leave a voicemail: [] Yes [] No    Uziel Castellanos   03/18/25 12:34 EDT

## 2025-03-18 NOTE — TELEPHONE ENCOUNTER
Rx Refill Note  Requested Prescriptions     Pending Prescriptions Disp Refills    modafinil (PROVIGIL) 100 MG tablet 30 tablet 2     Sig: Take 1 tablet by mouth Daily.      Last office visit with prescribing clinician: 2/3/2025   Last telemedicine visit with prescribing clinician: Visit date not found   Next office visit with prescribing clinician: 3/24/2025                         Would you like a call back once the refill request has been completed: [] Yes [] No    If the office needs to give you a call back, can they leave a voicemail: [] Yes [] No    Jairo Griffin CMA/LMR  03/18/25, 12:37 EDT

## 2025-03-19 ENCOUNTER — PATIENT ROUNDING (BHMG ONLY) (OUTPATIENT)
Dept: URGENT CARE | Facility: CLINIC | Age: OVER 89
End: 2025-03-19
Payer: MEDICARE

## 2025-03-19 NOTE — ED NOTES
Thank you for letting us care for you in your recent visit to our [facility]. We would love to hear about your experience with us.    We’re always looking for ways to make our patients’ experiences even better. Do you have any recommendations on ways we may improve?     I appreciate you taking the time to respond. Please be on the lookout for a survey about your recent visit from [facility] via text or email. We would greatly appreciate if you could fill that out and turn it back in. We want your voice to be heard and we value your feedback.   Thank you for choosing [facility] for your healthcare needs.     Greg Molina RN/Practice Manager

## 2025-03-25 ENCOUNTER — TELEPHONE (OUTPATIENT)
Dept: FAMILY MEDICINE CLINIC | Facility: CLINIC | Age: OVER 89
End: 2025-03-25

## 2025-03-25 NOTE — TELEPHONE ENCOUNTER
Please let her know that I cannot review any records while the patient is actively admitted to [facility].  I will be able to look at his records after he is discharged.    Please let me know if she still wants me to call her.

## 2025-03-25 NOTE — TELEPHONE ENCOUNTER
Received VM from patient wife requesting a call back states she would like / needs you're opinion on this recent diagnosis , not sure if you can be able to see patient while he's currently at [facility] .    Please advise patient wife call back is [phone]    Please advise if any test or anything needs to be requested , I will give a call to request them , thanks in advance .

## 2025-03-25 NOTE — TELEPHONE ENCOUNTER
Spoke with patient wife said once they get the approval they will be home within a few days currently will be going to rehab , wife will give another call back once patient is discharged .

## 2025-03-25 NOTE — TELEPHONE ENCOUNTER
Caller: Tiffanie Ellington    Relationship: Emergency Contact    Best call back number: [phone]*    What is the best time to reach you: ANYTIM    Who are you requesting to speak with (clinical staff, provider,  specific staff member): DR. PARRA    What was the call regarding: PATIENT'S SPOUSE CALLING REQUESTING A CALL BACK REGARDING THE PATIENT'S NEW DIAGNOSIS OF CONGESTIVE HEART FAILURE. TIFFANIE STATES THAT THE PATIENT IS CURRENTLY HOSPITALIZED AT [facility].

## 2025-03-28 ENCOUNTER — READMISSION MANAGEMENT (OUTPATIENT)
Dept: CALL CENTER | Facility: HOSPITAL | Age: OVER 89
End: 2025-03-28
Payer: MEDICARE

## 2025-03-28 NOTE — OUTREACH NOTE
Prep Survey      Flowsheet Row Responses   [facility] facility patient discharged from? Non-BH   Is LACE score < 7 ? Non-BH Discharge   Eligibility Not Eligible   What are the reasons patient is not eligible? [facility]acute Care Center  [Rehab Facility]   Does the patient have one of the following disease processes/diagnoses(primary or secondary)? Other   Prep survey completed? Yes            Rajwinder CESPEDES - Registered Nurse

## 2025-04-11 ENCOUNTER — OFFICE VISIT (OUTPATIENT)
Dept: FAMILY MEDICINE CLINIC | Facility: CLINIC | Age: OVER 89
End: 2025-04-11
Payer: MEDICARE

## 2025-04-11 VITALS
HEIGHT: 70 IN | OXYGEN SATURATION: 97 % | BODY MASS INDEX: 27.86 KG/M2 | SYSTOLIC BLOOD PRESSURE: 128 MMHG | TEMPERATURE: 98.7 F | WEIGHT: 194.6 LBS | DIASTOLIC BLOOD PRESSURE: 72 MMHG | HEART RATE: 82 BPM

## 2025-04-11 DIAGNOSIS — J18.9 PNEUMONIA OF RIGHT MIDDLE LOBE DUE TO INFECTIOUS ORGANISM: ICD-10-CM

## 2025-04-11 DIAGNOSIS — G47.419 PRIMARY NARCOLEPSY WITHOUT CATAPLEXY: ICD-10-CM

## 2025-04-11 DIAGNOSIS — R06.02 SHORTNESS OF BREATH: ICD-10-CM

## 2025-04-11 DIAGNOSIS — R05.2 SUBACUTE COUGH: ICD-10-CM

## 2025-04-11 DIAGNOSIS — Z09 HOSPITAL DISCHARGE FOLLOW-UP: Primary | ICD-10-CM

## 2025-04-11 RX ORDER — AZITHROMYCIN 250 MG/1
TABLET, FILM COATED ORAL
Qty: 6 TABLET | Refills: 0 | Status: SHIPPED | OUTPATIENT
Start: 2025-04-11

## 2025-04-11 RX ORDER — DOXYCYCLINE 100 MG/1
100 CAPSULE ORAL 2 TIMES DAILY
Qty: 14 CAPSULE | Refills: 0 | Status: SHIPPED | OUTPATIENT
Start: 2025-04-11 | End: 2025-04-18

## 2025-04-11 RX ORDER — IPRATROPIUM BROMIDE AND ALBUTEROL SULFATE 2.5; .5 MG/3ML; MG/3ML
3 SOLUTION RESPIRATORY (INHALATION) EVERY 4 HOURS PRN
Qty: 150 ML | Refills: 1 | Status: SHIPPED | OUTPATIENT
Start: 2025-04-11

## 2025-04-11 RX ORDER — IPRATROPIUM BROMIDE AND ALBUTEROL SULFATE 2.5; .5 MG/3ML; MG/3ML
3 SOLUTION RESPIRATORY (INHALATION)
Status: SHIPPED | OUTPATIENT
Start: 2025-04-11

## 2025-04-11 NOTE — PROGRESS NOTES
Transitional Care Follow Up Visit  Subjective     Kevin Ellington is a 89 y.o. male who presents for a transitional care management visit.    Within 48 business hours after discharge our office contacted him via telephone to coordinate his care and needs.      I reviewed and discussed the details of that call along with the discharge summary, hospital problems, inpatient lab results, inpatient diagnostic studies, and consultation reports with Kevin.     Current outpatient and discharge medications have been reconciled for the patient.  Reviewed by: Leanna Ryder MD          1/24/2025    11:28 PM   Date of TCM Phone Call   [facility]   Date of Admission 1/20/2025   Date of Discharge 1/24/2025   Discharge Disposition Home-Health Care [facility]     Risk for Readmission (LACE) No data recorded    History of Present Illness   Course During Hospital Stay: Pleasant 89-year-old male here to follow-up for hospitalization follow-up from [facility], he was admitted to the hospital from March 22 to March 28, 2025 due to acute exacerbation of heart failure with a preserved ejection fraction and generalized weakness.  Then he went to subacute rehab and returned home April 8.  Echocardiogram shows an EF of 60% without regional wall motion or significant valvular abnormalities.  He was treated with IV diuretics.  History of Present Illness  The patient is here for a follow-up after being hospitalized at [facility] for a new diagnosis of heart failure.  He is here with his wife Tiffanie.    He was admitted to [facility] on March 22, 2025, because he was feeling very weak and couldn't get up from his chair. He had a persistent cough, and they initially thought it might be pneumonia, so they did a chest x-ray. They found fluid in his lungs and did an echocardiogram, which led to the heart failure diagnosis. His heart was reported to be pumping at 60%, but it wasn't emptying completely. He also has some  "swelling, which is minimal today. He has an appointment with Dr. Hope on July 1, 2025. During his hospital stay, he was prescribed Lasix and potassium supplements.    For his cough, he was given Mucinex during his rehabilitation stay, which ended two days ago. He didn't receive any antibiotics during this time. Initially, he went to [facility] on March 16, 2025, and was given a one-week course of amoxicillin for a suspected respiratory virus, but his cough persisted. He had a chest x-ray before leaving rehab but hasn't received the results yet. He has been sleeping in a recliner because lying flat makes his cough worse.    He was in the process of starting modafinil when he called our office, but he hasn't received it yet. He has enough for a few days but doesn't think it's bothering him too much. He didn't take it for a while, and when he got home, he took some prepared pills he had, but didn't notice any difference. He doesn't drive anymore.    MEDICATIONS  Current: Lasix, potassium chloride, Mucinex, modafinil.  Discontinued: Chlorthalidone, amoxicillin.       The following portions of the patient's history were reviewed and updated as appropriate: allergies, current medications, past family history, past medical history, past social history, past surgical history, and problem list.    Review of Systems    Objective   /72   Pulse 82   Temp 98.7 °F (37.1 °C)   Ht 177.8 cm (70\")   Wt 88.3 kg (194 lb 9.6 oz)   SpO2 97%   BMI 27.92 kg/m²   Physical Exam  Vitals and nursing note reviewed.   Constitutional:       General: He is not in acute distress.     Appearance: He is well-developed.   HENT:      Head: Normocephalic.      Nose: Nose normal.   Cardiovascular:      Rate and Rhythm: Normal rate and regular rhythm.      Heart sounds: Normal heart sounds. No murmur heard.  Pulmonary:      Effort: Pulmonary effort is normal. No respiratory distress.      Breath sounds: Normal breath sounds.      " Comments: No wheezing or crackles on exam however he did have noticeable improved movement of air after to using the DuoNebs, improved cough  Musculoskeletal:         General: Normal range of motion.   Skin:     General: Skin is warm and dry.      Findings: No rash.   Neurological:      Mental Status: He is alert and oriented to person, place, and time.   Psychiatric:         Behavior: Behavior normal.         Thought Content: Thought content normal.         Judgment: Judgment normal.           Assessment & Plan   Diagnoses and all orders for this visit:    1. 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 Subacute cough  -     XR Chest 2 View  -     ipratropium-albuterol (DUO-NEB) nebulizer solution 3 mL  -     CT Chest Without Contrast; Future    4. Shortness of breath  -     CT Chest Without Contrast; Future    5. Pneumonia of right middle lobe due to infectious organism  -     azithromycin (ZITHROMAX) 250 MG tablet; Take 2 tablets the first day, then 1 tablet daily for 4 days.  Dispense: 6 tablet; Refill: 0  -     doxycycline (VIBRAMYCIN) 100 MG capsule; Take 1 capsule by mouth 2 (Two) Times a Day for 7 days.  Dispense: 14 capsule; Refill: 0  -     ipratropium-albuterol (DUO-NEB) 0.5-2.5 mg/3 ml nebulizer; Take 3 mL by nebulization Every 4 (Four) Hours As Needed (cough).  Dispense: 150 mL; Refill: 1  -     CT Chest Without Contrast; Future      Assessment & Plan  Recent diagnosis of heart failure: Diagnosis made during recent hospitalization, BNP level on admission was normal,  Ejection fraction at 60%, within normal range for age group (2023 with a EF of 53%), x-ray concerning for some volume overload and pedal edema.  He is also sleeping in a recliner due to cough.  He was already on Lasix and his dose was increased from 20 to 40 mg to treat his fluid overload.  -I am uncertain if this new diagnosis is accurate?  It does seem that the volume overload could be infectious  in nature especially as both he and his wife have had a pretty significant cough and upper respiratory infection.  He did get treated amoxicillin earlier in March.  I did advise that they make an appoint with his cardiologist Dr. Salmeron to get a second opinion about this new diagnosis.  - Continue Lasix regimen and potassium supplements  -Will check electrolytes today    Pneumonia in the right middle lobe and cough: Started at the beginning of March, treated with amoxicillin and promethazine cough syrup and guaifenesin.  Chest x-ray in the hospital negative for pneumonia.  He states he had an x-ray performed April 7  subacute rehab but those records are not available.  It is possible that these results have not returned yet.  - Order chest x-ray today which does look like a pneumonia  -DuoNebs performed in the office with improvement in his ability to breathe and improved cough.  -Will follow-up with x-ray especially with the question of heart failure, I think it would be helpful to know if his symptoms are really more pulmonary in nature  -Follow-up in 3 months unless not improving.      Hypersomnia: Well-controlled:  - Previously taking modafinil, not received recently, Reports no significant difference in symptoms without it  - Prefers to discontinue modafinil.  Discussed it would be better for his heart to stop this medication overall.  - Prescription for modafinil will not be renewed unless requested by patient    Earwax accumulation:  - Some earwax buildup  - No discomfort reported  - No intervention needed at this time      40 minutes spent with patient reviewing results, obtaining history from his wife, performing exam, counseling patient and documenting in medical record.  Time for x-ray or DuoNebs not included in the time above.  Return in about 3 months (around 7/11/2025), or if symptoms worsen or fail to improve, for Recheck .    Leanna Ryder MD      Patient or patient representative verbalized consent  for the use of Ambient Listening during the visit with  Leanna Ryder MD for chart documentation. 4/11/2025  10:55 EDT

## 2025-04-11 NOTE — Clinical Note
Dr. Salmeron!  I hope all is going well for you.  This pleasant patient was recently discharged from [facility] for new diagnosis of EF preserved heart failure.  I will be honest I am a little perplexed at the diagnosis, he had a normal BNP, EF of 60%, no systolic or diastolic dysfunction.  But he did have pulmonary infiltrates.  It seems to me that the pulmonary infiltrates are from an upper respiratory infection he has a pretty significant cough which was present prior to his admission.  He has an appointment with you in July, would you want to see him sooner to assess this potential diagnosis?  I would feel much more comfortable seeing your assessment of his heart then my impression that I think it was a misdiagnosis.  I know there could be other factors that I am not aware of. Thank you so much! Leanna

## 2025-04-14 ENCOUNTER — TELEPHONE (OUTPATIENT)
Dept: CARDIOLOGY | Age: OVER 89
End: 2025-04-14
Payer: MEDICARE

## 2025-04-14 NOTE — TELEPHONE ENCOUNTER
Called Kevin Ellington and scheduled hospital f/u with Dr. Salmeron on 4/17 at 1230 pm.    Thank you,  July BHATIA RN  Triage Nurse OSVALDOG  04/14/25 12:50 EDT

## 2025-04-14 NOTE — TELEPHONE ENCOUNTER
----- Message from Halima Salmeron sent at 4/14/2025 12:24 PM EDT -----  Can we get this patient scheduled for a hospital follow up appointment?  thanks

## 2025-04-15 NOTE — ANESTHESIA PROCEDURE NOTES
Right-sided lumbar facet medial branch radiofrequency ablation to cover the levels of L3-L4, L4-L5, and L5-S1.    Pre-sedation assessment completed: 11/13/2020 7:54 AM    Patient reassessed immediately prior to procedure    Patient location during procedure: Pain [facility]  Start time: 11/13/2020 8:03 AM  Stop Time: 11/13/2020 8:33 AM    Reason for block: procedure for pain  Performed by  Anesthesiologist: Simon Rueda MD  Preanesthetic Checklist  Completed: patient identified, site marked, surgical consent, pre-op evaluation, timeout performed, IV checked, risks and benefits discussed and monitors and equipment checked  Prep:  Patient position: prone  Sterile barriers:cap, gloves, mask and sterile barrier  Prep: ChloraPrep  Patient monitoring: blood pressure monitoring, continuous pulse oximetry and EKG  Procedure:  Sedation:yes  Approach:unilateral right  Guidance:fluoroscopy  Location:lumbar  Interspace:  to cover the levels of L3-L4, L4-L5, and L5-S1.  Needle type: Radiofrequency ablation needles with 1 cm active tips.  Needle Gauge:20 G  Aspiration:negative  Medications:  Comment:0.5 cc of 2% lidocaine was injected at each level after positive sensory and negative motor stimulation testing was performed.      Post Assessment  Injection Assessment: negative  Patient Tolerance:comfortable throughout block  Complications:no  Additional Notes  The patient was brought into the procedure room and placed in a prone position and was comfortable.  ChloraPrep was used and allowed to dry appropriately.  Sterile towels were placed around the patient's back.  An ipsilateral oblique and caudal tilt was used on the x-ray machine to visualize targets.  Skin was numbed up with 2 cc of 1% lidocaine at each injection site.  Radiofrequency needles were advanced to the groove between the transverse process and the superior articular process.  This was done to cover the right-sided L3-L4, L4-L5, and L5-S1 level(s) (designated by  the SAP and transverse process).  Once the needle was seated against the superior margin of the transverse process, where it joins the superior articular process of the facet, cannula was walked off the superior margin of the transverse process and advanced 2 mm to position the active tip along the course of the medial branch nerve.  Sensory testing was positive at each location with a stimulation at 50 Hz at less than 0.5 V.  There was no motor stimulation to the affected myotomes of the lower extremities at 2 Hz up to 2 V.  Impedances were 231, 235, 310, and 358 ohms respectively.  Great care was taken not to move the cannulae.  Each level was anesthetized with 0.5 mL of 2% lidocaine, and lesions were created at 80 °C for 90 seconds.  Needles were removed and bandages were placed per nursing.  At no time during the ablation did the patient have any pain or symptoms radiating down the buttocks, groin, hip, or leg.  The patient was taken to recovery by nursing and observed appropriately.    Post-Op Diagnosis Codes:     * Spondylosis of lumbar region without myelopathy or radiculopathy (M47.816)     * DDD (degenerative disc disease), lumbar (M51.36)     * Lumbar stenosis (M48.061)    The patient was observed in recovery with no evidence of new onset neurological deficits or other problems. All questions were answered. The patient was discharged with appropriate instructions.               Calm
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
## 2025-04-17 ENCOUNTER — OFFICE VISIT (OUTPATIENT)
Age: OVER 89
End: 2025-04-17
Payer: MEDICARE

## 2025-04-17 VITALS
OXYGEN SATURATION: 98 % | HEIGHT: 70 IN | DIASTOLIC BLOOD PRESSURE: 76 MMHG | BODY MASS INDEX: 28.35 KG/M2 | SYSTOLIC BLOOD PRESSURE: 122 MMHG | HEART RATE: 97 BPM | WEIGHT: 198 LBS

## 2025-04-17 DIAGNOSIS — Z95.5 S/P CORONARY ARTERY STENT PLACEMENT: ICD-10-CM

## 2025-04-17 DIAGNOSIS — I25.10 CORONARY ARTERY DISEASE INVOLVING NATIVE CORONARY ARTERY OF NATIVE HEART WITHOUT ANGINA PECTORIS: Primary | ICD-10-CM

## 2025-04-17 DIAGNOSIS — I10 ESSENTIAL HYPERTENSION: ICD-10-CM

## 2025-04-17 DIAGNOSIS — I48.20 CHRONIC ATRIAL FIBRILLATION: ICD-10-CM

## 2025-04-17 DIAGNOSIS — E78.5 HYPERLIPIDEMIA, UNSPECIFIED HYPERLIPIDEMIA TYPE: ICD-10-CM

## 2025-04-17 DIAGNOSIS — E11.9 TYPE 2 DIABETES MELLITUS WITHOUT COMPLICATION, WITHOUT LONG-TERM CURRENT USE OF INSULIN: ICD-10-CM

## 2025-04-17 NOTE — LETTER
April 17, 2025     Leanna Ryder MD  [address] [facility] [address]    Patient: Kevin Ellington   YOB: 1935   Date of Visit: 4/17/2025       Dear Leanna Ryder MD,    Kevin Ellington was in my office today. Below are the relevant portions of my assessment and plan of care.           If you have questions, please do not hesitate to call me. I look forward to following Kevin along with you.         Sincerely,        Halima Salmeron MD        CC: No Recipients

## 2025-05-07 ENCOUNTER — HOSPITAL ENCOUNTER (OUTPATIENT)
Facility: HOSPITAL | Age: OVER 89
Discharge: HOME | End: 2025-05-07
Admitting: FAMILY MEDICINE
Payer: MEDICARE

## 2025-05-07 DIAGNOSIS — J18.9 PNEUMONIA OF RIGHT MIDDLE LOBE DUE TO INFECTIOUS ORGANISM: ICD-10-CM

## 2025-05-07 DIAGNOSIS — R05.2 SUBACUTE COUGH: ICD-10-CM

## 2025-05-07 DIAGNOSIS — R06.02 SHORTNESS OF BREATH: ICD-10-CM

## 2025-05-07 PROCEDURE — 71250 CT THORAX DX C-: CPT

## 2025-05-19 DIAGNOSIS — R60.0 PEDAL EDEMA: ICD-10-CM

## 2025-05-19 DIAGNOSIS — K21.9 GASTROESOPHAGEAL REFLUX DISEASE WITHOUT ESOPHAGITIS: ICD-10-CM

## 2025-05-19 RX ORDER — FUROSEMIDE 20 MG/1
20 TABLET ORAL DAILY PRN
Qty: 30 TABLET | Refills: 2 | Status: SHIPPED | OUTPATIENT
Start: 2025-05-19

## 2025-05-19 RX ORDER — PANTOPRAZOLE SODIUM 40 MG/1
40 TABLET, DELAYED RELEASE ORAL DAILY
Qty: 90 TABLET | Refills: 3 | Status: SHIPPED | OUTPATIENT
Start: 2025-05-19

## 2025-05-19 NOTE — TELEPHONE ENCOUNTER
PATIENT CALLED FOR MEDICATION REFILL OF    furosemide (Lasix) 20 MG tablet   HE HAS A FEW DAYS LEFT    [facility] [id number] - [facility] [address] AT [facility] - [phone]  - [phone]  [phone]     CALL BACK NUMBER  [phone]

## 2025-05-19 NOTE — TELEPHONE ENCOUNTER
PATIENT CALLED FOR MEDICATION REFILL OF    pantoprazole (PROTONIX) 40 MG EC tablet   HE HAS ABOUT 10 DAYS LEFT    [facility] - [address] - [phone] [facility] [phone] [facility] [phone]     CALL BACK NUMBER  [phone]

## 2025-06-03 ENCOUNTER — OFFICE VISIT (OUTPATIENT)
Dept: FAMILY MEDICINE CLINIC | Facility: CLINIC | Age: OVER 89
End: 2025-06-03
Payer: MEDICARE

## 2025-06-03 VITALS
HEART RATE: 78 BPM | DIASTOLIC BLOOD PRESSURE: 60 MMHG | TEMPERATURE: 97.6 F | HEIGHT: 70 IN | SYSTOLIC BLOOD PRESSURE: 110 MMHG | BODY MASS INDEX: 28.63 KG/M2 | WEIGHT: 200 LBS

## 2025-06-03 DIAGNOSIS — N18.31 STAGE 3A CHRONIC KIDNEY DISEASE: ICD-10-CM

## 2025-06-03 DIAGNOSIS — K21.9 GASTROESOPHAGEAL REFLUX DISEASE WITHOUT ESOPHAGITIS: ICD-10-CM

## 2025-06-03 DIAGNOSIS — K31.89 GASTRIC MASS: Primary | ICD-10-CM

## 2025-06-03 DIAGNOSIS — E11.9 TYPE 2 DIABETES MELLITUS WITHOUT COMPLICATION, WITHOUT LONG-TERM CURRENT USE OF INSULIN: ICD-10-CM

## 2025-06-03 RX ORDER — POTASSIUM CHLORIDE 750 MG/1
10 TABLET, EXTENDED RELEASE ORAL 2 TIMES DAILY
COMMUNITY

## 2025-06-03 RX ORDER — POTASSIUM CHLORIDE 750 MG/1
10 CAPSULE, EXTENDED RELEASE ORAL 2 TIMES DAILY
Qty: 180 CAPSULE | Refills: 1 | Status: SHIPPED | OUTPATIENT
Start: 2025-06-03

## 2025-06-03 RX ORDER — PANTOPRAZOLE SODIUM 40 MG/1
40 TABLET, DELAYED RELEASE ORAL DAILY
Qty: 90 TABLET | Refills: 3 | Status: SHIPPED | OUTPATIENT
Start: 2025-06-03

## 2025-06-03 RX ORDER — POTASSIUM CHLORIDE 750 MG/1
10 CAPSULE, EXTENDED RELEASE ORAL 2 TIMES DAILY
COMMUNITY
End: 2025-06-03 | Stop reason: SDUPTHER

## 2025-06-03 NOTE — PROGRESS NOTES
"Chief Complaint  Imaging Only (Ct chest  follow up   on more detail with dr pelayo ) diabetes    Subjective        Kevin Ellington presents to [facility]  History of Present Illness    History of Present Illness  The patient presents for evaluation of pneumonia, stomach wall thickening, gallstones, enlarged prostate, and diabetes.    Pneumonia  He has a persistent cough without respiratory distress and a history of 2 UTIs earlier this year.  - Onset: Persistent  - Character: Cough without respiratory distress  - Severity: History of 2 UTIs earlier this year    Stomach Wall Thickening  No difficulty swallowing or abdominal pain, but occasional reflux. Upcoming appointment with nurse practitioner in 12/2025, unaware of scan results.  - Character: Occasional reflux  - Timing: Upcoming appointment with nurse practitioner in 12/2025    Gallstones  Diagnosed with gallstones causing discomfort.  - Character: Discomfort    Enlarged Prostate  Enlarged prostate managed by urologist, experiencing incomplete bladder emptying and incontinence, uses adult diapers.  - Character: Incomplete bladder emptying and incontinence  - Alleviating Factors: Managed by urologist, uses adult diapers    Diabetes  Monitoring blood glucose and weight, decreased from 220 to 191 pounds. On Lasix for 8 days without adverse effects.  - Onset: Monitoring blood glucose and weight  - Duration: Weight decreased from 220 to 191 pounds  - Alleviating Factors: On Lasix for 8 days without adverse effects    Moderate Constipation  Moderate constipation, takes MiraLAX.  - Character: Moderate constipation  - Alleviating Factors: Takes MiraLAX    MEDICATIONS  Current: Eliquis, MiraLAX, Lasix       Objective   Vital Signs:  /60   Pulse 78   Temp 97.6 °F (36.4 °C)   Ht 177.8 cm (70\")   Wt 90.7 kg (200 lb)   BMI 28.70 kg/m²   Estimated body mass index is 28.7 kg/m² as calculated from the following:    Height as of this " "encounter: 177.8 cm (70\").    Weight as of this encounter: 90.7 kg (200 lb).    BMI is >= 25 and <30. (Overweight) The following options were offered after discussion;: exercise counseling/recommendations and nutrition counseling/recommendations      Physical Exam  Vitals and nursing note reviewed.   Constitutional:       General: He is not in acute distress.     Appearance: He is well-developed.   HENT:      Head: Normocephalic.      Nose: Nose normal.   Cardiovascular:      Rate and Rhythm: Normal rate and regular rhythm.      Heart sounds: Normal heart sounds. No murmur heard.  Pulmonary:      Effort: Pulmonary effort is normal. No respiratory distress.      Breath sounds: Normal breath sounds.   Abdominal:      General: Bowel sounds are normal. There is no distension.      Palpations: Abdomen is soft.      Tenderness: There is no abdominal tenderness. There is no guarding.   Musculoskeletal:         General: Normal range of motion.   Skin:     General: Skin is warm and dry.      Findings: No rash.   Neurological:      Mental Status: He is alert and oriented to person, place, and time.   Psychiatric:         Behavior: Behavior normal.         Thought Content: Thought content normal.         Judgment: Judgment normal.            Result Review :  The following data was reviewed by: Leanna Ryder MD on 06/03/2025:         UPPER GI ENDOSCOPY (12/19/2022 07:47)   IMAGING SCANNED (04/29/2025)  CT Chest Without Contrast Diagnostic (05/07/2025 14:41)       Assessment and Plan   Diagnoses and all orders for this visit:    1. Gastric mass (Primary)  -     Ambulatory referral for Screening EGD    2. Gastroesophageal reflux disease without esophagitis  -     pantoprazole (PROTONIX) 40 MG EC tablet; Take 1 tablet by mouth Daily.  Dispense: 90 tablet; Refill: 3    3. Type 2 diabetes mellitus without complication, without long-term current use of insulin  -     Comprehensive Metabolic Panel  -     Lipid Panel  -     TSH Rfx " On Abnormal To Free T4  -     Microalbumin / Creatinine Urine Ratio - Urine, Clean Catch    4. Stage 3a chronic kidney disease  -     Comprehensive Metabolic Panel  -     CBC & Differential  -     Lipid Panel  -     Vitamin D,25-Hydroxy    Other orders  -     potassium chloride (MICRO-K) 10 MEQ CR capsule; Take 1 capsule by mouth 2 (Two) Times a Day.  Dispense: 180 capsule; Refill: 1           Assessment & Plan  1.  Abnormal CT scan of the chest, he had a few months of upper respiratory infection symptoms, cough and congestion.  CTs did show some consolidation in the bilateral lower lobes that could be infiltrates or beginnings of reactive airway disease  - Chest CT from 05/07/2025 shows healing pneumonia bilaterally, right side more affected  - Possible COPD or emphysema  - Repeat chest CT in 3-6 months  - If resolved, no action; if persistent, workup for COPD/emphysema    2. Stomach wall thickening:  - Abdominal CT shows stomach wall thickening with mineralization, possible cancer or precancerous conditions he does have known GERD.  Last EGD was done in 2022.  - Referral placed for EGD  - Contact GI to communicate the new findings, especially with some of the complications with his age and being on Eliquis for anticoagulation.  - Send imaging results for evaluation    3. Gallstones: Incidental finding.  He does have occasional episodes of abdominal pain but is generally asymptomatic.  - Abdominal CT shows gallstones  - If asymptomatic, no action; if symptomatic, consider referral to general surgeon.  For now as he is not having problems no abdominal tenderness will continue to observe.  Will defer surgery    4. Enlarged prostate:  - Abdominal CT shows enlarged prostate  - Patient reports bladder control issues and incomplete emptying  - Follow up with urologist    5. Diabetes: Chronic problem that has been well-controlled  - 8 months since last follow-up, will do labs today while he is here  - Conduct blood work  (A1c, cholesterol) and urine test today  - Not on meds currently, will continue off meds as long as A1c is below 7.  - Follow-up in 4 months    PROCEDURE  Underwent EGD in 2022.      Follow Up   Return in about 4 months (around 10/3/2025), or if symptoms worsen or fail to improve, for Recheck diabetes.  Patient was given instructions and counseling regarding his condition or for health maintenance advice. Please see specific information pulled into the AVS if appropriate.         Leanna Ryder MD      Patient or patient representative verbalized consent for the use of Ambient Listening during the visit with  Leanna Ryder MD for chart documentation. 6/3/2025  14:37 EDT

## 2025-06-03 NOTE — Clinical Note
Saleem Sosa, Hope all is going well.  This patient is scheduled to see you in December for GERD, had an EGD with Dr. Mathews in 2022.  He had a CT scan done with [facility] showing a 3 x 7 centimeter thickening of the gastric wall, there is some mineralization around this lesion.  Recommendations for endoscopy.  I went ahead and placed an order for endoscopy, but also wanted to make you aware, I am not sure if it would be helpful to see him in the office first because of his age and being on Eliquis for A-fib.  He would be able to hold anticoagulation before the EGD.  But there may be a little bit more counseling needed for the scope.  Happy to hear your thoughts, thank you so much!

## 2025-06-04 LAB
25(OH)D3+25(OH)D2 SERPL-MCNC: 47.1 NG/ML (ref 30–100)
ALBUMIN SERPL-MCNC: 4.4 G/DL (ref 3.6–4.6)
ALBUMIN/CREAT UR: <10 MG/G CREAT (ref 0–29)
ALP SERPL-CCNC: 86 IU/L (ref 44–121)
ALT SERPL-CCNC: 12 IU/L (ref 0–44)
AST SERPL-CCNC: 18 IU/L (ref 0–40)
BASOPHILS # BLD AUTO: 0 X10E3/UL (ref 0–0.2)
BASOPHILS NFR BLD AUTO: 0 %
BILIRUB SERPL-MCNC: 0.6 MG/DL (ref 0–1.2)
BUN SERPL-MCNC: 29 MG/DL (ref 10–36)
BUN/CREAT SERPL: 25 (ref 10–24)
CALCIUM SERPL-MCNC: 9.4 MG/DL (ref 8.6–10.2)
CHLORIDE SERPL-SCNC: 106 MMOL/L (ref 96–106)
CHOLEST SERPL-MCNC: 144 MG/DL (ref 100–199)
CO2 SERPL-SCNC: 21 MMOL/L (ref 20–29)
CREAT SERPL-MCNC: 1.17 MG/DL (ref 0.76–1.27)
CREAT UR-MCNC: 30.6 MG/DL
EGFRCR SERPLBLD CKD-EPI 2021: 59 ML/MIN/1.73
EOSINOPHIL # BLD AUTO: 0.1 X10E3/UL (ref 0–0.4)
EOSINOPHIL NFR BLD AUTO: 1 %
ERYTHROCYTE [DISTWIDTH] IN BLOOD BY AUTOMATED COUNT: 13.5 % (ref 11.6–15.4)
GLOBULIN SER CALC-MCNC: 1.9 G/DL (ref 1.5–4.5)
GLUCOSE SERPL-MCNC: 107 MG/DL (ref 70–99)
HCT VFR BLD AUTO: 47.8 % (ref 37.5–51)
HDLC SERPL-MCNC: 46 MG/DL
HGB BLD-MCNC: 14.9 G/DL (ref 13–17.7)
IMM GRANULOCYTES # BLD AUTO: 0 X10E3/UL (ref 0–0.1)
IMM GRANULOCYTES NFR BLD AUTO: 0 %
LDLC SERPL CALC-MCNC: 74 MG/DL (ref 0–99)
LYMPHOCYTES # BLD AUTO: 2.5 X10E3/UL (ref 0.7–3.1)
LYMPHOCYTES NFR BLD AUTO: 27 %
MCH RBC QN AUTO: 29.7 PG (ref 26.6–33)
MCHC RBC AUTO-ENTMCNC: 31.2 G/DL (ref 31.5–35.7)
MCV RBC AUTO: 95 FL (ref 79–97)
MICROALBUMIN UR-MCNC: <3 UG/ML
MONOCYTES # BLD AUTO: 0.6 X10E3/UL (ref 0.1–0.9)
MONOCYTES NFR BLD AUTO: 7 %
NEUTROPHILS # BLD AUTO: 5.7 X10E3/UL (ref 1.4–7)
NEUTROPHILS NFR BLD AUTO: 65 %
PLATELET # BLD AUTO: 133 X10E3/UL (ref 150–450)
POTASSIUM SERPL-SCNC: 5 MMOL/L (ref 3.5–5.2)
PROT SERPL-MCNC: 6.3 G/DL (ref 6–8.5)
RBC # BLD AUTO: 5.01 X10E6/UL (ref 4.14–5.8)
SODIUM SERPL-SCNC: 142 MMOL/L (ref 134–144)
TRIGL SERPL-MCNC: 139 MG/DL (ref 0–149)
TSH SERPL DL<=0.005 MIU/L-ACNC: 2.05 UIU/ML (ref 0.45–4.5)
VLDLC SERPL CALC-MCNC: 24 MG/DL (ref 5–40)
WBC # BLD AUTO: 9 X10E3/UL (ref 3.4–10.8)

## 2025-06-05 DIAGNOSIS — K31.89 GASTRIC MASS: Primary | ICD-10-CM

## 2025-06-11 NOTE — PROGRESS NOTES
"Chief Complaint   Patient presents with    GI Problem         History of Present Illness  Patient is a 89-year-old male who presents today for follow-up. He has a history of GERD, hiatal hernia, and alternating bowel pattern.      He presents today for follow-up after recent CT done by urology showed abnormal thickening of the gastric cardia with associated mineralization concerning for gastric mass.  CT also showed cholelithiasis.    He is anticoagulated on Eliquis.    Patient overall is feeling well from a GI standpoint at this time.  He denies any abdominal pain, nausea, or vomiting.  Denies any blood in the stool or melena.    He continues on pantoprazole for GERD and symptoms are well-controlled.  He uses MiraLAX and stool softeners as needed for constipation and bowels are moving regularly with use of these medications.    He had recent lab work with his primary care provider with no anemia.     Result Review :       Comprehensive Metabolic Panel (06/03/2025 15:13)    CBC & Differential (06/03/2025 15:13)    IMAGING SCANNED (04/29/2025)    Office Visit with Sheila Irizarry APRN (12/04/2024)    Tissue Pathology Exam (12/19/2022 07:58)    UPPER GI ENDOSCOPY (12/19/2022 07:47)    Vital Signs:   /68   Pulse 97   Temp 98.4 °F (36.9 °C)   Ht 177.8 cm (70\")   Wt 90.7 kg (200 lb)   SpO2 97%   BMI 28.70 kg/m²     Body mass index is 28.7 kg/m².     Physical Exam  Vitals reviewed.   Constitutional:       General: He is not in acute distress.     Appearance: He is well-developed.   HENT:      Head: Normocephalic and atraumatic.   Pulmonary:      Effort: Pulmonary effort is normal. No respiratory distress.   Skin:     General: Skin is dry.      Coloration: Skin is not pale.   Neurological:      Mental Status: He is alert and oriented to person, place, and time.   Psychiatric:         Thought Content: Thought content normal.           Assessment and Plan    Diagnoses and all orders for this visit:    1. " Abnormal CT scan, stomach (Primary)    2. Gastroesophageal reflux disease without esophagitis    3. Calculus of gallbladder without cholecystitis without obstruction         Discussion  Patient resents today for follow-up after recent CT scan showed gastric wall thickening with concern for possible gastric mass.  Recommended EGD for further evaluation which we will arrange to be completed.    CT also showed gallstones.  He is currently asymptomatic from these.  As he is asymptomatic, no further treatment needed for this at this time.  He was instructed to monitor for the development of any abdominal pain and notify the office if this occurs.          Follow Up   Return for Follow up to review results after testing complete.    Patient Instructions   Schedule EGD for further evaluation of symptoms.

## 2025-06-12 ENCOUNTER — OFFICE VISIT (OUTPATIENT)
Dept: GASTROENTEROLOGY | Facility: CLINIC | Age: OVER 89
End: 2025-06-12
Payer: MEDICARE

## 2025-06-12 ENCOUNTER — PREP FOR SURGERY (OUTPATIENT)
Dept: SURGERY | Facility: SURGERY CENTER | Age: OVER 89
End: 2025-06-12
Payer: MEDICARE

## 2025-06-12 VITALS
HEART RATE: 97 BPM | BODY MASS INDEX: 28.63 KG/M2 | SYSTOLIC BLOOD PRESSURE: 106 MMHG | TEMPERATURE: 98.4 F | OXYGEN SATURATION: 97 % | HEIGHT: 70 IN | WEIGHT: 200 LBS | DIASTOLIC BLOOD PRESSURE: 68 MMHG

## 2025-06-12 DIAGNOSIS — R93.3 ABNORMAL CT SCAN, STOMACH: Primary | ICD-10-CM

## 2025-06-12 DIAGNOSIS — K21.9 GASTROESOPHAGEAL REFLUX DISEASE WITHOUT ESOPHAGITIS: ICD-10-CM

## 2025-06-12 DIAGNOSIS — K80.20 CALCULUS OF GALLBLADDER WITHOUT CHOLECYSTITIS WITHOUT OBSTRUCTION: ICD-10-CM

## 2025-06-12 DIAGNOSIS — K31.89 GASTRIC MASS: ICD-10-CM

## 2025-06-12 PROCEDURE — 1160F RVW MEDS BY RX/DR IN RCRD: CPT | Performed by: NURSE PRACTITIONER

## 2025-06-12 PROCEDURE — 99214 OFFICE O/P EST MOD 30 MIN: CPT | Performed by: NURSE PRACTITIONER

## 2025-06-12 PROCEDURE — 1159F MED LIST DOCD IN RCRD: CPT | Performed by: NURSE PRACTITIONER

## 2025-06-12 RX ORDER — SODIUM CHLORIDE, SODIUM LACTATE, POTASSIUM CHLORIDE, CALCIUM CHLORIDE 600; 310; 30; 20 MG/100ML; MG/100ML; MG/100ML; MG/100ML
30 INJECTION, SOLUTION INTRAVENOUS CONTINUOUS PRN
Status: CANCELLED | OUTPATIENT
Start: 2025-06-12 | End: 2025-06-12

## 2025-06-12 RX ORDER — SODIUM CHLORIDE 0.9 % (FLUSH) 0.9 %
10 SYRINGE (ML) INJECTION AS NEEDED
Status: CANCELLED | OUTPATIENT
Start: 2025-06-12

## 2025-06-12 RX ORDER — SODIUM CHLORIDE 0.9 % (FLUSH) 0.9 %
3 SYRINGE (ML) INJECTION EVERY 12 HOURS SCHEDULED
Status: CANCELLED | OUTPATIENT
Start: 2025-06-12

## 2025-06-16 ENCOUNTER — ANESTHESIA EVENT (OUTPATIENT)
Dept: SURGERY | Facility: SURGERY CENTER | Age: OVER 89
End: 2025-06-16
Payer: MEDICARE

## 2025-06-16 ENCOUNTER — HOSPITAL ENCOUNTER (OUTPATIENT)
Facility: SURGERY CENTER | Age: OVER 89
Setting detail: HOSPITAL OUTPATIENT SURGERY
Discharge: HOME | End: 2025-06-16
Attending: INTERNAL MEDICINE | Admitting: INTERNAL MEDICINE
Payer: MEDICARE

## 2025-06-16 ENCOUNTER — ANESTHESIA (OUTPATIENT)
Dept: SURGERY | Facility: SURGERY CENTER | Age: OVER 89
End: 2025-06-16
Payer: MEDICARE

## 2025-06-16 VITALS
RESPIRATION RATE: 18 BRPM | BODY MASS INDEX: 27.92 KG/M2 | SYSTOLIC BLOOD PRESSURE: 104 MMHG | OXYGEN SATURATION: 97 % | DIASTOLIC BLOOD PRESSURE: 80 MMHG | HEART RATE: 89 BPM | WEIGHT: 195 LBS | TEMPERATURE: 98 F | HEIGHT: 70 IN

## 2025-06-16 DIAGNOSIS — R93.3 ABNORMAL CT SCAN, STOMACH: ICD-10-CM

## 2025-06-16 LAB
GLUCOSE BLDC GLUCOMTR-MCNC: 107 MG/DL (ref 70–130)
GLUCOSE BLDC GLUCOMTR-MCNC: 107 MG/DL (ref 70–130)

## 2025-06-16 PROCEDURE — 25010000002 GLYCOPYRROLATE PF 0.2 MG/ML SOLUTION: Performed by: NURSE ANESTHETIST, CERTIFIED REGISTERED

## 2025-06-16 PROCEDURE — 43239 EGD BIOPSY SINGLE/MULTIPLE: CPT | Performed by: INTERNAL MEDICINE

## 2025-06-16 PROCEDURE — 88305 TISSUE EXAM BY PATHOLOGIST: CPT | Performed by: INTERNAL MEDICINE

## 2025-06-16 PROCEDURE — 25810000003 LACTATED RINGERS PER 1000 ML: Performed by: NURSE PRACTITIONER

## 2025-06-16 PROCEDURE — 25010000002 LIDOCAINE 2% SOLUTION: Performed by: NURSE ANESTHETIST, CERTIFIED REGISTERED

## 2025-06-16 PROCEDURE — 25010000002 PROPOFOL 10 MG/ML EMULSION: Performed by: NURSE ANESTHETIST, CERTIFIED REGISTERED

## 2025-06-16 PROCEDURE — 25010000002 PROPOFOL 1000 MG/100ML EMULSION: Performed by: NURSE ANESTHETIST, CERTIFIED REGISTERED

## 2025-06-16 RX ORDER — SODIUM CHLORIDE 0.9 % (FLUSH) 0.9 %
10 SYRINGE (ML) INJECTION AS NEEDED
Status: DISCONTINUED | OUTPATIENT
Start: 2025-06-16 | End: 2025-06-16 | Stop reason: HOSPADM

## 2025-06-16 RX ORDER — LIDOCAINE HYDROCHLORIDE 20 MG/ML
INJECTION, SOLUTION INFILTRATION; PERINEURAL AS NEEDED
Status: DISCONTINUED | OUTPATIENT
Start: 2025-06-16 | End: 2025-06-16 | Stop reason: SURG

## 2025-06-16 RX ORDER — SODIUM CHLORIDE 0.9 % (FLUSH) 0.9 %
3 SYRINGE (ML) INJECTION EVERY 12 HOURS SCHEDULED
Status: DISCONTINUED | OUTPATIENT
Start: 2025-06-16 | End: 2025-06-16 | Stop reason: HOSPADM

## 2025-06-16 RX ORDER — SODIUM CHLORIDE, SODIUM LACTATE, POTASSIUM CHLORIDE, CALCIUM CHLORIDE 600; 310; 30; 20 MG/100ML; MG/100ML; MG/100ML; MG/100ML
30 INJECTION, SOLUTION INTRAVENOUS CONTINUOUS PRN
Status: ACTIVE | OUTPATIENT
Start: 2025-06-16 | End: 2025-06-16

## 2025-06-16 RX ORDER — PROPOFOL 10 MG/ML
INJECTION, EMULSION INTRAVENOUS AS NEEDED
Status: DISCONTINUED | OUTPATIENT
Start: 2025-06-16 | End: 2025-06-16 | Stop reason: SURG

## 2025-06-16 RX ADMIN — PROPOFOL 100 MG: 10 INJECTION, EMULSION INTRAVENOUS at 09:02

## 2025-06-16 RX ADMIN — GLYCOPYRROLATE 0.2 MCG: 0.2 INJECTION, SOLUTION INTRAMUSCULAR; INTRAVITREAL at 09:02

## 2025-06-16 RX ADMIN — PROPOFOL 100 MCG/KG/MIN: 10 INJECTION, EMULSION INTRAVENOUS at 09:03

## 2025-06-16 RX ADMIN — SODIUM CHLORIDE, POTASSIUM CHLORIDE, SODIUM LACTATE AND CALCIUM CHLORIDE 30 ML/HR: 600; 310; 30; 20 INJECTION, SOLUTION INTRAVENOUS at 08:21

## 2025-06-16 RX ADMIN — LIDOCAINE HYDROCHLORIDE 60 MG: 20 INJECTION, SOLUTION INFILTRATION; PERINEURAL at 09:02

## 2025-06-16 NOTE — ANESTHESIA POSTPROCEDURE EVALUATION
"Patient: Kevin Ellington    Procedure Summary       Date: 06/16/25 Room / Location[facility]  [facility]    Anesthesia Start: 0858 Anesthesia Stop: 0916    Procedure: ESOPHAGOGASTRODUODENOSCOPY WITH BIOPSY Diagnosis:       Abnormal CT scan, stomach      (Abnormal CT scan, stomach [R93.3])    Surgeons: Vargas Reed MD Provider: Renato Arguello MD    Anesthesia Type: MAC ASA Status: 3            Anesthesia Type: MAC    Vitals  Vitals Value Taken Time   /80 06/16/25 09:40   Temp 36.7 °C (98 °F) 06/16/25 09:15   Pulse 89 06/16/25 09:40   Resp 18 06/16/25 09:40   SpO2 97 % 06/16/25 09:40           Post Anesthesia Care and Evaluation    Pain management: adequate    Airway patency: patent  Anesthetic complications: No anesthetic complications    Cardiovascular status: acceptable  Respiratory status: acceptable  Hydration status: acceptable    Comments: /80   Pulse 89   Temp 36.7 °C (98 °F) (Temporal)   Resp 18   Ht 177.8 cm (70\")   Wt 88.5 kg (195 lb)   SpO2 97%   BMI 27.98 kg/m²       "

## 2025-06-16 NOTE — ANESTHESIA PREPROCEDURE EVALUATION
Anesthesia Evaluation     NPO Solid Status: > 8 hours             Airway   Mallampati: II  Dental      Pulmonary    (-) not a smoker  Cardiovascular     (+) hypertension, dysrhythmias Atrial Fib, hyperlipidemia      Neuro/Psych  GI/Hepatic/Renal/Endo      Musculoskeletal     Abdominal    Substance History      OB/GYN          Other                      Anesthesia Plan    ASA 3     MAC       Anesthetic plan, risks, benefits, and alternatives have been provided, discussed and informed consent has been obtained with: patient.    CODE STATUS:

## 2025-06-16 NOTE — H&P
No chief complaint on file.      HPI  Abnormal imaging of stomach         Problem List:    Patient Active Problem List   Diagnosis    Bulging lumbar disc    DDD (degenerative disc disease), lumbar    Leg pain    Spinal stenosis of lumbar region with neurogenic claudication    Radiculitis, thoracic    Status post total left knee replacement using cement    Status post total right knee replacement using cement    Coronary artery disease involving native coronary artery of native heart without angina pectoris    Essential hypertension    Hyperlipemia    S/P coronary artery stent placement    Primary narcolepsy without cataplexy    Benign prostatic hyperplasia with urinary frequency    Cervical radiculopathy    Bilateral carpal tunnel syndrome    Cervical spinal stenosis    Arthritis of ankle    Chronic pain of right ankle    Shortness of breath    Bilateral leg weakness    History of melanoma    Spondylosis of lumbar region without myelopathy or radiculopathy    Stage 3a chronic kidney disease    Pedal edema    Gastroesophageal reflux disease without esophagitis    TIA (transient ischemic attack)    Bilateral carotid artery stenosis    Cerebral artery occlusion    Hoarseness    Hiatal hernia    Abdominal fullness    Fever and chills    COVID-19    Chronic atrial fibrillation    Cytokine release syndrome, grade 2    Type 2 diabetes mellitus without complication, without long-term current use of insulin    Chronic instability of ankle    Lumbar post-laminectomy syndrome    Primary localized osteoarthrosis of ankle and foot    Leg edema, right    Abnormal CT scan, stomach       Medical History:    Past Medical History:   Diagnosis Date    Abnormal ECG 6-    Taking Eliquis    Allergic 1970    Arrhythmia 6-    Taking Eliquis    Arthritis     Asthma 05/23/2019    shortness of breath-exertion    Benign prostatic hyperplasia     Bilateral carotid artery stenosis 05/12/2022    Cataract     Cervical disc disorder      Cervical radiculopathy     Chronic atrial fibrillation 06/15/2023    Chronic constipation     Colon polyp     Coronary artery disease 2001    Stent    COVID-19 2023    CTS (carpal tunnel syndrome) 1998    Surgery both hands in 1999    Difficulty walking     Erectile dysfunction     GERD (gastroesophageal reflux disease)     HTN (hypertension)     Hyperlipidemia     Injury of back     Kidney stone     Low back pain     2 Prior surgeries    Lumbosacral disc disease     Melanoma 01/15/2009    DR WILLY SIMMS ( Left forearm)    Obesity     Radiculitis, thoracic     Scoliosis     Shortness of breath     Spondylosis of lumbar region without myelopathy or radiculopathy 2020    Stage 3a chronic kidney disease 10/21/2021    Stented coronary artery     Thoracic disc disorder     Surgery by Dr. Aldridge (sp)    TIA (transient ischemic attack) 2022    Type 2 diabetes mellitus without complication, without long-term current use of insulin 10/03/2023    Urinary tract infection aug. 2024    2025        Social History:    Social History     Socioeconomic History    Marital status:     Number of children: 1    Years of education: [employer]   Tobacco Use    Smoking status: Never    Smokeless tobacco: Never    Tobacco comments:     caffeine use   Vaping Use    Vaping status: Never Used   Substance and Sexual Activity    Alcohol use: No    Drug use: No    Sexual activity: Not Currently     Partners: Female     Birth control/protection: None       Family History:   Family History   Problem Relation Age of Onset    Hypertension Mother     Arthritis Mother             Skin cancer Mother     Heart disease Father     Early death Father         heart disease    Hypertension Father     Aortic aneurysm Father 58    Arthritis Sister     Parkinsonism Brother             Parkinsonism Brother     Cancer Neg Hx     Colon cancer Neg Hx     Colon polyps Neg Hx     Crohn's disease Neg Hx      Irritable bowel syndrome Neg Hx     Ulcerative colitis Neg Hx        Surgical History:   Past Surgical History:   Procedure Laterality Date    ABLATION OF DYSRHYTHMIC FOCUS  nov. 2020    radio frequency /lower back    ANKLE FUSION Left 2007    reconstruction and fusion    BACK SURGERY      HERNIATED LUMBAR DISK 1975,2000,2012    CARDIAC CATHETERIZATION  2001    CARPAL TUNNEL RELEASE      CATARACT EXTRACTION, BILATERAL Bilateral 2002    COLONOSCOPY  2015    CORONARY ANGIOPLASTY WITH STENT PLACEMENT  2001    CORONARY STENT PLACEMENT      ENDOSCOPY N/A 12/19/2022    Procedure: ESOPHAGOGASTRODUODENOSCOPY WITH BIOPSY;  Surgeon: Rashard Mathews MD;  Location[facility];  Service: Gastroenterology;  Laterality: N/A;  gastric polyps, hiatal hernia    EPIDURAL BLOCK  Several at [facility]    and [facility]    EYE SURGERY      JOINT REPLACEMENT      LAMINECTOMY  2012    Surgery    REPLACEMENT TOTAL KNEE Left 2015    REPLACEMENT TOTAL KNEE Right     SKIN CANCER EXCISION      MELANOMA    SPINE SURGERY      THORACIC SPINE SURGERY      UPPER GASTROINTESTINAL ENDOSCOPY           Current Facility-Administered Medications:     ipratropium-albuterol (DUO-NEB) nebulizer solution 3 mL, 3 mL, Nebulization, 4x Daily - RT, Leanna Ryder MD    Current Outpatient Medications:     apixaban (ELIQUIS) 5 MG tablet tablet, Take 1 tablet by mouth Every 12 (Twelve) Hours. Indications: Atrial Fibrillation, Disp: 180 tablet, Rfl: 3    atorvastatin (LIPITOR) 40 MG tablet, TAKE 1 TABLET DAILY, Disp: 90 tablet, Rfl: 3    Blood Glucose Monitoring Suppl (True Metrix Air Glucose Meter) w/Device kit, Use 1 each Daily., Disp: 1 kit, Rfl: 0    cetirizine (zyrTEC) 10 MG tablet, Take 1 tablet by mouth Daily., Disp: , Rfl:     fluticasone (FLONASE) 50 MCG/ACT nasal spray, 2 sprays into the nostril(s) as directed by provider Daily., Disp: 48 g, Rfl: 3    furosemide (Lasix) 20 MG tablet, Take 1 tablet by mouth Daily As Needed  (swelling)., Disp: 30 tablet, Rfl: 2    Lancets (freestyle) lancets, Check one daily, Disp: 100 each, Rfl: 12    losartan (COZAAR) 50 MG tablet, TAKE 1 TABLET DAILY, Disp: 90 tablet, Rfl: 3    metoprolol succinate XL (TOPROL-XL) 100 MG 24 hr tablet, TAKE 1 TABLET DAILY, Disp: 90 tablet, Rfl: 3    modafinil (PROVIGIL) 100 MG tablet, Take 1 tablet by mouth Daily., Disp: 30 tablet, Rfl: 2    pantoprazole (PROTONIX) 40 MG EC tablet, Take 1 tablet by mouth Daily., Disp: 90 tablet, Rfl: 3    potassium chloride (MICRO-K) 10 MEQ CR capsule, Take 1 capsule by mouth 2 (Two) Times a Day., Disp: 180 capsule, Rfl: 1    potassium chloride 10 MEQ CR tablet, Take 1 tablet by mouth 2 (Two) Times a Day., Disp: , Rfl:     tamsulosin (FLOMAX) 0.4 MG capsule 24 hr capsule, TAKE 2 CAPSULES DAILY, Disp: 180 capsule, Rfl: 3    Allergies:   Allergies   Allergen Reactions    Codeine GI Intolerance    Hydrocodone GI Intolerance     SEVERE CONSTIPATION    Sulfa Antibiotics Unknown (See Comments)     Unable to remember        The following portions of the patient's history were reviewed by me and updated as appropriate: review of systems, allergies, current medications, past family history, past medical history, past social history, past surgical history and problem list.    There were no vitals filed for this visit.    PHYSICAL EXAM:    CONSTITUTIONAL:  today's vital signs reviewed by me  GASTROINTESTINAL: abdomen is soft nontender nondistended with normal active bowel sounds, no masses are appreciated    Assessment/ Plan  Abnormal imaging of stomach    egd    Risks and benefits as well as alternatives to endoscopic evaluation were explained to the patient and they voiced understanding and wish to proceed.  These risks include but are not limited to the risk of bleeding, perforation, adverse reaction to sedation, and missed lesions.  The patient was given the opportunity to ask questions prior to the endoscopic procedure.

## 2025-06-17 LAB
CYTO UR: NORMAL
LAB AP CASE REPORT: NORMAL
LAB AP CLINICAL INFORMATION: NORMAL
PATH REPORT.FINAL DX SPEC: NORMAL
PATH REPORT.GROSS SPEC: NORMAL

## 2025-06-20 DIAGNOSIS — K21.9 GASTROESOPHAGEAL REFLUX DISEASE WITHOUT ESOPHAGITIS: ICD-10-CM

## 2025-06-20 RX ORDER — PANTOPRAZOLE SODIUM 40 MG/1
40 TABLET, DELAYED RELEASE ORAL DAILY
Qty: 90 TABLET | Refills: 3 | Status: SHIPPED | OUTPATIENT
Start: 2025-06-20

## 2025-06-20 RX ORDER — POTASSIUM CHLORIDE 750 MG/1
10 CAPSULE, EXTENDED RELEASE ORAL 2 TIMES DAILY
Qty: 180 CAPSULE | Refills: 1 | Status: SHIPPED | OUTPATIENT
Start: 2025-06-20

## 2025-06-20 NOTE — TELEPHONE ENCOUNTER
"Caller: Kevin Ellington \"AL\"    Relationship: Self    Best call back number: [phone]     Requested Prescriptions:   Requested Prescriptions     Pending Prescriptions Disp Refills    pantoprazole (PROTONIX) 40 MG EC tablet 90 tablet 3     Sig: Take 1 tablet by mouth Daily.    potassium chloride (MICRO-K) 10 MEQ CR capsule 180 capsule 1     Sig: Take 1 capsule by mouth 2 (Two) Times a Day.        Pharmacy where request should be sent: [facility] [id number] [address] AT [facility] [phone] [facility] [phone] [facility]     Last office visit with prescribing clinician: 6/3/2025   Last telemedicine visit with prescribing clinician: Visit date not found   Next office visit with prescribing clinician: 10/3/2025     Additional details provided by patient: PATIENT CALLING STATING THAT [facility] NEVER RECEIVED THE PRESCRIPTION FOR THE MEDICATIONS.HE WOULD LIKE TO GET THESE PRESCRIPTION SENT TO LOCAL PHARMACY.    Does the patient have less than a 3 day supply:  [x] Yes  [] No    Uziel Yao Rep   06/20/25 15:48 EDT           "
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
## 2025-07-01 ENCOUNTER — OFFICE VISIT (OUTPATIENT)
Age: OVER 89
End: 2025-07-01
Payer: MEDICARE

## 2025-07-01 VITALS
HEART RATE: 72 BPM | WEIGHT: 195 LBS | HEIGHT: 70 IN | BODY MASS INDEX: 27.92 KG/M2 | DIASTOLIC BLOOD PRESSURE: 76 MMHG | SYSTOLIC BLOOD PRESSURE: 128 MMHG

## 2025-07-01 DIAGNOSIS — I25.10 CORONARY ARTERY DISEASE INVOLVING NATIVE CORONARY ARTERY OF NATIVE HEART WITHOUT ANGINA PECTORIS: Primary | ICD-10-CM

## 2025-07-01 DIAGNOSIS — E78.5 HYPERLIPIDEMIA, UNSPECIFIED HYPERLIPIDEMIA TYPE: ICD-10-CM

## 2025-07-01 DIAGNOSIS — Z95.5 S/P CORONARY ARTERY STENT PLACEMENT: ICD-10-CM

## 2025-07-01 DIAGNOSIS — I65.23 BILATERAL CAROTID ARTERY STENOSIS: ICD-10-CM

## 2025-07-01 DIAGNOSIS — E11.9 TYPE 2 DIABETES MELLITUS WITHOUT COMPLICATION, WITHOUT LONG-TERM CURRENT USE OF INSULIN: ICD-10-CM

## 2025-07-01 DIAGNOSIS — I48.20 CHRONIC ATRIAL FIBRILLATION: ICD-10-CM

## 2025-07-01 DIAGNOSIS — N18.31 STAGE 3A CHRONIC KIDNEY DISEASE: ICD-10-CM

## 2025-07-01 DIAGNOSIS — I10 ESSENTIAL HYPERTENSION: ICD-10-CM
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
## 2025-07-09 ENCOUNTER — APPOINTMENT (OUTPATIENT)
Dept: GENERAL RADIOLOGY | Facility: HOSPITAL | Age: OVER 89
End: 2025-07-09
Payer: MEDICARE

## 2025-07-09 ENCOUNTER — APPOINTMENT (OUTPATIENT)
Dept: CT IMAGING | Facility: HOSPITAL | Age: OVER 89
End: 2025-07-09
Payer: MEDICARE

## 2025-07-09 ENCOUNTER — HOSPITAL ENCOUNTER (EMERGENCY)
Facility: HOSPITAL | Age: OVER 89
Discharge: HOME | End: 2025-07-09
Attending: EMERGENCY MEDICINE
Payer: MEDICARE

## 2025-07-09 VITALS
DIASTOLIC BLOOD PRESSURE: 77 MMHG | OXYGEN SATURATION: 98 % | RESPIRATION RATE: 16 BRPM | HEART RATE: 85 BPM | SYSTOLIC BLOOD PRESSURE: 138 MMHG | TEMPERATURE: 99.1 F

## 2025-07-09 DIAGNOSIS — R53.83 OTHER FATIGUE: ICD-10-CM

## 2025-07-09 DIAGNOSIS — R50.9 LOW GRADE FEVER: Primary | ICD-10-CM

## 2025-07-09 LAB
ALBUMIN SERPL-MCNC: 4.1 G/DL (ref 3.5–5.2)
ALBUMIN/GLOB SERPL: 1.5 G/DL
ALP SERPL-CCNC: 87 U/L (ref 39–117)
ALT SERPL W P-5'-P-CCNC: 12 U/L (ref 1–41)
ANION GAP SERPL CALCULATED.3IONS-SCNC: 12 MMOL/L (ref 5–15)
AST SERPL-CCNC: 18 U/L (ref 1–40)
B PARAPERT DNA SPEC QL NAA+PROBE: NOT DETECTED
B PERT DNA SPEC QL NAA+PROBE: NOT DETECTED
BASOPHILS # BLD AUTO: 0.04 10*3/MM3 (ref 0–0.2)
BASOPHILS NFR BLD AUTO: 0.3 % (ref 0–1.5)
BILIRUB SERPL-MCNC: 0.8 MG/DL (ref 0–1.2)
BILIRUB UR QL STRIP: NEGATIVE
BUN SERPL-MCNC: 21 MG/DL (ref 8–23)
BUN/CREAT SERPL: 18.3 (ref 7–25)
C PNEUM DNA NPH QL NAA+NON-PROBE: NOT DETECTED
CALCIUM SPEC-SCNC: 9.2 MG/DL (ref 8.2–9.6)
CHLORIDE SERPL-SCNC: 104 MMOL/L (ref 98–107)
CLARITY UR: CLEAR
CO2 SERPL-SCNC: 23 MMOL/L (ref 22–29)
COLOR UR: YELLOW
CREAT SERPL-MCNC: 1.15 MG/DL (ref 0.76–1.27)
DEPRECATED RDW RBC AUTO: 45.5 FL (ref 37–54)
EGFRCR SERPLBLD CKD-EPI 2021: 60.5 ML/MIN/1.73
EOSINOPHIL # BLD AUTO: 0.06 10*3/MM3 (ref 0–0.4)
EOSINOPHIL NFR BLD AUTO: 0.4 % (ref 0.3–6.2)
ERYTHROCYTE [DISTWIDTH] IN BLOOD BY AUTOMATED COUNT: 13.5 % (ref 12.3–15.4)
FLUAV SUBTYP SPEC NAA+PROBE: NOT DETECTED
FLUBV RNA NPH QL NAA+NON-PROBE: NOT DETECTED
GEN 5 1HR TROPONIN T REFLEX: 26 NG/L
GLOBULIN UR ELPH-MCNC: 2.7 GM/DL
GLUCOSE SERPL-MCNC: 143 MG/DL (ref 65–99)
GLUCOSE UR STRIP-MCNC: NEGATIVE MG/DL
HADV DNA SPEC NAA+PROBE: NOT DETECTED
HCOV 229E RNA SPEC QL NAA+PROBE: NOT DETECTED
HCOV HKU1 RNA SPEC QL NAA+PROBE: NOT DETECTED
HCOV NL63 RNA SPEC QL NAA+PROBE: NOT DETECTED
HCOV OC43 RNA SPEC QL NAA+PROBE: NOT DETECTED
HCT VFR BLD AUTO: 45.6 % (ref 37.5–51)
HGB BLD-MCNC: 15.2 G/DL (ref 13–17.7)
HGB UR QL STRIP.AUTO: NEGATIVE
HMPV RNA NPH QL NAA+NON-PROBE: NOT DETECTED
HPIV1 RNA ISLT QL NAA+PROBE: NOT DETECTED
HPIV2 RNA SPEC QL NAA+PROBE: NOT DETECTED
HPIV3 RNA NPH QL NAA+PROBE: NOT DETECTED
HPIV4 P GENE NPH QL NAA+PROBE: NOT DETECTED
IMM GRANULOCYTES # BLD AUTO: 0.06 10*3/MM3 (ref 0–0.05)
IMM GRANULOCYTES NFR BLD AUTO: 0.4 % (ref 0–0.5)
KETONES UR QL STRIP: NEGATIVE
LEUKOCYTE ESTERASE UR QL STRIP.AUTO: NEGATIVE
LYMPHOCYTES # BLD AUTO: 1.52 10*3/MM3 (ref 0.7–3.1)
LYMPHOCYTES NFR BLD AUTO: 10.1 % (ref 19.6–45.3)
M PNEUMO IGG SER IA-ACNC: NOT DETECTED
MAGNESIUM SERPL-MCNC: 1.8 MG/DL (ref 1.6–2.4)
MCH RBC QN AUTO: 30.8 PG (ref 26.6–33)
MCHC RBC AUTO-ENTMCNC: 33.3 G/DL (ref 31.5–35.7)
MCV RBC AUTO: 92.3 FL (ref 79–97)
MONOCYTES # BLD AUTO: 0.88 10*3/MM3 (ref 0.1–0.9)
MONOCYTES NFR BLD AUTO: 5.9 % (ref 5–12)
NEUTROPHILS NFR BLD AUTO: 12.44 10*3/MM3 (ref 1.7–7)
NEUTROPHILS NFR BLD AUTO: 82.9 % (ref 42.7–76)
NITRITE UR QL STRIP: NEGATIVE
NRBC BLD AUTO-RTO: 0 /100 WBC (ref 0–0.2)
PH UR STRIP.AUTO: 5.5 [PH] (ref 5–8)
PHOSPHATE SERPL-MCNC: 2.3 MG/DL (ref 2.5–4.5)
PLATELET # BLD AUTO: 173 10*3/MM3 (ref 140–450)
PMV BLD AUTO: 9 FL (ref 6–12)
POTASSIUM SERPL-SCNC: 4.3 MMOL/L (ref 3.5–5.2)
PROT SERPL-MCNC: 6.8 G/DL (ref 6–8.5)
PROT UR QL STRIP: NEGATIVE
RBC # BLD AUTO: 4.94 10*6/MM3 (ref 4.14–5.8)
RHINOVIRUS RNA SPEC NAA+PROBE: NOT DETECTED
RSV RNA NPH QL NAA+NON-PROBE: NOT DETECTED
SARS-COV-2 RNA NPH QL NAA+NON-PROBE: NOT DETECTED
SODIUM SERPL-SCNC: 139 MMOL/L (ref 136–145)
SP GR UR STRIP: 1.01 (ref 1–1.03)
TROPONIN T % DELTA: 4
TROPONIN T NUMERIC DELTA: 1 NG/L
TROPONIN T SERPL HS-MCNC: 25 NG/L
TSH SERPL DL<=0.05 MIU/L-ACNC: 2.19 UIU/ML (ref 0.27–4.2)
UROBILINOGEN UR QL STRIP: NORMAL
WBC NRBC COR # BLD AUTO: 15 10*3/MM3 (ref 3.4–10.8)

## 2025-07-09 PROCEDURE — 83735 ASSAY OF MAGNESIUM: CPT | Performed by: EMERGENCY MEDICINE

## 2025-07-09 PROCEDURE — 36415 COLL VENOUS BLD VENIPUNCTURE: CPT | Performed by: EMERGENCY MEDICINE

## 2025-07-09 PROCEDURE — 0202U NFCT DS 22 TRGT SARS-COV-2: CPT | Performed by: EMERGENCY MEDICINE

## 2025-07-09 PROCEDURE — 93010 ELECTROCARDIOGRAM REPORT: CPT | Performed by: INTERNAL MEDICINE

## 2025-07-09 PROCEDURE — 80053 COMPREHEN METABOLIC PANEL: CPT | Performed by: EMERGENCY MEDICINE

## 2025-07-09 PROCEDURE — 81003 URINALYSIS AUTO W/O SCOPE: CPT | Performed by: EMERGENCY MEDICINE

## 2025-07-09 PROCEDURE — 99285 EMERGENCY DEPT VISIT HI MDM: CPT

## 2025-07-09 PROCEDURE — 85025 COMPLETE CBC W/AUTO DIFF WBC: CPT | Performed by: EMERGENCY MEDICINE

## 2025-07-09 PROCEDURE — 84443 ASSAY THYROID STIM HORMONE: CPT | Performed by: EMERGENCY MEDICINE

## 2025-07-09 PROCEDURE — 74177 CT ABD & PELVIS W/CONTRAST: CPT

## 2025-07-09 PROCEDURE — 84484 ASSAY OF TROPONIN QUANT: CPT | Performed by: EMERGENCY MEDICINE

## 2025-07-09 PROCEDURE — 93005 ELECTROCARDIOGRAM TRACING: CPT | Performed by: EMERGENCY MEDICINE

## 2025-07-09 PROCEDURE — 25510000001 IOPAMIDOL 61 % SOLUTION: Performed by: EMERGENCY MEDICINE

## 2025-07-09 PROCEDURE — 71045 X-RAY EXAM CHEST 1 VIEW: CPT

## 2025-07-09 PROCEDURE — 84100 ASSAY OF PHOSPHORUS: CPT | Performed by: EMERGENCY MEDICINE

## 2025-07-09 RX ORDER — IOPAMIDOL 612 MG/ML
85 INJECTION, SOLUTION INTRAVASCULAR
Status: COMPLETED | OUTPATIENT
Start: 2025-07-09 | End: 2025-07-09

## 2025-07-09 RX ORDER — SODIUM CHLORIDE 0.9 % (FLUSH) 0.9 %
10 SYRINGE (ML) INJECTION AS NEEDED
Status: DISCONTINUED | OUTPATIENT
Start: 2025-07-09 | End: 2025-07-10 | Stop reason: HOSPADM

## 2025-07-09 RX ADMIN — IOPAMIDOL 85 ML: 612 INJECTION, SOLUTION INTRAVENOUS at 20:46

## 2025-07-09 NOTE — ED PROVIDER NOTES
EMERGENCY DEPARTMENT ENCOUNTER    Room Number:  09/09  PCP: Leanna Ryder MD    HPI:  Chief Complaint: Fever  A complete HPI/ROS/PMH/PSH/SH/FH are unobtainable due to: None  Context: Kevin Ellington is a 89 y.o. male who presents to the ED c/o acute fever.  Onset of symptoms this morning.  Tmax 99.8 °F.  He states that he developed urinary frequency that started today as well.  He has noticed some nasal congestion.  No vomiting, diarrhea, abdominal pain.  No rash other than a skin tear to his right hand.        PAST MEDICAL HISTORY  Active Ambulatory Problems     Diagnosis Date Noted    Bulging lumbar disc 06/16/2016    DDD (degenerative disc disease), lumbar 06/16/2016    Leg pain 06/16/2016    Spinal stenosis of lumbar region with neurogenic claudication 06/16/2016    Radiculitis, thoracic 06/16/2016    Status post total left knee replacement using cement 06/16/2016    Status post total right knee replacement using cement 06/16/2016    Coronary artery disease involving native coronary artery of native heart without angina pectoris 08/23/2016    Essential hypertension 08/23/2016    Hyperlipemia 08/23/2016    S/P coronary artery stent placement 09/06/2017    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/05/2017    Benign prostatic hyperplasia with urinary frequency 10/05/2017    Cervical radiculopathy 02/06/2018    Bilateral carpal tunnel syndrome 08/20/2018    Cervical spinal stenosis 08/20/2018    Arthritis of ankle 12/06/2018    Chronic pain of right ankle 12/06/2018    Shortness of breath 07/09/2019    Bilateral leg weakness 10/21/2019    History of melanoma 08/27/2020    Spondylosis of lumbar region without myelopathy or radiculopathy 09/09/2020    Stage 3a chronic kidney disease 10/21/2021    Pedal edema 10/21/2021    Gastroesophageal reflux disease without esophagitis 04/29/2022    TIA (transient ischemic attack) 05/12/2022    Bilateral carotid artery stenosis 05/12/2022    Cerebral artery occlusion  08/09/2022    Hoarseness 12/13/2022    Hiatal hernia 12/13/2022    Abdominal fullness 12/13/2022    Fever and chills 06/12/2023    COVID-19 06/12/2023    Chronic atrial fibrillation 06/15/2023    Cytokine release syndrome, grade 2 06/16/2023    Type 2 diabetes mellitus without complication, without long-term current use of insulin 10/03/2023    Chronic instability of ankle 09/06/2022    Lumbar post-laminectomy syndrome 06/13/2022    Primary localized osteoarthrosis of ankle and foot 09/06/2022    Leg edema, right 01/20/2025    Abnormal CT scan, stomach 06/12/2025     Resolved Ambulatory Problems     Diagnosis Date Noted    Health care maintenance 10/05/2017    Cancer 10/04/2018    Acute bronchitis 02/27/2019    Hyperglycemia 08/09/2022    Atrial fibrillation 06/15/2023     Past Medical History:   Diagnosis Date    Abnormal ECG 6-    Allergic 1970    Arrhythmia 6-    Arthritis     Asthma 05/23/2019    Benign prostatic hyperplasia     Cataract removed 1999    Cervical disc disorder     Chronic constipation     Colon polyp     Coronary artery disease 2001    CTS (carpal tunnel syndrome) 1998    Difficulty walking     Erectile dysfunction     GERD (gastroesophageal reflux disease)     HTN (hypertension)     Hyperlipidemia     Injury of back     Kidney stone     Low back pain     Lumbosacral disc disease     Melanoma 01/15/2009    Obesity     Scoliosis     Stented coronary artery     Thoracic disc disorder     Urinary tract infection aug. 2024         PAST SURGICAL HISTORY  Past Surgical History:   Procedure Laterality Date    ABLATION OF DYSRHYTHMIC FOCUS  nov. 2020    radio frequency /lower back    ANKLE FUSION Left 2007    reconstruction and fusion    BACK SURGERY      HERNIATED LUMBAR DISK 1975,2000,2012    CARDIAC CATHETERIZATION  2001    CARPAL TUNNEL RELEASE      CATARACT EXTRACTION, BILATERAL Bilateral 2002    COLONOSCOPY      CORONARY ANGIOPLASTY WITH STENT PLACEMENT  2001    CORONARY STENT  PLACEMENT      ENDOSCOPY N/A 2022    Procedure: ESOPHAGOGASTRODUODENOSCOPY WITH BIOPSY;  Surgeon: Rashard Mathews MD;  Location: [facility];  Service: Gastroenterology;  Laterality: N/A;  gastric polyps, hiatal hernia    ENDOSCOPY N/A 2025    Procedure: ESOPHAGOGASTRODUODENOSCOPY WITH BIOPSY;  Surgeon: Vargas Reed MD;  Location: [facility];  Service: Gastroenterology;  Laterality: N/A;  gastric polyps, hiatal hernia, gastritis, duodenitis    EPIDURAL BLOCK  Several at [facility]    and [facility]    EYE SURGERY      cataracts    JOINT REPLACEMENT      knees    LAMINECTOMY  2012    Surgery    REPLACEMENT TOTAL KNEE Left 2015    REPLACEMENT TOTAL KNEE Right     SKIN CANCER EXCISION      MELANOMA    SPINE SURGERY      herniated discs   (3)    THORACIC SPINE SURGERY      UPPER GASTROINTESTINAL ENDOSCOPY           FAMILY HISTORY  Family History   Problem Relation Age of Onset    Hypertension Mother     Arthritis Mother             Skin cancer Mother     Heart disease Father     Early death Father         heart disease    Hypertension Father     Aortic aneurysm Father 58    Arthritis Sister     Parkinsonism Brother             Parkinsonism Brother     Cancer Neg Hx     Colon cancer Neg Hx     Colon polyps Neg Hx     Crohn's disease Neg Hx     Irritable bowel syndrome Neg Hx     Ulcerative colitis Neg Hx          SOCIAL HISTORY  Social History     Socioeconomic History    Marital status:     Number of children: 1    Years of education: BA DE[employer]REE   Tobacco Use    Smoking status: Never    Smokeless tobacco: Never    Tobacco comments:     caffeine use   Vaping Use    Vaping status: Never Used   Substance and Sexual Activity    Alcohol use: No    Drug use: No    Sexual activity: Not Currently     Partners: Female     Birth control/protection: None         ALLERGIES  Codeine, Hydrocodone, and Sulfa antibiotics        REVIEW OF SYSTEMS  Review of Systems      Included in HPI  All systems reviewed and negative except for those discussed in HPI.       PHYSICAL EXAM  ED Triage Vitals [07/09/25 1702]   Temp Heart Rate Resp BP SpO2   99.1 °F (37.3 °C) 91 18 143/96 97 %      Temp src Heart Rate Source Patient Position BP Location FiO2 (%)   -- Monitor -- -- --       Physical Exam      GENERAL: no acute distress  HENT: nares patent  EYES: no scleral icterus  CV: regular rhythm, normal rate  RESPIRATORY: normal effort, clear to auscultation bilaterally  ABDOMEN: soft, nontender  MUSCULOSKELETAL: no deformity, 1+ lower extremity edema bilaterally  NEURO: alert, moves all extremities, follows commands  PSYCH:  calm, cooperative  SKIN: warm, dry, skin tear to right hand is currently dressed    Vital signs and nursing notes reviewed.          LAB RESULTS  Recent Results (from the past 24 hours)   Comprehensive Metabolic Panel    Collection Time: 07/09/25  5:58 PM    Specimen: Blood   Result Value Ref Range    Glucose 143 (H) 65 - 99 mg/dL    BUN 21.0 8.0 - 23.0 mg/dL    Creatinine 1.15 0.76 - 1.27 mg/dL    Sodium 139 136 - 145 mmol/L    Potassium 4.3 3.5 - 5.2 mmol/L    Chloride 104 98 - 107 mmol/L    CO2 23.0 22.0 - 29.0 mmol/L    Calcium 9.2 8.2 - 9.6 mg/dL    Total Protein 6.8 6.0 - 8.5 g/dL    Albumin 4.1 3.5 - 5.2 g/dL    ALT (SGPT) 12 1 - 41 U/L    AST (SGOT) 18 1 - 40 U/L    Alkaline Phosphatase 87 39 - 117 U/L    Total Bilirubin 0.8 0.0 - 1.2 mg/dL    Globulin 2.7 gm/dL    A/G Ratio 1.5 g/dL    BUN/Creatinine Ratio 18.3 7.0 - 25.0    Anion Gap 12.0 5.0 - 15.0 mmol/L    eGFR 60.5 >60.0 mL/min/1.73   High Sensitivity Troponin T    Collection Time: 07/09/25  5:58 PM    Specimen: Blood   Result Value Ref Range    HS Troponin T 25 (H) <22 ng/L   TSH Rfx On Abnormal To Free T4    Collection Time: 07/09/25  5:58 PM    Specimen: Blood   Result Value Ref Range    TSH 2.190 0.270 - 4.200 uIU/mL   Magnesium    Collection Time: 07/09/25  5:58 PM    Specimen: Blood   Result Value Ref  Range    Magnesium 1.8 1.6 - 2.4 mg/dL   Phosphorus    Collection Time: 07/09/25  5:58 PM    Specimen: Blood   Result Value Ref Range    Phosphorus 2.3 (L) 2.5 - 4.5 mg/dL   CBC Auto Differential    Collection Time: 07/09/25  5:58 PM    Specimen: Blood   Result Value Ref Range    WBC 15.00 (H) 3.40 - 10.80 10*3/mm3    RBC 4.94 4.14 - 5.80 10*6/mm3    Hemoglobin 15.2 13.0 - 17.7 g/dL    Hematocrit 45.6 37.5 - 51.0 %    MCV 92.3 79.0 - 97.0 fL    MCH 30.8 26.6 - 33.0 pg    MCHC 33.3 31.5 - 35.7 g/dL    RDW 13.5 12.3 - 15.4 %    RDW-SD 45.5 37.0 - 54.0 fl    MPV 9.0 6.0 - 12.0 fL    Platelets 173 140 - 450 10*3/mm3    Neutrophil % 82.9 (H) 42.7 - 76.0 %    Lymphocyte % 10.1 (L) 19.6 - 45.3 %    Monocyte % 5.9 5.0 - 12.0 %    Eosinophil % 0.4 0.3 - 6.2 %    Basophil % 0.3 0.0 - 1.5 %    Immature Grans % 0.4 0.0 - 0.5 %    Neutrophils, Absolute 12.44 (H) 1.70 - 7.00 10*3/mm3    Lymphocytes, Absolute 1.52 0.70 - 3.10 10*3/mm3    Monocytes, Absolute 0.88 0.10 - 0.90 10*3/mm3    Eosinophils, Absolute 0.06 0.00 - 0.40 10*3/mm3    Basophils, Absolute 0.04 0.00 - 0.20 10*3/mm3    Immature Grans, Absolute 0.06 (H) 0.00 - 0.05 10*3/mm3    nRBC 0.0 0.0 - 0.2 /100 WBC   Urinalysis With Culture If Indicated - Urine, Clean Catch    Collection Time: 07/09/25  5:59 PM    Specimen: Urine, Clean Catch   Result Value Ref Range    Color, UA Yellow Yellow, Straw    Appearance, UA Clear Clear    pH, UA 5.5 5.0 - 8.0    Specific Gravity, UA 1.008 1.005 - 1.030    Glucose, UA Negative Negative    Ketones, UA Negative Negative    Bilirubin, UA Negative Negative    Blood, UA Negative Negative    Protein, UA Negative Negative    Leuk Esterase, UA Negative Negative    Nitrite, UA Negative Negative    Urobilinogen, UA 0.2 E.U./dL 0.2 - 1.0 E.U./dL   Respiratory Panel PCR w/COVID-19(SARS-CoV-2) ALBERT/INDERJIT/MARLENY/PAD/COR/ABEL In-House, NP Swab in [facility], 2 HR TAT - Swab, Nasopharynx    Collection Time: 07/09/25  5:59 PM    Specimen: Nasopharynx; Swab    Result Value Ref Range    ADENOVIRUS, PCR Not Detected Not Detected    Coronavirus 229E Not Detected Not Detected    Coronavirus HKU1 Not Detected Not Detected    Coronavirus NL63 Not Detected Not Detected    Coronavirus OC43 Not Detected Not Detected    COVID19 Not Detected Not Detected - Ref. Range    Human Metapneumovirus Not Detected Not Detected    Human Rhinovirus/Enterovirus Not Detected Not Detected    Influenza A PCR Not Detected Not Detected    Influenza B PCR Not Detected Not Detected    Parainfluenza Virus 1 Not Detected Not Detected    Parainfluenza Virus 2 Not Detected Not Detected    Parainfluenza Virus 3 Not Detected Not Detected    Parainfluenza Virus 4 Not Detected Not Detected    RSV, PCR Not Detected Not Detected    Bordetella pertussis pcr Not Detected Not Detected    Bordetella parapertussis PCR Not Detected Not Detected    Chlamydophila pneumoniae PCR Not Detected Not Detected    Mycoplasma pneumo by PCR Not Detected Not Detected   ECG 12 Lead Electrolyte Imbalance    Collection Time: 07/09/25  6:06 PM   Result Value Ref Range    QT Interval 362 ms    QTC Interval 475 ms   High Sensitivity Troponin T 1Hr    Collection Time: 07/09/25  7:24 PM    Specimen: Arm, Left; Blood   Result Value Ref Range    HS Troponin T 26 (H) <22 ng/L    Troponin T Numeric Delta 1 ng/L    Troponin T % Delta 4 Abnormal if >/= 20%       Ordered the above labs and reviewed the results.        RADIOLOGY  CT Abdomen Pelvis With Contrast  Result Date: 7/9/2025  CT ABDOMEN PELVIS W CONTRAST-  Radiation dose reduction techniques were utilized, including automated exposure control and exposure modulation based on body size.  Clinical: Febrile, weakness  COMPARISON 12/11/2022  FINDINGS: 1. At the left lung base, there is a 4-5 mm noncalcified nodule similar to the 2022 examination supporting a benign etiology. Few other smaller stable nodules also noted.  2. Gallstones demonstrated within the dependent portion of an  otherwise normal-appearing gallbladder, no biliary duct dilatation. The liver and spleen have a satisfactory appearance. No pancreatic abnormality. Small hiatal hernia. Stomach is collapsed, overall contour within normal limits. No duodenal abnormality.  3. Both adrenal glands have a normal appearance. Renal cortical pattern of enhancement is symmetric and satisfactory. Tiny nonobstructing right renal calculus seen on image #55. No renal cortical mass or obstructive uropathy. The ureters are normal in course and caliber. There is diffuse bladder wall thickening/trabeculation, similar to the previous examination. Tiny gas bubble within the anti-dependent portion of the bladder, has there been recent catheterization?  4. There is dense fecal material demonstrated within the rectum, mild rectal wall thickening, suggesting impaction. There is diverticulosis of the colon, no indication of acute diverticulitis. Moderate to large amount of fecal material is demonstrated within the ascending colon, likely constipation. The appendix is normal. Small bowel is satisfactory in appearance.  5. There is a left inguinal hernia containing only mesenteric fat. There is a subtle amount of edema demonstrated along the internal inguinal ring.  6. There is atherosclerotic calcification of a normal diameter aorta. No free intraperitoneal gas or fluid.  CONCLUSION: Hiatal hernia. Increased fecal material within the ascending colon consistent with constipation, dense fecal material within the rectum with mild rectal wall thickening worrisome for impaction. There are gallstones. There is diverticulosis of the colon without overt diverticulitis. There is a left inguinal hernia containing only mesenteric fat with a subtle amount of edema along the internal inguinal ring. Diffuse bladder wall thickening/trabeculation as before. Small amount of gas within the anti-dependent portion of the bladder, has there been recent catheterization?     This  report was finalized on 7/9/2025 9:13 PM by Dr. Luis Felipe Fuchs M.D on Workstation: [id number]      XR Chest 1 View  Result Date: 7/9/2025  XR CHEST 1 VW-  HISTORY: Male who is 89 years-old, fever  TECHNIQUE: Frontal view of the chest  COMPARISON: 4/11/2025  FINDINGS: The heart size is normal. Aorta is tortuous, calcified. Pulmonary vasculature is unremarkable. No focal pulmonary consolidation, pleural effusion, or pneumothorax. No acute osseous process.      No focal pulmonary consolidation. Tortuous aorta. Follow-up as clinically indicated.  This report was finalized on 7/9/2025 5:50 PM by Dr. Constantine Dumont M.D on Workstation: [id number]        Ordered the above noted radiological studies. Reviewed by me in PACS.        MEDICATIONS GIVEN IN ER  Medications   sodium chloride 0.9 % flush 10 mL (has no administration in time range)   iopamidol (ISOVUE-300) 61 % injection 85 mL (85 mL Intravenous Given 7/9/25 2046)         ORDERS PLACED DURING THIS VISIT:  Orders Placed This Encounter   Procedures    Respiratory Panel PCR w/COVID-19(SARS-CoV-2) ALBERT/INDERJIT/MARLENY/PAD/COR/ABEL In-House, NP Swab in UTM/VTM, 2 HR TAT - Swab, Nasopharynx    XR Chest 1 View    CT Abdomen Pelvis With Contrast    Comprehensive Metabolic Panel    Urinalysis With Culture If Indicated - Urine, Clean Catch    High Sensitivity Troponin T    TSH Rfx On Abnormal To Free T4    Magnesium    Phosphorus    CBC Auto Differential    High Sensitivity Troponin T 1Hr    Monitor Blood Pressure    Continuous Pulse Oximetry    ECG 12 Lead Electrolyte Imbalance    Insert Peripheral IV    CBC & Differential         OUTPATIENT MEDICATION MANAGEMENT:  Current Facility-Administered Medications Ordered in Epic   Medication Dose Route Frequency Provider Last Rate Last Admin    ipratropium-albuterol (DUO-NEB) nebulizer solution 3 mL  3 mL Nebulization 4x Daily - RT Leanna Ryder MD        sodium chloride 0.9 % flush 10 mL  10 mL Intravenous PRN Renato Collier II,  MD         Current Outpatient Medications Ordered in Epic   Medication Sig Dispense Refill    apixaban (ELIQUIS) 5 MG tablet tablet Take 1 tablet by mouth Every 12 (Twelve) Hours. Indications: Atrial Fibrillation 180 tablet 3    atorvastatin (LIPITOR) 40 MG tablet TAKE 1 TABLET DAILY 90 tablet 3    Blood Glucose Monitoring Suppl (True Metrix Air Glucose Meter) w/Device kit Use 1 each Daily. 1 kit 0    cetirizine (zyrTEC) 10 MG tablet Take 1 tablet by mouth Daily.      fluticasone (FLONASE) 50 MCG/ACT nasal spray 2 sprays into the nostril(s) as directed by provider Daily. 48 g 3    furosemide (Lasix) 20 MG tablet Take 1 tablet by mouth Daily As Needed (swelling). 30 tablet 2    Lancets (freestyle) lancets Check one daily 100 each 12    losartan (COZAAR) 50 MG tablet TAKE 1 TABLET DAILY 90 tablet 3    metoprolol succinate XL (TOPROL-XL) 100 MG 24 hr tablet TAKE 1 TABLET DAILY 90 tablet 3    modafinil (PROVIGIL) 100 MG tablet Take 1 tablet by mouth Daily. 30 tablet 2    pantoprazole (PROTONIX) 40 MG EC tablet Take 1 tablet by mouth Daily. 90 tablet 3    potassium chloride (MICRO-K) 10 MEQ CR capsule Take 1 capsule by mouth 2 (Two) Times a Day. 180 capsule 1    tamsulosin (FLOMAX) 0.4 MG capsule 24 hr capsule TAKE 2 CAPSULES DAILY 180 capsule 3       PROCEDURES  Procedures          MEDICAL DECISION MAKING, PROGRESS, and CONSULTS    Discussion below represents my analysis of pertinent findings related to patient's condition, differential diagnosis, treatment plan and final disposition.      Additional sources:  - Discussed/obtained information from independent historians: Wife at bedside  Additional information was obtained to confirm the patient's history.          Differential diagnosis:    Pneumonia, viral syndrome, UTI, intra-abdominal source of infection such as cholecystitis.  I have low suspicion for meningitis as he has no meningismus.             Independent interpretation of labs, radiology studies, and  discussions with consultants:  ED Course as of 07/09/25 2238 Wed Jul 09, 2025 1823 EKG independently interpreted by myself.  Time 6:23 PM.  Atrial fibrillation.  Heart rate 104.  Right bundle branch block. [TD]   1929 Chest x-ray independently interpreted by myself.  I see no pneumonia. [TD]   1929 Phosphorus(!): 2.3 [TD]   1930 HS Troponin T(!): 25 [TD]   1930 Leukocytes, UA: Negative [TD]   1930 Nitrite, UA: Negative [TD]   2117 HS Troponin T(!): 26 [TD]   2117 Troponin T Numeric Delta: 1 [TD]   2139 I rechecked the patient.  Son is now at bedside.  He states that the patient looks like his normal self.  I discussed with the patient his constipation seen on CT.  He states that he is inconsistent with taking MiraLAX which I encouraged him to start doing more consistently.  He last had a bowel movement yesterday, however. [TD]      ED Course User Index  [TD] Renato Collier II, MD             Clinical Scores:                                   DIAGNOSIS  Final diagnoses:   Low grade fever   Other fatigue         DISPOSITION  DISCHARGE    FOLLOW-UP  Leanna Ryder MD  [address] [address]  [phone]    Schedule an appointment as soon as possible for a visit   As needed    [facility] EMERGENCY DEPARTMENT  [address] [facility] [address] [phone]  Go to   If symptoms worsen         Medication List      No changes were made to your prescriptions during this visit.             Latest Documented Vital Signs:  As of 22:38 EDT  BP- 138/77 HR- 85 Temp- 99.1 °F (37.3 °C) O2 sat- 98%      --    Please note that portions of this were completed with a voice recognition program.       Note Disclaimer: At [facility], we believe that sharing information builds trust and better relationships. You are receiving this note because you are receiving care at [facility] or recently visited. It is possible you will see health information before a provider has  talked with you about it. This kind of information can be easy to misunderstand. To help you fully understand what it means for your health, we urge you to discuss this note with your provider.         Renato Collier II, MD  07/09/25 5506

## 2025-07-10 LAB
QT INTERVAL: 362 MS
QTC INTERVAL: 475 MS

## 2025-07-12 ENCOUNTER — HOSPITAL ENCOUNTER (EMERGENCY)
Facility: HOSPITAL | Age: OVER 89
Discharge: HOME | End: 2025-07-12
Attending: STUDENT IN AN ORGANIZED HEALTH CARE EDUCATION/TRAINING PROGRAM
Payer: MEDICARE

## 2025-07-12 VITALS
SYSTOLIC BLOOD PRESSURE: 139 MMHG | HEIGHT: 70 IN | BODY MASS INDEX: 27.93 KG/M2 | DIASTOLIC BLOOD PRESSURE: 90 MMHG | TEMPERATURE: 98.9 F | OXYGEN SATURATION: 100 % | WEIGHT: 195.11 LBS | HEART RATE: 81 BPM | RESPIRATION RATE: 20 BRPM

## 2025-07-12 DIAGNOSIS — N30.01 ACUTE CYSTITIS WITH HEMATURIA: Primary | ICD-10-CM

## 2025-07-12 LAB
BACTERIA UR QL AUTO: ABNORMAL /HPF
BILIRUB UR QL STRIP: NEGATIVE
CLARITY UR: CLEAR
COLOR UR: YELLOW
GLUCOSE UR STRIP-MCNC: NEGATIVE MG/DL
HGB UR QL STRIP.AUTO: ABNORMAL
HYALINE CASTS UR QL AUTO: ABNORMAL /LPF
KETONES UR QL STRIP: NEGATIVE
LEUKOCYTE ESTERASE UR QL STRIP.AUTO: ABNORMAL
NITRITE UR QL STRIP: POSITIVE
PH UR STRIP.AUTO: 6.5 [PH] (ref 5–8)
PROT UR QL STRIP: ABNORMAL
RBC # UR STRIP: ABNORMAL /HPF
REF LAB TEST METHOD: ABNORMAL
SP GR UR STRIP: 1.01 (ref 1–1.03)
SQUAMOUS #/AREA URNS HPF: ABNORMAL /HPF
UROBILINOGEN UR QL STRIP: ABNORMAL
WBC # UR STRIP: ABNORMAL /HPF

## 2025-07-12 PROCEDURE — 81001 URINALYSIS AUTO W/SCOPE: CPT | Performed by: STUDENT IN AN ORGANIZED HEALTH CARE EDUCATION/TRAINING PROGRAM

## 2025-07-12 PROCEDURE — 87077 CULTURE AEROBIC IDENTIFY: CPT | Performed by: STUDENT IN AN ORGANIZED HEALTH CARE EDUCATION/TRAINING PROGRAM

## 2025-07-12 PROCEDURE — 87186 SC STD MICRODIL/AGAR DIL: CPT | Performed by: STUDENT IN AN ORGANIZED HEALTH CARE EDUCATION/TRAINING PROGRAM

## 2025-07-12 PROCEDURE — 25010000002 CEFTRIAXONE PER 250 MG: Performed by: STUDENT IN AN ORGANIZED HEALTH CARE EDUCATION/TRAINING PROGRAM

## 2025-07-12 PROCEDURE — 51798 US URINE CAPACITY MEASURE: CPT

## 2025-07-12 PROCEDURE — P9612 CATHETERIZE FOR URINE SPEC: HCPCS

## 2025-07-12 PROCEDURE — 99283 EMERGENCY DEPT VISIT LOW MDM: CPT

## 2025-07-12 PROCEDURE — 96365 THER/PROPH/DIAG IV INF INIT: CPT

## 2025-07-12 PROCEDURE — 87086 URINE CULTURE/COLONY COUNT: CPT | Performed by: STUDENT IN AN ORGANIZED HEALTH CARE EDUCATION/TRAINING PROGRAM

## 2025-07-12 RX ORDER — CEFDINIR 300 MG/1
300 CAPSULE ORAL 2 TIMES DAILY
Qty: 14 CAPSULE | Refills: 0 | Status: SHIPPED | OUTPATIENT
Start: 2025-07-12 | End: 2025-07-14

## 2025-07-12 RX ADMIN — CEFTRIAXONE SODIUM 1000 MG: 1 INJECTION, POWDER, FOR SOLUTION INTRAMUSCULAR; INTRAVENOUS at 19:25

## 2025-07-12 NOTE — DISCHARGE INSTRUCTIONS
Please follow-up with your urologist at your first available appointment next week    Please follow-up with your primary care physician within 2 to 3 days for repeat evaluation    Please return to the ER with new or worsening symptoms including but not limited to fever, chills, confusion, mental status changes, chest pain, shortness of breath, abdominal pain, difficulty with urination

## 2025-07-12 NOTE — ED PROVIDER NOTES
EMERGENCY DEPARTMENT ENCOUNTER  Room Number:  10/10  PCP: Leanna Ryder MD  Independent Historians: Patient and Family      HPI:  Chief Complaint: had concerns including Difficulty Urinating.       Context: Kevin Ellington is a 89 y.o. male with a medical history of DDD, CAD, HTN, HLD, CAD, BPH who presents to the ED c/o acute urinary retention.  Patient states has been struggling with decreased urine flow for quite some time but over the last 24 to 48 hours this is severely worsened to the point he can only get drops or very weak stream out.  Patient did urinate in the waiting room and upon arrival to the room he had a bladder scan performed that demonstrated retention with urine of 360.  Patient has some discomfort in the lower abdomen.  Patient denies fever and chills.      Review of prior external notes (non-ED) -and- Review of prior external test results outside of this encounter: Office visit with cardiology from 7/1/2025 reviewed and notable for history of A-fib, hypertension, CAD, HLD.  Plan at that visit was to continue metoprolol and apixaban, continue atorvastatin.    Prescription drug monitoring program review:         PAST MEDICAL HISTORY  Active Ambulatory Problems     Diagnosis Date Noted    Bulging lumbar disc 06/16/2016    DDD (degenerative disc disease), lumbar 06/16/2016    Leg pain 06/16/2016    Spinal stenosis of lumbar region with neurogenic claudication 06/16/2016    Radiculitis, thoracic 06/16/2016    Status post total left knee replacement using cement 06/16/2016    Status post total right knee replacement using cement 06/16/2016    Coronary artery disease involving native coronary artery of native heart without angina pectoris 08/23/2016    Essential hypertension 08/23/2016    Hyperlipemia 08/23/2016    S/P coronary artery stent placement 09/06/2017    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/05/2017    Benign prostatic hyperplasia with urinary frequency 10/05/2017    Cervical  radiculopathy 02/06/2018    Bilateral carpal tunnel syndrome 08/20/2018    Cervical spinal stenosis 08/20/2018    Arthritis of ankle 12/06/2018    Chronic pain of right ankle 12/06/2018    Shortness of breath 07/09/2019    Bilateral leg weakness 10/21/2019    History of melanoma 08/27/2020    Spondylosis of lumbar region without myelopathy or radiculopathy 09/09/2020    Stage 3a chronic kidney disease 10/21/2021    Pedal edema 10/21/2021    Gastroesophageal reflux disease without esophagitis 04/29/2022    TIA (transient ischemic attack) 05/12/2022    Bilateral carotid artery stenosis 05/12/2022    Cerebral artery occlusion 08/09/2022    Hoarseness 12/13/2022    Hiatal hernia 12/13/2022    Abdominal fullness 12/13/2022    Fever and chills 06/12/2023    COVID-19 06/12/2023    Chronic atrial fibrillation 06/15/2023    Cytokine release syndrome, grade 2 06/16/2023    Type 2 diabetes mellitus without complication, without long-term current use of insulin 10/03/2023    Chronic instability of ankle 09/06/2022    Lumbar post-laminectomy syndrome 06/13/2022    Primary localized osteoarthrosis of ankle and foot 09/06/2022    Leg edema, right 01/20/2025    Abnormal CT scan, stomach 06/12/2025     Resolved Ambulatory Problems     Diagnosis Date Noted    Health care maintenance 10/05/2017    Cancer 10/04/2018    Acute bronchitis 02/27/2019    Hyperglycemia 08/09/2022    Atrial fibrillation 06/15/2023     Past Medical History:   Diagnosis Date    Abnormal ECG 6-    Allergic 1970    Arrhythmia 6-    Arthritis     Asthma 05/23/2019    Benign prostatic hyperplasia     Cataract removed 1999    Cervical disc disorder     Chronic constipation     Colon polyp     Coronary artery disease 2001    CTS (carpal tunnel syndrome) 1998    Difficulty walking     Erectile dysfunction     GERD (gastroesophageal reflux disease)     HTN (hypertension)     Hyperlipidemia     Injury of back     Kidney stone     Low back pain      Lumbosacral disc disease     Melanoma 01/15/2009    Obesity     Scoliosis     Stented coronary artery     Thoracic disc disorder     Urinary tract infection aug. 2024         PAST SURGICAL HISTORY  Past Surgical History:   Procedure Laterality Date    ABLATION OF DYSRHYTHMIC FOCUS  2020    radio frequency /lower back    ANKLE FUSION Left 2007    reconstruction and fusion    BACK SURGERY      HERNIATED LUMBAR DISK 1975,,    CARDIAC CATHETERIZATION      CARPAL TUNNEL RELEASE      CATARACT EXTRACTION, BILATERAL Bilateral     COLONOSCOPY      CORONARY ANGIOPLASTY WITH STENT PLACEMENT      CORONARY STENT PLACEMENT      ENDOSCOPY N/A 2022    Procedure: ESOPHAGOGASTRODUODENOSCOPY WITH BIOPSY;  Surgeon: Rashard Mathews MD;  Location: [facility];  Service: Gastroenterology;  Laterality: N/A;  gastric polyps, hiatal hernia    ENDOSCOPY N/A 2025    Procedure: ESOPHAGOGASTRODUODENOSCOPY WITH BIOPSY;  Surgeon: Vargas Reed MD;  Location: [facility];  Service: Gastroenterology;  Laterality: N/A;  gastric polyps, hiatal hernia, gastritis, duodenitis    EPIDURAL BLOCK  Several at [facility]    and [facility]    EYE SURGERY      cataracts    JOINT REPLACEMENT      knees    LAMINECTOMY  2012    Surgery    REPLACEMENT TOTAL KNEE Left 2015    REPLACEMENT TOTAL KNEE Right     SKIN CANCER EXCISION      MELANOMA    SPINE SURGERY      herniated discs   (3)    THORACIC SPINE SURGERY      UPPER GASTROINTESTINAL ENDOSCOPY           FAMILY HISTORY  Family History   Problem Relation Age of Onset    Hypertension Mother     Arthritis Mother             Skin cancer Mother     Heart disease Father     Early death Father         heart disease    Hypertension Father     Aortic aneurysm Father 58    Arthritis Sister     Parkinsonism Brother             Parkinsonism Brother     Cancer Neg Hx     Colon cancer Neg Hx     Colon polyps Neg Hx     Crohn's disease Neg  Hx     Irritable bowel syndrome Neg Hx     Ulcerative colitis Neg Hx          SOCIAL HISTORY  Social History     Socioeconomic History    Marital status:     Number of children: 1    Years of education: BA DEGREE   Tobacco Use    Smoking status: Never    Smokeless tobacco: Never    Tobacco comments:     caffeine use   Vaping Use    Vaping status: Never Used   Substance and Sexual Activity    Alcohol use: No    Drug use: No    Sexual activity: Not Currently     Partners: Female     Birth control/protection: None       Chronic or social conditions impacting patient care (Social Determinants of Health):  Social Drivers of Health     Tobacco Use: Low Risk  (7/1/2025)    Patient History     Smoking Tobacco Use: Never     Smokeless Tobacco Use: Never     Passive Exposure: Not on file   Alcohol Use: Not At Risk (1/20/2025)    AUDIT-C     Frequency of Alcohol Consumption: Never     Average Number of Drinks: Patient does not drink     Frequency of Binge Drinking: Never   Financial Resource Strain: Not on file   Food Insecurity: No Food Insecurity (1/21/2025)    Hunger Vital Sign     Worried About Running Out of Food in the Last Year: Never true     Ran Out of Food in the Last Year: Never true   Transportation Needs: No Transportation Needs (1/21/2025)    PRAPARE - Transportation     Lack of Transportation (Medical): No     Lack of Transportation (Non-Medical): No   Physical Activity: Not on file   Stress: Not on file   Social Connections: At Risk (1/21/2025)    Family and Community Support     Help with Day-to-Day Activities: I could use a little more help     Lonely or Isolated: Not on file   Interpersonal Safety: Not At Risk (7/12/2025)    Abuse Screen     Unsafe at Home or Work/School: no     Feels Threatened by Someone?: no     Does Anyone Keep You from Contacting Others or Doint Things Outside the Home?: no     Physical Sign of Abuse Present: no   Depression: Not at risk (2/3/2025)    PHQ-2     PHQ-2 Score: 0    Housing Stability: Not At Risk (1/21/2025)    Housing Stability     Current Living Arrangements: independent living facility     Potentially Unsafe Housing Conditions: none   Utilities: Not At Risk (1/21/2025)    [facility] Utilities     Threatened with loss of utilities: No   Health Literacy: Not At Risk (1/21/2025)    Education     Help with school or training?: No     Preferred Language: English   Employment: Not At Risk (1/21/2025)    Employment     Do you want help finding or keeping work or a job?: I do not need or want help   Disabilities: Not At Risk (1/20/2025)    Disabilities     Concentrating, Remembering, or Making Decisions Difficulty: no     Doing Errands Independently Difficulty: no       ALLERGIES  Codeine, Hydrocodone, and Sulfa antibiotics      REVIEW OF SYSTEMS  Review of Systems  Included in HPI  All systems reviewed and negative except for those discussed in HPI.      PHYSICAL EXAM    I have reviewed the triage vital signs and nursing notes.    ED Triage Vitals   Temp Heart Rate Resp BP SpO2   07/12/25 1534 07/12/25 1534 07/12/25 1534 07/12/25 1536 07/12/25 1534   98.2 °F (36.8 °C) 107 16 142/89 96 %      Temp src Heart Rate Source Patient Position BP Location FiO2 (%)   -- -- -- -- --              Physical Exam  GENERAL: alert, no acute distress  SKIN: Warm, dry  HENT: Normocephalic, atraumatic  EYES: no scleral icterus  CV: regular rhythm, regular rate  RESPIRATORY: normal effort, lungs clear  ABDOMEN: soft, mild tenderness in the suprapubic region  MUSCULOSKELETAL: no deformity  NEURO: alert, moves all extremities, follows commands            LAB RESULTS  Recent Results (from the past 24 hours)   Urinalysis With Culture If Indicated - Straight Cath    Collection Time: 07/12/25  6:02 PM    Specimen: Straight Cath; Urine   Result Value Ref Range    Color, UA Yellow Yellow, Straw    Appearance, UA Clear Clear    pH, UA 6.5 5.0 - 8.0    Specific Gravity, UA 1.011 1.005 - 1.030    Glucose, UA Negative  Negative    Ketones, UA Negative Negative    Bilirubin, UA Negative Negative    Blood, UA Large (3+) (A) Negative    Protein, UA 30 mg/dL (1+) (A) Negative    Leuk Esterase, UA Moderate (2+) (A) Negative    Nitrite, UA Positive (A) Negative    Urobilinogen, UA 1.0 E.U./dL 0.2 - 1.0 E.U./dL   Urinalysis, Microscopic Only - Straight Cath    Collection Time: 07/12/25  6:02 PM    Specimen: Straight Cath; Urine   Result Value Ref Range    RBC, UA 11-20 (A) None Seen, 0-2 /HPF    WBC, UA 21-50 (A) None Seen, 0-2 /HPF    Bacteria, UA 4+ (A) None Seen /HPF    Squamous Epithelial Cells, UA 0-2 None Seen, 0-2 /HPF    Hyaline Casts, UA 0-2 None Seen /LPF    Methodology Automated Microscopy          RADIOLOGY  No Radiology Exams Resulted Within Past 24 Hours      MEDICATIONS GIVEN IN ER  Medications   cefTRIAXone (ROCEPHIN) 1,000 mg in sodium chloride 0.9 % 100 mL MBP (1,000 mg Intravenous New Bag 7/12/25 1925)         ORDERS PLACED DURING THIS VISIT:  Orders Placed This Encounter   Procedures    Urine Culture - Urine,    Urinalysis With Culture If Indicated - Urine, Clean Catch    Urinalysis, Microscopic Only - Urine, Clean Catch    Bladder scan         OUTPATIENT MEDICATION MANAGEMENT:  Current Facility-Administered Medications Ordered in Epic   Medication Dose Route Frequency Provider Last Rate Last Admin    ipratropium-albuterol (DUO-NEB) nebulizer solution 3 mL  3 mL Nebulization 4x Daily - RT Leanna Ryder MD         Current Outpatient Medications Ordered in Epic   Medication Sig Dispense Refill    apixaban (ELIQUIS) 5 MG tablet tablet Take 1 tablet by mouth Every 12 (Twelve) Hours. Indications: Atrial Fibrillation 180 tablet 3    atorvastatin (LIPITOR) 40 MG tablet TAKE 1 TABLET DAILY 90 tablet 3    Blood Glucose Monitoring Suppl (True Metrix Air Glucose Meter) w/Device kit Use 1 each Daily. 1 kit 0    cefdinir (OMNICEF) 300 MG capsule Take 1 capsule by mouth 2 (Two) Times a Day for 7 days. 14 capsule 0    cetirizine  (zyrTEC) 10 MG tablet Take 1 tablet by mouth Daily.      fluticasone (FLONASE) 50 MCG/ACT nasal spray 2 sprays into the nostril(s) as directed by provider Daily. 48 g 3    furosemide (Lasix) 20 MG tablet Take 1 tablet by mouth Daily As Needed (swelling). 30 tablet 2    Lancets (freestyle) lancets Check one daily 100 each 12    losartan (COZAAR) 50 MG tablet TAKE 1 TABLET DAILY 90 tablet 3    metoprolol succinate XL (TOPROL-XL) 100 MG 24 hr tablet TAKE 1 TABLET DAILY 90 tablet 3    modafinil (PROVIGIL) 100 MG tablet Take 1 tablet by mouth Daily. 30 tablet 2    pantoprazole (PROTONIX) 40 MG EC tablet Take 1 tablet by mouth Daily. 90 tablet 3    potassium chloride (MICRO-K) 10 MEQ CR capsule Take 1 capsule by mouth 2 (Two) Times a Day. 180 capsule 1    tamsulosin (FLOMAX) 0.4 MG capsule 24 hr capsule TAKE 2 CAPSULES DAILY 180 capsule 3         PROCEDURES  Procedures            PROGRESS, DATA ANALYSIS, CONSULTS, AND MEDICAL DECISION MAKING  All labs have been independently interpreted by me.  All radiology studies have been reviewed by me. All EKG's have been independently viewed and interpreted by me.  Discussion below represents my analysis of pertinent findings related to patient's condition, differential diagnosis, treatment plan and final disposition.    Differential diagnosis includes but is not limited to UTI, BPH, urinary retention.    Clinical Scores:                                       ED Course as of 07/12/25 2004   Sat Jul 12, 2025   1742 Offered multiple options including monitoring symptoms, straight cath or Garcia catheter to the patient.  At this time he would prefer to proceed with Garcia catheter.  Will send urinalysis off of Garcia catheter once placed. [MW]   1845 Urinalysis returns and demonstrates clear nitrite positive UTI.  Patient is overall well-appearing and afebrile.  Will give dose of ceftriaxone in the emergency department.  Garcia catheter has been placed.  Will have patient follow-[facility]  clos[facility] with.  Patient is agreeable with this plan.  Very strict return precautions provided to patient and family. [MW]      ED Course User Index  [MW] Donnell Rubio MD             AS OF 20:04 EDT VITALS:    BP - 142/89  HR - 107  TEMP - 98.2 °F (36.8 °C)  O2 SATS - 96%    COMPLEXITY OF CARE  Admission was considered but after careful review of the patient's presentation, physical examination, diagnostic results, and response to treatment the patient may be safely discharged with outpatient follow-up.      DIAGNOSIS  Final diagnoses:   Acute cystitis with hematuria         DISPOSITION  ED Disposition       ED Disposition   Discharge    Condition   Stable    Comment   --                Please note that portions of this document were completed with a voice recognition program.    Note Disclaimer: At [facility], we believe that sharing information builds trust and better relationships. You are receiving this note because you recently visited [facility]. It is possible you will see health information before a provider has talked with you about it. This kind of information can be easy to misunderstand. To help you fully understand what it means for your health, we urge you to discuss this note with your provider.         Donnell Rubio MD  07/12/25 2004

## 2025-07-14 ENCOUNTER — OFFICE VISIT (OUTPATIENT)
Dept: FAMILY MEDICINE CLINIC | Facility: CLINIC | Age: OVER 89
End: 2025-07-14
Payer: MEDICARE

## 2025-07-14 VITALS
OXYGEN SATURATION: 98 % | RESPIRATION RATE: 16 BRPM | SYSTOLIC BLOOD PRESSURE: 118 MMHG | TEMPERATURE: 98.2 F | HEIGHT: 70 IN | WEIGHT: 202.6 LBS | HEART RATE: 66 BPM | BODY MASS INDEX: 29.01 KG/M2 | DIASTOLIC BLOOD PRESSURE: 82 MMHG

## 2025-07-14 DIAGNOSIS — N13.9 LOWER URINARY OBSTRUCTIVE SYMPTOM: ICD-10-CM

## 2025-07-14 DIAGNOSIS — N40.1 BENIGN PROSTATIC HYPERPLASIA WITH URINARY OBSTRUCTION: ICD-10-CM

## 2025-07-14 DIAGNOSIS — E11.9 TYPE 2 DIABETES MELLITUS WITHOUT COMPLICATION, WITHOUT LONG-TERM CURRENT USE OF INSULIN: Primary | ICD-10-CM

## 2025-07-14 DIAGNOSIS — R09.89 CHRONIC THROAT CLEARING: ICD-10-CM

## 2025-07-14 DIAGNOSIS — N13.8 BENIGN PROSTATIC HYPERPLASIA WITH URINARY OBSTRUCTION: ICD-10-CM

## 2025-07-14 LAB
BACTERIA SPEC AEROBE CULT: ABNORMAL
EXPIRATION DATE: ABNORMAL
HBA1C MFR BLD: 6.1 % (ref 4.5–5.7)
Lab: ABNORMAL

## 2025-07-14 PROCEDURE — 99214 OFFICE O/P EST MOD 30 MIN: CPT | Performed by: FAMILY MEDICINE

## 2025-07-14 PROCEDURE — 83036 HEMOGLOBIN GLYCOSYLATED A1C: CPT | Performed by: FAMILY MEDICINE

## 2025-07-14 PROCEDURE — 3044F HG A1C LEVEL LT 7.0%: CPT | Performed by: FAMILY MEDICINE

## 2025-07-14 PROCEDURE — 1159F MED LIST DOCD IN RCRD: CPT | Performed by: FAMILY MEDICINE

## 2025-07-14 PROCEDURE — 1160F RVW MEDS BY RX/DR IN RCRD: CPT | Performed by: FAMILY MEDICINE

## 2025-07-14 PROCEDURE — 1126F AMNT PAIN NOTED NONE PRSNT: CPT | Performed by: FAMILY MEDICINE

## 2025-07-14 RX ORDER — CIPROFLOXACIN 500 MG/1
500 TABLET, FILM COATED ORAL 2 TIMES DAILY
Qty: 10 TABLET | Refills: 0 | Status: SHIPPED | OUTPATIENT
Start: 2025-07-14 | End: 2025-07-19

## 2025-07-14 NOTE — PROGRESS NOTES
Chief Complaint  Blood in Urine and Hospital Follow Up Visit (7/12 Acute cystitis with hematuria.)    Subjective        Kevin Ellington presents to [facility]  History of Present Illness    History of Present Illness  The patient is a 89-year-old male presenting with urinary issues, diabetes, and hoarseness.  He is here today with his wife Dairela.    Obstructive complications with benign prostate hyperplasia  He has difficulty urinating and incomplete bladder emptying, confirmed by urologist Dr. Bunn. Despite discharge on Wednesday, his condition worsened by Saturday, leading to a return visit due to a UTI. A Garcia catheter was inserted, which remains in place. He noticed hematuria and emptied the catheter bag twice during the night. He has an appointment with Dr. Hatch' assistant on Monday for potential catheter removal. He is considering a UroLift procedure for BPH, scheduled for 09/08/2025. A recent CT scan revealed a tortuous aorta with calcification. He experienced a fever of 101°F, prompting an ER visit. He is concerned about dehydration due to urine output. He stopped Lasix since Saturday and has not gained significant weight, monitoring his weight daily.  - Onset: Condition worsened by Saturday after discharge on Wednesday.  - Location: Urinary tract.  - Character: Difficulty urinating, incomplete bladder emptying, hematuria.  - Alleviating/Aggravating Factors: Garcia catheter insertion, considering UroLift procedure, stopped Lasix.  - Timing: Emptied catheter bag twice during the night, monitoring weight daily.  - Severity: Worsened condition leading to ER visit, fever of 101°F, concerned about dehydration.    Diabetes chronic problem that is well-controlled  His morning blood sugar levels have been acceptable, except for a slight increase from Wednesday to Saturday, possibly related to the UTI.  - Onset: Slight increase in blood sugar levels from Wednesday to Saturday.  -  "Timing: Morning blood sugar levels.  - Severity: Acceptable levels, slight increase possibly related to UTI.    Hoarseness, chronic problem that is unchanged  He reports recurrent hoarseness, sinus drainage, and rhinorrhea, attributed to allergies. Previously used Flonase twice daily, which alleviated symptoms. Now experiences wheezing and a squeaking sound when exhaling.  - Onset: Recurrent.  - Location: Sinus and respiratory tract.  - Character: Hoarseness, sinus drainage, rhinorrhea, wheezing, squeaking sound when exhaling.  - Alleviating Factors: Flonase twice daily.  - Severity: Symptoms alleviated by Flonase, now experiencing wheezing and squeaking sound.    MEDICATIONS  Current: Zyrtec, Flonase, pantoprazole, cefdinir  Discontinued: Lasix       Objective   Vital Signs:  /82   Pulse 66   Temp 98.2 °F (36.8 °C)   Resp 16   Ht 177.8 cm (70\")   Wt 91.9 kg (202 lb 9.6 oz)   SpO2 98%   BMI 29.07 kg/m²   Estimated body mass index is 29.07 kg/m² as calculated from the following:    Height as of this encounter: 177.8 cm (70\").    Weight as of this encounter: 91.9 kg (202 lb 9.6 oz).            Physical Exam  Vitals and nursing note reviewed.   Constitutional:       General: He is not in acute distress.     Appearance: He is well-developed.   HENT:      Head: Normocephalic.      Nose: Nose normal.   Cardiovascular:      Rate and Rhythm: Normal rate and regular rhythm.      Heart sounds: Normal heart sounds. No murmur heard.  Pulmonary:      Effort: Pulmonary effort is normal. No respiratory distress.      Breath sounds: Normal breath sounds.   Musculoskeletal:         General: Normal range of motion.   Skin:     General: Skin is warm and dry.      Findings: No rash.   Neurological:      Mental Status: He is alert and oriented to person, place, and time.   Psychiatric:         Behavior: Behavior normal.         Thought Content: Thought content normal.         Judgment: Judgment normal.        Physical " Exam  Lungs auscultated. No renal tenderness.      Result Review :  The following data was reviewed by: Leanna Ryder MD on 07/14/2025:  A1C Last 3 Results          9/17/2024    16:12 7/14/2025    12:01   HGBA1C Last 3 Results   Hemoglobin A1C 7.1  6.1           CT Abdomen Pelvis With Contrast (07/09/2025 20:45)        Assessment and Plan   Diagnoses and all orders for this visit:    1. Type 2 diabetes mellitus without complication, without long-term current use of insulin (Primary)  -     POC Glycosylated Hemoglobin (Hb A1C)    2. Lower urinary obstructive symptom    3. Benign prostatic hyperplasia with urinary obstruction    4. Chronic throat clearing           Assessment & Plan  1.  Prostate enlargement with postobstructive symptoms now with a chronic indwelling Garcia catheter.  He unfortunately has had issues with voiding and now UTI symptoms, treated with cefdinir.  We discussed why the Garcia is necessary.  He has had some hematuria  - Hematuria noted, emptied catheter bag twice during the night  - Appointment with Dr. Hatch' assistant on Monday for potential catheter removal  - Considering UroLift procedure for BPH, scheduled for 09/08/2025  - CT scan revealed tortuous aorta with calcification-reassured patient's as this is likely not clinically relevant  - Stopped Lasix since Saturday due to excessive output, no significant weight gain, monitoring weight daily.  If he notices edema or fluid retention he can take the furosemide 20 mg as needed  - Condition improving as expected  - CT scan indicates normal diameter aorta with plaque, not concerning  - Continue weight monitoring, take furosemide if rapid weight increase  - Maintain hydration with electrolyte fluids  - Continue follow-up with Dr. Hatch    2. Diabetes mellitus: Chronic problem, complicated with chronic kidney disease and coronary artery disease  - A1c improved from 7.1 to 6.1, indicating better control  - Continue current diabetes  management    3. Hoarseness: Chronic problem with throat clearing.  We discussed that he had sufficient treatment with allergies and GERD that should treat his symptoms.  Also the cefdinir for UTI would also cover for any sinusitis.  -Continue Zyrtec, Flonase twice daily, and pantoprazole  - consider ENT referral for vocal cord evaluation according to his preference.  Deferred for now.    Follow-up:  - Follow up in October 2025      Follow Up   Return in about 3 months (around 10/14/2025), or if symptoms worsen or fail to improve, for Recheck diabetes/ AWV.  Patient was given instructions and counseling regarding his condition or for health maintenance advice. Please see specific information pulled into the AVS if appropriate.         Leanna Ryder MD      Patient or patient representative verbalized consent for the use of Ambient Listening during the visit with  Leanna Ryder MD for chart documentation. 7/14/2025  12:38 EDT

## 2025-07-18 ENCOUNTER — HOSPITAL ENCOUNTER (EMERGENCY)
Facility: HOSPITAL | Age: OVER 89
Discharge: HOME | End: 2025-07-18
Attending: EMERGENCY MEDICINE
Payer: MEDICARE

## 2025-07-18 VITALS
OXYGEN SATURATION: 100 % | RESPIRATION RATE: 18 BRPM | TEMPERATURE: 97.6 F | HEART RATE: 85 BPM | DIASTOLIC BLOOD PRESSURE: 84 MMHG | SYSTOLIC BLOOD PRESSURE: 146 MMHG

## 2025-07-18 DIAGNOSIS — T83.9XXA FOLEY CATHETER PROBLEM, INITIAL ENCOUNTER: ICD-10-CM

## 2025-07-18 DIAGNOSIS — N48.89 PENILE SWELLING: Primary | ICD-10-CM

## 2025-07-18 LAB
ALBUMIN SERPL-MCNC: 3.7 G/DL (ref 3.5–5.2)
ALBUMIN/GLOB SERPL: 1.4 G/DL
ALP SERPL-CCNC: 86 U/L (ref 39–117)
ALT SERPL W P-5'-P-CCNC: 12 U/L (ref 1–41)
ANION GAP SERPL CALCULATED.3IONS-SCNC: 9 MMOL/L (ref 5–15)
APTT PPP: 51.4 SECONDS (ref 22.7–35.4)
AST SERPL-CCNC: 18 U/L (ref 1–40)
BACTERIA UR QL AUTO: ABNORMAL /HPF
BASOPHILS # BLD AUTO: 0.03 10*3/MM3 (ref 0–0.2)
BASOPHILS NFR BLD AUTO: 0.3 % (ref 0–1.5)
BILIRUB SERPL-MCNC: 0.4 MG/DL (ref 0–1.2)
BILIRUB UR QL STRIP: NEGATIVE
BUN SERPL-MCNC: 21 MG/DL (ref 8–23)
BUN/CREAT SERPL: 21 (ref 7–25)
CALCIUM SPEC-SCNC: 9 MG/DL (ref 8.2–9.6)
CHLORIDE SERPL-SCNC: 109 MMOL/L (ref 98–107)
CLARITY UR: CLEAR
CO2 SERPL-SCNC: 21 MMOL/L (ref 22–29)
COLOR UR: YELLOW
CREAT SERPL-MCNC: 1 MG/DL (ref 0.76–1.27)
D-LACTATE SERPL-SCNC: 1.3 MMOL/L (ref 0.5–2)
DEPRECATED RDW RBC AUTO: 45.2 FL (ref 37–54)
EGFRCR SERPLBLD CKD-EPI 2021: 71.5 ML/MIN/1.73
EOSINOPHIL # BLD AUTO: 0.18 10*3/MM3 (ref 0–0.4)
EOSINOPHIL NFR BLD AUTO: 1.7 % (ref 0.3–6.2)
ERYTHROCYTE [DISTWIDTH] IN BLOOD BY AUTOMATED COUNT: 13.2 % (ref 12.3–15.4)
GLOBULIN UR ELPH-MCNC: 2.7 GM/DL
GLUCOSE SERPL-MCNC: 70 MG/DL (ref 65–99)
GLUCOSE UR STRIP-MCNC: NEGATIVE MG/DL
HCT VFR BLD AUTO: 41.1 % (ref 37.5–51)
HGB BLD-MCNC: 13.7 G/DL (ref 13–17.7)
HGB UR QL STRIP.AUTO: ABNORMAL
HYALINE CASTS UR QL AUTO: ABNORMAL /LPF
IMM GRANULOCYTES # BLD AUTO: 0.07 10*3/MM3 (ref 0–0.05)
IMM GRANULOCYTES NFR BLD AUTO: 0.7 % (ref 0–0.5)
INR PPP: 1.47 (ref 0.9–1.1)
KETONES UR QL STRIP: NEGATIVE
LEUKOCYTE ESTERASE UR QL STRIP.AUTO: ABNORMAL
LYMPHOCYTES # BLD AUTO: 1.99 10*3/MM3 (ref 0.7–3.1)
LYMPHOCYTES NFR BLD AUTO: 18.5 % (ref 19.6–45.3)
MCH RBC QN AUTO: 30.9 PG (ref 26.6–33)
MCHC RBC AUTO-ENTMCNC: 33.3 G/DL (ref 31.5–35.7)
MCV RBC AUTO: 92.8 FL (ref 79–97)
MONOCYTES # BLD AUTO: 1.04 10*3/MM3 (ref 0.1–0.9)
MONOCYTES NFR BLD AUTO: 9.7 % (ref 5–12)
NEUTROPHILS NFR BLD AUTO: 69.1 % (ref 42.7–76)
NEUTROPHILS NFR BLD AUTO: 7.42 10*3/MM3 (ref 1.7–7)
NITRITE UR QL STRIP: NEGATIVE
NRBC BLD AUTO-RTO: 0 /100 WBC (ref 0–0.2)
PH UR STRIP.AUTO: 5.5 [PH] (ref 5–8)
PLATELET # BLD AUTO: 211 10*3/MM3 (ref 140–450)
PMV BLD AUTO: 8.6 FL (ref 6–12)
POTASSIUM SERPL-SCNC: 4.1 MMOL/L (ref 3.5–5.2)
PROT SERPL-MCNC: 6.4 G/DL (ref 6–8.5)
PROT UR QL STRIP: ABNORMAL
PROTHROMBIN TIME: 17.8 SECONDS (ref 11.7–14.2)
RBC # BLD AUTO: 4.43 10*6/MM3 (ref 4.14–5.8)
RBC # UR STRIP: ABNORMAL /HPF
REF LAB TEST METHOD: ABNORMAL
SODIUM SERPL-SCNC: 139 MMOL/L (ref 136–145)
SP GR UR STRIP: 1.02 (ref 1–1.03)
SQUAMOUS #/AREA URNS HPF: ABNORMAL /HPF
UROBILINOGEN UR QL STRIP: ABNORMAL
WBC # UR STRIP: ABNORMAL /HPF
WBC NRBC COR # BLD AUTO: 10.73 10*3/MM3 (ref 3.4–10.8)

## 2025-07-18 PROCEDURE — 85730 THROMBOPLASTIN TIME PARTIAL: CPT | Performed by: EMERGENCY MEDICINE

## 2025-07-18 PROCEDURE — 85025 COMPLETE CBC W/AUTO DIFF WBC: CPT | Performed by: EMERGENCY MEDICINE

## 2025-07-18 PROCEDURE — 99283 EMERGENCY DEPT VISIT LOW MDM: CPT

## 2025-07-18 PROCEDURE — 81001 URINALYSIS AUTO W/SCOPE: CPT | Performed by: EMERGENCY MEDICINE

## 2025-07-18 PROCEDURE — 80053 COMPREHEN METABOLIC PANEL: CPT | Performed by: EMERGENCY MEDICINE

## 2025-07-18 PROCEDURE — 85610 PROTHROMBIN TIME: CPT | Performed by: EMERGENCY MEDICINE

## 2025-07-18 PROCEDURE — P9612 CATHETERIZE FOR URINE SPEC: HCPCS

## 2025-07-18 PROCEDURE — 83605 ASSAY OF LACTIC ACID: CPT | Performed by: EMERGENCY MEDICINE

## 2025-07-18 RX ORDER — SODIUM CHLORIDE 0.9 % (FLUSH) 0.9 %
10 SYRINGE (ML) INJECTION AS NEEDED
Status: DISCONTINUED | OUTPATIENT
Start: 2025-07-18 | End: 2025-07-18 | Stop reason: HOSPADM

## 2025-07-18 NOTE — ED TRIAGE NOTES
Patient to ED via EMS from home c/o penile swelling and redness x 1 week with catheter in place x 1 week. Patient currently taking cipro.

## 2025-07-18 NOTE — ED PROVIDER NOTES
EMERGENCY DEPARTMENT ENCOUNTER    Room Number:  36/36  PCP: Leanna Ryder MD  Historian: Patient      HPI:  Chief Complaint: Groin swelling  A complete HPI/ROS/PMH/PSH/SH/FH are unobtainable due to: None  Context: Kevin Ellington is a 89 y.o. male who presents to the ED c/o sudden onset of swelling to his groin most notably at the penis that has now been present throughout the day today.  He reports that he was having difficulty urinating due to his BPH history and a catheter was placed roughly 6 days ago.  He has a follow-up scheduled with urology this coming Monday.  This morning they noted some swelling and discoloration with some leakage around the catheter itself.  Symptoms are moderate in intensity.  They initially report that the symptoms now have somewhat improved on their own.  He denies abdominal pain, fever/chills, back pain, chest pain, or shortness of breath.            PAST MEDICAL HISTORY  Active Ambulatory Problems     Diagnosis Date Noted    Bulging lumbar disc 06/16/2016    DDD (degenerative disc disease), lumbar 06/16/2016    Leg pain 06/16/2016    Spinal stenosis of lumbar region with neurogenic claudication 06/16/2016    Radiculitis, thoracic 06/16/2016    Status post total left knee replacement using cement 06/16/2016    Status post total right knee replacement using cement 06/16/2016    Coronary artery disease involving native coronary artery of native heart without angina pectoris 08/23/2016    Essential hypertension 08/23/2016    Hyperlipemia 08/23/2016    S/P coronary artery stent placement 09/06/2017    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/05/2017    Benign prostatic hyperplasia with urinary frequency 10/05/2017    Cervical radiculopathy 02/06/2018    Bilateral carpal tunnel syndrome 08/20/2018    Cervical spinal stenosis 08/20/2018    Arthritis of ankle 12/06/2018    Chronic pain of right ankle 12/06/2018    Shortness of breath 07/09/2019    Bilateral leg weakness 10/21/2019     History of melanoma 08/27/2020    Spondylosis of lumbar region without myelopathy or radiculopathy 09/09/2020    Stage 3a chronic kidney disease 10/21/2021    Pedal edema 10/21/2021    Gastroesophageal reflux disease without esophagitis 04/29/2022    TIA (transient ischemic attack) 05/12/2022    Bilateral carotid artery stenosis 05/12/2022    Cerebral artery occlusion 08/09/2022    Hoarseness 12/13/2022    Hiatal hernia 12/13/2022    Abdominal fullness 12/13/2022    Fever and chills 06/12/2023    COVID-19 06/12/2023    Chronic atrial fibrillation 06/15/2023    Cytokine release syndrome, grade 2 06/16/2023    Type 2 diabetes mellitus without complication, without long-term current use of insulin 10/03/2023    Chronic instability of ankle 09/06/2022    Lumbar post-laminectomy syndrome 06/13/2022    Primary localized osteoarthrosis of ankle and foot 09/06/2022    Leg edema, right 01/20/2025    Abnormal CT scan, stomach 06/12/2025     Resolved Ambulatory Problems     Diagnosis Date Noted    Health care maintenance 10/05/2017    Cancer 10/04/2018    Acute bronchitis 02/27/2019    Hyperglycemia 08/09/2022    Atrial fibrillation 06/15/2023     Past Medical History:   Diagnosis Date    Abnormal ECG 6-    Allergic 1970    Arrhythmia 6-    Arthritis     Asthma 05/23/2019    Benign prostatic hyperplasia     Cataract removed 1999    Cervical disc disorder     Chronic constipation     Colon polyp     Coronary artery disease 2001    CTS (carpal tunnel syndrome) 1998    Difficulty walking     Erectile dysfunction     GERD (gastroesophageal reflux disease)     HTN (hypertension)     Hyperlipidemia     Injury of back     Kidney stone     Low back pain     Lumbosacral disc disease     Melanoma 01/15/2009    Obesity     Scoliosis     Stented coronary artery     Thoracic disc disorder     Urinary tract infection aug. 2024         PAST SURGICAL HISTORY  Past Surgical History:   Procedure Laterality Date    ABLATION OF  DYSRHYTHMIC FOCUS  2020    radio frequency /lower back    ANKLE FUSION Left 2007    reconstruction and fusion    BACK SURGERY      HERNIATED LUMBAR DISK 1975,,    CARDIAC CATHETERIZATION      CARPAL TUNNEL RELEASE      CATARACT EXTRACTION, BILATERAL Bilateral     COLONOSCOPY      CORONARY ANGIOPLASTY WITH STENT PLACEMENT      CORONARY STENT PLACEMENT      ENDOSCOPY N/A 2022    Procedure: ESOPHAGOGASTRODUODENOSCOPY WITH BIOPSY;  Surgeon: Rashard Mathews MD;  Location: [facility];  Service: Gastroenterology;  Laterality: N/A;  gastric polyps, hiatal hernia    ENDOSCOPY N/A 2025    Procedure: ESOPHAGOGASTRODUODENOSCOPY WITH BIOPSY;  Surgeon: Vargas Reed MD;  Location: [facility];  Service: Gastroenterology;  Laterality: N/A;  gastric polyps, hiatal hernia, gastritis, duodenitis    EPIDURAL BLOCK  Several at [facility]    and [facility]    EYE SURGERY      cataracts    JOINT REPLACEMENT      knees    LAMINECTOMY  2012    Surgery    REPLACEMENT TOTAL KNEE Left 2015    REPLACEMENT TOTAL KNEE Right     SKIN CANCER EXCISION      MELANOMA    SPINE SURGERY      herniated discs   (3)    THORACIC SPINE SURGERY      UPPER GASTROINTESTINAL ENDOSCOPY           FAMILY HISTORY  Family History   Problem Relation Age of Onset    Hypertension Mother     Arthritis Mother             Skin cancer Mother     Heart disease Father     Early death Father         heart disease    Hypertension Father     Aortic aneurysm Father 58    Arthritis Sister     Parkinsonism Brother             Parkinsonism Brother     Cancer Neg Hx     Colon cancer Neg Hx     Colon polyps Neg Hx     Crohn's disease Neg Hx     Irritable bowel syndrome Neg Hx     Ulcerative colitis Neg Hx          SOCIAL HISTORY  Social History     Socioeconomic History    Marital status:     Number of children: 1    Years of education: BA DE[employer]REE   Tobacco Use    Smoking status: Never     Smokeless tobacco: Never    Tobacco comments:     caffeine use   Vaping Use    Vaping status: Never Used   Substance and Sexual Activity    Alcohol use: No    Drug use: No    Sexual activity: Not Currently     Partners: Female     Birth control/protection: None         ALLERGIES  Codeine, Hydrocodone, and Sulfa antibiotics        REVIEW OF SYSTEMS  Review of Systems   Constitutional:  Negative for activity change, appetite change and fever.   HENT:  Negative for congestion and sore throat.    Eyes: Negative.    Respiratory:  Negative for cough and shortness of breath.    Cardiovascular:  Negative for chest pain and leg swelling.   Gastrointestinal:  Negative for abdominal pain, diarrhea and vomiting.   Endocrine: Negative.    Genitourinary:  Positive for hematuria and penile discharge. Negative for decreased urine volume and dysuria.   Musculoskeletal:  Negative for neck pain.   Skin:  Negative for rash and wound.   Allergic/Immunologic: Negative.    Neurological:  Negative for weakness, numbness and headaches.   Hematological: Negative.    Psychiatric/Behavioral: Negative.     All other systems reviewed and are negative.         PHYSICAL EXAM  ED Triage Vitals [07/18/25 1156]   Temp Heart Rate Resp BP SpO2   98 °F (36.7 °C) 97 18 142/78 99 %      Temp src Heart Rate Source Patient Position BP Location FiO2 (%)   Oral -- -- -- --       Physical Exam  Constitutional:       General: He is not in acute distress.     Appearance: Normal appearance. He is not ill-appearing or toxic-appearing.   HENT:      Head: Normocephalic and atraumatic.   Eyes:      Extraocular Movements: Extraocular movements intact.      Pupils: Pupils are equal, round, and reactive to light.   Cardiovascular:      Rate and Rhythm: Normal rate and regular rhythm.      Heart sounds: No murmur heard.     No friction rub. No gallop.   Pulmonary:      Effort: Pulmonary effort is normal.      Breath sounds: Normal breath sounds.   Abdominal:       General: Abdomen is flat. There is no distension.      Palpations: Abdomen is soft.      Tenderness: There is no abdominal tenderness.   Genitourinary:     Penis: Normal.       Testes: Normal.      Comments: Easily retractable foreskin that was also easily retracted back over the penile head, no obvious structural abnormality seen  Musculoskeletal:         General: No swelling or tenderness. Normal range of motion.      Cervical back: Normal range of motion and neck supple.   Skin:     General: Skin is warm and dry.   Neurological:      General: No focal deficit present.      Mental Status: He is alert and oriented to person, place, and time.      Sensory: No sensory deficit.      Motor: No weakness.   Psychiatric:         Mood and Affect: Mood normal.         Behavior: Behavior normal.         Vital signs and nursing notes reviewed.          LAB RESULTS  Recent Results (from the past 24 hours)   Comprehensive Metabolic Panel    Collection Time: 07/18/25  1:08 PM    Specimen: Blood   Result Value Ref Range    Glucose 70 65 - 99 mg/dL    BUN 21.0 8.0 - 23.0 mg/dL    Creatinine 1.00 0.76 - 1.27 mg/dL    Sodium 139 136 - 145 mmol/L    Potassium 4.1 3.5 - 5.2 mmol/L    Chloride 109 (H) 98 - 107 mmol/L    CO2 21.0 (L) 22.0 - 29.0 mmol/L    Calcium 9.0 8.2 - 9.6 mg/dL    Total Protein 6.4 6.0 - 8.5 g/dL    Albumin 3.7 3.5 - 5.2 g/dL    ALT (SGPT) 12 1 - 41 U/L    AST (SGOT) 18 1 - 40 U/L    Alkaline Phosphatase 86 39 - 117 U/L    Total Bilirubin 0.4 0.0 - 1.2 mg/dL    Globulin 2.7 gm/dL    A/G Ratio 1.4 g/dL    BUN/Creatinine Ratio 21.0 7.0 - 25.0    Anion Gap 9.0 5.0 - 15.0 mmol/L    eGFR 71.5 >60.0 mL/min/1.73   Protime-INR    Collection Time: 07/18/25  1:08 PM    Specimen: Blood   Result Value Ref Range    Protime 17.8 (H) 11.7 - 14.2 Seconds    INR 1.47 (H) 0.90 - 1.10   aPTT    Collection Time: 07/18/25  1:08 PM    Specimen: Blood   Result Value Ref Range    PTT 51.4 (H) 22.7 - 35.4 seconds   Lactic Acid, Plasma     Collection Time: 07/18/25  1:08 PM    Specimen: Blood   Result Value Ref Range    Lactate 1.3 0.5 - 2.0 mmol/L   CBC Auto Differential    Collection Time: 07/18/25  1:08 PM    Specimen: Blood   Result Value Ref Range    WBC 10.73 3.40 - 10.80 10*3/mm3    RBC 4.43 4.14 - 5.80 10*6/mm3    Hemoglobin 13.7 13.0 - 17.7 g/dL    Hematocrit 41.1 37.5 - 51.0 %    MCV 92.8 79.0 - 97.0 fL    MCH 30.9 26.6 - 33.0 pg    MCHC 33.3 31.5 - 35.7 g/dL    RDW 13.2 12.3 - 15.4 %    RDW-SD 45.2 37.0 - 54.0 fl    MPV 8.6 6.0 - 12.0 fL    Platelets 211 140 - 450 10*3/mm3    Neutrophil % 69.1 42.7 - 76.0 %    Lymphocyte % 18.5 (L) 19.6 - 45.3 %    Monocyte % 9.7 5.0 - 12.0 %    Eosinophil % 1.7 0.3 - 6.2 %    Basophil % 0.3 0.0 - 1.5 %    Immature Grans % 0.7 (H) 0.0 - 0.5 %    Neutrophils, Absolute 7.42 (H) 1.70 - 7.00 10*3/mm3    Lymphocytes, Absolute 1.99 0.70 - 3.10 10*3/mm3    Monocytes, Absolute 1.04 (H) 0.10 - 0.90 10*3/mm3    Eosinophils, Absolute 0.18 0.00 - 0.40 10*3/mm3    Basophils, Absolute 0.03 0.00 - 0.20 10*3/mm3    Immature Grans, Absolute 0.07 (H) 0.00 - 0.05 10*3/mm3    nRBC 0.0 0.0 - 0.2 /100 WBC   Urinalysis With Microscopic If Indicated (No Culture) - Indwelling Urethral Catheter    Collection Time: 07/18/25  1:12 PM    Specimen: Indwelling Urethral Catheter; Urine   Result Value Ref Range    Color, UA Yellow Yellow, Straw    Appearance, UA Clear Clear    pH, UA 5.5 5.0 - 8.0    Specific Gravity, UA 1.018 1.005 - 1.030    Glucose, UA Negative Negative    Ketones, UA Negative Negative    Bilirubin, UA Negative Negative    Blood, UA Large (3+) (A) Negative    Protein, UA 30 mg/dL (1+) (A) Negative    Leuk Esterase, UA Trace (A) Negative    Nitrite, UA Negative Negative    Urobilinogen, UA 0.2 E.U./dL 0.2 - 1.0 E.U./dL   Urinalysis, Microscopic Only - Indwelling Urethral Catheter    Collection Time: 07/18/25  1:12 PM    Specimen: Indwelling Urethral Catheter; Urine   Result Value Ref Range    RBC, UA Too Numerous to  Count (A) None Seen, 0-2 /HPF    WBC, UA 3-5 (A) None Seen, 0-2 /HPF    Bacteria, UA None Seen None Seen /HPF    Squamous Epithelial Cells, UA 0-2 None Seen, 0-2 /HPF    Hyaline Casts, UA None Seen None Seen /LPF    Methodology Automated Microscopy        Ordered the above labs and reviewed the results.        RADIOLOGY  No Radiology Exams Resulted Within Past 24 Hours    Ordered the above noted radiological studies. Reviewed by me in PACS.            PROCEDURES  Procedures            MEDICATIONS GIVEN IN ER  Medications   sodium chloride 0.9 % flush 10 mL (has no administration in time range)                   MEDICAL DECISION MAKING, PROGRESS, and CONSULTS    All labs have been independently reviewed by me.  All radiology studies have been reviewed by me and I have also reviewed the radiology report.   EKGs independently viewed and interpreted by me.  Discussion below represents my analysis of pertinent findings related to patient's condition, differential diagnosis, treatment plan and final disposition.      Additional sources:  - Discussed/ obtained information from independent historians: History obtained from the patient as well as the patient's family at bedside.    - External (non-ED) record review: Upon medical records review, the patient was last seen and evaluated in the outpatient office of his primary care provider on 7/14/2025 in follow-up for his known obstructive uropathy with BPH requiring catheter placement as well as manage no type 2 diabetes mellitus.    - Chronic or social conditions impacting care: BPH, type 2 diabetes mellitus    - Shared decision making: Discharge decision based on shared conversations have between myself as well as the patient and patient's family at bedside.      Orders placed during this visit:  Orders Placed This Encounter   Procedures    Comprehensive Metabolic Panel    Protime-INR    aPTT    Urinalysis With Microscopic If Indicated (No Culture) - Urine, Catheter     Lactic Acid, Plasma    CBC Auto Differential    Urinalysis, Microscopic Only - Urine, Clean Catch    Insert Peripheral IV    CBC & Differential             Differential diagnosis includes but is not limited to:    Sepsis, acute bacteremia, penile cellulitis, abscess formation, urinary tract infection, phimosis, or paraphimosis      Independent interpretation of labs, radiology studies, and discussions with consultants:    Lab values independently interpreted by myself with my interpretation showing a normal CBC, CMP, as well as a noninfected urinalysis.      ED Course as of 07/18/25 1437   Fri Jul 18, 2025   1435 On reevaluation, the patient is resting comfortably and without any further complaints.  Vital signs are all stable.  I informed him that his workup in the ED including labs as well as urinalysis are all unremarkable.  He is showing no evidence of infection at this point.  I did move the catheter tubing somewhat to relieve a little bit of tight tugging between the end of the penis and his tubing security giving him a bit of slack.  He does report that that seems to have helped his symptoms.  We offered to change the catheter system however he declined and states that he feels much better and would like to follow-up as scheduled on Monday with urology.  All questions answered. [BM]      ED Course User Index  [BM] Nacho Sanders MD         DIAGNOSIS  Final diagnoses:   Penile swelling   Garcia catheter problem, initial encounter         DISPOSITION  DISCHARGE    Patient discharged in stable condition.    Reviewed implications of results, diagnosis, meds, responsibility to follow up, warning signs and symptoms of possible worsening, potential complications and reasons to return to ER.    Patient/Family voiced understanding of above instructions.    Discussed plan for discharge, as there is no emergent indication for admission. Patient referred to primary care provider for BP management due to today's BP.  Pt/family is agreeable and understands need for follow up and repeat testing.  Pt is aware that discharge does not mean that nothing is wrong but it indicates no emergency is present that requires admission and they must continue care with follow-up as given below or physician of their choice.     FOLLOW-UP  Leanna Ryder MD  [address] [facility] [address]  [phone]    Schedule an appointment as soon as possible for a visit       [facility]  [address] [place], [place] [id number] [phone]  Schedule an appointment as soon as possible for a visit            Medication List      No changes were made to your prescriptions during this visit.                   Latest Documented Vital Signs:  As of 14:37 EDT  BP- 129/73 HR- 75 Temp- 98 °F (36.7 °C) (Oral) O2 sat- 100%              --    Please note that portions of this were completed with a voice recognition program.       Note Disclaimer: At [facility], we believe that sharing information builds trust and better relationships. You are receiving this note because you are receiving care at [facility] or recently visited. It is possible you will see health information before a provider has talked with you about it. This kind of information can be easy to misunderstand. To help you fully understand what it means for your health, we urge you to discuss this note with your provider.             Nacho Sanders MD  07/18/25 0405

## 2025-07-23 DIAGNOSIS — Z91.09 ENVIRONMENTAL ALLERGIES: ICD-10-CM

## 2025-07-23 RX ORDER — FLUTICASONE PROPIONATE 50 MCG
2 SPRAY, SUSPENSION (ML) NASAL DAILY
Qty: 48 G | Refills: 3 | Status: SHIPPED | OUTPATIENT
Start: 2025-07-23

## 2025-07-25 ENCOUNTER — HOSPITAL ENCOUNTER (EMERGENCY)
Facility: HOSPITAL | Age: OVER 89
Discharge: HOME | End: 2025-07-25
Attending: EMERGENCY MEDICINE
Payer: MEDICARE

## 2025-07-25 ENCOUNTER — APPOINTMENT (OUTPATIENT)
Dept: CT IMAGING | Facility: HOSPITAL | Age: OVER 89
End: 2025-07-25
Payer: MEDICARE

## 2025-07-25 VITALS
SYSTOLIC BLOOD PRESSURE: 120 MMHG | HEART RATE: 90 BPM | TEMPERATURE: 97.6 F | DIASTOLIC BLOOD PRESSURE: 78 MMHG | OXYGEN SATURATION: 96 % | RESPIRATION RATE: 18 BRPM

## 2025-07-25 DIAGNOSIS — E04.1 THYROID NODULE: ICD-10-CM

## 2025-07-25 DIAGNOSIS — K11.20 SIALOADENITIS: Primary | ICD-10-CM

## 2025-07-25 LAB
ANION GAP SERPL CALCULATED.3IONS-SCNC: 11.3 MMOL/L (ref 5–15)
BASOPHILS # BLD AUTO: 0.04 10*3/MM3 (ref 0–0.2)
BASOPHILS NFR BLD AUTO: 0.4 % (ref 0–1.5)
BUN SERPL-MCNC: 25 MG/DL (ref 8–23)
BUN/CREAT SERPL: 24 (ref 7–25)
CALCIUM SPEC-SCNC: 9.4 MG/DL (ref 8.2–9.6)
CHLORIDE SERPL-SCNC: 107 MMOL/L (ref 98–107)
CO2 SERPL-SCNC: 19.7 MMOL/L (ref 22–29)
CREAT SERPL-MCNC: 1.04 MG/DL (ref 0.76–1.27)
DEPRECATED RDW RBC AUTO: 42.1 FL (ref 37–54)
EGFRCR SERPLBLD CKD-EPI 2021: 68.2 ML/MIN/1.73
EOSINOPHIL # BLD AUTO: 0.1 10*3/MM3 (ref 0–0.4)
EOSINOPHIL NFR BLD AUTO: 1 % (ref 0.3–6.2)
ERYTHROCYTE [DISTWIDTH] IN BLOOD BY AUTOMATED COUNT: 12.9 % (ref 12.3–15.4)
GLUCOSE SERPL-MCNC: 95 MG/DL (ref 65–99)
HCT VFR BLD AUTO: 39.8 % (ref 37.5–51)
HGB BLD-MCNC: 13.6 G/DL (ref 13–17.7)
IMM GRANULOCYTES # BLD AUTO: 0.03 10*3/MM3 (ref 0–0.05)
IMM GRANULOCYTES NFR BLD AUTO: 0.3 % (ref 0–0.5)
LYMPHOCYTES # BLD AUTO: 2.16 10*3/MM3 (ref 0.7–3.1)
LYMPHOCYTES NFR BLD AUTO: 22.6 % (ref 19.6–45.3)
MCH RBC QN AUTO: 30.8 PG (ref 26.6–33)
MCHC RBC AUTO-ENTMCNC: 34.2 G/DL (ref 31.5–35.7)
MCV RBC AUTO: 90 FL (ref 79–97)
MONOCYTES # BLD AUTO: 0.69 10*3/MM3 (ref 0.1–0.9)
MONOCYTES NFR BLD AUTO: 7.2 % (ref 5–12)
NEUTROPHILS NFR BLD AUTO: 6.52 10*3/MM3 (ref 1.7–7)
NEUTROPHILS NFR BLD AUTO: 68.5 % (ref 42.7–76)
NRBC BLD AUTO-RTO: 0 /100 WBC (ref 0–0.2)
PLATELET # BLD AUTO: 249 10*3/MM3 (ref 140–450)
PMV BLD AUTO: 8.4 FL (ref 6–12)
POTASSIUM SERPL-SCNC: 4.5 MMOL/L (ref 3.5–5.2)
RBC # BLD AUTO: 4.42 10*6/MM3 (ref 4.14–5.8)
SODIUM SERPL-SCNC: 138 MMOL/L (ref 136–145)
WBC NRBC COR # BLD AUTO: 9.54 10*3/MM3 (ref 3.4–10.8)

## 2025-07-25 PROCEDURE — 80048 BASIC METABOLIC PNL TOTAL CA: CPT | Performed by: EMERGENCY MEDICINE

## 2025-07-25 PROCEDURE — 99285 EMERGENCY DEPT VISIT HI MDM: CPT

## 2025-07-25 PROCEDURE — 70491 CT SOFT TISSUE NECK W/DYE: CPT

## 2025-07-25 PROCEDURE — 85025 COMPLETE CBC W/AUTO DIFF WBC: CPT | Performed by: EMERGENCY MEDICINE

## 2025-07-25 PROCEDURE — 25510000001 IOPAMIDOL 61 % SOLUTION: Performed by: EMERGENCY MEDICINE

## 2025-07-25 RX ORDER — SODIUM CHLORIDE 0.9 % (FLUSH) 0.9 %
10 SYRINGE (ML) INJECTION AS NEEDED
Status: DISCONTINUED | OUTPATIENT
Start: 2025-07-25 | End: 2025-07-25 | Stop reason: HOSPADM

## 2025-07-25 RX ORDER — IOPAMIDOL 612 MG/ML
85 INJECTION, SOLUTION INTRAVASCULAR
Status: COMPLETED | OUTPATIENT
Start: 2025-07-25 | End: 2025-07-25

## 2025-07-25 RX ADMIN — IOPAMIDOL 85 ML: 612 INJECTION, SOLUTION INTRAVENOUS at 14:54

## 2025-07-25 NOTE — ED PROVIDER NOTES
EMERGENCY DEPARTMENT ENCOUNTER    Room Number:  HB1/C  PCP: Leanna Ryder MD  Independent Historians: EM_Historian: Patient and Family    HPI:  Chief Complaint: had concerns including Mass.      A complete HPI/ROS/PMH/PSH/SH/FH are unobtainable due to: EM_Unobtainable History : None    Chronic or social conditions impacting patient care (Social Determinants of Health): None      Context: Kevin Ellington is a 89 y.o. male with a medical history of ddd, htn, cad, ckd, afib who presents to the ED c/o acute neck swelling. Pt work up with a firm mobile mass anterior neck with some overlying skin redness.  Pt with no fever, no drooling, no sore throat, no cough, no voice change.  He denies h/o thyroid issues and no new medications. He denies dental pain, ear pain, or headache.        Review of prior external notes (non-ED) -and- Review of prior external test results outside of this encounter: Pt seen by pmd 7/18/25 for hospital followup after having hematuria and cystitis    Prescription drug monitoring program review:     EM_Kasper : N/A    PAST MEDICAL HISTORY  Active Ambulatory Problems     Diagnosis Date Noted    Bulging lumbar disc 06/16/2016    DDD (degenerative disc disease), lumbar 06/16/2016    Leg pain 06/16/2016    Spinal stenosis of lumbar region with neurogenic claudication 06/16/2016    Radiculitis, thoracic 06/16/2016    Status post total left knee replacement using cement 06/16/2016    Status post total right knee replacement using cement 06/16/2016    Coronary artery disease involving native coronary artery of native heart without angina pectoris 08/23/2016    Essential hypertension 08/23/2016    Hyperlipemia 08/23/2016    S/P coronary artery stent placement 09/06/2017    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/05/2017    Benign prostatic hyperplasia with urinary frequency 10/05/2017    Cervical radiculopathy 02/06/2018    Bilateral carpal tunnel syndrome 08/20/2018    Cervical spinal stenosis  08/20/2018    Arthritis of ankle 12/06/2018    Chronic pain of right ankle 12/06/2018    Shortness of breath 07/09/2019    Bilateral leg weakness 10/21/2019    History of melanoma 08/27/2020    Spondylosis of lumbar region without myelopathy or radiculopathy 09/09/2020    Stage 3a chronic kidney disease 10/21/2021    Pedal edema 10/21/2021    Gastroesophageal reflux disease without esophagitis 04/29/2022    TIA (transient ischemic attack) 05/12/2022    Bilateral carotid artery stenosis 05/12/2022    Cerebral artery occlusion 08/09/2022    Hoarseness 12/13/2022    Hiatal hernia 12/13/2022    Abdominal fullness 12/13/2022    Fever and chills 06/12/2023    COVID-19 06/12/2023    Chronic atrial fibrillation 06/15/2023    Cytokine release syndrome, grade 2 06/16/2023    Type 2 diabetes mellitus without complication, without long-term current use of insulin 10/03/2023    Chronic instability of ankle 09/06/2022    Lumbar post-laminectomy syndrome 06/13/2022    Primary localized osteoarthrosis of ankle and foot 09/06/2022    Leg edema, right 01/20/2025    Abnormal CT scan, stomach 06/12/2025     Resolved Ambulatory Problems     Diagnosis Date Noted    Health care maintenance 10/05/2017    Cancer 10/04/2018    Acute bronchitis 02/27/2019    Hyperglycemia 08/09/2022    Atrial fibrillation 06/15/2023     Past Medical History:   Diagnosis Date    Abnormal ECG 6-    Allergic 1970    Arrhythmia 6-    Arthritis     Asthma 05/23/2019    Benign prostatic hyperplasia     Cataract removed 1999    Cervical disc disorder     Chronic constipation     Colon polyp     Coronary artery disease 2001    CTS (carpal tunnel syndrome) 1998    Difficulty walking     Erectile dysfunction     GERD (gastroesophageal reflux disease)     HTN (hypertension)     Hyperlipidemia     Injury of back     Kidney stone     Low back pain     Lumbosacral disc disease     Melanoma 01/15/2009    Obesity     Scoliosis     Stented coronary artery      Thoracic disc disorder     Urinary tract infection aug. 2024         PAST SURGICAL HISTORY  Past Surgical History:   Procedure Laterality Date    ABLATION OF DYSRHYTHMIC FOCUS  2020    radio frequency /lower back    ANKLE FUSION Left 2007    reconstruction and fusion    BACK SURGERY      HERNIATED LUMBAR DISK ,,    CARDIAC CATHETERIZATION      CARPAL TUNNEL RELEASE      CATARACT EXTRACTION, BILATERAL Bilateral     COLONOSCOPY      CORONARY ANGIOPLASTY WITH STENT PLACEMENT      CORONARY STENT PLACEMENT      ENDOSCOPY N/A 2022    Procedure: ESOPHAGOGASTRODUODENOSCOPY WITH BIOPSY;  Surgeon: Rashard Mathews MD;  Location: [facility];  Service: Gastroenterology;  Laterality: N/A;  gastric polyps, hiatal hernia    ENDOSCOPY N/A 2025    Procedure: ESOPHAGOGASTRODUODENOSCOPY WITH BIOPSY;  Surgeon: Vargas Reed MD;  Location: [facility];  Service: Gastroenterology;  Laterality: N/A;  gastric polyps, hiatal hernia, gastritis, duodenitis    EPIDURAL BLOCK  [facility] at [facility]    and [facility]    EYE SURGERY      cataracts    JOINT REPLACEMENT      knees    LAMINECTOMY  2012    Surgery    REPLACEMENT TOTAL KNEE Left 2015    REPLACEMENT TOTAL KNEE Right     SKIN CANCER EXCISION      MELANOMA    SPINE SURGERY      herniated discs   (3)    THORACIC SPINE SURGERY      UPPER GASTROINTESTINAL ENDOSCOPY           FAMILY HISTORY  Family History   Problem Relation Age of Onset    Hypertension Mother     Arthritis Mother             Skin cancer Mother     Heart disease Father     Early death Father         heart disease    Hypertension Father     Aortic aneurysm Father 58    Arthritis Sister     Parkinsonism Brother             Parkinsonism Brother     Cancer Neg Hx     Colon cancer Neg Hx     Colon polyps Neg Hx     Crohn's disease Neg Hx     Irritable bowel syndrome Neg Hx     Ulcerative colitis Neg Hx          SOCIAL HISTORY  Social  History     Socioeconomic History    Marital status:     Number of children: 1    Years of education: BA DEGREE   Tobacco Use    Smoking status: Never    Smokeless tobacco: Never    Tobacco comments:     caffeine use   Vaping Use    Vaping status: Never Used   Substance and Sexual Activity    Alcohol use: No    Drug use: No    Sexual activity: Not Currently     Partners: Female     Birth control/protection: None         ALLERGIES  Codeine, Hydrocodone, and Sulfa antibiotics        REVIEW OF SYSTEMS  Review of Systems  Included in HPI  All systems reviewed and negative except for those discussed in HPI.      PHYSICAL EXAM    I have reviewed the triage vital signs and nursing notes.    ED Triage Vitals   Temp Heart Rate Resp BP SpO2   07/25/25 1308 07/25/25 1308 07/25/25 1308 07/25/25 1312 07/25/25 1308   97.6 °F (36.4 °C) 106 18 112/88 96 %      Temp src Heart Rate Source Patient Position BP Location FiO2 (%)   07/25/25 1308 -- -- -- --   Oral           Physical Exam  GENERAL: pleasant cooperative M evaluated by me after ct scan, alert, no acute distress, normal phonation and voice with no drooling  SKIN: Warm, dry  HENT: Normocephalic, atraumatic, mmm, left parotid gland fullness without overlying erythema nor induration  EYES: no scleral icterus,e caridad  NECK: no masses, no ttp, mild erythema anterior neck superficially with no induration, no open skin lesions, trachea midline,no thyromegaly, no lymphadenopathy  RESPIRATORY: relaxed breathing  NEURO: alert, moves all extremities, follows commands                                                                   LAB RESULTS  Recent Results (from the past 24 hours)   Basic Metabolic Panel    Collection Time: 07/25/25  1:46 PM    Specimen: Blood   Result Value Ref Range    Glucose 95 65 - 99 mg/dL    BUN 25.0 (H) 8.0 - 23.0 mg/dL    Creatinine 1.04 0.76 - 1.27 mg/dL    Sodium 138 136 - 145 mmol/L    Potassium 4.5 3.5 - 5.2 mmol/L    Chloride 107 98 - 107 mmol/L     CO2 19.7 (L) 22.0 - 29.0 mmol/L    Calcium 9.4 8.2 - 9.6 mg/dL    BUN/Creatinine Ratio 24.0 7.0 - 25.0    Anion Gap 11.3 5.0 - 15.0 mmol/L    eGFR 68.2 >60.0 mL/min/1.73   CBC Auto Differential    Collection Time: 07/25/25  1:46 PM    Specimen: Blood   Result Value Ref Range    WBC 9.54 3.40 - 10.80 10*3/mm3    RBC 4.42 4.14 - 5.80 10*6/mm3    Hemoglobin 13.6 13.0 - 17.7 g/dL    Hematocrit 39.8 37.5 - 51.0 %    MCV 90.0 79.0 - 97.0 fL    MCH 30.8 26.6 - 33.0 pg    MCHC 34.2 31.5 - 35.7 g/dL    RDW 12.9 12.3 - 15.4 %    RDW-SD 42.1 37.0 - 54.0 fl    MPV 8.4 6.0 - 12.0 fL    Platelets 249 140 - 450 10*3/mm3    Neutrophil % 68.5 42.7 - 76.0 %    Lymphocyte % 22.6 19.6 - 45.3 %    Monocyte % 7.2 5.0 - 12.0 %    Eosinophil % 1.0 0.3 - 6.2 %    Basophil % 0.4 0.0 - 1.5 %    Immature Grans % 0.3 0.0 - 0.5 %    Neutrophils, Absolute 6.52 1.70 - 7.00 10*3/mm3    Lymphocytes, Absolute 2.16 0.70 - 3.10 10*3/mm3    Monocytes, Absolute 0.69 0.10 - 0.90 10*3/mm3    Eosinophils, Absolute 0.10 0.00 - 0.40 10*3/mm3    Basophils, Absolute 0.04 0.00 - 0.20 10*3/mm3    Immature Grans, Absolute 0.03 0.00 - 0.05 10*3/mm3    nRBC 0.0 0.0 - 0.2 /100 WBC         RADIOLOGY  CT Soft Tissue Neck With Contrast  Result Date: 7/25/2025  CONTRAST ENHANCED CT SCAN OF THE NECK ON 7/25/2025  CLINICAL HISTORY: This is a 89-year-old male patient with left-sided neck swelling and some anterior redness.  TECHNIQUE: Spiral CTA images were obtained from the tops of the petrous apices down through the sternoclavicular junction following intravenous contrast and images were reformatted and submitted in 3 mm thick axial, sagittal and coronal CT sections with soft tissue algorithm.  COMPARISON: This is correlated to cervical spine CT on 08/13/2018.  FINDINGS: There is minimal mucosal thickening in the inferior left maxillary sinus and posterior left ethmoid sinus otherwise, the paranasal sinuses mastoid air cells and middle ear cavities are clear. The  visualized mid and inferior aspect of the posterior cranial fossa structures are normal in appearance. The nasopharynx, oropharynx, the hypopharynx, true cords and subglottic airway are normal in appearance. Within the mid and superior aspect of the left lobe of the thyroid gland is a mass that measures 4.2 x 3.4 x 3.8 cm in anterior, posterior, mediolateral and craniocaudal dimension.  Mass is low in density. Its new low-density mass when compared to postmyelogram CT of the cervical spine on 08/13/2018. The right lobe of the thyroid gland is normal in appearance. The lung apices are clear. There is disc space narrowing there are degenerative endplate changes from C3-C7 with areas of canal and foraminal narrowing in the cervical spine. The parotid,  parapharyngeal and submandibular spaces are symmetric and normal in appearance. There is some minimal low-density lateral to the left thyroarytenoid muscle, may be some very minimal edema, etiology uncertain.      1. This patient has a low-density mass in the mid to superior left lobe of the thyroid that measures up to 4.2 x 3.4 x 3.8 cm in size and new when compared to postmyelogram CT of the cervical spine on 08/13/2018 and there is a tiny area of low density lateral to the left thyroid retinoid muscle that is vague and may be some minimal edema lateral to the left thyroarytenoid muscle. The etiology of this finding is uncertain, could be inflammatory in origin. Recommend clinical correlation as to where precisely the left neck swelling and redness is. Furthermore, I recommend further evaluation of the left thyroid mass with thyroid ultrasound and if warranted on thyroid ultrasound could be needle aspirated for more definitive diagnosis  2.. The remainder of the neck CT is unremarkable and the results and recommendations were communicated to Maria Del Carmen Calvin, emergency room doctor, by telephone on 07/25/2025 at 3:40 pm. . Radiation dose reduction techniques were  utilized, including automated exposure control and exposure modulation based on body size.           MEDICATIONS GIVEN IN ER  Medications   iopamidol (ISOVUE-300) 61 % injection 85 mL (85 mL Intravenous Given 7/25/25 1454)         ORDERS PLACED DURING THIS VISIT:  Orders Placed This Encounter   Procedures    CT Soft Tissue Neck With Contrast    Basic Metabolic Panel    CBC Auto Differential    CBC & Differential         OUTPATIENT MEDICATION MANAGEMENT:  Current Facility-Administered Medications Ordered in Epic   Medication Dose Route Frequency Provider Last Rate Last Admin    ipratropium-albuterol (DUO-NEB) nebulizer solution 3 mL  3 mL Nebulization 4x Daily - RT Leanna Ryder MD         Current Outpatient Medications Ordered in Epic   Medication Sig Dispense Refill    amoxicillin-clavulanate (AUGMENTIN) 875-125 MG per tablet Take 1 tablet by mouth 2 (Two) Times a Day. 20 tablet 0    apixaban (ELIQUIS) 5 MG tablet tablet Take 1 tablet by mouth Every 12 (Twelve) Hours. Indications: Atrial Fibrillation 180 tablet 3    atorvastatin (LIPITOR) 40 MG tablet TAKE 1 TABLET DAILY 90 tablet 3    Blood Glucose Monitoring Suppl (True Metrix Air Glucose Meter) w/Device kit Use 1 each Daily. 1 kit 0    cetirizine (zyrTEC) 10 MG tablet Take 1 tablet by mouth Daily.      fluticasone (FLONASE) 50 MCG/ACT nasal spray USE 2 SPRAYS INTO THE NOSTRIL(S) AS DIRECTED BY PROVIDER DAILY 48 g 3    furosemide (Lasix) 20 MG tablet Take 1 tablet by mouth Daily As Needed (swelling). 30 tablet 2    Lancets (freestyle) lancets Check one daily 100 each 12    losartan (COZAAR) 50 MG tablet TAKE 1 TABLET DAILY 90 tablet 3    metoprolol succinate XL (TOPROL-XL) 100 MG 24 hr tablet TAKE 1 TABLET DAILY 90 tablet 3    modafinil (PROVIGIL) 100 MG tablet Take 1 tablet by mouth Daily. 30 tablet 2    pantoprazole (PROTONIX) 40 MG EC tablet Take 1 tablet by mouth Daily. 90 tablet 3    potassium chloride (MICRO-K) 10 MEQ CR capsule Take 1 capsule by mouth 2  "(Two) Times a Day. 180 capsule 1    tamsulosin (FLOMAX) 0.4 MG capsule 24 hr capsule TAKE 2 CAPSULES DAILY 180 capsule 3         PROCEDURES  Procedures            PROGRESS, DATA ANALYSIS, CONSULTS, AND MEDICAL DECISION MAKING  All labs have been independently interpreted by me.  All radiology studies have been reviewed by me. All EKG's have been independently viewed and interpreted by me.  Discussion below represents my analysis of pertinent findings related to patient's condition, differential diagnosis, treatment plan and final disposition.    Differential diagnosis includes but is not limited to salivary gland stone, sialoadenitis, mass, malignancy, abscess, among many other possibilities.          ED Course as of 07/26/25 0145   Fri Jul 25, 2025   1407 On first look, patient presents complaining of mass/swelling to the anterior neck.  He states that he woke up with these symptoms.  He notices new redness as well.  No difficulty swallowing.  No difficulty tolerating secretions.  No change in phonation.  No history of similar.  I have ordered labs and CT imaging of the soft tissue of the neck for the oncoming physician. [TD]   1550 I discussed patient's CT scan with Dr. Riley    On exam patient has had clinical improvement in his swelling.  He no longer has a \"walnut\" like he had earlier per patient and wife.  Patient has a little bit of redness to anterior neck but no induration, no mass, no swelling.  Patient does have a little bit of parotid gland fullness on the left side with no overlying erythema.    I discussed in detail the need for follow-up with ENT given increased size left thyroid nodule that may need FNA [AR]      ED Course User Index  [AR] Yarelis Burch MD  [TD] Renato Collier II, MD             AS OF 01:45 EDT VITALS:    BP - 120/78  HR - 90  TEMP - 97.6 °F (36.4 °C) (Oral)  O2 SATS - 96%      COMPLEXITY OF CARE  Admission was considered but after careful review of the patient's " presentation, physical examination, diagnostic results, and response to treatment the patient may be safely discharged with outpatient follow-up.    DIAGNOSIS  Final diagnoses:   Sialoadenitis   Thyroid nodule         DISPOSITION  ED Disposition       ED Disposition   Discharge    Condition   Stable    Comment   --                Please note that portions of this document were completed with a voice recognition program.    Note Disclaimer: At [facility], we believe that sharing information builds trust and better relationships. You are receiving this note because you recently visited [facility]. It is possible you will see health information before a provider has talked with you about it. This kind of information can be easy to misunderstand. To help you fully understand what it means for your health, we urge you to discuss this note with your provider.         Yarelis Burch MD  07/26/25 9813

## 2025-07-25 NOTE — DISCHARGE INSTRUCTIONS
Rest at home avoiding any particularly strenuous activity today.       Eat small, frequent meals and drink plenty of fluids especially those things that will produce saliva like sour candies (as we discussed). Take any medication prescribed as instructed. If given antibiotics, then finish the full course of them.    Monitor for any signs of recurrent symptoms or worsening and see your primary doctor to discuss your ER visit while returning to the ER if any concerns as we discussed.     No

## 2025-07-25 NOTE — ED TRIAGE NOTES
"Patient to ED via pv from home c/o mass in throat that he noticed this morning. Patient reports it feels \"strange\" to swallow.   "

## 2025-07-28 ENCOUNTER — TELEPHONE (OUTPATIENT)
Dept: FAMILY MEDICINE CLINIC | Facility: CLINIC | Age: OVER 89
End: 2025-07-28
Payer: MEDICARE

## 2025-07-28 NOTE — TELEPHONE ENCOUNTER
Patient was seen at [facility] ED over the weekend. A mass was found. Is it possible to fit patient in this week?

## 2025-07-29 NOTE — TELEPHONE ENCOUNTER
HUB please transfer patient to office. Dr Ryder has agreed to fit patient in. Please offer 7/29 at 3:45 pm.

## 2025-08-01 ENCOUNTER — OFFICE VISIT (OUTPATIENT)
Dept: FAMILY MEDICINE CLINIC | Facility: CLINIC | Age: OVER 89
End: 2025-08-01
Payer: MEDICARE

## 2025-08-01 VITALS
TEMPERATURE: 97.4 F | DIASTOLIC BLOOD PRESSURE: 68 MMHG | HEIGHT: 70 IN | OXYGEN SATURATION: 97 % | HEART RATE: 76 BPM | BODY MASS INDEX: 29.2 KG/M2 | SYSTOLIC BLOOD PRESSURE: 110 MMHG | WEIGHT: 204 LBS

## 2025-08-01 DIAGNOSIS — E04.1 THYROID NODULE GREATER THAN OR EQUAL TO 1.5 CM IN DIAMETER INCIDENTALLY NOTED ON IMAGING STUDY: Primary | ICD-10-CM

## 2025-08-01 DIAGNOSIS — G47.419 PRIMARY NARCOLEPSY WITHOUT CATAPLEXY: ICD-10-CM

## 2025-08-01 DIAGNOSIS — Z51.81 THERAPEUTIC DRUG MONITORING: ICD-10-CM

## 2025-08-01 RX ORDER — MODAFINIL 100 MG/1
100 TABLET ORAL DAILY
Qty: 90 TABLET | Refills: 1 | Status: SHIPPED | OUTPATIENT
Start: 2025-08-01

## 2025-08-01 NOTE — PROGRESS NOTES
"Chief Complaint  Thyroid Problem (Follow up ct scan from ed room ct  they found thyroid mass )    Subjective        Kevin Ellington presents to [facility]  History of Present Illness    History of Present Illness  The patient presents for a thyroid nodule, hypersomnia, and enlarged prostate, he is here with his wife Dariela.    Thyroid Nodule  Recently visited ER for left neck swelling with a lump. Lump was painless but had a red streak. CT revealed a new nodule on the left thyroid lobe. Lump size has reduced but remains slightly larger on the left. No dysphagia. Unsure if ENT doctor is still practicing.  - Onset: Recently.  - Location: Left neck.  - Character: Painless lump with a red streak.  - Duration: Lump size has reduced but remains slightly larger on the left.  - Severity: No dysphagia.    Recurrent Fever  Intermittent fevers subsided after starting amoxicillin. Improved condition within days, including better color, increased activity, and no fever. Continues Augmentin. Upcoming rotator cuff procedure in 09/2025.  - Onset: Intermittent.  - Duration: Improved within days after starting amoxicillin.  - Character: Intermittent fevers.  - Alleviating Factors: Amoxicillin and Augmentin.  - Severity: Improved condition with better color, increased activity, and no fever.    Hypersomnia with narcolepsy  Requests modafinil refill from [facility]. Unsure if insurance covers 90-day supply. Has been receiving 30-day supplies. Discontinued hydrocodone due to constipation.  - Alleviating Factors: Discontinued hydrocodone due to constipation.  - Timing: Requests modafinil refill; unsure about insurance coverage for 90-day supply.    MEDICATIONS  Current: Amoxicillin, modafinil.  Discontinued: Hydrocodone.       Objective   Vital Signs:  /68   Pulse 76   Temp 97.4 °F (36.3 °C)   Ht 177.8 cm (70\")   Wt 92.5 kg (204 lb)   SpO2 97%   BMI 29.27 kg/m²   Estimated body mass index is " "29.27 kg/m² as calculated from the following:    Height as of this encounter: 177.8 cm (70\").    Weight as of this encounter: 92.5 kg (204 lb).            Physical Exam  Vitals and nursing note reviewed.   Constitutional:       General: He is not in acute distress.     Appearance: He is well-developed.   HENT:      Head: Normocephalic.      Nose: Nose normal.   Cardiovascular:      Rate and Rhythm: Normal rate and regular rhythm.      Heart sounds: Normal heart sounds. No murmur heard.  Pulmonary:      Effort: Pulmonary effort is normal. No respiratory distress.      Breath sounds: Normal breath sounds.   Musculoskeletal:         General: Normal range of motion.   Skin:     General: Skin is warm and dry.      Findings: No rash.   Neurological:      Mental Status: He is alert and oriented to person, place, and time.   Psychiatric:         Behavior: Behavior normal.         Thought Content: Thought content normal.         Judgment: Judgment normal.            Result Review :  The following data was reviewed by: Lenana Ryder MD on 08/01/2025:           CT Soft Tissue Neck With Contrast (07/25/2025 14:53)      Assessment and Plan   Diagnoses and all orders for this visit:    1. Thyroid nodule greater than or equal to 1.5 cm in diameter incidentally noted on imaging study (Primary)  -     TSH  -     T4, free  -     T3, free  -     Ambulatory Referral to ENT (Otolaryngology)    2. Therapeutic drug monitoring  -     ToxASSURE Select 13 (MW) - Urine, Clean Catch    3. Primary narcolepsy without cataplexy  -     modafinil (PROVIGIL) 100 MG tablet; Take 1 tablet by mouth Daily.  Dispense: 90 tablet; Refill: 1           Assessment & Plan  1. Thyroid nodule, new problem found incidentally on imaging after enlarged submandibular gland on the left side which has resolved.  - New low-density mass 4 x 3 cm on left thyroid lobe, not present in 2018 CT  - Concerning due to size   - Biopsy needed to determine benign or malignant  - " Order T4 and T3 levels today  - Refer to ENT for biopsy    2.  Narcolepsy, chronic problem that is well-controlled on modafinil 100 mg daily  - Order urine drug screen  - Schedule lab appointment for urine drug screen, unable to leave urine sample today with enlarged prostate  - Update modafinil agreement  - Send modafinil 100 mg prescription to [facility]  - Attempt 90-day supply approval    3.  Enlarged prostate with obstruction now doing In-N-Out catheterization, scheduled for procedure with first urology in September  - Recurrent fever resolved with amoxicillin  - Continue Augmentin for a few more days      Follow Up   Return if symptoms worsen or fail to improve, for needs lab apt for urine sample .  Patient was given instructions and counseling regarding his condition or for health maintenance advice. Please see specific information pulled into the AVS if appropriate.         Leanna Ryder MD      Patient or patient representative verbalized consent for the use of Ambient Listening during the visit with  Leanna Ryder MD for chart documentation. 8/1/2025  15:21 EDT

## 2025-08-02 LAB
T3FREE SERPL-MCNC: 3.2 PG/ML (ref 2–4.4)
T4 FREE SERPL-MCNC: 0.95 NG/DL (ref 0.92–1.68)
TSH SERPL DL<=0.005 MIU/L-ACNC: 3.79 UIU/ML (ref 0.27–4.2)

## 2025-08-21 ENCOUNTER — TELEPHONE (OUTPATIENT)
Dept: FAMILY MEDICINE CLINIC | Facility: CLINIC | Age: OVER 89
End: 2025-08-21
Payer: MEDICARE

## 2025-08-21 DIAGNOSIS — G47.419 PRIMARY NARCOLEPSY WITHOUT CATAPLEXY: ICD-10-CM

## 2025-08-21 RX ORDER — MODAFINIL 100 MG/1
100 TABLET ORAL DAILY
Qty: 90 TABLET | Refills: 1 | Status: SHIPPED | OUTPATIENT
Start: 2025-08-21

## (undated) DEVICE — VIAL FORMLN CAP 10PCT 20ML

## (undated) DEVICE — GOWN ISOL W/THUMB UNIV BLU BX/15

## (undated) DEVICE — BLCK/BITE BLOX W/DENTL/RIM W/STRAP 54F

## (undated) DEVICE — SINGLE-USE BIOPSY FORCEPS: Brand: RADIAL JAW 4

## (undated) DEVICE — MSK ENDO PORT O2 POM ELITE CURAPLEX A/

## (undated) DEVICE — FLEX ADVANTAGE 1500CC: Brand: FLEX ADVANTAGE

## (undated) DEVICE — KT ORCA ORCAPOD DISP STRL

## (undated) DEVICE — BITEBLOCK OMNI BLOC

## (undated) DEVICE — Device

## (undated) DEVICE — ADAPT CLN SCPE ENDO PORPOISE BX/50 DISP

## (undated) DEVICE — GOWN ,SIRUS,NONREINFORCED 3XL: Brand: MEDLINE

## (undated) DEVICE — GOWN PROC ENDOARMOR GI LVL3 HY/SHLD UNIV